# Patient Record
Sex: FEMALE | Race: ASIAN | NOT HISPANIC OR LATINO | Employment: UNEMPLOYED | URBAN - METROPOLITAN AREA
[De-identification: names, ages, dates, MRNs, and addresses within clinical notes are randomized per-mention and may not be internally consistent; named-entity substitution may affect disease eponyms.]

---

## 2018-03-07 ENCOUNTER — TRANSFERRED RECORDS (OUTPATIENT)
Dept: HEALTH INFORMATION MANAGEMENT | Facility: CLINIC | Age: 48
End: 2018-03-07

## 2019-07-29 LAB
CHOLESTEROL (EXTERNAL): 152 MG/DL
CREATININE (EXTERNAL): 1.99 MG/DL (ref 0.5–1.05)
GFR ESTIMATED (EXTERNAL): 28 ML/MIN/1.73M2
GFR ESTIMATED (IF AFRICAN AMERICAN) (EXTERNAL): 33 ML/MIN/1.73M2
GLUCOSE (EXTERNAL): 230 MG/DL (ref 65–99)
HDLC SERPL-MCNC: 16 MG/DL
NON HDL CHOLESTEROL (EXTERNAL): 136 MG/DL
POTASSIUM (EXTERNAL): 3.9 MMOL/L (ref 3.5–5.3)
TRIGLYCERIDES (EXTERNAL): 820 MG/DL

## 2019-11-15 LAB
AST SERPL-CCNC: 28 U/L (ref 10–35)
CHOLESTEROL (EXTERNAL): 136 MG/DL
CREATININE (EXTERNAL): 2.09 MG/DL (ref 0.5–1.05)
GFR ESTIMATED (EXTERNAL): 27 ML/MIN/1.73M2
GFR ESTIMATED (IF AFRICAN AMERICAN) (EXTERNAL): 31 ML/MIN/1.73M2
GLUCOSE (EXTERNAL): 369 MG/DL (ref 65–99)
HDLC SERPL-MCNC: 15 MG/DL
LDL CHOLESTEROL DIRECT (EXTERNAL): 30 MG/DL
NON HDL CHOLESTEROL (EXTERNAL): 121 MG/DL
POTASSIUM (EXTERNAL): 4.1 MMOL/L (ref 3.5–5.3)
TRIGLYCERIDES (EXTERNAL): 931 MG/DL

## 2020-07-29 LAB
AST SERPL-CCNC: 28 U/L (ref 10–35)
CHOLESTEROL (EXTERNAL): 220 MG/DL
CREATININE (EXTERNAL): 1.8 MG/DL (ref 0.5–1.05)
GFR ESTIMATED (EXTERNAL): 32 ML/MIN/1.73M2
GFR ESTIMATED (IF AFRICAN AMERICAN) (EXTERNAL): 37 ML/MIN/1.73M2
GLUCOSE (EXTERNAL): 492 MG/DL (ref 65–99)
HDLC SERPL-MCNC: 23 MG/DL
NON HDL CHOLESTEROL (EXTERNAL): 197 MG/DL
POTASSIUM (EXTERNAL): 4 MMOL/L (ref 3.5–5.3)
TRIGLYCERIDES (EXTERNAL): 1708 MG/DL
TSH SERPL-ACNC: 2.49 MIU/L (ref 0.4–4.5)

## 2020-12-02 LAB
AST SERPL-CCNC: 27 U/L (ref 10–35)
CHOLESTEROL (EXTERNAL): 136 MG/DL
CREATININE (EXTERNAL): 1.88 MG/DL (ref 0.5–1.05)
GFR ESTIMATED (EXTERNAL): 30 ML/MIN/1.73M2
GFR ESTIMATED (IF AFRICAN AMERICAN) (EXTERNAL): 35 ML/MIN/1.73M2
GLUCOSE (EXTERNAL): 153 MG/DL (ref 65–99)
HDLC SERPL-MCNC: 23 MG/DL
LDL CHOLESTEROL DIRECT (EXTERNAL): 33 MG/DL
NON HDL CHOLESTEROL (EXTERNAL): 113 MG/DL
POTASSIUM (EXTERNAL): 3.3 MMOL/L (ref 3.5–5.3)
TRIGLYCERIDES (EXTERNAL): 654 MG/DL

## 2021-03-23 LAB
AST SERPL-CCNC: 21 U/L (ref 10–35)
CHOLESTEROL (EXTERNAL): 152 MG/DL
CREATININE (EXTERNAL): 2.1 MG/DL (ref 0.5–1.05)
GFR ESTIMATED (EXTERNAL): 26 ML/MIN/1.73M2
GFR ESTIMATED (IF AFRICAN AMERICAN) (EXTERNAL): 30 ML/MIN/1.73M2
GLUCOSE (EXTERNAL): 77 MG/DL (ref 65–99)
HBA1C MFR BLD: 9.3 %
HDLC SERPL-MCNC: 29 MG/DL
NON HDL CHOLESTEROL (EXTERNAL): 123 MG/DL
POTASSIUM (EXTERNAL): 3 MMOL/L (ref 3.5–5.3)
TRIGLYCERIDES (EXTERNAL): 566 MG/DL

## 2021-07-07 ENCOUNTER — TRANSFERRED RECORDS (OUTPATIENT)
Dept: HEALTH INFORMATION MANAGEMENT | Facility: CLINIC | Age: 51
End: 2021-07-07

## 2021-07-07 LAB
ALBUMIN (URINE) MG/SPEC: 79.2 MG/DL
ALBUMIN/CREATININE RATIO: 1494 MCG/MG CREAT
AST SERPL-CCNC: 22 U/L (ref 10–35)
CHOLESTEROL (EXTERNAL): 150 MG/DL
CREATININE (EXTERNAL): 2.27 MG/DL (ref 0.5–1.05)
CREATININE (URINE): 53 MG/DL (ref 20–275)
GFR ESTIMATED (EXTERNAL): 24 ML/MIN/1.73M2
GFR ESTIMATED (IF AFRICAN AMERICAN) (EXTERNAL): 28 ML/MIN/1.73M2
GLUCOSE (EXTERNAL): 88 MG/DL (ref 65–99)
HBA1C MFR BLD: 8 %
HDLC SERPL-MCNC: 26 MG/DL
LDL CHOLESTEROL DIRECT (EXTERNAL): 44 MG/DL
NON HDL CHOLESTEROL (EXTERNAL): 124 MG/DL
POTASSIUM (EXTERNAL): 3.6 MMOL/L (ref 3.5–5.3)
TRIGLYCERIDES (EXTERNAL): 534 MG/DL

## 2021-08-17 ENCOUNTER — TRANSFERRED RECORDS (OUTPATIENT)
Dept: HEALTH INFORMATION MANAGEMENT | Facility: CLINIC | Age: 51
End: 2021-08-17

## 2021-08-17 LAB
C TRACH DNA SPEC QL PROBE+SIG AMP: NEGATIVE
HPV ABSTRACT: NORMAL
N GONORRHOEA DNA SPEC QL PROBE+SIG AMP: NEGATIVE
PAP-ABSTRACT: NORMAL
SPECIMEN DESCRIP: NORMAL
SPECIMEN DESCRIPTION: NORMAL

## 2021-09-24 ENCOUNTER — TRANSFERRED RECORDS (OUTPATIENT)
Dept: HEALTH INFORMATION MANAGEMENT | Facility: CLINIC | Age: 51
End: 2021-09-24

## 2021-09-24 LAB
CREATININE (EXTERNAL): 2.11 MG/DL (ref 0.5–1.05)
GFR ESTIMATED (EXTERNAL): 26 ML/MIN/1.73M2
GFR ESTIMATED (IF AFRICAN AMERICAN) (EXTERNAL): 30 ML/MIN/1.73M2
GLUCOSE (EXTERNAL): 131 MG/DL (ref 65–99)
HBA1C MFR BLD: 9.3 %
POTASSIUM (EXTERNAL): 3.6 MMOL/L (ref 3.5–5.3)

## 2021-09-29 ENCOUNTER — TRANSFERRED RECORDS (OUTPATIENT)
Dept: HEALTH INFORMATION MANAGEMENT | Facility: CLINIC | Age: 51
End: 2021-09-29

## 2021-12-02 ENCOUNTER — HOSPITAL ENCOUNTER (EMERGENCY)
Facility: HOSPITAL | Age: 51
Discharge: HOME | End: 2021-12-02
Attending: EMERGENCY MEDICINE | Admitting: EMERGENCY MEDICINE
Payer: MEDICAID

## 2021-12-02 VITALS
HEART RATE: 75 BPM | WEIGHT: 105 LBS | TEMPERATURE: 98.2 F | OXYGEN SATURATION: 95 % | SYSTOLIC BLOOD PRESSURE: 138 MMHG | DIASTOLIC BLOOD PRESSURE: 77 MMHG | RESPIRATION RATE: 16 BRPM

## 2021-12-02 DIAGNOSIS — Z78.9 HAS RUN OUT OF MEDICATIONS: ICD-10-CM

## 2021-12-02 DIAGNOSIS — R73.9 HYPERGLYCEMIA: ICD-10-CM

## 2021-12-02 LAB — GLUCOSE BLDC GLUCOMTR-MCNC: 573 MG/DL (ref 70–99)

## 2021-12-02 PROCEDURE — 99284 EMERGENCY DEPT VISIT MOD MDM: CPT

## 2021-12-02 RX ORDER — ESCITALOPRAM OXALATE 10 MG/1
10 TABLET ORAL DAILY
Qty: 30 TABLET | Refills: 0 | Status: SHIPPED | OUTPATIENT
Start: 2021-12-02 | End: 2021-12-13

## 2021-12-02 RX ORDER — AMLODIPINE BESYLATE 10 MG/1
10 TABLET ORAL DAILY
Qty: 30 TABLET | Refills: 0 | Status: ON HOLD | OUTPATIENT
Start: 2021-12-02 | End: 2021-12-10

## 2021-12-02 RX ORDER — METOPROLOL SUCCINATE 200 MG/1
200 TABLET, EXTENDED RELEASE ORAL 2 TIMES DAILY
Qty: 60 TABLET | Refills: 0 | Status: ON HOLD | OUTPATIENT
Start: 2021-12-02 | End: 2021-12-10

## 2021-12-02 RX ORDER — DULAGLUTIDE 3 MG/.5ML
0.5 INJECTION, SOLUTION SUBCUTANEOUS WEEKLY
Qty: 4 ML | Refills: 0 | Status: SHIPPED | OUTPATIENT
Start: 2021-12-02 | End: 2021-12-06

## 2021-12-02 RX ORDER — FENOFIBRATE 54 MG/1
54 TABLET ORAL 2 TIMES DAILY
Qty: 60 TABLET | Refills: 0 | Status: SHIPPED | OUTPATIENT
Start: 2021-12-02 | End: 2021-12-06

## 2021-12-02 RX ORDER — LISINOPRIL 40 MG/1
40 TABLET ORAL DAILY
Qty: 30 TABLET | Refills: 0 | Status: ON HOLD | OUTPATIENT
Start: 2021-12-02 | End: 2021-12-10

## 2021-12-02 RX ORDER — EZETIMIBE 10 MG/1
10 TABLET ORAL DAILY
Qty: 30 TABLET | Refills: 0 | Status: SHIPPED | OUTPATIENT
Start: 2021-12-02 | End: 2021-12-13

## 2021-12-02 RX ORDER — INSULIN DEGLUDEC 200 U/ML
160 INJECTION, SOLUTION SUBCUTANEOUS DAILY
Qty: 30 ML | Refills: 0 | Status: SHIPPED | OUTPATIENT
Start: 2021-12-02 | End: 2021-12-06

## 2021-12-02 RX ORDER — HYDROCHLOROTHIAZIDE 50 MG/1
50 TABLET ORAL DAILY
Qty: 30 TABLET | Refills: 0 | Status: SHIPPED | OUTPATIENT
Start: 2021-12-02 | End: 2021-12-13

## 2021-12-02 RX ORDER — INSULIN LISPRO 100 U/ML
20 INJECTION, SOLUTION SUBCUTANEOUS
Qty: 15 ML | Refills: 0 | Status: SHIPPED | OUTPATIENT
Start: 2021-12-02 | End: 2021-12-06

## 2021-12-02 RX ORDER — ATORVASTATIN CALCIUM 80 MG/1
80 TABLET, FILM COATED ORAL DAILY
Qty: 30 TABLET | Refills: 0 | Status: SHIPPED | OUTPATIENT
Start: 2021-12-02 | End: 2021-12-13

## 2021-12-02 ASSESSMENT — ENCOUNTER SYMPTOMS
CONFUSION: 0
NECK STIFFNESS: 0
SLEEP DISTURBANCE: 1
FEVER: 0
COLOR CHANGE: 0
HEADACHES: 0
MYALGIAS: 1
DIFFICULTY URINATING: 0
EYE REDNESS: 0
SHORTNESS OF BREATH: 0
ARTHRALGIAS: 0
ABDOMINAL PAIN: 0

## 2021-12-02 NOTE — ED TRIAGE NOTES
Patient presents here for evaluation of not feeling well due to running out with many of her prescribed medications, including those to treat her diabetes. She states that she has she has not monitored her blood sugar for one month.     Patient's blood glucose in triage is 573 at this time.

## 2021-12-02 NOTE — ED PROVIDER NOTES
EMERGENCY DEPARTMENT ENCOUNTER      NAME: Nithya Matthews  AGE: 51 year old female  YOB: 1970  MRN: [id number]  EVALUATION DATE & TIME: No admission date for patient encounter.    PCP: No Ref-Primary, Physician    ED PROVIDER: Andi You M.D.      Chief Complaint   Patient presents with     Ran out of medications         IMPRESSION  1. Has run out of medications    2. Hyperglycemia        PLAN  - refilled medications  - PCP referral made  - close PCP follow up  - discharge to home    ED COURSE & MEDICAL DECISION MAKING         1:12 PM I met with the patient for the initial interview and physical examination. Discussed plan for treatment and workup in the ED.  1:18 PM The patient's blood glucose noted to be 573.  1:40 PM I discussed the plan for discharge with the patient, and patient is agreeable. We discussed supportivecares at home and reasons for return to the ER including new or worsening symptoms - all questions and concerns addressed. Patient to be discharged by RN.     51yoF with history of T2DM, HTN, HLD presenting with her daughter requesting her previously-prescribed meds be refilled; moved from [place], [place] and ran out of meds 2 days ago. Feels fatigued since running out of her meds but no other symptoms. Normal vitals on presentation. Exam unremarkable with normal work of breathing, clear lungs, normal neuro exam, no peripheral edema, no rash, benign abdomen; reassuring. POC glucose in 500s but certainly no clinical concern for DKA with clear lungs, normal work of breathing, normal vitals. Refilled essential medications (diabetes, HTN, HLD) per patient & daughter request and referral to PCP made. Patient comfortable with discharge home at this time. Tolerating PO without difficulty; declines any labs here in the ED. Return precautions and need for PCP follow up discussed and understood. No further questions at the time of discharge.    I wore the following PPE during this patient encounter:  N95  mask, face shield w/ eye protection, gloves.    MEDICATIONS GIVEN IN THE EMERGENCY DEPARTMENT  Medications - No data to display    NEW PRESCRIPTIONS STARTED AT TODAY'S ER VISIT  Current Discharge Medication List      START taking these medications    Details   amLODIPine (NORVASC) 10 MG tablet Take 1 tablet (10 mg) by mouth daily  Qty: 30 tablet, Refills: 0      atorvastatin (LIPITOR) 80 MG tablet Take 1 tablet (80 mg) by mouth daily  Qty: 30 tablet, Refills: 0      Dulaglutide (TRULICITY) 3 MG/0.5ML SOPN Inject 0.5 mLs Subcutaneous once a week  Qty: 4 mL, Refills: 0      escitalopram (LEXAPRO) 10 MG tablet Take 1 tablet (10 mg) by mouth daily  Qty: 30 tablet, Refills: 0      ezetimibe (ZETIA) 10 MG tablet Take 1 tablet (10 mg) by mouth daily  Qty: 30 tablet, Refills: 0      fenofibrate (LOFIBRA) 54 MG tablet Take 1 tablet (54 mg) by mouth 2 times daily  Qty: 60 tablet, Refills: 0      hydrochlorothiazide (HYDRODIURIL) 50 MG tablet Take 1 tablet (50 mg) by mouth daily  Qty: 30 tablet, Refills: 0      insulin degludec (TRESIBA FLEXTOUCH) 200 UNIT/ML pen Inject 160 Units Subcutaneous daily  Qty: 30 mL, Refills: 0      insulin lispro (ADMELOG SOLOSTAR) 100 UNIT/ML pen Inject 20 Units Subcutaneous 2 times daily (before meals)  Qty: 15 mL, Refills: 0      lisinopril (ZESTRIL) 40 MG tablet Take 1 tablet (40 mg) by mouth daily  Qty: 30 tablet, Refills: 0      metoprolol succinate ER (TOPROL-XL) 200 MG 24 hr tablet Take 1 tablet (200 mg) by mouth 2 times daily  Qty: 60 tablet, Refills: 0           =================================================================      HPI  Patient information was obtained from: the patient    Use of : Yes - Language Jamari Matthews is a 51 year old female with no history who presents to this ED by personal vehicle for evaluation of med refill.    The patient presents to the ED today needing a medication refill.  [place] and has not taken her  medication for ~2 days and hasn't taken her injections for ~1 month.  She is currently out of all of her medications. She has not established a PCP since moving. She reports shakiness, myalgias, sleep disturbance.    The patient denies any other symptoms at this time.      REVIEW OF SYSTEMS   Review of Systems   Constitutional: Negative for fever.   HENT: Negative for congestion.    Eyes: Negative for redness.   Respiratory: Negative for shortness of breath.    Cardiovascular: Negative for chest pain.   Gastrointestinal: Negative for abdominal pain.   Genitourinary: Negative for difficulty urinating.   Musculoskeletal: Positive for myalgias. Negative for arthralgias and neck stiffness.        Positive for shakiness.   Skin: Negative for color change.   Neurological: Negative for headaches.   Psychiatric/Behavioral: Positive for sleep disturbance. Negative for confusion.   All other systems reviewed and are negative.          --------------- MEDICAL HISTORY ---------------  PAST MEDICAL HISTORY:  History reviewed. No pertinent past medical history.  There is no problem list on file for this patient.      PAST SURGICAL HISTORY:  History reviewed. No pertinent surgical history.    CURRENT MEDICATIONS:    No current facility-administered medications for this encounter.    Current Outpatient Medications:      amLODIPine (NORVASC) 10 MG tablet, Take 1 tablet (10 mg) by mouth daily, Disp: 30 tablet, Rfl: 0     atorvastatin (LIPITOR) 80 MG tablet, Take 1 tablet (80 mg) by mouth daily, Disp: 30 tablet, Rfl: 0     Dulaglutide (TRULICITY) 3 MG/0.5ML SOPN, Inject 0.5 mLs Subcutaneous once a week, Disp: 4 mL, Rfl: 0     escitalopram (LEXAPRO) 10 MG tablet, Take 1 tablet (10 mg) by mouth daily, Disp: 30 tablet, Rfl: 0     ezetimibe (ZETIA) 10 MG tablet, Take 1 tablet (10 mg) by mouth daily, Disp: 30 tablet, Rfl: 0     fenofibrate (LOFIBRA) 54 MG tablet, Take 1 tablet (54 mg) by mouth 2 times daily, Disp: 60 tablet, Rfl: 0      hydrochlorothiazide (HYDRODIURIL) 50 MG tablet, Take 1 tablet (50 mg) by mouth daily, Disp: 30 tablet, Rfl: 0     insulin degludec (TRESIBA FLEXTOUCH) 200 UNIT/ML pen, Inject 160 Units Subcutaneous daily, Disp: 30 mL, Rfl: 0     insulin lispro (ADMELOG SOLOSTAR) 100 UNIT/ML pen, Inject 20 Units Subcutaneous 2 times daily (before meals), Disp: 15 mL, Rfl: 0     lisinopril (ZESTRIL) 40 MG tablet, Take 1 tablet (40 mg) by mouth daily, Disp: 30 tablet, Rfl: 0     metoprolol succinate ER (TOPROL-XL) 200 MG 24 hr tablet, Take 1 tablet (200 mg) by mouth 2 times daily, Disp: 60 tablet, Rfl: 0    ALLERGIES:  No Known Allergies    FAMILY HISTORY:  History reviewed. No pertinent family history.    SOCIAL HISTORY:   Social History     Socioeconomic History     Marital status:      Spouse name: Not on file     Number of children: Not on file     Years of education: Not on file     Highest education level: Not on file   Occupational History     Not on file   Tobacco Use     Smoking status: Not on file     Smokeless tobacco: Not on file   Substance and Sexual Activity     Alcohol use: Not on file     Drug use: Not on file     Sexual activity: Not on file   Other Topics Concern     Not on file   Social History Narrative     Not on file     Social Determinants of Health     Financial Resource Strain: Not on file   Food Insecurity: Not on file   Transportation Needs: Not on file   Physical Activity: Not on file   Stress: Not on file   Social Connections: Not on file   Intimate Partner Violence: Not on file   Housing Stability: Not on file         --------------- PHYSICAL EXAM ---------------  Nursing notes and vitals reviewed by me.  VITALS:  Vitals:    12/02/21 1306   BP: 138/77   Pulse: 75   Resp: 16   Temp: 98.2  F (36.8  C)   TempSrc: Oral   SpO2: 95%   Weight: 47.6 kg (105 lb)       PHYSICAL EXAM:    General:  alert, interactive, no distress  Eyes:  conjunctivae clear, conjugate gaze   HENT:  atraumatic, nose with no  rhinorrhea, oropharynx clear  Neck:  no meningismus  Cardiovascular:  HR 70s during exam, regular rhythm, no murmurs, brisk cap refill  Chest:  no chest wall tenderness  Pulmonary:  no stridor, normal phonation, normal work of breathing, clear lungs bilaterally  Abdomen:  soft, nondistended, nontender  :  no CVA tenderness  Back:  no midline spinal tenderness  Musculoskeletal:  no pretibial edema, no calf tenderness. Gross ROM intact to joints of extremities with no obvious deformities.  Skin:  warm, dry, no rash  Neuro:  awake, alert, answers questions appropriately, follows commands, moves all limbs, CN 2-12 intact, negative pronator drift, 5/5 strength to all extremities with sensation to light touch intact, no tremor  Psych:  calm, normal affect      --------------- RESULTS ---------------  LAB:  Reviewed and interpreted by me.  Results for orders placed or performed during the hospital encounter of 12/02/21   Glucose by meter   Result Value Ref Range    GLUCOSE BY METER POCT 573 () 70 - 99 mg/dL           I, Mariusz Suarez, am serving as a scribe to document services personally performed by Dr. Andi You based on my observation and the provider's statements to me. I, Andi You MD attest that Mariusz Suarez is acting in a scribe capacity, has observed my performance of the services and has documented them in accordance with my direction.      Andi You MD  12/02/21  Emergency Medicine  [facility] EMERGENCY DEPARTMENT  [address] [place] [id number] [phone]  Dept: [phone]       Andi You MD  12/02/21 8286

## 2021-12-02 NOTE — DISCHARGE INSTRUCTIONS
Take your prescriptions as prescribed.    Drink plenty of fluids to stay hydrated.    Follow up with your Primary Care provider in 2 days for a recheck.    Return to the Emergency Department for any difficulty breathing, persistent vomiting, or any other concerns.

## 2021-12-06 ENCOUNTER — APPOINTMENT (OUTPATIENT)
Dept: CT IMAGING | Facility: HOSPITAL | Age: 51
End: 2021-12-06
Attending: EMERGENCY MEDICINE
Payer: MEDICAID

## 2021-12-06 ENCOUNTER — OFFICE VISIT (OUTPATIENT)
Dept: FAMILY MEDICINE | Facility: CLINIC | Age: 51
End: 2021-12-06
Payer: MEDICAID

## 2021-12-06 ENCOUNTER — APPOINTMENT (OUTPATIENT)
Dept: RADIOLOGY | Facility: HOSPITAL | Age: 51
End: 2021-12-06
Attending: EMERGENCY MEDICINE
Payer: MEDICAID

## 2021-12-06 ENCOUNTER — HOSPITAL ENCOUNTER (INPATIENT)
Facility: HOSPITAL | Age: 51
LOS: 1 days | Discharge: HOME | End: 2021-12-10
Attending: EMERGENCY MEDICINE | Admitting: HOSPITALIST
Payer: MEDICAID

## 2021-12-06 ENCOUNTER — TELEPHONE (OUTPATIENT)
Dept: FAMILY MEDICINE | Facility: CLINIC | Age: 51
End: 2021-12-06

## 2021-12-06 VITALS
WEIGHT: 106 LBS | RESPIRATION RATE: 16 BRPM | BODY MASS INDEX: 19.51 KG/M2 | TEMPERATURE: 98.5 F | DIASTOLIC BLOOD PRESSURE: 58 MMHG | HEIGHT: 62 IN | OXYGEN SATURATION: 95 % | HEART RATE: 61 BPM | SYSTOLIC BLOOD PRESSURE: 110 MMHG

## 2021-12-06 DIAGNOSIS — I10 HYPERTENSION, UNSPECIFIED TYPE: ICD-10-CM

## 2021-12-06 DIAGNOSIS — E78.5 HYPERLIPIDEMIA LDL GOAL <100: Primary | ICD-10-CM

## 2021-12-06 DIAGNOSIS — E78.5 HYPERLIPIDEMIA LDL GOAL <100: ICD-10-CM

## 2021-12-06 DIAGNOSIS — E11.69 TYPE 2 DIABETES MELLITUS WITH OTHER SPECIFIED COMPLICATION, UNSPECIFIED WHETHER LONG TERM INSULIN USE (H): Primary | ICD-10-CM

## 2021-12-06 DIAGNOSIS — F32.A DEPRESSION, UNSPECIFIED DEPRESSION TYPE: ICD-10-CM

## 2021-12-06 DIAGNOSIS — E11.69 TYPE 2 DIABETES MELLITUS WITH OTHER SPECIFIED COMPLICATION, UNSPECIFIED WHETHER LONG TERM INSULIN USE (H): ICD-10-CM

## 2021-12-06 DIAGNOSIS — R73.9 HYPERGLYCEMIA: ICD-10-CM

## 2021-12-06 DIAGNOSIS — M62.81 GENERALIZED MUSCLE WEAKNESS: ICD-10-CM

## 2021-12-06 PROBLEM — N18.30 STAGE 3 CHRONIC KIDNEY DISEASE (H): Status: ACTIVE | Noted: 2021-12-06

## 2021-12-06 LAB
ALBUMIN SERPL-MCNC: 4 G/DL (ref 3.5–5)
ALBUMIN UR-MCNC: 100 MG/DL
ALP SERPL-CCNC: 85 U/L (ref 45–120)
ALT SERPL W P-5'-P-CCNC: 37 U/L (ref 0–45)
ANION GAP SERPL CALCULATED.3IONS-SCNC: 16 MMOL/L (ref 5–18)
ANION GAP SERPL CALCULATED.3IONS-SCNC: 9 MMOL/L (ref 3–14)
APPEARANCE UR: CLEAR
AST SERPL W P-5'-P-CCNC: 50 U/L (ref 0–40)
BACTERIA #/AREA URNS HPF: ABNORMAL /HPF
BILIRUB SERPL-MCNC: 0.7 MG/DL (ref 0–1)
BILIRUB UR QL STRIP: NEGATIVE
BUN SERPL-MCNC: 39 MG/DL (ref 8–22)
BUN SERPL-MCNC: 41 MG/DL (ref 7–30)
CALCIUM SERPL-MCNC: 8.9 MG/DL (ref 8.5–10.1)
CALCIUM SERPL-MCNC: 8.9 MG/DL (ref 8.5–10.5)
CHLORIDE BLD-SCNC: 91 MMOL/L (ref 94–109)
CHLORIDE BLD-SCNC: 92 MMOL/L (ref 98–107)
CHOLEST SERPL-MCNC: 194 MG/DL
CO2 SERPL-SCNC: 25 MMOL/L (ref 22–31)
CO2 SERPL-SCNC: 29 MMOL/L (ref 20–32)
COLOR UR AUTO: ABNORMAL
CREAT SERPL-MCNC: 2.16 MG/DL (ref 0.52–1.04)
CREAT SERPL-MCNC: 2.54 MG/DL (ref 0.6–1.1)
ERYTHROCYTE [DISTWIDTH] IN BLOOD BY AUTOMATED COUNT: 18.8 % (ref 10–15)
ERYTHROCYTE [DISTWIDTH] IN BLOOD BY AUTOMATED COUNT: 18.8 % (ref 10–15)
FASTING STATUS PATIENT QL REPORTED: NO
GFR SERPL CREATININE-BSD FRML MDRD: 21 ML/MIN/1.73M2
GFR SERPL CREATININE-BSD FRML MDRD: 25 ML/MIN/1.73M2
GLUCOSE BLD-MCNC: 450 MG/DL (ref 70–125)
GLUCOSE BLD-MCNC: 669 MG/DL (ref 70–99)
GLUCOSE BLDC GLUCOMTR-MCNC: 409 MG/DL (ref 70–99)
GLUCOSE UR STRIP-MCNC: >1000 MG/DL
HBA1C MFR BLD: 14.5 % (ref 0–5.6)
HCT VFR BLD AUTO: 33.3 % (ref 35–47)
HCT VFR BLD AUTO: 35.5 % (ref 35–47)
HDLC SERPL-MCNC: 28 MG/DL
HGB BLD-MCNC: 10.8 G/DL (ref 11.7–15.7)
HGB BLD-MCNC: 11.3 G/DL (ref 11.7–15.7)
HGB UR QL STRIP: ABNORMAL
KETONES UR STRIP-MCNC: NEGATIVE MG/DL
LDLC SERPL CALC-MCNC: 40 MG/DL
LDLC SERPL CALC-MCNC: ABNORMAL MG/DL
LEUKOCYTE ESTERASE UR QL STRIP: NEGATIVE
LIPASE SERPL-CCNC: 108 U/L (ref 0–52)
MAGNESIUM SERPL-MCNC: 1.9 MG/DL (ref 1.8–2.6)
MCH RBC QN AUTO: 21.4 PG (ref 26.5–33)
MCH RBC QN AUTO: 21.7 PG (ref 26.5–33)
MCHC RBC AUTO-ENTMCNC: 31.8 G/DL (ref 31.5–36.5)
MCHC RBC AUTO-ENTMCNC: 32.4 G/DL (ref 31.5–36.5)
MCV RBC AUTO: 67 FL (ref 78–100)
MCV RBC AUTO: 67 FL (ref 78–100)
NITRATE UR QL: NEGATIVE
NONHDLC SERPL-MCNC: 166 MG/DL
PH UR STRIP: 6 [PH] (ref 5–7)
PLATELET # BLD AUTO: 188 10E3/UL (ref 150–450)
PLATELET # BLD AUTO: 200 10E3/UL (ref 150–450)
POTASSIUM BLD-SCNC: 3.4 MMOL/L (ref 3.4–5.3)
POTASSIUM BLD-SCNC: 3.5 MMOL/L (ref 3.5–5)
PROT SERPL-MCNC: 8.5 G/DL (ref 6–8)
RBC # BLD AUTO: 4.98 10E6/UL (ref 3.8–5.2)
RBC # BLD AUTO: 5.29 10E6/UL (ref 3.8–5.2)
RBC URINE: 3 /HPF
SARS-COV-2 RNA RESP QL NAA+PROBE: NEGATIVE
SODIUM SERPL-SCNC: 129 MMOL/L (ref 133–144)
SODIUM SERPL-SCNC: 133 MMOL/L (ref 136–145)
SP GR UR STRIP: 1.02 (ref 1–1.03)
SQUAMOUS EPITHELIAL: 4 /HPF
TRIGL SERPL-MCNC: 1018 MG/DL
UROBILINOGEN UR STRIP-MCNC: <2 MG/DL
WBC # BLD AUTO: 7.6 10E3/UL (ref 4–11)
WBC # BLD AUTO: 9.5 10E3/UL (ref 4–11)
WBC URINE: 10 /HPF

## 2021-12-06 PROCEDURE — 71045 X-RAY EXAM CHEST 1 VIEW: CPT

## 2021-12-06 PROCEDURE — C9803 HOPD COVID-19 SPEC COLLECT: HCPCS

## 2021-12-06 PROCEDURE — 81001 URINALYSIS AUTO W/SCOPE: CPT | Performed by: STUDENT IN AN ORGANIZED HEALTH CARE EDUCATION/TRAINING PROGRAM

## 2021-12-06 PROCEDURE — 80061 LIPID PANEL: CPT | Performed by: PHYSICIAN ASSISTANT

## 2021-12-06 PROCEDURE — 85027 COMPLETE CBC AUTOMATED: CPT | Performed by: EMERGENCY MEDICINE

## 2021-12-06 PROCEDURE — 258N000003 HC RX IP 258 OP 636: Performed by: EMERGENCY MEDICINE

## 2021-12-06 PROCEDURE — 36415 COLL VENOUS BLD VENIPUNCTURE: CPT | Performed by: EMERGENCY MEDICINE

## 2021-12-06 PROCEDURE — 84300 ASSAY OF URINE SODIUM: CPT | Performed by: HOSPITALIST

## 2021-12-06 PROCEDURE — 83690 ASSAY OF LIPASE: CPT | Performed by: EMERGENCY MEDICINE

## 2021-12-06 PROCEDURE — 36415 COLL VENOUS BLD VENIPUNCTURE: CPT | Performed by: PHYSICIAN ASSISTANT

## 2021-12-06 PROCEDURE — 83735 ASSAY OF MAGNESIUM: CPT | Performed by: EMERGENCY MEDICINE

## 2021-12-06 PROCEDURE — 99285 EMERGENCY DEPT VISIT HI MDM: CPT | Mod: 25

## 2021-12-06 PROCEDURE — 74176 CT ABD & PELVIS W/O CONTRAST: CPT

## 2021-12-06 PROCEDURE — 80048 BASIC METABOLIC PNL TOTAL CA: CPT | Performed by: EMERGENCY MEDICINE

## 2021-12-06 PROCEDURE — 83721 ASSAY OF BLOOD LIPOPROTEIN: CPT | Mod: 59 | Performed by: PHYSICIAN ASSISTANT

## 2021-12-06 PROCEDURE — 99204 OFFICE O/P NEW MOD 45 MIN: CPT | Performed by: PHYSICIAN ASSISTANT

## 2021-12-06 PROCEDURE — 84520 ASSAY OF UREA NITROGEN: CPT | Performed by: EMERGENCY MEDICINE

## 2021-12-06 PROCEDURE — 96361 HYDRATE IV INFUSION ADD-ON: CPT

## 2021-12-06 PROCEDURE — 83935 ASSAY OF URINE OSMOLALITY: CPT | Performed by: HOSPITALIST

## 2021-12-06 PROCEDURE — 80053 COMPREHEN METABOLIC PANEL: CPT | Performed by: PHYSICIAN ASSISTANT

## 2021-12-06 PROCEDURE — 83036 HEMOGLOBIN GLYCOSYLATED A1C: CPT | Performed by: PHYSICIAN ASSISTANT

## 2021-12-06 PROCEDURE — 81001 URINALYSIS AUTO W/SCOPE: CPT | Performed by: EMERGENCY MEDICINE

## 2021-12-06 PROCEDURE — 87635 SARS-COV-2 COVID-19 AMP PRB: CPT | Performed by: EMERGENCY MEDICINE

## 2021-12-06 PROCEDURE — 85027 COMPLETE CBC AUTOMATED: CPT | Performed by: PHYSICIAN ASSISTANT

## 2021-12-06 RX ORDER — INSULIN LISPRO 100 U/ML
20 INJECTION, SOLUTION SUBCUTANEOUS
Qty: 15 ML | Refills: 0 | Status: SHIPPED | OUTPATIENT
Start: 2021-12-06 | End: 2021-12-06

## 2021-12-06 RX ORDER — FENOFIBRATE 54 MG/1
54 TABLET ORAL 2 TIMES DAILY
Qty: 60 TABLET | Refills: 0 | Status: ON HOLD | OUTPATIENT
Start: 2021-12-06 | End: 2021-12-10

## 2021-12-06 RX ORDER — DULAGLUTIDE 3 MG/.5ML
0.5 INJECTION, SOLUTION SUBCUTANEOUS WEEKLY
Qty: 4 ML | Refills: 0 | Status: SHIPPED | OUTPATIENT
Start: 2021-12-06 | End: 2021-12-13

## 2021-12-06 RX ORDER — INSULIN LISPRO 100 U/ML
20 INJECTION, SOLUTION SUBCUTANEOUS
Qty: 15 ML | Refills: 0 | Status: ON HOLD | OUTPATIENT
Start: 2021-12-06 | End: 2021-12-10

## 2021-12-06 RX ORDER — DULAGLUTIDE 0.75 MG/.5ML
0.75 INJECTION, SOLUTION SUBCUTANEOUS
Qty: 2 ML | Refills: 0 | Status: SHIPPED | OUTPATIENT
Start: 2021-12-06 | End: 2021-12-07

## 2021-12-06 RX ORDER — INSULIN DEGLUDEC 200 U/ML
80 INJECTION, SOLUTION SUBCUTANEOUS DAILY
Qty: 30 ML | Refills: 0 | Status: ON HOLD | OUTPATIENT
Start: 2021-12-06 | End: 2021-12-10

## 2021-12-06 RX ORDER — INSULIN DEGLUDEC 200 U/ML
160 INJECTION, SOLUTION SUBCUTANEOUS DAILY
Qty: 30 ML | Refills: 0 | Status: SHIPPED | OUTPATIENT
Start: 2021-12-06 | End: 2021-12-06

## 2021-12-06 RX ADMIN — SODIUM CHLORIDE 2000 ML: 9 INJECTION, SOLUTION INTRAVENOUS at 22:22

## 2021-12-06 ASSESSMENT — ENCOUNTER SYMPTOMS
PARESTHESIAS: 1
NERVOUS/ANXIOUS: 0
VOMITING: 1
APPETITE CHANGE: 0
LIGHT-HEADEDNESS: 0
BACK PAIN: 1
VOMITING: 0
CHILLS: 1
FEVER: 0
FEVER: 0
DIZZINESS: 0
DIAPHORESIS: 0
NAUSEA: 0
SHORTNESS OF BREATH: 0
ABDOMINAL PAIN: 0
DIARRHEA: 1
BACK PAIN: 1

## 2021-12-06 ASSESSMENT — PAIN SCALES - GENERAL: PAINLEVEL: NO PAIN (0)

## 2021-12-06 ASSESSMENT — MIFFLIN-ST. JEOR: SCORE: 1039.06

## 2021-12-06 NOTE — PROGRESS NOTES
Assessment & Plan     Type 2 diabetes mellitus with other specified complication, unspecified whether long term insulin use (H)  Patient is a 51-year-old female with past medical history significant for type 2 diabetes-on insulin, hyperlipidemia, depression, hypertension who presents to clinic with daughter requesting to establish primary care provider following ED visit.  Patient was evaluated in ED due to running out of all medications as she recently moved from [place] to [place] and has not established primary care.  Per ED note, patient was out of medications for 3 days.  Per daughter, patient has been off of medications for 1 month.  Some medications were refilled through ED, but medications for diabetes including Trulicity, insulin degludec, and  insulin lispro declined.  Patient denies nausea, vomiting, abdominal pain.  Blood glucose in ED in the 500's.  No further lab work completed during ED visit.  Vital signs normal.  Physical exam without acute abnormalities.     Will complete A1c as well as electrolyte panel today.  Discussed that I am a same-day care provider and do not typically manage chronic conditions.  We will schedule patient with PCP for further management.  Patient care and medication dosing discussed with Sarah Moon, primary care NP.  Refills represcribed with recommendation to switch to alternative brand if needed for insurance coverage.  Patient scheduled to establish care PCP in 1 week.  Discussed symptoms that warrant urgent/emergent follow-up as well as return precautions.    - Dulaglutide (TRULICITY) 3 MG/0.5ML SOPN; Inject 0.5 mLs Subcutaneous once a week  - CBC with platelets; Future  - Basic metabolic panel  (Ca, Cl, CO2, Creat, Gluc, K, Na, BUN); Future  - Hemoglobin A1c; Future  - insulin degludec (TRESIBA FLEXTOUCH) 200 UNIT/ML pen; Inject 80 Units Subcutaneous daily  - insulin lispro (ADMELOG SOLOSTAR) 100 UNIT/ML pen; Inject 20 Units Subcutaneous 2 times daily (before  meals)  - dulaglutide (TRULICITY) 0.75 MG/0.5ML pen; Inject 0.75 mg Subcutaneous every 7 days  - CBC with platelets  - Basic metabolic panel  (Ca, Cl, CO2, Creat, Gluc, K, Na, BUN)  - Hemoglobin A1c    Hypertension, unspecified type  Patient compliant with amlodipine, hydrochlorothiazide, and metoprolol as prescribed by prior PCP and refilled the ED.  Blood pressure today 110/58.    Hyperlipidemia LDL goal <100  Patient prescribed fenofibrate as well as atorvastatin for management of hyperlipidemia.  Atorvastatin was refilled by ED, but fenofibrate declined by insurance.  Fenofibrate refill provided today.  Lipid panel in process.  - fenofibrate (LOFIBRA) 54 MG tablet; Take 1 tablet (54 mg) by mouth 2 times daily  - Lipid panel reflex to direct LDL Fasting; Future  - Lipid panel reflex to direct LDL Fasting    Depression, unspecified depression type  Patient has long-term history of depression controlled with escitalopram/Lexapro.  Refill provided by ED.  Patient compliant with this medication.     See Patient Instructions    No follow-ups on file.    Flor Cho PA-C  [facility] LENNOX Barriga is a 51 year old who presents for the following health issues     HPI     - Follow up aft 12/2/2021 ED visit. She is currently out of all medication. Patient has been off of insulin and other medications X 1 month     Diabetes Follow-up  Trulicity 0.5mL SubQ weekly, zetia 10mg every day, Tresiba 160U SubQ every day, Admelog 20U SubQ bid  How often are you checking your blood sugar? Two times daily  Blood sugar testing frequency justification:  Uncontrolled diabetes  What time of day are you checking your blood sugars (select all that apply)?  Before meals  Have you had any blood sugars above 200?  Yes 490 this morning before breakfast  Have you had any blood sugars below 70?  No    What symptoms do you notice when your blood sugar is low?  None    What concerns do you have today about your  "diabetes? Blood sugar is often over 200     Do you have any of these symptoms? (Select all that apply)  Numbness in feet and Burning in feet      Hyperlipidemia Follow-Up  Atorvastatin 80mg every day, fenobibrate 54mg bid    Are you regularly taking any medication or supplement to lower your cholesterol?   Yes- statin and fibrate    Are you having muscle aches or other side effects that you think could be caused by your cholesterol lowering medication?  No    Hypertension Follow-up  Amlodipine 10mg every day, hydrochlorothiazide 50mg every day, lisinopril 40mg, metoprolol 200mg bid    Do you check your blood pressure regularly outside of the clinic? No     Are you following a low salt diet? No    Are your blood pressures ever more than 140 on the top number (systolic) OR more   than 90 on the bottom number (diastolic), for example 140/90? N/A    BP Readings from Last 2 Encounters:   12/06/21 110/58   12/02/21 138/77     No results found for: A1C, LDL        Review of Systems   Constitutional: Negative for fever.   Respiratory: Negative for shortness of breath.    Cardiovascular: Negative for chest pain.   Gastrointestinal: Negative for abdominal pain, nausea and vomiting.   Musculoskeletal: Positive for back pain.   Skin: Negative for rash.   Neurological: Positive for paresthesias. Negative for dizziness and light-headedness.   Psychiatric/Behavioral: The patient is not nervous/anxious.             Objective    /58   Pulse 61   Temp 98.5  F (36.9  C) (Tympanic)   Resp 16   Ht 1.575 m (5' 2\")   Wt 48.1 kg (106 lb)   SpO2 95%   BMI 19.39 kg/m    Body mass index is 19.39 kg/m .  Physical Exam  Vitals and nursing note reviewed.   Constitutional:       General: She is not in acute distress.     Appearance: Normal appearance.   HENT:      Head: Normocephalic and atraumatic.      Mouth/Throat:      Mouth: Mucous membranes are moist.      Pharynx: Oropharynx is clear.   Eyes:      Extraocular Movements: " Extraocular movements intact.      Pupils: Pupils are equal, round, and reactive to light.   Cardiovascular:      Rate and Rhythm: Normal rate and regular rhythm.      Heart sounds: Normal heart sounds.   Pulmonary:      Effort: Pulmonary effort is normal.      Breath sounds: Normal breath sounds.   Abdominal:      General: Bowel sounds are normal.      Palpations: Abdomen is soft.      Tenderness: There is no abdominal tenderness.   Musculoskeletal:         General: Normal range of motion.      Cervical back: Normal range of motion.   Skin:     General: Skin is warm and dry.   Neurological:      General: No focal deficit present.      Mental Status: She is alert.   Psychiatric:         Mood and Affect: Mood normal.         Behavior: Behavior normal.

## 2021-12-06 NOTE — PATIENT INSTRUCTIONS
Your prescriptions for diabetes were sent to your pharmacy.  Please call your pharmacy to pick these up today and start them today.  You been scheduled with one of our primary care providers for further evaluation within 1 week.  We are completing lab work at this time which will be reviewed at your primary care visit.  Please reach out with questions or concerns.  Review the following handout about diabetes with high blood sugar and when to follow-up in the emergency room.    Patient Education     Diabetes with High Blood Sugar   You have been treated for high blood sugar (hyperglycemia). This may be because of an infection or other illness. Or it may be from eating too many sweets or starches. Or it may be from not taking enough insulin or other diabetes medicine.   Home care   Check your blood sugar level at least 2 times a day. Write it down the results. Do this before breakfast and before dinner. If you take insulin, also write down your routine insulin dose. Note any other doses you needed based on your sliding scale or as advised by your healthcare provider. Do this for the next 3 to 5 days.   High blood sugar may cause symptoms that you can learn to spot. These include:     Peeing often    Thirst    Headache    Breath that smells fruity    Nausea or vomiting    Belly pain  If you have symptoms of high blood sugar, use a blood or urine test to find out what your blood sugar level is. If it is above your usual range, use the sliding scale regular insulin dose from your healthcare provider. Call your provider for advice if you were not given a range for your insulin dose. If your blood sugar is over 240 mg/dL, check your urine for ketones.   Follow-up care   Follow up with your healthcare provider, or as advised. You may need to meet with your provider in the next week. You will likely look at your blood sugar records together. You may need to change your dose of insulin or other diabetes medicine.   When to  seek medical advice   Call your healthcare provider right away if these occur:     Symptoms of high blood sugar that don't get better with the treatment your provider advised. This is especially true if you also have ketones in your urine.    Blood sugar over 300 mg/dl. If you can t reach your healthcare provider, go to a hospital emergency room or urgent care center.  Call 911  Call 911 if you have any of the following:     Confusion    Dizziness, lightheadedness, or loss of consciousness    Shortness of breath    Chest pain    Weakness of an arm, leg, or one side of the face    Sudden trouble with speech or vision  Ta last reviewed this educational content on 9/1/2018 2000-2021 [employer]. All rights reserved. This information is not intended as a substitute for professional medical care. Always follow your healthcare professional's instructions.

## 2021-12-07 ENCOUNTER — TELEPHONE (OUTPATIENT)
Dept: FAMILY MEDICINE | Facility: CLINIC | Age: 51
End: 2021-12-07

## 2021-12-07 PROBLEM — R73.9 HYPERGLYCEMIA: Status: ACTIVE | Noted: 2021-12-07

## 2021-12-07 PROBLEM — M62.81 GENERALIZED MUSCLE WEAKNESS: Status: ACTIVE | Noted: 2021-12-07

## 2021-12-07 LAB
ANION GAP SERPL CALCULATED.3IONS-SCNC: 14 MMOL/L (ref 5–18)
ATRIAL RATE - MUSE: 65 BPM
BUN SERPL-MCNC: 35 MG/DL (ref 8–22)
CALCIUM SERPL-MCNC: 7.9 MG/DL (ref 8.5–10.5)
CHLORIDE BLD-SCNC: 98 MMOL/L (ref 98–107)
CO2 SERPL-SCNC: 22 MMOL/L (ref 22–31)
CREAT SERPL-MCNC: 2.2 MG/DL (ref 0.6–1.1)
DIASTOLIC BLOOD PRESSURE - MUSE: NORMAL MMHG
GFR SERPL CREATININE-BSD FRML MDRD: 25 ML/MIN/1.73M2
GLUCOSE BLD-MCNC: 467 MG/DL (ref 70–125)
GLUCOSE BLDC GLUCOMTR-MCNC: 277 MG/DL (ref 70–99)
GLUCOSE BLDC GLUCOMTR-MCNC: 280 MG/DL (ref 70–99)
GLUCOSE BLDC GLUCOMTR-MCNC: 308 MG/DL (ref 70–99)
GLUCOSE BLDC GLUCOMTR-MCNC: 314 MG/DL (ref 70–99)
GLUCOSE BLDC GLUCOMTR-MCNC: 358 MG/DL (ref 70–99)
INTERPRETATION ECG - MUSE: NORMAL
OSMOLALITY UR: 445 MOSM/KG (ref 300–900)
P AXIS - MUSE: 66 DEGREES
POTASSIUM BLD-SCNC: 3.4 MMOL/L (ref 3.5–5)
PR INTERVAL - MUSE: 188 MS
QRS DURATION - MUSE: 94 MS
QT - MUSE: 478 MS
QTC - MUSE: 497 MS
R AXIS - MUSE: 19 DEGREES
SODIUM SERPL-SCNC: 134 MMOL/L (ref 136–145)
SODIUM UR-SCNC: 57 MMOL/L
SYSTOLIC BLOOD PRESSURE - MUSE: NORMAL MMHG
T AXIS - MUSE: 35 DEGREES
VENTRICULAR RATE- MUSE: 65 BPM

## 2021-12-07 PROCEDURE — 82962 GLUCOSE BLOOD TEST: CPT

## 2021-12-07 PROCEDURE — 258N000003 HC RX IP 258 OP 636: Performed by: HOSPITALIST

## 2021-12-07 PROCEDURE — 250N000011 HC RX IP 250 OP 636: Performed by: HOSPITALIST

## 2021-12-07 PROCEDURE — 96372 THER/PROPH/DIAG INJ SC/IM: CPT | Mod: XS | Performed by: STUDENT IN AN ORGANIZED HEALTH CARE EDUCATION/TRAINING PROGRAM

## 2021-12-07 PROCEDURE — 96365 THER/PROPH/DIAG IV INF INIT: CPT

## 2021-12-07 PROCEDURE — G0378 HOSPITAL OBSERVATION PER HR: HCPCS

## 2021-12-07 PROCEDURE — 99219 PR INITIAL OBSERVATION CARE,LEVEL II: CPT | Performed by: HOSPITALIST

## 2021-12-07 PROCEDURE — 96372 THER/PROPH/DIAG INJ SC/IM: CPT | Mod: XS

## 2021-12-07 PROCEDURE — 250N000012 HC RX MED GY IP 250 OP 636 PS 637: Performed by: STUDENT IN AN ORGANIZED HEALTH CARE EDUCATION/TRAINING PROGRAM

## 2021-12-07 PROCEDURE — 96361 HYDRATE IV INFUSION ADD-ON: CPT

## 2021-12-07 PROCEDURE — 250N000012 HC RX MED GY IP 250 OP 636 PS 637: Performed by: HOSPITALIST

## 2021-12-07 PROCEDURE — 96372 THER/PROPH/DIAG INJ SC/IM: CPT | Performed by: HOSPITALIST

## 2021-12-07 PROCEDURE — 250N000013 HC RX MED GY IP 250 OP 250 PS 637: Performed by: HOSPITALIST

## 2021-12-07 RX ORDER — ACETAMINOPHEN 500 MG
500-1000 TABLET ORAL EVERY 6 HOURS PRN
COMMUNITY
End: 2022-12-19

## 2021-12-07 RX ORDER — ESCITALOPRAM OXALATE 10 MG/1
10 TABLET ORAL DAILY
Status: DISCONTINUED | OUTPATIENT
Start: 2021-12-08 | End: 2021-12-10 | Stop reason: HOSPADM

## 2021-12-07 RX ORDER — GABAPENTIN 300 MG/1
300 CAPSULE ORAL 2 TIMES DAILY
Status: DISCONTINUED | OUTPATIENT
Start: 2021-12-07 | End: 2021-12-10 | Stop reason: HOSPADM

## 2021-12-07 RX ORDER — GABAPENTIN 300 MG/1
300 CAPSULE ORAL 2 TIMES DAILY
COMMUNITY
End: 2021-12-13

## 2021-12-07 RX ORDER — ATORVASTATIN CALCIUM 40 MG/1
80 TABLET, FILM COATED ORAL DAILY
Status: DISCONTINUED | OUTPATIENT
Start: 2021-12-08 | End: 2021-12-10 | Stop reason: HOSPADM

## 2021-12-07 RX ORDER — ONDANSETRON 2 MG/ML
4 INJECTION INTRAMUSCULAR; INTRAVENOUS EVERY 6 HOURS PRN
Status: DISCONTINUED | OUTPATIENT
Start: 2021-12-07 | End: 2021-12-10 | Stop reason: HOSPADM

## 2021-12-07 RX ORDER — PANTOPRAZOLE SODIUM 40 MG/1
40 TABLET, DELAYED RELEASE ORAL
Status: DISCONTINUED | OUTPATIENT
Start: 2021-12-08 | End: 2021-12-10 | Stop reason: HOSPADM

## 2021-12-07 RX ORDER — NICOTINE POLACRILEX 4 MG/1
20 GUM, CHEWING ORAL DAILY
COMMUNITY
End: 2021-12-13

## 2021-12-07 RX ORDER — FERROUS SULFATE 325(65) MG
325 TABLET ORAL 2 TIMES DAILY WITH MEALS
Status: DISCONTINUED | OUTPATIENT
Start: 2021-12-08 | End: 2021-12-10 | Stop reason: HOSPADM

## 2021-12-07 RX ORDER — ACETAMINOPHEN 650 MG/1
650 SUPPOSITORY RECTAL EVERY 6 HOURS PRN
Status: DISCONTINUED | OUTPATIENT
Start: 2021-12-07 | End: 2021-12-10 | Stop reason: HOSPADM

## 2021-12-07 RX ORDER — SODIUM CHLORIDE 9 MG/ML
INJECTION, SOLUTION INTRAVENOUS CONTINUOUS
Status: DISCONTINUED | OUTPATIENT
Start: 2021-12-07 | End: 2021-12-10 | Stop reason: HOSPADM

## 2021-12-07 RX ORDER — ASPIRIN 81 MG/1
81 TABLET ORAL DAILY
COMMUNITY
End: 2021-12-13

## 2021-12-07 RX ORDER — AMLODIPINE BESYLATE 5 MG/1
10 TABLET ORAL DAILY
Status: DISCONTINUED | OUTPATIENT
Start: 2021-12-08 | End: 2021-12-10 | Stop reason: HOSPADM

## 2021-12-07 RX ORDER — ONDANSETRON 4 MG/1
4 TABLET, ORALLY DISINTEGRATING ORAL EVERY 6 HOURS PRN
Status: DISCONTINUED | OUTPATIENT
Start: 2021-12-07 | End: 2021-12-10 | Stop reason: HOSPADM

## 2021-12-07 RX ORDER — CHLORAL HYDRATE 500 MG
1 CAPSULE ORAL DAILY
COMMUNITY
End: 2021-12-13

## 2021-12-07 RX ORDER — NICOTINE POLACRILEX 4 MG/1
20 GUM, CHEWING ORAL DAILY
Status: DISCONTINUED | OUTPATIENT
Start: 2021-12-08 | End: 2021-12-07 | Stop reason: CLARIF

## 2021-12-07 RX ORDER — FERROUS SULFATE 325(65) MG
325 TABLET ORAL 2 TIMES DAILY WITH MEALS
COMMUNITY
End: 2021-12-13

## 2021-12-07 RX ORDER — EZETIMIBE 10 MG/1
10 TABLET ORAL DAILY
Status: DISCONTINUED | OUTPATIENT
Start: 2021-12-08 | End: 2021-12-10 | Stop reason: HOSPADM

## 2021-12-07 RX ORDER — LANOLIN ALCOHOL/MO/W.PET/CERES
1000 CREAM (GRAM) TOPICAL DAILY
Status: DISCONTINUED | OUTPATIENT
Start: 2021-12-08 | End: 2021-12-10 | Stop reason: HOSPADM

## 2021-12-07 RX ORDER — ERGOCALCIFEROL 1.25 MG/1
50000 CAPSULE, LIQUID FILLED ORAL WEEKLY
Status: DISCONTINUED | OUTPATIENT
Start: 2021-12-13 | End: 2021-12-10 | Stop reason: HOSPADM

## 2021-12-07 RX ORDER — NICOTINE POLACRILEX 4 MG
15-30 LOZENGE BUCCAL
Status: DISCONTINUED | OUTPATIENT
Start: 2021-12-07 | End: 2021-12-10 | Stop reason: HOSPADM

## 2021-12-07 RX ORDER — METOPROLOL SUCCINATE 100 MG/1
200 TABLET, EXTENDED RELEASE ORAL DAILY
Status: DISCONTINUED | OUTPATIENT
Start: 2021-12-08 | End: 2021-12-10 | Stop reason: HOSPADM

## 2021-12-07 RX ORDER — ACETAMINOPHEN 325 MG/1
650 TABLET ORAL EVERY 6 HOURS PRN
Status: DISCONTINUED | OUTPATIENT
Start: 2021-12-07 | End: 2021-12-10 | Stop reason: HOSPADM

## 2021-12-07 RX ORDER — ERGOCALCIFEROL 1.25 MG/1
50000 CAPSULE, LIQUID FILLED ORAL WEEKLY
COMMUNITY
End: 2021-12-13

## 2021-12-07 RX ORDER — POTASSIUM CHLORIDE 7.45 MG/ML
10 INJECTION INTRAVENOUS ONCE
Status: COMPLETED | OUTPATIENT
Start: 2021-12-07 | End: 2021-12-07

## 2021-12-07 RX ORDER — DEXTROSE MONOHYDRATE 25 G/50ML
25-50 INJECTION, SOLUTION INTRAVENOUS
Status: DISCONTINUED | OUTPATIENT
Start: 2021-12-07 | End: 2021-12-10 | Stop reason: HOSPADM

## 2021-12-07 RX ORDER — SODIUM CHLORIDE 9 MG/ML
INJECTION, SOLUTION INTRAVENOUS ONCE
Status: DISCONTINUED | OUTPATIENT
Start: 2021-12-07 | End: 2021-12-07

## 2021-12-07 RX ORDER — FENOFIBRATE 54 MG/1
54 TABLET ORAL DAILY
Status: DISCONTINUED | OUTPATIENT
Start: 2021-12-08 | End: 2021-12-10 | Stop reason: HOSPADM

## 2021-12-07 RX ORDER — ASPIRIN 81 MG/1
81 TABLET ORAL DAILY
Status: DISCONTINUED | OUTPATIENT
Start: 2021-12-08 | End: 2021-12-10 | Stop reason: HOSPADM

## 2021-12-07 RX ADMIN — ACETAMINOPHEN 650 MG: 325 TABLET ORAL at 23:08

## 2021-12-07 RX ADMIN — SODIUM CHLORIDE: 9 INJECTION, SOLUTION INTRAVENOUS at 05:49

## 2021-12-07 RX ADMIN — INSULIN ASPART 4 UNITS: 100 INJECTION, SOLUTION INTRAVENOUS; SUBCUTANEOUS at 11:47

## 2021-12-07 RX ADMIN — INSULIN GLARGINE 10 UNITS: 100 INJECTION, SOLUTION SUBCUTANEOUS at 15:08

## 2021-12-07 RX ADMIN — POTASSIUM CHLORIDE 10 MEQ: 7.46 INJECTION, SOLUTION INTRAVENOUS at 05:50

## 2021-12-07 RX ADMIN — INSULIN ASPART 5 UNITS: 100 INJECTION, SOLUTION INTRAVENOUS; SUBCUTANEOUS at 07:51

## 2021-12-07 RX ADMIN — INSULIN ASPART 3 UNITS: 100 INJECTION, SOLUTION INTRAVENOUS; SUBCUTANEOUS at 16:54

## 2021-12-07 NOTE — H&P
[facility]    History and Physical - Hospitalist Service       Date of Admission:  12/6/2021    Assessment & Plan      Nithya Matthews is a 51 year old female admitted on 12/6/2021. She was brought to the emergency department for evaluation of hyperglycemia, generalized weakness.    1.  Hyperglycemia, uncontrolled type 2 diabetes mellitus with long-term insulin  Hemoglobin A1c 14.5  Ran out of insulin 2 weeks ago  No evidence of DKA  Reconcile PTA medications  Insulin sliding scale  Care management to assist with medications for discharge    2.  Chronic kidney disease  Unknown baseline  Per daughter, known history of kidney disease over the last 2 years  IV hydration, monitor renal function    3.  Hyponatremia  Likely related to hyperglycemia  Check urine sodium, urine osmolality    4.  Hypokalemia  Replace per protocol  Likely from GI losses    5.  Essential hypertension  Reconcile PTA medications    6.  Dyslipidemia  Reconcile PTA medications    7.  Depression  Reconcile PTA medications     Diet: Low Consistent Carb (45 g CHO per Meal) Diet    DVT Prophylaxis: Ambulate every shift  Parra Catheter: Not present  Central Lines: None  Code Status: Full Code      Clinically Significant Risk Factors Present on Admission          # Hypocalcemia: Ca = 7.9 mg/dL (Ref range: 8.5 - 10.5 mg/dL) and/or iCa = N/A on admission, will replace as needed          Disposition Plan   Expected discharge:  1 day recommended to prior living arrangement once Blood glucose improved, insulin supplies sorted out.     The patient's care was discussed with the Patient and Patient's Family.    Jose Riggins MD  [facility]  Securely message with the [employer] Web Console (learn more here)  Text page via [employer] Paging/Directory        ______________________________________________________________________    Chief Complaint   Hyperglycemia, generalized weakness    History is obtained from the electronic  health record, emergency department physician and patient's daughter    History of Present Illness   Nithya Matthews is a 51 year old female who was brought to the emergency department for evaluation of hyperglycemia and generalized weakness.  Past medical history of type 2 diabetes, hypertension, chronic kidney disease, dyslipidemia, depression.  Patient moved from [facility] to [place] about 2 months ago and ran out of medications about a month ago.  Her daughter showed me medications prescribed from [facility].  Patient was seen in the ED on December 2, 2021 and given prescriptions with plan to establish cares in the clinic.  However, it seems that some medications were not approved by insurance and so she went to the clinic on December 6, 2021.  Laboratory work-up remarkable for blood glucose 669.  Patient has not felt well for a few days with generalized weakness and muscle aches.  She denies fevers, chills, chest pain, shortness of breath or palpitations.  However, she complained of some nausea without vomiting and some watery stools without melena or hematochezia before she came to the ED.    Review of Systems    The 10 point Review of Systems is negative other than noted in the HPI or here.     Past Medical History    I have reviewed this patient's medical history and updated it with pertinent information if needed.   No past medical history on file.    Past Surgical History   I have reviewed this patient's surgical history and updated it with pertinent information if needed.  No past surgical history on file.    Social History   I have reviewed this patient's social history and updated it with pertinent information if needed.  Social History     Tobacco Use     Smoking status: Not on file     Smokeless tobacco: Not on file   Substance Use Topics     Alcohol use: Not on file     Drug use: Not on file       Family History     No significant family history, including no history of:      Prior to Admission Medications   Prior to Admission Medications   Prescriptions Last Dose Informant Patient Reported? Taking?   Dulaglutide (TRULICITY) 3 MG/0.5ML SOPN   No No   Sig: Inject 0.5 mLs Subcutaneous once a week   amLODIPine (NORVASC) 10 MG tablet   No No   Sig: Take 1 tablet (10 mg) by mouth daily   atorvastatin (LIPITOR) 80 MG tablet   No No   Sig: Take 1 tablet (80 mg) by mouth daily   dulaglutide (TRULICITY) 0.75 MG/0.5ML pen   No No   Sig: Inject 0.75 mg Subcutaneous every 7 days   escitalopram (LEXAPRO) 10 MG tablet   No No   Sig: Take 1 tablet (10 mg) by mouth daily   ezetimibe (ZETIA) 10 MG tablet   No No   Sig: Take 1 tablet (10 mg) by mouth daily   fenofibrate (LOFIBRA) 54 MG tablet   No No   Sig: Take 1 tablet (54 mg) by mouth 2 times daily   hydrochlorothiazide (HYDRODIURIL) 50 MG tablet   No No   Sig: Take 1 tablet (50 mg) by mouth daily   insulin degludec (TRESIBA FLEXTOUCH) 200 UNIT/ML pen   No No   Sig: Inject 80 Units Subcutaneous daily   insulin lispro (ADMELOG SOLOSTAR) 100 UNIT/ML pen   No No   Sig: Inject 20 Units Subcutaneous 2 times daily (before meals)   lisinopril (ZESTRIL) 40 MG tablet   No No   Sig: Take 1 tablet (40 mg) by mouth daily   metoprolol succinate ER (TOPROL-XL) 200 MG 24 hr tablet   No No   Sig: Take 1 tablet (200 mg) by mouth 2 times daily      Facility-Administered Medications: None     Allergies   No Known Allergies    Physical Exam   Vital Signs: Temp: 99.1  F (37.3  C) Temp src: Oral BP: (!) 143/77 Pulse: 69   Resp: 14 SpO2: 98 % O2 Device: None (Room air)    Weight: 0 lbs 0 oz    Constitutional: awake, alert, cooperative, no apparent distress, and appears stated age  Eyes: extra-ocular muscles intact  ENT: normocepalic, without obvious abnormality, atramatic  Respiratory: No increased work of breathing, good air exchange, clear to auscultation bilaterally, no crackles or wheezing  Cardiovascular: regular rate and rhythm and normal S1 and S2  GI: normal  bowel sounds, soft, non-distended and non-tender  Skin: no bruising or bleeding and no redness, warmth, or swelling  Musculoskeletal: no lower extremity pitting edema present  tone is normal  Neurologic: Mental Status Exam:  Level of Alertness:   awake  Motor Exam:  moves all extremities well and symmetrically  Neuropsychiatric: General: normal and calm    Data   Data reviewed today: I reviewed all medications, new labs and imaging results over the last 24 hours. I personally reviewed the EKG tracing showing Sinus rhythm at 65 bpm, nonspecific ST waves, no previous EKG to compare.    Recent Labs   Lab 12/06/21  2347 12/06/21  2118 12/06/21 2054 12/06/21  1050   WBC  --  9.5  --  7.6   HGB  --  11.3*  --  10.8*   MCV  --  67*  --  67*   PLT  --  200  --  188   * 133*  --  129*   POTASSIUM 3.4* 3.5  --  3.4   CHLORIDE 98 92*  --  91*   CO2 22 25  --  29   BUN 35* 39*  --  41*   CR 2.20* 2.54*  --  2.16*   ANIONGAP 14 16  --  9   JOSEPH 7.9* 8.9  --  8.9   * 450* 409* 669*   ALBUMIN  --  4.0  --   --    PROTTOTAL  --  8.5*  --   --    BILITOTAL  --  0.7  --   --    ALKPHOS  --  85  --   --    ALT  --  37  --   --    AST  --  50*  --   --    LIPASE  --  108*  --   --      Recent Results (from the past 24 hour(s))   XR Chest Port 1 View    Narrative    EXAM: XR CHEST PORT 1 VIEW  LOCATION: [facility]  DATE/TIME: 12/6/2021 11:10 PM    INDICATION: cough  COMPARISON: None.      Impression    IMPRESSION: Negative chest.   CT Abdomen Pelvis w/o Contrast    Narrative    EXAM: CT ABDOMEN PELVIS W/O CONTRAST  LOCATION: [facility]  DATE/TIME: 12/7/2021 12:07 AM    INDICATION: Abdominal pain, acute, nonlocalized  COMPARISON: None.  TECHNIQUE: CT scan of the abdomen and pelvis was performed without IV contrast. Multiplanar reformats were obtained. Dose reduction techniques were used.  CONTRAST: None.    FINDINGS:   LOWER CHEST: Mosaic attenuation in the visualized lower  lungs can be seen with air trapping.    HEPATOBILIARY: Fatty liver.    PANCREAS: Normal.    SPLEEN: Normal.    ADRENAL GLANDS: Normal.    KIDNEYS/BLADDER: There are 2 nonobstructing stones in the upper pole of the right kidney the largest measuring 3 mm. No ureteric stone or hydronephrosis.    BOWEL: Normal.    LYMPH NODES: Normal.    VASCULATURE: Unremarkable.    PELVIC ORGANS: Dominant follicle right ovary.    MUSCULOSKELETAL: Normal.      Impression    IMPRESSION:   1.  2 small nonobstructing stones upper pole right kidney with the largest measuring 3 mm. No urinary calculi or hydronephrosis.

## 2021-12-07 NOTE — PHARMACY-ADMISSION MEDICATION HISTORY
Pharmacy Note - Admission Medication History    Pertinent Provider Information: patient arrives from [place] two months ago, with her daughter, and is now establishing care in [place]. Medication list, from [facility] obtained. Pharmacy pill packs were obtained too.      ______________________________________________________________________    Prior To Admission (PTA) med list completed and updated in EMR.       PTA Med List   Medication Sig Last Dose     acetaminophen (TYLENOL) 500 MG tablet Take 500-1,000 mg by mouth every 6 hours as needed for mild pain Past Week     amLODIPine (NORVASC) 10 MG tablet Take 1 tablet (10 mg) by mouth daily 12/7/2021 at am     aspirin 81 MG EC tablet Take 81 mg by mouth daily 12/7/2021 at am     atorvastatin (LIPITOR) 80 MG tablet Take 1 tablet (80 mg) by mouth daily 12/7/2021 at am     Dulaglutide (TRULICITY) 3 MG/0.5ML SOPN Inject 0.5 mLs Subcutaneous once a week 12/6/2021 at am     escitalopram (LEXAPRO) 10 MG tablet Take 1 tablet (10 mg) by mouth daily 12/7/2021 at am     ezetimibe (ZETIA) 10 MG tablet Take 1 tablet (10 mg) by mouth daily 12/7/2021 at am     fenofibrate (LOFIBRA) 54 MG tablet Take 1 tablet (54 mg) by mouth 2 times daily (Patient taking differently: Take 54 mg by mouth daily ) 12/7/2021 at am     ferrous sulfate (FEROSUL) 325 (65 Fe) MG tablet Take 325 mg by mouth 2 times daily (with meals) 12/7/2021 at am     fish oil-omega-3 fatty acids 1000 MG capsule Take 1 g by mouth daily 12/7/2021 at am     gabapentin (NEURONTIN) 300 MG capsule Take 300 mg by mouth 2 times daily 12/7/2021 at am     hydrochlorothiazide (HYDRODIURIL) 50 MG tablet Take 1 tablet (50 mg) by mouth daily 12/7/2021 at am     insulin degludec (TRESIBA FLEXTOUCH) 200 UNIT/ML pen Inject 80 Units Subcutaneous daily (Patient taking differently: Inject 160 Units Subcutaneous daily ) 12/6/2021 at am     insulin lispro (ADMELOG SOLOSTAR) 100 UNIT/ML pen Inject 20 Units Subcutaneous 2  times daily (before meals) (Patient taking differently: Inject 20 Units Subcutaneous 2 times daily (before meals) Before first and second meal of the day) 12/6/2021 at pm     lisinopril (ZESTRIL) 40 MG tablet Take 1 tablet (40 mg) by mouth daily 12/7/2021 at am     menthol, Topical Analgesic, 2.5% (BENGAY VANISHING SCENT) 2.5 % GEL topical gel Apply topically 3 times daily Right shoulder pain Past Week     metoprolol succinate ER (TOPROL-XL) 200 MG 24 hr tablet Take 1 tablet (200 mg) by mouth 2 times daily (Patient taking differently: Take 200 mg by mouth daily ) 12/7/2021 at am     omeprazole 20 MG tablet Take 20 mg by mouth daily 12/7/2021 at am     vitamin B-12 (CYANOCOBALAMIN) 100 MCG tablet Take 1,000 mcg by mouth daily 12/7/2021 at am     vitamin D2 (ERGOCALCIFEROL) 68819 units (1250 mcg) capsule Take 50,000 Units by mouth once a week 12/6/2021 at am       Information source(s): Patient, Family member, Caregiver, Patient's pharmacy, Clinic records, Hospital records, Prescription bottles and [facility]Everywhere[facility]  Method of interview communication: in-person    Summary of Changes to PTA Med List  New: nothing obvious  Discontinued: nothing  Changed: corrected dosing from primary care office inputs.     Patient was asked about OTC/herbal products specifically.  PTA med list reflects this.    In the past week, patient estimated taking medication this percent of the time:  greater than 90%.    Allergies were reviewed, assessed, and updated with the patient.      Patient did not bring any medications to the hospital and can't retrieve from home. No multi-dose medications are available for use during hospital stay.     The information provided in this note is only as accurate as the sources available at the time of the update(s).    Thank you for the opportunity to participate in the care of this patient.    Jessee Muro [facility]  12/7/2021 9:02 AM

## 2021-12-07 NOTE — ED PROVIDER NOTES
EMERGENCY DEPARTMENT ENCOUNTER      NAME: Nithya Matthews  AGE: 51 year old female  YOB: 1970  MRN: [id number]  EVALUATION DATE & TIME: 12/6/2021 10:14 PM    PCP: No Ref-Primary, Physician    ED PROVIDER: Kallie Reece M.D.      Chief Complaint   Patient presents with     Hypertension     Hyperglycemia         FINAL IMPRESSION:  1. Hyperglycemia    2. Generalized muscle weakness          ED COURSE & MEDICAL DECISION MAKING:    Pertinent Labs & Imaging studies reviewed. (See chart for details)  51 year old female presents to the Emergency Department for evaluation of hyperglycemia.  Patient reports that she has been off of her medications over the last month as she recently moved to the state and has not able been able to activate her Medicaid insurance.  She states that she has been increasingly thirsty, had body aches, felt chilled.  She also reports that she has had a cough.  She has decreased urinary output.  She has had associated chills.  She reports a history of back pain which is chronic and unchanged.  On exam, she has no new abdominal pain or otherwise new pain.  Laboratory studies demonstrate hyperglycemia without signs of DKA.  Plan to assess the patient for any sign of associated infection.    There are no clear signs of infection.  Patient remains hyperglycemic.  We will plan to admit given the patient's diffuse generalized weakness and necessity for full cares at home and ongoing management of uncontrolled hyperglycemia.    At the conclusion of the encounter I discussed the results of all of the tests and the disposition. The questions were answered. The patient or family acknowledged understanding and was agreeable with the care plan.     10:15 PM I met with patient for initial interview and exam. PPE includes N95, protective glasses, gloves.  12:50 AM I met with the patient and the patient's daughter to clarify some history.  The patient has been significantly weak over the last month.  The  patient's daughter states that she or her dad are having to help her with all ADLs.  Including standing up, walking, using the bathroom.  She usually eats about twice a day but does not eat a significant amount.  They attribute this to her being out of her medications for the last month.  Patient denies feeling significantly short of breath.  She states she has intermittent episodes of chest pain, this has been ongoing for 3 years.  On room air, oxygen saturations are 92%.  She has no history of asthma, denies tobacco use.  Given the patient's significant weakness, hyperglycemia, hypoxia requiring supplemental oxygen, plan to admit the patient for further evaluation and supportive management.         MEDICATIONS GIVEN IN THE EMERGENCY:  Medications   glucose gel 15-30 g (has no administration in time range)     Or   dextrose 50 % injection 25-50 mL (has no administration in time range)     Or   glucagon injection 1 mg (has no administration in time range)   sodium chloride 0.9% infusion (has no administration in time range)   0.9% sodium chloride BOLUS (0 mLs Intravenous Stopped 12/7/21 0015)       =================================================================    HPI    Patient information was obtained from: Patient and patient's daughter    Use of : Yes (MARTII) - Language Camilojovanjorgito Matthews is a 51 year old female with a pertinent history of diabetes mellitus type 2, hypertension, hyperlipidemia, chronic back pain, and depression who presents to this ED via private car for evaluation of hypertension and hyperglycemia.    Per patient's daughter, for the past couple weeks, the patient has been out of her insulin and diabetes medication and has shown increased blood sugar levels. She states that this morning, the patient's levels were at 490. In addition, the patient has a history of chronic back pain.    Per chart review (12/6/2021), the patient presents to [facility] to attempt to  "establish primary care provider following a ED visit. ED labs showed B in the 500s. Patient care and medication dosing was discussed with Sarah Moon, primary care NP. Patient discharged with recommendation to establish PCP care within the week.    The patient reports increased blood sugars for about a month. She states that she has been unable to refill her prescription because she [place].  In addition, she states that since this morning she has experienced diarrhea and vomiting. She visited the [facility] earlier today (12/6), for evaluation of increased blood sugars as well as to attempt primary care. The patient endorses \"pain everywhere\" and numbness to her legs and head. Additionally, she reports feeling chilled, and developing a \"cold\" last week. When she feels ill, she reports decreased fluid intake and decreased urination. Otherwise, she denies fever, diaphoresis, eating/drinking problems, or any other complaints at this time.    Of note, the patient reports she is a nonsmoker.    For her diabetes mellitus type 2, the patient is taking Trulicity 3 mg/0.5ml, 0.5 mL weekly, and insulin lispro 100u/mL, 20 units subcutaneous before 1st and 2nd meal.    REVIEW OF SYSTEMS   Review of Systems   Constitutional: Positive for chills. Negative for appetite change, diaphoresis and fever.   HENT:        Positive for a \"cold.\"   Gastrointestinal: Positive for diarrhea and vomiting.   Genitourinary: Positive for decreased urine volume.   Musculoskeletal: Positive for back pain (chronic).        Positive for \"pain everywhere, and numbness to head and legs.   All other systems reviewed and are negative.       PAST MEDICAL HISTORY:  No past medical history on file.    PAST SURGICAL HISTORY:  No past surgical history on file.        CURRENT MEDICATIONS:    Current Facility-Administered Medications   Medication     glucose gel 15-30 g    Or     dextrose 50 % injection 25-50 mL    Or     glucagon " injection 1 mg     sodium chloride 0.9% infusion     Current Outpatient Medications   Medication     amLODIPine (NORVASC) 10 MG tablet     atorvastatin (LIPITOR) 80 MG tablet     dulaglutide (TRULICITY) 0.75 MG/0.5ML pen     Dulaglutide (TRULICITY) 3 MG/0.5ML SOPN     escitalopram (LEXAPRO) 10 MG tablet     ezetimibe (ZETIA) 10 MG tablet     fenofibrate (LOFIBRA) 54 MG tablet     hydrochlorothiazide (HYDRODIURIL) 50 MG tablet     insulin degludec (TRESIBA FLEXTOUCH) 200 UNIT/ML pen     insulin lispro (ADMELOG SOLOSTAR) 100 UNIT/ML pen     lisinopril (ZESTRIL) 40 MG tablet     metoprolol succinate ER (TOPROL-XL) 200 MG 24 hr tablet         ALLERGIES:  No Known Allergies    FAMILY HISTORY:  No family history on file.    SOCIAL HISTORY:   Social History     Socioeconomic History     Marital status:      Spouse name: Not on file     Number of children: Not on file     Years of education: Not on file     Highest education level: Not on file   Occupational History     Not on file   Tobacco Use     Smoking status: Not on file     Smokeless tobacco: Not on file   Substance and Sexual Activity     Alcohol use: Not on file     Drug use: Not on file     Sexual activity: Not on file   Other Topics Concern     Not on file   Social History Narrative     Not on file     Social Determinants of Health     Financial Resource Strain: Not on file   Food Insecurity: Not on file   Transportation Needs: Not on file   Physical Activity: Not on file   Stress: Not on file   Social Connections: Not on file   Intimate Partner Violence: Not on file   Housing Stability: Not on file       VITALS:  BP (!) 150/77   Pulse 71   Temp 99.1  F (37.3  C) (Oral)   Resp 14   SpO2 92%     PHYSICAL EXAM    Constitutional: Well developed, Well nourished, NAD  HENT: Normocephalic, Atraumatic, Bilateral external ears normal, Oropharynx normal, mucous membranes moist, Nose normal. Neck- Normal range of motion, No tenderness, Supple, No stridor.    Eyes: PERRL, EOMI, Conjunctiva normal, No discharge.   Respiratory: Normal breath sounds, No respiratory distress, No wheezing, Speaks full sentences easily. No cough.    Cardiovascular: Normal heart rate, Regular rhythm, Chest wall nontender.    GI: Bowel sounds normal, Soft, No tenderness, No masses, No flank tenderness. No rebound or guarding.    Musculoskeletal: 2+ DP pulses. No edema.  No cyanosis, No clubbing. Good range of motion in all major joints. No tenderness to palpation or major deformities noted.   Integument: Warm, Dry, No erythema, No rash. No petechiae.   Neurologic: Alert & oriented x 3, symmetric strength in lower and upper extremities, Normal sensory function, No focal deficits noted.    Psychiatric: Affect normal, Judgment normal, Mood normal. Cooperative.      LAB/RADIOLOGY:  All pertinent labs and imaging reviewed and interpreted. Please see official radiology report.  Results for orders placed or performed during the hospital encounter of 12/06/21   XR Chest Port 1 View    Impression    IMPRESSION: Negative chest.   CT Abdomen Pelvis w/o Contrast    Impression    IMPRESSION:   1.  2 small nonobstructing stones upper pole right kidney with the largest measuring 3 mm. No urinary calculi or hydronephrosis.     Glucose by meter   Result Value Ref Range    GLUCOSE BY METER POCT 409 (H) 70 - 99 mg/dL   Comprehensive metabolic panel   Result Value Ref Range    Sodium 133 (L) 136 - 145 mmol/L    Potassium 3.5 3.5 - 5.0 mmol/L    Chloride 92 (L) 98 - 107 mmol/L    Carbon Dioxide (CO2) 25 22 - 31 mmol/L    Anion Gap 16 5 - 18 mmol/L    Urea Nitrogen 39 (H) 8 - 22 mg/dL    Creatinine 2.54 (H) 0.60 - 1.10 mg/dL    Calcium 8.9 8.5 - 10.5 mg/dL    Glucose 450 (HH) 70 - 125 mg/dL    Alkaline Phosphatase 85 45 - 120 U/L    AST 50 (H) 0 - 40 U/L    ALT 37 0 - 45 U/L    Protein Total 8.5 (H) 6.0 - 8.0 g/dL    Albumin 4.0 3.5 - 5.0 g/dL    Bilirubin Total 0.7 0.0 - 1.0 mg/dL    GFR Estimate 21 (L) >60  mL/min/1.73m2   Result Value Ref Range    Lipase 108 (H) 0 - 52 U/L   Result Value Ref Range    Magnesium 1.9 1.8 - 2.6 mg/dL   CBC (+ platelets, no diff)   Result Value Ref Range    WBC Count 9.5 4.0 - 11.0 10e3/uL    RBC Count 5.29 (H) 3.80 - 5.20 10e6/uL    Hemoglobin 11.3 (L) 11.7 - 15.7 g/dL    Hematocrit 35.5 35.0 - 47.0 %    MCV 67 (L) 78 - 100 fL    MCH 21.4 (L) 26.5 - 33.0 pg    MCHC 31.8 31.5 - 36.5 g/dL    RDW 18.8 (H) 10.0 - 15.0 %    Platelet Count 200 150 - 450 10e3/uL   UA with Microscopic reflex to Culture    Specimen: Urine, Clean Catch   Result Value Ref Range    Color Urine Light Yellow Colorless, Straw, Light Yellow, Yellow    Appearance Urine Clear Clear    Glucose Urine >1000 (A) Negative mg/dL    Bilirubin Urine Negative Negative    Ketones Urine Negative Negative mg/dL    Specific Gravity Urine 1.016 1.001 - 1.030    Blood Urine 0.1 mg/dL (A) Negative    pH Urine 6.0 5.0 - 7.0    Protein Albumin Urine 100  (A) Negative mg/dL    Urobilinogen Urine <2.0 <2.0 mg/dL    Nitrite Urine Negative Negative    Leukocyte Esterase Urine Negative Negative    Bacteria Urine Few (A) None Seen /HPF    RBC Urine 3 (H) <=2 /HPF    WBC Urine 10 (H) <=5 /HPF    Squamous Epithelials Urine 4 (H) <=1 /HPF   Symptomatic COVID-19 Virus (Coronavirus) by PCR Nasopharyngeal    Specimen: Nasopharyngeal; Swab   Result Value Ref Range    SARS CoV2 PCR Negative Negative   Basic metabolic panel   Result Value Ref Range    Sodium 134 (L) 136 - 145 mmol/L    Potassium 3.4 (L) 3.5 - 5.0 mmol/L    Chloride 98 98 - 107 mmol/L    Carbon Dioxide (CO2) 22 22 - 31 mmol/L    Anion Gap 14 5 - 18 mmol/L    Urea Nitrogen 35 (H) 8 - 22 mg/dL    Creatinine 2.20 (H) 0.60 - 1.10 mg/dL    Calcium 7.9 (L) 8.5 - 10.5 mg/dL    Glucose 467 (HH) 70 - 125 mg/dL    GFR Estimate 25 (L) >60 mL/min/1.73m2               ISarah, am serving as a scribe to document services personally performed by Dr. Reece based on my observation and the  provider's statements to me. I, Kallie Reece MD attest that Sarah Melgoza acting in a scribe capacity, has observed my performance of the services and has documented them in accordance with my direction.    Kallie Reece M.D.  Emergency Medicine  [facility] EMERGENCY DEPARTMENT  [address] [place] [id number]  [phone]  Dept: [phone]       Kallie Reece MD  12/07/21 0110

## 2021-12-07 NOTE — TELEPHONE ENCOUNTER
Prior Authorization Retail Medication Request    Medication/Dose: fenofibrate (LOFIBRA) 54 MG tablet/insulin lispro (ADMELOG SOLOSTAR) 100 UNIT/ML pen/insulin degludec (TRESIBA FLEXTOUCH) 200 UNIT/ML pen/Dulaglutide (TRULICITY) 3 MG/0.5ML SOPN/TRULICITY 0.75 MG/0.5 ML  ICD code (if different than what is on RX):  E78.5/E11.69/  Previously Tried and Failed:  na  Rationale:  na    Insurance Name:  MEDICAID [place]  Insurance ID:  [id number]      Pharmacy Information (if different than what is on RX)  Name:  [facility]  Phone:  [phone]

## 2021-12-07 NOTE — ED TRIAGE NOTES
Pt presents to ED with c/o high BP and high BS today at the office visit per daughter, pt has been out of her insulin for over 2 weeks due to insurance not covering medication any longer, daughter stated that BP was over 100 at the office, pt has not been feeling well for a couple of days now, pt has pain in her abd/back and down her legs, pt is a/ox4 in no acute distress

## 2021-12-07 NOTE — PROGRESS NOTES
Nithya Matthews is a 51 year old female admitted on 12/6/2021.  She recently moved to [place] from [place].  She was brought to the emergency department for evaluation of hyperglycemia, generalized weakness.  She has been admitted for type with hyperglycemia poorly controlled.  Reportedly patient do not have access to her medications.  Medication reconciliation is done.  Patient has multiple high dose antihypertensive medications and lipid-lowering agents.  We will get a comprehensive metabolic panel,  lipid panel tomorrow and reassess.    Problem list    Poorly controlled controlled type 2 diabetes mellitus  CKD  Hyponatremia  Hypokalemia  Hypertension/HLD    continue management as per admitting physician

## 2021-12-07 NOTE — TELEPHONE ENCOUNTER
Discussed with daughter as pt is reportedly not feeling well. Pt seen today and prescribed insulin but it is not covered by insurance so she did not receive her insulin today  Received call from lab.    Given that daughter reports pt not feeling well and does not elaborate recommend pt to go to ER for further evaluation. She may be IVF and insulin.     Claire Hameed MD

## 2021-12-07 NOTE — ED NOTES
"Pt presents C/O generalized weakness and fatigue for 2 months. Stated \"sugar has been high\", body aches, SOB/CP. Pt stated she has been too weak to walk, dress, bathe, toilet self. Has been having family assist her with ADL's for a couple months now.   SKIN: WNL.   HEENT: Normocephalic, alert and oriented x 3.   ABD: NTND. No reported N&V or diarrhea.  EXT: GOODRICH x 4  Rest of exam unremarkable.     "

## 2021-12-07 NOTE — PLAN OF CARE
Problem: Adult Inpatient Plan of Care  Goal: Plan of Care Review  Outcome: Improving  Flowsheets (Taken 12/7/2021 1209)  Plan of Care Reviewed With: patient  Goal: Patient-Specific Goal (Individualized)  Outcome: Improving  Goal: Absence of Hospital-Acquired Illness or Injury  Outcome: Improving  Intervention: Identify and Manage Fall Risk  Recent Flowsheet Documentation  Taken 12/7/2021 0800 by Benny Hancock RN  Safety Promotion/Fall Prevention: safety round/check completed  Goal: Optimal Comfort and Wellbeing  Outcome: Improving  Goal: Readiness for Transition of Care  Outcome: Improving   Pt is alert and oriented x4. Pt is med complaint. Pt denies pain. Pt's v/s stable. Pt's Blood sugar still is high. Pt is not symptomatic. Pt has poor appetite. Continue to monitor pt.

## 2021-12-07 NOTE — CONSULTS
Care Management Initial Consult    General Information  Assessment completed with: Patient,Children, pt and dtr Fritz Matthews  Type of CM/SW Visit: Initial Assessment    Primary Care Provider verified and updated as needed: Yes   Readmission within the last 30 days: no previous admission in last 30 days   Return Category: Progression of disease  Reason for Consult: discharge planning  Advance Care Planning: Advance Care Planning Reviewed: no concerns identified     General Information Comments: just moved from [place]    Communication Assessment  Patient's communication style: spoken language (English or Bilingual) (pt speaks KarZEBi)    Hearing Difficulty or Deaf: no   Wear Glasses or Blind: no    Cognitive  Cognitive/Neuro/Behavioral: WDL                      Living Environment:   People in home: spouse,child(darcie), adult     Current living Arrangements: apartment      Able to return to prior arrangements: yes       Family/Social Support:  Care provided by: child(darcie),spouse/significant other  Provides care for: no one  Marital Status:   ,Children          Description of Support System: Supportive         Current Resources:   Patient receiving home care services: No (had PCA while in [place] )     Community Resources: None  Equipment currently used at home: none  Supplies currently used at home: None    Employment/Financial:  Employment Status:          Financial Concerns: No concerns identified   Referral to Financial Counselor: No       Lifestyle & Psychosocial Needs:  Social Determinants of Health     Tobacco Use: Not on file   Alcohol Use: Not on file   Financial Resource Strain: Not on file   Food Insecurity: Not on file   Transportation Needs: Not on file   Physical Activity: Not on file   Stress: Not on file   Social Connections: Not on file   Intimate Partner Violence: Not on file   Depression: Not on file   Housing Stability: Not on file       Functional Status:  Prior to admission patient needed  assistance:   Dependent ADLs:: Ambulation-no assistive device,Bathing,Dressing  Dependent IADLs:: Transportation,Medication Management,Shopping,Cleaning,Cooking,Laundry  Assesssment of Functional Status: Not at baseline with mobility,Not at  functional baseline,Not at baseline with ADL Functioning,Needs assistive devices (DME)    Mental Health Status:  Mental Health Status: No Current Concerns       Chemical Dependency Status:                Values/Beliefs:  Spiritual, Cultural Beliefs, Druze Practices, Values that affect care: no               Additional Information:  KENTON assessed, spoke to patent and dtr, Fritz Allenh, pt recently moved here [facility], had a PCA but working on getting león here. No DMEs and What is Obs given. dtr Fritz will transport at discharge. [phone].      Estefania Yates RN

## 2021-12-08 ENCOUNTER — APPOINTMENT (OUTPATIENT)
Dept: PHYSICAL THERAPY | Facility: HOSPITAL | Age: 51
End: 2021-12-08
Attending: INTERNAL MEDICINE
Payer: MEDICAID

## 2021-12-08 LAB
ALBUMIN SERPL-MCNC: 3.3 G/DL (ref 3.5–5)
ALP SERPL-CCNC: 63 U/L (ref 45–120)
ALT SERPL W P-5'-P-CCNC: 41 U/L (ref 0–45)
ANION GAP SERPL CALCULATED.3IONS-SCNC: 10 MMOL/L (ref 5–18)
AST SERPL W P-5'-P-CCNC: 39 U/L (ref 0–40)
BILIRUB SERPL-MCNC: 0.4 MG/DL (ref 0–1)
BUN SERPL-MCNC: 23 MG/DL (ref 8–22)
CALCIUM SERPL-MCNC: 7.8 MG/DL (ref 8.5–10.5)
CHLORIDE BLD-SCNC: 104 MMOL/L (ref 98–107)
CHOLEST SERPL-MCNC: 150 MG/DL
CO2 SERPL-SCNC: 25 MMOL/L (ref 22–31)
CREAT SERPL-MCNC: 1.97 MG/DL (ref 0.6–1.1)
ERYTHROCYTE [DISTWIDTH] IN BLOOD BY AUTOMATED COUNT: 18.7 % (ref 10–15)
FASTING STATUS PATIENT QL REPORTED: ABNORMAL
GFR SERPL CREATININE-BSD FRML MDRD: 28 ML/MIN/1.73M2
GLUCOSE BLD-MCNC: 259 MG/DL (ref 70–125)
GLUCOSE BLDC GLUCOMTR-MCNC: 221 MG/DL (ref 70–99)
GLUCOSE BLDC GLUCOMTR-MCNC: 223 MG/DL (ref 70–99)
GLUCOSE BLDC GLUCOMTR-MCNC: 255 MG/DL (ref 70–99)
GLUCOSE BLDC GLUCOMTR-MCNC: 282 MG/DL (ref 70–99)
GLUCOSE BLDC GLUCOMTR-MCNC: 285 MG/DL (ref 70–99)
HCT VFR BLD AUTO: 32.4 % (ref 35–47)
HDLC SERPL-MCNC: 18 MG/DL
HGB BLD-MCNC: 10.1 G/DL (ref 11.7–15.7)
LDLC SERPL CALC-MCNC: ABNORMAL MG/DL
MCH RBC QN AUTO: 21.3 PG (ref 26.5–33)
MCHC RBC AUTO-ENTMCNC: 31.2 G/DL (ref 31.5–36.5)
MCV RBC AUTO: 68 FL (ref 78–100)
PLATELET # BLD AUTO: 156 10E3/UL (ref 150–450)
POTASSIUM BLD-SCNC: 3 MMOL/L (ref 3.5–5)
POTASSIUM BLD-SCNC: 3.5 MMOL/L (ref 3.5–5)
POTASSIUM BLD-SCNC: 3.5 MMOL/L (ref 3.5–5)
PROT SERPL-MCNC: 7.3 G/DL (ref 6–8)
RBC # BLD AUTO: 4.75 10E6/UL (ref 3.8–5.2)
SODIUM SERPL-SCNC: 139 MMOL/L (ref 136–145)
TRIGL SERPL-MCNC: 656 MG/DL
WBC # BLD AUTO: 5.9 10E3/UL (ref 4–11)

## 2021-12-08 PROCEDURE — 99207 PR CDG-CODE CATEGORY CHANGED: CPT | Performed by: INTERNAL MEDICINE

## 2021-12-08 PROCEDURE — G0378 HOSPITAL OBSERVATION PER HR: HCPCS

## 2021-12-08 PROCEDURE — 97162 PT EVAL MOD COMPLEX 30 MIN: CPT | Mod: GP

## 2021-12-08 PROCEDURE — 82962 GLUCOSE BLOOD TEST: CPT

## 2021-12-08 PROCEDURE — 84132 ASSAY OF SERUM POTASSIUM: CPT | Performed by: HOSPITALIST

## 2021-12-08 PROCEDURE — 258N000003 HC RX IP 258 OP 636: Performed by: HOSPITALIST

## 2021-12-08 PROCEDURE — 250N000013 HC RX MED GY IP 250 OP 250 PS 637: Performed by: STUDENT IN AN ORGANIZED HEALTH CARE EDUCATION/TRAINING PROGRAM

## 2021-12-08 PROCEDURE — 96372 THER/PROPH/DIAG INJ SC/IM: CPT

## 2021-12-08 PROCEDURE — 36415 COLL VENOUS BLD VENIPUNCTURE: CPT | Performed by: HOSPITALIST

## 2021-12-08 PROCEDURE — 84132 ASSAY OF SERUM POTASSIUM: CPT | Performed by: STUDENT IN AN ORGANIZED HEALTH CARE EDUCATION/TRAINING PROGRAM

## 2021-12-08 PROCEDURE — 82465 ASSAY BLD/SERUM CHOLESTEROL: CPT | Performed by: STUDENT IN AN ORGANIZED HEALTH CARE EDUCATION/TRAINING PROGRAM

## 2021-12-08 PROCEDURE — 97116 GAIT TRAINING THERAPY: CPT | Mod: GP

## 2021-12-08 PROCEDURE — 250N000013 HC RX MED GY IP 250 OP 250 PS 637: Performed by: HOSPITALIST

## 2021-12-08 PROCEDURE — 97110 THERAPEUTIC EXERCISES: CPT | Mod: GP

## 2021-12-08 PROCEDURE — 85027 COMPLETE CBC AUTOMATED: CPT | Performed by: STUDENT IN AN ORGANIZED HEALTH CARE EDUCATION/TRAINING PROGRAM

## 2021-12-08 PROCEDURE — 80053 COMPREHEN METABOLIC PANEL: CPT | Performed by: HOSPITALIST

## 2021-12-08 PROCEDURE — 36415 COLL VENOUS BLD VENIPUNCTURE: CPT | Performed by: STUDENT IN AN ORGANIZED HEALTH CARE EDUCATION/TRAINING PROGRAM

## 2021-12-08 PROCEDURE — 999N000111 HC STATISTIC OT IP EVAL DEFER

## 2021-12-08 PROCEDURE — 99225 PR SUBSEQUENT OBSERVATION CARE,LEVEL II: CPT | Performed by: INTERNAL MEDICINE

## 2021-12-08 RX ORDER — POTASSIUM CHLORIDE 1500 MG/1
40 TABLET, EXTENDED RELEASE ORAL ONCE
Status: COMPLETED | OUTPATIENT
Start: 2021-12-08 | End: 2021-12-08

## 2021-12-08 RX ADMIN — Medication: at 17:32

## 2021-12-08 RX ADMIN — INSULIN ASPART 2 UNITS: 100 INJECTION, SOLUTION INTRAVENOUS; SUBCUTANEOUS at 17:31

## 2021-12-08 RX ADMIN — EZETIMIBE 10 MG: 10 TABLET ORAL at 08:36

## 2021-12-08 RX ADMIN — GABAPENTIN 300 MG: 300 CAPSULE ORAL at 08:36

## 2021-12-08 RX ADMIN — ATORVASTATIN CALCIUM 80 MG: 40 TABLET, FILM COATED ORAL at 08:37

## 2021-12-08 RX ADMIN — ASPIRIN 81 MG: 81 TABLET, COATED ORAL at 08:36

## 2021-12-08 RX ADMIN — POTASSIUM CHLORIDE 40 MEQ: 1500 TABLET, EXTENDED RELEASE ORAL at 02:34

## 2021-12-08 RX ADMIN — PANTOPRAZOLE SODIUM 40 MG: 40 TABLET, DELAYED RELEASE ORAL at 08:37

## 2021-12-08 RX ADMIN — SODIUM CHLORIDE: 9 INJECTION, SOLUTION INTRAVENOUS at 08:44

## 2021-12-08 RX ADMIN — CYANOCOBALAMIN TAB 1000 MCG 1000 MCG: 1000 TAB at 08:37

## 2021-12-08 RX ADMIN — FERROUS SULFATE TAB 325 MG (65 MG ELEMENTAL FE) 325 MG: 325 (65 FE) TAB at 08:36

## 2021-12-08 RX ADMIN — INSULIN ASPART 2 UNITS: 100 INJECTION, SOLUTION INTRAVENOUS; SUBCUTANEOUS at 08:35

## 2021-12-08 RX ADMIN — GABAPENTIN 300 MG: 300 CAPSULE ORAL at 20:48

## 2021-12-08 RX ADMIN — FERROUS SULFATE TAB 325 MG (65 MG ELEMENTAL FE) 325 MG: 325 (65 FE) TAB at 17:31

## 2021-12-08 RX ADMIN — Medication: at 08:38

## 2021-12-08 RX ADMIN — INSULIN ASPART 3 UNITS: 100 INJECTION, SOLUTION INTRAVENOUS; SUBCUTANEOUS at 12:13

## 2021-12-08 RX ADMIN — Medication: at 12:17

## 2021-12-08 RX ADMIN — ESCITALOPRAM OXALATE 10 MG: 10 TABLET, FILM COATED ORAL at 08:38

## 2021-12-08 RX ADMIN — SODIUM CHLORIDE: 9 INJECTION, SOLUTION INTRAVENOUS at 13:51

## 2021-12-08 RX ADMIN — AMLODIPINE BESYLATE 10 MG: 5 TABLET ORAL at 08:37

## 2021-12-08 RX ADMIN — FENOFIBRATE 54 MG: 54 TABLET ORAL at 08:36

## 2021-12-08 RX ADMIN — GABAPENTIN 300 MG: 300 CAPSULE ORAL at 00:49

## 2021-12-08 RX ADMIN — SODIUM CHLORIDE: 9 INJECTION, SOLUTION INTRAVENOUS at 00:54

## 2021-12-08 NOTE — PLAN OF CARE
"PRIMARY DIAGNOSIS: \"GENERIC\" NURSING  OUTPATIENT/OBSERVATION GOALS TO BE MET BEFORE DISCHARGE:  1. ADLs back to baseline: No    2. Activity and level of assistance: Up with standby assistance.    3. Pain status: Improved with use of alternative comfort measures i.e.:Topical Analgesic.    4. Return to near baseline physical activity: No  AM Blood glucose 255 correction bolus given.  Oxygen titrated to Room Air sating 95%.    Discharge Planner Nurse   Safe discharge environment identified: No  Barriers to discharge: Yes       Entered by: Altagracia Sánchez 12/08/2021 11:22 AM     Please review provider order for any additional goals.   Nurse to notify provider when observation goals have been met and patient is ready for discharge.  "

## 2021-12-08 NOTE — ED NOTES
[facility] ED Handoff Report    ED Chief Complaint: Generalized Weakness    ED Diagnosis:  (R73.9) Hyperglycemia  Comment: , pt has not had insulin since moving to [place]  approx 2 weeks ago  Plan: Stabilize on meds    (M62.81) Generalized muscle weakness  Comment:   Plan: PT/OT       PMH:  No past medical history on file.     Code Status:  Full Code     Falls Risk: No Band: Not applicable    Current Living Situation/Residence: lives with their son or daughter     Elimination Status: Continent: Yes     Activity Level: Independent    Patients Preferred Language:  Other: [employer]     Needed: Yes    Vital Signs:  /66   Pulse 64   Temp 98.6  F (37  C) (Oral)   Resp 20   SpO2 97%      Cardiac Rhythm:     Pain Score: 0/10    Is the Patient Confused:  No    Last Food or Drink: 12/07/21 at dinner    Focused Assessment:  AAOx4, RA, low appetite    Tests Performed: Done: Labs and Imaging    Treatments Provided:  Insulin, IV fluids    Family Dynamics/Concerns: No    Family Updated On Visitor Policy: Yes    Plan of Care Communicated to Family: Yes    Who Was Updated about Plan of Care: Daughter    Belongings Checklist Done and Signed by Patient: No    Covid: asymptomatic , negative    Additional Information:     RN: Yaa Gillette   12/7/2021 8:46 PM

## 2021-12-08 NOTE — PLAN OF CARE
"PRIMARY DIAGNOSIS: \"GENERIC\" NURSING  OUTPATIENT/OBSERVATION GOALS TO BE MET BEFORE DISCHARGE:  ADLs back to baseline: No    Activity and level of assistance: Up with standby assistance.    Pain status: Improved with use of alternative comfort measures i.e.: topical anages      Return to near baseline physical activity: No       Discharge Planner Nurse   Safe discharge environment identified: No  Barriers to discharge: Yes       Entered by: Altagracia Sánchez 12/08/2021 12:20 PM     Please review provider order for any additional goals.   Nurse to notify provider when observation goals have been met and patient is ready for discharge.  "

## 2021-12-08 NOTE — PLAN OF CARE
PRIMARY DIAGNOSIS: HYPO/HYPERGLYCEMIA    OUTPATIENT/OBSERVATION GOALS TO BE MET BEFORE DISCHARGE  BG greater than 100 and less than 250 on two consecutive readings: No  No results for input(s): BGM in the last 58946 hours.    Ketones absent from urine  (hyperglycemia):  NA    Tolerating oral intake to maintain hydration: Yes    Return to near baseline physical activity: No    Discharge Planner Nurse   Safe discharge environment identified: No  Barriers to discharge: Yes      B @ 2231; 282 @ 0042. Pt received Novolog insulin at bedtime per sliding scale. Absent of any signs/symptoms of hyperglycemia. Continues on a 45g carbohydrate diet.     Pt denied and offered no c/o pain at this time, resting comfortably in bed. Received PO Tylenol on previous shift for chronic back and shoulder pain. Speaks fluent Karenni but understands some simple commands/verbal cues. Assist of 1 w/ gaitbelt for ambulation. Ambulated to restroom without difficulty but Pt noted to have ome generalized weakness. Calm and pleasant towards writer and other staff. Receptive to all cares. Continues on K+ protocol. Last K+ was 3.0 and Pt received PO replacement.     Continues on 2Ltrs O2 via NC. Fine crackles noted throughout. Pt with and infrequent dry/ nonproductive cough. Cough/ deep breath exercises completed w/ encouragement.

## 2021-12-08 NOTE — PLAN OF CARE
PRIMARY DIAGNOSIS: HYPERGLYCEMIA    OUTPATIENT/OBSERVATION GOALS TO BE MET BEFORE DISCHARGE  1. BG greater than 100 and less than 250 on two consecutive readings: Yes  2. Ketones absent from urine  (hyperglycemia): Yes    3. Tolerating oral intake to maintain hydration: Yes    4. Return to near baseline physical activity: No    Discharge Planner Nurse   Safe discharge environment identified: Yes  Barriers to discharge: Yes       Entered by: Altagracia Sánchez 12/08/2021 4:34 PM   Seen by Diabetic Education.  Please review provider order for any additional goals.   Nurse to notify provider when observation goals have been met and patient is ready for discharge.

## 2021-12-08 NOTE — PROGRESS NOTES
12/08/21 1300   Quick Adds   Type of Visit Initial PT Evaluation       Present yes   [facility]   Living Environment   People in home spouse;child(darcie), adult   Current Living Arrangements apartment   Home Accessibility other (see comments)  (unclear, but states bed on main level, gets assist with stai)   Transportation Anticipated family or friend will provide   Self-Care   Usual Activity Tolerance moderate   Current Activity Tolerance fair   Equipment Currently Used at Home none   Activity/Exercise/Self-Care Comment states family assists her with most ADL's as needed, states she does not use a walker   General Information   Onset of Illness/Injury or Date of Surgery 12/06/21   Referring Physician BLESSING Barone   Patient/Family Therapy Goals Statement (PT) return home   Pertinent History of Current Problem (include personal factors and/or comorbidities that impact the POC) admit with generalized weakness, fatigue, has not been taking her meds for diabetes   Existing Precautions/Restrictions fall   General Observations agreeable to PT   Cognition   Orientation Status (Cognition) oriented x 3   Affect/Mental Status (Cognition) WFL   Follows Commands (Cognition) WFL   Pain Assessment   Patient Currently in Pain Yes, see Vital Sign flowsheet   Range of Motion (ROM)   ROM Comment LE ROM is WFL   Strength   Strength Comments LE strength 4/5 but fatigues fairly easily   Bed Mobility   Bed Mobility sit-supine   Sit-Supine [place] (Bed Mobility) independent;verbal cues   Assistive Device (Bed Mobility)   (none)   Comment (Bed Mobility) able to reposition self in bed indep   Transfers   Transfers sit-stand transfer;toilet transfer   Sit-Stand Transfer   Sit-Stand [place] (Transfers) supervision;verbal cues   Assistive Device (Sit-Stand Transfers)   (none)   Sit/Stand Transfer Comments HHA, no AD   Toilet Transfer   Type (Toilet Transfer) sit-stand   [place] Level  (Toilet Transfer) supervision   Assistive Device (Toilet Transfer) grab bars/safety frame   Gait/Stairs (Locomotion)   [place] Level (Gait) contact guard   Assistive Device (Gait) other (see comments)  (HHA of 1)   Distance in Feet (Required for LE Total Joints) 75   Pattern (Gait) step-through   Comment (Gait/Stairs) slow pace, no LOB, fatigues easily   Clinical Impression   Criteria for Skilled Therapeutic Intervention yes, treatment indicated   PT Diagnosis (PT) decreased activity tolerance   Influenced by the following impairments weakness, fatigue   Functional limitations due to impairments weakness, fatigue   Clinical Presentation Stable/Uncomplicated   Clinical Presentation Rationale presents as medically diagnosed   Clinical Decision Making (Complexity) moderate complexity   Therapy Frequency (PT) Daily   Predicted Duration of Therapy Intervention (days/wks) 4 days   Planned Therapy Interventions (PT) bed mobility training;transfer training;gait training;stair training;strengthening   Anticipated Equipment Needs at Discharge (PT) other (see comments)  (could trial a FWW, most likely will not need any)   Risk & Benefits of therapy have been explained care plan/treatment goals reviewed;patient   PT Discharge Planning    PT Discharge Recommendation (DC Rec) home with assist   PT Rationale for DC Rec assist as prior to admit   PT Brief overview of current status  pt tolerated PT well   Total Evaluation Time   Total Evaluation Time (Minutes) 10

## 2021-12-08 NOTE — PROGRESS NOTES
Progress Note        Assessment/Plan  Nithya Matthews is a 51 year old female admitted on 12/6/2021. She was brought to the emergency department for evaluation of hyperglycemia, generalized weakness.     1.  Hyperglycemia  uncontrolled type 2 diabetes mellitus  Hemoglobin A1c 14.5  Ran out of insulin 2 weeks ago  No evidence of DKA  Reconcile PTA medications  Insulin sliding scale  Care management to assist with medications for discharge     2.  Chronic kidney disease  Unknown baseline   known history of kidney disease over the last 2 years  IV hydration, monitor renal function     3.  pseudoHyponatremia  related to hyperglycemia  Resolved      4.  Hypokalemia  Replace per protocol  Likely from GI losses     5.  Essential hypertension  Reconcile PTA medications     6.  Dyslipidemia  Resume statin      7.  Depression  Resume home meds      Diet: Low Consistent Carb (45 g CHO per Meal) Diet    DVT Prophylaxis: Ambulate every shift  Parra Catheter: Not present  Central Lines: None  Code Status: Full Code      Barriers to discharge weakness     Anticipated Discharge date  1-2 days         Subjective  Still weak not in painful distress, no fever     Objective  Vital signs in last 24 hours  Temp:  [98.2  F (36.8  C)-100  F (37.8  C)] 98.3  F (36.8  C)  Pulse:  [56-66] 66  Resp:  [17-20] 18  BP: (112-141)/(56-76) 112/59  SpO2:  [95 %-99 %] 95 %    Input and Output in 24 hrs     Intake/Output Summary (Last 24 hours) at 12/8/2021 1358  Last data filed at 12/8/2021 0400  Gross per 24 hour   Intake 1630 ml   Output --   Net 1630 ml       GEN: Alert and oriented. Not in acute distress  HEENT: Atraumatic    Pupils- round and reactive to light bilaterally   Neck- supple, no JVP elevation, no lymphadenopathy or thyromegaly   Sclera- anicteric   Mucous membrane- moist and pink  CHEST: Clear to auscultation bilaterally  HEART: S1S2 regular. No murmurs, rubs or gallops  ABDOMEN: Soft. Non-tender, non-distended. No organomegaly. No guarding or  rigidity. Bowel sounds- active  Extremities: No pedal oedema  CNS: No focal neurological deficit. No involuntary movements  SKIN: No skin rash, no cyanosis or clubbing      Pertinent Labs       Recent Results (from the past 24 hour(s))   Glucose by meter    Collection Time: 12/07/21  4:49 PM   Result Value Ref Range    GLUCOSE BY METER POCT 280 (H) 70 - 99 mg/dL   Glucose by meter    Collection Time: 12/07/21 10:31 PM   Result Value Ref Range    GLUCOSE BY METER POCT 277 (H) 70 - 99 mg/dL   Glucose by meter    Collection Time: 12/08/21 12:42 AM   Result Value Ref Range    GLUCOSE BY METER POCT 282 (H) 70 - 99 mg/dL   Potassium    Collection Time: 12/08/21  1:33 AM   Result Value Ref Range    Potassium 3.0 (L) 3.5 - 5.0 mmol/L   CBC with platelets    Collection Time: 12/08/21  6:07 AM   Result Value Ref Range    WBC Count 5.9 4.0 - 11.0 10e3/uL    RBC Count 4.75 3.80 - 5.20 10e6/uL    Hemoglobin 10.1 (L) 11.7 - 15.7 g/dL    Hematocrit 32.4 (L) 35.0 - 47.0 %    MCV 68 (L) 78 - 100 fL    MCH 21.3 (L) 26.5 - 33.0 pg    MCHC 31.2 (L) 31.5 - 36.5 g/dL    RDW 18.7 (H) 10.0 - 15.0 %    Platelet Count 156 150 - 450 10e3/uL   Comprehensive metabolic panel    Collection Time: 12/08/21  6:07 AM   Result Value Ref Range    Sodium 139 136 - 145 mmol/L    Potassium 3.5 3.5 - 5.0 mmol/L    Chloride 104 98 - 107 mmol/L    Carbon Dioxide (CO2) 25 22 - 31 mmol/L    Anion Gap 10 5 - 18 mmol/L    Urea Nitrogen 23 (H) 8 - 22 mg/dL    Creatinine 1.97 (H) 0.60 - 1.10 mg/dL    Calcium 7.8 (L) 8.5 - 10.5 mg/dL    Glucose 259 (H) 70 - 125 mg/dL    Alkaline Phosphatase 63 45 - 120 U/L    AST 39 0 - 40 U/L    ALT 41 0 - 45 U/L    Protein Total 7.3 6.0 - 8.0 g/dL    Albumin 3.3 (L) 3.5 - 5.0 g/dL    Bilirubin Total 0.4 0.0 - 1.0 mg/dL    GFR Estimate 28 (L) >60 mL/min/1.73m2   Lipid Profile    Collection Time: 12/08/21  6:07 AM   Result Value Ref Range    Cholesterol 150 <=199 mg/dL    Triglycerides 656 (H) <=149 mg/dL    Direct Measure HDL 18  (L) >=50 mg/dL    LDL Cholesterol Calculated      Patient Fasting > 8hrs? Unknown    Potassium    Collection Time: 12/08/21  6:07 AM   Result Value Ref Range    Potassium 3.5 3.5 - 5.0 mmol/L   Glucose by meter    Collection Time: 12/08/21  8:04 AM   Result Value Ref Range    GLUCOSE BY METER POCT 255 (H) 70 - 99 mg/dL   Glucose by meter    Collection Time: 12/08/21 11:29 AM   Result Value Ref Range    GLUCOSE BY METER POCT 285 (H) 70 - 99 mg/dL       EKG Results reviewed       Advanced Care Planning      Eliceo Barone MD MPipeD

## 2021-12-08 NOTE — CONSULTS
DIABETES CARE  Consulted by Provider for Diabetes Education    51year old female with type 2 diabetes. Patient was admitted for hyperglycemia and weakness.   Related Co-morbidities include: CKD, HTN    PCP: no provider yet, just moved from [place]  Social: Lives with spouse    Nutrition & Diabetes History: Longterm history of type 2 diabetes insulin dependent    Meds for BG Management PTA:  -Trulicity once weekly, not sure of dose. IT says in history 05 ml but the dose could be either 0.75 mg per 0.5 ml or 1.5 mg per 0.5 ml  -Tresiba 200 unit/ml 160 units daily  -Admelog or humalog 20 units per 2 meals daily.    Current Inpatient Meds for BG Management:  -Novolog medium correction dose 1 doprs 50  -Lantus 15 units      Labs:  Hemoglobin A1C:14.5            Hgb:11.3 SCr:1.97  GFR:28   BGs:   Results for SANJU GUNN (MRN [id number]) as of 12/8/2021 09:37   Ref. Range 12/7/2021 05:46 12/7/2021 07:39 12/7/2021 11:38 12/7/2021 16:49 12/7/2021 22:31 12/8/2021 00:42 12/8/2021 06:07 12/8/2021 08:04   Glucose Latest Ref Range: 70 - 125 mg/dL       259 (H)    GLUCOSE BY METER POCT Latest Ref Range: 70 - 99 mg/dL 314 (H) 358 (H) 308 (H) 280 (H) 277 (H) 282 (H)  255 (H)       Diet Order: 45 grams cho Intake: Fair   Weight: 48.1kg  BMI: 19.39    DM EDUCATION/COUNSELING:  Barriers to Learning and/or DM Self-Management: Language  Previous DM Education:   Yes  Current Education and/or visit with Patient and/or caregiver(s):  Reviewed diabetes disease with patient .. Discussed current blood sugars/A1C and target/goal numbers.  Reviewed hypoglycemia/hyperglycemia symptoms and risks of complications with continued high blood glucose. Reviewed how to treat low Bg.     Reviewed with her the insulin we have listed on her PTA and she states she was taking as per the PTA before he ran out of insulin after moving      She rotates sites on her arm and abdomen for insulin  Injections. She stores her extra insulin in refrigerator and leaves in a  "drawer out the ones she is using currently.    She treats lows appropriately      She needs to have medications delivered to her home or sent here before discharge.   May need meter as well.    (See also \"Diabetic Ed Flowsheet\"  Or Education tab-diabetes for any education topic details.)    ASSESSMENT:  Ran out of medications after move.  Told her she needs to continue with insulin in all circumstances or she will end up in the hospital again.  She needs medications ordered for her prior to discharge at PTA doses or whatever doses are decided upon in this admission     For inpatient diabetes management guidelines refer to \"Guidelines for Subcutaneous Insulin Dosing\" found in the DIAB Subcutaneous Insulin Management Adult order set.     F/U PLAN:  Will send request to PCP for DM educator referral at discharge.    DISCHARGE NEEDS:   Tresiba insulin   Trulicity insulin   Humalog insulin  Meter as needed plus supplies if needed.       Thank you,   Yaa Arboleda RN, [facility]  Diabetes Care   VM: [phone]  Pager: [phone]             "

## 2021-12-08 NOTE — PLAN OF CARE
"PRIMARY DIAGNOSIS: HYPO/HYPERGLYCEMIA    OUTPATIENT/OBSERVATION GOALS TO BE MET BEFORE DISCHARGE  BG greater than 100 and less than 250 on two consecutive readings: No  No results for input(s): BGM in the last 72470 hours.    Ketones absent from urine  (hyperglycemia):  n/a    Tolerating oral intake to maintain hydration: Yes    Return to near baseline physical activity: Yes    Discharge Planner Nurse   Safe discharge environment identified: No  Barriers to discharge: Yes       Entered by: Alexis Mary 12/07/2021 11:47 PM     [facility]  reported symptoms of dizziness for 1 month that is partly relieved by sitting down, headache, back of neck ache, pain in both shoulder, right hip, and lower back. Pain levels 10/10. Patient reported not knowing what year it is or what State they are in, repeatedly saying \"I dont know\" to orientation questions. But did not appear disoriented or delirious but rather disinterested and dismissive of such questions. Family member was present and said that that is \"just the way they have been\". Blood glucose 277. Patient ambulated with assistance to toilet and voiding for an long period. Drank 2 cups of hot tea. Bowel sounds clear and active. Lung sounds revealed crackles most audible from posterior mid and lower lobes.    Please review provider order for any additional goals.   Nurse to notify provider when observation goals have been met and patient is ready for discharge.  "

## 2021-12-08 NOTE — PLAN OF CARE
PRIMARY DIAGNOSIS:HYPERGLYCEMIA    OUTPATIENT/OBSERVATION GOALS TO BE MET BEFORE DISCHARGE  BG greater than 100 and less than 250 on two consecutive readings: No    Ketones absent from urine  (hyperglycemia):  NA    Tolerating oral intake to maintain hydration: Yes    Return to near baseline physical activity: No    Discharge Planner Nurse   Safe discharge environment identified: No  Barriers to discharge: No       Entered by: Denis James 12/08/2021 5:03 AM   Absent of any signs/symptoms of hyperglycemia. Continues on a 45g carbohydrate diet.   Pt resting/sleeping comfortably at this time. Uses call light appropriately.  Speaks fluent [place] but understands some simple commands/verbal cues. Assist of 1 w/ gaitbelt for ambulation.  Calm and pleasant towards writer and other staff. Receptive to all cares. Continues on K+ protocol. Last K+ was 3.0 and Pt received PO replacement.   Pt removed her Oxygen via NC but SpO2 maintained 95%. Fine crackles noted throughout. Pt with and infrequent dry/ nonproductive cough. Cough/ deep breath exercises completed w/ encouragement.   Continues on IV fluids, NS at 75ml/hr.

## 2021-12-08 NOTE — PLAN OF CARE
Occupational Therapy: Orders received. Chart reviewed and discussed with care team.? Occupational Therapy not indicated due to pt at baseline for ADLs.  Pt has assist from family as needed.  PT to continue to see pt to address endurance and strength..? Defer discharge recommendations to PT.? Will complete orders.

## 2021-12-08 NOTE — PLAN OF CARE
Problem: Adult Inpatient Plan of Care  Goal: Plan of Care Review  12/7/2021 1900 by Benny Hancock RN  Outcome: Improving  Flowsheets (Taken 12/7/2021 1900)  Plan of Care Reviewed With:   patient   daughter  12/7/2021 1209 by Benny Hancock RN  Outcome: Improving  Flowsheets (Taken 12/7/2021 1209)  Plan of Care Reviewed With: patient  Goal: Patient-Specific Goal (Individualized)  Outcome: Improving  Goal: Absence of Hospital-Acquired Illness or Injury  Outcome: Improving  Intervention: Identify and Manage Fall Risk  Recent Flowsheet Documentation  Taken 12/7/2021 1614 by Benny Hancock RN  Safety Promotion/Fall Prevention: safety round/check completed  Taken 12/7/2021 1200 by Benny Hancock RN  Safety Promotion/Fall Prevention: safety round/check completed  Taken 12/7/2021 0800 by Benny Hancock RN  Safety Promotion/Fall Prevention: safety round/check completed  Goal: Optimal Comfort and Wellbeing  Outcome: Improving  Goal: Readiness for Transition of Care  Outcome: Improving   Pt is alert and oriented x4. Pt is med complaint. Pt denies pain. Pt's v/s stable. Pt has poor appetite. No complain. Continue to monitor pt.

## 2021-12-09 ENCOUNTER — APPOINTMENT (OUTPATIENT)
Dept: PHYSICAL THERAPY | Facility: HOSPITAL | Age: 51
End: 2021-12-09
Payer: MEDICAID

## 2021-12-09 LAB
ANION GAP SERPL CALCULATED.3IONS-SCNC: 9 MMOL/L (ref 5–18)
BUN SERPL-MCNC: 19 MG/DL (ref 8–22)
CALCIUM SERPL-MCNC: 7.3 MG/DL (ref 8.5–10.5)
CHLORIDE BLD-SCNC: 108 MMOL/L (ref 98–107)
CO2 SERPL-SCNC: 21 MMOL/L (ref 22–31)
CREAT SERPL-MCNC: 1.67 MG/DL (ref 0.6–1.1)
GFR SERPL CREATININE-BSD FRML MDRD: 35 ML/MIN/1.73M2
GLUCOSE BLD-MCNC: 250 MG/DL (ref 70–125)
GLUCOSE BLDC GLUCOMTR-MCNC: 198 MG/DL (ref 70–99)
GLUCOSE BLDC GLUCOMTR-MCNC: 212 MG/DL (ref 70–99)
GLUCOSE BLDC GLUCOMTR-MCNC: 224 MG/DL (ref 70–99)
GLUCOSE BLDC GLUCOMTR-MCNC: 228 MG/DL (ref 70–99)
GLUCOSE BLDC GLUCOMTR-MCNC: 247 MG/DL (ref 70–99)
POTASSIUM BLD-SCNC: 3.5 MMOL/L (ref 3.5–5)
SODIUM SERPL-SCNC: 138 MMOL/L (ref 136–145)

## 2021-12-09 PROCEDURE — 96372 THER/PROPH/DIAG INJ SC/IM: CPT

## 2021-12-09 PROCEDURE — 80048 BASIC METABOLIC PNL TOTAL CA: CPT | Performed by: INTERNAL MEDICINE

## 2021-12-09 PROCEDURE — 258N000003 HC RX IP 258 OP 636: Performed by: INTERNAL MEDICINE

## 2021-12-09 PROCEDURE — 99222 1ST HOSP IP/OBS MODERATE 55: CPT | Mod: AI | Performed by: INTERNAL MEDICINE

## 2021-12-09 PROCEDURE — 250N000013 HC RX MED GY IP 250 OP 250 PS 637: Performed by: HOSPITALIST

## 2021-12-09 PROCEDURE — 120N000001 HC R&B MED SURG/OB

## 2021-12-09 PROCEDURE — 258N000003 HC RX IP 258 OP 636: Performed by: HOSPITALIST

## 2021-12-09 PROCEDURE — 36415 COLL VENOUS BLD VENIPUNCTURE: CPT | Performed by: INTERNAL MEDICINE

## 2021-12-09 PROCEDURE — G0378 HOSPITAL OBSERVATION PER HR: HCPCS

## 2021-12-09 PROCEDURE — 250N000013 HC RX MED GY IP 250 OP 250 PS 637: Performed by: STUDENT IN AN ORGANIZED HEALTH CARE EDUCATION/TRAINING PROGRAM

## 2021-12-09 PROCEDURE — 99207 PR CDG-CODE CATEGORY CHANGED: CPT | Performed by: INTERNAL MEDICINE

## 2021-12-09 PROCEDURE — 97116 GAIT TRAINING THERAPY: CPT | Mod: GP

## 2021-12-09 PROCEDURE — 82962 GLUCOSE BLOOD TEST: CPT

## 2021-12-09 PROCEDURE — 97110 THERAPEUTIC EXERCISES: CPT | Mod: GP

## 2021-12-09 RX ORDER — DEXTROSE MONOHYDRATE 25 G/50ML
25-50 INJECTION, SOLUTION INTRAVENOUS
Status: DISCONTINUED | OUTPATIENT
Start: 2021-12-09 | End: 2021-12-09

## 2021-12-09 RX ORDER — NICOTINE POLACRILEX 4 MG
15-30 LOZENGE BUCCAL
Status: DISCONTINUED | OUTPATIENT
Start: 2021-12-09 | End: 2021-12-09

## 2021-12-09 RX ADMIN — ATORVASTATIN CALCIUM 80 MG: 40 TABLET, FILM COATED ORAL at 09:14

## 2021-12-09 RX ADMIN — Medication: at 09:19

## 2021-12-09 RX ADMIN — FENOFIBRATE 54 MG: 54 TABLET ORAL at 09:13

## 2021-12-09 RX ADMIN — GABAPENTIN 300 MG: 300 CAPSULE ORAL at 21:01

## 2021-12-09 RX ADMIN — Medication: at 11:51

## 2021-12-09 RX ADMIN — ACETAMINOPHEN 650 MG: 325 TABLET ORAL at 16:45

## 2021-12-09 RX ADMIN — ESCITALOPRAM OXALATE 10 MG: 10 TABLET, FILM COATED ORAL at 09:14

## 2021-12-09 RX ADMIN — SODIUM CHLORIDE: 9 INJECTION, SOLUTION INTRAVENOUS at 11:49

## 2021-12-09 RX ADMIN — INSULIN ASPART 3 UNITS: 100 INJECTION, SOLUTION INTRAVENOUS; SUBCUTANEOUS at 09:15

## 2021-12-09 RX ADMIN — FERROUS SULFATE TAB 325 MG (65 MG ELEMENTAL FE) 325 MG: 325 (65 FE) TAB at 09:13

## 2021-12-09 RX ADMIN — EZETIMIBE 10 MG: 10 TABLET ORAL at 09:14

## 2021-12-09 RX ADMIN — SODIUM CHLORIDE: 9 INJECTION, SOLUTION INTRAVENOUS at 05:39

## 2021-12-09 RX ADMIN — Medication: at 16:53

## 2021-12-09 RX ADMIN — PANTOPRAZOLE SODIUM 40 MG: 40 TABLET, DELAYED RELEASE ORAL at 09:14

## 2021-12-09 RX ADMIN — GABAPENTIN 300 MG: 300 CAPSULE ORAL at 09:13

## 2021-12-09 RX ADMIN — SODIUM CHLORIDE 1000 ML: 9 INJECTION, SOLUTION INTRAVENOUS at 09:11

## 2021-12-09 RX ADMIN — ASPIRIN 81 MG: 81 TABLET, COATED ORAL at 09:14

## 2021-12-09 RX ADMIN — CYANOCOBALAMIN TAB 1000 MCG 1000 MCG: 1000 TAB at 09:14

## 2021-12-09 RX ADMIN — AMLODIPINE BESYLATE 10 MG: 5 TABLET ORAL at 09:14

## 2021-12-09 RX ADMIN — FERROUS SULFATE TAB 325 MG (65 MG ELEMENTAL FE) 325 MG: 325 (65 FE) TAB at 18:17

## 2021-12-09 RX ADMIN — SODIUM CHLORIDE: 9 INJECTION, SOLUTION INTRAVENOUS at 21:02

## 2021-12-09 ASSESSMENT — ACTIVITIES OF DAILY LIVING (ADL)
ADLS_ACUITY_SCORE: 18

## 2021-12-09 NOTE — CONSULTS
Care Management Follow Up    Length of Stay (days): 0    Expected Discharge Date: 12/10/2021     Concerns to be Addressed:     Control blood sugars, education with Diabetic Educator  Patient plan of care discussed at interdisciplinary rounds: Yes    Anticipated Discharge Disposition:  Home      Anticipated Discharge Services:  none  Anticipated Discharge DME:      Patient/family educated on Medicare website which has current facility and service quality ratings:    Education Provided on the Discharge Plan:    Patient/Family in Agreement with the Plan:      Referrals Placed by CM/SW:    Private pay costs discussed: Not applicable    Additional Information:  Pt. Should discharge home with no needs. Has stated could not afford DM meds and she does have [facility]. Diabetic Educator to work with for meds that may be cheaper.      Geri Puga RN

## 2021-12-09 NOTE — UTILIZATION REVIEW
Admission Status; Secondary Review Determination   Under the authority of the Utilization Management Committee, the utilization review process indicated a secondary review on [facility]. The review outcome is based on review of the medical records, discussions with staff, and applying clinical experience noted on the date of the review.   (x) Inpatient Status Appropriate - This patient's medical care is consistent with medical management for inpatient care and reasonable inpatient medical practice.     RATIONALE FOR DETERMINATION   51-year-old male with uncontrolled type 2 diabetes admitted with weakness, hyperglycemia, PRUDENCE, hyponatremia/hypokalemia.  Restarted on insulin, IV fluids, replacement of potassium and close monitoring of sodium with IV fluids.  Creatinine improved to 1.67 from a high of 2.20 3 days ago.  Blood glucose improved to 200s from the 600s.  Requiring further blood glucose management, IV fluids and renal function/electrolyte monitoring.    At the time of admission with the information available to the attending physician more than 2 nights Hospital complex care was anticipated, based on patient risk of adverse outcome if treated as outpatient and complex care required. Inpatient admission is appropriate based on the Medicare guidelines.   The information on this document is developed by the utilization review team in order for the business office to ensure compliance. This only denotes the appropriateness of proper admission status and does not reflect the quality of care rendered.   The definitions of Inpatient Status and Observation Status used in making the determination above are those provided in the CMS Coverage Manual, Chapter 1 and Chapter 6, section 70.4.   Sincerely,   Bari Leyva MD  Utilization Review  Physician Advisor  [facility]

## 2021-12-09 NOTE — PLAN OF CARE
Problem: Hyperglycemia  Goal: Blood Glucose Level Within Targeted Range  12/9/2021 1417 by Zollinger, Nancy K, RN  Outcome: Improving   PRIMARY DIAGNOSIS: HYPO/HYPERGLYCEMIA    OUTPATIENT/OBSERVATION GOALS TO BE MET BEFORE DISCHARGE  BG greater than 100 and less than 250 on two consecutive readings: Yes  No results for input(s): BGM in the last 98215 hours.    Ketones absent from urine  (hyperglycemia):  NA    Tolerating oral intake to maintain hydration: Yes    Return to near baseline physical activity: Yes    Discharge Planner Nurse   Safe discharge environment identified: Yes  Barriers to discharge: Yes       Entered by: Nancy K Zollinger 12/09/2021 2:18 PM     Please review provider order for any additional goals.   Nurse to notify provider when observation goals have been met and patient is ready for discharge.    Blood sugar 224 before lunch.  Patient ate 100% of lunch.  Novolog administered per sliding scale and carb count.  1000cc IV bolus completed.  Up in room with SBA.

## 2021-12-09 NOTE — PLAN OF CARE
PRIMARY DIAGNOSIS: HYPO/HYPERGLYCEMIA    OUTPATIENT/OBSERVATION GOALS TO BE MET BEFORE DISCHARGE  BG greater than 100 and less than 250 on two consecutive readings: No  No results for input(s): BGM in the last 10055 hours.    Ketones absent from urine  (hyperglycemia): Yes    Tolerating oral intake to maintain hydration: Yes    Return to near baseline physical activity: No    Discharge Planner Nurse   Safe discharge environment identified: No  Barriers to discharge: Yes       Entered by: Altagracia Sánchez 12/08/2021 9:27 PM     Please review provider order for any additional goals.   Nurse to notify provider when observation goals have been met and patient is ready for discharge.

## 2021-12-09 NOTE — PROGRESS NOTES
PRIMARY DIAGNOSIS: HYPO/HYPERGLYCEMIA    OUTPATIENT/OBSERVATION GOALS TO BE MET BEFORE DISCHARGE  1. BG greater than 100 and less than 250 on two consecutive readings: Yes  No results for input(s): BGM in the last 87714 hours.    2. Ketones absent from urine  (hyperglycemia): Yes    3. Tolerating oral intake to maintain hydration: Yes    4. Return to near baseline physical activity: No    Discharge Planner Nurse   Safe discharge environment identified: Yes  Barriers to discharge: Yes       Entered by: Masha Blanco 12/09/2021 6:11 AM     Please review provider order for any additional goals.   Nurse to notify provider when observation goals have been met and patient is ready for discharge.

## 2021-12-09 NOTE — PROGRESS NOTES
Progress Note        Assessment/Plan  Nithya Matthews is a 51 year old female admitted on 12/6/2021. She was brought to the emergency department for evaluation of hyperglycemia, generalized weakness.     1.  Hyperglycemia  uncontrolled type 2 diabetes mellitus,A1c 14.5  Ran out of insulin 2 weeks ago  No evidence of DKA  Increase lantus /Insulin sliding scale  Dm nurse to assist with affordable medications for discharge     2. PRUDENCE stage 2-3 with Chronic kidney disease  Unknown baseline   known history of kidney disease over the last 2 years  IV hydration, monitor renal function     3.  pseudoHyponatremia  related to hyperglycemia  Resolved      4.  Hypokalemia  Replace per protocol  Likely from GI losses     5.  Essential hypertension  Reconcile PTA medications     6.  Dyslipidemia  Resume statin      7.  Depression  Resume home meds      Diet: Low Consistent Carb (45 g CHO per Meal) Diet    DVT Prophylaxis: Ambulate every shift  Parra Catheter: Not present  Central Lines: None  Code Status: Full Code      Barriers to discharge weakness /elevated glucose     Anticipated Discharge date  1-2 days       Subjective  Communicate using prof    Had diarrhea few days ago , now resolved   Feels stronger     Objective  Vital signs in last 24 hours  Temp:  [98  F (36.7  C)-98.5  F (36.9  C)] 98.1  F (36.7  C)  Pulse:  [59-64] 64  Resp:  [18-20] 18  BP: (107-152)/(58-74) 119/64  SpO2:  [93 %-97 %] 93 %    Input and Output in 24 hrs     Intake/Output Summary (Last 24 hours) at 12/8/2021 1358  Last data filed at 12/8/2021 0400  Gross per 24 hour   Intake 1630 ml   Output --   Net 1630 ml       GEN: Alert and oriented. Not in acute distress  HEENT: Atraumatic    Pupils- round and reactive to light bilaterally   Neck- supple, no JVP elevation, no lymphadenopathy or thyromegaly   Sclera- anicteric   Mucous membrane- moist and pink  CHEST: Clear to auscultation bilaterally  HEART: S1S2 regular. No murmurs, rubs or gallops  ABDOMEN:  Soft. Non-tender, non-distended. No organomegaly. No guarding or rigidity. Bowel sounds- active  Extremities: No pedal oedema  CNS: No focal neurological deficit. No involuntary movements  SKIN: No skin rash, no cyanosis or clubbing      Pertinent Labs       Recent Results (from the past 24 hour(s))   Glucose by meter    Collection Time: 12/08/21  4:47 PM   Result Value Ref Range    GLUCOSE BY METER POCT 221 (H) 70 - 99 mg/dL   Glucose by meter    Collection Time: 12/08/21  9:13 PM   Result Value Ref Range    GLUCOSE BY METER POCT 223 (H) 70 - 99 mg/dL   Glucose by meter    Collection Time: 12/09/21  1:40 AM   Result Value Ref Range    GLUCOSE BY METER POCT 212 (H) 70 - 99 mg/dL   Basic metabolic panel    Collection Time: 12/09/21  7:25 AM   Result Value Ref Range    Sodium 138 136 - 145 mmol/L    Potassium 3.5 3.5 - 5.0 mmol/L    Chloride 108 (H) 98 - 107 mmol/L    Carbon Dioxide (CO2) 21 (L) 22 - 31 mmol/L    Anion Gap 9 5 - 18 mmol/L    Urea Nitrogen 19 8 - 22 mg/dL    Creatinine 1.67 (H) 0.60 - 1.10 mg/dL    Calcium 7.3 (L) 8.5 - 10.5 mg/dL    Glucose 250 (H) 70 - 125 mg/dL    GFR Estimate 35 (L) >60 mL/min/1.73m2   Glucose by meter    Collection Time: 12/09/21  9:09 AM   Result Value Ref Range    GLUCOSE BY METER POCT 247 (H) 70 - 99 mg/dL   Glucose by meter    Collection Time: 12/09/21 11:44 AM   Result Value Ref Range    GLUCOSE BY METER POCT 224 (H) 70 - 99 mg/dL       EKG Results reviewed       Advanced Care Planning      Eliceo Barone MD M.D

## 2021-12-09 NOTE — PLAN OF CARE
PRIMARY DIAGNOSIS: HYPO/HYPERGLYCEMIA    OUTPATIENT/OBSERVATION GOALS TO BE MET BEFORE DISCHARGE  BG greater than 100 and less than 250 on two consecutive readings:   No results for input(s): BGM in the last 92391 hours.  Yes  Ketones absent from urine  (hyperglycemia):  NA    Tolerating oral intake to maintain hydration: Yes    Return to near baseline physical activity: Yes    Discharge Planner Nurse   Safe discharge environment identified: Yes  Barriers to discharge: Yes       Entered by: Nancy K Zollinger 12/09/2021 11:10 AM     Please review provider order for any additional goals.   Nurse to notify provider when observation goals have been met and patient is ready for discharge.    Blood sugar 247 this morning, replaced per sliding scale.  Ate 100% of breakfast.  1000cc bolus infusing.

## 2021-12-09 NOTE — PROGRESS NOTES
PRIMARY DIAGNOSIS: HYPO/HYPERGLYCEMIA    OUTPATIENT/OBSERVATION GOALS TO BE MET BEFORE DISCHARGE  1. BG greater than 100 and less than 250 on two consecutive readings: Yes(223 and 221)  No results for input(s): BGM in the last 15554 hours.    2. Ketones absent from urine  (hyperglycemia): Yes    3. Tolerating oral intake to maintain hydration: Yes    4. Return to near baseline physical activity: No    Discharge Planner Nurse   Safe discharge environment identified: No  Barriers to discharge: Yes       Entered by: Masha Blanco 12/09/2021 2:09 AM   Patient still reports feeling weak. Stand by assist with transfer.  Please review provider order for any additional goals.   Nurse to notify provider when observation goals have been met and patient is ready for discharge.

## 2021-12-10 ENCOUNTER — TELEPHONE (OUTPATIENT)
Dept: FAMILY MEDICINE | Facility: CLINIC | Age: 51
End: 2021-12-10
Payer: MEDICAID

## 2021-12-10 ENCOUNTER — APPOINTMENT (OUTPATIENT)
Dept: PHYSICAL THERAPY | Facility: HOSPITAL | Age: 51
End: 2021-12-10
Payer: MEDICAID

## 2021-12-10 ENCOUNTER — MEDICAL CORRESPONDENCE (OUTPATIENT)
Dept: HEALTH INFORMATION MANAGEMENT | Facility: CLINIC | Age: 51
End: 2021-12-10

## 2021-12-10 VITALS
DIASTOLIC BLOOD PRESSURE: 78 MMHG | SYSTOLIC BLOOD PRESSURE: 156 MMHG | RESPIRATION RATE: 16 BRPM | HEART RATE: 61 BPM | TEMPERATURE: 98.3 F | OXYGEN SATURATION: 96 %

## 2021-12-10 DIAGNOSIS — E11.69 TYPE 2 DIABETES MELLITUS WITH OTHER SPECIFIED COMPLICATION, UNSPECIFIED WHETHER LONG TERM INSULIN USE (H): Primary | ICD-10-CM

## 2021-12-10 LAB
ANION GAP SERPL CALCULATED.3IONS-SCNC: 6 MMOL/L (ref 5–18)
BUN SERPL-MCNC: 14 MG/DL (ref 8–22)
CALCIUM SERPL-MCNC: 7.5 MG/DL (ref 8.5–10.5)
CHLORIDE BLD-SCNC: 113 MMOL/L (ref 98–107)
CO2 SERPL-SCNC: 23 MMOL/L (ref 22–31)
CREAT SERPL-MCNC: 1.57 MG/DL (ref 0.6–1.1)
GFR SERPL CREATININE-BSD FRML MDRD: 37 ML/MIN/1.73M2
GLUCOSE BLD-MCNC: 192 MG/DL (ref 70–125)
GLUCOSE BLDC GLUCOMTR-MCNC: 160 MG/DL (ref 70–99)
GLUCOSE BLDC GLUCOMTR-MCNC: 190 MG/DL (ref 70–99)
GLUCOSE BLDC GLUCOMTR-MCNC: 190 MG/DL (ref 70–99)
POTASSIUM BLD-SCNC: 3.6 MMOL/L (ref 3.5–5)
SODIUM SERPL-SCNC: 142 MMOL/L (ref 136–145)

## 2021-12-10 PROCEDURE — 97116 GAIT TRAINING THERAPY: CPT | Mod: GP

## 2021-12-10 PROCEDURE — 258N000003 HC RX IP 258 OP 636: Performed by: INTERNAL MEDICINE

## 2021-12-10 PROCEDURE — 80048 BASIC METABOLIC PNL TOTAL CA: CPT | Performed by: INTERNAL MEDICINE

## 2021-12-10 PROCEDURE — 250N000013 HC RX MED GY IP 250 OP 250 PS 637: Performed by: HOSPITALIST

## 2021-12-10 PROCEDURE — 36415 COLL VENOUS BLD VENIPUNCTURE: CPT | Performed by: INTERNAL MEDICINE

## 2021-12-10 PROCEDURE — 99239 HOSP IP/OBS DSCHRG MGMT >30: CPT | Performed by: INTERNAL MEDICINE

## 2021-12-10 PROCEDURE — 250N000013 HC RX MED GY IP 250 OP 250 PS 637: Performed by: STUDENT IN AN ORGANIZED HEALTH CARE EDUCATION/TRAINING PROGRAM

## 2021-12-10 RX ORDER — LANCING DEVICE/LANCETS
KIT MISCELLANEOUS
Qty: 1 EACH | Refills: 0 | Status: SHIPPED | OUTPATIENT
Start: 2021-12-10 | End: 2022-02-28

## 2021-12-10 RX ORDER — PEN NEEDLE, DIABETIC 32GX 5/32"
NEEDLE, DISPOSABLE MISCELLANEOUS
Qty: 100 EACH | Refills: 3 | Status: SHIPPED | OUTPATIENT
Start: 2021-12-10 | End: 2021-12-13

## 2021-12-10 RX ORDER — GLUCOSAMINE HCL/CHONDROITIN SU 500-400 MG
CAPSULE ORAL
Qty: 100 EACH | Refills: 3 | Status: SHIPPED | OUTPATIENT
Start: 2021-12-10 | End: 2021-12-13

## 2021-12-10 RX ORDER — INSULIN ASPART 100 [IU]/ML
INJECTION, SOLUTION INTRAVENOUS; SUBCUTANEOUS
Qty: 15 ML | Refills: 3 | Status: SHIPPED | OUTPATIENT
Start: 2021-12-10 | End: 2021-12-10

## 2021-12-10 RX ORDER — LANCETS
EACH MISCELLANEOUS
Qty: 102 EACH | Refills: 6 | Status: SHIPPED | OUTPATIENT
Start: 2021-12-10 | End: 2021-12-13

## 2021-12-10 RX ORDER — AMLODIPINE BESYLATE 10 MG/1
10 TABLET ORAL DAILY
Qty: 20 TABLET | Refills: 0 | Status: SHIPPED | OUTPATIENT
Start: 2021-12-10 | End: 2021-12-13

## 2021-12-10 RX ORDER — FENOFIBRATE 54 MG/1
54 TABLET ORAL DAILY
Qty: 30 TABLET | Refills: 0 | Status: SHIPPED | OUTPATIENT
Start: 2021-12-10 | End: 2021-12-13

## 2021-12-10 RX ORDER — GLUCOSAMINE HCL/CHONDROITIN SU 500-400 MG
CAPSULE ORAL
Qty: 100 EACH | Refills: 6 | Status: SHIPPED | OUTPATIENT
Start: 2021-12-10 | End: 2021-12-13

## 2021-12-10 RX ORDER — INSULIN LISPRO 100 U/ML
5 INJECTION, SOLUTION SUBCUTANEOUS
Qty: 15 ML | Refills: 0 | Status: SHIPPED | OUTPATIENT
Start: 2021-12-10 | End: 2021-12-13

## 2021-12-10 RX ORDER — LANCETS
EACH MISCELLANEOUS
Qty: 1 EACH | Refills: 3 | Status: SHIPPED | OUTPATIENT
Start: 2021-12-10 | End: 2021-12-13

## 2021-12-10 RX ORDER — METOPROLOL SUCCINATE 200 MG/1
200 TABLET, EXTENDED RELEASE ORAL DAILY
Qty: 30 TABLET | Refills: 0 | Status: SHIPPED | OUTPATIENT
Start: 2021-12-10 | End: 2021-12-13

## 2021-12-10 RX ORDER — LISINOPRIL 40 MG/1
40 TABLET ORAL DAILY
Qty: 20 TABLET | Refills: 1 | Status: SHIPPED | OUTPATIENT
Start: 2021-12-10 | End: 2021-12-13

## 2021-12-10 RX ADMIN — CYANOCOBALAMIN TAB 1000 MCG 1000 MCG: 1000 TAB at 09:44

## 2021-12-10 RX ADMIN — Medication 1 G: at 09:46

## 2021-12-10 RX ADMIN — FERROUS SULFATE TAB 325 MG (65 MG ELEMENTAL FE) 325 MG: 325 (65 FE) TAB at 09:44

## 2021-12-10 RX ADMIN — GABAPENTIN 300 MG: 300 CAPSULE ORAL at 09:44

## 2021-12-10 RX ADMIN — SODIUM CHLORIDE: 9 INJECTION, SOLUTION INTRAVENOUS at 06:14

## 2021-12-10 RX ADMIN — PANTOPRAZOLE SODIUM 40 MG: 40 TABLET, DELAYED RELEASE ORAL at 06:08

## 2021-12-10 RX ADMIN — ASPIRIN 81 MG: 81 TABLET, COATED ORAL at 09:43

## 2021-12-10 RX ADMIN — AMLODIPINE BESYLATE 10 MG: 5 TABLET ORAL at 09:43

## 2021-12-10 RX ADMIN — ATORVASTATIN CALCIUM 80 MG: 40 TABLET, FILM COATED ORAL at 09:44

## 2021-12-10 RX ADMIN — ESCITALOPRAM OXALATE 10 MG: 10 TABLET, FILM COATED ORAL at 09:44

## 2021-12-10 RX ADMIN — EZETIMIBE 10 MG: 10 TABLET ORAL at 09:44

## 2021-12-10 RX ADMIN — FENOFIBRATE 54 MG: 54 TABLET ORAL at 09:43

## 2021-12-10 RX ADMIN — METOPROLOL SUCCINATE 200 MG: 100 TABLET, FILM COATED, EXTENDED RELEASE ORAL at 09:43

## 2021-12-10 ASSESSMENT — ACTIVITIES OF DAILY LIVING (ADL)
ADLS_ACUITY_SCORE: 18
ADLS_ACUITY_SCORE: 16
ADLS_ACUITY_SCORE: 16
ADLS_ACUITY_SCORE: 18

## 2021-12-10 NOTE — PLAN OF CARE
Problem: Adult Inpatient Plan of Care  Goal: Absence of Hospital-Acquired Illness or Injury  Intervention: Identify and Manage Fall Risk  Recent Flowsheet Documentation  Taken 12/10/2021 0130 by Denis James RN  Safety Promotion/Fall Prevention:   activity supervised   bed alarm on   clutter free environment maintained   lighting adjusted   nonskid shoes/slippers when out of bed   patient and family education   room organization consistent     Problem: Adult Inpatient Plan of Care  Goal: Optimal Comfort and Wellbeing  Outcome: Improving     Problem: Hyperglycemia  Goal: Blood Glucose Level Within Targeted Range  Outcome: Improving    Pt denied and offered no c/o pain. Absent of non-verbal indicators. Received PRN Tylenol for lower back pain on previous shift. Slept comfortably throughout the shift. Calm/ pleasant towards writer. Receptive to all cares. Bedside glucose at 0200 was 190. Pt absent of any hyperglycemia symptoms this shift. VS: stable. Ambulates w/ SBA, steady gait noted. Fine crackles noted bibasilary, IS completed. Coughing and deep breathing also completed. Used call light appropriately. Continues on a 45g carb diet. Speaks Passenger Baggage Xpress fluently, Passenger Baggage Xpress  needed.

## 2021-12-10 NOTE — PLAN OF CARE
Physical Therapy Discharge Summary    Reason for therapy discharge:    Discharged to home. Home with assist. Issued SEC.    Progress towards therapy goal(s). See goals on Care Plan in [facility] electronic health record for goal details.  Goals met    Therapy recommendation(s):    No further therapy is recommended.

## 2021-12-10 NOTE — PROGRESS NOTES
Diabetes Care: Discussed with Barriga some issues related to her diabetes.  She states she has had 2-3 lows per week usually. This makes me think she has had too much insulin prescribed with no changes due to her readings.  She also treats hypoglycemia appropriately but should not have that often.    She does not report losing weight recently but I suspect as some point she has and thus also does not need as much insulin.  I think discharging her on the dose of 20 units of Lantus and IC of 1:10 Novolog like in the hospital seems appropriate with her doing follow up with provider and outpatient diabetes education.      Yaa Arboleda RN, [facility]  Diabetes Care   VM: [phone]  Pager: [phone]

## 2021-12-10 NOTE — PLAN OF CARE
Care Management Discharge Note    Discharge Date: 12/10/2021       Discharge Disposition: Home    Discharge Services: None    Discharge DME: None    Discharge Transportation: family or friend will provide    Private pay costs discussed: Not applicable    PAS Confirmation Code:    Patient/family educated on Medicare website which has current facility and service quality ratings:      Education Provided on the Discharge Plan:    Persons Notified of Discharge Plans: pt.  Patient/Family in Agreement with the Plan: yes    Handoff Referral Completed: No    Additional Information:  Home today with no CM needs.    Geri Puga RN

## 2021-12-10 NOTE — DISCHARGE INSTRUCTIONS
DIABETES REMINDERS:  1) Check your blood sugar 4 times per day.  Always bring your blood sugar log and meter to your diabetes-related appointments.  2) Your blood sugar goals:  80-130mg/dL before eating  and  mg/dL 2 hours after eating (or per your doctor).  3) Always be prepared to treat a low blood sugar should it happen. Keep a sugar-containing beverage or food nearby.  4) When to call your clinic:     Blood sugar over 400 mg/dL.     If you have 2 to 3 low blood sugars (under 70mg/dL) in a row,     Low reading the same time of day several days in a row,    Blood sugars elevated and you can not get them down with your usual diabetes regimen,    You are ill and can't keep blood sugars controlled.   5) When to call 911:  If your blood glucose does not get better with treatment, or if you/someone else is unable to give you treatment.  6) Talk to your primary care doctor about an appointment with a Certified Diabetes Educator to assist you with your BG management.

## 2021-12-10 NOTE — DISCHARGE SUMMARY
[facility] MEDICINE  DISCHARGE SUMMARY     Primary Care Physician: Flor Cho  Admission Date: 12/6/2021   Discharge Provider: Eliceo Barone MD Discharge Date: 12/10/2021   Diet: Diabetic   Code Status: Full Code   Activity: As tolerated        Condition at Discharge: Stable     REASON FOR PRESENTATION(See Admission Note for Details)   Weakness high blood sugar    PRINCIPAL & ACTIVE DISCHARGE DIAGNOSES     Principal Problem:    Hyperglycemia  Active Problems:    Type 2 diabetes mellitus with other specified complication, unspecified whether long term insulin use (H)    Hypertension, unspecified type    Stage 3 chronic kidney disease (H)    Generalized muscle weakness      SIGNIFICANT FINDINGS (Imaging, labs):     Results for orders placed or performed during the hospital encounter of 12/06/21   XR Chest Port 1 View    Narrative    EXAM: XR CHEST PORT 1 VIEW  LOCATION: [facility]  DATE/TIME: 12/6/2021 11:10 PM    INDICATION: cough  COMPARISON: None.      Impression    IMPRESSION: Negative chest.   CT Abdomen Pelvis w/o Contrast    Narrative    EXAM: CT ABDOMEN PELVIS W/O CONTRAST  LOCATION: [facility]  DATE/TIME: 12/7/2021 12:07 AM    INDICATION: Abdominal pain, acute, nonlocalized  COMPARISON: None.  TECHNIQUE: CT scan of the abdomen and pelvis was performed without IV contrast. Multiplanar reformats were obtained. Dose reduction techniques were used.  CONTRAST: None.    FINDINGS:   LOWER CHEST: Mosaic attenuation in the visualized lower lungs can be seen with air trapping.    HEPATOBILIARY: Fatty liver.    PANCREAS: Normal.    SPLEEN: Normal.    ADRENAL GLANDS: Normal.    KIDNEYS/BLADDER: There are 2 nonobstructing stones in the upper pole of the right kidney the largest measuring 3 mm. No ureteric stone or hydronephrosis.    BOWEL: Normal.    LYMPH NODES: Normal.    VASCULATURE: Unremarkable.    PELVIC ORGANS:  Dominant follicle right ovary.    MUSCULOSKELETAL: Normal.      Impression    IMPRESSION:   1.  2 small nonobstructing stones upper pole right kidney with the largest measuring 3 mm. No urinary calculi or hydronephrosis.           PENDING LABS          PROCEDURES ( this hospitalization only)          RECOMMENDATIONS TO OUTPATIENT PROVIDER FOR F/U VISIT     Primary care 1 to 2 weeks    DISPOSITION     Home    SUMMARY OF HOSPITAL COURSE:      Assessment/Plan  Nithya Matthews is a 51 year old female admitted on 12/6/2021. She was brought to the emergency department for evaluation of hyperglycemia, generalized weakness.     1.  Hyperglycemia  uncontrolled type 2 diabetes mellitus,A1c 14.5  Ran out of insulin 2 weeks ago  No evidence of DKA  Home medications dose/type adjusted     2. PRUDENCE stage 2-3 with Chronic kidney disease  Unknown baseline, better with hydration   known history of kidney disease over the last 2 years  Primary care follow-up     3.  pseudoHyponatremia  related to hyperglycemia  Resolved      4.  Hypokalemia  Replace per protocol  Likely from GI losses     5.  Essential hypertension  Reconcile PTA medications     6.  Dyslipidemia  Resume statin     Discharge Medications with Med changes:        Review of your medicines      START taking      Dose / Directions   * alcohol swab prep pads      Use to swab area of injection/amadeo as directed.  Quantity: 100 each  Refills: 6     * alcohol swab prep pads      Use to swab area of injection/amadeo as directed  Quantity: 100 each  Refills: 3     * alcohol swab prep pads      Use to swab area of injection/amadeo as directed.  Quantity: 100 each  Refills: 3     blood glucose calibration solution  Commonly known as: NO BRAND SPECIFIED      Use to calibrate blood glucose monitor as needed as directed. To accompany: Blood Glucose Monitor Brands: per insurance.  Quantity: 30 each  Refills: 0     blood glucose lancing device      Lancing device to be used with lancets.  Quantity:  1 each  Refills: 0     * blood glucose monitoring lancets      Use to test blood sugar 4x  times daily.  Quantity: 102 each  Refills: 6     * thin lancets  Commonly known as: NO BRAND SPECIFIED      Use to test blood sugar 4 times daily or as directed. To accompany: Blood Glucose Monitor Brands: per insurance.  Quantity: 1 each  Refills: 3     blood glucose test strip  Commonly known as: NO BRAND SPECIFIED      Use to test blood sugar 4x times daily or as directed. To accompany: Blood Glucose Monitor Brands: per insurance.  Quantity: 100 strip  Refills: 6     insulin glargine 100 UNIT/ML pen  Commonly known as: LANTUS PEN      Dose: 25 Units  Inject 25 Units Subcutaneous every morning (before breakfast)  Quantity: 15 mL  Refills: 0     * ULTICARE MICRO 32G X 4 MM miscellaneous  Generic drug: insulin pen needle      Use 4x pen needles daily or as directed.  Quantity: 100 each  Refills: 3     * ULTICARE MICRO 32G X 4 MM miscellaneous  Generic drug: insulin pen needle      Use 4 pen needles daily or as directed.  Quantity: 100 each  Refills: 3     * insulin pen needle 32G X 4 MM miscellaneous  Commonly known as: 32G X 4 MM      Use 4x pen needles daily or as directed.  Quantity: 30 each  Refills: 1         * This list has 8 medication(s) that are the same as other medications prescribed for you. Read the directions carefully, and ask your doctor or other care provider to review them with you.            CONTINUE these medicines which may have CHANGED, or have new prescriptions. If we are uncertain of the size of tablets/capsules you have at home, strength may be listed as something that might have changed.      Dose / Directions   insulin lispro 100 UNIT/ML pen  This may have changed:     how much to take    when to take this      Dose: 5 Units  Inject 5 Units Subcutaneous 3 times daily (before meals)  Quantity: 15 mL  Refills: 0        CONTINUE these medicines which have NOT CHANGED      Dose / Directions    acetaminophen 500 MG tablet  Commonly known as: TYLENOL      Dose: 500-1,000 mg  Take 500-1,000 mg by mouth every 6 hours as needed for mild pain  Refills: 0     amLODIPine 10 MG tablet  Commonly known as: NORVASC      Dose: 10 mg  Take 1 tablet (10 mg) by mouth daily  Quantity: 30 tablet  Refills: 0     aspirin 81 MG EC tablet      Dose: 81 mg  Take 81 mg by mouth daily  Refills: 0     atorvastatin 80 MG tablet  Commonly known as: LIPITOR      Dose: 80 mg  Take 1 tablet (80 mg) by mouth daily  Quantity: 30 tablet  Refills: 0     escitalopram 10 MG tablet  Commonly known as: LEXAPRO      Dose: 10 mg  Take 1 tablet (10 mg) by mouth daily  Quantity: 30 tablet  Refills: 0     ezetimibe 10 MG tablet  Commonly known as: ZETIA      Dose: 10 mg  Take 1 tablet (10 mg) by mouth daily  Quantity: 30 tablet  Refills: 0     fenofibrate 54 MG tablet  Commonly known as: LOFIBRA      Dose: 54 mg  Take 1 tablet (54 mg) by mouth daily  Quantity: 30 tablet  Refills: 0     ferrous sulfate 325 (65 Fe) MG tablet  Commonly known as: FEROSUL      Dose: 325 mg  Take 325 mg by mouth 2 times daily (with meals)  Refills: 0     fish oil-omega-3 fatty acids 1000 MG capsule      Dose: 1 g  Take 1 g by mouth daily  Refills: 0     gabapentin 300 MG capsule  Commonly known as: NEURONTIN      Dose: 300 mg  Take 300 mg by mouth 2 times daily  Refills: 0     hydrochlorothiazide 50 MG tablet  Commonly known as: HYDRODIURIL      Dose: 50 mg  Take 1 tablet (50 mg) by mouth daily  Quantity: 30 tablet  Refills: 0     lisinopril 40 MG tablet  Commonly known as: ZESTRIL      Dose: 40 mg  Take 1 tablet (40 mg) by mouth daily  Quantity: 30 tablet  Refills: 0     menthol (Topical Analgesic) 2.5% 2.5 % Gel topical gel  Commonly known as: BENGAY VANISHING SCENT      Apply topically 3 times daily Right shoulder pain  Refills: 0     metoprolol succinate  MG 24 hr tablet  Commonly known as: TOPROL-XL      Dose: 200 mg  Take 1 tablet (200 mg) by mouth  daily  Quantity: 30 tablet  Refills: 0     omeprazole 20 MG tablet      Dose: 20 mg  Take 20 mg by mouth daily  Refills: 0     Trulicity 3 MG/0.5ML Sopn  Generic drug: Dulaglutide      Dose: 0.5 mL  Inject 0.5 mLs Subcutaneous once a week  Quantity: 4 mL  Refills: 0     vitamin B-12 100 MCG tablet  Commonly known as: CYANOCOBALAMIN      Dose: 1,000 mcg  Take 1,000 mcg by mouth daily  Refills: 0     vitamin D2 09998 units (1250 mcg) capsule  Commonly known as: ERGOCALCIFEROL      Dose: 50,000 Units  Take 50,000 Units by mouth once a week  Refills: 0        STOP taking    Tresiba FlexTouch 200 UNIT/ML pen  Generic drug: insulin degludec              Where to get your medicines      These medications were sent to [facility] [address] [address]    Phone: [phone]     alcohol swab prep pads    alcohol swab prep pads    alcohol swab prep pads    blood glucose calibration solution    fenofibrate 54 MG tablet    metoprolol succinate  MG 24 hr tablet    ULTICARE MICRO 32G X 4 MM miscellaneous     Some of these will need a paper prescription and others can be bought over the counter. Ask your nurse if you have questions.    Bring a paper prescription for each of these medications    blood glucose lancing device    blood glucose monitoring lancets    blood glucose test strip    insulin glargine 100 UNIT/ML pen    insulin lispro 100 UNIT/ML pen    insulin pen needle 32G X 4 MM miscellaneous    thin lancets    ULTICARE MICRO 32G X 4 MM miscellaneous             Rationale for medication changes:              Consults         Immunizations given this encounter       There is no immunization history on file for this patient.        Anticoagulation Information      Recent INR results: No results for input(s): INR in the last 168 hours.  Warfarin doses (if applicable) or name of other anticoagulant:       Discharge Orders      Discharge Procedure Orders   Reason for your hospital stay   Order Comments: weakness     Follow-up and recommended labs and tests    Order Comments: Follow up with primary care provider, Physician No Ref-Primary, within 7 days for hospital follow- up.  The following labs/tests are recommended: cbc/bmp.     Activity   Order Comments: Your activity upon discharge: activity as tolerated     Order Specific Question Answer Comments   Is discharge order? Yes      Diet   Order Comments: Follow this diet upon discharge: Orders Placed This Encounter      Low Consistent Carb (45 g CHO per Meal) Diet     Order Specific Question Answer Comments   Is discharge order? Yes      Examination     Vital Signs in last 24 hours:   Temp:  [98  F (36.7  C)-98.3  F (36.8  C)] 98.3  F (36.8  C)  Pulse:  [57-65] 61  Resp:  [16-18] 16  BP: (119-156)/(58-78) 156/78  SpO2:  [93 %-98 %] 96 %   BP (!) 156/78 (BP Location: Left arm)   Pulse 61   Temp 98.3  F (36.8  C) (Oral)   Resp 16   SpO2 96%   General appearance: alert, appears stated age and cooperative  Lungs: clear to auscultation bilaterally  Heart: S1 anteriorly  Abdomen: soft, non-tender; bowel sounds normal; no masses,  no organomegaly  Extremities: No edema      Please see EMR for more detailed significant labs, imaging, consultant notes etc.  Total time spent on discharge: 3 5 minutes    Eliceo Barone MD   [facility] Service: Ph:[phone]    CC:Flor Cho

## 2021-12-10 NOTE — PLAN OF CARE
Problem: Adult Inpatient Plan of Care  Goal: Optimal Comfort and Wellbeing  Outcome: Improving   Medicated with tylenol for back pain  Problem: Hyperglycemia  Goal: Blood Glucose Level Within Targeted Range  Outcome: Improving     Problem: Diabetes Comorbidity  Goal: Blood Glucose Level Within Targeted Range  Outcome: Improving   Bs 198 and 228  Pt states feeling more stronger

## 2021-12-10 NOTE — TELEPHONE ENCOUNTER
Prior Authorization Retail Medication Request    Medication/Dose: Dulaglutide (TRULICITY) 3 MG/0.5ML SOPN  ICD code (if different than what is on RX):  E11.69  Previously Tried and Failed:  na  Rationale:  na    Insurance Name:   [place]ICA[place]  Insurance ID:  [id number]      Pharmacy Information (if different than what is on RX)  Name:  [facility]  Phone:  [phone]

## 2021-12-13 ENCOUNTER — OFFICE VISIT (OUTPATIENT)
Dept: FAMILY MEDICINE | Facility: CLINIC | Age: 51
End: 2021-12-13
Payer: MEDICAID

## 2021-12-13 ENCOUNTER — TELEPHONE (OUTPATIENT)
Dept: FAMILY MEDICINE | Facility: CLINIC | Age: 51
End: 2021-12-13

## 2021-12-13 ENCOUNTER — TELEPHONE (OUTPATIENT)
Dept: LAB | Facility: CLINIC | Age: 51
End: 2021-12-13

## 2021-12-13 VITALS
WEIGHT: 101 LBS | BODY MASS INDEX: 18.47 KG/M2 | DIASTOLIC BLOOD PRESSURE: 53 MMHG | SYSTOLIC BLOOD PRESSURE: 91 MMHG | TEMPERATURE: 95.7 F | OXYGEN SATURATION: 96 % | HEART RATE: 57 BPM

## 2021-12-13 DIAGNOSIS — F32.A DEPRESSION, UNSPECIFIED DEPRESSION TYPE: ICD-10-CM

## 2021-12-13 DIAGNOSIS — I10 HYPERTENSION GOAL BP (BLOOD PRESSURE) < 140/90: ICD-10-CM

## 2021-12-13 DIAGNOSIS — R73.9 HYPERGLYCEMIA: ICD-10-CM

## 2021-12-13 DIAGNOSIS — E78.5 HYPERLIPIDEMIA LDL GOAL <100: ICD-10-CM

## 2021-12-13 DIAGNOSIS — E61.1 LOW IRON: ICD-10-CM

## 2021-12-13 DIAGNOSIS — Z23 HIGH PRIORITY FOR 2019-NCOV VACCINE: ICD-10-CM

## 2021-12-13 DIAGNOSIS — E11.69 TYPE 2 DIABETES MELLITUS WITH OTHER SPECIFIED COMPLICATION, UNSPECIFIED WHETHER LONG TERM INSULIN USE (H): Primary | ICD-10-CM

## 2021-12-13 DIAGNOSIS — I10 HYPERTENSION, UNSPECIFIED TYPE: ICD-10-CM

## 2021-12-13 DIAGNOSIS — R79.89 LOW VITAMIN D LEVEL: ICD-10-CM

## 2021-12-13 DIAGNOSIS — K21.00 GASTROESOPHAGEAL REFLUX DISEASE WITH ESOPHAGITIS WITHOUT HEMORRHAGE: ICD-10-CM

## 2021-12-13 DIAGNOSIS — R79.89 LOW VITAMIN B12 LEVEL: ICD-10-CM

## 2021-12-13 PROCEDURE — 0064A COVID-19,PF,MODERNA (18+ YRS BOOSTER .25ML): CPT | Performed by: PHYSICIAN ASSISTANT

## 2021-12-13 PROCEDURE — 91306 COVID-19,PF,MODERNA (18+ YRS BOOSTER .25ML): CPT | Performed by: PHYSICIAN ASSISTANT

## 2021-12-13 PROCEDURE — 99215 OFFICE O/P EST HI 40 MIN: CPT | Mod: 25 | Performed by: PHYSICIAN ASSISTANT

## 2021-12-13 RX ORDER — CHLORAL HYDRATE 500 MG
1 CAPSULE ORAL DAILY
Qty: 90 CAPSULE | Refills: 0 | Status: SHIPPED | OUTPATIENT
Start: 2021-12-13 | End: 2022-03-17

## 2021-12-13 RX ORDER — LANOLIN ALCOHOL/MO/W.PET/CERES
1000 CREAM (GRAM) TOPICAL DAILY
Qty: 90 TABLET | Refills: 0 | Status: SHIPPED | OUTPATIENT
Start: 2021-12-13 | End: 2021-12-13

## 2021-12-13 RX ORDER — GABAPENTIN 300 MG/1
300 CAPSULE ORAL 2 TIMES DAILY
Qty: 180 CAPSULE | Refills: 0 | Status: SHIPPED | OUTPATIENT
Start: 2021-12-13 | End: 2022-02-28

## 2021-12-13 RX ORDER — ERGOCALCIFEROL 1.25 MG/1
50000 CAPSULE, LIQUID FILLED ORAL WEEKLY
Qty: 12 CAPSULE | Refills: 0 | Status: SHIPPED | OUTPATIENT
Start: 2021-12-13 | End: 2022-07-29

## 2021-12-13 RX ORDER — FENOFIBRATE 54 MG/1
54 TABLET ORAL DAILY
Qty: 90 TABLET | Refills: 0 | Status: SHIPPED | OUTPATIENT
Start: 2021-12-13 | End: 2021-12-13

## 2021-12-13 RX ORDER — FERROUS SULFATE 325(65) MG
325 TABLET ORAL 2 TIMES DAILY WITH MEALS
Qty: 180 TABLET | Refills: 0 | Status: SHIPPED | OUTPATIENT
Start: 2021-12-13 | End: 2022-03-17

## 2021-12-13 RX ORDER — HYDROCHLOROTHIAZIDE 50 MG/1
50 TABLET ORAL DAILY
Qty: 90 TABLET | Refills: 0 | Status: SHIPPED | OUTPATIENT
Start: 2021-12-13 | End: 2022-03-17

## 2021-12-13 RX ORDER — FERROUS SULFATE 325(65) MG
325 TABLET ORAL 2 TIMES DAILY WITH MEALS
Qty: 180 TABLET | Refills: 0 | Status: SHIPPED | OUTPATIENT
Start: 2021-12-13 | End: 2021-12-13

## 2021-12-13 RX ORDER — AMLODIPINE BESYLATE 10 MG/1
10 TABLET ORAL DAILY
Qty: 90 TABLET | Refills: 0 | Status: SHIPPED | OUTPATIENT
Start: 2021-12-13 | End: 2022-03-17

## 2021-12-13 RX ORDER — ESCITALOPRAM OXALATE 10 MG/1
10 TABLET ORAL DAILY
Qty: 90 TABLET | Refills: 0 | Status: SHIPPED | OUTPATIENT
Start: 2021-12-13 | End: 2022-03-17

## 2021-12-13 RX ORDER — ATORVASTATIN CALCIUM 80 MG/1
80 TABLET, FILM COATED ORAL DAILY
Qty: 90 TABLET | Refills: 0 | Status: SHIPPED | OUTPATIENT
Start: 2021-12-13 | End: 2022-03-17

## 2021-12-13 RX ORDER — DULAGLUTIDE 3 MG/.5ML
0.75 INJECTION, SOLUTION SUBCUTANEOUS WEEKLY
Qty: 4 ML | Refills: 0 | Status: SHIPPED | OUTPATIENT
Start: 2021-12-13 | End: 2021-12-13

## 2021-12-13 RX ORDER — ATORVASTATIN CALCIUM 80 MG/1
80 TABLET, FILM COATED ORAL DAILY
Qty: 90 TABLET | Refills: 0 | Status: SHIPPED | OUTPATIENT
Start: 2021-12-13 | End: 2021-12-13

## 2021-12-13 RX ORDER — LANOLIN ALCOHOL/MO/W.PET/CERES
1000 CREAM (GRAM) TOPICAL DAILY
Qty: 90 TABLET | Refills: 0 | Status: SHIPPED | OUTPATIENT
Start: 2021-12-13 | End: 2022-03-17

## 2021-12-13 RX ORDER — NICOTINE POLACRILEX 4 MG/1
20 GUM, CHEWING ORAL DAILY
Qty: 90 TABLET | Refills: 0 | Status: SHIPPED | OUTPATIENT
Start: 2021-12-13 | End: 2021-12-13

## 2021-12-13 RX ORDER — GLUCOSAMINE HCL/CHONDROITIN SU 500-400 MG
CAPSULE ORAL
Qty: 100 EACH | Refills: 3 | Status: SHIPPED | OUTPATIENT
Start: 2021-12-13 | End: 2022-02-14

## 2021-12-13 RX ORDER — ERGOCALCIFEROL 1.25 MG/1
50000 CAPSULE, LIQUID FILLED ORAL WEEKLY
Qty: 12 CAPSULE | Refills: 0 | Status: SHIPPED | OUTPATIENT
Start: 2021-12-13 | End: 2021-12-13

## 2021-12-13 RX ORDER — FENOFIBRATE 54 MG/1
54 TABLET ORAL DAILY
Qty: 90 TABLET | Refills: 0 | Status: SHIPPED | OUTPATIENT
Start: 2021-12-13 | End: 2022-03-17

## 2021-12-13 RX ORDER — LANCETS
EACH MISCELLANEOUS
Qty: 102 EACH | Refills: 6 | Status: SHIPPED | OUTPATIENT
Start: 2021-12-13 | End: 2022-02-14

## 2021-12-13 RX ORDER — ASPIRIN 81 MG/1
81 TABLET ORAL DAILY
Qty: 90 TABLET | Refills: 0 | Status: SHIPPED | OUTPATIENT
Start: 2021-12-13 | End: 2021-12-13

## 2021-12-13 RX ORDER — METOPROLOL SUCCINATE 200 MG/1
200 TABLET, EXTENDED RELEASE ORAL DAILY
Qty: 90 TABLET | Refills: 0 | Status: SHIPPED | OUTPATIENT
Start: 2021-12-13 | End: 2021-12-13

## 2021-12-13 RX ORDER — AMLODIPINE BESYLATE 10 MG/1
10 TABLET ORAL DAILY
Qty: 20 TABLET | Refills: 0 | Status: SHIPPED | OUTPATIENT
Start: 2021-12-13 | End: 2021-12-13

## 2021-12-13 RX ORDER — GABAPENTIN 300 MG/1
300 CAPSULE ORAL 2 TIMES DAILY
Qty: 180 CAPSULE | Refills: 0 | Status: SHIPPED | OUTPATIENT
Start: 2021-12-13 | End: 2021-12-13

## 2021-12-13 RX ORDER — ASPIRIN 81 MG/1
81 TABLET ORAL DAILY
Qty: 90 TABLET | Refills: 0 | Status: SHIPPED | OUTPATIENT
Start: 2021-12-13 | End: 2022-03-17

## 2021-12-13 RX ORDER — LANCETS
EACH MISCELLANEOUS
Qty: 1 EACH | Refills: 3 | Status: SHIPPED | OUTPATIENT
Start: 2021-12-13 | End: 2022-02-28

## 2021-12-13 RX ORDER — HYDROCHLOROTHIAZIDE 50 MG/1
50 TABLET ORAL DAILY
Qty: 90 TABLET | Refills: 0 | Status: SHIPPED | OUTPATIENT
Start: 2021-12-13 | End: 2021-12-13

## 2021-12-13 RX ORDER — ESCITALOPRAM OXALATE 10 MG/1
10 TABLET ORAL DAILY
Qty: 90 TABLET | Refills: 0 | Status: SHIPPED | OUTPATIENT
Start: 2021-12-13 | End: 2021-12-13

## 2021-12-13 RX ORDER — LISINOPRIL 40 MG/1
40 TABLET ORAL DAILY
Qty: 90 TABLET | Refills: 0 | Status: SHIPPED | OUTPATIENT
Start: 2021-12-13 | End: 2022-03-17

## 2021-12-13 RX ORDER — NICOTINE POLACRILEX 4 MG/1
20 GUM, CHEWING ORAL DAILY
Qty: 90 TABLET | Refills: 0 | Status: SHIPPED | OUTPATIENT
Start: 2021-12-13 | End: 2022-03-17

## 2021-12-13 RX ORDER — METOPROLOL SUCCINATE 200 MG/1
200 TABLET, EXTENDED RELEASE ORAL DAILY
Qty: 90 TABLET | Refills: 0 | Status: SHIPPED | OUTPATIENT
Start: 2021-12-13 | End: 2022-03-17

## 2021-12-13 RX ORDER — LISINOPRIL 40 MG/1
40 TABLET ORAL DAILY
Qty: 90 TABLET | Refills: 0 | Status: SHIPPED | OUTPATIENT
Start: 2021-12-13 | End: 2021-12-13

## 2021-12-13 RX ORDER — LIRAGLUTIDE 6 MG/ML
INJECTION SUBCUTANEOUS
Qty: 9 ML | Refills: 0 | Status: SHIPPED | OUTPATIENT
Start: 2021-12-13 | End: 2021-12-16

## 2021-12-13 ASSESSMENT — ANXIETY QUESTIONNAIRES
7. FEELING AFRAID AS IF SOMETHING AWFUL MIGHT HAPPEN: SEVERAL DAYS
GAD7 TOTAL SCORE: 11
5. BEING SO RESTLESS THAT IT IS HARD TO SIT STILL: NEARLY EVERY DAY
3. WORRYING TOO MUCH ABOUT DIFFERENT THINGS: SEVERAL DAYS
GAD7 TOTAL SCORE: 11
4. TROUBLE RELAXING: MORE THAN HALF THE DAYS
GAD7 TOTAL SCORE: 11
7. FEELING AFRAID AS IF SOMETHING AWFUL MIGHT HAPPEN: SEVERAL DAYS
6. BECOMING EASILY ANNOYED OR IRRITABLE: MORE THAN HALF THE DAYS
1. FEELING NERVOUS, ANXIOUS, OR ON EDGE: SEVERAL DAYS
2. NOT BEING ABLE TO STOP OR CONTROL WORRYING: SEVERAL DAYS

## 2021-12-13 ASSESSMENT — PATIENT HEALTH QUESTIONNAIRE - PHQ9
10. IF YOU CHECKED OFF ANY PROBLEMS, HOW DIFFICULT HAVE THESE PROBLEMS MADE IT FOR YOU TO DO YOUR WORK, TAKE CARE OF THINGS AT HOME, OR GET ALONG WITH OTHER PEOPLE: VERY DIFFICULT
SUM OF ALL RESPONSES TO PHQ QUESTIONS 1-9: 8
SUM OF ALL RESPONSES TO PHQ QUESTIONS 1-9: 8

## 2021-12-13 NOTE — TELEPHONE ENCOUNTER
"The prescriptions Alyssa Garcia sent today were sent to an independent pharmacy in [place] (not [facility]).  She needs all of her prescriptions cancelled and re-sent to \"[facility]  Phone number is [phone].  "

## 2021-12-13 NOTE — PROGRESS NOTES
Assessment & Plan     Type 2 diabetes mellitus with other specified complication, unspecified whether long term insulin use (H)  I suggest increasing lantus by 2 units every 3 days until morning fasting sugar is < 140.  Do not go higher than 40 units with this.      For the next three days, take 27 units every morning.  Check morning sugars and goal is < 140.      Leave short acting insulin as is for now (5 units before each meal).     Will try to get trulicity covered by insurance.  Trulicity not covered, but victoza was, will switch to this instead.  Spoke with our pharmacy, they are aware of a mail order med delivery through [employer] that will provide the weekly packets.  Meds sent to [email or web address].       - AMB Adult Diabetes Educator Referral; Future  - aspirin 81 MG EC tablet; Take 1 tablet (81 mg) by mouth daily  - alcohol swab prep pads; Use to swab area of injection/amadeo as directed.  - gabapentin (NEURONTIN) 300 MG capsule; Take 1 capsule (300 mg) by mouth 2 times daily  - fish oil-omega-3 fatty acids 1000 MG capsule; Take 1 capsule (1 g) by mouth daily  - insulin aspart (NOVOLOG PEN) 100 UNIT/ML pen; Inject 5 Units Subcutaneous 3 times daily (with meals)  - insulin pen needle (32G X 4 MM) 32G X 4 MM miscellaneous; Use 4x pen needles daily or as directed.    Hyperlipidemia LDL goal <100  Will leave lipitor as is.  She was also on zetia/fenofibrate, will recheck labs before next appt to determine if these are necessary going forward.  She is on quite a few medications and simplifying medications would be helpful.   - amLODIPine (NORVASC) 10 MG tablet; Take 1 tablet (10 mg) by mouth daily  - atorvastatin (LIPITOR) 80 MG tablet; Take 1 tablet (80 mg) by mouth daily  - fenofibrate (LOFIBRA) 54 MG tablet; Take 1 tablet (54 mg) by mouth daily  - lisinopril (ZESTRIL) 40 MG tablet; Take 1 tablet (40 mg) by mouth daily    Low iron  Will leave as is  - ferrous sulfate (FEROSUL) 325 (65 Fe) MG tablet; Take 1 tablet  (325 mg) by mouth 2 times daily (with meals)    Depression, unspecified depression type  Will leave as is  - escitalopram (LEXAPRO) 10 MG tablet; Take 1 tablet (10 mg) by mouth daily    Hypertension goal BP (blood pressure) < 140/90  Stable.   - hydrochlorothiazide (HYDRODIURIL) 50 MG tablet; Take 1 tablet (50 mg) by mouth daily    Gastroesophageal reflux disease with esophagitis without hemorrhage  stable  - omeprazole 20 MG tablet; Take 1 tablet (20 mg) by mouth daily    Low vitamin B12 level  stable  - cyanocobalamin (VITAMIN B-12) 1000 MCG tablet; Take 1 tablet (1,000 mcg) by mouth daily    Low vitamin D level  stable  - vitamin D2 (ERGOCALCIFEROL) 61950 units (1250 mcg) capsule; Take 1 capsule (50,000 Units) by mouth once a week    Hyperglycemia  As above  - blood glucose (NO BRAND SPECIFIED) test strip; Use to test blood sugar 4x times daily or as directed. To accompany: Blood Glucose Monitor Brands: per insurance.  - blood glucose monitoring (ACCU-CHEK FASTCLIX) lancets; Use to test blood sugar 4x  times daily.  - insulin glargine (LANTUS PEN) 100 UNIT/ML pen; Inject 27 Units Subcutaneous every morning (before breakfast)  - thin (NO BRAND SPECIFIED) lancets; Use to test blood sugar 4 times daily or as directed. To accompany: Blood Glucose Monitor Brands: per insurance.    Hypertension, unspecified type  stable  - metoprolol succinate ER (TOPROL-XL) 200 MG 24 hr tablet; Take 1 tablet (200 mg) by mouth daily    High priority for 2019-nCoV vaccine  Due for booster likely (she was not exactly sure when her last dose was, it was approximately 6 months ago).  Given current state of pandemic (especially in [place] now) and uncontrolled medical history, I suggest booster now as she is quite susceptible to a complication from covid.   - COVID-19,PF,MODERNA (18+ Yrs BOOSTER .25mL)      65 minutes spent on the date of the encounter doing chart review, history and exam, documentation and further activities per the note            No follow-ups on file.    [facility] LENNOX Barriga is a 51 year old who presents for the following health issues     HPI     Diabetes Follow-up    How often are you checking your blood sugar? Two times daily  Blood sugar testing frequency justification:  Uncontrolled diabetes  What time of day are you checking your blood sugars (select all that apply)?  Before and after meals  Have you had any blood sugars above 200?  Yes   Have you had any blood sugars below 70?  Yes     What symptoms do you notice when your blood sugar is low?  Shaky, Dizzy, Weak, Blurred vision and Confusion    What concerns do you have today about your diabetes? Getting infections often, Blood sugar is often over 200 and Low blood sugar     Do you have any of these symptoms? (Select all that apply)  Numbness in feet, Burning in feet, Excessive thirst, Blurry vision and Weight loss    Have you had a diabetic eye exam in the last 12 months?     She feels a little better since restarting medications (was recently in ER)  This am, sugar was 425  Sugar yesterday before meal:  120  After meal: 240    When sugars are 80-90 she feels shaky.    Will have lower sugars < 80 about once every 1-2 months.      She c/o numbness in feet.      BP Readings from Last 2 Encounters:   12/10/21 (!) 156/78   12/06/21 110/58     Hemoglobin A1C (%)   Date Value   12/06/2021 14.5 (H)     LDL Cholesterol Calculated (no units)   Date Value   12/08/2021      Comment:     Cannot estimate LDL when triglyceride exceeds 400 mg/dL   12/06/2021      Comment:     Cannot estimate LDL when triglyceride exceeds 400 mg/dL     LDL Cholesterol Direct (mg/dL)   Date Value   12/06/2021 40               Hypertension Follow-up      Do you check your blood pressure regularly outside of the clinic? Yes     Are you following a low salt diet? No    Are your blood pressures ever more than 140 on the top number (systolic) OR more   than 90  on the bottom number (diastolic), for example 140/90? Yes    Hospital follow-up:  Referred to hospital, started back on insulin.  No DKA.  Told to f/u with PCP for continued management of diabetes.     Review of Systems   Constitutional, HEENT, cardiovascular, pulmonary, GI, , musculoskeletal, neuro, skin, endocrine and psych systems are negative, except as otherwise noted.      Objective    There were no vitals taken for this visit.  There is no height or weight on file to calculate BMI.  Physical Exam   GENERAL: healthy, alert and no distress  NECK: no adenopathy, no asymmetry, masses, or scars and thyroid normal to palpation  RESP: lungs clear to auscultation - no rales, rhonchi or wheezes  CV: regular rate and rhythm, normal S1 S2, no S3 or S4, no murmur, click or rub, no peripheral edema and peripheral pulses strong  ABDOMEN: soft, nontender, no hepatosplenomegaly, no masses and bowel sounds normal  MS: no gross musculoskeletal defects noted, no edema    No results found for this or any previous visit (from the past 24 hour(s)).            Answers for HPI/ROS submitted by the patient on 12/13/2021  If you checked off any problems, how difficult have these problems made it for you to do your work, take care of things at home, or get along with other people?: Very difficult  PHQ9 TOTAL SCORE: 8  MADAN 7 TOTAL SCORE: 11

## 2021-12-13 NOTE — TELEPHONE ENCOUNTER
Looks like Rx's were sent to [facility] today; I called the other pharmacy, [facility], they said they already called the patient, the Rx's with them are cancelled.    I removed the wrong [facility] pharmacy from [facility] to avoid future errors.    I called   Services, placed call to patient with assistance of [facility]  #[phone].      Patient answered, I advised her of her prescriptions having been re-sent to [facility], phone number provided, advised she or a family member call to request prescriptions.    Patient verbalized understanding of and agreement with plan.    Maritza Stringer RN  [facility]

## 2021-12-13 NOTE — PATIENT INSTRUCTIONS
I suggest increasing lantus by 2 units every 3 days until morning fasting sugar is < 140.  Do not go higher than 40 units with this.      For the next three days, take 27 units every morning.  Check morning sugars and goal is < 140.      Leave short acting insulin as is for now (5 units before each meal).     Will try to get trulicity covered by insurance.      I am sending over your prescriptions to [facility] (they do the weekly pill packs delivered).  Please call [phone] to set up the account.

## 2021-12-13 NOTE — TELEPHONE ENCOUNTER
Called patient via [employer]  and informed her per Alyssa Garcia PA-C, to get OTC fish oil and more suggestions to follow once we recheck labs at her next visit..  Patient verbalized understanding via

## 2021-12-13 NOTE — TELEPHONE ENCOUNTER
Please call patient (will need ).    Prior authorization was denied for Trulicity, as insurance requires we first try a similar medication from that class instead called victoza.    This is similar to trulicity, however she should take it every day (not once per week).      Follow taper instructions:    Inject 0.6 mg Subcutaneous daily for 7 days, THEN 1.2 mg daily for 7 days, THEN 1.8 mg daily going forward.    Alyssa Garica PA-C

## 2021-12-13 NOTE — TELEPHONE ENCOUNTER
I suggest OTC for fish oil as well.  More suggestions to follow once we recheck labs at her next visit.     Alyssa Garcia PA-C

## 2021-12-13 NOTE — TELEPHONE ENCOUNTER
PRIOR AUTHORIZATION DENIED    Medication: Dulaglutide (TRULICITY) 3 MG/0.5ML SOPN    Denial Date: 12/13/2021    Denial Rationale:      Appeal Information: If provider would like to appeal this decision we will need a detailed letter of medical necessity to start the process. Then re-route this request back to the PA pool.

## 2021-12-13 NOTE — TELEPHONE ENCOUNTER
PA Initiation    Medication: Dulaglutide (TRULICITY) 3 MG/0.5ML SOPN  Insurance Company: [place] Medicaid ([facility]) - Phone [phone] Fax [phone]  Pharmacy Filling the Rx: [facility] - [place], [place] - [address]  Filling Pharmacy Phone: [phone]  Filling Pharmacy Fax: [phone]  Start Date: 12/13/2021

## 2021-12-13 NOTE — TELEPHONE ENCOUNTER
I called patient along with the [facility] .     I reviewed the directives from Alyssa Garcia PA-C with patient and she verbalized a good understanding.     She said that her plan will not cover the Aspirin 81 MG EC tablet, so she will purchase OTC.    She is concerned because the fish oil-omega-3 fatty acids 1000 MG capsules will cost $40/month.   I checked [facility] and the cost is generally $40-48 for 30 capsules.     Patient wonders if there is another option.     Please review and advise.     Milagro Walker RN BSN  [facility]

## 2021-12-14 ASSESSMENT — ANXIETY QUESTIONNAIRES: GAD7 TOTAL SCORE: 11

## 2021-12-14 ASSESSMENT — PATIENT HEALTH QUESTIONNAIRE - PHQ9: SUM OF ALL RESPONSES TO PHQ QUESTIONS 1-9: 8

## 2021-12-16 ENCOUNTER — TELEPHONE (OUTPATIENT)
Dept: FAMILY MEDICINE | Facility: CLINIC | Age: 51
End: 2021-12-16
Payer: MEDICAID

## 2021-12-16 DIAGNOSIS — E11.69 TYPE 2 DIABETES MELLITUS WITH OTHER SPECIFIED COMPLICATION, UNSPECIFIED WHETHER LONG TERM INSULIN USE (H): ICD-10-CM

## 2021-12-16 RX ORDER — LIRAGLUTIDE 6 MG/ML
INJECTION SUBCUTANEOUS
Qty: 9 ML | Refills: 0 | Status: SHIPPED | OUTPATIENT
Start: 2021-12-16 | End: 2022-02-14

## 2021-12-16 NOTE — TELEPHONE ENCOUNTER
Pharmacist from [facility] calling, says they need clarification on the trulicity, directions were to give .75 mls but the pen only gives 0.5 mls.  Patient's meds were transferred to them from [facility].    I see the directions are for 0.75 mg, not mls, but also that it was changed to victoza due to insurance not covering trulicity.    They did not get the victoza from [employer] yet.    I re-sent the victoza to [facility] now.    Maritza Stringer RN  [facility]

## 2021-12-21 ENCOUNTER — TELEPHONE (OUTPATIENT)
Dept: FAMILY MEDICINE | Facility: CLINIC | Age: 51
End: 2021-12-21
Payer: MEDICAID

## 2021-12-21 NOTE — TELEPHONE ENCOUNTER
The medication, Tresiba FlexTouch 200 Unit/ML Pen-inject has been discontinued. Sending to the provider as I am not sure if she would still like the patient to be on this medication.

## 2021-12-21 NOTE — TELEPHONE ENCOUNTER
Prior Authorization Retail Medication Request    Medication/Dose: Tresiba FlexTouch 200 Unit/ML Pen-injector  ICD code (if different than what is on RX):  R73.9  Previously Tried and Failed:  na  Rationale:  na    Insurance Name:  MEDICAID [place]  Insurance ID:  [id number]      Pharmacy Information (if different than what is on RX)  Name:  [facility]  Phone:  [phone]

## 2021-12-22 NOTE — TELEPHONE ENCOUNTER
Patient switched to lantus. Please notify pharmacy.  Patient has switched to pillpack by [facility] pharmacy      Alyssa Garcia PA-C

## 2022-02-14 ENCOUNTER — OFFICE VISIT (OUTPATIENT)
Dept: FAMILY MEDICINE | Facility: CLINIC | Age: 52
End: 2022-02-14
Payer: COMMERCIAL

## 2022-02-14 ENCOUNTER — TELEPHONE (OUTPATIENT)
Dept: FAMILY MEDICINE | Facility: CLINIC | Age: 52
End: 2022-02-14

## 2022-02-14 VITALS
BODY MASS INDEX: 19.88 KG/M2 | HEART RATE: 86 BPM | DIASTOLIC BLOOD PRESSURE: 60 MMHG | TEMPERATURE: 98.2 F | OXYGEN SATURATION: 90 % | SYSTOLIC BLOOD PRESSURE: 110 MMHG | WEIGHT: 108 LBS | HEIGHT: 62 IN

## 2022-02-14 DIAGNOSIS — K90.0 CELIAC DISEASE: ICD-10-CM

## 2022-02-14 DIAGNOSIS — Z76.89 ENCOUNTER TO ESTABLISH CARE: Primary | ICD-10-CM

## 2022-02-14 DIAGNOSIS — E11.42 DIABETIC POLYNEUROPATHY ASSOCIATED WITH TYPE 2 DIABETES MELLITUS (H): ICD-10-CM

## 2022-02-14 DIAGNOSIS — E11.69 TYPE 2 DIABETES MELLITUS WITH OTHER SPECIFIED COMPLICATION, UNSPECIFIED WHETHER LONG TERM INSULIN USE (H): ICD-10-CM

## 2022-02-14 DIAGNOSIS — Z59.9 FINANCIAL DIFFICULTIES: ICD-10-CM

## 2022-02-14 DIAGNOSIS — E78.5 HYPERLIPIDEMIA LDL GOAL <100: ICD-10-CM

## 2022-02-14 DIAGNOSIS — I10 HYPERTENSION, UNSPECIFIED TYPE: ICD-10-CM

## 2022-02-14 DIAGNOSIS — F32.A DEPRESSION, UNSPECIFIED DEPRESSION TYPE: ICD-10-CM

## 2022-02-14 DIAGNOSIS — K21.00 GASTROESOPHAGEAL REFLUX DISEASE WITH ESOPHAGITIS WITHOUT HEMORRHAGE: ICD-10-CM

## 2022-02-14 DIAGNOSIS — D56.3 BETA THALASSEMIA MINOR: ICD-10-CM

## 2022-02-14 DIAGNOSIS — E11.3493 SEVERE NONPROLIFERATIVE DIABETIC RETINOPATHY OF BOTH EYES WITHOUT MACULAR EDEMA ASSOCIATED WITH TYPE 2 DIABETES MELLITUS (H): ICD-10-CM

## 2022-02-14 DIAGNOSIS — E61.1 LOW IRON: ICD-10-CM

## 2022-02-14 DIAGNOSIS — R79.89 LOW VITAMIN B12 LEVEL: ICD-10-CM

## 2022-02-14 DIAGNOSIS — I10 HYPERTENSION GOAL BP (BLOOD PRESSURE) < 140/90: ICD-10-CM

## 2022-02-14 DIAGNOSIS — R73.9 HYPERGLYCEMIA: ICD-10-CM

## 2022-02-14 PROCEDURE — 99215 OFFICE O/P EST HI 40 MIN: CPT | Performed by: FAMILY MEDICINE

## 2022-02-14 RX ORDER — LANCETS
EACH MISCELLANEOUS
Qty: 102 EACH | Refills: 6 | Status: SHIPPED | OUTPATIENT
Start: 2022-02-14 | End: 2022-02-28

## 2022-02-14 RX ORDER — HYDROCHLOROTHIAZIDE 50 MG/1
50 TABLET ORAL DAILY
Qty: 90 TABLET | Refills: 0 | Status: CANCELLED | OUTPATIENT
Start: 2022-02-14

## 2022-02-14 RX ORDER — ESCITALOPRAM OXALATE 10 MG/1
10 TABLET ORAL DAILY
Qty: 90 TABLET | Refills: 0 | Status: CANCELLED | OUTPATIENT
Start: 2022-02-14

## 2022-02-14 RX ORDER — CHLORAL HYDRATE 500 MG
1 CAPSULE ORAL DAILY
Qty: 90 CAPSULE | Refills: 0 | Status: CANCELLED | OUTPATIENT
Start: 2022-02-14

## 2022-02-14 RX ORDER — ASPIRIN 81 MG/1
81 TABLET ORAL DAILY
Qty: 90 TABLET | Refills: 0 | Status: CANCELLED | OUTPATIENT
Start: 2022-02-14

## 2022-02-14 RX ORDER — ATORVASTATIN CALCIUM 80 MG/1
80 TABLET, FILM COATED ORAL DAILY
Qty: 90 TABLET | Refills: 0 | Status: CANCELLED | OUTPATIENT
Start: 2022-02-14

## 2022-02-14 RX ORDER — GABAPENTIN 300 MG/1
300 CAPSULE ORAL 2 TIMES DAILY
Qty: 180 CAPSULE | Refills: 0 | Status: CANCELLED | OUTPATIENT
Start: 2022-02-14

## 2022-02-14 RX ORDER — LIRAGLUTIDE 6 MG/ML
1.8 INJECTION SUBCUTANEOUS DAILY
Qty: 27 ML | Refills: 3 | Status: SHIPPED | OUTPATIENT
Start: 2022-02-14 | End: 2022-02-28

## 2022-02-14 RX ORDER — FERROUS SULFATE 325(65) MG
325 TABLET ORAL 2 TIMES DAILY WITH MEALS
Qty: 180 TABLET | Refills: 0 | Status: CANCELLED | OUTPATIENT
Start: 2022-02-14

## 2022-02-14 RX ORDER — METOPROLOL SUCCINATE 200 MG/1
200 TABLET, EXTENDED RELEASE ORAL DAILY
Qty: 90 TABLET | Refills: 0 | Status: CANCELLED | OUTPATIENT
Start: 2022-02-14

## 2022-02-14 RX ORDER — LISINOPRIL 40 MG/1
40 TABLET ORAL DAILY
Qty: 90 TABLET | Refills: 0 | Status: CANCELLED | OUTPATIENT
Start: 2022-02-14

## 2022-02-14 RX ORDER — NICOTINE POLACRILEX 4 MG/1
20 GUM, CHEWING ORAL DAILY
Qty: 90 TABLET | Refills: 0 | Status: CANCELLED | OUTPATIENT
Start: 2022-02-14

## 2022-02-14 RX ORDER — LANOLIN ALCOHOL/MO/W.PET/CERES
1000 CREAM (GRAM) TOPICAL DAILY
Qty: 90 TABLET | Refills: 0 | Status: CANCELLED | OUTPATIENT
Start: 2022-02-14

## 2022-02-14 RX ORDER — AMLODIPINE BESYLATE 10 MG/1
10 TABLET ORAL DAILY
Qty: 90 TABLET | Refills: 0 | Status: CANCELLED | OUTPATIENT
Start: 2022-02-14

## 2022-02-14 RX ORDER — GLUCOSAMINE HCL/CHONDROITIN SU 500-400 MG
CAPSULE ORAL
Qty: 200 EACH | Refills: 3 | Status: SHIPPED | OUTPATIENT
Start: 2022-02-14 | End: 2022-02-28

## 2022-02-14 RX ORDER — ACETAMINOPHEN 500 MG
500-1000 TABLET ORAL EVERY 6 HOURS PRN
Status: CANCELLED | OUTPATIENT
Start: 2022-02-14

## 2022-02-14 SDOH — ECONOMIC STABILITY - INCOME SECURITY: PROBLEM RELATED TO HOUSING AND ECONOMIC CIRCUMSTANCES, UNSPECIFIED: Z59.9

## 2022-02-14 ASSESSMENT — MIFFLIN-ST. JEOR: SCORE: 1043.13

## 2022-02-14 NOTE — TELEPHONE ENCOUNTER
Reason for Call:  Form, our goal is to have forms completed with 72 hours, however, some forms may require a visit or additional information.    Type of letter, form or note:  medical opinion form    Who is the form from?: [facility] (if other please explain)    Where did the form come from: Patient or family brought in       What clinic location was the form placed at?: [place]    Where the form was placed: Given to physician    What number is listed as a contact on the form?: [phone]       Additional comments: Caller is asking if PCP had a chance to complete form they brought to appt. They would like to send it ASAP. Please call pt back with status of form.    Call taken on 2/14/2022 at 4:59 PM by Sophia Garrett

## 2022-02-14 NOTE — PROGRESS NOTES
ASSESSMENT/PLAN:   Barriga was seen today for establish care.    Diagnoses and all orders for this visit:    Encounter to establish care  -     Care Coordination Referral; Future    Hyperlipidemia LDL goal <100    Type 2 diabetes mellitus with other specified complication, unspecified whether long term insulin use (H)  Increased lantus to 40u at bedtime but likely will need more, possibly needs U-500? She was on 100u tresiba in [place] along with max of trulicity and 20u mealtime insulin. Unclear what her a1c was back then. Her last visit to the doctor there was 9/2021.   -     liraglutide (VICTOZA) 18 MG/3ML solution; Inject 1.8 mg Subcutaneous daily  -     insulin pen needle (32G X 4 MM) 32G X 4 MM miscellaneous; Use 4x pen needles daily or as directed.  -     insulin aspart (NOVOLOG PEN) 100 UNIT/ML pen; Inject 7 Units Subcutaneous 3 times daily (with meals)  -     alcohol swab prep pads; Use to swab area of injection/amadeo as directed.  -     Care Coordination Referral; Future  - Will schedule with [facility] in 1 month (next available)      Hypertension goal BP (blood pressure) < 140/90  -     Care Coordination Referral; Future    Diabetic polyneuropathy associated with type 2 diabetes mellitus (H)  Hyperglycemia  Severe nonproliferative diabetic retinopathy of both eyes without macular edema associated with type 2 diabetes mellitus (H)  -     blood glucose monitoring (ACCU-CHEK FASTCLIX) lancets; Use to test blood sugar 4x  times daily.  -     Discontinue: insulin glargine (LANTUS PEN) 100 UNIT/ML pen; Inject 50 Units Subcutaneous every morning (before breakfast)  -     insulin glargine (LANTUS PEN) 100 UNIT/ML pen; Inject 40 Units Subcutaneous every morning (before breakfast)    Low vitamin B12 level  Depression, unspecified depression type  Low iron  Hypertension, unspecified type  Gastroesophageal reflux disease with esophagitis without hemorrhage  Celiac disease  Beta thalassemia minor  Medical opinion form filled out,  "patient is unable to work.   KRISTINA signed for her previous clinic. She has had colonoscopies, EGD, mammogram, pap smears in the past few years.   Patient has about a month of medications left before needing a refill. Most of her medications have been stable, daughter says bubble packs/prepackaged pills were extremely helpful in keeping her on track. Discussed with [facility] RN, will have to wait until her next refills to get them packaged and it also depends on their needs in terms of copay and other factors. Will refer to [facility].       Financial difficulties  -     Care Coordination Referral; Future          No follow-ups on file.       ======================================================    SUBJECTIVE  Barriga Byron is a 52 year old female here for [facility] care.     Diabetes.   Last a1c was 14.5%  She was put on insulin lantus after seen in ER and WIC.   Daughter has been keeping a very good record of her glucose which has  Been in the 500s all the time.     She says she has memory issues. She has been unable to work because of her medical conditions but she is unable to tell me what those issues are.     She moved from [place], [place] to [place] because her son moved to [place]. Now he got  and moved out of the house. Her daughter still lives with her and she would be her PCA. They applied but have not heard back, unsure what to do next.         ROS  Complete 10 point review of systems negative except as noted above in HPI      OBJECTIVE  /60 (BP Location: Left arm, Patient Position: Sitting, Cuff Size: Adult Regular)   Pulse 86   Temp 98.2  F (36.8  C) (Oral)   Ht 1.575 m (5' 2\")   Wt 49 kg (108 lb)   SpO2 90%   BMI 19.75 kg/m       General: Cooperative, pleasant, in no acute distress  HEENT: PERRL, EOMI.  TM normal bilaterally.  Pharynx normal without erythema.  Tonsils normal without hypertrophy or exudates.  Neck: no lymphadenopathy, no masses  CV: RRR, normal S1/S2, no murmur, rubs, gallops  Resp: No respiratory " distress. Clear to auscultation bilaterally. No wheezes, rales, rhonchi  Abd: Nontender, nondistended, bowel sounds present  Ext: radial/pedal pulses +2 bilaterally  MSK: Normal muscle tone  Neuro: CN II-XII intact  Skin: warm, well perfused. No rashes  Psych: No suicidal or homicidal ideations, no self-harm.  flataffect.    LABS & IMAGES   No results found for any visits on 02/14/22.      ======================================================  50 minutes spent on the date of the encounter doing chart review, history and exam, documentation and further activities per the note    Visit was completed along with [facility]  and patient's daughter.     Options for treatment and follow-up care were reviewed with the patient. Barriga Byron and/or guardian was engaged and actively involved in the decision making process. Barriga Byron and/or guardian verbalized understanding of the options discussed and was satisfied with the final plan.      Giancarlo Arizmendi MD

## 2022-02-15 PROBLEM — K90.0 CELIAC DISEASE: Status: ACTIVE | Noted: 2022-02-15

## 2022-02-15 PROBLEM — E11.3493 SEVERE NONPROLIFERATIVE DIABETIC RETINOPATHY OF BOTH EYES WITHOUT MACULAR EDEMA ASSOCIATED WITH TYPE 2 DIABETES MELLITUS (H): Status: ACTIVE | Noted: 2022-02-15

## 2022-02-15 PROBLEM — D56.3 BETA THALASSEMIA MINOR: Status: ACTIVE | Noted: 2022-02-15

## 2022-02-15 PROBLEM — E11.40 DIABETIC NEUROPATHY ASSOCIATED WITH TYPE 2 DIABETES MELLITUS (H): Status: ACTIVE | Noted: 2022-02-15

## 2022-02-15 NOTE — TELEPHONE ENCOUNTER
Talked to pt, she stated that she came in this AM and dropped off some ppwk to one of the girls at the  (one with a window in front of her desk). , have you seen this? They need ppwk before 02/25 since they have ti submit it no later than 02/25.

## 2022-02-15 NOTE — TELEPHONE ENCOUNTER
Please make sure you are routing this to the correct scheduling team. This is not our pt or nor provider. Completing task.

## 2022-02-28 ENCOUNTER — OFFICE VISIT (OUTPATIENT)
Dept: FAMILY MEDICINE | Facility: CLINIC | Age: 52
End: 2022-02-28
Payer: COMMERCIAL

## 2022-02-28 VITALS
SYSTOLIC BLOOD PRESSURE: 130 MMHG | BODY MASS INDEX: 20.43 KG/M2 | DIASTOLIC BLOOD PRESSURE: 70 MMHG | TEMPERATURE: 98 F | RESPIRATION RATE: 16 BRPM | HEIGHT: 62 IN | WEIGHT: 111 LBS | HEART RATE: 88 BPM

## 2022-02-28 DIAGNOSIS — E11.69 TYPE 2 DIABETES MELLITUS WITH OTHER SPECIFIED COMPLICATION, UNSPECIFIED WHETHER LONG TERM INSULIN USE (H): Primary | ICD-10-CM

## 2022-02-28 DIAGNOSIS — R73.9 HYPERGLYCEMIA: ICD-10-CM

## 2022-02-28 DIAGNOSIS — Z91.148 MEDICATION NONADHERENCE DUE TO PSYCHOSOCIAL PROBLEM: ICD-10-CM

## 2022-02-28 DIAGNOSIS — Z65.9 MEDICATION NONADHERENCE DUE TO PSYCHOSOCIAL PROBLEM: ICD-10-CM

## 2022-02-28 LAB
ALBUMIN SERPL-MCNC: 3.3 G/DL (ref 3.5–5)
ALP SERPL-CCNC: 88 U/L (ref 45–120)
ALT SERPL W P-5'-P-CCNC: 21 U/L (ref 0–45)
ANION GAP SERPL CALCULATED.3IONS-SCNC: 13 MMOL/L (ref 5–18)
AST SERPL W P-5'-P-CCNC: 23 U/L (ref 0–40)
BILIRUB SERPL-MCNC: 0.5 MG/DL (ref 0–1)
BUN SERPL-MCNC: 18 MG/DL (ref 8–22)
CALCIUM SERPL-MCNC: 8.2 MG/DL (ref 8.5–10.5)
CHLORIDE BLD-SCNC: 98 MMOL/L (ref 98–107)
CO2 SERPL-SCNC: 25 MMOL/L (ref 22–31)
CREAT SERPL-MCNC: 1.93 MG/DL (ref 0.6–1.1)
CREAT UR-MCNC: 63 MG/DL
GFR SERPL CREATININE-BSD FRML MDRD: 30 ML/MIN/1.73M2
GLUCOSE BLD-MCNC: 319 MG/DL (ref 70–125)
HBA1C MFR BLD: 14.4 % (ref 0–5.6)
MICROALBUMIN UR-MCNC: 230.52 MG/DL (ref 0–1.99)
MICROALBUMIN/CREAT UR: 3659 MG/G CR
POTASSIUM BLD-SCNC: 3.5 MMOL/L (ref 3.5–5)
PROT SERPL-MCNC: 7.5 G/DL (ref 6–8)
SODIUM SERPL-SCNC: 136 MMOL/L (ref 136–145)

## 2022-02-28 PROCEDURE — 99207 PR FOOT EXAM NO CHARGE: CPT | Performed by: FAMILY MEDICINE

## 2022-02-28 PROCEDURE — 83036 HEMOGLOBIN GLYCOSYLATED A1C: CPT | Performed by: FAMILY MEDICINE

## 2022-02-28 PROCEDURE — 80053 COMPREHEN METABOLIC PANEL: CPT | Performed by: FAMILY MEDICINE

## 2022-02-28 PROCEDURE — 82043 UR ALBUMIN QUANTITATIVE: CPT | Performed by: FAMILY MEDICINE

## 2022-02-28 PROCEDURE — 99214 OFFICE O/P EST MOD 30 MIN: CPT | Performed by: FAMILY MEDICINE

## 2022-02-28 PROCEDURE — 36415 COLL VENOUS BLD VENIPUNCTURE: CPT | Performed by: FAMILY MEDICINE

## 2022-02-28 RX ORDER — LIRAGLUTIDE 6 MG/ML
1.8 INJECTION SUBCUTANEOUS DAILY
Qty: 27 ML | Refills: 3 | Status: SHIPPED | OUTPATIENT
Start: 2022-02-28 | End: 2022-06-30

## 2022-02-28 RX ORDER — ORAL DOSING DEVICES
1 EACH MISCELLANEOUS DAILY
Qty: 1 EACH | Refills: 0 | Status: SHIPPED | OUTPATIENT
Start: 2022-02-28 | End: 2022-07-27

## 2022-02-28 RX ORDER — GLUCOSAMINE HCL/CHONDROITIN SU 500-400 MG
CAPSULE ORAL
Qty: 100 EACH | Refills: 3 | Status: SHIPPED | OUTPATIENT
Start: 2022-02-28 | End: 2022-07-28

## 2022-02-28 RX ORDER — GABAPENTIN 300 MG/1
600 CAPSULE ORAL 2 TIMES DAILY
Qty: 180 CAPSULE | Refills: 0
Start: 2022-02-28 | End: 2022-03-17

## 2022-02-28 RX ORDER — LANCETS
EACH MISCELLANEOUS
Qty: 200 EACH | Refills: 3 | Status: SHIPPED | OUTPATIENT
Start: 2022-02-28 | End: 2022-12-19

## 2022-02-28 NOTE — PATIENT INSTRUCTIONS
Lantus (purple)  Increase to 45 unit(s) starting tomorrow.   If blood sugars are ok, increase to 50 unit(s) on 3/7/2022 (next week)    Victoza (light blue)  Increase to 1.8 mg daily    Gabapentin  Increase to 600 mg (2 tablets) twice per day

## 2022-02-28 NOTE — PROGRESS NOTES
ASSESSMENT/PLAN:   Barriga was seen today for diabetes.    Diagnoses and all orders for this visit:    Type 2 diabetes mellitus with other specified complication, unspecified whether long term insulin use (H)  Hyperglycemia - glucose will likely be critical high on labs. No further triage needed - chronic issue and patient continues to be stable.   Patient only has her old meter, has not picked up any of them sent in [place]. Will re-send, have her continue to check glucose. Patient keeps a paper record of her glucose.    Discussed with [facility] pharmacist. Will increase to 45 unit(s) lantus, continue mealtime insulin 7 unit(s) , and maximize victoza to 1.8. If glucose still high, increase to 50 unit(s) lantus in 1 week.     -     FOOT EXAM  -     Hemoglobin A1c  -     Comprehensive metabolic panel (BMP + Alb, Alk Phos, ALT, AST, Total. Bili, TP)  -     Albumin Random Urine Quantitative with Creat Ratio  -     liraglutide (VICTOZA) 18 MG/3ML solution; Inject 1.8 mg Subcutaneous daily  -     blood glucose monitoring (NO BRAND SPECIFIED) meter device kit; Use to test blood sugar 3 times daily or as directed. Preferred blood glucose meter OR supplies to accompany: Blood Glucose Monitor Brands: per insurance.  -     blood glucose (NO BRAND SPECIFIED) test strip; Use to test blood sugar 3 times daily or as directed. To accompany: Blood Glucose Monitor Brands: per insurance.  -     blood glucose calibration (NO BRAND SPECIFIED) solution; To accompany: Blood Glucose Monitor Brands: per insurance.  -     thin (NO BRAND SPECIFIED) lancets; Use with lanceting device. To accompany: Blood Glucose Monitor Brands: per insurance.  -     alcohol swab prep pads; Use to swab area of injection/amadeo as directed.  -     gabapentin (NEURONTIN) 300 MG capsule; Take 2 capsules (600 mg) by mouth 2 times daily  -     Misc. Devices (PILL BOX 7 DAY) MISC; 1 each daily      Medication nonadherence due to psychosocial problem  Will discuss with [facility] RN (seeing  "tomorrow) and scheduled to see MTM in 3 weeks.   -     Misc. Devices (PILL BOX 7 DAY) MISC; 1 each daily          Return in about 1 month (around 3/28/2022) for diabetes follow up, Medication Check, sooner if needed.       ======================================================    SUBJECTIVE  Barriga Byron is a 52 year old female here for f/u DM.     Her daughter is very helpful with medications but struggling to set them up. They would benefit from pill box or prepackaged medicatiosn. They have an appointment with [facility] RN to discuss tomorrow.     Glucose still in the 300s-500s daily.   See previous notes. She was on 100u tresiba when she lived in [place].   Currently on 40u lantus, 7u tidac novolog, 1.2mg victoza.     She has numbness/tingling pain in her hands and feet. Her medications do not help. She has been struggling with this for a long time. We discussed that it's from her high blood sugars. She is on gabapentin 300mg bid. We can increase it or change to lyrica OR trial amitriptyline. She wants to increase it for now and discuss other options in the future.     ROS  Complete 10 point review of systems negative except as noted above in HPI      OBJECTIVE  /70 (BP Location: Right arm, Patient Position: Sitting, Cuff Size: Adult Regular)   Pulse 88   Temp 98  F (36.7  C) (Oral)   Resp 16   Ht 1.575 m (5' 2\")   Wt 50.3 kg (111 lb)   BMI 20.30 kg/m       General: Cooperative, pleasant, in no acute distress  CV: RRR, normal S1/S2, no murmur, rubs, gallops  Resp: No respiratory distress. Clear to auscultation bilaterally. No wheezes, rales, rhonchi  Abd: Nontender, nondistended, bowel sounds present  Ext: radial/pedal pulses +2 bilaterally  MSK: Normal muscle tone  Neuro: CN II-XII intact. Sensation to light touch absent in legs to mid shin and hands up to wrist. Diabetic foot exam: normal DP and PT pulses, no trophic changes or ulcerative lesions and reduced sensation at entire foot and hands  Skin: warm, well " perfused. No rashes  Psych: No suicidal or homicidal ideations, no self-harm.  Normal affect.    LABS & IMAGES   Results for orders placed or performed in visit on 02/28/22   Hemoglobin A1c     Status: Abnormal   Result Value Ref Range    Hemoglobin A1C 14.4 (H) 0.0 - 5.6 %         ======================================================    Visit was completed along with Fanny  and patient's daughter    Options for treatment and follow-up care were reviewed with the patient. Barriga Byron and/or guardian was engaged and actively involved in the decision making process. Barriga Byron and/or guardian verbalized understanding of the options discussed and was satisfied with the final plan.      Giancarlo Arizmendi MD

## 2022-03-01 ENCOUNTER — PATIENT OUTREACH (OUTPATIENT)
Dept: NURSING | Facility: CLINIC | Age: 52
End: 2022-03-01
Attending: FAMILY MEDICINE
Payer: COMMERCIAL

## 2022-03-01 DIAGNOSIS — Z59.9 FINANCIAL DIFFICULTIES: ICD-10-CM

## 2022-03-01 DIAGNOSIS — I10 HYPERTENSION GOAL BP (BLOOD PRESSURE) < 140/90: ICD-10-CM

## 2022-03-01 DIAGNOSIS — Z76.89 ENCOUNTER TO ESTABLISH CARE: ICD-10-CM

## 2022-03-01 DIAGNOSIS — E11.69 TYPE 2 DIABETES MELLITUS WITH OTHER SPECIFIED COMPLICATION, UNSPECIFIED WHETHER LONG TERM INSULIN USE (H): ICD-10-CM

## 2022-03-01 SDOH — ECONOMIC STABILITY - INCOME SECURITY: PROBLEM RELATED TO HOUSING AND ECONOMIC CIRCUMSTANCES, UNSPECIFIED: Z59.9

## 2022-03-01 ASSESSMENT — ACTIVITIES OF DAILY LIVING (ADL)
DEPENDENT_IADLS:: CLEANING;COOKING;LAUNDRY;SHOPPING;MEAL PREPARATION;MEDICATION MANAGEMENT;MONEY MANAGEMENT;TRANSPORTATION;INCONTINENCE

## 2022-03-01 NOTE — LETTER
[facility] CARE COORDINATION    March 2, 2022    [facility]  [address] [place] [address]      Dear Nithya,    I am a clinic care coordinator who works with Giancarlo Arizmendi MD. I wanted to thank you for spending the time to talk with me.  Below is a description of clinic care coordination and how I can further assist you.      The clinic care coordination team is made up of a registered nurse,  and community health worker who understand the health care system. The goal of clinic care coordination is to help you manage your health and improve access to the health care system in the most efficient manner. The team can assist you in meeting your health care goals by providing education, coordinating services, strengthening the communication among your providers and supporting you with any resource needs.    Please feel free to contact me and the Community Health Worker at [phone] with any questions or concerns. We are focused on providing you with the highest-quality healthcare experience possible and that all starts with you.     Sincerely,     Janel Jhaveri RN    Enclosed: I have enclosed a copy of the Patient Centered Plan of Care. This has helpful information and goals that we have talked about. Please keep this in an easy to access place to use as needed.

## 2022-03-01 NOTE — LETTER
[facility]  Patient Centered Plan of Care  About Me:        Patient Name:  Nithya Matthews    YOB: 1970  Age:         52 year old   Johann MRN:    [id number] Telephone Information:  Home Phone [phone]   Mobile [phone]       Address:  [address] [place] [address] Email address:  No e-mail address on record      Emergency Contact(s)    Name Relationship Lgl Grd Work Phone Home Phone Mobile Phone   1. JESSY MATTHEWS Daughter    [phone]           Primary language:  Jamari      needed? Yes   [place] [facility]:  [phone] op. 1  Other communication barriers:Language barrier; Physical impairment; Lack of coping    Preferred Method of Communication:     Current living arrangement: I live in a private home with family    Mobility Status/ Medical Equipment: Independent w/Device        Health Maintenance  Health Maintenance Reviewed: Due/Overdue  (with PCP on 5/4/2022)      My Access Plan  Medical Emergency 911   Primary Clinic Line [facility] [phone]   24 Hour Appointment Line [phone] or  [phone]) (toll-free)   24 Hour Nurse Line [phone] (toll-free)   Preferred Urgent Care [facility] [phone]     [facility]  [phone]     Preferred Pharmacy [facility] - [place], [place] - [address]     Behavioral Health Crisis Line The National Suicide Prevention Lifeline at 1-839.363.5943 or 911             My Care Team Members  Patient Care Team       Relationship Specialty Notifications Start End    Giancarlo Arizmendi MD PCP - General Family Medicine  2/14/22     Phone: [phone] Fax: [phone]         [address] [place] [address]    Giancarlo Arizmendi MD Assigned PCP   2/20/22     Phone: [phone] Fax: [phone]         [address] [place] [address]    Janel Jhaveri, RN Lead Care Coordinator  Primary Care - CC Admissions 3/1/22     Irene Samson [facility] Health Worker Primary Care - CC Admissions 3/1/22     [facility] worker ARM worker   3/2/22     Herbert            My Care Plans  Self Management and Treatment Plan  Goals and (Comments)  Goals        General     1. Med set up (pt-stated)      Notes - Note created  3/2/2022  5:34 AM by Janel Jhaveri, RN     Goal Statement: I want my adult daughter to learn how to set up my medications correctly by working with the CCC RN over the next 90 days.    Date goal set: 3/1/2022  Barriers: language barrier and lack of knowledge  Strengths: Agrees to work on goal  Date to Achieve By: 6/1/2022  Patient expressed understanding of goal: Yes    Action steps to achieve this goal  1. My adult daughter will meet with the CCC RN in the clinic on 3/17/2022 at 3:00pm.   2. I will take my medications as prescribed and as they are set up by my daughter.  3. I/daughter/son will call the CCC RN with concerns or questions.                Action Plans on File:                       Advance Care Plans/Directives Type:   No data recorded    My Medical and Care Information  Problem List   Patient Active Problem List   Diagnosis     Type 2 diabetes mellitus with other specified complication, unspecified whether long term insulin use (H)     Hypertension goal BP (blood pressure) < 140/90     Hyperlipidemia LDL goal <100     Depression, unspecified depression type     Stage 3 chronic kidney disease (H)     Hyperglycemia     Generalized muscle weakness     Low iron     Gastroesophageal reflux disease with esophagitis without hemorrhage     Low vitamin B12 level     Low vitamin D level     Celiac disease     Severe nonproliferative diabetic retinopathy of both 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    Care Coordination Start Date: 3/1/2022   Frequency of Care Coordination: 6 weeks     Form Last Updated: 03/02/2022

## 2022-03-01 NOTE — PROGRESS NOTES
Clinic Care Coordination Contact    Clinic Care Coordination Contact  OUTREACH    Referral Information:  Referral Source: PCP    Primary Diagnosis: Diabetes    Chief Complaint   Patient presents with     Clinic Care Coordination - Initial     Clinic Utilization  Difficulty keeping appointments:: No  Compliance Concerns: Yes  No-Show Concerns: Yes  No PCP office visit in Past Year: No  Utilization    Hospital Admissions  1             ED Visits  2             No Show Count (past year)  1                Current as of: 3/1/2022  3:18 AM            Clinical Concerns:  CC RN assessment completed today with patient and with the help of professional [facility]  - ID#[id number]. Patient was referral to us by PCP for:  Provider Comments 02/15/2022  2:00 PM Giancarlo Arizmendi MD Provider Comments -   Note    Patient recently moved to [place] from [place], [place]. Diabetes is not well controlled, insurance change after she got to [place] so her meds are going to need a lot of adjustment. Needs help setting up PCA, daughter would be best contact, number listed in chart. Financial concerns - she does not work, says they have not received any resources since arriving. Interested in bubble pack/blist pack for pills.              Patient lives in 2 bedroom duplex with spouse and 20 yr old daughter. Patient's 20 yr old daughter in college. No one works in the work. CC RN spoke found out from [employer] that patient is already establish with them for [facility] service. [facility] and her team are assisting patient with medication refill, county benefits and rental assistance. Per [facility], someone assisted patient filled out the wrong  on [employer] benefits forms which causes some delays. [facility] continues to follow up on this.     Patient states she would like assist with med set up due to too many meds and she's confused. Agreed to come see CC RN on 3/17/2022 at 3:00pm. MTM appt same day. CC RN plans to assess the need for other resources when she comes to see CC  RN on 3/17/2022. Will create new goals as needed.       Pain  Pain (GOAL):: Yes  Type: Chronic (>3mo)  Location of chronic pain:: all over  Radiating: No  Progression: Unchanged  Description of pain: Aching, Nagging  Chronic pain severity:: 4  Alleviating Factors: Pain Medication  Aggravating Factors: Activity  Health Maintenance Reviewed: Due/Overdue  (with PCP on 5/4/2022)  Clinical Pathway: None    Medication Management:  Medication review status: Medications reviewed and no changes reported per patient.             Functional Status:  Dependent ADLs:: Bathing, Dressing, Grooming, Incontinence, Toileting, Transfers  Dependent IADLs:: Cleaning, Cooking, Laundry, Shopping, Meal Preparation, Medication Management, Money Management, Transportation, Incontinence  Bed or wheelchair confined:: No  Mobility Status: Independent w/Device  Fallen 2 or more times in the past year?: No  Any fall with injury in the past year?: No    Living Situation:  Current living arrangement:: I live in a private home with family  Type of residence:: Private [place] [facility]    Lifestyle & Psychosocial Needs:    Social Determinants of Health     Tobacco Use: Low Risk      Smoking Tobacco Use: Never Smoker     Smokeless Tobacco Use: Never Used   Alcohol Use: Not on file   Financial Resource Strain: Not on file   Food Insecurity: Not on file   Transportation Needs: Not on file   Physical Activity: Not on file   Stress: Not on file   Social Connections: Not on file   Intimate Partner Violence: Not on file   Depression: Not at risk     PHQ-2 Score: 2   Housing Stability: Not on file     Diet:: Diabetic diet  Inadequate nutrition (GOAL):: No  Tube Feeding: No  Inadequate activity/exercise (GOAL):: Yes  Significant changes in sleep pattern (GOAL): No  Transportation means:: Medical transport     Yazidi or spiritual beliefs that impact treatment:: No  Mental health DX:: Yes  Mental health DX how managed:: Medication  Mental health management  concern (GOAL):: No  Chemical Dependency Status: No Current Concerns  Informal Support system:: Children, Lina based, Family      Resources and Interventions:  Current Resources:      Community Resources: None  Supplies Currently Used at Home: Diabetic Supplies  Equipment Currently Used at Home: cane, quad  Employment Status: unemployed    Advance Care Plan/Directive  Advanced Care Plans/Directives on file:: No  Advanced Care Plan/Directive Status: Declined Further Information    Referrals Placed: None     Goals:      Goals        1. Med set up (pt-stated)       Goal Statement: I want my adult daughter to learn how to set up my medications correctly by working with the CCC RN over the next 90 days.    Date goal set: 3/1/2022  Barriers: language barrier and lack of knowledge  Strengths: Agrees to work on goal  Date to Achieve By: 6/1/2022  Patient expressed understanding of goal: Yes    Action steps to achieve this goal  1. My adult daughter will meet with the CCC RN in the clinic on 3/17/2022 at 3:00pm.   2. I will take my medications as prescribed and as they are set up by my daughter.  3. I/daughter/son will call the CCC RN with concerns or questions.                 Outreach Frequency: 6 weeks  Future Appointments              Today PHONE, ; SPRO CCC RN [facility] JALIL Farah    In 2 weeks Mika Monahan RPH [facility] JALIL Farah    In 2 weeks SANDRAO CCC RN [facility] JALIL Farah    In 2 months Giancarlo Arizmendi MD [facility] JALIL Farah SPRGIOVANNI          Plan:   1) Patient will attend her appt with CC RN and MTM on 3/17/2022.

## 2022-03-02 SDOH — HEALTH STABILITY: PHYSICAL HEALTH: ON AVERAGE, HOW MANY MINUTES DO YOU ENGAGE IN EXERCISE AT THIS LEVEL?: 0 MIN

## 2022-03-02 SDOH — ECONOMIC STABILITY: INCOME INSECURITY: IN THE LAST 12 MONTHS, WAS THERE A TIME WHEN YOU WERE NOT ABLE TO PAY THE MORTGAGE OR RENT ON TIME?: NO

## 2022-03-02 SDOH — HEALTH STABILITY: PHYSICAL HEALTH: ON AVERAGE, HOW MANY DAYS PER WEEK DO YOU ENGAGE IN MODERATE TO STRENUOUS EXERCISE (LIKE A BRISK WALK)?: 0 DAYS

## 2022-03-02 SDOH — ECONOMIC STABILITY: TRANSPORTATION INSECURITY
IN THE PAST 12 MONTHS, HAS THE LACK OF TRANSPORTATION KEPT YOU FROM MEDICAL APPOINTMENTS OR FROM GETTING MEDICATIONS?: YES

## 2022-03-02 SDOH — ECONOMIC STABILITY: FOOD INSECURITY: WITHIN THE PAST 12 MONTHS, YOU WORRIED THAT YOUR FOOD WOULD RUN OUT BEFORE YOU GOT MONEY TO BUY MORE.: SOMETIMES TRUE

## 2022-03-02 SDOH — ECONOMIC STABILITY: TRANSPORTATION INSECURITY
IN THE PAST 12 MONTHS, HAS LACK OF TRANSPORTATION KEPT YOU FROM MEETINGS, WORK, OR FROM GETTING THINGS NEEDED FOR DAILY LIVING?: YES

## 2022-03-02 SDOH — ECONOMIC STABILITY: FOOD INSECURITY: WITHIN THE PAST 12 MONTHS, THE FOOD YOU BOUGHT JUST DIDN'T LAST AND YOU DIDN'T HAVE MONEY TO GET MORE.: SOMETIMES TRUE

## 2022-03-02 ASSESSMENT — SOCIAL DETERMINANTS OF HEALTH (SDOH)
WITHIN THE LAST YEAR, HAVE YOU BEEN AFRAID OF YOUR PARTNER OR EX-PARTNER?: NO
HOW OFTEN DO YOU ATTEND CHURCH OR RELIGIOUS SERVICES?: NEVER
HOW OFTEN DO YOU GET TOGETHER WITH FRIENDS OR RELATIVES?: MORE THAN THREE TIMES A WEEK
IN A TYPICAL WEEK, HOW MANY TIMES DO YOU TALK ON THE PHONE WITH FAMILY, FRIENDS, OR NEIGHBORS?: MORE THAN THREE TIMES A WEEK
HOW OFTEN DO YOU ATTENT MEETINGS OF THE CLUB OR ORGANIZATION YOU BELONG TO?: NEVER
HOW HARD IS IT FOR YOU TO PAY FOR THE VERY BASICS LIKE FOOD, HOUSING, MEDICAL CARE, AND HEATING?: SOMEWHAT HARD
WITHIN THE LAST YEAR, HAVE YOU BEEN KICKED, HIT, SLAPPED, OR OTHERWISE PHYSICALLY HURT BY YOUR PARTNER OR EX-PARTNER?: NO
WITHIN THE LAST YEAR, HAVE YOU BEEN HUMILIATED OR EMOTIONALLY ABUSED IN OTHER WAYS BY YOUR PARTNER OR EX-PARTNER?: NO
DO YOU BELONG TO ANY CLUBS OR ORGANIZATIONS SUCH AS CHURCH GROUPS UNIONS, FRATERNAL OR ATHLETIC GROUPS, OR SCHOOL GROUPS?: NO
WITHIN THE LAST YEAR, HAVE TO BEEN RAPED OR FORCED TO HAVE ANY KIND OF SEXUAL ACTIVITY BY YOUR PARTNER OR EX-PARTNER?: NO

## 2022-03-02 ASSESSMENT — LIFESTYLE VARIABLES
HOW OFTEN DO YOU HAVE SIX OR MORE DRINKS ON ONE OCCASION: NEVER
HOW OFTEN DO YOU HAVE A DRINK CONTAINING ALCOHOL: NEVER

## 2022-03-17 ENCOUNTER — ALLIED HEALTH/NURSE VISIT (OUTPATIENT)
Dept: NURSING | Facility: CLINIC | Age: 52
End: 2022-03-17
Payer: COMMERCIAL

## 2022-03-17 ENCOUNTER — OFFICE VISIT (OUTPATIENT)
Dept: PHARMACY | Facility: CLINIC | Age: 52
End: 2022-03-17
Payer: COMMERCIAL

## 2022-03-17 ENCOUNTER — LAB (OUTPATIENT)
Dept: LAB | Facility: CLINIC | Age: 52
End: 2022-03-17

## 2022-03-17 VITALS
HEART RATE: 95 BPM | SYSTOLIC BLOOD PRESSURE: 124 MMHG | DIASTOLIC BLOOD PRESSURE: 62 MMHG | OXYGEN SATURATION: 93 % | BODY MASS INDEX: 19.75 KG/M2 | WEIGHT: 108 LBS

## 2022-03-17 DIAGNOSIS — E61.1 LOW IRON: ICD-10-CM

## 2022-03-17 DIAGNOSIS — R79.89 LOW VITAMIN B12 LEVEL: ICD-10-CM

## 2022-03-17 DIAGNOSIS — Z78.9 TAKES DIETARY SUPPLEMENTS: ICD-10-CM

## 2022-03-17 DIAGNOSIS — E11.69 TYPE 2 DIABETES MELLITUS WITH OTHER SPECIFIED COMPLICATION, UNSPECIFIED WHETHER LONG TERM INSULIN USE (H): Primary | ICD-10-CM

## 2022-03-17 DIAGNOSIS — Z71.89 COMPLEX CARE COORDINATION: Primary | ICD-10-CM

## 2022-03-17 DIAGNOSIS — K21.00 GASTROESOPHAGEAL REFLUX DISEASE WITH ESOPHAGITIS WITHOUT HEMORRHAGE: ICD-10-CM

## 2022-03-17 DIAGNOSIS — F32.A DEPRESSION, UNSPECIFIED DEPRESSION TYPE: ICD-10-CM

## 2022-03-17 DIAGNOSIS — E78.5 HYPERLIPIDEMIA LDL GOAL <100: ICD-10-CM

## 2022-03-17 DIAGNOSIS — I10 HYPERTENSION GOAL BP (BLOOD PRESSURE) < 140/90: ICD-10-CM

## 2022-03-17 DIAGNOSIS — R79.89 LOW VITAMIN D LEVEL: ICD-10-CM

## 2022-03-17 DIAGNOSIS — R52 PAIN: ICD-10-CM

## 2022-03-17 LAB
ERYTHROCYTE [DISTWIDTH] IN BLOOD BY AUTOMATED COUNT: 20.5 % (ref 10–15)
HCT VFR BLD AUTO: 33.2 % (ref 35–47)
HGB BLD-MCNC: 10.7 G/DL (ref 11.7–15.7)
MCH RBC QN AUTO: 21.8 PG (ref 26.5–33)
MCHC RBC AUTO-ENTMCNC: 32.2 G/DL (ref 31.5–36.5)
MCV RBC AUTO: 68 FL (ref 78–100)
PLATELET # BLD AUTO: 161 10E3/UL (ref 150–450)
RBC # BLD AUTO: 4.9 10E6/UL (ref 3.8–5.2)
VIT B12 SERPL-MCNC: 729 PG/ML (ref 213–816)
WBC # BLD AUTO: 6.5 10E3/UL (ref 4–11)

## 2022-03-17 PROCEDURE — 99207 PR NO CHARGE LOS: CPT

## 2022-03-17 PROCEDURE — 85027 COMPLETE CBC AUTOMATED: CPT

## 2022-03-17 PROCEDURE — 99605 MTMS BY PHARM NP 15 MIN: CPT | Performed by: PHARMACIST

## 2022-03-17 PROCEDURE — 36415 COLL VENOUS BLD VENIPUNCTURE: CPT

## 2022-03-17 PROCEDURE — 82607 VITAMIN B-12: CPT

## 2022-03-17 PROCEDURE — 82306 VITAMIN D 25 HYDROXY: CPT

## 2022-03-17 PROCEDURE — 99607 MTMS BY PHARM ADDL 15 MIN: CPT | Performed by: PHARMACIST

## 2022-03-17 RX ORDER — CHLORAL HYDRATE 500 MG
1 CAPSULE ORAL DAILY
Qty: 90 CAPSULE | Refills: 3 | Status: SHIPPED | OUTPATIENT
Start: 2022-03-17 | End: 2023-02-16

## 2022-03-17 RX ORDER — HYDROCHLOROTHIAZIDE 50 MG/1
50 TABLET ORAL DAILY
Qty: 90 TABLET | Refills: 3 | Status: SHIPPED | OUTPATIENT
Start: 2022-03-17 | End: 2022-04-14

## 2022-03-17 RX ORDER — ATORVASTATIN CALCIUM 80 MG/1
80 TABLET, FILM COATED ORAL DAILY
Qty: 90 TABLET | Refills: 3 | Status: SHIPPED | OUTPATIENT
Start: 2022-03-17 | End: 2023-03-13

## 2022-03-17 RX ORDER — ESCITALOPRAM OXALATE 10 MG/1
10 TABLET ORAL DAILY
Qty: 90 TABLET | Refills: 3 | Status: SHIPPED | OUTPATIENT
Start: 2022-03-17 | End: 2023-03-13

## 2022-03-17 RX ORDER — GABAPENTIN 300 MG/1
600 CAPSULE ORAL 2 TIMES DAILY
Qty: 180 CAPSULE | Refills: 3 | Status: SHIPPED | OUTPATIENT
Start: 2022-03-17 | End: 2022-08-01

## 2022-03-17 RX ORDER — FERROUS SULFATE 325(65) MG
325 TABLET ORAL 2 TIMES DAILY WITH MEALS
Qty: 180 TABLET | Refills: 3 | Status: SHIPPED | OUTPATIENT
Start: 2022-03-17 | End: 2022-03-17

## 2022-03-17 RX ORDER — NICOTINE POLACRILEX 4 MG/1
20 GUM, CHEWING ORAL DAILY
Qty: 90 TABLET | Refills: 3 | Status: SHIPPED | OUTPATIENT
Start: 2022-03-17 | End: 2023-06-27

## 2022-03-17 RX ORDER — METOPROLOL SUCCINATE 200 MG/1
200 TABLET, EXTENDED RELEASE ORAL DAILY
Qty: 90 TABLET | Refills: 3 | Status: SHIPPED | OUTPATIENT
Start: 2022-03-17 | End: 2023-03-13

## 2022-03-17 RX ORDER — LISINOPRIL 40 MG/1
40 TABLET ORAL DAILY
Qty: 90 TABLET | Refills: 3 | Status: SHIPPED | OUTPATIENT
Start: 2022-03-17 | End: 2023-03-13

## 2022-03-17 RX ORDER — FERROUS SULFATE 325(65) MG
325 TABLET ORAL EVERY OTHER DAY
Qty: 70 TABLET | Refills: 3 | Status: SHIPPED | OUTPATIENT
Start: 2022-03-17 | End: 2023-04-06

## 2022-03-17 RX ORDER — LANOLIN ALCOHOL/MO/W.PET/CERES
1000 CREAM (GRAM) TOPICAL DAILY
Qty: 90 TABLET | Refills: 3 | Status: SHIPPED | OUTPATIENT
Start: 2022-03-17 | End: 2023-02-07

## 2022-03-17 RX ORDER — ASPIRIN 81 MG/1
81 TABLET ORAL DAILY
Qty: 90 TABLET | Refills: 3 | Status: ON HOLD | OUTPATIENT
Start: 2022-03-17 | End: 2022-10-29

## 2022-03-17 RX ORDER — AMLODIPINE BESYLATE 10 MG/1
10 TABLET ORAL DAILY
Qty: 90 TABLET | Refills: 3 | Status: SHIPPED | OUTPATIENT
Start: 2022-03-17 | End: 2022-05-04 | Stop reason: SINTOL

## 2022-03-17 NOTE — Clinical Note
Increased insulin and changed frequency of iron for better absorption. Patient was out of many medications so I sent refills to [facility],    Please let me know what questions you have for me,    Mika

## 2022-03-17 NOTE — PATIENT INSTRUCTIONS
Recommendations from today's MTM visit:                                                    MTM (medication therapy management) is a service provided by a clinical pharmacist designed to help you get the most of out of your medicines.   Today we reviewed what your medicines are for, how to know if they are working, that your medicines are safe and how to make your medicine regimen as easy as possible.      1. Increase Lantus (insulin glargine) to 60 units once daily.  2. Get the following labs done at next visit: fasting cholesterol  3. Sent refills for all medications to [facility] with refills.    Follow-up: Return in about 4 weeks (around 4/14/2022) for Follow up, in person.    It was great to speak with you today.  I value your experience and would be very thankful for your time with providing feedback on our clinic survey. You may receive a survey via email or text message in the next few days.     To schedule another MTM appointment, please call the clinic directly or you may call the MTM scheduling line at [phone] or toll-free at [phone].     My Clinical Pharmacist's contact information:                                                      Please feel free to contact me with any questions or concerns you have.      Mika Monahan, Pharm. D., [facility]  Medication Therapy Management Pharmacist  Direct Voicemail: [phone]

## 2022-03-17 NOTE — PROGRESS NOTES
Clinic Care Coordination Contact    Follow Up Progress Note      Assessment: Initial MEV  CC RN met with patient and adult daughter who speaks good English today. Patient brought in some empty prescription bottles and not enough to set up meds today. CC RN spoke with Dee - pharmacist with [facility] today. Per Dee, They need new scripts for all prescriptions except diabetic supplies. Requested auto refill and delivery. Daughter will attend MEV with CC RN on 3/29/2022 once all meds were filled. Patient met with MTM prior to seeing CC RN today. Assisted patient scheduled preventative care appt on 6/2/2022 at 11:40am.       Goals addressed this encounter:   Goals        1. Med set up (pt-stated)       Goal Statement: I want my adult daughter to learn how to set up my medications correctly by working with the CCC RN over the next 90 days.    Date goal set: 3/1/2022  Barriers: language barrier and lack of knowledge  Strengths: Agrees to work on goal  Date to Achieve By: 6/1/2022  Patient expressed understanding of goal: Yes    Action steps to achieve this goal  1. My adult daughter will meet with the CCC RN in the clinic on 3/17/2022 at 3:00pm. ( completed)'  2. My daughter will attend in person MEV with CC RN on 3/29/2022 at 12pm.   3. I will take my medications as prescribed and as they are set up by my daughter.  4. I/daughter/son will call the CCC RN with concerns or questions.            2. Preventative care (pt-stated)         Goal Statement: I want to attend an Annual Physical exam in the next 90 days so that I may address my current health maintenance care gaps with my PCP.     Date goal set: 3/17/2022  Barriers: language barrier  Strengths: motivated to attend PCP appt  Date to Achieve By: 6/17/22  Patient expressed understanding of goal: Yes    Action steps to achieve this goal:  1. I will answer my phone when I am contacted to schedule my Annual Physical.  2. I will attend my Annual Physical appointment  at [facility] on 6/2/2022 at 11:40am.   3. I will schedule a follow up appointment with my PCP if it is recommended to do so while I am at the clinic.  4. I will follow up with CCC regarding this goal at each outreach until it is completed.                  Outreach Frequency: 6 weeks    Plan:   1) Daughter will attend [facility] with CC RN on 3/29/2022 at 12pm.

## 2022-03-17 NOTE — PROGRESS NOTES
Medication Therapy Management (MTM) Encounter    ASSESSMENT:                            Medication Adherence/Access: No issues identified    Type 2 Diabetes: Patient is not meeting A1c goal of < 7%. Self monitoring of blood glucose is not at goal of fasting  mg/dL and post prandial < 180 mg/dL. Since she is having significantly high blood sugars we will institute a 20% increase in basal insulin.    Hypertension: Stable.     Hyperlipidemia: It is unclear whether the previous cholesterol labs were fasting. Discussed this with patient and she will come to next visit fasting. If triglycerides still elevated >500 would recommend increasing fishoil to 4 g daily and re-initiating fibrate.    Anemia: Patient would get more benefit from taking iron every other day as this will improve absorption.    Pain: Stable.     GERD: Stable.     Supplements:  Based off labs and discussion today it does not seem like she has been taking her vitamin D weekly. Will provide a refill.    Depression: Will send in a refill for Lexapro.    PLAN:                            1. Increase Lantus (insulin glargine) to 60 units once daily.  2. Get the following labs done at next visit: fasting cholesterol  3. Sent refills for all medications to [facility] with refills.  4. Update: changed iron to every other day for improved absorption    Follow-up: Return in about 4 weeks (around 4/14/2022) for Follow up, in person.  Medication issues to be addressed at a future visit: fasting lipid panel. Further increases in insulin.     SUBJECTIVE/OBJECTIVE:                          Nithya Matthews is a 52 year old female coming in for an initial visit. She was referred to me from Giancarlo Arizmendi MD. Patient [facility] .     Reason for visit: diabetes management.    Allergies/ADRs: Reviewed in chart  Past Medical History: Reviewed in chart  Tobacco: She reports that she has never smoked. She has never used smokeless tobacco.    Medication  Adherence/Access: no issues reported - reports that insurance would not pay for pillpack - wants to continue getting medications at [facility] is out of refills on most medications.    Type 2 Diabetes:  Currently taking Lantus 50 units daily, Victoza 1.8 mg daily, and insulin aspart 7 units three times a day with meals. Patient is not experiencing side effects.  Blood sugar monitoring: 3 time(s) daily. Ranges (based on glucometer readings): See below    Date FBG/ 2hours post Lunch/2hours post Dinner /2hours post    3/17 363      3/16 283  290 296   3/15 367  461 435   3/14 343  410 415   3/13 460  380 395   3/12 557  341 268   3/11 396  390 278       Symptoms of low blood sugar? none  Symptoms of high blood sugar? vision changes, polydipsia and nausea  Eye exam: due - sending referral   Foot exam: up to date  Diet/Exercise: Eats 1-2 meals per day  Aspirin: Taking 81mg daily and has the following issues: nose bleeds 2-3 times per week that stop quickly.  Statin: Yes: atrovastatin   ACEi/ARB: Yes: lisinopril.   Urine Albumin: No results found for: UMALCR   Lab Results   Component Value Date    A1C 14.4 02/28/2022    A1C 14.5 12/06/2021       Hypertension: Current medications include lisinopril 40 mg, hydrochlorothiazide 50 mg, daily, amlodipine 10 mg, and metoprolol succinate 200 mg daily.  Patient does not self-monitor blood pressure.  Patient reports the following medication side effects: dizziness.  BP Readings from Last 3 Encounters:   03/17/22 124/62   02/28/22 130/70   02/14/22 110/60     Hyperlipidemia: Current therapy includes atorvastatin 80mg daily.  Patient reports no significant myalgias or other side effects. Does not report that she is taking fenofibrate.  The ASCVD Risk score (Aurelia LINDA Jr., et al., 2013) failed to calculate for the following reasons:    The valid HDL cholesterol range is 20 to 100 mg/dL  Recent Labs   Lab Test 12/08/21  0607 12/06/21  1050   CHOL 150 194   HDL 18* 28*   LDL  --  40   TRIG  656* 1,018*       Anemia: Currently taking ferrous sulfate 325 mg twice a day. Reports no side effects.     Hemoglobin   Date Value Ref Range Status   03/17/2022 10.7 (L) 11.7 - 15.7 g/dL Final   ]    Pain: Currently taking gabapentin 600 mg twice a day and 500-1000 mg as needed for pain. Is asking for some topical cream to help with her pain. No concerns or questions at this time.     GERD: Currently taking omeprazole 20 mg daily. Reports she does still have heartburn sometimes.    Supplements:  Currently prescribed vitamin D2 5000 units weekly, fish oil 1g daily, and vitmain B12 1000 mcg daily. It is unclear whether the patient has been taking these medications regularly.    Depression: Doesn't have Lexepro but wants refill for this. Is prescribed escitalopram 10 mg daily.       Today's Vitals: /62   Pulse 95   Wt 108 lb (49 kg)   SpO2 93%   BMI 19.75 kg/m    ----------------      I spent 60 minutes with this patient today. All changes were made via collaborative practice agreement with Giancarlo Arizmendi MD. A copy of the visit note was provided to the patient's provider(s).    The patient was given a summary of these recommendations.     Mika Monahan, Pharm. D., [facility]  Medication Therapy Management Pharmacist  Direct Voicemail: [phone]       Medication Therapy Recommendations  Depression, unspecified depression type    Current Medication: escitalopram (LEXAPRO) 10 MG tablet   Rationale: Patient forgets to take - Adherence - Adherence   Recommendation: Provide Adherence Intervention   Status: Accepted per CPA         Diabetic neuropathy associated with type 2 diabetes mellitus (H)    Current Medication: insulin glargine (LANTUS PEN) 100 UNIT/ML pen   Rationale: Dose too low - Dosage too low - Effectiveness   Recommendation: Increase Dose   Status: Accepted per CPA         Low iron    Current Medication: ferrous sulfate (FEROSUL) 325 (65 Fe) MG tablet   Rationale: Frequency inappropriate - Dosage too low  - Effectiveness   Recommendation: Decrease Frequency   Status: Accepted per CPA

## 2022-03-18 LAB — DEPRECATED CALCIDIOL+CALCIFEROL SERPL-MC: 17 UG/L (ref 30–80)

## 2022-03-28 ENCOUNTER — TELEPHONE (OUTPATIENT)
Dept: PHARMACY | Facility: CLINIC | Age: 52
End: 2022-03-28
Payer: COMMERCIAL

## 2022-03-28 NOTE — TELEPHONE ENCOUNTER
Relayed change of iron administration to patient via phone . Patient verbalized understanding of message and had no further questions related to this message.    Patient is inquiring if she can get an order for a walker that she is able to sit on for the summer because she'd like to go outside with her family and feels fatigued causing her to need a place to rest that's mobile.    Routing to PCP to advise on patient's walker request.    Roma FELICIANO RN      ----- Message from Mika Monahan [facility] sent at 3/17/2022  3:27 PM CDT -----  Hemoglobin still low. Will change iron to every other day administration to improve absorption. Please call to inform of this change.    Thank you,    Mika

## 2022-03-29 ENCOUNTER — ALLIED HEALTH/NURSE VISIT (OUTPATIENT)
Dept: NURSING | Facility: CLINIC | Age: 52
End: 2022-03-29
Payer: COMMERCIAL

## 2022-03-29 DIAGNOSIS — E11.69 TYPE 2 DIABETES MELLITUS WITH OTHER SPECIFIED COMPLICATION, UNSPECIFIED WHETHER LONG TERM INSULIN USE (H): Primary | ICD-10-CM

## 2022-03-29 PROCEDURE — 99207 PR NO CHARGE LOS: CPT

## 2022-04-01 ENCOUNTER — OFFICE VISIT (OUTPATIENT)
Dept: OPHTHALMOLOGY | Facility: CLINIC | Age: 52
End: 2022-04-01
Attending: FAMILY MEDICINE
Payer: COMMERCIAL

## 2022-04-01 DIAGNOSIS — H35.371 EPIRETINAL MEMBRANE (ERM) OF RIGHT EYE: ICD-10-CM

## 2022-04-01 DIAGNOSIS — E11.3393 TYPE 2 DIABETES MELLITUS WITH BOTH EYES AFFECTED BY MODERATE NONPROLIFERATIVE RETINOPATHY WITHOUT MACULAR EDEMA, WITH LONG-TERM CURRENT USE OF INSULIN (H): Primary | ICD-10-CM

## 2022-04-01 DIAGNOSIS — Z96.1 PSEUDOPHAKIA OF BOTH EYES: ICD-10-CM

## 2022-04-01 DIAGNOSIS — Z79.4 TYPE 2 DIABETES MELLITUS WITH BOTH EYES AFFECTED BY MODERATE NONPROLIFERATIVE RETINOPATHY WITHOUT MACULAR EDEMA, WITH LONG-TERM CURRENT USE OF INSULIN (H): Primary | ICD-10-CM

## 2022-04-01 PROCEDURE — 92004 COMPRE OPH EXAM NEW PT 1/>: CPT | Performed by: OPTOMETRIST

## 2022-04-01 ASSESSMENT — REFRACTION_MANIFEST
OS_SPHERE: -1.00
OD_CYLINDER: +0.50
OS_AXIS: 130
OD_AXIS: 009
OD_ADD: +2.50
OS_CYLINDER: +0.25
OD_SPHERE: -1.50
OS_ADD: +2.50

## 2022-04-01 ASSESSMENT — VISUAL ACUITY
OD_SC: 20/50
METHOD: SNELLEN - LINEAR
OS_SC: 20/40
OS_SC+: -2
OD_PH_SC: 20/40

## 2022-04-01 ASSESSMENT — EXTERNAL EXAM - LEFT EYE: OS_EXAM: NORMAL

## 2022-04-01 ASSESSMENT — TONOMETRY
OD_IOP_MMHG: 10
OS_IOP_MMHG: 12
IOP_METHOD: ICARE

## 2022-04-01 ASSESSMENT — SLIT LAMP EXAM - LIDS
COMMENTS: NORMAL
COMMENTS: NORMAL

## 2022-04-01 ASSESSMENT — CUP TO DISC RATIO
OD_RATIO: 0.45
OS_RATIO: 0.45

## 2022-04-01 ASSESSMENT — CONF VISUAL FIELD
METHOD: COUNTING FINGERS
OS_NORMAL: 1
OD_NORMAL: 1

## 2022-04-01 ASSESSMENT — EXTERNAL EXAM - RIGHT EYE: OD_EXAM: NORMAL

## 2022-04-01 NOTE — NURSING NOTE
Chief Complaints and History of Present Illnesses   Patient presents with     Diabetic Eye Exam     Chief Complaint(s) and History of Present Illness(es)     Diabetic Eye Exam     Associated symptoms: blurred vision.  Negative for flashes, floaters, nausea and photophobia    Diabetes Type: Type 2, controlled with diet, on insulin and taking oral medications              Comments     Patient present for diabetic eye exam. Patient states she needs new glasses. Patient denies pain each eye. Patient had cataract sx 2-3 years ago believes it is getting foggy.   Lab Results       Component                Value               Date                       A1C                      14.4                02/28/2022                 A1C                      14.5                12/06/2021              NANCY Rodriguez April 1, 2022 9:36 AM

## 2022-04-01 NOTE — PROGRESS NOTES
A/P  1.) Type 2 DM with moderate nonproliferative diabetic retinopathy OU  -Last A1c 14.4, currently on insulin  -Peripheral CWS OU, no hemes at this time. No macular edema  -Reviewed findings with pt including need to reduce BS levels to avoid further retinopathy  -Rec recheck in 6 months     2.) Pseudophakia OU  -Doing well, lenses clear without PCO  -Mild nearsighted Rx    3.) ERM right eye  -Likely not visually significant   -Monitor    RTC 6 months diabetic eye exam, sooner prn with acute vision changes    I have confirmed the patient's CC, HPI and reviewed Past Medical History, Past Surgical History, Social History, Family History, Problem List, Medication List and agree with Tech note.     Miracle Mi, OD FAAO FSLS

## 2022-04-04 ENCOUNTER — PATIENT OUTREACH (OUTPATIENT)
Dept: NURSING | Facility: CLINIC | Age: 52
End: 2022-04-04
Payer: COMMERCIAL

## 2022-04-04 ENCOUNTER — PATIENT OUTREACH (OUTPATIENT)
Dept: CARE COORDINATION | Facility: CLINIC | Age: 52
End: 2022-04-04

## 2022-04-04 NOTE — PROGRESS NOTES
Clinic Care Coordination Contact    Community Health Worker Follow Up  Spoke with patient through Fanny .     Care Gaps:     Health Maintenance Due   Topic Date Due     PREVENTIVE CARE VISIT  Never done     ADVANCE CARE PLANNING  Never done     MAMMO SCREENING  Never done     Pneumococcal Vaccine: Pediatrics (0 to 5 Years) and At-Risk Patients (6 to 64 Years) (1 of 4 - PCV13) Never done     COLORECTAL CANCER SCREENING  Never done     HEPATITIS B IMMUNIZATION (1 of 3 - Risk 3-dose series) Never done     DTAP/TDAP/TD IMMUNIZATION (1 - Tdap) Never done     ZOSTER IMMUNIZATION (1 of 2) Never done     INFLUENZA VACCINE (1) Never done       Scheduled 6/2/22 at 11:20AM with Dr. Arizmendi for Preventative Care      Goals:   Goals Addressed as of 4/4/2022 at 2:39 PM                    Today    3/17/22       1. Med set up (pt-stated)   100%  40%    Added 3/2/22 by Janel Jhaveri, RN      Goal Statement: I want my adult daughter to learn how to set up my medications correctly by working with the CCC RN over the next 90 days.    Date goal set: 3/1/2022  Barriers: language barrier and lack of knowledge  Strengths: Agrees to work on goal  Date to Achieve By: 6/1/2022  Patient expressed understanding of goal: Yes    Action steps to achieve this goal  1. My adult daughter will meet with the CCC RN in the clinic on 3/17/2022 at 3:00pm. ( completed)'  2. My daughter will attend in person MEV with CC RN on 3/29/2022 at 12pm.   3. I will take my medications as prescribed and as they are set up by my daughter.  4. I/daughter/son will call the CCC RN with concerns or questions.            2. Preventative care (pt-stated)   30%  20%    Added 3/17/22 by Janel Jhaveri, RN        Goal Statement: I want to attend an Annual Physical exam in the next 90 days so that I may address my current health maintenance care gaps with my PCP.     Date goal set: 3/17/2022  Barriers: language barrier  Strengths: motivated to attend PCP appt  Date to Achieve  By: 6/17/22  Patient expressed understanding of goal: Yes    Action steps to achieve this goal:  1. I will answer my phone when I am contacted to schedule my Annual Physical.  2. I will attend my Annual Physical appointment at [facility] on 6/2/2022 at 11:40am.   3. I will schedule a follow up appointment with my PCP if it is recommended to do so while I am at the clinic.  4. I will follow up with CCC regarding this goal at each outreach until it is completed.             Intervention and Education during outreach:       CHW attempted follow up with patient however she handed phone to daughter Fritz Matthews who speaks English. Fritz Matthews confirmed that they met with CCRN on 3/29th and she feels comfortable setting up patients medications. CHW encouraged reaching out if she needs a refresher.     CHW reminded Fritz Matthews of appointment with Dr. Arizmendi on 6/2 at 11:40AM.       Patient states, she's still having right side leg pain and she thinks it was discussed with Dr. Arizmendi at last visit but she's not sure what she's supposed to do. CHW informed patient, will route message to Dr. Arizmendi for clarification.     CHW Plan: CHW to follow up in 1 month. CHW to send new encounter regarding right side leg pain to PCP.     Irene Samson  Clinic Care Coordination  [facility]    Phone: [phone]

## 2022-04-04 NOTE — PROGRESS NOTES
Clinic Care Coordination Medication Education Initial Visit    Patient presents for:  Medication education, Pill box teaching, Compliance monitoring and Medication reconciliation    Language: Jamari   : Yes    Communication: Literate in other languages    Accompanied by:      Patient Living Situation:      Primary Care Provider:  Giancarlo Arizmendi    Barriers:  Language, Cultural, Poor insight into disease process, Inadequate support at home, Non-compliance of medications, Multiple uncontrolled disease states, Chronic persistent mental illness and Memory deficits    Medication List (see cited below): Patient presents with all ordered medications    Current Outpatient Medications   Medication Sig     acetaminophen (TYLENOL) 500 MG tablet Take 500-1,000 mg by mouth every 6 hours as needed for mild pain     alcohol swab prep pads Use to swab area of injection/amadeo as directed.     amLODIPine (NORVASC) 10 MG tablet Take 1 tablet (10 mg) by mouth daily     aspirin 81 MG EC tablet Take 1 tablet (81 mg) by mouth daily     atorvastatin (LIPITOR) 80 MG tablet Take 1 tablet (80 mg) by mouth daily     blood glucose (NO BRAND SPECIFIED) test strip Use to test blood sugar 3 times daily or as directed. To accompany: Blood Glucose Monitor Brands: per insurance.     blood glucose calibration (NO BRAND SPECIFIED) solution To accompany: Blood Glucose Monitor Brands: per insurance.     blood glucose monitoring (NO BRAND SPECIFIED) meter device kit Use to test blood sugar 3 times daily or as directed. Preferred blood glucose meter OR supplies to accompany: Blood Glucose Monitor Brands: per insurance.     cyanocobalamin (VITAMIN B-12) 1000 MCG tablet Take 1 tablet (1,000 mcg) by mouth daily     escitalopram (LEXAPRO) 10 MG tablet Take 1 tablet (10 mg) by mouth daily     ferrous sulfate (FEROSUL) 325 (65 Fe) MG tablet Take 1 tablet (325 mg) by mouth every other day     fish oil-omega-3 fatty acids 1000 MG capsule Take 1 capsule  (1 g) by mouth daily     gabapentin (NEURONTIN) 300 MG capsule Take 2 capsules (600 mg) by mouth 2 times daily     hydrochlorothiazide (HYDRODIURIL) 50 MG tablet Take 1 tablet (50 mg) by mouth daily     insulin aspart (NOVOLOG PEN) 100 UNIT/ML pen Inject 7 Units Subcutaneous 3 times daily (with meals)     insulin glargine (LANTUS PEN) 100 UNIT/ML pen Inject 60 units under the skin daily or as directed     insulin pen needle (32G X 4 MM) 32G X 4 MM miscellaneous Use 4x pen needles daily or as directed.     liraglutide (VICTOZA) 18 MG/3ML solution Inject 1.8 mg Subcutaneous daily     lisinopril (ZESTRIL) 40 MG tablet Take 1 tablet (40 mg) by mouth daily     menthol, Topical Analgesic, 2.5% (BENGAY VANISHING SCENT) 2.5 % GEL topical gel Apply topically 3 times daily Right shoulder pain     metoprolol succinate ER (TOPROL-XL) 200 MG 24 hr tablet Take 1 tablet (200 mg) by mouth daily     Misc. Devices (PILL BOX 7 DAY) MISC 1 each daily     omeprazole 20 MG tablet Take 1 tablet (20 mg) by mouth daily     thin (NO BRAND SPECIFIED) lancets Use with lanceting device. To accompany: Blood Glucose Monitor Brands: per insurance.     vitamin D2 (ERGOCALCIFEROL) 08999 units (1250 mcg) capsule Take 1 capsule (50,000 Units) by mouth once a week     No current facility-administered medications for this visit.       Equipment: Basic pill box- four times daily dosing    Pill Box was set up by RN at visit  and daughter    Medication Set Up: Dependent  Medication Administration:  Independent    Compliance: 50%    Future Appointments   Date Time Provider Department Center   4/14/2022  8:15 AM [facility]   4/14/2022  8:30 AM Shital Hernandez, PharmD [facility]   5/4/2022 10:40 AM [facility]carlo Arizmendi MD [facility]   6/2/2022 11:40 AM [facility] Arizmendi MD [facility]   10/6/2022  9:40 AM Miracle Mi OD [facility]       Action Plan  RN Will:  4 - 6 weeks      Nursing Notes: Daughter met with CCRN in  person today for med teaching. CCRN reviewed each medicine with daughter and gave daughter med list printout. Daughter was taught how to set up meds weekly. Daughter speaks, reads and writes English very well.     Med set up x 2 weeks today with daughter. Daughter was able to demonstrate back how to set up meds correctly.     1) Almodipine 10mg (1) tab daily - AM    2) ASA 81mg (1) tab daily - AM    3) Atorvastatin 80mg (1) tab daily - HS    4) Vit B12 (1) tab daily -AM    5) Lexapro 10mg (1) tab daily - AM    6) Fish oil (1) tab daily - AM    7) Gabapentin (2) caps BID - AM and HS    8) Hydrochlorothiazide 50mg (1) tab daily - AM    9) Novolog 7 units TID with meals     10) Lantus 60 units at HS    11) Lisinopril 40mg (1) tab daily - AM    12) Metoprolol 200mg (1) tab - AM    13) Omeprazole 20mg (1) tab daily - AM - 30 mins before meal    14) Victoza 1.8mg daily    15) Vit D 1 cap weekly on Sundays    Daughter will request in person visit with CCRN again in the future if additional teaching needed. Patient and daughter plans to meet with MTM and lab on 4/14/2022.   CCRN plans to follow up on 4/28/2022 via phone to make sure daughter sets up meds as instructed.   CCRN gave future appts printout to daughter instructed daughter to attend in person MTM and lab appts on 4/14/2022.

## 2022-04-06 DIAGNOSIS — E78.5 HYPERLIPIDEMIA LDL GOAL <100: Primary | ICD-10-CM

## 2022-04-14 ENCOUNTER — APPOINTMENT (OUTPATIENT)
Dept: LAB | Facility: CLINIC | Age: 52
End: 2022-04-14
Payer: COMMERCIAL

## 2022-04-14 ENCOUNTER — TELEPHONE (OUTPATIENT)
Dept: FAMILY MEDICINE | Facility: CLINIC | Age: 52
End: 2022-04-14
Payer: COMMERCIAL

## 2022-04-14 ENCOUNTER — OFFICE VISIT (OUTPATIENT)
Dept: PHARMACY | Facility: CLINIC | Age: 52
End: 2022-04-14
Payer: COMMERCIAL

## 2022-04-14 VITALS
WEIGHT: 108.5 LBS | BODY MASS INDEX: 19.84 KG/M2 | OXYGEN SATURATION: 92 % | SYSTOLIC BLOOD PRESSURE: 118 MMHG | DIASTOLIC BLOOD PRESSURE: 70 MMHG | HEART RATE: 87 BPM

## 2022-04-14 DIAGNOSIS — E78.5 HYPERLIPIDEMIA LDL GOAL <100: ICD-10-CM

## 2022-04-14 DIAGNOSIS — E11.69 TYPE 2 DIABETES MELLITUS WITH OTHER SPECIFIED COMPLICATION, UNSPECIFIED WHETHER LONG TERM INSULIN USE (H): ICD-10-CM

## 2022-04-14 DIAGNOSIS — F32.A DEPRESSION, UNSPECIFIED DEPRESSION TYPE: ICD-10-CM

## 2022-04-14 DIAGNOSIS — E61.1 LOW IRON: Primary | ICD-10-CM

## 2022-04-14 DIAGNOSIS — Z78.9 TAKES DIETARY SUPPLEMENTS: ICD-10-CM

## 2022-04-14 DIAGNOSIS — I10 HYPERTENSION GOAL BP (BLOOD PRESSURE) < 140/90: ICD-10-CM

## 2022-04-14 DIAGNOSIS — R52 PAIN: ICD-10-CM

## 2022-04-14 DIAGNOSIS — K21.00 GASTROESOPHAGEAL REFLUX DISEASE WITH ESOPHAGITIS WITHOUT HEMORRHAGE: ICD-10-CM

## 2022-04-14 LAB
CHOLEST SERPL-MCNC: 260 MG/DL
FASTING STATUS PATIENT QL REPORTED: ABNORMAL
HDLC SERPL-MCNC: ABNORMAL MG/DL
HGB BLD-MCNC: 11.2 G/DL (ref 11.7–15.7)
LDLC SERPL CALC-MCNC: ABNORMAL MG/DL
TRIGL SERPL-MCNC: 1283 MG/DL

## 2022-04-14 PROCEDURE — 82465 ASSAY BLD/SERUM CHOLESTEROL: CPT | Performed by: FAMILY MEDICINE

## 2022-04-14 PROCEDURE — 99606 MTMS BY PHARM EST 15 MIN: CPT | Performed by: PHARMACIST

## 2022-04-14 PROCEDURE — 36415 COLL VENOUS BLD VENIPUNCTURE: CPT | Performed by: PHARMACIST

## 2022-04-14 PROCEDURE — 83721 ASSAY OF BLOOD LIPOPROTEIN: CPT | Mod: 59 | Performed by: FAMILY MEDICINE

## 2022-04-14 PROCEDURE — 99607 MTMS BY PHARM ADDL 15 MIN: CPT | Performed by: PHARMACIST

## 2022-04-14 PROCEDURE — 85018 HEMOGLOBIN: CPT | Performed by: FAMILY MEDICINE

## 2022-04-14 PROCEDURE — 84478 ASSAY OF TRIGLYCERIDES: CPT | Performed by: FAMILY MEDICINE

## 2022-04-14 RX ORDER — HYDROCHLOROTHIAZIDE 25 MG/1
25 TABLET ORAL DAILY
Qty: 30 TABLET | Refills: 11 | Status: SHIPPED | OUTPATIENT
Start: 2022-04-14 | End: 2022-06-02

## 2022-04-14 RX ORDER — FLASH GLUCOSE SCANNING READER
1 EACH MISCELLANEOUS ONCE
Qty: 1 EACH | Refills: 0 | Status: SHIPPED | OUTPATIENT
Start: 2022-04-14 | End: 2022-04-14

## 2022-04-14 RX ORDER — FLASH GLUCOSE SENSOR
1 KIT MISCELLANEOUS
Qty: 6 EACH | Refills: 4 | Status: SHIPPED | OUTPATIENT
Start: 2022-04-14 | End: 2022-08-09

## 2022-04-14 RX ORDER — FENOFIBRATE 54 MG/1
54 TABLET ORAL DAILY
Qty: 90 TABLET | Refills: 1 | Status: SHIPPED | OUTPATIENT
Start: 2022-04-14 | End: 2022-09-14

## 2022-04-14 NOTE — TELEPHONE ENCOUNTER
RN call to patient via  to relay attached information to patient  -inform if fenofibrate med  -reviewed changes from MTM visit as noted below  -patient had no questions at the end of call      Daughter was available   -RN provided same information to her     Margie Downing RN on 4/14/2022 at 4:23 PM      Route to [facility] for [facility]

## 2022-04-14 NOTE — PATIENT INSTRUCTIONS
Lantus 65 units per day  Amlodipine 10 mg and lisinopril 40 mg in the afternoon (not in the morning)   Claribel system and bring to follow up visit  Take acetaminophen (tylenol) 500-1000 up to every 6 hours as needed for pain

## 2022-04-14 NOTE — Clinical Note
Dr. Arizmendi -   Patient complaining of severe foot pain today - appears as though there is a large callus under the bunion on her right foot.  With her uncontrolled diabetes, do think a referral should be sent to podiatry?  Or would you like to see her first sooner than your next visit?  Please also note the other changes that I made today.  Let me know if you have any questions Jeremías Rubin

## 2022-04-14 NOTE — PROGRESS NOTES
Medication Therapy Management (MTM) Encounter    ASSESSMENT:                            Medication Adherence/Access: No issues identified    1. Type 2 diabetes mellitus with other specified complication, unspecified whether long term insulin use (H)  A1c elevated, not meeting goal of <7%.  Patient's blood sugars are slowly improving, though they remain elevated, especially fasting blood sugars.  Therefore, will increase dose of Lantus to 65 units daily and continue current NovoLog and Victoza.    2. Hypertension goal BP (blood pressure) < 140/90  BP at goal of <140/90.  Patient experiencing significant dizziness, therefore recommend patient continue HCTZ 25 mg daily (which she currently brings with her today) and avoid further increasing dose, updated prescription with pharmacy today.  Additionally, switching amlodipine and lisinopril administration to evening today to avoid all blood pressure medications administered at the same time.    3. Hyperlipidemia LDL goal <100  Rechecking fasting lipid today.  Appropriately taking high intensity statin and fish oil for elevated triglycerides.  Could consider dose increase of fish oil and adding fenofibrate if triglycerides remain elevated.    4. Low iron  Rechecking hemoglobin today.  Patient appropriately taking iron supplement.    5. Pain  Recommend increasing acetaminophen administration as prescribed to help with pain.  Additionally, will defer to PCP regarding foot pain, may warrant referral to podiatry.    6. Gastroesophageal reflux disease with esophagitis without hemorrhage  Stable.    7. Takes dietary supplements  Unable to address vitamin D capsule today, will check at next visit.    8. Depression, unspecified depression type  Unable to fully assess mood control today, will review at next visit.    PLAN:                            1.  Increase Lantus dose: 65 units daily  2.  Recheck labs today: Fasting lipid and hemoglobin  3.  Patient to take acetaminophen 500  to 1000 mg up to every 6 hours as needed for pain  4.  Decreased dose of hydrochlorothiazide: 25 mg daily  5.  Change administration of lisinopril and amlodipine to evening due to patient's dizziness  6.  Sent prescription for freestyle nathan CGM; education to be provided at next visit  7.  Defer to PCP regarding patient's foot pain, could consider referral to podiatry if warranted    Follow-up: Return in about 6 days (around 4/20/2022) for with me.    SUBJECTIVE/OBJECTIVE:                          Nithya Matthews is a 52 year old female coming in for a follow up visit. She was referred to me from Giancarlo Arizmendi MD. Patient was accompanied by daughter. Patient seen with [facility] . ID#[id number].    Reason for visit: Referral from PCP; Diabetes management.    Allergies/ADRs: Reviewed in chart  Past Medical History: Reviewed in chart  Tobacco: She reports that she has never smoked. She has never used smokeless tobacco.    Medication Adherence/Access: Daughter organizes medication in pillbox. Reports no missed medication doses.     Type 2 Diabetes:    Lantus 60 units daily AM  Novolog 7 units three times a day with meals (had ran out for a week due to insurance coverage)  Victoza 1.8 mg daily AM. Endorsing sometimes having stomach upset, not all the time. Reports appetite has been good.   Patients daughter has been injecting all injectables for patient.  Denies any missed doses of insulin or GLP-1 recently, other than when she ran out of her NovoLog.    Blood sugar monitoring: 3 time(s) daily. Ranges (based on glucometer readings): See below  Date FBG Dinner /2hours post   4/13 295    4/12 307    4/10 332 188   4/9 300    4/8 341    4/7 257 176   4/6 256 273     Symptoms of low blood sugar? Yes, feels shaking, hungry and weak - at 80-90. Not sure when this last happened.  Symptoms of high blood sugar? vision changes, polydipsia and nausea  Eye exam: due - sending referral   Foot exam: up to date  Diet/Exercise: Eats 3 meals  "per day, eats at 10 am, 1 pm, 5-6 pm.  Aspirin: Taking 81mg daily and has the following issues: nose bleeds 2-3 times per week that stop quickly.  Statin: Yes: atrovastatin   ACEi/ARB: Yes: lisinopril.    Lab Results   Component Value Date    A1C 14.4 02/28/2022    A1C 14.5 12/06/2021     Creatinine   Date Value Ref Range Status   02/28/2022 1.93 (H) 0.60 - 1.10 mg/dL Final      Latest Reference Range & Units 02/28/22 13:46   Microalbumin Urine mg/dL 0.00 - 1.99 mg/dL 230.52 (H)     Hypertension:   lisinopril 40 mg daily AM  hydrochlorothiazide 25 mg daily AM  amlodipine 10 mg daily AM  metoprolol succinate 200 mg daily AM  Patient does not self-monitor blood pressure.    Patient reports the following medication side effects: dizziness about 3 times weekly. Been happening for a long time. Has difficulty with daily living, going up and down stairs, because she \"almost passes out\". Denies any falls, states that she is about to fall but she doesn't because she holds onto something.   BP Readings from Last 3 Encounters:   04/14/22 118/70   03/17/22 124/62   02/28/22 130/70     Hyperlipidemia:   atorvastatin 80mg daily PM    Fish Oil 1,000 mg daily AM  Patient reports no significant myalgias or other side effects. Does not report that she is taking fenofibrate.  The ASCVD Risk score (Dana LINDA Jr., et al., 2013) failed to calculate for the following reasons:    The valid HDL cholesterol range is 20 to 100 mg/dL  Recent Labs   Lab Test 12/08/21  0607 12/06/21  1050   CHOL 150 194   HDL 18* 28*   LDL  --  40   TRIG 656* 1,018*     Anemia:   ferrous sulfate 325 mg - 1 tablet every other day.   Reports no side effects.   Hemoglobin   Date Value Ref Range Status   04/14/2022 11.2 (L) 11.7 - 15.7 g/dL Final     Pain:   gabapentin 600 mg twice a day   Acetaminophen 500 mg as needed for pain (taking 1 pill at a time only for severe pain, only taking occasionally, last dose 2 days ago)  Noting severe pain on the bottom of her right " foot when walking. Despite taking acetaminophen, experiencing sharp pain when walking and sleeping. Has numbness on her leg as well.     GERD:   omeprazole 20 mg daily. Reports she does still have heartburn sometimes.    Supplements:  Currently prescribed   vitamin D2 5000 units weekly (does not bring with her today)  vitmain B12 1000 mcg daily AM  Vitamin D Deficiency Screening Results:  Lab Results   Component Value Date    VITDT 17 (L) 03/17/2022     Depression:   Escitalopram 10 mg daily AM.   Unable to review efficacy from medication with patient today due to time limitations.  PHQ 12/13/2021   PHQ-9 Total Score 8   Q9: Thoughts of better off dead/self-harm past 2 weeks Not at all       Today's Vitals: /70   Pulse 87   Wt 108 lb 8 oz (49.2 kg)   SpO2 92%   BMI 19.84 kg/m    ----------------  I spent 70 minutes with this patient today. All changes were made via collaborative practice agreement with Giancarlo Arizmendi MD. A copy of the visit note was provided to the patient's provider(s).    The patient was given a summary of these recommendations.     Shital Hernandez, PharmD, BCACP  Medication Therapy Management Pharmacist     Medication Therapy Recommendations  Hypertension goal BP (blood pressure) < 140/90    Current Medication: lisinopril (ZESTRIL) 40 MG tablet   Rationale: Undesirable effect - Adverse medication event - Safety   Recommendation: Change Administration Time   Status: Patient Agreed - Adherence/Education         Type 2 diabetes mellitus with other specified complication, unspecified whether long term insulin use (H)    Current Medication: insulin glargine (LANTUS PEN) 100 UNIT/ML pen   Rationale: Dose too low - Dosage too low - Effectiveness   Recommendation: Increase Dose   Status: Accepted per CPA

## 2022-04-15 LAB — LDLC SERPL CALC-MCNC: NORMAL MG/DL

## 2022-04-20 ENCOUNTER — OFFICE VISIT (OUTPATIENT)
Dept: PHARMACY | Facility: CLINIC | Age: 52
End: 2022-04-20
Payer: COMMERCIAL

## 2022-04-20 DIAGNOSIS — E11.69 TYPE 2 DIABETES MELLITUS WITH OTHER SPECIFIED COMPLICATION, UNSPECIFIED WHETHER LONG TERM INSULIN USE (H): Primary | ICD-10-CM

## 2022-04-20 PROCEDURE — 99207 PR NO CHARGE LOS: CPT | Performed by: PHARMACIST

## 2022-04-20 NOTE — PROGRESS NOTES
Clinical Pharmacy Consult:                                                    Nithya Matthews is a 52 year old female coming in for a clinical pharmacist consult.  She was referred to me from Giancarlo Arizmendi.  ID# [id number], presents with her daughter today.    Reason for Consult: CGM Claribel education    Discussion: [facility] pharmacist helps patient and her daughter place first freestyle claribel sensor on the back of the left arm today. Provided education to patient today, recommended patient monitor blood sugars 3-4 times daily (at least every 8 hours), in the morning, at mealtime, and before bed.  Notified patient that the sensor can be worn and get wet, to be replaced every 14 days.  Patient endorsing understanding without further questions today.    Additionally patient mentioning continued pain on the bottom of her foot where callus present. Per discussion with PCP today, referral placed for patient to see podiatrist for further assessment.     Plan:  1. MT pharmacist provides freestyle claribel education during visit today; sensor placed on the back of left arm  2. Places referral for podiatry per discussion with PCP    Shital Hernandez, PharmD, BCACP  Medication Therapy Management Pharmacist     Medication Therapy Recommendations  Diabetic neuropathy associated with type 2 diabetes mellitus (H)    Current Medication: Continuous Blood Gluc  (FREESTYLE CLARIBEL 14 DAY READER) XAVI ()   Rationale: Does not understand instructions - Adherence - Adherence   Recommendation: Provide Education   Status: Patient Agreed - Adherence/Education

## 2022-04-26 ENCOUNTER — OFFICE VISIT (OUTPATIENT)
Dept: PODIATRY | Facility: CLINIC | Age: 52
End: 2022-04-26
Attending: FAMILY MEDICINE
Payer: COMMERCIAL

## 2022-04-26 VITALS
SYSTOLIC BLOOD PRESSURE: 122 MMHG | DIASTOLIC BLOOD PRESSURE: 72 MMHG | WEIGHT: 108 LBS | BODY MASS INDEX: 19.88 KG/M2 | HEIGHT: 62 IN

## 2022-04-26 DIAGNOSIS — M20.12 HAV (HALLUX ABDUCTO VALGUS), LEFT: ICD-10-CM

## 2022-04-26 DIAGNOSIS — E11.621 DIABETIC ULCER OF OTHER PART OF RIGHT FOOT ASSOCIATED WITH TYPE 2 DIABETES MELLITUS, WITH FAT LAYER EXPOSED (H): ICD-10-CM

## 2022-04-26 DIAGNOSIS — L84 PRE-ULCERATIVE CALLUSES: ICD-10-CM

## 2022-04-26 DIAGNOSIS — G57.93 NEUROPATHIC PAIN OF BOTH FEET: ICD-10-CM

## 2022-04-26 DIAGNOSIS — L97.512 DIABETIC ULCER OF OTHER PART OF RIGHT FOOT ASSOCIATED WITH TYPE 2 DIABETES MELLITUS, WITH FAT LAYER EXPOSED (H): ICD-10-CM

## 2022-04-26 DIAGNOSIS — M20.11 HAV (HALLUX ABDUCTO VALGUS), RIGHT: ICD-10-CM

## 2022-04-26 DIAGNOSIS — E11.42 DIABETIC POLYNEUROPATHY ASSOCIATED WITH TYPE 2 DIABETES MELLITUS (H): Primary | ICD-10-CM

## 2022-04-26 DIAGNOSIS — E11.69 TYPE 2 DIABETES MELLITUS WITH OTHER SPECIFIED COMPLICATION, UNSPECIFIED WHETHER LONG TERM INSULIN USE (H): ICD-10-CM

## 2022-04-26 PROCEDURE — 11042 DBRDMT SUBQ TIS 1ST 20SQCM/<: CPT | Performed by: PODIATRIST

## 2022-04-26 PROCEDURE — 99204 OFFICE O/P NEW MOD 45 MIN: CPT | Mod: 25 | Performed by: PODIATRIST

## 2022-04-26 RX ORDER — SILVER SULFADIAZINE 10 MG/G
CREAM TOPICAL 2 TIMES DAILY
Qty: 25 G | Refills: 1 | Status: SHIPPED | OUTPATIENT
Start: 2022-04-26 | End: 2022-12-19

## 2022-04-26 RX ORDER — AMMONIUM LACTATE 12 G/100G
CREAM TOPICAL DAILY
Qty: 385 G | Refills: 4 | Status: SHIPPED | OUTPATIENT
Start: 2022-04-26 | End: 2022-12-19

## 2022-04-26 NOTE — LETTER
"    4/26/2022         RE: Nithya Matthews  [address] [place] [address]        Dear Colleague,    Thank you for referring your patient, Nithya Matthews, to the [facility]. Please see a copy of my visit note below.    PATIENT HISTORY:  Dr. Arizmendi requested I see this patient for their foot issue.  Nithya Matthews is a 52 year old female who presents to clinic for diabetic foot check and foot pain.  Here with her daughter who is interpreting for her.  Trying to get the interpreting services to work but there was difficulties with the phone.  Daughter notes that patient states her feet hurt especially at night.  She is diabetic and notes that her sugars are kind of high.  She has thick \"swelling\" especially to the right foot and that can be sore at times when walking.  She is concerned about infection.  Denies fever, nausea, vomiting.  Denies injury.    Review of Systems:  Patient denies fever, chills, rash, wound, stiffness, limping,  weakness, heart burn, blood in stool, chest pain with activity, calf pain when walking, shortness of breath with activity, chronic cough, easy bleeding/bruising, swelling of ankles, excessive thirst, fatigue, depression, anxiety.  Patient admits to numbness.     PAST MEDICAL HISTORY: No past medical history on file.     PAST SURGICAL HISTORY:   Past Surgical History:   Procedure Laterality Date     CATARACT IOL, RT/LT          MEDICATIONS:   Current Outpatient Medications:      acetaminophen (TYLENOL) 500 MG tablet, Take 500-1,000 mg by mouth every 6 hours as needed for mild pain, Disp: , Rfl:      alcohol swab prep pads, Use to swab area of injection/amadeo as directed., Disp: 100 each, Rfl: 3     amLODIPine (NORVASC) 10 MG tablet, Take 1 tablet (10 mg) by mouth daily, Disp: 90 tablet, Rfl: 3     aspirin 81 MG EC tablet, Take 1 tablet (81 mg) by mouth daily, Disp: 90 tablet, Rfl: 3     atorvastatin (LIPITOR) 80 MG tablet, Take 1 tablet (80 mg) by mouth daily, Disp: 90 tablet, " Rfl: 3     blood glucose (NO BRAND SPECIFIED) test strip, Use to test blood sugar 3 times daily or as directed. To accompany: Blood Glucose Monitor Brands: per insurance., Disp: 200 strip, Rfl: 3     blood glucose calibration (NO BRAND SPECIFIED) solution, To accompany: Blood Glucose Monitor Brands: per insurance., Disp: 1 each, Rfl: 3     blood glucose monitoring (NO BRAND SPECIFIED) meter device kit, Use to test blood sugar 3 times daily or as directed. Preferred blood glucose meter OR supplies to accompany: Blood Glucose Monitor Brands: per insurance., Disp: 1 kit, Rfl: 0     Continuous Blood Gluc Sensor (FREESTYLE MICKIE 14 DAY SENSOR) [facility], 1 Device every 14 days Change sensor as directed every 14 days, Disp: 6 each, Rfl: 4     cyanocobalamin (VITAMIN B-12) 1000 MCG tablet, Take 1 tablet (1,000 mcg) by mouth daily, Disp: 90 tablet, Rfl: 3     escitalopram (LEXAPRO) 10 MG tablet, Take 1 tablet (10 mg) by mouth daily, Disp: 90 tablet, Rfl: 3     fenofibrate (LOFIBRA) 54 MG tablet, Take 1 tablet (54 mg) by mouth daily, Disp: 90 tablet, Rfl: 1     ferrous sulfate (FEROSUL) 325 (65 Fe) MG tablet, Take 1 tablet (325 mg) by mouth every other day, Disp: 70 tablet, Rfl: 3     fish oil-omega-3 fatty acids 1000 MG capsule, Take 1 capsule (1 g) by mouth daily, Disp: 90 capsule, Rfl: 3     gabapentin (NEURONTIN) 300 MG capsule, Take 2 capsules (600 mg) by mouth 2 times daily, Disp: 180 capsule, Rfl: 3     hydrochlorothiazide (HYDRODIURIL) 25 MG tablet, Take 1 tablet (25 mg) by mouth daily, Disp: 30 tablet, Rfl: 11     insulin aspart (NOVOLOG PEN) 100 UNIT/ML pen, Inject 7 Units Subcutaneous 3 times daily (with meals), Disp: 15 mL, Rfl: 3     insulin glargine (LANTUS PEN) 100 UNIT/ML pen, Inject 60 units under the skin daily or as directed, Disp: 60 mL, Rfl: 3     insulin pen needle (32G X 4 MM) 32G X 4 MM miscellaneous, Use 4x pen needles daily or as directed., Disp: 200 each, Rfl: 1     liraglutide (VICTOZA) 18 MG/3ML  solution, Inject 1.8 mg Subcutaneous daily, Disp: 27 mL, Rfl: 3     lisinopril (ZESTRIL) 40 MG tablet, Take 1 tablet (40 mg) by mouth daily, Disp: 90 tablet, Rfl: 3     menthol, Topical Analgesic, 2.5% (BENGAY VANISHING SCENT) 2.5 % GEL topical gel, Apply topically 3 times daily Right shoulder pain, Disp: , Rfl:      metoprolol succinate ER (TOPROL-XL) 200 MG 24 hr tablet, Take 1 tablet (200 mg) by mouth daily, Disp: 90 tablet, Rfl: 3     Misc. Devices (PILL BOX 7 DAY) MISC, 1 each daily, Disp: 1 each, Rfl: 0     omeprazole 20 MG tablet, Take 1 tablet (20 mg) by mouth daily, Disp: 90 tablet, Rfl: 3     thin (NO BRAND SPECIFIED) lancets, Use with lanceting device. To accompany: Blood Glucose Monitor Brands: per insurance., Disp: 200 each, Rfl: 3     vitamin D2 (ERGOCALCIFEROL) 59469 units (1250 mcg) capsule, Take 1 capsule (50,000 Units) by mouth once a week, Disp: 12 capsule, Rfl: 0     ALLERGIES:  No Known Allergies     SOCIAL HISTORY:   Social History     Socioeconomic History     Marital status:      Spouse name: Not on file     Number of children: Not on file     Years of education: Not on file     Highest education level: Not on file   Occupational History     Not on file   Tobacco Use     Smoking status: Never Smoker     Smokeless tobacco: Never Used   Vaping Use     Vaping Use: Never used   Substance and Sexual Activity     Alcohol use: Not Currently     Drug use: Never     Sexual activity: Yes   Other Topics Concern     Not on file   Social History Narrative     Not on file     Social Determinants of Health     Financial Resource Strain: Medium Risk     Difficulty of Paying Living Expenses: Somewhat hard   Food Insecurity: Food Insecurity Present     Worried About Running Out of Food in the Last Year: Sometimes true     Ran Out of Food in the Last Year: Sometimes true   Transportation Needs: Unmet Transportation Needs     Lack of Transportation (Medical): Yes     Lack of Transportation (Non-Medical):  "Yes   Physical Activity: Inactive     Days of Exercise per Week: 0 days     Minutes of Exercise per Session: 0 min   Stress: Stress Concern Present     Feeling of Stress : To some extent   Social Connections: Moderately Isolated     Frequency of Communication with Friends and Family: More than three times a week     Frequency of Social Gatherings with Friends and Family: More than three times a week     Attends Hinduism Services: Never     Active Member of Clubs or Organizations: No     Attends Club or Organization Meetings: Never     Marital Status:    Intimate Partner Violence: Not At Risk     Fear of Current or Ex-Partner: No     Emotionally Abused: No     Physically Abused: No     Sexually Abused: No   Housing Stability: Low Risk      Unable to Pay for Housing in the Last Year: No     Number of Places Lived in the Last Year: 2     Unstable Housing in the Last Year: No        FAMILY HISTORY:   Family History   Problem Relation Age of Onset     Glaucoma No family hx of      Macular Degeneration No family hx of         EXAM:Vitals: /72   Ht 1.575 m (5' 2\")   Wt 49 kg (108 lb)   BMI 19.75 kg/m      A1C: 14.4 (2/2022)    General appearance: Patient is alert and fully cooperative with history & exam.  No sign of distress is noted during the visit.     Psychiatric: Affect is pleasant & appropriate.  Patient appears motivated to improve health.     Respiratory: Breathing is regular & unlabored while sitting.     HEENT: Hearing is intact to spoken word.  Speech is clear.  No gross evidence of visual impairment that would impact ambulation.     Dermatologic: Full-thickness ulceration to the plantar aspect of the right first metatarsal head after debridement of callus.  This measures approximately 2-1/2 cm x 2 cm x 0.2 cm.  Base of the wound is granular.  No surrounding erythema purulent drainage or malodor noted.  Preulcerative callus noted to the plantar aspects of both fifth metatarsal heads.  No open " wounds are noted.     Vascular: DP & PT pulses are intact & regular bilaterally.  No significant edema or varicosities noted.  CFT and skin temperature is normal to both lower extremities.     Neurologic: Lower extremity sensation is diminished to feet.     Musculoskeletal: Patient is ambulatory without assistive device or brace.  Prominent first metatarsal heads medially both feet.       ASSESSMENT:    Type 2 diabetes mellitus with other specified complication, unspecified whether long term insulin use (H)  Diabetic polyneuropathy associated with type 2 diabetes mellitus (H)  Diabetic ulcer of other part of right foot associated with type 2 diabetes mellitus, with fat layer exposed (H)  Neuropathic pain of both feet  Pre-ulcerative calluses  Hav (hallux abducto valgus), right  Hav (hallux abducto valgus), left     Medical Decision Making/Plan:  Reviewed patient's chart in epic.  Had a long discussion with patient and patient's daughter about her foot pain.  Discussed that it is most likely due to her uncontrolled diabetic sugars.  We talked about using over-the-counter Voltaren gel to help with pain.  Talked about her calluses and ulcers.    For right foot ulcer was debrided today.  Please see procedure note below.  We will have them apply Silvadene cream and a large Band-Aid to the area daily.  We will have them apply AmLactin cream to the feet daily to help try to soften the other preulcerative callus areas before they become ulcers.  Discussed that given her high sugars she is at increased risk of infection.  Currently there are clinically no signs of infection noted.  They were given an order for a DH 2 shoe for her right foot that they will have her wear at all times and they will take the pegs out under the first metatarsal head.  She was also given an order for diabetic shoes and inserts for long-term to try to help prevent callusing and ulceration.  We will have them follow-up in 1 month for reassessment of  the right foot ulceration.  All questions were answered to patient and patient's daughter satisfaction and they will call for the questions or concerns.    Procedure: After verbal consent, excisional debridement was performed on ulcer.  #15 blade was used to debride ulcer down to and including subcutaneous tissue. Bleeding controlled with light pressure.   No drainage noted.  No anesthesia was used due to neuropathy. Dry dressing applied to foot.  Patient tolerated procedure well.      Patient risk factor: Patient is at high risk for infection.        Grecia Hearn DPM, Podiatry/Foot and Ankle Surgery        Again, thank you for allowing me to participate in the care of your patient.        Sincerely,        Grecia Hearn DPM, Podiatry/Foot and Ankle Surgery

## 2022-04-26 NOTE — PROGRESS NOTES
"PATIENT HISTORY:  Dr. Arizmendi requested I see this patient for their foot issue.  Nithya Matthews is a 52 year old female who presents to clinic for diabetic foot check and foot pain.  Here with her daughter who is interpreting for her.  Trying to get the interpreting services to work but there was difficulties with the phone.  Daughter notes that patient states her feet hurt especially at night.  She is diabetic and notes that her sugars are kind of high.  She has thick \"swelling\" especially to the right foot and that can be sore at times when walking.  She is concerned about infection.  Denies fever, nausea, vomiting.  Denies injury.    Review of Systems:  Patient denies fever, chills, rash, wound, stiffness, limping,  weakness, heart burn, blood in stool, chest pain with activity, calf pain when walking, shortness of breath with activity, chronic cough, easy bleeding/bruising, swelling of ankles, excessive thirst, fatigue, depression, anxiety.  Patient admits to numbness.     PAST MEDICAL HISTORY: No past medical history on file.     PAST SURGICAL HISTORY:   Past Surgical History:   Procedure Laterality Date     CATARACT IOL, RT/LT          MEDICATIONS:   Current Outpatient Medications:      acetaminophen (TYLENOL) 500 MG tablet, Take 500-1,000 mg by mouth every 6 hours as needed for mild pain, Disp: , Rfl:      alcohol swab prep pads, Use to swab area of injection/amadeo as directed., Disp: 100 each, Rfl: 3     amLODIPine (NORVASC) 10 MG tablet, Take 1 tablet (10 mg) by mouth daily, Disp: 90 tablet, Rfl: 3     aspirin 81 MG EC tablet, Take 1 tablet (81 mg) by mouth daily, Disp: 90 tablet, Rfl: 3     atorvastatin (LIPITOR) 80 MG tablet, Take 1 tablet (80 mg) by mouth daily, Disp: 90 tablet, Rfl: 3     blood glucose (NO BRAND SPECIFIED) test strip, Use to test blood sugar 3 times daily or as directed. To accompany: Blood Glucose Monitor Brands: per insurance., Disp: 200 strip, Rfl: 3     blood glucose calibration (NO BRAND " SPECIFIED) solution, To accompany: Blood Glucose Monitor Brands: per insurance., Disp: 1 each, Rfl: 3     blood glucose monitoring (NO BRAND SPECIFIED) meter device kit, Use to test blood sugar 3 times daily or as directed. Preferred blood glucose meter OR supplies to accompany: Blood Glucose Monitor Brands: per insurance., Disp: 1 kit, Rfl: 0     Continuous Blood Gluc Sensor (FREESTYLE MICKIE 14 DAY SENSOR) [facility] [place], 1 Device every 14 days Change sensor as directed every 14 days, Disp: 6 each, Rfl: 4     cyanocobalamin (VITAMIN B-12) 1000 MCG tablet, Take 1 tablet (1,000 mcg) by mouth daily, Disp: 90 tablet, Rfl: 3     escitalopram (LEXAPRO) 10 MG tablet, Take 1 tablet (10 mg) by mouth daily, Disp: 90 tablet, Rfl: 3     fenofibrate (LOFIBRA) 54 MG tablet, Take 1 tablet (54 mg) by mouth daily, Disp: 90 tablet, Rfl: 1     ferrous sulfate (FEROSUL) 325 (65 Fe) MG tablet, Take 1 tablet (325 mg) by mouth every other day, Disp: 70 tablet, Rfl: 3     fish oil-omega-3 fatty acids 1000 MG capsule, Take 1 capsule (1 g) by mouth daily, Disp: 90 capsule, Rfl: 3     gabapentin (NEURONTIN) 300 MG capsule, Take 2 capsules (600 mg) by mouth 2 times daily, Disp: 180 capsule, Rfl: 3     hydrochlorothiazide (HYDRODIURIL) 25 MG tablet, Take 1 tablet (25 mg) by mouth daily, Disp: 30 tablet, Rfl: 11     insulin aspart (NOVOLOG PEN) 100 UNIT/ML pen, Inject 7 Units Subcutaneous 3 times daily (with meals), Disp: 15 mL, Rfl: 3     insulin glargine (LANTUS PEN) 100 UNIT/ML pen, Inject 60 units under the skin daily or as directed, Disp: 60 mL, Rfl: 3     insulin pen needle (32G X 4 MM) 32G X 4 MM miscellaneous, Use 4x pen needles daily or as directed., Disp: 200 each, Rfl: 1     liraglutide (VICTOZA) 18 MG/3ML solution, Inject 1.8 mg Subcutaneous daily, Disp: 27 mL, Rfl: 3     lisinopril (ZESTRIL) 40 MG tablet, Take 1 tablet (40 mg) by mouth daily, Disp: 90 tablet, Rfl: 3     menthol, Topical Analgesic, 2.5% (BENGAY VANISHING SCENT) 2.5 % GEL  topical gel, Apply topically 3 times daily Right shoulder pain, Disp: , Rfl:      metoprolol succinate ER (TOPROL-XL) 200 MG 24 hr tablet, Take 1 tablet (200 mg) by mouth daily, Disp: 90 tablet, Rfl: 3     Misc. Devices (PILL BOX 7 DAY) MISC, 1 each daily, Disp: 1 each, Rfl: 0     omeprazole 20 MG tablet, Take 1 tablet (20 mg) by mouth daily, Disp: 90 tablet, Rfl: 3     thin (NO BRAND SPECIFIED) lancets, Use with lanceting device. To accompany: Blood Glucose Monitor Brands: per insurance., Disp: 200 each, Rfl: 3     vitamin D2 (ERGOCALCIFEROL) 56458 units (1250 mcg) capsule, Take 1 capsule (50,000 Units) by mouth once a week, Disp: 12 capsule, Rfl: 0     ALLERGIES:  No Known Allergies     SOCIAL HISTORY:   Social History     Socioeconomic History     Marital status:      Spouse name: Not on file     Number of children: Not on file     Years of education: Not on file     Highest education level: Not on file   Occupational History     Not on file   Tobacco Use     Smoking status: Never Smoker     Smokeless tobacco: Never Used   Vaping Use     Vaping Use: Never used   Substance and Sexual Activity     Alcohol use: Not Currently     Drug use: Never     Sexual activity: Yes   Other Topics Concern     Not on file   Social History Narrative     Not on file     Social Determinants of Health     Financial Resource Strain: Medium Risk     Difficulty of Paying Living Expenses: Somewhat hard   Food Insecurity: Food Insecurity Present     Worried About Running Out of Food in the Last Year: Sometimes true     Ran Out of Food in the Last Year: Sometimes true   Transportation Needs: Unmet Transportation Needs     Lack of Transportation (Medical): Yes     Lack of Transportation (Non-Medical): Yes   Physical Activity: Inactive     Days of Exercise per Week: 0 days     Minutes of Exercise per Session: 0 min   Stress: Stress Concern Present     Feeling of Stress : To some extent   Social Connections: Moderately Isolated      "Frequency of Communication with Friends and Family: More than three times a week     Frequency of Social Gatherings with Friends and Family: More than three times a week     Attends [employer] Services: Never     Active Member of Clubs or Organizations: No     Attends Club or Organization Meetings: Never     Marital Status:    Intimate Partner Violence: Not At Risk     Fear of Current or Ex-Partner: No     Emotionally Abused: No     Physically Abused: No     Sexually Abused: No   Housing Stability: Low Risk      Unable to Pay for Housing in the Last Year: No     Number of Places Lived in the Last Year: 2     Unstable Housing in the Last Year: No        FAMILY HISTORY:   Family History   Problem Relation Age of Onset     Glaucoma No family hx of      Macular Degeneration No family hx of         EXAM:Vitals: /72   Ht 1.575 m (5' 2\")   Wt 49 kg (108 lb)   BMI 19.75 kg/m      A1C: 14.4 (2/2022)    General appearance: Patient is alert and fully cooperative with history & exam.  No sign of distress is noted during the visit.     Psychiatric: Affect is pleasant & appropriate.  Patient appears motivated to improve health.     Respiratory: Breathing is regular & unlabored while sitting.     HEENT: Hearing is intact to spoken word.  Speech is clear.  No gross evidence of visual impairment that would impact ambulation.     Dermatologic: Full-thickness ulceration to the plantar aspect of the right first metatarsal head after debridement of callus.  This measures approximately 2-1/2 cm x 2 cm x 0.2 cm.  Base of the wound is granular.  No surrounding erythema purulent drainage or malodor noted.  Preulcerative callus noted to the plantar aspects of both fifth metatarsal heads.  No open wounds are noted.     Vascular: DP & PT pulses are intact & regular bilaterally.  No significant edema or varicosities noted.  CFT and skin temperature is normal to both lower extremities.     Neurologic: Lower extremity sensation is " diminished to feet.     Musculoskeletal: Patient is ambulatory without assistive device or brace.  Prominent first metatarsal heads medially both feet.       ASSESSMENT:    Type 2 diabetes mellitus with other specified complication, unspecified whether long term insulin use (H)  Diabetic polyneuropathy associated with type 2 diabetes mellitus (H)  Diabetic ulcer of other part of right foot associated with type 2 diabetes mellitus, with fat layer exposed (H)  Neuropathic pain of both feet  Pre-ulcerative calluses  Hav (hallux abducto valgus), right  Hav (hallux abducto valgus), left     Medical Decision Making/Plan:  Reviewed patient's chart in epic.  Had a long discussion with patient and patient's daughter about her foot pain.  Discussed that it is most likely due to her uncontrolled diabetic sugars.  We talked about using over-the-counter Voltaren gel to help with pain.  Talked about her calluses and ulcers.    For right foot ulcer was debrided today.  Please see procedure note below.  We will have them apply Silvadene cream and a large Band-Aid to the area daily.  We will have them apply AmLactin cream to the feet daily to help try to soften the other preulcerative callus areas before they become ulcers.  Discussed that given her high sugars she is at increased risk of infection.  Currently there are clinically no signs of infection noted.  They were given an order for a DH 2 shoe for her right foot that they will have her wear at all times and they will take the pegs out under the first metatarsal head.  She was also given an order for diabetic shoes and inserts for long-term to try to help prevent callusing and ulceration.  We will have them follow-up in 1 month for reassessment of the right foot ulceration.  All questions were answered to patient and patient's daughter satisfaction and they will call for the questions or concerns.    Procedure: After verbal consent, excisional debridement was performed on  ulcer.  #15 blade was used to debride ulcer down to and including subcutaneous tissue. Bleeding controlled with light pressure.   No drainage noted.  No anesthesia was used due to neuropathy. Dry dressing applied to foot.  Patient tolerated procedure well.      Patient risk factor: Patient is at high risk for infection.        Grecia Hearn DPM, Podiatry/Foot and Ankle Surgery

## 2022-04-26 NOTE — PATIENT INSTRUCTIONS
Thank you for choosing [facility] Podiatry / Foot & Ankle Surgery!    DR HIRSCH'S CLINIC:  [facility]   [address]   [place] [address]      TRIAGE LINE: [phone]  APPOINTMENTS: [phone]  RADIOLOGY: [phone]  SET UP SURGERY: [phone]  FAX NUMBER: [phone]  BILLING QUESTIONS: [phone]       Follow up: 1 month.  Patient needs to be a 30-minute appointment.      #1.   Silvadene cream and applied to patient's right foot ulcer daily.  Cover with an extra-large Band-Aid  #2  over-the-counter Voltaren gel and apply to feet when painful.  #3  offloading shoe for the right foot at [facility] medical on the second floor.  Take the pegs out under the ulcer area only.  Have patient wear the shoe at all times.    #4.  Call to schedule to be fitted for diabetic shoes and inserts.      [facility]  [place]  [address]   [place] [address]  Ph: [phone]  Fax: [phone] [place] [facility]  [address]  [place] [address]  Ph: [phone]  Fax: [phone]   [place]  [address]   [place] [address]  Ph: [phone]  Fax: [phone] [place]  [address]  [place] [address]   Ph: [phone]    [place] [address]  [place] [address]   Ph: [phone]  [place]  [address]  [place] [address]   Ph: [phone]      [place]  [address]  [place] [address]  Ph: [phone]  [place]  [address]   [place] [address]  Ph: [phone]  Fax: [phone]       [facility]  [facility]  [address]  [place] [address]  Phone: [phone]  Fax: [phone] [facility]  [address]  [place] [address]  Phone: [phone]   Fax: [phone]   [facility]  [address]  [place] [address]  Phone: [phone]  Fax: [phone] [facility]  [address]  [place] [address]  Phone: [phone]   Fax: [phone]   [facility]   [address]  [place] [address]  Phone: [phone]  Fax: [phone] * Please call any location listed to make an appointment for a casting/fitting. Your referral was sent to their central office and they will all have the order on file.     WEARING YOUR CUSTOM FOOT ORTHOTICS   Most insurance plans cover one pair of orthotics per year. You must check with your   insurance plan to see what your payment responsibility will be. Please call your   insurance company by calling the number on the back of your insurance card.   Orthotic's are non-refundable and non-returnable.   Orthotics are made of various designs. Some orthotics are covered with material that extends beyond your toes. If your orthotic is of this design, you will likely need to trim the toe end to get a proper fit. The insole from your shoe can be used as a template. Simply overlay the shoe insert on top of the custom orthotic. Align the heel end while tracing the length of the insert onto the custom orthotic. Use a large scissor to trim the toe end until you get a proper fit in the shoe.   The orthotic needs to be pushed as far back in the shoe as possible. The heel portion should not ride forward so as not to irritate your heel.   Orthotics are designed to work with socks. Excessive perspiration will shorten the life span of the orthotics. Remove the orthotic from the shoe frequently for proper drying.   The break-in period lasts for weeks. People new to orthotics will likely experience new aches and pains. The orthotic is forcing your foot into a new position. Arch, foot and leg muscle aches and fatigue are common during these weeks. Minor discomfort can be considered normal break in phenomenon. Start wearing your orthotic  around your home your first day. Limited activity for one to two hours is recommended. You can increase one or two additional hours each day provided the aches and pains are subsiding. The degree of discomfort, fatigue and problems will dictate the speed of break in. You may require multiple weeks to work up to full time use.   Do not continue wearing your orthotics if they are creating problems such as blisters or sores. Do not hesitate to call the clinic to speak with a nurse regarding orthotic   break in, fit, trimming, etc. You may also need to see the doctor if the orthotics are   simply not working out. Adjustments are sometimes made to improve orthotic   function.     Orthotics will only work in certain styles and types of shoes. Orthotics rarely work in dress shoes. Slip-ons, clogs, sandals and heels are particularly troublesome. Specially designed orthotics may be necessary for these types of shoes. Your custom orthotic was designed for activities that require appropriate walking or running shoes. Lace up athletic shoes, walking shoes or work boots should work appropriately. You may need a wider or longer shoe. Shoes with a removable  or insert work best. In general, you want to remove an insert from the shoe before placing the orthotic into the shoe. Shoes without a removable liner may not work as well.     When purchasing new shoes, bring your orthotics along to get a proper fit. Shop at stores that are familiar with orthotics.   Frequent washing of the orthotic may shorten the life span of the top cover. The top cover can be replaced but will generally last one to five years depending on use and foot perspiration.     FOOT ULCER (WOUND) EDUCATION  Ulceration ofthe foot involves a break or hole in the skin. Skin is our best protection against infection. Skin is quite durable, however, the underlying tissues are fragile. For this reason, the wound is likely to deepen rapidly. Deep wounds usually  get infected and require amputation. Prompt healing is therefore essential to avoid limb loss.     Foot ulcers do not heal without intervention. Walking on the foot and living your normal life is not typically compatible with healing the sore. Successful healing will require several months of significant alteration of your daily activities.   Ulcer complications frequently develop. This primarily includes infection of skin, which then spreads deep into your joints, bones and tendons. Spreading infection may travel up your leg and into other parts of your body. Deep infection is usually treated with amputation ofpart ofyour foot or your leg. Signs of infection include fever, chills, nausea, vomiting, erratic blood sugars, local redness, pus, strong odor and localized warmth. Signs of infection should be taken seriously. Prompt evaluation in the clinic or hospital emergency room is required.   Ulcer treatment requires debridement or surgical removal of devitalized tissue. Your doctor will trim away callused, moistened, unhealthy tissue from the wound surface and margin. This helps to clean the wound and allows proper inspection. Debridement also stimulates healing even though the wound originally appears larger. Expect some bleeding with each debridement. You will be given instruction regarding wound bandaging. This often includes ointment and gauze. Avoid tape directly on the skin. Hand washing is essential since most infections will come from your fingertips. Ulcer care requires a no touch technique. Your fingers should not touch the margin or base of the wound.    HELPFUL HEALING TIPS:  1. Debridement: Getting rid of bad tissue makes way for good tissue to promote healing  2. Addressing Foot Deformities: Hammertoes and bunions can cause increased pressure  3. Pressure Reduction: If pressure remains to the wound, it won't heal  4. Good Pulses: If bloodflow is not getting to the foot, the ulcer will not heal  5. Good  "Nutrition: If you are not getting proper nutrition your body can't heal.Protein! At least 90g a day.  Supplements are a good way to help get this, such as Dennys, Glucerna, Ensure. Also taking 5000units of Vitamin D a day.   6. Infection Control: Keep the ulcer clean with wound cleanser. DO NOT SOAK IT!  7. Moisture Control: Keep edema down and make sure that drainage is getting pulled away from the ulcer    IMPORTANCE OF DEBRIDEMENT   Reduces bioburden to help control or reduce infection. Even if an ulcer is not  infected,  the bacterial bioburden causes increased local inflammation.   Allows more accurate visualization of the wound base and edges, which allows for more precise staging.   Removes necrotic/non-viable tissue, which impedes wound healing, causes protein loss and can be a nidus for infection.   Stimulates new circulation (angiogenesis) and allows adequate oxygen delivery to the wound.   Removes undermining and tunneling, and may help reduce abscess formation.   Releases healing growth factors at the edge of the wound.   Prepares the wound bed by leaving only tissues that are capable of regenerating.    DIABETES AND YOUR FEET  Diabetes can result in several problems in the feet including ulcers (open sores) and amputations. Two of the most important reasons why people develop foot problems when they have diabetes is : 1. Neuropathy (loss of feeling)  2. Vascular disease (loss or decrease of blood flow).    Neuropathy is a term used to describe a loss of nerve function.  Patients with diabetes are at risk of developing neuropathy if their sugars continue to run high and are above the normal value. One theory for neuropathy is that the \"extra\" sugar in the body enters the nerves and is broken down. These by-products build up in the nerve causing it to swell and impairing nerve function. Often times, this can be prevented by controlling your sugars, dieting and exercise.    When a person develops " neuropathy, they usually begin to feel numbness or tingling in their feet and sometime in their legs.  Other symptoms may include painful burning or hot feet, tingling or feeling like insects or ants are crawling on your feet or legs.  If the diabetes is sever and the sugars run high for long periods of time, neuropathy can also occur in the hands.    Vascular disease  is a term used to describe a loss or decrease in circulation (blood flow). There is a problem in getting blood and oxygen to areas that need it. Similar to neuropathy, sugars can build up in the walls of the arteries (blood vessels) and cause them to become swollen, thickened and hardened. This decreases the amount of blood that can go to an area that needs it. Though this is common in the legs of diabetic patients, it can also affect other arteries (blood vessels) in the body such as in the heart and eyes.    In the legs, vascular disease usually results in cramping. Patients who develop leg cramps after walking the same distance every time (i.e. One block, half a mile, ect.) need to let their doctors know so that their circulation may be checked. Cramps causing severe pain in the feet and/or legs while sleeping and the cramps go away when you stand or hang your legs off the side of the bed, may also be a sign of poor blood circulation.  Occasional cramping in cold weather or on rare occasions with activity may not be due to poor circulation, but you should inform your doctor.    PREVENTION OF THESE DISEASES  The key to prevention is good blood sugar control. Poor blood sugar control is a big reason many of these problems start. Physical activity (exercise) is a very good way to help decrease your blood sugars. Exercise can lower your blood sugar, blood pressure, and cholesterol. It also reduces your risk for heart disease and stroke, relieves stress, and strengthens your heart, muscles and bones.  In addition, regular activity helps insulin work  "better, improves your blood circulation, and keeps your joints flexible. If you're trying to lose weight, a combination of exercise and wise food choices can help you reach your target weight and maintain it.      PAIN MANAGEMENT (**Please speak with your primary doctor about any medications**)  1.Blood Sugar Control - Most important  2. Medications such as:  Amytriptylline, duloxetine, gabapentin, lyrica, tramadol (talk with your primary care doctor about this).     NUTRITION:  Nutrition is also important to help with healing. If your body does not have what it needs, it can't heal.   Increasing your protein intake is important.  With wounds you need 60-90gm of protein a day to help with healing. Over the counter protein shakes such as Dennys, Glucerna, Ensure, ect... can help to supplement your daily protein intake.   It is also important to take Vitamins to help with healing.  Vitamins such as B12, B6 and Vitamin D3 are important for healing. These can be gotten over the counter at pharmacies or at stores like [employer] or the [employer].    I can also prescribe a dietary supplement called \"Rheumate\" that has a lot of essential vitamins in one capsule.  This may not be covered by insurance though.     FOOT CARE RECOMMENDATIONS   1. Wash your feet with lukewarm water and a mild soap and then dry them thoroughly, especially between the toes.     2. Examine your feet daily looking for cuts, corns, blisters, cracks, ect, especially after wearing new shoes. Make sure to look between your toes. If you cannot see the bottom of your feet, set a mirror on the floor and hold your foot over it, or ask a spouse, friend or family member to examine your feet for you. Contact your doctor immediately if new problems are noted or if sores are not healing.     3. Immediately apply moisturizer to the tops and bottoms of your feet, avoiding areas between the toes. Hand lotion (Intesive Care, Yanely, Eucerin, Neutrogena, Curel, ect) is " sufficient unless your doctor prescribes a medicated lotion. Apply sunscreen to your feet when going swimming outside.     4. Use clean comfortable shoes, wear white socks (if you have any bleeding or drainage, you will see it on white socks). Socks should not have thick seams or cut off the circulation around the leg. Break in new shoes slowly and rotate with older shoes until broken in. Check the inside of your shoes with your hand to look for areas of irritation or objects that may have fallen into your shoes.       5. Keep slippers by the side of your bed for use during the night.     6.  Shoes should be fitted by a professional and should not cause areas of irritation.  Check your feet regularly when wearing a new pair of shoes and replace them as needed.     7.  Talk to your doctor about proper exercise. Exercise and stretching stimulate blood flow to your feet and maintain proper glucose levels.     8.  Monitor your blood glucose level as instructed by your doctor. Notify your doctor immediately if your blood sugar is abnormally high or low.    9. Cut your nails straight across, but then gently round any sharp edges with a cardboard nail file. If you have neuropathy, peripheral vascular disease or cannot see that well to trim your own toenails contact [employer] ([phone]) or [employer] ([phone]).      THINGS TO AVOID DOING   1.  Do not soak your feet if you have an open sore. Use only lukewarm water and always check the temperature with your hand as hot water can easily burn your feet.       2.  Never use a hot water bottle or heating pad on your feet. Also do not apply cold compresses to your feet. With decreased sensation, you could burn or freeze your feet.       3.  Do not apply any of these to your feet:    -  Over the counter medicine for corns or warts    -  Harsh chemicals like boric acid    -  Do not self-treat corns, cuts, blisters or infections. Always consult your doctor.       4.  Do  not wear sandals, slippers or walk barefoot, especially on hot sand or concrete or other harsh surfaces.     5.  If you smoke, stop!!!        CALLUS / CORNS / IPKs  When there is excessive friction or pressure on the skin, the body responds by making the skin thicker to protect the deeper structures from becoming exposed. While this works well to protect the deeper structures, the thickened skin can increase pressure and pain.    CALLUS: Flat, diffuse thickening are simple calluses and they are usually caused by friction. Often these are the result of rubbing on a shoe or going barefoot.    CORNS: Calluses with a central core between the toes are called corns. These result from prominent joints on adjacent toes rubbing together. Theses are a symptom of bone malalignment and will always recur unless the underlying bones are addressed surgically.    IPKs: Calluses with a central core on the ball of the foot are usually IPKs (intractable plantar keratosis). These are caused by excessive pressure from the metatarsals, the bones that make up the ball of the foot. Often one of these bones is too long or too prominent.  Again, these will always recur unless the underlying bone issue is addressed. There is no cure for these. They will either go away by themselves, recur, or more could develop.    ROUTINE MAINTENANCE  1. File them down with a pumice stone or callus file a couple times a week.   2. An electric callus removing device. Amope Pedi Perfect Electronic Pedicure Foot File and Callus Remover can be a good option.   3. Lotion can be applied to soften the callus. A urea based cream such as Kersal or Vanicream or thicker cream with shea butter are good options.  4. Toe spacers or toe covers can be used for corns, gel pads can be used for other lesions on the bottom of the foot.   If there is a surgical pathology noted, such as a prominent bone, often this needs to be addressed surgically to minimize recurrence. However,  sometimes the lesion simply migrates to another spot after surgery, so it is not a guaranteed cure.     **If you come back to clinic for treatment, insurance does not cover it, and you would be billed. This charge could range from $100 - $227**

## 2022-04-28 ENCOUNTER — PATIENT OUTREACH (OUTPATIENT)
Dept: NURSING | Facility: CLINIC | Age: 52
End: 2022-04-28
Payer: COMMERCIAL

## 2022-04-28 NOTE — PROGRESS NOTES
Clinic Care Coordination Contact    Follow Up Progress Note      Assessment: follow up on med compliance  Chart review completed today. CCRN spoke with patient via phone with assist from professional Jamari - ID# [id number]    1) Med compliance   - Confirmed with patient today her daughter continues to set up her meds weekly and she has no concerns.   - Instructed patient to notify the clinic or PCP if her daughter not able to set up her meds.   - Patient will continue to work with MTM on DM II and med compliance.   - no further assist needed from [facility] re: med teaching.     2) Podiatry  - Seen by Dr Hearn on 4/26/2022 for right foot pain. Right foot ulcer was debrided on 4/26/2022. Per patient, she noticed less pain since it was debrided and she needs to wear a boot/shoe otherwise she notices more pain. Per patient, she was told they will give her a diabetic shoes and she will pick it up with her next follow up appt.   - follow-up appt scheduled on 5/24/2022 with Dr Hearn.         Care Gaps:    Health Maintenance Due   Topic Date Due     PREVENTIVE CARE VISIT  Never done     ADVANCE CARE PLANNING  Never done     MAMMO SCREENING  Never done     Pneumococcal Vaccine: Pediatrics (0 to 5 Years) and At-Risk Patients (6 to 64 Years) (1 - PCV) Never done     COLORECTAL CANCER SCREENING  Never done     HEPATITIS B IMMUNIZATION (1 of 3 - Risk 3-dose series) Never done     DTAP/TDAP/TD IMMUNIZATION (1 - Tdap) Never done     ZOSTER IMMUNIZATION (1 of 2) Never done     INFLUENZA VACCINE (1) Never done       6/2/2022 with PCP    Goals addressed this encounter:    Goals Addressed                    This Visit's Progress       1. Preventative care (pt-stated)   50%        Goal Statement: I want to attend an Annual Physical exam in the next 90 days so that I may address my current health maintenance care gaps with my PCP.     Date goal set: 3/17/2022  Barriers: language barrier  Strengths: motivated to attend PCP appt  Date to  Achieve By: 6/17/22  Patient expressed understanding of goal: Yes    Action steps to achieve this goal:  1. I will answer my phone when I am contacted to schedule my Annual Physical.  2. I will attend my Annual Physical appointment at [facility] on 6/2/2022 at 11:40am.   3. I will schedule a follow up appointment with my PCP if it is recommended to do so while I am at the clinic.  4. I will follow up with [facility] regarding this goal at each outreach until it is completed.                COMPLETED: Med set up (pt-stated)         Goal Statement: I want my adult daughter to learn how to set up my medications correctly by working with the [facility] RN over the next 90 days.    Date goal set: 3/1/2022  Barriers: language barrier and lack of knowledge  Strengths: Agrees to work on goal  Date to Achieve By: 6/1/2022  Patient expressed understanding of goal: Yes    Action steps to achieve this goal  1. My adult daughter will meet with the [facility] RN in the clinic on 3/17/2022 at 3:00pm. ( completed)'  2. My daughter will attend in person MEV with CC RN on 3/29/2022 at 12pm.   3. I will take my medications as prescribed and as they are set up by my daughter.  4. I/daughter/son will call the CCC RN with concerns or questions.                  Outreach Frequency: 6 weeks    Plan:   1) CHW to make sure transportation set up for her follow-up appt with podiatrist on 5/24/2022. Arrival time at 10:30am.

## 2022-05-04 ENCOUNTER — OFFICE VISIT (OUTPATIENT)
Dept: FAMILY MEDICINE | Facility: CLINIC | Age: 52
End: 2022-05-04
Payer: COMMERCIAL

## 2022-05-04 VITALS
HEIGHT: 62 IN | HEART RATE: 84 BPM | OXYGEN SATURATION: 94 % | TEMPERATURE: 98.1 F | BODY MASS INDEX: 20.06 KG/M2 | DIASTOLIC BLOOD PRESSURE: 68 MMHG | SYSTOLIC BLOOD PRESSURE: 116 MMHG | WEIGHT: 109 LBS

## 2022-05-04 DIAGNOSIS — E11.69 TYPE 2 DIABETES MELLITUS WITH OTHER SPECIFIED COMPLICATION, UNSPECIFIED WHETHER LONG TERM INSULIN USE (H): ICD-10-CM

## 2022-05-04 DIAGNOSIS — R15.2 INCONTINENCE OF FECES WITH FECAL URGENCY: ICD-10-CM

## 2022-05-04 DIAGNOSIS — F51.01 PRIMARY INSOMNIA: ICD-10-CM

## 2022-05-04 DIAGNOSIS — Z74.09 IMPAIRED MOBILITY AND ADLS: ICD-10-CM

## 2022-05-04 DIAGNOSIS — N39.490 OVERFLOW INCONTINENCE: ICD-10-CM

## 2022-05-04 DIAGNOSIS — E11.42 DIABETIC POLYNEUROPATHY ASSOCIATED WITH TYPE 2 DIABETES MELLITUS (H): Primary | ICD-10-CM

## 2022-05-04 DIAGNOSIS — Z78.9 IMPAIRED MOBILITY AND ADLS: ICD-10-CM

## 2022-05-04 DIAGNOSIS — R39.15 URINARY URGENCY: ICD-10-CM

## 2022-05-04 DIAGNOSIS — M62.81 GENERALIZED MUSCLE WEAKNESS: ICD-10-CM

## 2022-05-04 DIAGNOSIS — I10 HYPERTENSION GOAL BP (BLOOD PRESSURE) < 140/90: ICD-10-CM

## 2022-05-04 DIAGNOSIS — R15.9 INCONTINENCE OF FECES WITH FECAL URGENCY: ICD-10-CM

## 2022-05-04 PROCEDURE — 99214 OFFICE O/P EST MOD 30 MIN: CPT | Performed by: FAMILY MEDICINE

## 2022-05-04 RX ORDER — AMLODIPINE BESYLATE 5 MG/1
5 TABLET ORAL DAILY
Qty: 90 TABLET | Refills: 3 | Status: SHIPPED | OUTPATIENT
Start: 2022-05-04 | End: 2022-07-27

## 2022-05-04 RX ORDER — MIRTAZAPINE 7.5 MG/1
7.5 TABLET, FILM COATED ORAL AT BEDTIME
Qty: 30 TABLET | Refills: 0 | Status: SHIPPED | OUTPATIENT
Start: 2022-05-04 | End: 2022-05-16

## 2022-05-04 NOTE — PROGRESS NOTES
ASSESSMENT/PLAN:   Barriga was seen today for diabetes mellitus and edema.    Diagnoses and all orders for this visit:    Diabetic polyneuropathy associated with type 2 diabetes mellitus (H)  Generalized muscle weakness  Impaired mobility and ADLs  -     Walker Order for DME - ONLY FOR DME  -     Incontinence Supplies Order for DME - ONLY FOR DME    Edema  Hypertension goal BP (blood pressure) < 140/90  Bp on lower end and having intermittent edema likely from amlodipine. Will decrease dose and follow up in 1 month. If still having swelling, consider stopping and changing to metoprolol? She is already on hydrochlorothiazide and lisinopril maximum doses.   -     amLODIPine (NORVASC) 5 MG tablet; Take 1 tablet (5 mg) by mouth daily    Type 2 diabetes mellitus with other specified complication, unspecified whether long term insulin use (H)  Continue trulicity, increase lantus to 70u. Seeing MTM in 1-2 weeks to follow up.   -     insulin glargine (LANTUS PEN) 100 UNIT/ML pen; Inject 70 Units Subcutaneous every morning Inject 60 units under the skin daily or as directed    Overflow incontinence  Urinary urgency  Incontinence of feces with fecal urgency  Recommend incontinence supplies. Patient unable to make it to the bathroom every time due to urgency.   -     Incontinence Supplies Order for DME - ONLY FOR DME    Primary insomnia  Ok to increase if patient needs to. If no effect on sleep, can consider amitriptyline but she is also already on lexapro.   -     mirtazapine (REMERON) 7.5 MG tablet; Take 1 tablet (7.5 mg) by mouth At Bedtime          Return in about 1 week (around 5/11/2022) for diabetes follow up, With MTM pharmacist.       ======================================================    SUBJECTIVE  Barriga Byron is a 52 year old female here for follow up.     Diabetes.   Has cgm and doing well with it. Her glucose never goes below 170 (one time was 147). Most of the time glucose is in high 200s to high 300s. She is on  "trulicity 1.8mg, lantus 65u.     She feels her appetite is decreased, but she has gained over 5lb in the past few months. She also struggles to sleep and then feels tired the next day and needs to nap    She has urinary urgency - often does not make it to the bathroom on time. This happens with bowel movements as well. The urge comes on suddenly and she cannot control it every time. Her leg pain and foot pain make it difficult to move fast enough to get to the bathroom.     ROS  Complete 10 point review of systems negative except as noted above in HPI      OBJECTIVE  /68 (BP Location: Left arm, Patient Position: Sitting, Cuff Size: Adult Regular)   Pulse 84   Temp 98.1  F (36.7  C) (Oral)   Ht 1.575 m (5' 2\")   Wt 49.4 kg (109 lb)   SpO2 94%   BMI 19.94 kg/m       General: Cooperative, pleasant, in no acute distress  CV: RRR, normal S1/S2, no murmur, rubs, gallops  Resp: No respiratory distress. Clear to auscultation bilaterally. No wheezes, rales, rhonchi  Abd: Nontender, nondistended, bowel sounds present  Ext: radial/pedal pulses +2 bilaterally. Right foot in soft boot.   MSK: Normal muscle tone  Neuro: CN II-XII intact    Skin: warm, well perfused. No rashes  Psych: No suicidal or homicidal ideations, no self-harm.  Normal affect.    LABS & IMAGES   No results found for any visits on 05/04/22.      ======================================================    Visit was completed along with [place] , no [facility]  was available in person or on phone    Options for treatment and follow-up care were reviewed with the patient. Barriga Byron and/or guardian was engaged and actively involved in the decision making process. Barriga Byron and/or guardian verbalized understanding of the options discussed and was satisfied with the final plan.      Giancarlo Arizmendi MD        "

## 2022-05-12 ENCOUNTER — OFFICE VISIT (OUTPATIENT)
Dept: PHARMACY | Facility: CLINIC | Age: 52
End: 2022-05-12
Payer: COMMERCIAL

## 2022-05-12 VITALS
DIASTOLIC BLOOD PRESSURE: 68 MMHG | SYSTOLIC BLOOD PRESSURE: 108 MMHG | HEART RATE: 83 BPM | OXYGEN SATURATION: 96 % | BODY MASS INDEX: 20.12 KG/M2 | WEIGHT: 110 LBS

## 2022-05-12 DIAGNOSIS — Z78.9 TAKES DIETARY SUPPLEMENTS: ICD-10-CM

## 2022-05-12 DIAGNOSIS — E61.1 LOW IRON: ICD-10-CM

## 2022-05-12 DIAGNOSIS — E11.69 TYPE 2 DIABETES MELLITUS WITH OTHER SPECIFIED COMPLICATION, UNSPECIFIED WHETHER LONG TERM INSULIN USE (H): Primary | ICD-10-CM

## 2022-05-12 DIAGNOSIS — I10 HYPERTENSION GOAL BP (BLOOD PRESSURE) < 140/90: ICD-10-CM

## 2022-05-12 DIAGNOSIS — K21.00 GASTROESOPHAGEAL REFLUX DISEASE WITH ESOPHAGITIS WITHOUT HEMORRHAGE: ICD-10-CM

## 2022-05-12 DIAGNOSIS — E78.5 HYPERLIPIDEMIA LDL GOAL <100: ICD-10-CM

## 2022-05-12 DIAGNOSIS — F32.A DEPRESSION, UNSPECIFIED DEPRESSION TYPE: ICD-10-CM

## 2022-05-12 PROCEDURE — 99606 MTMS BY PHARM EST 15 MIN: CPT | Performed by: PHARMACIST

## 2022-05-12 PROCEDURE — 99605 MTMS BY PHARM NP 15 MIN: CPT | Performed by: PHARMACIST

## 2022-05-12 NOTE — PROGRESS NOTES
Medication Therapy Management (MTM) Encounter    ASSESSMENT:                            Medication Adherence/Access: No issues identified  - Called [facility] to inquire about mirtazapine, filled 5/4 & delivered to patient on 5/5.     1. Type 2 diabetes mellitus with other specified complication, unspecified whether long term insulin use (H)  A1c elevated, not meeting goal of <7%.  Per new results from CGM, patient's blood sugars are persistently elevated with no particular pattern throughout the day.  Patient's current basal insulin dose is much higher than her daily bolus insulin, therefore recommend dose increase of her mealtime insulin today.  Patient's kidney function is too low to initiate metformin.  Recommend continuing with basal/bolus insulin and GLP-1 agonist.  Had large discussion with patient today about diet and the importance of decreasing carbohydrates in her diet while increasing proteins and vegetables.    2. Hypertension goal BP (blood pressure) < 140/90  BP well controlled to goal of <140/90.  Patient continues to experience dizziness, could consider dose decrease or dose of antihypertensives in the future.  If dizziness continues would recommend discontinuing amlodipine at next visit.    3. Hyperlipidemia LDL goal <100  Last monitored, triglycerides were significantly elevated and therefore fenofibrate was reinitiated.  Patient appropriately taking high intensity statin and fish oil.  Triglycerides will also likely improve as A1c improves.    4. Low iron  Continue current ferrous sulfate.    5. Gastroesophageal reflux disease with esophagitis without hemorrhage  Stable.    6. Takes dietary supplements  Stable.    7. Depression, unspecified depression type  Recommend patient initiate mirtazapine as directed by PCP at recent visit.  This will likely help with her insomnia/pain/depression.    PLAN:                            1.  Initiate mirtazapine 7.5 mg daily at bedtime for insmonia   2.   Patient to decrease carbohydrates and increase protein/vegetable in her diet for better blood sugar control  3.  Increase NovoLog dose: 10 units 3 times daily before meals     Futrue:  - Recheck Vitamin D  - Consider dose adjustment of antihypertensive if still experiencing dizziness    Follow-up: Return in about 7 weeks (around 6/30/2022) for with me.    SUBJECTIVE/OBJECTIVE:                          Nithya Matthews is a 52 year old female coming in for a follow up visit. She was referred to me from Giancarlo Arizmendi MD. Patient was accompanied by daughter. Patient seen with [facility] . Follow up from 4/14/22. ID#[id number].    Reason for visit: Referral from PCP; Diabetes management.    Allergies/ADRs: Reviewed in chart  Past Medical History: Reviewed in chart  Tobacco: She reports that she has never smoked. She has never used smokeless tobacco.  Alcohol: None    Medication Adherence/Access: No concerns. Daughter organizes medication in pillbox. Reports no missed medication doses.   - Brings with her meter and medications today (in pillbox and bottles).    Type 2 Diabetes:    Lantus 70 units daily AM (dose increased per PCP on 5/4)  Novolog 7 units three times a day with meals   Victoza 1.8 mg daily AM. Endorsing sometimes having stomach upset, not all the time. Reports appetite has been good.   Patients daughter has been injecting all injectables for patient.  Denies any missed doses of insulin or GLP-1 recently.     Blood sugar monitoring: Now using CGM Freestyle Claribel since 4/20/22.  She is happy with this meter, glad she is not poking her fingers. Showed patient today her Claribel History results and explained that her blood sugars are consistently elevated.       Symptoms of low blood sugar? Yes, feels shaking, hungry and weak - at 80-90. Not sure when this last happened.  Symptoms of high blood sugar? vision changes, polydipsia and nausea  Eye exam: due - sending referral   Foot exam: up to date  Diet/Exercise: Eats 3  meals per day, eats at 10 am, 1 pm, 5-6 pm.  -- Patient thinks that maybe her blood sugar are high because of what she eats, but states that she is not entirely sure.   -- States that when she has mangoes her blood sugar increases. Also states that she thinks maybe the meat that she eats is increasing her sugars.   -- States that she is likes vegetables.  Aspirin: Taking 81mg daily and has the following issues: nose bleeds 2-3 times per week that stop quickly.  Statin: Yes: atrovastatin   ACEi/ARB: Yes: lisinopril.    Lab Results   Component Value Date    A1C 14.4 02/28/2022    A1C 14.5 12/06/2021     Creatinine   Date Value Ref Range Status   02/28/2022 1.93 (H) 0.60 - 1.10 mg/dL Final      Latest Reference Range & Units 02/28/22 13:46   Microalbumin Urine mg/dL 0.00 - 1.99 mg/dL 230.52 (H)     Hypertension:   lisinopril 40 mg daily PM  hydrochlorothiazide 25 mg daily AM  amlodipine 5 mg daily PM (dose decreased per PCP on 5/4 due to well controlled BP, and dizziness) - Currently taking amlodipine 10 mg - 1/2 tablet daily PM  metoprolol succinate 200 mg daily AM  Patient does not self-monitor blood pressure.    - Reports that the dizziness is the same as before. States that she cannot move her head too much or she feels dizzy. Dizziness frequency? All the time, every day, even when laying down or standing. States that some days are better than others.   - Denies ankle swelling since last visit, now this is better since decreasing amlodipine dose.  BP Readings from Last 3 Encounters:   05/12/22 108/68   05/04/22 116/68   04/26/22 122/72     Hyperlipidemia:   atorvastatin 80mg daily PM    Fish Oil 1,000 mg daily AM  Fenofibrate 54 mg daily AM  Patient reports no significant myalgias or other side effects. Reports that her pain is primarily in her back which radiates to both legs. Also points at her hip today. Reports that she has asked for a wheelchair, has not yet heard back on this.   The ASCVD Risk score (Wilmer DC   et al., 2013) failed to calculate for the following reasons:    The valid HDL cholesterol range is 20 to 100 mg/dL   Recent Labs   Lab Test 04/14/22  0808 12/08/21  0607 12/06/21  1050   CHOL 260* 150 194   HDL  --  18* 28*   LDL  --   --  40   TRIG 1,283* 656* 1,018*     Anemia:   ferrous sulfate 325 mg - 1 tablet every other day.   Reports she is still feeling fatigued with activity. She wants to sit down after walking due to pain.   Hemoglobin   Date Value Ref Range Status   04/14/2022 11.2 (L) 11.7 - 15.7 g/dL Final     Pain:   gabapentin 600 mg twice a day   Acetaminophen 500 mg as needed for pain (taking 1 pill at a time only for severe pain, only taking occasionally, last dose 2 days ago)  States that her pain is getting better. Since last visit, the podiatrist shaved down the area of her foot and she wear a boot today, likely provided by podiatry.     Supplements:    vitamin D2 5000 units weekly (does not have any ore of this)  vitmain B12 1000 mcg daily AM  Vitamin D Deficiency Screening Results:  Lab Results   Component Value Date    VITDT 17 (L) 03/17/2022     Depression/Insomnia:   Escitalopram 10 mg daily AM.   Prescribed Mirtazapine 7.5 mg daily at bedtime - Presents WITHOUT this today, not yet started, possibly just not delivered from pharmacy yet.   - Patient states that her sleep has been troublesome still, states that she cannot sleep well. States that since seeing podiatry she no longer has pain in her foot that affects her sleep but she is still struggling with sleep.  PHQ 12/13/2021   PHQ-9 Total Score 8   Q9: Thoughts of better off dead/self-harm past 2 weeks Not at all     GERD:   omeprazole 20 mg daily.   - Reports she does not have heartburn symptoms anymore, no concerns today.    Today's Vitals: /68   Pulse 83   Wt 110 lb (49.9 kg)   SpO2 96%   BMI 20.12 kg/m    ----------------  I spent 60 minutes with this patient today. All changes were made via collaborative practice  agreement with Giancarlo Arizmendi MD. A copy of the visit note was provided to the patient's provider(s).    The patient was given a summary of these recommendations.     Shital Hernandez, PharmD, BCACP  Medication Therapy Management Pharmacist     Medication Therapy Recommendations  Type 2 diabetes mellitus with other specified complication, unspecified whether long term insulin use (H)    Current Medication: insulin aspart (NOVOLOG PEN) 100 UNIT/ML pen   Rationale: Dose too low - Dosage too low - Effectiveness   Recommendation: Increase Dose   Status: Accepted per CPA

## 2022-05-16 DIAGNOSIS — F51.01 PRIMARY INSOMNIA: ICD-10-CM

## 2022-05-16 DIAGNOSIS — Z76.0 ENCOUNTER FOR MEDICATION REFILL: Primary | ICD-10-CM

## 2022-05-16 RX ORDER — MIRTAZAPINE 7.5 MG/1
7.5 TABLET, FILM COATED ORAL AT BEDTIME
Qty: 30 TABLET | Refills: 0 | Status: SHIPPED | OUTPATIENT
Start: 2022-05-16 | End: 2022-06-02

## 2022-05-24 ENCOUNTER — OFFICE VISIT (OUTPATIENT)
Dept: PODIATRY | Facility: CLINIC | Age: 52
End: 2022-05-24
Payer: COMMERCIAL

## 2022-05-24 VITALS — BODY MASS INDEX: 19.94 KG/M2 | WEIGHT: 109 LBS | SYSTOLIC BLOOD PRESSURE: 112 MMHG | DIASTOLIC BLOOD PRESSURE: 70 MMHG

## 2022-05-24 DIAGNOSIS — G57.93 NEUROPATHIC PAIN OF BOTH FEET: ICD-10-CM

## 2022-05-24 DIAGNOSIS — E11.42 DIABETIC POLYNEUROPATHY ASSOCIATED WITH TYPE 2 DIABETES MELLITUS (H): Primary | ICD-10-CM

## 2022-05-24 DIAGNOSIS — M20.11 HAV (HALLUX ABDUCTO VALGUS), RIGHT: ICD-10-CM

## 2022-05-24 DIAGNOSIS — M20.12 HAV (HALLUX ABDUCTO VALGUS), LEFT: ICD-10-CM

## 2022-05-24 DIAGNOSIS — L84 PRE-ULCERATIVE CALLUSES: ICD-10-CM

## 2022-05-24 PROCEDURE — 99213 OFFICE O/P EST LOW 20 MIN: CPT | Performed by: PODIATRIST

## 2022-05-24 RX ORDER — UREA 200 MG/G
CREAM TOPICAL DAILY
Qty: 85 G | Refills: 4 | Status: SHIPPED | OUTPATIENT
Start: 2022-05-24 | End: 2022-07-27

## 2022-05-24 NOTE — PATIENT INSTRUCTIONS
Thank you for choosing [facility] / Foot & Ankle Surgery!    DR HIRSCH'S CLINIC:  [facility]   [address]   [place] [address]      TRIAGE LINE: [phone]  APPOINTMENTS: [phone]  RADIOLOGY: [phone]  SET UP SURGERY: [phone]  FAX NUMBER: [phone]  BILLING QUESTIONS: [phone]       Follow up: 6-8 weeks.    Can stop using the Silvadene cream as the wound is healed.  Recommend using the urea lotion.  This was ordered to your pharmacy.  Apply this to your feet daily.  Also use a pumice stone to the callused area every time you take a bath or shower to help keep it from building up.    Call to schedule an appointment for diabetic shoes and inserts at one of the numbers below.    [facility]  [facility]  [address]  [place] [address]  Phone: [phone]  Fax: [phone] [facility]  [address]  [place] [address]  Phone: [phone]   Fax: [phone]   [facility]  [address]  [place] [address]  Phone: [phone]  Fax: [phone] [facility]  [address]  [place] [address]  Phone: [phone]   Fax: [phone]   [facility]   [address]  [place] [address]  Phone: [phone]  Fax: [phone] * Please call any location listed to make an appointment for a casting/fitting. Your referral was sent to their central office and they will all have the order on file.         CALLUS / CORNS / IPKs  When there is excessive friction or pressure on the skin, the body responds by making the skin thicker to protect the deeper structures from becoming exposed. While this works well to protect the deeper structures, the thickened skin can increase pressure and pain.    CALLUS: Flat, diffuse thickening are simple calluses and they are usually caused by  friction. Often these are the result of rubbing on a shoe or going barefoot.    CORNS: Calluses with a central core between the toes are called corns. These result from prominent joints on adjacent toes rubbing together. Theses are a symptom of bone malalignment and will always recur unless the underlying bones are addressed surgically.    IPKs: Calluses with a central core on the ball of the foot are usually IPKs (intractable plantar keratosis). These are caused by excessive pressure from the metatarsals, the bones that make up the ball of the foot. Often one of these bones is too long or too prominent.  Again, these will always recur unless the underlying bone issue is addressed. There is no cure for these. They will either go away by themselves, recur, or more could develop.    ROUTINE MAINTENANCE  1. File them down with a pumice stone or callus file a couple times a week.   2. An electric callus removing device. Amope Pedi Perfect Electronic Pedicure Foot File and Callus Remover can be a good option.   3. Lotion can be applied to soften the callus. A urea based cream such as Kersal or Vanicream or thicker cream with shea butter are good options.  4. Toe spacers or toe covers can be used for corns, gel pads can be used for other lesions on the bottom of the foot.   If there is a surgical pathology noted, such as a prominent bone, often this needs to be addressed surgically to minimize recurrence. However, sometimes the lesion simply migrates to another spot after surgery, so it is not a guaranteed cure.     **If you come back to clinic for treatment, insurance does not cover it, and you would be billed. This charge could range from $100 - $227**    DIABETES AND YOUR FEET  Diabetes can result in several problems in the feet including ulcers (open sores) and amputations. Two of the most important reasons why people develop foot problems when they have diabetes is : 1. Neuropathy (loss of feeling)  2. Vascular  "disease (loss or decrease of blood flow).    Neuropathy is a term used to describe a loss of nerve function.  Patients with diabetes are at risk of developing neuropathy if their sugars continue to run high and are above the normal value. One theory for neuropathy is that the \"extra\" sugar in the body enters the nerves and is broken down. These by-products build up in the nerve causing it to swell and impairing nerve function. Often times, this can be prevented by controlling your sugars, dieting and exercise.    When a person develops neuropathy, they usually begin to feel numbness or tingling in their feet and sometime in their legs.  Other symptoms may include painful burning or hot feet, tingling or feeling like insects or ants are crawling on your feet or legs.  If the diabetes is sever and the sugars run high for long periods of time, neuropathy can also occur in the hands.    Vascular disease  is a term used to describe a loss or decrease in circulation (blood flow). There is a problem in getting blood and oxygen to areas that need it. Similar to neuropathy, sugars can build up in the walls of the arteries (blood vessels) and cause them to become swollen, thickened and hardened. This decreases the amount of blood that can go to an area that needs it. Though this is common in the legs of diabetic patients, it can also affect other arteries (blood vessels) in the body such as in the heart and eyes.    In the legs, vascular disease usually results in cramping. Patients who develop leg cramps after walking the same distance every time (i.e. One block, half a mile, ect.) need to let their doctors know so that their circulation may be checked. Cramps causing severe pain in the feet and/or legs while sleeping and the cramps go away when you stand or hang your legs off the side of the bed, may also be a sign of poor blood circulation.  Occasional cramping in cold weather or on rare occasions with activity may not be " "due to poor circulation, but you should inform your doctor.    PREVENTION OF THESE DISEASES  The key to prevention is good blood sugar control. Poor blood sugar control is a big reason many of these problems start. Physical activity (exercise) is a very good way to help decrease your blood sugars. Exercise can lower your blood sugar, blood pressure, and cholesterol. It also reduces your risk for heart disease and stroke, relieves stress, and strengthens your heart, muscles and bones.  In addition, regular activity helps insulin work better, improves your blood circulation, and keeps your joints flexible. If you're trying to lose weight, a combination of exercise and wise food choices can help you reach your target weight and maintain it.      PAIN MANAGEMENT (**Please speak with your primary doctor about any medications**)  1.Blood Sugar Control - Most important  2. Medications such as:  Amytriptylline, duloxetine, gabapentin, lyrica, tramadol (talk with your primary care doctor about this).     NUTRITION:  Nutrition is also important to help with healing. If your body does not have what it needs, it can't heal.   Increasing your protein intake is important.  With wounds you need 60-90gm of protein a day to help with healing. Over the counter protein shakes such as Dennys, Glucerna, Ensure, ect... can help to supplement your daily protein intake.   It is also important to take Vitamins to help with healing.  Vitamins such as B12, B6 and Vitamin D3 are important for healing. These can be gotten over the counter at pharmacies or at stores like [employer] or the [employer].    I can also prescribe a dietary supplement called \"Rheumate\" that has a lot of essential vitamins in one capsule.  This may not be covered by insurance though.     FOOT CARE RECOMMENDATIONS   1. Wash your feet with lukewarm water and a mild soap and then dry them thoroughly, especially between the toes.     2. Examine your feet daily looking for cuts, " corns, blisters, cracks, ect, especially after wearing new shoes. Make sure to look between your toes. If you cannot see the bottom of your feet, set a mirror on the floor and hold your foot over it, or ask a spouse, friend or family member to examine your feet for you. Contact your doctor immediately if new problems are noted or if sores are not healing.     3. Immediately apply moisturizer to the tops and bottoms of your feet, avoiding areas between the toes. Hand lotion (Intesive Care, Yanely, Eucerin, Neutrogena, Curel, ect) is sufficient unless your doctor prescribes a medicated lotion. Apply sunscreen to your feet when going swimming outside.     4. Use clean comfortable shoes, wear white socks (if you have any bleeding or drainage, you will see it on white socks). Socks should not have thick seams or cut off the circulation around the leg. Break in new shoes slowly and rotate with older shoes until broken in. Check the inside of your shoes with your hand to look for areas of irritation or objects that may have fallen into your shoes.       5. Keep slippers by the side of your bed for use during the night.     6.  Shoes should be fitted by a professional and should not cause areas of irritation.  Check your feet regularly when wearing a new pair of shoes and replace them as needed.     7.  Talk to your doctor about proper exercise. Exercise and stretching stimulate blood flow to your feet and maintain proper glucose levels.     8.  Monitor your blood glucose level as instructed by your doctor. Notify your doctor immediately if your blood sugar is abnormally high or low.    9. Cut your nails straight across, but then gently round any sharp edges with a cardboard nail file. If you have neuropathy, peripheral vascular disease or cannot see that well to trim your own toenails contact [employer] ([phone]) or [employer] ([phone]).      THINGS TO AVOID DOING   1.  Do not soak your feet if you have an open  sore. Use only lukewarm water and always check the temperature with your hand as hot water can easily burn your feet.       2.  Never use a hot water bottle or heating pad on your feet. Also do not apply cold compresses to your feet. With decreased sensation, you could burn or freeze your feet.       3.  Do not apply any of these to your feet:    -  Over the counter medicine for corns or warts    -  Harsh chemicals like boric acid    -  Do not self-treat corns, cuts, blisters or infections. Always consult your doctor.       4.  Do not wear sandals, slippers or walk barefoot, especially on hot sand or concrete or other harsh surfaces.     5.  If you smoke, stop!!!

## 2022-05-24 NOTE — LETTER
5/24/2022         RE: Nithya Matthews  [address] [place] [address]        Dear Colleague,    Thank you for referring your patient, Nithya Matthews, to the [facility] POD[facility]RY. Please see a copy of my visit note below.    Podiatry / Foot and Ankle Surgery Progress Note    May 24, 2022    Subject: Patient was seen for follow-up on right foot ulcer.  They have been doing Silvadene dressing changes daily.  Patient is here with her daughter who is interpreting for her.  Denies fever, nausea, vomiting.  Notes pain is little better to the foot.  Has been wearing the offloading shoe.    Objective:  Vitals: /70   Wt 49.4 kg (109 lb)   BMI 19.94 kg/m    BMI= Body mass index is 19.94 kg/m .    A1C: 14.4 (2/2022)     General appearance: Patient is alert and fully cooperative with history & exam.  No sign of distress is noted during the visit.     Psychiatric: Affect is pleasant & appropriate.  Patient appears motivated to improve health.     Respiratory: Breathing is regular & unlabored while sitting.     HEENT: Hearing is intact to spoken word.  Speech is clear.  No gross evidence of visual impairment that would impact ambulation.     Dermatologic: Full-thickness ulceration to the plantar aspect of the right first metatarsal head is healed..  No surrounding erythema purulent drainage or malodor noted.  Preulcerative callus noted to the plantar aspects of both fifth metatarsal heads.  No open wounds are noted.     Vascular: DP & PT pulses are intact & regular bilaterally.  No significant edema or varicosities noted.  CFT and skin temperature is normal to both lower extremities.     Neurologic: Lower extremity sensation is diminished to feet.     Musculoskeletal: Patient is ambulatory without assistive device or brace.  Prominent first metatarsal heads medially both feet.        ASSESSMENT:     Diabetic polyneuropathy associated with type 2 diabetes mellitus (H)  Pre-ulcerative calluses  Neuropathic  pain of both feet  Hav (hallux abducto valgus), right  Hav (hallux abducto valgus), left     Medical Decision Making/Plan:  Reviewed patient's chart in epic.    At this time the ulceration has healed..  We will have him stop using the Silvadene cream and ordered urea cream to help soften the callus area.  Recommend using a pumice stone 3-4 times a week to try to keep the callus from building up.  Was also given an order for diabetic shoes and inserts to help as well.  We will have them follow-up in 6 to 8 weeks for reassessment.  We discussed if they develop any more callused areas to come in. All questions were answered to patient and patient's daughter satisfaction and they will call for the questions or concerns.      Patient risk factor: Patient is at high risk for infection.      Grecia Hearn DPM, Podiatry/Foot and Ankle Surgery        Again, thank you for allowing me to participate in the care of your patient.        Sincerely,        Grecia Hearn DPM, Podiatry/Foot and Ankle Surgery

## 2022-05-24 NOTE — PROGRESS NOTES
Podiatry / Foot and Ankle Surgery Progress Note    May 24, 2022    Subject: Patient was seen for follow-up on right foot ulcer.  They have been doing Silvadene dressing changes daily.  Patient is here with her daughter who is interpreting for her.  Denies fever, nausea, vomiting.  Notes pain is little better to the foot.  Has been wearing the offloading shoe.    Objective:  Vitals: /70   Wt 49.4 kg (109 lb)   BMI 19.94 kg/m    BMI= Body mass index is 19.94 kg/m .    A1C: 14.4 (2/2022)     General appearance: Patient is alert and fully cooperative with history & exam.  No sign of distress is noted during the visit.     Psychiatric: Affect is pleasant & appropriate.  Patient appears motivated to improve health.     Respiratory: Breathing is regular & unlabored while sitting.     HEENT: Hearing is intact to spoken word.  Speech is clear.  No gross evidence of visual impairment that would impact ambulation.     Dermatologic: Full-thickness ulceration to the plantar aspect of the right first metatarsal head is healed..  No surrounding erythema purulent drainage or malodor noted.  Preulcerative callus noted to the plantar aspects of both fifth metatarsal heads.  No open wounds are noted.     Vascular: DP & PT pulses are intact & regular bilaterally.  No significant edema or varicosities noted.  CFT and skin temperature is normal to both lower extremities.     Neurologic: Lower extremity sensation is diminished to feet.     Musculoskeletal: Patient is ambulatory without assistive device or brace.  Prominent first metatarsal heads medially both feet.        ASSESSMENT:     Diabetic polyneuropathy associated with type 2 diabetes mellitus (H)  Pre-ulcerative calluses  Neuropathic pain of both feet  Hav (hallux abducto valgus), right  Hav (hallux abducto valgus), left     Medical Decision Making/Plan:  Reviewed patient's chart in epic.    At this time the ulceration has healed..  We will have him stop using the  Silvadene cream and ordered urea cream to help soften the callus area.  Recommend using a pumice stone 3-4 times a week to try to keep the callus from building up.  Was also given an order for diabetic shoes and inserts to help as well.  We will have them follow-up in 6 to 8 weeks for reassessment.  We discussed if they develop any more callused areas to come in. All questions were answered to patient and patient's daughter satisfaction and they will call for the questions or concerns.      Patient risk factor: Patient is at high risk for infection.      Grecia Hearn DPM, Podiatry/Foot and Ankle Surgery

## 2022-06-01 ASSESSMENT — ENCOUNTER SYMPTOMS
SHORTNESS OF BREATH: 0
NAUSEA: 1
JOINT SWELLING: 0
PARESTHESIAS: 0
CONSTIPATION: 0
DIARRHEA: 0
HEADACHES: 1
EYE PAIN: 0
HEARTBURN: 0
HEMATOCHEZIA: 0
NERVOUS/ANXIOUS: 0
COUGH: 0
SORE THROAT: 0
ABDOMINAL PAIN: 0
FEVER: 0
DYSURIA: 0
DIZZINESS: 1
HEMATURIA: 0
FREQUENCY: 0
MYALGIAS: 1
WEAKNESS: 0
CHILLS: 0
BREAST MASS: 0
ARTHRALGIAS: 1
PALPITATIONS: 0

## 2022-06-02 ENCOUNTER — OFFICE VISIT (OUTPATIENT)
Dept: FAMILY MEDICINE | Facility: CLINIC | Age: 52
End: 2022-06-02
Payer: COMMERCIAL

## 2022-06-02 VITALS
DIASTOLIC BLOOD PRESSURE: 68 MMHG | SYSTOLIC BLOOD PRESSURE: 106 MMHG | TEMPERATURE: 98.3 F | RESPIRATION RATE: 16 BRPM | OXYGEN SATURATION: 96 % | WEIGHT: 106.56 LBS | HEART RATE: 87 BPM | BODY MASS INDEX: 19.61 KG/M2 | HEIGHT: 62 IN

## 2022-06-02 DIAGNOSIS — Z23 HIGH PRIORITY FOR 2019-NCOV VACCINE: ICD-10-CM

## 2022-06-02 DIAGNOSIS — Z00.00 ROUTINE GENERAL MEDICAL EXAMINATION AT A HEALTH CARE FACILITY: Primary | ICD-10-CM

## 2022-06-02 DIAGNOSIS — Z23 IMMUNIZATION DUE: ICD-10-CM

## 2022-06-02 DIAGNOSIS — F51.01 PRIMARY INSOMNIA: ICD-10-CM

## 2022-06-02 DIAGNOSIS — E11.69 TYPE 2 DIABETES MELLITUS WITH OTHER SPECIFIED COMPLICATION, UNSPECIFIED WHETHER LONG TERM INSULIN USE (H): ICD-10-CM

## 2022-06-02 DIAGNOSIS — Z12.11 SCREEN FOR COLON CANCER: ICD-10-CM

## 2022-06-02 DIAGNOSIS — Z76.0 ENCOUNTER FOR MEDICATION REFILL: ICD-10-CM

## 2022-06-02 DIAGNOSIS — Z12.31 VISIT FOR SCREENING MAMMOGRAM: ICD-10-CM

## 2022-06-02 LAB
ANION GAP SERPL CALCULATED.3IONS-SCNC: 15 MMOL/L (ref 5–18)
BUN SERPL-MCNC: 29 MG/DL (ref 8–22)
CALCIUM SERPL-MCNC: 8.8 MG/DL (ref 8.5–10.5)
CHLORIDE BLD-SCNC: 97 MMOL/L (ref 98–107)
CO2 SERPL-SCNC: 24 MMOL/L (ref 22–31)
CREAT SERPL-MCNC: 2.5 MG/DL (ref 0.6–1.1)
GFR SERPL CREATININE-BSD FRML MDRD: 22 ML/MIN/1.73M2
GLUCOSE BLD-MCNC: 242 MG/DL (ref 70–125)
HBA1C MFR BLD: 10.4 % (ref 0–5.6)
POTASSIUM BLD-SCNC: 3 MMOL/L (ref 3.5–5)
SODIUM SERPL-SCNC: 136 MMOL/L (ref 136–145)

## 2022-06-02 PROCEDURE — 99396 PREV VISIT EST AGE 40-64: CPT | Mod: 25 | Performed by: FAMILY MEDICINE

## 2022-06-02 PROCEDURE — 86706 HEP B SURFACE ANTIBODY: CPT | Performed by: FAMILY MEDICINE

## 2022-06-02 PROCEDURE — 83036 HEMOGLOBIN GLYCOSYLATED A1C: CPT | Performed by: FAMILY MEDICINE

## 2022-06-02 PROCEDURE — 99214 OFFICE O/P EST MOD 30 MIN: CPT | Mod: 25 | Performed by: FAMILY MEDICINE

## 2022-06-02 PROCEDURE — 91306 COVID-19,PF,MODERNA (18+ YRS BOOSTER .25ML): CPT | Performed by: FAMILY MEDICINE

## 2022-06-02 PROCEDURE — 0064A COVID-19,PF,MODERNA (18+ YRS BOOSTER .25ML): CPT | Performed by: FAMILY MEDICINE

## 2022-06-02 PROCEDURE — 80048 BASIC METABOLIC PNL TOTAL CA: CPT | Performed by: FAMILY MEDICINE

## 2022-06-02 PROCEDURE — 36415 COLL VENOUS BLD VENIPUNCTURE: CPT | Performed by: FAMILY MEDICINE

## 2022-06-02 RX ORDER — MIRTAZAPINE 7.5 MG/1
7.5 TABLET, FILM COATED ORAL AT BEDTIME
Qty: 90 TABLET | Refills: 3 | Status: SHIPPED | OUTPATIENT
Start: 2022-06-02 | End: 2022-06-27

## 2022-06-02 ASSESSMENT — ENCOUNTER SYMPTOMS
FREQUENCY: 0
COUGH: 0
HEMATOCHEZIA: 0
SHORTNESS OF BREATH: 0
CONSTIPATION: 0
CHILLS: 0
FEVER: 0
PARESTHESIAS: 0
JOINT SWELLING: 0
SORE THROAT: 0
MYALGIAS: 1
DIZZINESS: 1
HEARTBURN: 0
HEMATURIA: 0
WEAKNESS: 0
BREAST MASS: 0
ABDOMINAL PAIN: 0
ARTHRALGIAS: 1
NAUSEA: 1
DYSURIA: 0
EYE PAIN: 0
DIARRHEA: 0
HEADACHES: 1
NERVOUS/ANXIOUS: 0
PALPITATIONS: 0

## 2022-06-02 NOTE — Clinical Note
Hello hello.  I know this patient has an armhs worker and stuff, but I'm not sure who can help with what?  Patient says she has not received walker or incontinence supplies that were ordered last month.  She has an appointment to  diabetic shoes from podiatry in [place] on 6/16 at 2pm and they need a ride.  I am also referring to endocrine because her diabetes has been really difficult to control - daughter is pretty good at making appointments when they call.  She is worried about finances and she told me some weird story about someone telling her that no one is accepting applications for [employer] benefits so they cannot help her right now.   That's a lot! Let me know what you think!  Giancarlo Arizmendi MD

## 2022-06-02 NOTE — PROGRESS NOTES
SUBJECTIVE:   CC: Nithya Matthews is an 52 year old woman who presents for preventive health visit.     {Split Bill scripting  The purpose of this visit is to discuss your medical history and prevent health problems before you are sick. You may be responsible for a co-pay, coinsurance, or deductible if your visit today includes services such as checking on a sore throat, having an x-ray or lab test, or treating and evaluating a new or existing condition :  Patient has been advised of split billing requirements and indicates understanding: Yes  Healthy Habits:     Getting at least 3 servings of Calcium per day:  NO    Bi-annual eye exam:  Yes    Dental care twice a year:  NO    Sleep apnea or symptoms of sleep apnea:  None    Diet:  Regular (no restrictions)    Frequency of exercise:  None    Taking medications regularly:  No    Barriers to taking medications:  Problems remembering to take them    Medication side effects:  None    PHQ-2 Total Score: 2    Additional concerns today:  Yes    Has not received walker or incontinence supplies.     Diabetes   Notes from previous clinic reviewed. She was on less medications then, but A1c was in the 8-9.3 range. Her daily glucose now ranges mostly in upper 200-300s, occasionally in the 100s.     Patient wants to apply for benefits but unsure if she qualifies for them.   She wants to apply for SSI but unsure if she qualifies.   She is not currently a citizen, she has been in the [place] for 9 years. She has applied for citizenship but has not heard from anyone yet. She was told no one was accepting any applications for food stamps or citizenship or anything so they cannot help her.       Today's PHQ-2 Score:   PHQ-2 ( 1999 [employer]) 6/1/2022   Q1: Little interest or pleasure in doing things 0   Q2: Feeling down, depressed or hopeless 2   PHQ-2 Score 2   Q1: Little interest or pleasure in doing things Not at all   Q2: Feeling down, depressed or hopeless More than half the days   PHQ-2  Score 2       Abuse: Current or Past (Physical, Sexual or Emotional) - No  Do you feel safe in your environment? Yes    Have you ever done Advance Care Planning? (For example, a Health Directive, POLST, or a discussion with a medical provider or your loved ones about your wishes): No, advance care planning information given to patient to review.  Patient declined advance care planning discussion at this time.    Social History     Tobacco Use     Smoking status: Never Smoker     Smokeless tobacco: Never Used   Substance Use Topics     Alcohol use: Not Currently     If you drink alcohol do you typically have >3 drinks per day or >7 drinks per week? No    No flowsheet data found.    Reviewed orders with patient.  Reviewed health maintenance and updated orders accordingly - Yes  Lab work is in process    Breast Cancer Screening:    Breast CA Risk Assessment (FHS-7) 6/1/2022   Do you have a family history of breast, colon, or ovarian cancer? No / Unknown       Pertinent mammograms are reviewed under the imaging tab.    History of abnormal Pap smear: NO - age 30-65 PAP every 5 years with negative HPV co-testing recommended     Reviewed and updated as needed this visit by clinical staff   Tobacco  Allergies  Meds  Problems  Med Hx  Surg Hx  Fam Hx  Soc   Hx          Reviewed and updated as needed this visit by Provider   Tobacco  Allergies  Meds  Problems  Med Hx  Surg Hx  Fam Hx           History reviewed. No pertinent past medical history.   Past Surgical History:   Procedure Laterality Date     CATARACT IOL, RT/LT         Review of Systems   Constitutional: Negative for chills and fever.   HENT: Negative for congestion, ear pain, hearing loss and sore throat.    Eyes: Negative for pain and visual disturbance.   Respiratory: Negative for cough and shortness of breath.    Cardiovascular: Negative for chest pain, palpitations and peripheral edema.   Gastrointestinal: Positive for nausea. Negative for  "abdominal pain, constipation, diarrhea, heartburn and hematochezia.   Breasts:  Negative for tenderness, breast mass and discharge.   Genitourinary: Negative for dysuria, frequency, genital sores, hematuria, pelvic pain, urgency, vaginal bleeding and vaginal discharge.   Musculoskeletal: Positive for arthralgias and myalgias. Negative for joint swelling.   Skin: Negative for rash.   Neurological: Positive for dizziness and headaches. Negative for weakness and paresthesias.   Psychiatric/Behavioral: Negative for mood changes. The patient is not nervous/anxious.      CONSTITUTIONAL: NEGATIVE for fever, chills, change in weight  INTEGUMENTARY/SKIN: NEGATIVE for worrisome rashes, moles or lesions  EYES: NEGATIVE for vision changes or irritation  ENT: NEGATIVE for ear, mouth and throat problems  RESP: NEGATIVE for significant cough or SOB  BREAST: NEGATIVE for masses, tenderness or discharge  CV: NEGATIVE for chest pain, palpitations or peripheral edema  GI: NEGATIVE for nausea, abdominal pain, heartburn, or change in bowel habits  : NEGATIVE for unusual urinary or vaginal symptoms. No vaginal bleeding.  MUSCULOSKELETAL: NEGATIVE for significant arthralgias or myalgia  NEURO: NEGATIVE for weakness, dizziness or paresthesias  PSYCHIATRIC: NEGATIVE for changes in mood or affect      OBJECTIVE:   /68 (BP Location: Left arm, Patient Position: Sitting, Cuff Size: Adult Regular)   Pulse 87   Temp 98.3  F (36.8  C) (Oral)   Resp 16   Ht 1.575 m (5' 2\")   Wt 48.3 kg (106 lb 9 oz)   SpO2 96%   BMI 19.49 kg/m    Physical Exam  GENERAL APPEARANCE: healthy, alert and no distress  EYES: Eyes grossly normal to inspection, PERRL and conjunctivae and sclerae normal  HENT: ear canals and TM's normal, nose and mouth without ulcers or lesions, oropharynx clear and oral mucous membranes moist  NECK: no adenopathy, no asymmetry, masses, or scars and thyroid normal to palpation  RESP: lungs clear to auscultation - no rales, " rhonchi or wheezes  BREAST: normal without masses, tenderness or nipple discharge and no palpable axillary masses or adenopathy  CV: regular rate and rhythm, normal S1 S2, no S3 or S4, no murmur, click or rub, no peripheral edema and peripheral pulses strong  ABDOMEN: soft, nontender, no hepatosplenomegaly, no masses and bowel sounds normal  MS: no musculoskeletal defects are noted and gait is age appropriate without ataxia  SKIN: no suspicious lesions or rashes  NEURO: Normal strength and tone, sensory exam grossly normal, mentation intact and speech normal  PSYCH: mentation appears normal and affect normal/bright    Diagnostic Test Results:  Labs reviewed in Epic    ASSESSMENT/PLAN:   Barriga was seen today for physical and imm/inj.    Diagnoses and all orders for this visit:    Routine general medical examination at a health care facility    Immunization due  No immunization records from previous clinic, will check hep b antibodies and request immunizations.   -     Hepatitis B Surface Antibody    Hypertension  Now hypotensive and symptomatic. Will stop hydrochlorothiazide, continue metoprolol and lisinopril.     Type 2 diabetes mellitus with other specified complication, unspecified whether long term insulin use (H)  Has been really difficult to control, glucose remains in upper 200-300s. Will refer to endocrine to see if they have any ideas on what else we can do. Currently on victoza 1.8mg daily, lantus 70u daily, increased novolog to 13u tidac.   -     Hemoglobin A1c; Future  -     Basic metabolic panel  (Ca, Cl, CO2, Creat, Gluc, K, Na, BUN); Future  -     insulin glargine (LANTUS PEN) 100 UNIT/ML pen; Inject 70 Units Subcutaneous every morning  -     insulin aspart (NOVOLOG PEN) 100 UNIT/ML pen; Inject 13 Units Subcutaneous 3 times daily (with meals)  -     Hemoglobin A1c  -     Basic metabolic panel  (Ca, Cl, CO2, Creat, Gluc, K, Na, BUN)  -     Adult Endocrinology  Referral; Future    Visit for  "screening mammogram  -     *MA Screening Digital Bilateral; Future    Screen for colon cancer  -     COLOGUARD(EXACT SCIENCES)    Primary insomnia  -     mirtazapine (REMERON) 7.5 MG tablet; Take 1 tablet (7.5 mg) by mouth At Bedtime    High priority for 2019-nCoV vaccine  -     COVID-19,PF,MODERNA (18+ Yrs BOOSTER .25mL)            COUNSELING:  Reviewed preventive health counseling, as reflected in patient instructions       Regular exercise       Healthy diet/nutrition       Vision screening       Hearing screening       Advance Care Planning    Estimated body mass index is 19.49 kg/m  as calculated from the following:    Height as of this encounter: 1.575 m (5' 2\").    Weight as of this encounter: 48.3 kg (106 lb 9 oz).    Weight management plan noted, stable and monitoring    She reports that she has never smoked. She has never used smokeless tobacco.      Counseling Resources:  ATP IV Guidelines  Pooled Cohorts Equation Calculator  Breast Cancer Risk Calculator  BRCA-Related Cancer Risk Assessment: FHS-7 Tool  FRAX Risk Assessment  ICSI Preventive Guidelines  Dietary Guidelines for Americans, 2010  USDA's MyPlate  ASA Prophylaxis  Lung CA Screening    Giancarlo Arizmendi MD  [facility]  "

## 2022-06-03 LAB — HBV SURFACE AB SERPLBLD QL IA.RAPID: POSITIVE

## 2022-06-06 ENCOUNTER — PATIENT OUTREACH (OUTPATIENT)
Dept: CARE COORDINATION | Facility: CLINIC | Age: 52
End: 2022-06-06
Payer: COMMERCIAL

## 2022-06-06 NOTE — PROGRESS NOTES
Clinic Care Coordination Contact  [facility]/Voicemail    Clinical Data: Care Coordinator Outreach  Outreach attempted x 1.  Left message on patient's voicemail with call back information and requested return call.    Chart Review: Per Dr. Arizmendi's message on 6/2/22, CHW to clarify what [facility] worker can assist with. Patient has not received walker or incontinence supplies that were ordered last month. Patient has an appointment to  diabetic shoes from podiatry in [place] on 6/16 at 2PM and they need a ride.     Plan: Care Coordinator will try to reach patient again in 10 business days.    Irene Samson  Clinic Care Coordination  [facility]    Phone: [phone]

## 2022-06-13 ENCOUNTER — PATIENT OUTREACH (OUTPATIENT)
Dept: CARE COORDINATION | Facility: CLINIC | Age: 52
End: 2022-06-13

## 2022-06-13 ENCOUNTER — PATIENT OUTREACH (OUTPATIENT)
Dept: NURSING | Facility: CLINIC | Age: 52
End: 2022-06-13
Payer: COMMERCIAL

## 2022-06-13 NOTE — PROGRESS NOTES
Care Coordination Clinician Chart Review     Situation: Patient chart reviewed by care coordinator.?     Background: Initial assessment and enrollment to Care Coordination was 3/1/2022.?? Patient centered goals were developed with participation from patient.? Lead CC handed patient off to CHW for continued outreach every 30 days.??     Assessment: Per chart review, patient outreach completed by CC CHW on 6/9/2022 .? Patient is actively working to accomplish goal(s).? Patient's goal(s) remain(s) appropriate at this time.? Patient is not due for updated Plan of Care.? Annual assessment will be due 3/1/2022. Per chart review, CHW outreach to patient via phone today. See CHW notes for more information. CHW to follow up on incontinence supplies and walker. CHW to follow up on this.       Patient wants to apply for benefits but unsure if she qualifies for them.   She wants to apply for SSI but unsure if she qualifies.   She is not currently a citizen, she has been in the [place] 9 [place]. She has applied for citizenship but has not heard from anyone yet. She was told no one was accepting any applications for food stamps or citizenship or anything so they cannot help her.     Goals        1. Preventative care (pt-stated)         Goal Statement: I want to attend an Annual Physical exam in the next 90 days so that I may address my current health maintenance care gaps with my PCP.     Date goal set: 3/17/2022  Barriers: language barrier  Strengths: motivated to attend PCP appt  Date to Achieve By: 6/17/22  Patient expressed understanding of goal: Yes    Action steps to achieve this goal:  1. I will answer my phone when I am contacted to schedule my Annual Physical.  2. I will attend my Annual Physical appointment at [facility] on 6/2/2022 at 11:40am.   3. I will schedule a follow up appointment with my PCP if it is recommended to do so while I am at the clinic.  4. I will follow up with [facility] regarding this goal at each  outreach until it is completed.                Podiatry (pt-stated)       Goal Statement: I want to attend an follow up appointment with podiatrist in the next 90 days.    Date goal set: 5/04/2022  Barriers: language barrier  Strengths: motivated to attend PCP appt  Date to Achieve By: 8/04/2022  Patient expressed understanding of goal: Yes    Action steps to achieve this goal:  1. I will attend my podiatry appointment as scheduled on Tuesday 5/24/22 at 10:30am with Grecia Dove.  2. I will schedule a follow up appointment with my PCP if it is recommended to do so while I am at the clinic.  3. I will follow up with [facility] regarding this goal at each outreach until it is completed.     [facility] [phone]  [address] [address]           Queen size mattress (pt-stated)       Goal Statement: I need resources for a queen sizes mattress within the next 60 days.    Date goal set: 5/04/2022  Barriers: language and financial barriers  Strengths: accepting of resources  Date to Achieve By: 7/04/2022  Patient expressed understanding of goal: Yes    Action steps to achieve this goal:  1. I will work with my ARM on obtaining a queen size mattress.  2. I will follow up with CCC regarding this goal at each outreach until it is completed.     Note: CHW spoke with Scooter at [facility] on 5/4/22. She will notify patients ARM worker and assist with resources for mattress and delivery.           ??     Plan/Recommendations: The patient will continue working with Care Coordination to achieve above goal(s).? CHW will involve Lead CC as needed or if patient is ready to move to maintenance.? Lead CC will continue to monitor CHW s monthly outreaches and progress to goal(s) every 6 weeks.?     Plan of Care updated and sent to patient: Yes

## 2022-06-13 NOTE — LETTER
[facility]  Patient Centered Plan of Care  About Me:        Patient Name:  Nithya Matthews    YOB: 1970  Age:         52 year old   Johann MRN:    [id number] Telephone Information:  Home Phone [phone]   Mobile [phone]       Address:  [address] [place] [address] Email address:  No e-mail address on record      Emergency Contact(s)    Name Relationship Lgl Grd Work Phone Home Phone Mobile Phone   1. JESSY MATTHEWS Daughter    [phone]           Primary language:  Jamari      needed? Yes   [facility] [employer]:  [phone] op. 1  Other communication barriers:Language barrier; Physical impairment; Lack of coping    Preferred Method of Communication:     Current living arrangement: I live in a private home with family    Mobility Status/ Medical Equipment: Independent w/Device        Health Maintenance  Health Maintenance Reviewed: Due/Overdue  (with PCP on 5/4/2022)      My Access Plan  Medical Emergency 911   Primary Clinic Line [facility] [phone]   24 Hour Appointment Line [phone] or  [phone] ([phone]) (toll-free)   24 Hour Nurse Line [phone] (toll-free)   Preferred Urgent Care [facility] [phone]     [facility]  [phone]     Preferred Pharmacy [facility] - [place], [place] - [address]     Behavioral Health Crisis Line The National Suicide Prevention Lifeline at 1-246.621.6466 or 911             My Care Team Members  Patient Care Team       Relationship Specialty Notifications Start End    Giancarlo Airzmendi MD PCP - General Family Medicine  2/14/22     Referring to Eye    Phone: [phone] Fax: [phone]         [address] [facility] [address] [place] [address]    Giancarlo Arizmendi MD Assigned PCP   2/20/22     Phone: [phone] Fax: [phone]         [address] [facility] [address] [place] [address]    Janel Jhaveri RN Lead  Care Coordinator Primary Care - CC Admissions 3/1/22     Irene Samson [facility] Worker Primary Care - CC Admissions 3/1/22     Nicolle Andrew ARM worker   3/2/22     [facility]. Call Critical access hospital worker  Avani Nelson [phone] for assistance.    Phone: [phone]         Miracle Mi OD  Optometry  3/18/22     Phone: [phone] Fax: [phone]         [address] [address]    Shital Hernandez, PharmD Pharmacist Pharmacist  4/6/22     Phone: [phone]          [address] [place] [address]    Miracle Mi OD Assigned Surgical Provider   4/10/22     Phone: [phone] Fax: [phone]         [address] [place] [address]    Grecia Hearn, DPM, Podiatry/Foot and Ankle Surgery Assigned Musculoskeletal Provider   5/1/22     Phone: [phone] Pager: [phone] Fax: [phone]        [address] [address]  [facility] [address] [facility] [address]    Davis Villa MD MD Endocrinology, Diabetes, and Metabolism  6/2/22     Phone: [phone] Fax: [phone]         [facility] [address]            My Care Plans  Self Management and Treatment Plan  Goals and (Comments)   Goals        General     1. Podiatry (pt-stated)      Notes - Note edited  6/13/2022  4:38 PM by Irene Samson     Goal Statement: I want to attend an follow up appointment with podiatrist in the next 90 days.    Date goal set: 5/04/2022  Barriers: language barrier  Strengths: motivated to attend PCP appt  Date to Achieve By: 8/04/2022  Patient expressed understanding of goal: Yes    Action steps to achieve this goal:  1. I will attend my podiatry appointment as scheduled on Tuesday 5/24/22 at 10:30am with Grecia Dove. (Completed)    2. I will attend my follow up for Diabetic Shoes and Inserts as scheduled on Thursday 6/16/22 at 2pm   3. I will attend my 6 to 8 weeks follow up for reassessment with Dr. Hearn as  scheduled on Wednesday 7/6/22 at 10:45am.   4. I will schedule a follow up appointment with my PCP if it is recommended to do so while I am at the clinic.  5. I will follow up with CCC regarding this goal at each outreach until it is completed.     [facility] [phone]  [address] [place] [address]         2. Queen size mattress (pt-stated)      Notes - Note created  5/4/2022  3:04 PM by Irene Samson     Goal Statement: I need resources for a queen sizes mattress within the next 60 days.    Date goal set: 5/04/2022  Barriers: language and financial barriers  Strengths: accepting of resources  Date to Achieve By: 7/04/2022  Patient expressed understanding of goal: Yes    Action steps to achieve this goal:  1. I will work with my ARMHS on obtaining a queen size mattress.  2. I will follow up with [facility] regarding this goal at each outreach until it is completed.     Note: CHW spoke with Scooter at [facility] on 5/4/22. She will notify patients [facility] worker and assist with resources for mattress and delivery.          3. DME (pt-stated)      Notes - Note edited  6/13/2022  5:17 PM by Irene Samson     Goal Statement: I want to follow up on orders for 4-Wheel Walker (with seat) and Incontinence Supplies in the next 90 days.    Date goal set: 6/13/2022  Barriers: language and financial barriers  Strengths: accepting of resources  Date to Achieve By: 8/13/2022  Patient expressed understanding of goal: Yes    Action steps to achieve this goal:  1. CHW to assist patient/daughter with calling [facility] to clarify orders for walker and incontinence supplies.   2. I will work with my ARMHS on reordering of incontinence supplies or call CHW for support as needed.   2. I will follow up with [facility] regarding this goal at each outreach until it is completed.     Note: Orders submitted on 5/4/22 to [facility] [phone]               Action Plans on File:                        Advance Care Plans/Directives Type:   No data recorded    My Medical and Care Information  Problem List   Patient Active Problem List   Diagnosis     Type 2 diabetes mellitus with other specified complication, unspecified whether long term insulin use (H)     Hypertension goal BP (blood pressure) < 140/90     Hyperlipidemia LDL goal <100     Depression, unspecified depression type     Stage 3 chronic kidney disease (H)     Hyperglycemia     Generalized muscle weakness     Low iron     Gastroesophageal reflux disease with esophagitis without hemorrhage     Low vitamin B12 level     Low vitamin D level     Celiac disease     Severe nonproliferative diabetic retinopathy of both eyes without macular edema associated with type 2 diabetes mellitus (H)     Beta thalassemia minor     Diabetic neuropathy associated with type 2 diabetes mellitus (H)      Current Medications and Allergies:  See printed Medication Report.    Care Coordination Start Date: 3/1/2022   Frequency of Care Coordination: 6 weeks     Form Last Updated: 06/14/2022

## 2022-06-13 NOTE — PROGRESS NOTES
Clinic Care Coordination Contact    Community Health Worker Follow Up  Spoke with patient and Fritz Matthews (Daughter) through Fanny  [phone]    Care Gaps:     Health Maintenance Due   Topic Date Due     MAMMO SCREENING  Never done     Pneumococcal Vaccine: Pediatrics (0 to 5 Years) and At-Risk Patients (6 to 64 Years) (1 - PCV) Never done     COLORECTAL CANCER SCREENING  Never done     HEPATITIS B IMMUNIZATION (1 of 3 - Risk 3-dose series) Never done     DTAP/TDAP/TD IMMUNIZATION (1 - Tdap) Never done     ZOSTER IMMUNIZATION (1 of 2) Never done     CHW to review care gaps at next outreach.    Goals:    Goals Addressed as of 6/13/2022 at 4:39 PM                    Today    5/4/22       1. Preventative care (pt-stated)   100%  60%    Added 3/17/22 by Janel Jhaveri, RN        Goal Statement: I want to attend an Annual Physical exam in the next 90 days so that I may address my current health maintenance care gaps with my PCP.     Date goal set: 3/17/2022  Barriers: language barrier  Strengths: motivated to attend PCP appt  Date to Achieve By: 6/17/22  Patient expressed understanding of goal: Yes    Action steps to achieve this goal:  1. I will answer my phone when I am contacted to schedule my Annual Physical.  2. I will attend my Annual Physical appointment at [facility] on 6/2/2022 at 11:40am.   3. I will schedule a follow up appointment with my PCP if it is recommended to do so while I am at the clinic.  4. I will follow up with [facility] regarding this goal at each outreach until it is completed.                Podiatry (pt-stated)   20%  10%    Added 5/4/22 by Irene Samson      Goal Statement: I want to attend an follow up appointment with podiatrist in the next 90 days.    Date goal set: 5/04/2022  Barriers: language barrier  Strengths: motivated to attend PCP appt  Date to Achieve By: 8/04/2022  Patient expressed understanding of goal: Yes    Action steps to achieve this goal:  1. I will attend my podiatry  appointment as scheduled on Tuesday 5/24/22 at 10:30am with Grecia Dove. (Completed)    2. I will attend my follow up for Diabetic Shoes and Inserts as scheduled on Thursday 6/16/22 at 2pm   3. I will attend my 6 to 8 weeks follow up for reassessment with Dr. Hearn as scheduled on Wednesday 7/6/22 at 10:45am.   4. I will schedule a follow up appointment with my PCP if it is recommended to do so while I am at the clinic.  5. I will follow up with [facility] regarding this goal at each outreach until it is completed.     [facility] [phone]  [address] [place]  [address] [address]           Queen size mattress (pt-stated)   0%      Added 5/4/22 by Irene Samson      Goal Statement: I need resources for a queen sizes mattress within the next 60 days.    Date goal set: 5/04/2022  Barriers: language and financial barriers  Strengths: accepting of resources  Date to Achieve By: 7/04/2022  Patient expressed understanding of goal: Yes    Action steps to achieve this goal:  1. I will work with my ARM on obtaining a queen size mattress.  2. I will follow up with [facility] regarding this goal at each outreach until it is completed.     Note: CHW spoke with Scooter at [facility] on 5/4/22. She will notify patients [facility] worker and assist with resources for mattress and delivery.           Intervention and Education during outreach:       Informed, per Dr. Arizmendi's message on 6/2/22, CHW to clarify what ARMHS worker can assist with. Patient has not received walker or incontinence supplies that were ordered last month. Patient has an appointment to  diabetic shoes from podiatry in [place] on 6/16 at 2PM and they need a ride.        Per chart review and [place], patient went to 6/2 annual physical. Goal completed.       CHW called [facility] and confirmed that patients daughter already called to coordinate ride for appointments on Thurs 6/16 at 2pm for diabetic shoes and and follow up with  Dr. Hearn Wed at 7/6 at 10:45am. Both appointments schedule with [employer] [phone].     CHW reviewed above with Fritz and she confirmed that she set up rides for both appointments. Goal updated.     Informed, CHW scheduled [facility] for appointment on 6/30 at 11am with MTM.       [place] shares that patient has not received bed mattress yet. Informed, CHW to follow up with [facility] worker.       CHW inquired about walker and incontinence supplies. Fritz shares that patient has not received items yet. Informed, CHW to follow up on orders and will call her back. CHW will also outreach to [facility] worker to clarify support with reordering of incontinence supplies and bed mattress.     CHW called Scooter at [facility] and left message to return call. CHW called [facility] [phone], office is closed, left message for return call. Goal created.     CHW Plan: CHW to follow up in 1-2 weeks.     Irene Samson  Clinic Care Coordination  [facility]    Phone: [phone]

## 2022-06-14 ENCOUNTER — TELEPHONE (OUTPATIENT)
Dept: FAMILY MEDICINE | Facility: CLINIC | Age: 52
End: 2022-06-14
Payer: COMMERCIAL

## 2022-06-14 ENCOUNTER — APPOINTMENT (OUTPATIENT)
Dept: NURSING | Facility: CLINIC | Age: 52
End: 2022-06-14
Payer: COMMERCIAL

## 2022-06-14 NOTE — PROGRESS NOTES
Clinic Care Coordination Contact    Community Health Worker Follow Up  Spoke with patient and Fritz Matthews (Daughter) through [facility]  [phone]      Walter at [facility] returned call, confirmed orders received, they left message for return call on 5/4, to schedule delivery of walker but no return call from patient yet. Incontinent supplies will ship out separately. CHW encouraged calling Byron Fritz (Daughter) as contact for delivery. Walter will follow up with daughter for assistance.    CHW informed Fritz of above and encouraged her to help patient watch for follow up call. CHW encouraged reaching out to CHW for support as needed.    CHW Plan: CHW to follow up in 1 month    Irene Samson  [facility] Care Coordination  [facility]    Phone: [phone]

## 2022-06-14 NOTE — TELEPHONE ENCOUNTER
HELGA called and discussed with caller. I gave Betsy the number for [employer] [phone]. She will call and discuss with [employer].

## 2022-06-14 NOTE — TELEPHONE ENCOUNTER
Reason for call:  Other   Patient called regarding (reason for call):   Colonoscopy test kit replacement    Additional comments:   The patient needs a Colonoscopy test kit replacement.  Please call sergio to discuss.    Phone number to reach patient:  Other phone number:    Betsy [phone], care coordinator    Best Time:  any    Can we leave a detailed message on this number?  YES    Travel screening: Not Applicable

## 2022-06-27 DIAGNOSIS — Z76.0 ENCOUNTER FOR MEDICATION REFILL: Primary | ICD-10-CM

## 2022-06-27 DIAGNOSIS — F51.01 PRIMARY INSOMNIA: ICD-10-CM

## 2022-06-27 RX ORDER — MIRTAZAPINE 7.5 MG/1
7.5 TABLET, FILM COATED ORAL AT BEDTIME
Qty: 90 TABLET | Refills: 3 | Status: SHIPPED | OUTPATIENT
Start: 2022-06-27 | End: 2023-08-21

## 2022-06-30 ENCOUNTER — OFFICE VISIT (OUTPATIENT)
Dept: PHARMACY | Facility: CLINIC | Age: 52
End: 2022-06-30
Payer: COMMERCIAL

## 2022-06-30 VITALS
OXYGEN SATURATION: 94 % | HEART RATE: 87 BPM | WEIGHT: 105 LBS | SYSTOLIC BLOOD PRESSURE: 112 MMHG | BODY MASS INDEX: 19.2 KG/M2 | DIASTOLIC BLOOD PRESSURE: 76 MMHG

## 2022-06-30 DIAGNOSIS — E11.69 TYPE 2 DIABETES MELLITUS WITH OTHER SPECIFIED COMPLICATION, UNSPECIFIED WHETHER LONG TERM INSULIN USE (H): ICD-10-CM

## 2022-06-30 DIAGNOSIS — E78.5 HYPERLIPIDEMIA LDL GOAL <100: ICD-10-CM

## 2022-06-30 DIAGNOSIS — R52 PAIN: ICD-10-CM

## 2022-06-30 DIAGNOSIS — I10 HYPERTENSION GOAL BP (BLOOD PRESSURE) < 140/90: Primary | ICD-10-CM

## 2022-06-30 DIAGNOSIS — F32.A DEPRESSION, UNSPECIFIED DEPRESSION TYPE: ICD-10-CM

## 2022-06-30 LAB — NONINV COLON CA DNA+OCC BLD SCRN STL QL: NEGATIVE

## 2022-06-30 PROCEDURE — 99607 MTMS BY PHARM ADDL 15 MIN: CPT | Performed by: PHARMACIST

## 2022-06-30 PROCEDURE — 99606 MTMS BY PHARM EST 15 MIN: CPT | Performed by: PHARMACIST

## 2022-06-30 RX ORDER — LIRAGLUTIDE 6 MG/ML
1.8 INJECTION SUBCUTANEOUS DAILY
Qty: 9 ML | Refills: 11 | Status: SHIPPED | OUTPATIENT
Start: 2022-06-30 | End: 2023-07-19

## 2022-06-30 NOTE — PROGRESS NOTES
Medication Therapy Management (MTM) Encounter    ASSESSMENT:                            Medication Adherence/Access: Patient presents without medications today, recommended patient bring for complete medication review at follow-up visit.    1. Type 2 diabetes mellitus with other specified complication, unspecified whether long term insulin use (H)  A1c showed improvement, though still not meeting goal of <7%.  Per CGM resolved, blood sugars remain persistently elevated.  Recommend patient resume eating 3 meals a day and using larger bolus dose of insulin to help cover postprandial blood sugars, patient agreeable to plan.  Regarding patient concerns with nonfunctioning nathan sensor, recommended patient contact manufacture who can send replacement device, provided patient customer service number: [phone].    2. Hypertension goal BP (blood pressure) < 140/90  BP at goal of <140/90.  Per recent PCP visit, HCTZ discontinued, this decision was appropriate given patient's hypoglycemia and hypokalemia/elevated creatinine.  Recommend rechecking BMP at next visit.    3. Hyperlipidemia LDL goal <100  When last monitored, triglycerides were significantly elevated and therefore fenofibrate was reinitiated.  Could consider rechecking fasting lipids at next lab visit.    4. Pain  Stable.  Recommend patient continue to follow-up with podiatry as directed for foot pain.    5. Depression, unspecified depression type  Stable.    PLAN:                            1.  Increase NovoLog dose: 16 units 3 times daily before meals   2.  Labs ordered today for recheck at next visit: BMP    Follow-up: Return in about 27 days (around 7/27/2022) for with me.   PCP 8/9; Endo 12/19  SUBJECTIVE/OBJECTIVE:                          Nithya Matthews is a 52 year old female coming in for a follow up visit. She was referred to me from Giancarlo Arizmendi MD. Patient was accompanied by daughter. Patient seen with [facility] . Follow up from 5/12/22. ID  #[id number].    Reason for visit: Referral from PCP; Diabetes management.    Allergies/ADRs: Reviewed in chart  Past Medical History: Reviewed in chart  Tobacco: She reports that she has never smoked. She has never used smokeless tobacco.  Alcohol: None    Medication Adherence/Access: No concerns. Daughter organizes medication in pillbox. Reports no missed medication doses.   - Brings with her meter and injectable medicaitons today.   - Does NOT bring her pillbox and bottles with her today like she did last time, forgot at home.    Type 2 Diabetes:    PCP recently referred to endocrinology d/t persistently uncontrolled A1c, visit scheduled for 12/19/22.  Lantus 70 units daily AM   Admelog 13 units 2 times a day with meals (previosuly eating 3 meals)  Victoza 1.8 mg daily AM. Appetite decreased with warmer weather.  - Previously her daughter was giving the insulin to her, now she administers on her own.  Denies any missed doses of insulin or GLP-1 recently.     Blood sugar monitoring: Reports some days without information because it will fall off early sometimes.   Per Claribel in last 30 days:  Time in ranges: 89% above target, 11% in target, 0% below target.   Average glucose: 235 mg/dL at 12am-6am, 256 mg/dL at 6am-12pm, 296 mg/dL at 12pm-6pm, 273 mg/dL at 6pm-12am.   Time CGM is active 59%. Scanning twice daily       Symptoms of low blood sugar? Yes, feels shaking, hungry and weak - repots 1-2 times monthly and she is aware how to correct for lows.  Symptoms of high blood sugar? vision changes, polydipsia and nausea  Eye exam: up to date  Foot exam: up to date  Diet/Exercise: Eats 2 meals per day, eats at 8-10 am, 5-6 pm. Previously was eating 3 meals daily but now only eating twice daily since the weather is warmer out.   Aspirin: Taking 81mg daily and has the following issues: nose bleeds 2-3 times per week that stop quickly.  Statin: Yes: atrovastatin   ACEi/ARB: Yes: lisinopril.    Lab Results   Component Value Date     A1C 10.4 06/02/2022    A1C 14.4 02/28/2022    A1C 14.5 12/06/2021     Hypertension: *Unable to verify what she is currently taking without medications present today*  lisinopril 40 mg daily PM  amlodipine 5 mg daily PM (dose decreased per PCP on 5/4)  metoprolol succinate 200 mg daily AM  - Reports still having lightheadedness when changing position (standing, sitting). Reports symptoms more 2-3 times monthly. No more edema.   - Med Hx: hydrochlorothiazide d/c'ed by PCP on 6/2 due to hypotension (patient states that she thinks she stopped taking this medication, though unable to fully verify without medications present today)  - Reports drinking a lot of water  BP Readings from Last 3 Encounters:   06/30/22 112/76   06/02/22 106/68   05/24/22 112/70     Creatinine   Date Value Ref Range Status   06/02/2022 2.50 (H) 0.60 - 1.10 mg/dL Final     Potassium   Date Value Ref Range Status   06/02/2022 3.0 (L) 3.5 - 5.0 mmol/L Final     Hyperlipidemia:   atorvastatin 80mg daily PM    Fish Oil 1,000 mg daily AM  Fenofibrate 54 mg daily AM  Patient reports no significant myalgias or other side effects.   The ASCVD Risk score (Wilmer LINDA Jr., et al., 2013) failed to calculate for the following reasons:    The valid HDL cholesterol range is 20 to 100 mg/dL   Recent Labs   Lab Test 04/14/22  0808 12/08/21  0607 12/06/21  1050   CHOL 260* 150 194   HDL  --  18* 28*   LDL  --   --  40   TRIG 1,283* 656* 1,018*     Pain:   gabapentin 600 mg twice a day   Acetaminophen 500 mg as needed for pain (taking 1 pill at a time only for severe pain, only taking occasionally, last dose 2 days ago)  States that her pain is getting better. Since last visit, the podiatrist shaved down the area of her foot and she wear a boot today. Reports follow-up with podiatry in July. States that the pain is starting to comeback since surgery.     Depression/Insomnia:   Escitalopram 10 mg daily AM.   Reports taking Mirtazapine 7.5 mg daily at bedtime   -  Patient states that her sleep has been better, no concerns today.   - Reports mood has been better lately, happy right now.  PHQ 12/13/2021   PHQ-9 Total Score 8   Q9: Thoughts of better off dead/self-harm past 2 weeks Not at all     Today's Vitals: /76   Pulse 87   Wt 105 lb (47.6 kg)   SpO2 94%   BMI 19.20 kg/m    ----------------  I spent 60 minutes with this patient today. All changes were made via collaborative practice agreement with Giancarlo Arizmendi MD. A copy of the visit note was provided to the patient's provider(s).    The patient was given a summary of these recommendations.     Shital Hernandez, PharmD, BCACP  Medication Therapy Management Pharmacist     Medication Therapy Recommendations  Diabetic neuropathy associated with type 2 diabetes mellitus (H)    Current Medication: insulin aspart (NOVOLOG PEN) 100 UNIT/ML pen   Rationale: Dose too low - Dosage too low - Effectiveness   Recommendation: Increase Dose   Status: Accepted per CPA

## 2022-07-06 ENCOUNTER — OFFICE VISIT (OUTPATIENT)
Dept: PODIATRY | Facility: CLINIC | Age: 52
End: 2022-07-06
Payer: COMMERCIAL

## 2022-07-06 VITALS — DIASTOLIC BLOOD PRESSURE: 68 MMHG | BODY MASS INDEX: 19.2 KG/M2 | WEIGHT: 105 LBS | SYSTOLIC BLOOD PRESSURE: 110 MMHG

## 2022-07-06 DIAGNOSIS — M20.11 HAV (HALLUX ABDUCTO VALGUS), RIGHT: ICD-10-CM

## 2022-07-06 DIAGNOSIS — L84 PRE-ULCERATIVE CALLUSES: ICD-10-CM

## 2022-07-06 DIAGNOSIS — M20.12 HAV (HALLUX ABDUCTO VALGUS), LEFT: ICD-10-CM

## 2022-07-06 DIAGNOSIS — M79.671 FOOT PAIN, BILATERAL: ICD-10-CM

## 2022-07-06 DIAGNOSIS — E11.42 DIABETIC POLYNEUROPATHY ASSOCIATED WITH TYPE 2 DIABETES MELLITUS (H): Primary | ICD-10-CM

## 2022-07-06 DIAGNOSIS — M79.672 FOOT PAIN, BILATERAL: ICD-10-CM

## 2022-07-06 PROCEDURE — 11056 PARNG/CUTG B9 HYPRKR LES 2-4: CPT | Performed by: PODIATRIST

## 2022-07-06 PROCEDURE — 99213 OFFICE O/P EST LOW 20 MIN: CPT | Mod: 25 | Performed by: PODIATRIST

## 2022-07-06 RX ORDER — UREA 200 MG/G
CREAM TOPICAL DAILY
Qty: 385 G | Refills: 6 | Status: SHIPPED | OUTPATIENT
Start: 2022-07-06 | End: 2022-12-19

## 2022-07-06 NOTE — PROGRESS NOTES
Podiatry / Foot and Ankle Surgery Progress Note    July 6, 2022    Subject: Patient was seen for painful calluses.  Here with her daughter who is interpreting for her today  That contributed calluses down the feet felt a lot better however as the calluses build up started to become painful again.  She is wearing her diabetic shoes and inserts.  Denies fever, nausea, vomiting.  No injury to the foot.    Objective:  Vitals: /68   Wt 47.6 kg (105 lb)   BMI 19.20 kg/m    BMI= Body mass index is 19.2 kg/m .    A1C: 14.4 (2/2022)     General appearance: Patient is alert and fully cooperative with history & exam.  No sign of distress is noted during the visit.    Dermatologic: Preulcerative hyperkeratotic lesions to the plantar aspects of the first and fifth metatarsal heads bilaterally.  No open lesions or signs of infection noted.    Vascular: DP & PT pulses are intact & regular bilaterally.  No significant edema or varicosities noted.  CFT and skin temperature is normal to both lower extremities.     Neurologic: Lower extremity sensation is diminished to feet.     Musculoskeletal: Patient is ambulatory without assistive device or brace.  Prominent first metatarsal heads medially both feet.        ASSESSMENT:      Diabetic polyneuropathy associated with type 2 diabetes mellitus (H)  Foot pain, bilateral  Hav (hallux abducto valgus), right  Hav (hallux abducto valgus), left  Pre-ulcerative calluses     Medical Decision Making/Plan:  Reviewed patient's chart in epic.    Discussed causes of keratomas.  They are due to areas of increase friction.  Hammertoes can create these as they put more pressure to the metatarsal head.  Discussed treatments such as using foot file, pumice stone, metatarsal pads, orthotics, and not walking barefoot.     We discussed the cost structure of callus care if they were to come back and have it treated in the clinic if insurance does not cover it and explained that they would be billed.  They were also provided information on places to get the callus treatment.      Recommend using a pumice stone 3-4 times a week to try to keep the callus from building up.    Recommend using urea cream.  This was ordered.  Continue to wearing her diabetic shoes and inserts at all times.  Was given a list of places that do  Callus care.  ll questions were answered to patient and patient's daughter satisfaction and they will call for the questions or concerns.    Procedure: After verbal consent, the hyperkeratotic lesions x 4 were debrided using a #15 blade without incident. Patient tolerated procedure well.       Patient risk factor: Patient is at high risk for infection.      Grecia Hearn DPM, Podiatry/Foot and Ankle Surgery

## 2022-07-06 NOTE — LETTER
7/6/2022         RE: Nithya Matthews  [address] [place] [address]        Dear Colleague,    Thank you for referring your patient, Nithya Matthews, to the [facility] PODIATRY. Please see a copy of my visit note below.    Podiatry / Foot and Ankle Surgery Progress Note    July 6, 2022    Subject: Patient was seen for painful calluses.  Here with her daughter who is interpreting for her today  That contributed calluses down the feet felt a lot better however as the calluses build up started to become painful again.  She is wearing her diabetic shoes and inserts.  Denies fever, nausea, vomiting.  No injury to the foot.    Objective:  Vitals: /68   Wt 47.6 kg (105 lb)   BMI 19.20 kg/m    BMI= Body mass index is 19.2 kg/m .    A1C: 14.4 (2/2022)     General appearance: Patient is alert and fully cooperative with history & exam.  No sign of distress is noted during the visit.    Dermatologic: Preulcerative hyperkeratotic lesions to the plantar aspects of the first and fifth metatarsal heads bilaterally.  No open lesions or signs of infection noted.    Vascular: DP & PT pulses are intact & regular bilaterally.  No significant edema or varicosities noted.  CFT and skin temperature is normal to both lower extremities.     Neurologic: Lower extremity sensation is diminished to feet.     Musculoskeletal: Patient is ambulatory without assistive device or brace.  Prominent first metatarsal heads medially both feet.        ASSESSMENT:      Diabetic polyneuropathy associated with type 2 diabetes mellitus (H)  Foot pain, bilateral  Hav (hallux abducto valgus), right  Hav (hallux abducto valgus), left  Pre-ulcerative calluses     Medical Decision Making/Plan:  Reviewed patient's chart in epic.    Discussed causes of keratomas.  They are due to areas of increase friction.  Hammertoes can create these as they put more pressure to the metatarsal head.  Discussed treatments such as using foot file, pumice stone,  metatarsal pads, orthotics, and not walking barefoot.     We discussed the cost structure of callus care if they were to come back and have it treated in the clinic if insurance does not cover it and explained that they would be billed. They were also provided information on places to get the callus treatment.      Recommend using a pumice stone 3-4 times a week to try to keep the callus from building up.    Recommend using urea cream.  This was ordered.  Continue to wearing her diabetic shoes and inserts at all times.  Was given a list of places that do  Callus care.  ll questions were answered to patient and patient's daughter satisfaction and they will call for the questions or concerns.    Procedure: After verbal consent, the hyperkeratotic lesions x 4 were debrided using a #15 blade without incident. Patient tolerated procedure well.       Patient risk factor: Patient is at high risk for infection.      Grecia Hearn DPM, Podiatry/Foot and Ankle Surgery        Again, thank you for allowing me to participate in the care of your patient.        Sincerely,        Grecia Hearn DPM, Podiatry/Foot and Ankle Surgery

## 2022-07-06 NOTE — PATIENT INSTRUCTIONS
Thank you for choosing [facility] Podiatry / Foot & Ankle Surgery!    DR HIRSCH'S CLINIC:  [facility]   [address]   [place] [address]      TRIAGE LINE: [phone]  APPOINTMENTS: [phone]  RADIOLOGY: [phone]  SET UP SURGERY: [phone]  FAX NUMBER: [phone]  BILLING QUESTIONS: [phone]       Follow up: As needed    CALLUS / CORNS / IPKs  When there is excessive friction or pressure on the skin, the body responds by making the skin thicker to protect the deeper structures from becoming exposed. While this works well to protect the deeper structures, the thickened skin can increase pressure and pain.    CALLUS: Flat, diffuse thickening are simple calluses and they are usually caused by friction. Often these are the result of rubbing on a shoe or going barefoot.    CORNS: Calluses with a central core between the toes are called corns. These result from prominent joints on adjacent toes rubbing together. Theses are a symptom of bone malalignment and will always recur unless the underlying bones are addressed surgically.    IPKs: Calluses with a central core on the ball of the foot are usually IPKs (intractable plantar keratosis). These are caused by excessive pressure from the metatarsals, the bones that make up the ball of the foot. Often one of these bones is too long or too prominent.  Again, these will always recur unless the underlying bone issue is addressed. There is no cure for these. They will either go away by themselves, recur, or more could develop.    ROUTINE MAINTENANCE  1. File them down with a pumice stone or callus file a couple times a week.   2. An electric callus removing device. Amope Pedi Perfect Electronic Pedicure Foot File and Callus Remover can be a good option.   3. Lotion can be applied to soften the callus. A urea based cream such as Kersal or Vanicream or thicker cream with shea butter are good options.  4. Toe spacers or toe covers  can be used for corns, gel pads can be used for other lesions on the bottom of the foot.   If there is a surgical pathology noted, such as a prominent bone, often this needs to be addressed surgically to minimize recurrence. However, sometimes the lesion simply migrates to another spot after surgery, so it is not a guaranteed cure.     **If you come back to clinic for treatment, insurance does not cover it, and you would be billed. This charge could range from $100 - $227** ROUTINE FOOT CARE (NAIL TRIMMING / CALLUSES)    Go to afcna.org ([employer]) & search near you.    FYI: Some providers accept insurance while others are out of pocket.  Please contact them for more updated details.    [facility]   [phone]   [facility] (out of pocket)  [phone]  [email or web address]   [facility]  [phone]  [place] + [place] mile [place] [facility]  [phone]  [facility].   [facility]  [phone] [address] [facility]  [phone]  [place] [facility]   [facility]  [phone]  [facility]  [phone]  [place] - Dr. Meneses on Tuesdays   [facility]  [phone]  [facility]  [phone]  [facility] Podiat[facility]  [phone] [facility] (in home)  Masha Downs RN Foot Specialist  [phone]   [facility]   [phone]   [facility]  [phone] [address] [address]  Dr. Juan M Aleman  [phone]  [place], [place]

## 2022-07-14 ENCOUNTER — PATIENT OUTREACH (OUTPATIENT)
Dept: CARE COORDINATION | Facility: CLINIC | Age: 52
End: 2022-07-14

## 2022-07-14 NOTE — TELEPHONE ENCOUNTER
Jens Kendrick,   Schedule patient with PARDEEP Lynch and she can referral patient to disability specialists.     Thank you,

## 2022-07-14 NOTE — PROGRESS NOTES
Clinic Care Coordination Contact    Community Health Worker Follow Up  [facility]      Care Gaps:     Health Maintenance Due   Topic Date Due     MAMMO SCREENING  Never done     Pneumococcal Vaccine: Pediatrics (0 to 5 Years) and At-Risk Patients (6 to 64 Years) (1 - PCV) Never done     ZOSTER IMMUNIZATION (1 of 2) Never done     HEPATITIS B IMMUNIZATION (1 of 3 - Risk 3-dose series) Never done     DTAP/TDAP/TD IMMUNIZATION (1 - Tdap) Never done       Scheduled Tue 8/9/22 at 2PM with Dr. Arizmendi and Mammogram at 2:45PM.       Goals:    Goals Addressed as of 7/14/2022 at 1:45 PM                    Today    6/13/22       1. Podiatry (pt-stated)   100%  20%1     Added 5/4/22 by Irene Samson      Goal Statement: I want to attend an follow up appointment with podiatrist in the next 90 days.    Date goal set: 5/04/2022  Barriers: language barrier  Strengths: motivated to attend PCP appt  Date to Achieve By: 8/04/2022  Patient expressed understanding of goal: Yes    Action steps to achieve this goal:  1. I will attend my podiatry appointment as scheduled on Tuesday 5/24/22 at 10:30am with Grecia Dove. (Completed)    2. I will attend my follow up for Diabetic Shoes and Inserts as scheduled on Thursday 6/16/22 at 2pm. (Completed)  3. I will attend my 6 to 8 weeks follow up for reassessment with Dr. Hearn as scheduled on Wednesday 7/6/22 at 10:45am. (Completed)  4. I will schedule a follow up appointment with my PCP if it is recommended to do so while I am at the clinic.  5. I will follow up with [facility] regarding this goal at each outreach until it is completed.     [facility] [phone]  [address] [place] [address]           2. Queen size mattress (pt-stated)   100%  0%2     Added 5/4/22 by Irene Samson      Goal Statement: I need resources for a queen sizes mattress within the next 60 days.    Date goal set: 5/04/2022  Barriers: language and  financial barriers  Strengths: accepting of resources  Date to Achieve By: 7/04/2022  Patient expressed understanding of goal: Yes    Action steps to achieve this goal:  1. I will work with my ARM on obtaining a queen size mattress.  2. I will follow up with CCC regarding this goal at each outreach until it is completed.     Note: CHW spoke with Bonitaforrest at [facility] on 5/4/22. She will notify patients [employer] worker and assist with resources for mattress and delivery.            3. DME (pt-stated)   100%      Added 6/13/22 by Irene Samson      Goal Statement: I want to follow up on orders for 4-Wheel Walker (with seat) and Incontinence Supplies in the next 90 days.    Date goal set: 6/13/2022  Barriers: language and financial barriers  Strengths: accepting of resources  Date to Achieve By: 8/13/2022  Patient expressed understanding of goal: Yes    Action steps to achieve this goal:  1. CHW to assist patient/daughter with calling  [employer] to clarify orders for walker and incontinence supplies.   2. I will work with my [facility] on reordering of incontinence supplies or call CHW for support as needed.   2. I will follow up with CCC regarding this goal at each outreach until it is completed.     Note: Orders submitted on 5/4/22 to [facility] -[phone]          Intervention and Education during outreach:       Patient confirmed that she received diabetic shoes and went to follow up appointment with Dr. Hearn on 7/6. Goal completed.      Patient confirmed she received Queen size mattress with the help of her [employer] worker. Goal completed.      Patient confirmed that she received incontinence supplies and walker. Goal completed.       Patient inquired about resources for an air conditioner. CHW inquired if patient outreached to [employer] worker. Patient confirmed that she notified [facility] worker and they are working on it. Patient shares that [employer] worker is to visit next week, she will follow up  regarding air conditioner and will notify CHW if additional support is needed.       Patient inquired about resources to apply for SSI. She doesn't feel she can work. Informed, CHW to outreach to [employer] for support, need to clarify if okay to submit referral to  or if this is something [facility] worker could help with. Patient agreed.      CHW reviewed upcoming appointment and assisted with transportation. CHW called [employer] [phone], transportation confirmed with [employer] [phone] for appointments on Wed 7/27 2:45PM Lab, 3PM MTM and Tue 8/9 2PM Dr. Arizmendi, 2:45PM Mammo.    CHW Plan: CHW to follow up in 1 month or as needed. CHW to route encounter to CCRN for review and clarification with applying for SSI.     Irene Samson  Clinic Care Coordination  [facility]    Phone: [phone];bill

## 2022-07-15 NOTE — PROGRESS NOTES
CHW spoke with patient through [facility] , appointment scheduled on 7/29/22 at 2pm with SHERI Flynn to review resources for applying for SSI.     Plan: CHW to follow up in 1 month    [facility] Care Coordination  [facility]    Phone: [phone]

## 2022-07-27 ENCOUNTER — OFFICE VISIT (OUTPATIENT)
Dept: PHARMACY | Facility: CLINIC | Age: 52
End: 2022-07-27
Payer: COMMERCIAL

## 2022-07-27 VITALS — OXYGEN SATURATION: 90 % | SYSTOLIC BLOOD PRESSURE: 110 MMHG | DIASTOLIC BLOOD PRESSURE: 64 MMHG | HEART RATE: 88 BPM

## 2022-07-27 DIAGNOSIS — E55.9 VITAMIN D DEFICIENCY: ICD-10-CM

## 2022-07-27 DIAGNOSIS — E78.5 HYPERLIPIDEMIA LDL GOAL <100: ICD-10-CM

## 2022-07-27 DIAGNOSIS — I10 HYPERTENSION GOAL BP (BLOOD PRESSURE) < 140/90: Primary | ICD-10-CM

## 2022-07-27 DIAGNOSIS — E11.69 TYPE 2 DIABETES MELLITUS WITH OTHER SPECIFIED COMPLICATION, UNSPECIFIED WHETHER LONG TERM INSULIN USE (H): ICD-10-CM

## 2022-07-27 DIAGNOSIS — R79.89 LOW VITAMIN D LEVEL: ICD-10-CM

## 2022-07-27 DIAGNOSIS — R52 PAIN: ICD-10-CM

## 2022-07-27 DIAGNOSIS — K21.00 GASTROESOPHAGEAL REFLUX DISEASE WITH ESOPHAGITIS WITHOUT HEMORRHAGE: ICD-10-CM

## 2022-07-27 DIAGNOSIS — F32.A DEPRESSION, UNSPECIFIED DEPRESSION TYPE: ICD-10-CM

## 2022-07-27 LAB
ANION GAP SERPL CALCULATED.3IONS-SCNC: 13 MMOL/L (ref 7–15)
BUN SERPL-MCNC: 21.7 MG/DL (ref 6–20)
CALCIUM SERPL-MCNC: 8.3 MG/DL (ref 8.6–10)
CHLORIDE SERPL-SCNC: 97 MMOL/L (ref 98–107)
CREAT SERPL-MCNC: 2.37 MG/DL (ref 0.51–0.95)
DEPRECATED HCO3 PLAS-SCNC: 26 MMOL/L (ref 22–29)
GFR SERPL CREATININE-BSD FRML MDRD: 24 ML/MIN/1.73M2
GLUCOSE SERPL-MCNC: 417 MG/DL (ref 70–99)
POTASSIUM SERPL-SCNC: 3.4 MMOL/L (ref 3.4–5.3)
SODIUM SERPL-SCNC: 136 MMOL/L (ref 136–145)

## 2022-07-27 PROCEDURE — 99607 MTMS BY PHARM ADDL 15 MIN: CPT | Performed by: PHARMACIST

## 2022-07-27 PROCEDURE — 36415 COLL VENOUS BLD VENIPUNCTURE: CPT | Performed by: PHARMACIST

## 2022-07-27 PROCEDURE — 82306 VITAMIN D 25 HYDROXY: CPT | Performed by: PHARMACIST

## 2022-07-27 PROCEDURE — 80048 BASIC METABOLIC PNL TOTAL CA: CPT | Performed by: PHARMACIST

## 2022-07-27 PROCEDURE — 99606 MTMS BY PHARM EST 15 MIN: CPT | Performed by: PHARMACIST

## 2022-07-27 RX ORDER — AMLODIPINE BESYLATE 5 MG/1
2.5 TABLET ORAL DAILY
Qty: 90 TABLET | Refills: 3
Start: 2022-07-27 | End: 2022-08-09

## 2022-07-27 NOTE — PROGRESS NOTES
Medication Therapy Management (MTM) Encounter    ASSESSMENT:                            Medication Adherence/Access: Despite MTM expressing concern for hyperglycemia, patient denies any missed doses of diabetes medication.  Reviewed with patient today the importance of chronic medication use.    1. Type 2 diabetes mellitus with other specified complication, unspecified whether long term insulin use (H)  Last A1c elevated, though showed improvement from previously, still not meeting goal of <7%.  As noted above, largely concerned about medication nonadherence to insulin and GLP-1 agonist.  Large discussion with patient today about diet and exercise to help control blood sugars.  Per CGM, blood sugars were better controlled in June, therefore concerned that patient recently decreased/stopped using insulin or GLP-1 agonist as prescribed.  Although, patient states that she is taking her medications as prescribed, therefore slightly increased dose of basal insulin today.  Additionally, concerned that basal insulin not being completely absorbed due to high dose, recommended patient split dose in half and administering both sides of abdomen once daily, patient agreeable to plan.    2. Hypertension goal BP (blood pressure) < 140/90  BP meeting goal of <130/80.  Rechecked BMP today, potassium now stable.  Considering well-controlled BP, acceptable to have patient continue current dose of amlodipine 2.5 mg daily rather than 5 mg as prescribed, updated medication list to reflect this today.    3. Hyperlipidemia LDL goal <100  When last monitored, triglycerides were significantly elevated and therefore fenofibrate was reinitiated.  Recommend rechecking fasting lipid cascade at next lab draw.    4. Pain  Of note, considering patient's worsened renal function, max recommended dose of gabapentin is 600 mg/day and 1-2 divided doses.  Will route to PCP, recommend decreasing dose of gabapentin to 300 mg twice daily.    5. Depression,  unspecified depression type  Stable.  Continue current therapy.    6. Gastroesophageal reflux disease with esophagitis without hemorrhage  Patient currently taking chronic PPI. PPIs increase risk of Cdiff, pneumonia, low B12, low Mg, low BMD/inc fracture risk, and risk increases with dose.  Unclear if patient taking for any other indication that GERD.  Pending current indication, would recommend trial tapering off of PPI and taking alternative, H2 blocker and/or Tums.    7. Vitamin D deficiency  Patient treated with ergocalciferol x12 weeks, rechecking vitamin D today.  If vitamin D remains low would recommend additional treatment with ergocalciferol, if stable would recommend maintenance vitamin D supplementation.    PLAN:                            1.  Increase basal insulin: Lantus 74 units -directed patient to dose 37 units x 2 once daily to help with insulin absorption  2.  Reviewed with patient today importance of diet/exercise for BG control  3.  Patient to continue amlodipine 2.5 mg daily, sent prescription to pharmacy today  4.  Labs today: BMP and vitamin D  5.  Routing recommendation to decrease dose of gabapentin to PCP today    Future:   -Recheck fasting lipid cascade    Follow-up: Return in about 7 weeks (around 9/14/2022) for with me.   PCP 8/9; Endo 12/19  SUBJECTIVE/OBJECTIVE:                          Nithya Matthews is a 52 year old female coming in for a follow up visit. She was referred to me from Giancarlo Arizmendi MD. Patient was accompanied by daughter. Patient seen with [facility] . Follow up from 6/30/22. ID #[id number].    Reason for visit: Referral from PCP; Diabetes management.    Allergies/ADRs: Reviewed in chart  Past Medical History: Reviewed in chart  Tobacco: She reports that she has never smoked. She has never used smokeless tobacco.  Alcohol: None    Medication Adherence/Access: No concerns. Daughter organizes medication in pillbox - takes medications twice daily.   - Reports no missed  medication doses. Large discussion today, patient denies any missed doses of oral or injectable medications.   - Brings with her meter, injections, med vials, and 1 day of pillbox sleeve today.     Type 2 Diabetes:    PCP recently referred to endocrinology d/t persistently uncontrolled A1c, visit scheduled for 12/19/22.  Lantus 70 units daily AM   Novolog 16 units 3 times a day before meals   Victoza 1.8 mg daily AM. Appetite decreased with warmer weather.  - Previously her daughter was giving the insulin to her, now she administers on her own.  Denies any missed doses of insulin or GLP-1 recently.   - Blood sugar monitoring: Per CGM blood sugar was better in June, see below.  July:       June:      Symptoms of low blood sugar? Sweating only sometimes, none recently  Symptoms of high blood sugar? Reports normal urination, admits blurred vision  Eye exam: up to date  Foot exam: up to date  Diet/Exercise: Eats 3 meals per day. Has not been meeting with diabetic educator. Did drink one tamarind juice (shows picture on her phone). But now only drinking water. Patient reports that she has been eating about the same amount of rice as before. Also reports eating fruit, veggies and meat.   Aspirin: Taking 81mg daily and has the following issues: nose bleeds 2-3 times per week that stop quickly.  Statin: Yes: atrovastatin   ACEi/ARB: Yes: lisinopril.    Lab Results   Component Value Date    A1C 10.4 06/02/2022    A1C 14.4 02/28/2022    A1C 14.5 12/06/2021     BP Readings from Last 3 Encounters:   07/27/22 110/64   07/06/22 110/68   06/30/22 112/76      Pulse Readings from Last 3 Encounters:   07/27/22 88   06/30/22 87   06/02/22 87     Wt Readings from Last 3 Encounters:   07/06/22 105 lb (47.6 kg)   06/30/22 105 lb (47.6 kg)   06/02/22 106 lb 9 oz (48.3 kg)     Hypertension:   lisinopril 40 mg daily PM  amlodipine 5 mg daily PM, though patient taking 1/2 tablet (2.5 mg) daily (dose decreased per PCP on 5/4 from 10 mg to 5  mg). Likely when patient received 5 mg tablets she continued to take 1/2 tablet instead of the full tablet as prescribed.   metoprolol succinate 200 mg daily AM  - Still feels dizziness at times but thinks this has been better recently - Been going outside more that she thinks helps, reports drinking plenty of water   - Med Hx: hydrochlorothiazide d/c'ed by PCP on 6/2/22 due to hypotension, amlodipine dose decreased from 10 to 5 mg on 5/4/22 due to edema (now stable)  BP Readings from Last 3 Encounters:   07/27/22 110/64   07/06/22 110/68   06/30/22 112/76     Creatinine   Date Value Ref Range Status   07/27/2022 2.37 (H) 0.51 - 0.95 mg/dL Final     Potassium   Date Value Ref Range Status   07/27/2022 3.4 3.4 - 5.3 mmol/L Final   06/02/2022 3.0 (L) 3.5 - 5.0 mmol/L Final      Latest Reference Range & Units 02/28/22 13:46   Microalbumin Urine mg/g Cr <=19.9 mg/g Cr 3,659.0 (H)     Hyperlipidemia:   atorvastatin 80mg daily PM    Fish Oil 1,000 mg daily AM  Fenofibrate 54 mg daily AM  Patient reports no significant myalgias or other side effects. Only has the pain on her foot when walking.   The ASCVD Risk score (Bristol LINDA Jr., et al., 2013) failed to calculate for the following reasons:    The valid HDL cholesterol range is 20 to 100 mg/dL   Recent Labs   Lab Test 04/14/22  0808 12/08/21  0607 12/06/21  1050   CHOL 260* 150 194   HDL  --  18* 28*   LDL  --   --  40   TRIG 1,283* 656* 1,018*     Pain:   gabapentin 600 mg twice a day   Acetaminophen 500 mg as needed for pain (states that she has not been taking this)  No more follow up with the podiatrist unless pain worsening again. Reports neuropathy is better, no more nerve pain.     Depression/Insomnia:   Escitalopram 10 mg daily AM.   Reports taking Mirtazapine 7.5 mg daily at bedtime   - Patient states that her sleep has been better, no concerns today.   - Reports mood has been better lately, happy right now.  PHQ 12/13/2021   PHQ-9 Total Score 8   Q9: Thoughts of  better off dead/self-harm past 2 weeks Not at all     GERD:   Omeprazole 20 mg daily  - States that she does notice acid reflux maybe once every 2 weeks and usually only when eating spicy foods. Trying to avoid these foods now.     Vitamin d deficiency: No longer taking vitamin D supplement, likely discontinued use in March 2022, was taking ergocalciferol 50,000 units once weekly.   Vitamin D Deficiency Screening Results:  Lab Results   Component Value Date    VITDT 12 (L) 07/27/2022    VITDT 17 (L) 03/17/2022     Today's Vitals: /64   Pulse 88   SpO2 90%   ----------------  I spent 60 minutes with this patient today. All changes were made via collaborative practice agreement with Giancarlo Arizmendi MD. A copy of the visit note was provided to the patient's provider(s).    The patient was given a summary of these recommendations.     Shital Hernandez, PharmD, BCACP  Medication Therapy Management Pharmacist     Medication Therapy Recommendations  Diabetic neuropathy associated with type 2 diabetes mellitus (H)    Current Medication: insulin glargine (LANTUS PEN) 100 UNIT/ML pen   Rationale: Dose too low - Dosage too low - Effectiveness   Recommendation: Increase Dose   Status: Accepted per CPA         Pain    Current Medication: gabapentin (NEURONTIN) 300 MG capsule (Discontinued)   Rationale: Dose too high - Dosage too high - Safety   Recommendation: Decrease Dose   Status: Accepted per Provider

## 2022-07-27 NOTE — Clinical Note
BIJAN Toth I did add ergo for her and send updated prescription with decreased dose due to her renal function.  Scottie

## 2022-07-27 NOTE — Clinical Note
See note. Blood sugar graph from July is much worse than blood sugar graph from June, though pt denies any missed doses. I am having her split the lantus dose in half to see if this can help with absorption.  Other thing to note, her gabapentin dose is higher than recommended daily dose of 600 mg considering her poor renal function, consider decreasing dose. If agreed I can notify patient.   Jeremías Scottie

## 2022-07-28 LAB — DEPRECATED CALCIDIOL+CALCIFEROL SERPL-MC: 12 UG/L (ref 20–75)

## 2022-07-28 RX ORDER — GLUCOSAMINE HCL/CHONDROITIN SU 500-400 MG
CAPSULE ORAL
Qty: 200 EACH | Refills: 11 | Status: SHIPPED | OUTPATIENT
Start: 2022-07-28 | End: 2022-12-19

## 2022-07-29 RX ORDER — ERGOCALCIFEROL 1.25 MG/1
50000 CAPSULE, LIQUID FILLED ORAL WEEKLY
Qty: 12 CAPSULE | Refills: 0 | Status: SHIPPED | OUTPATIENT
Start: 2022-07-29 | End: 2022-10-24

## 2022-08-01 RX ORDER — GABAPENTIN 300 MG/1
300 CAPSULE ORAL 2 TIMES DAILY
Qty: 60 CAPSULE | Refills: 5 | Status: SHIPPED | OUTPATIENT
Start: 2022-08-01 | End: 2023-03-22

## 2022-08-01 NOTE — PROGRESS NOTES
Called with [facility]  #[id number]. Called with lab results and medication changes today.     Vitamin D Deficiency Screening Results:  Lab Results   Component Value Date    VITDT 12 (L) 07/27/2022    VITDT 17 (L) 03/17/2022     Creatinine   Date Value Ref Range Status   07/27/2022 2.37 (H) 0.51 - 0.95 mg/dL Final   Due to renal function, recommend decreasing dose of gabapentin.     Plan:   1. Initiate ergocalciferol 50,000 units weekly - recheck vitamin d in 12 weeks  2. Decrease dose of gabapentin: max daily dose 600 mg due to renal function. Recommend patient take gabapentin 300 mg twice daily instead of 600 mg twice daily. Updated prescription today with approval per PCP.    Shital Hernandez, PharmD, BCACP  Medication Therapy Management Pharmacist

## 2022-08-09 ENCOUNTER — ANCILLARY PROCEDURE (OUTPATIENT)
Dept: MAMMOGRAPHY | Facility: CLINIC | Age: 52
End: 2022-08-09
Payer: COMMERCIAL

## 2022-08-09 ENCOUNTER — OFFICE VISIT (OUTPATIENT)
Dept: FAMILY MEDICINE | Facility: CLINIC | Age: 52
End: 2022-08-09

## 2022-08-09 VITALS
TEMPERATURE: 99.3 F | OXYGEN SATURATION: 91 % | SYSTOLIC BLOOD PRESSURE: 100 MMHG | HEART RATE: 95 BPM | RESPIRATION RATE: 16 BRPM | BODY MASS INDEX: 20.2 KG/M2 | WEIGHT: 109.8 LBS | DIASTOLIC BLOOD PRESSURE: 56 MMHG | HEIGHT: 62 IN

## 2022-08-09 DIAGNOSIS — E11.69 TYPE 2 DIABETES MELLITUS WITH OTHER SPECIFIED COMPLICATION, UNSPECIFIED WHETHER LONG TERM INSULIN USE (H): ICD-10-CM

## 2022-08-09 DIAGNOSIS — Z12.31 VISIT FOR SCREENING MAMMOGRAM: ICD-10-CM

## 2022-08-09 PROCEDURE — 77067 SCR MAMMO BI INCL CAD: CPT | Mod: TC | Performed by: RADIOLOGY

## 2022-08-09 PROCEDURE — 99214 OFFICE O/P EST MOD 30 MIN: CPT | Performed by: FAMILY MEDICINE

## 2022-08-09 RX ORDER — FLASH GLUCOSE SENSOR
1 KIT MISCELLANEOUS
Qty: 6 EACH | Refills: 4 | Status: SHIPPED | OUTPATIENT
Start: 2022-08-09 | End: 2022-12-19

## 2022-08-09 NOTE — PROGRESS NOTES
Assessment & Plan     Type 2 diabetes mellitus with other specified complication, unspecified whether long term insulin use (H)  Increase to 76 unit(s) daily, patient was only taking 70 unit(s) . Split 38 and 38 like they were doing before. Glucose has been hard to control. Was referred to endocrine for assistance as well, scheduled in October. Will follow closely.   - Continuous Blood Gluc Sensor (FREESTYLE MICKIE 14 DAY SENSOR) MISC  Dispense: 6 each; Refill: 4  - insulin glargine (LANTUS PEN) 100 UNIT/ML pen  Dispense: 75 mL; Refill: 3      Return in about 2 weeks (around 8/23/2022) for diabetes follow up, Medication Check.    Giancarlo Arizmendi MD  [facility] SHANKAR Barriga is a 52 year old, presenting for the following health issues:  Recheck Medication      History of Present Illness       Diabetes:   She presents for follow up of diabetes.  She is checking home blood glucose four or more times daily. She checks blood glucose before and after meals.  Blood glucose is sometimes over 200 and sometimes under 70. She is aware of hypoglycemia symptoms including shakiness. She is concerned about blood sugar frequently over 200. She is having numbness in feet, excessive thirst and blurry vision.         Reason for visit:  Diabetes    She eats 2-3 servings of fruits and vegetables daily.She consumes 0 sweetened beverage(s) daily.   She is taking medications regularly.     Patient was only taking 70 unit(s) lantus, not the 74 prescribed. Glucose is very high, consistent with her recent a1c of 10.4 about 1 month and half ago. She is splitting the insulin 35 and 35 unit(s) in different spots, once per day. Recommended increasing to 76 unit(s). She still has some neuropathy, blurry vision, increased thirst, but nothing different from before.       Review of Systems   Constitutional, HEENT, cardiovascular, pulmonary, gi and gu systems are negative, except as otherwise noted.      Objective    BP  "100/56   Pulse 95   Temp 99.3  F (37.4  C)   Resp 16   Ht 1.575 m (5' 2.01\")   Wt 49.8 kg (109 lb 12.8 oz)   SpO2 91%   BMI 20.08 kg/m    Body mass index is 20.08 kg/m .  Physical Exam   GENERAL: healthy, alert and no distress  RESP: lungs clear to auscultation - no rales, rhonchi or wheezes  CV: regular rate and rhythm, normal S1 S2, no S3 or S4, no murmur, click or rub, no peripheral edema and peripheral pulses strong  ABDOMEN: soft, nontender, no hepatosplenomegaly, no masses and bowel sounds normal  MS: no gross musculoskeletal defects noted, no edema  SKIN: no suspicious lesions or rashes  NEURO: Normal strength and tone, mentation intact and speech normal  PSYCH: mentation appears normal, affect normal/bright    Results for orders placed or performed in visit on 08/09/22   *MA Screening Digital Bilateral     Status: None    Narrative    BILATERAL FULL FIELD DIGITAL SCREENING MAMMOGRAM    Performed on: 8/9/22    No comparisons were made when reading this study. Baseline examination.    Technique: This study was evaluated with the assistance of Computer-Aided   Detection.    Findings: The breasts are extremely dense, which lowers the sensitivity of   mammography.  There is no radiographic evidence of malignancy.     IMPRESSION: ACR BI-RADS Category 1: Negative    RECOMMENDED FOLLOW-UP: Annual routine screening mammogram    The results and recommendations of this examination will be communicated   to the patient.                     .  ..  "

## 2022-08-10 ENCOUNTER — TELEPHONE (OUTPATIENT)
Dept: FAMILY MEDICINE | Facility: CLINIC | Age: 52
End: 2022-08-10

## 2022-08-10 NOTE — TELEPHONE ENCOUNTER
Forms/Letter Request    Type of form/letter: Medical Opinion (Also brought in form for Than Lay, MRN [id number] for Dr. Finley to fill out)    Have you been seen for this request: Yes 8/9/22     Do we have the form/letter: Yes: I will put the form in Dr. Arizmendi's blue folder    When is form/letter needed by: It is due 8/18/22    How would you like the form/letter returned:  BEFORE 8/18/22    Patient Notified form requests are processed in 3-5 business days:Yes    Okay to leave a detailed message?: Yes at Other phone number: [phone]

## 2022-08-16 ENCOUNTER — PATIENT OUTREACH (OUTPATIENT)
Dept: CARE COORDINATION | Facility: CLINIC | Age: 52
End: 2022-08-16

## 2022-08-16 NOTE — PROGRESS NOTES
Clinic Care Coordination Contact    Community Health Worker Follow Up  Spoke with patient through [employer]      Care Gaps:     Health Maintenance Due   Topic Date Due     Pneumococcal Vaccine: Pediatrics (0 to 5 Years) and At-Risk Patients (6 to 64 Years) (1 - PCV) Never done     ZOSTER IMMUNIZATION (1 of 2) Never done     HEPATITIS B IMMUNIZATION (1 of 3 - Risk 3-dose series) Never done     DTAP/TDAP/TD IMMUNIZATION (1 - Tdap) Never done     CHW to review care gaps at next outreach.    Goals:    Goals Addressed as of 8/16/2022 at 9:30 AM                    Today       1.Financial Wellbeing (pt-stated)   0%    Added 7/29/22 by Cris Smith, [facility]      Goal Statement: I want to apply for social security  Date Goal set:7/29/22  Barriers: language  Strengths: motivated to be financially stable  Date to Achieve By:10/11/22  Patient expressed understanding of goal: yes  Action steps to achieve this goal:  1. I will answer the phone when disability services call me  2. I will provide information and work with advocate  3. I will reach out to care coordinator if I have questions    Note: Online referral placed by CCSW on 7/29/22.      [phone]          Intervention and Education during outreach:       Patient has not received follow up call from  yet and will continue to watch for call. CHW reviewed  services and encouraged reaching out to CCC for support as needed.     CHW Plan: CHW to follow up in 1 month    Irene Samson  [facility] Care Coordination  [facility]    Phone: [phone]

## 2022-08-22 ENCOUNTER — PATIENT OUTREACH (OUTPATIENT)
Dept: CARE COORDINATION | Facility: CLINIC | Age: 52
End: 2022-08-22

## 2022-08-22 NOTE — PROGRESS NOTES
Clinic Care Coordination - Chart Review Only    Situation/Background: Patient chart reviewed by care coordinator related to [employer] conversion.    Assessment: Patient continues to be followed by Clinic Care Coordination.    Plan: Patient's chart updated to align with [employer] program for ongoing patient management.    Irene Samson  Clinic Care Coordination  [facility]    Phone: [phone]

## 2022-08-25 ENCOUNTER — TELEPHONE (OUTPATIENT)
Dept: FAMILY MEDICINE | Facility: CLINIC | Age: 52
End: 2022-08-25

## 2022-08-25 ENCOUNTER — OFFICE VISIT (OUTPATIENT)
Dept: FAMILY MEDICINE | Facility: CLINIC | Age: 52
End: 2022-08-25
Payer: COMMERCIAL

## 2022-08-25 VITALS
RESPIRATION RATE: 14 BRPM | TEMPERATURE: 98.6 F | WEIGHT: 107 LBS | OXYGEN SATURATION: 96 % | DIASTOLIC BLOOD PRESSURE: 70 MMHG | HEART RATE: 83 BPM | SYSTOLIC BLOOD PRESSURE: 116 MMHG | BODY MASS INDEX: 19.69 KG/M2 | HEIGHT: 62 IN

## 2022-08-25 DIAGNOSIS — Z23 NEED FOR VACCINATION: Primary | ICD-10-CM

## 2022-08-25 DIAGNOSIS — E11.69 TYPE 2 DIABETES MELLITUS WITH OTHER SPECIFIED COMPLICATION, UNSPECIFIED WHETHER LONG TERM INSULIN USE (H): ICD-10-CM

## 2022-08-25 DIAGNOSIS — N18.30 STAGE 3 CHRONIC KIDNEY DISEASE, UNSPECIFIED WHETHER STAGE 3A OR 3B CKD (H): ICD-10-CM

## 2022-08-25 DIAGNOSIS — E78.5 HYPERLIPIDEMIA LDL GOAL <100: ICD-10-CM

## 2022-08-25 PROCEDURE — 99214 OFFICE O/P EST MOD 30 MIN: CPT | Mod: 25 | Performed by: FAMILY MEDICINE

## 2022-08-25 PROCEDURE — 90472 IMMUNIZATION ADMIN EACH ADD: CPT | Performed by: FAMILY MEDICINE

## 2022-08-25 PROCEDURE — 90715 TDAP VACCINE 7 YRS/> IM: CPT | Performed by: FAMILY MEDICINE

## 2022-08-25 PROCEDURE — 90471 IMMUNIZATION ADMIN: CPT | Performed by: FAMILY MEDICINE

## 2022-08-25 PROCEDURE — 90677 PCV20 VACCINE IM: CPT | Performed by: FAMILY MEDICINE

## 2022-08-25 NOTE — TELEPHONE ENCOUNTER
General Call      Reason for Call:  Need clarification on insulin sig     What are your questions or concerns:  [facility] called and need clarification insulin glargine (LANTUS PEN) 100 UNIT/ML pen sig. Please follow back up with [facility] to advise. Thank you.       insulin glargine (LANTUS PEN) 100 UNIT/ML pen 75 mL 3 8/25/2022  No   Sig - Route: Inject 40 Units Subcutaneous every morning 40 unit(s) on each side - Subcutaneous     Pharmacy info:   [facility] - [place], [place] - [facility] [address]    Date of last appointment with provider: 8/25/22    Okay to leave a detailed message?: Yes at Home number on file [phone] (home)

## 2022-08-25 NOTE — PROGRESS NOTES
Assessment & Plan     Need for vaccination  - Pneumococcal 20 Valent Conjugate (Prevnar 20)  - TDAP VACCINE (Adacel, Boostrix)    Stage 3 chronic kidney disease, unspecified whether stage 3a or 3b CKD (H)  Consider referral to nephrology. Likely from dehydration and poorly ocntrolled DM.     Type 2 diabetes mellitus with other specified complication, unspecified whether long term insulin use (H)  Increased lantus to 80u daily, continue victoza. She receives 10 hours of pca - she needs it. Follow up with MTM in 3 weeks. Referred to DM educator, would definitely benefit from teaching. She eats a bowl of fruit for lunch every day, if she does not eat fruit she will eat a bowl of white rice. Spikes in glucose correlate.   - [facility] Adult Diabetes Educator Referral  - insulin glargine (LANTUS PEN) 100 UNIT/ML pen  Dispense: 75 mL; Refill: 3  - Hemoglobin A1c    Hyperlipidemia LDL goal <100  Continue statin.         Return in about 3 weeks (around 9/15/2022) for diabetes follow up, Medication Check, With MTM pharmacist.    30 minutes spent on the date of the encounter doing chart review, history and exam, documentation and further activities per the note    Visit was completed along with [facility]  and daughter    Options for treatment and follow-up care were reviewed with the patient. Nithya Byron and/or guardian was engaged and actively involved in the decision making process. Barriga Byron and/or guardian verbalized understanding of the options discussed and was satisfied with the final plan.    Giancarlo Arizmendi MD  [facility] is a 52 year old accompanied by her daughter, presenting for the following health issues:  Diabetes      History of Present Illness       Back Pain:  She presents for follow up of back pain. Patient's back pain is a chronic problem.  Location of back pain:  Right lower back, left lower back, right middle of back, left middle of back, right shoulder, left shoulder,  "right hip and left hip  Description of back pain: burning, sharp and shooting  Back pain spreads: right foot, left foot, right shoulder and left shoulder    Since patient first noticed back pain, pain is: always present, but gets better and worse  Does back pain interfere with her job:  Yes      Diabetes:   She presents for follow up of diabetes.  She is checking home blood glucose three times daily. She checks blood glucose before and after meals.  Blood glucose is sometimes over 200 and never under 70. She is aware of hypoglycemia symptoms including shakiness, dizziness, weakness, blurred vision and confusion. She is concerned about blood sugar frequently over 200. She is having numbness in feet, burning in feet and blurry vision.         Hypertension: She presents for follow up of hypertension.  She does not check blood pressure  regularly outside of the clinic. Outpatient blood pressures have not been over 140/90. She does not follow a low salt diet.         Increased insulin last visit to 78 unit(s)  Continue victoza daily, already at 1.8  Mealtime novolog 16 unit(s) tid  Metformin    She still has blurry vision, tired, weak/no energy. Nothing new. No nausea or vomiting.   She feels not much has changed since last visit - no lows and glucose still high.     Looking at her meter, prior to increase in insulin, her glucose was consistently \"hi\" on the meter or in the 400s. Since mid august her glucose is in the high 200s and a few in the 110s-140s. This is a significant improvement.     She seems to have a sharp spike around noon when she has her lunch. She eats a bowl of sweet fruits with lemon juice every day for lunch, at the same time.       Review of Systems   Constitutional, HEENT, cardiovascular, pulmonary, gi and gu systems are negative, except as otherwise noted.      Objective    /70   Pulse 83   Temp 98.6  F (37  C) (Oral)   Resp 14   Ht 1.575 m (5' 2.01\")   Wt 48.5 kg (107 lb)   SpO2 96%   " BMI 19.57 kg/m    Body mass index is 19.57 kg/m .  Physical Exam   GENERAL: healthy, alert and no distress  EYES: Eyes grossly normal to inspection, PERRL and conjunctivae and sclerae normal  HENT: ear canals and TM's normal, nose and mouth without ulcers or lesions  NECK: no adenopathy, no asymmetry, masses, or scars and thyroid normal to palpation  RESP: lungs clear to auscultation - no rales, rhonchi or wheezes  BREAST: normal without masses, tenderness or nipple discharge and no palpable axillary masses or adenopathy  CV: regular rate and rhythm, normal S1 S2, no S3 or S4, no murmur, click or rub, no peripheral edema and peripheral pulses strong  ABDOMEN: soft, nontender, no hepatosplenomegaly, no masses and bowel sounds normal  MS: no gross musculoskeletal defects noted, no edema  SKIN: no suspicious lesions or rashes  NEURO: Normal strength and tone, mentation intact and speech normal  PSYCH: mentation appears normal, affect normal/bright    No results found for this or any previous visit (from the past 24 hour(s)).                .  ..

## 2022-08-26 NOTE — TELEPHONE ENCOUNTER
Acknowledged.  Provider made correction to insulin.    Amos Carballo, ZAYRAN, RN  [facility]

## 2022-09-14 ENCOUNTER — LAB (OUTPATIENT)
Dept: LAB | Facility: CLINIC | Age: 52
End: 2022-09-14
Payer: COMMERCIAL

## 2022-09-14 ENCOUNTER — OFFICE VISIT (OUTPATIENT)
Dept: PHARMACY | Facility: CLINIC | Age: 52
End: 2022-09-14
Payer: COMMERCIAL

## 2022-09-14 VITALS
OXYGEN SATURATION: 94 % | DIASTOLIC BLOOD PRESSURE: 82 MMHG | BODY MASS INDEX: 19.84 KG/M2 | SYSTOLIC BLOOD PRESSURE: 136 MMHG | HEART RATE: 93 BPM | WEIGHT: 108.5 LBS

## 2022-09-14 DIAGNOSIS — E11.69 TYPE 2 DIABETES MELLITUS WITH OTHER SPECIFIED COMPLICATION, UNSPECIFIED WHETHER LONG TERM INSULIN USE (H): Primary | ICD-10-CM

## 2022-09-14 DIAGNOSIS — E78.5 HYPERLIPIDEMIA LDL GOAL <100: ICD-10-CM

## 2022-09-14 DIAGNOSIS — F32.A DEPRESSION, UNSPECIFIED DEPRESSION TYPE: ICD-10-CM

## 2022-09-14 DIAGNOSIS — R52 PAIN: ICD-10-CM

## 2022-09-14 DIAGNOSIS — I10 HYPERTENSION GOAL BP (BLOOD PRESSURE) < 140/90: ICD-10-CM

## 2022-09-14 DIAGNOSIS — B37.31 YEAST INFECTION OF THE VAGINA: ICD-10-CM

## 2022-09-14 DIAGNOSIS — E11.69 TYPE 2 DIABETES MELLITUS WITH OTHER SPECIFIED COMPLICATION, UNSPECIFIED WHETHER LONG TERM INSULIN USE (H): ICD-10-CM

## 2022-09-14 DIAGNOSIS — K21.00 GASTROESOPHAGEAL REFLUX DISEASE WITH ESOPHAGITIS WITHOUT HEMORRHAGE: ICD-10-CM

## 2022-09-14 DIAGNOSIS — E55.9 VITAMIN D DEFICIENCY: ICD-10-CM

## 2022-09-14 LAB
ANION GAP SERPL CALCULATED.3IONS-SCNC: 12 MMOL/L (ref 7–15)
BUN SERPL-MCNC: 18.8 MG/DL (ref 6–20)
CALCIUM SERPL-MCNC: 6.1 MG/DL (ref 8.6–10)
CHLORIDE SERPL-SCNC: 101 MMOL/L (ref 98–107)
CHOLEST SERPL-MCNC: 307 MG/DL
CREAT SERPL-MCNC: 2.53 MG/DL (ref 0.51–0.95)
DEPRECATED HCO3 PLAS-SCNC: 26 MMOL/L (ref 22–29)
GFR SERPL CREATININE-BSD FRML MDRD: 22 ML/MIN/1.73M2
GLUCOSE SERPL-MCNC: 329 MG/DL (ref 70–99)
HBA1C MFR BLD: 11.1 % (ref 0–5.6)
HDLC SERPL-MCNC: 24 MG/DL
LDLC SERPL CALC-MCNC: ABNORMAL MG/DL
NONHDLC SERPL-MCNC: 283 MG/DL
POTASSIUM SERPL-SCNC: 3.3 MMOL/L (ref 3.4–5.3)
SODIUM SERPL-SCNC: 139 MMOL/L (ref 136–145)
TRIGL SERPL-MCNC: 1750 MG/DL

## 2022-09-14 PROCEDURE — 80061 LIPID PANEL: CPT | Performed by: FAMILY MEDICINE

## 2022-09-14 PROCEDURE — 83721 ASSAY OF BLOOD LIPOPROTEIN: CPT | Mod: 59 | Performed by: FAMILY MEDICINE

## 2022-09-14 PROCEDURE — 99606 MTMS BY PHARM EST 15 MIN: CPT | Performed by: PHARMACIST

## 2022-09-14 PROCEDURE — 83036 HEMOGLOBIN GLYCOSYLATED A1C: CPT | Performed by: FAMILY MEDICINE

## 2022-09-14 PROCEDURE — 99607 MTMS BY PHARM ADDL 15 MIN: CPT | Performed by: PHARMACIST

## 2022-09-14 PROCEDURE — 80048 BASIC METABOLIC PNL TOTAL CA: CPT | Performed by: FAMILY MEDICINE

## 2022-09-14 PROCEDURE — 36415 COLL VENOUS BLD VENIPUNCTURE: CPT | Performed by: FAMILY MEDICINE

## 2022-09-14 RX ORDER — FENOFIBRATE 54 MG/1
54 TABLET ORAL DAILY
Qty: 90 TABLET | Refills: 1 | Status: SHIPPED | OUTPATIENT
Start: 2022-09-14 | End: 2023-03-31

## 2022-09-14 RX ORDER — FLUCONAZOLE 150 MG/1
150 TABLET ORAL ONCE
Qty: 1 TABLET | Refills: 0 | Status: SHIPPED | OUTPATIENT
Start: 2022-09-14 | End: 2022-09-14

## 2022-09-14 NOTE — PROGRESS NOTES
Medication Therapy Management (MTM) Encounter    ASSESSMENT:                            Medication Adherence/Access: No issues identified.  Previously had lengthy discussion with patient about medication adherence.    1. Type 2 diabetes mellitus with other specified complication, unspecified whether long term insulin use (H)  A1c rechecked today, elevated, not meeting goal of <7%.  Since last visit patient's dose of Lantus was increased, blood sugars have shown improvement, specifically fasting blood sugars.  In the future, could consider switching current Lantus to Toujeo considering high quantity of insulin.  For now, focusing on postprandial sugars and recommended patient increase dose of bolus insulin today, agreeable to plan.  PCP has also sent referral to endocrinologist for further assessment.    2. Hyperlipidemia LDL goal <100  When last monitored, patient's triglycerides were significantly elevated and fenofibrate was reinitiated.  Lipids rechecked today remained elevated, recommend recheck when patient is fasting.    3. Hypertension goal BP (blood pressure) < 140/90  BP at goal today of <140/90.  Recently hydrochlorothiazide discontinued due to hypotension, patient's dizziness has since improved.  No changes recommended today.  Unfortunately patient's renal function did not show improvement when monitored today, per discussion with PCP sending referral to nephrology.    4. Pain  Stable on current therapy.    5. Depression, unspecified depression type  Stable.    6. Gastroesophageal reflux disease with esophagitis without hemorrhage  Stable.    7. Vitamin D deficiency  Last vitamin D was low.  Patient currently taking ergocalciferol once weekly.  Recommend rechecking vitamin D level in early November (after completion of 12-weeks ergocalciferol).    8. Yeast infection of the vagina  Per discussion with PCP today, will treat patient with 1 day course fluconazole.  Prescription sent to the pharmacy today with  verbal approval from PCP.    PLAN:                            1.  Labs today: A1c, Lipids, BMP -also reordering lipid cascade today for recheck when patient is fasting  2.  Increase insulin dose: Novolog 18 units three times daily before meals  3.  Placing referral today to nephrology per discussion with PCP  4.  Patient to take fluconazole 150 mg x 1 dose    Future:  - Consider switching Lantus to Toujeo    Follow-up: Return in about 4 weeks (around 10/12/2022) for with me.   PCP 11/28; CDE 11/1  SUBJECTIVE/OBJECTIVE:                          Nithya Matthews is a 52 year old female coming in for a follow-up visit. Patient was accompanied by daughter. [facility] Today's visit is a follow-up MTM visit from 7/27/22     Reason for visit: MTM follow up    Allergies/ADRs: Reviewed in chart  Past Medical History: Reviewed in chart  Tobacco: She reports that she has never smoked. She has never used smokeless tobacco.  Alcohol: none    Medication Adherence/Access:   Missed a few days of Victoza because the pharmacy told her it was too soon to be filled.  Daughter organizes medication in pillbox - takes medications twice daily.   - Reports no missed medication doses. No missed oral or injectable doses lately.   - Brings with her meter, injections, and med vials.     Sx Yeast infection: Patient notes that she has had white vaginal discharge that's itchy for the last week. Endorsing painful urination. This has happened in the past, last was about a year ago. Also notes that she is feeling cold under her skin all over her body - this comes and goes, always has happened to her. No fever or chills noted though.    Type 2 Diabetes:    PCP recently referred to endocrinology d/t persistently uncontrolled A1c, visit scheduled for 12/19/22.  Lantus 80 units daily (40 on each side of the abdomen) AM   Novolog 16 units 3 times a day before meals   Victoza 1.8 mg daily AM. Appetite is the same, no concerns.   -  Previously her daughter was giving the insulin to her, now she administers on her own.  Denies any missed doses of insulin or GLP-1 recently.   - Blood sugar monitoring: Per CGM      Symptoms of low blood sugar? Sweating only sometimes, none recently  Symptoms of high blood sugar? Reports normal urination, admits blurred vision  Eye exam: up to date  Foot exam: up to date  Diet/Exercise: Eats 3 meals per day. Has not been meeting with diabetic educator. States that her diet is the same, no changes.   Aspirin: Taking 81mg daily and has the following issues: nose bleeds 2-3 times per week that stop quickly.  Statin: Yes: atrovastatin   ACEi/ARB: Yes: lisinopril.    Lab Results   Component Value Date    A1C 11.1 09/14/2022    A1C 10.4 06/02/2022    A1C 14.4 02/28/2022     BP Readings from Last 3 Encounters:   09/14/22 136/82   08/25/22 116/70   08/09/22 100/56      Pulse Readings from Last 3 Encounters:   09/14/22 93   08/25/22 83   08/09/22 95     Wt Readings from Last 3 Encounters:   09/14/22 108 lb 8 oz (49.2 kg)   08/25/22 107 lb (48.5 kg)   08/09/22 109 lb 12.8 oz (49.8 kg)     Hypertension:   lisinopril 40 mg daily PM  metoprolol succinate 200 mg daily AM  - Reports that her dizziness is better now.   - Med Hx: hydrochlorothiazide d/c'ed by PCP on 6/2/22 due to hypotension, amlodipine dose decreased from 10 to 5 mg on 5/4/22 due to edema and stopped per pcp on 8/25 due to hypotension  BP Readings from Last 3 Encounters:   09/14/22 136/82   08/25/22 116/70   08/09/22 100/56     Creatinine   Date Value Ref Range Status   09/14/2022 2.53 (H) 0.51 - 0.95 mg/dL Final     Potassium   Date Value Ref Range Status   09/14/2022 3.3 (L) 3.4 - 5.3 mmol/L Final   06/02/2022 3.0 (L) 3.5 - 5.0 mmol/L Final      Latest Reference Range & Units 02/28/22 13:46   Microalbumin Urine mg/g Cr <=19.9 mg/g Cr 3,659.0 (H)     Hyperlipidemia:   atorvastatin 80mg daily PM    Fish Oil 1,000 mg daily AM  Fenofibrate 54 mg daily AM (does not  bring this with her today)  Patient reports no significant myalgias or other side effects.   -Of note, patient nonfasting when getting lipids rechecked today  Recent Labs   Lab Test 09/14/22  1518 04/14/22  0808 12/08/21  0607 12/06/21  1050   CHOL 307* 260* 150 194   HDL 24*  --  18* 28*   LDL 30  --   --  40   TRIG 1,750* 1,283* 656* 1,018*     Pain:   Gabapentin 300 mg twice a day (dose decreased in July due to renal function)  Acetaminophen 500 mg as needed for pain (states that she has not been taking this)  - Reports neuropathy is better, no more nerve pain.     Depression/Insomnia:   Escitalopram 10 mg daily AM.   Mirtazapine 7.5 mg daily at bedtime   - Unable to assess condition today.   PHQ 12/13/2021   PHQ-9 Total Score 8   Q9: Thoughts of better off dead/self-harm past 2 weeks Not at all     GERD:   Omeprazole 20 mg daily  - Historically states that she does notice acid reflux maybe once every 2 weeks and usually only when eating spicy foods. Trying to avoid these foods now.     Vitamin d deficiency:  ergocalciferol 50,000 units once weekly.   Vitamin D Deficiency Screening Results:  Lab Results   Component Value Date    VITDT 12 (L) 07/27/2022    VITDT 17 (L) 03/17/2022     Today's Vitals: /82   Pulse 93   Wt 108 lb 8 oz (49.2 kg)   SpO2 94%   BMI 19.84 kg/m    ----------------      I spent 45 minutes with this patient today. All changes were made via collaborative practice agreement with Giancarlo Arizmendi MD. A copy of the visit note was provided to the patient's provider(s).    The patient declined a summary of these recommendations.     Shital Hernandez, PharmD, BCACP  Medication Therapy Management Pharmacist     Medication Therapy Recommendations  Type 2 diabetes mellitus with other specified complication, unspecified whether long term insulin use (H)    Current Medication: insulin aspart (NOVOLOG PEN) 100 UNIT/ML pen   Rationale: Dose too low - Dosage too low - Effectiveness    Recommendation: Increase Dose   Status: Accepted per CPA

## 2022-09-15 ENCOUNTER — TELEPHONE (OUTPATIENT)
Dept: FAMILY MEDICINE | Facility: CLINIC | Age: 52
End: 2022-09-15

## 2022-09-15 LAB — LDLC SERPL DIRECT ASSAY-MCNC: 30 MG/DL

## 2022-09-15 NOTE — TELEPHONE ENCOUNTER
Called patient with the assistance of an  to relay provider message regarding test result. Lab only appointment scheduled. Instruct patient nothing to eat 12 hours before appointment.  Referral contact provided.  Patient verbalized understood recommendation.    PATRICIA Chavez, RN  [facility]

## 2022-09-15 NOTE — TELEPHONE ENCOUNTER
----- Message from Shital Hernandez PharmD sent at 9/15/2022 10:14 AM CDT -----  Please contact patient and let her know that she needs to come in for FASTING cholesterol check at soonest available. Also sending referral to kidney specialist - they will reach out to schedule this. Her kidneys are the same, unimproved.

## 2022-09-21 ENCOUNTER — LAB (OUTPATIENT)
Dept: LAB | Facility: CLINIC | Age: 52
End: 2022-09-21
Payer: COMMERCIAL

## 2022-09-21 DIAGNOSIS — E11.69 TYPE 2 DIABETES MELLITUS WITH OTHER SPECIFIED COMPLICATION, UNSPECIFIED WHETHER LONG TERM INSULIN USE (H): ICD-10-CM

## 2022-09-21 DIAGNOSIS — E78.5 HYPERLIPIDEMIA LDL GOAL <100: ICD-10-CM

## 2022-09-21 LAB
CHOLEST SERPL-MCNC: 310 MG/DL
HBA1C MFR BLD: 11 % (ref 0–5.6)
HDLC SERPL-MCNC: 25 MG/DL
LDLC SERPL CALC-MCNC: ABNORMAL MG/DL
LDLC SERPL DIRECT ASSAY-MCNC: 37 MG/DL
NONHDLC SERPL-MCNC: 285 MG/DL
TRIGL SERPL-MCNC: 1627 MG/DL

## 2022-09-21 PROCEDURE — 83721 ASSAY OF BLOOD LIPOPROTEIN: CPT | Mod: 59 | Performed by: FAMILY MEDICINE

## 2022-09-21 PROCEDURE — 83036 HEMOGLOBIN GLYCOSYLATED A1C: CPT | Performed by: FAMILY MEDICINE

## 2022-09-21 PROCEDURE — 36415 COLL VENOUS BLD VENIPUNCTURE: CPT | Performed by: FAMILY MEDICINE

## 2022-09-21 PROCEDURE — 80061 LIPID PANEL: CPT | Performed by: FAMILY MEDICINE

## 2022-09-30 ENCOUNTER — PATIENT OUTREACH (OUTPATIENT)
Dept: CARE COORDINATION | Facility: CLINIC | Age: 52
End: 2022-09-30

## 2022-09-30 NOTE — PROGRESS NOTES
Clinic Care Coordination Contact  Community Health Worker Follow Up    Spoke with patient through [facility] -  ID [id number]    Care Gaps: Did Not Address    Health Maintenance Due   Topic Date Due     ZOSTER IMMUNIZATION (1 of 2) Never done     COVID-19 Vaccine (5 - Booster) 07/28/2022     INFLUENZA VACCINE (1) Never done         Care Plan:    Addressed on 9/30/22     Goal Statement: I want to apply for social security  Date Goal set:7/29/22  Barriers: language  Strengths: motivated to be financially stable  Date to Achieve By:10/11/22  Patient expressed understanding of goal: yes  Action steps to achieve this goal:  1. I will answer the phone when disability services call me  2. I will provide information and work with advocate  3. I will reach out to care coordinator if I have questions    Note: Online referral placed by CCSW on 7/29/22.      [phone]         Intervention and Education during outreach:     Patient stated she has not received a call from .    CHW called  with patient and .  Spoke with Faizan.  Faizan checked the online referrals.  She did not have one for this patient.  However, Faizan stated many online referrals were recently lost due to their system change.  Faizan transferred the call to [place] to begin the intake process..    Ayleen started the intake process with patient.  As the process and questions continued, patient is not eligible for any benefits with  / Social Security at this time.    Patient was born in [facility].  Moved to the [place] in [place].  Patient has not worked or had any employment since her move to the [place].  Patient does have her green card.  Patient does not have [place] Citizenship.  Patient stated she applied for [place] in January of 2022.    Ayleen with  stated when a person moves to the [place], that person has seven years to apply for  Social Security benefits having their green card.  After 7 years, they must be a [place] Citizen.  If a person has worked, then they would be able to apply for benefits using worked hours.  Patient should have applied by 2020 with her green card.    At this time, patient is not egilible for Disability / Social Security.    Steps per Ayleen at :    Once patient receives her [place] Citizens[place] paperwork,  1) Patient should go to the Social Security office in person.  2) Patient should show the Social  the original paperwork of her [place] Citizenship.  During this time, request an in person appointment to apply for Social Security benefits.      CHW Plan: will put current goal on hold.  CHW will route to [facility].    Kaylah Rendon, DORY  Clinic Care Coordination  [facility]: [facility] [place], and [facility]  Phone: [phone]

## 2022-10-06 ENCOUNTER — OFFICE VISIT (OUTPATIENT)
Dept: OPHTHALMOLOGY | Facility: CLINIC | Age: 52
End: 2022-10-06
Payer: COMMERCIAL

## 2022-10-06 DIAGNOSIS — E11.3393 TYPE 2 DIABETES MELLITUS WITH BOTH EYES AFFECTED BY MODERATE NONPROLIFERATIVE RETINOPATHY WITHOUT MACULAR EDEMA, WITH LONG-TERM CURRENT USE OF INSULIN (H): Primary | ICD-10-CM

## 2022-10-06 DIAGNOSIS — H04.129 DRY EYE: ICD-10-CM

## 2022-10-06 DIAGNOSIS — Z79.4 TYPE 2 DIABETES MELLITUS WITH BOTH EYES AFFECTED BY MODERATE NONPROLIFERATIVE RETINOPATHY WITHOUT MACULAR EDEMA, WITH LONG-TERM CURRENT USE OF INSULIN (H): Primary | ICD-10-CM

## 2022-10-06 DIAGNOSIS — H35.371 EPIRETINAL MEMBRANE (ERM) OF RIGHT EYE: ICD-10-CM

## 2022-10-06 DIAGNOSIS — Z96.1 PSEUDOPHAKIA OF BOTH EYES: ICD-10-CM

## 2022-10-06 PROCEDURE — 92014 COMPRE OPH EXAM EST PT 1/>: CPT | Performed by: OPTOMETRIST

## 2022-10-06 ASSESSMENT — CONF VISUAL FIELD
OD_NORMAL: 1
OS_NORMAL: 1
METHOD: COUNTING FINGERS

## 2022-10-06 ASSESSMENT — EXTERNAL EXAM - LEFT EYE: OS_EXAM: NORMAL

## 2022-10-06 ASSESSMENT — REFRACTION_WEARINGRX
OD_ADD: +2.50
OS_CYLINDER: +0.25
OS_AXIS: 130
OS_ADD: +2.50
OD_AXIS: 009
OS_SPHERE: -1.00
OD_SPHERE: -1.50
OD_CYLINDER: +0.50

## 2022-10-06 ASSESSMENT — VISUAL ACUITY
OD_SC+: -2
OS_SC: 20/40
OD_PH_SC: 20/30
OS_PH_SC: 20/30
OD_SC: 20/40
METHOD: SNELLEN - LINEAR

## 2022-10-06 ASSESSMENT — REFRACTION_MANIFEST
OD_CYLINDER: +0.50
OS_AXIS: 130
OD_SPHERE: -1.25
OS_SPHERE: -1.00
OS_CYLINDER: +0.25
OD_AXIS: 009
OD_ADD: +2.50
OS_ADD: +2.50

## 2022-10-06 ASSESSMENT — SLIT LAMP EXAM - LIDS
COMMENTS: NORMAL
COMMENTS: NORMAL

## 2022-10-06 ASSESSMENT — TONOMETRY
OD_IOP_MMHG: 10
IOP_METHOD: ICARE
OS_IOP_MMHG: 8

## 2022-10-06 ASSESSMENT — CUP TO DISC RATIO
OD_RATIO: 0.45
OS_RATIO: 0.45

## 2022-10-06 ASSESSMENT — EXTERNAL EXAM - RIGHT EYE: OD_EXAM: NORMAL

## 2022-10-06 NOTE — PROGRESS NOTES
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 Now with mild blot heme/exudate only. No macular edema  -Reviewed findings with pt including need to reduce BS levels to avoid further retinopathy  -Monitor 1 year at this time    2.) Pseudophakia OU  -Doing well, lenses clear without PCO  -Mild nearsighted Rx  -Reduced BCVA left eye>right eye without obvious cause, stable to last exam    3.) ERM right eye  -Likely not visually significant   -Monitor    RTC 1 year diabetic eye exam, sooner prn with acute vision changes    I have confirmed the patient's CC, HPI and reviewed Past Medical History, Past Surgical History, Social History, Family History, Problem List, Medication List and agree with Tech note.     Miracle Mi, OD FAAO FSLS

## 2022-10-06 NOTE — NURSING NOTE
Chief Complaints and History of Present Illnesses   Patient presents with     Diabetic Eye Exam     Chief Complaint(s) and History of Present Illness(es)     Diabetic Eye Exam     Vision: is blurred for near, is blurred for distance and fluctuates with blood sugars    Diabetes Type: Type 2              Comments     Patient present for diabetic eye exam. Patient states stinging in both eyes everyday.  Patient has trouble with near and distance vision each eye.    Lab Results       Component                Value               Date                       A1C                      11.0                09/21/2022                 A1C                      11.1                09/14/2022                 A1C                      10.4                06/02/2022                 A1C                      14.4                02/28/2022                 A1C                      14.5                12/06/2021             NANCY Rodriguez October 6, 2022 9:54 AM

## 2022-10-12 ENCOUNTER — OFFICE VISIT (OUTPATIENT)
Dept: PHARMACY | Facility: CLINIC | Age: 52
End: 2022-10-12
Payer: COMMERCIAL

## 2022-10-12 VITALS
HEART RATE: 91 BPM | OXYGEN SATURATION: 97 % | SYSTOLIC BLOOD PRESSURE: 112 MMHG | BODY MASS INDEX: 19.95 KG/M2 | DIASTOLIC BLOOD PRESSURE: 74 MMHG | WEIGHT: 109.08 LBS

## 2022-10-12 DIAGNOSIS — F32.A DEPRESSION, UNSPECIFIED DEPRESSION TYPE: ICD-10-CM

## 2022-10-12 DIAGNOSIS — I10 HYPERTENSION GOAL BP (BLOOD PRESSURE) < 140/90: ICD-10-CM

## 2022-10-12 DIAGNOSIS — K21.00 GASTROESOPHAGEAL REFLUX DISEASE WITH ESOPHAGITIS WITHOUT HEMORRHAGE: ICD-10-CM

## 2022-10-12 DIAGNOSIS — E78.5 HYPERLIPIDEMIA LDL GOAL <100: ICD-10-CM

## 2022-10-12 DIAGNOSIS — R79.89 LOW VITAMIN B12 LEVEL: ICD-10-CM

## 2022-10-12 DIAGNOSIS — G47.00 INSOMNIA, UNSPECIFIED TYPE: ICD-10-CM

## 2022-10-12 DIAGNOSIS — E11.69 TYPE 2 DIABETES MELLITUS WITH OTHER SPECIFIED COMPLICATION, UNSPECIFIED WHETHER LONG TERM INSULIN USE (H): Primary | ICD-10-CM

## 2022-10-12 DIAGNOSIS — R79.89 LOW VITAMIN D LEVEL: ICD-10-CM

## 2022-10-12 DIAGNOSIS — B37.31 YEAST INFECTION OF THE VAGINA: ICD-10-CM

## 2022-10-12 DIAGNOSIS — R52 PAIN: ICD-10-CM

## 2022-10-12 DIAGNOSIS — E61.1 LOW IRON: ICD-10-CM

## 2022-10-12 PROCEDURE — 99606 MTMS BY PHARM EST 15 MIN: CPT | Performed by: PHARMACIST

## 2022-10-12 PROCEDURE — 99607 MTMS BY PHARM ADDL 15 MIN: CPT | Performed by: PHARMACIST

## 2022-10-12 RX ORDER — LANOLIN ALCOHOL/MO/W.PET/CERES
3 CREAM (GRAM) TOPICAL
Qty: 30 TABLET | Refills: 3 | Status: SHIPPED | OUTPATIENT
Start: 2022-10-12 | End: 2022-12-19

## 2022-10-12 RX ORDER — POLYETHYLENE GLYCOL 3350 17 G/17G
17 POWDER, FOR SOLUTION ORAL DAILY
Qty: 510 G | Refills: 3 | Status: SHIPPED | OUTPATIENT
Start: 2022-10-12 | End: 2022-12-19

## 2022-10-12 NOTE — PATIENT INSTRUCTIONS
PLAN:                          1.  Increase Novolog to 22 units three times daily 5-10 mins before eating  2. Start taking melatonin 3mg at bedtime as needed  3. Start Miralax 17g once daily

## 2022-10-12 NOTE — PROGRESS NOTES
Medication Therapy Management (MTM) Encounter    ASSESSMENT:                            Medication Adherence/Access: No issues identified    Type 2 diabetes mellitus with other specified complication, unspecified whether long term insulin use (H)  A1c rechecked today, elevated, not meeting goal of <7%. Unfortunately given patient's kidney function, metformin is contraindicated. Patient is using large quantities of both basal and bolus insulin and in the future, could consider switching current Lantus to Toujeo.  For now, focusing on postprandial sugars and recommended patient increase dose of bolus insulin today, agreeable to plan. Patient understands how to check sugars and appropriately administer insulin. She also understands the importance of diet and exercise in helping improve her sugars.  PCP has also sent referral to endocrinologist for further assessment.    Hypertension goal BP (blood pressure) < 140/90  BP at goal of <140/90 and pulse is elevated at 91 today in clinic. Patient has upcoming appointment with nephrology. Recommend patient continue with current regimen.     Hyperlipidemia LDL goal <100  Lipids remain elevated although patient is on high intensity statin, fish oil, and fenofibrate. Would expect improvement in triglycerides as her sugars become better controlled.  At this time, would not adjust medications, but encourage lifestyle changes. Recommend rechecking lipids in March.     Pain  Stable on current therapy. In future, could consider scheduling Tylenol. Unfortunately, unable to increase gabapentin dose given renal function.     Depression/Insomnia  Depression is stable. Patient does have some difficulty falling asleep for which as needed melatonin may be helpful. Prescription sent to pharmacy and reviewed instructions with patient.     Gastroesophageal reflux disease with esophagitis without hemorrhage  Stable. In future, could consider switching off of PPI and starting H2RA.     Vitamin D  deficiency  Last vitamin D was WNL.  Patient currently taking ergocalciferol once weekly.  Recommend rechecking vitamin D level in early November (after completion of 12-weeks ergocalciferol).    Low Iron:   Patient would benefit from starting Miralax given the increase in constipation, potentially due to her daily iron supplement. Prescription sent to pharmacy for patient to start.      Yeast infection of the vagina  Resolved after treatment.     Low Vitamin B12:   Stable, continue with current therapy.     PLAN:                            1.  Increase Novolog to 22 units three times daily 5-10 mins before eating  2. Start taking melatonin 3mg at bedtime as needed  3. Start Miralax 17g once daily  4. Scan sensor at least three times a day      Future:  - Consider switching Lantus to Toujeo  - Recommend rechecking Vitamin D in November    Follow-up: Return in about 11 weeks (around 12/28/2022) for Follow up, with me, in person.   PCP 11/28; CDE 11/1; and nephrology and Endo  SUBJECTIVE/OBJECTIVE:                          Nithya Matthews is a 52 year old female coming in for a follow-up visit. Patient was accompanied by daughter. [facility] . Today's visit is a follow-up MTM visit from 9/14/22ID# [id number]      Reason for visit: MTM follow up    Allergies/ADRs: Reviewed in chart  Past Medical History: Reviewed in chart  Tobacco: She reports that she has never smoked. She has never used smokeless tobacco.  Alcohol: none    Medication Adherence/Access:   Missed a few days of Victoza because the pharmacy told her it was too soon to be filled.  Daughter organizes medication in pillbox - takes medications twice times daily.   - Reports no missed medication doses. No missed oral or injectable doses lately.   - Brings with her meter, injections, pillbox and med vials.     Type 2 Diabetes:    PCP recently referred to endocrinology d/t persistently uncontrolled A1c, visit scheduled for 12/19/22. Patient not on  metformin anymore due to CrCl. Check in clinic is 308 and she reports using Novolog prior to lunch   Lantus 80 units daily (40 on each side of the abdomen) AM   Novolog 18 units 3 times a day before meals  Victoza 1.8 mg daily AM. Appetite is the same, no concerns.   - Previously her daughter was giving the insulin to her, now she administers on her own.  Denies any missed doses of insulin or GLP-1 recently  - Blood sugar monitoring: Per CGM          Symptoms of low blood sugar? Sweating only sometimes, none recently  Symptoms of high blood sugar? Reports normal urination, admits blurred vision  Eye exam: up to date  Foot exam: up to date  Diet/Exercise: Eats 3 meals per day. Has not been meeting with diabetic educator. States that her diet is the same, no changes and is eating rice, veggies and ocasio. Drinks water and hot tea but does not drink sugary drinks. Breakfast 10am, Lunch 1pm, Dinner 6pm  Aspirin: Taking 81mg daily (denies anymore nose bleeds)  Statin: Yes: atrovastatin   ACEi/ARB: Yes: lisinopril.    Lab Results   Component Value Date    A1C 11.0 09/21/2022    A1C 11.1 09/14/2022    A1C 10.4 06/02/2022      Latest Reference Range & Units 02/28/22 13:46   Microalbumin Urine mg/g Cr <=19.9 mg/g Cr 3,659.0 (H)   (H): Data is abnormally high    Hypertension:   lisinopril 40 mg daily PM  metoprolol succinate 200 mg daily AM  - Denies feeling dizzy, lightheaded or headaches, but endorses blurry vision   - Med Hx: hydrochlorothiazide d/c'ed by PCP on 6/2/22 due to hypotension, amlodipine dose decreased from 10 to 5 mg on 5/4/22 due to edema and stopped per pcp on 8/25 due to hypotension  BP Readings from Last 3 Encounters:   10/12/22 112/74   09/14/22 136/82   08/25/22 116/70      Pulse Readings from Last 3 Encounters:   10/12/22 91   09/14/22 93   08/25/22 83     Creatinine   Date Value Ref Range Status   09/14/2022 2.53 (H) 0.51 - 0.95 mg/dL Final     Potassium   Date Value Ref Range Status   09/14/2022 3.3  (L) 3.4 - 5.3 mmol/L Final   06/02/2022 3.0 (L) 3.5 - 5.0 mmol/L Final        Latest Reference Range & Units 02/28/22 13:46   Microalbumin Urine mg/g Cr <=19.9 mg/g Cr 3,659.0 (H)     Hyperlipidemia:   Atorvastatin 80mg daily PM    Fish Oil 1,000 mg daily AM  Fenofibrate 54 mg daily AM   -  Reports unilateral cramping in calf that occurs when she strains herself   - Of note, patient nonfasting when getting lipids rechecked 9/21/22  Recent Labs   Lab Test 09/21/22  0814 09/14/22  1518   CHOL 310* 307*   HDL 25* 24*   LDL 37 30   TRIG 1,627* 1,750*     Pain:   Gabapentin 300 mg twice a day (dose decreased in July due to renal function)  Acetaminophen 500 mg as needed for pain (states that she has not been taking this)  - Patient with all over body aches for which she takes Tylenol  - Endorses numbness and tingling in her fingers and toes but states it has improved but not completely resolved    Depression/Insomnia:   Escitalopram 10 mg daily AM.   Mirtazapine 7.5 mg daily at bedtime   - Has been feeling good and okay   - Sleeps well at night for the most part but will occasionally have issues sleeping (about 2 times per week)   PHQ 12/13/2021   PHQ-9 Total Score 8   Q9: Thoughts of better off dead/self-harm past 2 weeks Not at all     GERD:   Omeprazole 20 mg daily  - Patient with epigastric pains that she states is not related to eating and describes that as a dull pain  - Historically states that she does notice acid reflux maybe once every 2 weeks and usually only when eating spicy foods. Trying to avoid these foods now.     Vitamin D deficiency:  ergocalciferol 50,000 units once weekly   Vitamin D Deficiency Screening Results:  Lab Results   Component Value Date    VITDT 12 (L) 07/27/2022    VITDT 17 (L) 03/17/2022     Low Iron/constipation: Ferrous Sulfate 325 every other day  - Reports constipation and having one bowel movement per day though they are hard to pass. Patient not taking anything for constipation.     Latest Reference Range & Units 12/06/21 10:50 12/06/21 21:18 12/08/21 06:07 03/17/22 14:33 04/14/22 08:08   Hemoglobin 11.7 - 15.7 g/dL 10.8 (L) 11.3 (L) 10.1 (L) 10.7 (L) 11.2 (L)   (L): Data is abnormally low    Yeast Infection:    - Took 1 dose of fluconazole and symptoms resolved.     Low Vitamin B12: vitamin B12 1000mcg every day   - No issues    Latest Reference Range & Units 03/17/22 14:33   Vitamin B12 213 - 816 pg/mL 729       Today's Vitals: /74   Pulse 91   Wt 109 lb 1.3 oz (49.5 kg)   SpO2 97%   BMI 19.95 kg/m    ----------------      I spent 30 minutes with this patient today. All changes were made via collaborative practice agreement with Giancarlo Arizmendi MD. A copy of the visit note was provided to the patient's provider(s).    The patient was given a summary of these recommendations.     Shital Hernandez, PharmD, BCACP  Medication Therapy Management Pharmacist    Jules WayneD,MPH  MTM Pharmacy Resident     Medication Therapy Recommendations  Insomnia, unspecified type    Rationale: Synergistic therapy - Needs additional medication therapy - Indication   Recommendation: Start Medication   Status: Accepted per CPA         Low iron    Current Medication: ferrous sulfate (FEROSUL) 325 (65 Fe) MG tablet   Rationale: Undesirable effect - Adverse medication event - Safety   Recommendation: Start Medication   Status: Accepted per CPA         Type 2 diabetes mellitus with other specified complication, unspecified whether long term insulin use (H)    Current Medication: insulin aspart (NOVOLOG PEN) 100 UNIT/ML pen   Rationale: Dose too low - Dosage too low - Effectiveness   Recommendation: Increase Dose   Status: Accepted per CPA

## 2022-10-14 NOTE — TELEPHONE ENCOUNTER
DIAGNOSIS:  Type 2 diabetes mellitus with other specified complication, unspecified whether long term insulin use, Hypertension goal BP (blood pressure) < 140/90   DATE RECEIVED: 10.20.2022    NOTES STATUS DETAILS   OFFICE NOTE from referring provider Internal 09.14.2022 Giancarlo Arizmendi MD   OFFICE NOTE from other specialist  Internal 10.12.2022 Shital Hernandez, PharmD   *Only VASCULITIS or LUPUS gather office notes for the following     *PULMONARY       *ENT     *DERMATOLOGY     *RHEUMATOLOGY     DISCHARGE SUMMARY from hospital     DISCHARGE REPORT from the ER     MEDICATION LIST Internal    IMAGING  (NEED IMAGES AND REPORTS)     KIDNEY CT SCAN     KIDNEY ULTRASOUND     MR ABDOMEN     NUCLEAR MEDICINE RENAL     LABS     CBC Internal 03.17.2022   CMP Internal 03.17.2022   BMP Internal 09.14.2022   UA Internal 12.06.2021   URINE PROTEIN Internal 12.06.2021   RENAL PANEL     BIOPSY     KIDNEY BIOPSY

## 2022-10-17 DIAGNOSIS — N18.31 STAGE 3A CHRONIC KIDNEY DISEASE (H): Primary | ICD-10-CM

## 2022-10-17 PROBLEM — N18.4 STAGE 4 CHRONIC KIDNEY DISEASE (H): Status: ACTIVE | Noted: 2021-12-06

## 2022-10-17 NOTE — PROGRESS NOTES
Nephrology Clinic Visit  Nithya Matthews MRN: [id number] YOB: 1970  Primary Care Provider: Giancarlo Arizmendi  History Obtained From: Patient  External Document Review: Primary Care Provider    Pertinent Nephro History:  CKD G4A3 2/2 DM, HTN, ?Obstruction from Nephrolithiasis?  No renal biopsy  CT Abd/Pelvis 12/2021: Nonobstructing stones R kidney, no hydro  Liraglutide, Glargine, Aspart, Lisinopril, Metoprolol, Ferrous Sulfate, Atorvastatin, Fenofibrate    -------------------------------------------------------------------------------------------------------------------------------  Subjective:  -Here to establish care with [facility]  -here with daughter today  -DM Control: Poorly controlled. Long term issue. Follows with PCP and referred to Endo  -HTN Control: Had some issues with hypotension recently so hydrochlorothiazide discontinued and BP improved. Continues on Lisinopril. BP in the 140's at todays appointments. She doesn't check her BP at home.   -Hx of Kidney Stones: She denies a history of any symptomatic kidney stones. She denies gross hematuria.  -Nocturia: 1-2x per night  -NSAIDs: Doesn't sound that way   -Herbal/OTC Medications: Denies  -RRT Planning/GOC:   --Understanding of kidney dysfunction: knows that her kidneys have been weak for a long time but not clear on how bad her function is. She is very reserved, asks few questions, and has a difficult time summarizing what we talked about today. This is not surprising as she is clearly nervous/scared about kidney failure/dialysis.   --We discussed that she is right at the border of stage 5 kidney disease and that we need to start planning for RRT.  --We had a long discussion about RRT and what kidney failure is using interpretor.  -Uremic symptoms:   --Appetite: She thinks her appetite is good.  --Metallic taste: No  --Nausea: She is often having nausea. She thinks lime juice helps.   --Day/Night Reversal: Sleep is okay  --Swelling in legs: She  notices a bit of leg swelling.  -She has been eating and drinking well leading up to today's appointment.    Objective:  PAST MEDICAL HISTORY:  No past medical history on file.   DM  HTN    PAST SURGICAL HISTORY:  Past Surgical History:   Procedure Laterality Date     CATARACT IOL, RT/LT         MEDICATIONS:  Current Outpatient Medications   Medication Instructions     acetaminophen (TYLENOL) 500-1,000 mg, Oral, EVERY 6 HOURS PRN     alcohol swab prep pads Use to swab area of injection/amadeo as directed.     ammonium lactate (AMLACTIN) 12 % external cream Topical, DAILY     aspirin 81 mg, Oral, DAILY     atorvastatin (LIPITOR) 80 mg, Oral, DAILY     blood glucose (NO BRAND SPECIFIED) test strip Use to test blood sugar 3 times daily or as directed. To accompany: Blood Glucose Monitor Brands: per insurance.     blood glucose calibration (NO BRAND SPECIFIED) solution To accompany: Blood Glucose Monitor Brands: per insurance.     blood glucose monitoring (NO BRAND SPECIFIED) meter device kit Use to test blood sugar 3 times daily or as directed. Preferred blood glucose meter OR supplies to accompany: Blood Glucose Monitor Brands: per insurance.     carboxymethylcellulose-glycerin (REFRESH OPTIVE) 0.5-0.9 % ophthalmic solution 1 drop, Both Eyes, 3 TIMES DAILY PRN     Continuous Blood Gluc Sensor (FREESTYLE MICKIE 14 DAY SENSOR) MISC 1 Device, Does not apply, EVERY 14 DAYS, Change sensor as directed every 14 days     cyanocobalamin (VITAMIN B-12) 1,000 mcg, Oral, DAILY     escitalopram (LEXAPRO) 10 mg, Oral, DAILY     fenofibrate (LOFIBRA) 54 mg, Oral, DAILY     ferrous sulfate (FEROSUL) 325 mg, Oral, EVERY OTHER DAY     fish oil-omega-3 fatty acids 1 g, Oral, DAILY     gabapentin (NEURONTIN) 300 mg, Oral, 2 TIMES DAILY     insulin aspart (NOVOLOG PEN) 18 Units, Subcutaneous, 3 TIMES DAILY WITH MEALS     insulin glargine (LANTUS PEN) 80 Units, Subcutaneous, EVERY MORNING, 40 unit(s) on each side     insulin pen needle (32G  "X 4 MM) 32G X 4 MM miscellaneous Use 6x pen needles daily or as directed (victoza, 3 for novolog, 2 for lantus)     liraglutide (VICTOZA) 1.8 mg, Subcutaneous, DAILY     lisinopril (ZESTRIL) 40 mg, Oral, DAILY     melatonin 3 mg, Oral, AT BEDTIME PRN     menthol, Topical Analgesic, 2.5% (BENGAY VANISHING SCENT) 2.5 % GEL topical gel Topical, 3 TIMES DAILY (Diuretics and Nitrates), Right shoulder pain      metoprolol succinate ER (TOPROL XL) 200 mg, Oral, DAILY     mirtazapine (REMERON) 7.5 mg, Oral, AT BEDTIME     omeprazole 20 mg, Oral, DAILY     polyethylene glycol (MIRALAX) 17 g, Oral, DAILY     silver sulfADIAZINE (SILVADENE) 1 % external cream Topical, 2 TIMES DAILY     thin (NO BRAND SPECIFIED) lancets Use with lanceting device. To accompany: Blood Glucose Monitor Brands: per insurance.     urea (GORMEL) 20 % external cream Topical, DAILY, Apply to feet daily     vitamin D2 (ERGOCALCIFEROL) 50,000 Units, Oral, WEEKLY       FAMILY MEDICAL HISTORY:   Family History   Problem Relation Age of Onset     Glaucoma No family hx of      Macular Degeneration No family hx of      Breast Cancer No family hx of      Ovarian Cancer No family hx of        PHYSICAL EXAM:   BP (!) 144/77   Pulse 92   Resp 18   Ht 1.575 m (5' 2.01\")   Wt 50.3 kg (111 lb)   SpO2 95%   BMI 20.30 kg/m    GENERAL APPEARANCE: alert and no distress  EYES: nonicteric  HENT: mouth without ulcers or lesions  RESP: lungs clear to auscultation   CV: regular rhythm, normal rate, no rub  ABDOMEN: soft, nontender, normal bowel sounds, no HSM   Extremities: no clubbing, cyanosis, no LE edema today (very surprising given her severe nephrotic range proteinuria)  MS: no evidence of inflammation in joints, no muscle tenderness  SKIN: no rash  NEURO: mentation intact and speech normal  PSYCH: affect normal    LABS REVIEWED BY ME:   ANEMIA  Recent Labs   Lab Test 04/14/22  0808 03/17/22  1433 12/08/21  0607 12/06/21  2118   HGB 11.2* 10.7* 10.1* 11.3* "       BMP  Recent Labs   Lab Test 09/14/22  1518 07/27/22  1447 06/02/22  1300 02/28/22  1334 12/09/21  0725 12/08/21  0607 12/06/21 2347 12/06/21 2118    136 136 136   < > 139   < > 133*   POTASSIUM 3.3* 3.4 3.0* 3.5   < > 3.5  3.5   < > 3.5   CHLORIDE 101 97* 97* 98   < > 104   < > 92*   CO2 26 26 24 25   < > 25   < > 25   ANIONGAP 12 13 15 13   < > 10   < > 16   BUN 18.8 21.7* 29* 18   < > 23*   < > 39*   CR 2.53* 2.37* 2.50* 1.93*   < > 1.97*   < > 2.54*   GFRESTIMATED 22* 24* 22* 30*   < > 28*   < > 21*   MAG  --   --   --   --   --   --   --  1.9   PROTTOTAL  --   --   --  7.5  --  7.3  --  8.5*    < > = values in this interval not displayed.       CBC  Recent Labs   Lab Test 04/14/22  0808 03/17/22  1433 12/08/21  0607 12/06/21 2118 12/06/21  1050   HGB 11.2* 10.7* 10.1* 11.3* 10.8*   WBC  --  6.5 5.9 9.5 7.6   HCT  --  33.2* 32.4* 35.5 33.3*   MCV  --  68* 68* 67* 67*   PLT  --  161 156 200 188       DIABETES  Recent Labs   Lab Test 09/21/22  0814 09/14/22  1518 06/02/22  1300 02/28/22  1334   A1C 11.0* 11.1* 10.4* 14.4*       HYPONATREMIA  Recent Labs   Lab Test 12/06/21 2118   UNAR 57       MBD  Recent Labs   Lab Test 09/14/22  1518 07/27/22  1447 06/02/22  1300 02/28/22  1334 12/09/21  0725 12/08/21  0607 12/06/21 2347 12/06/21 2118   JOSEPH 6.1* 8.3* 8.8 8.2*   < > 7.8*   < > 8.9   ALBUMIN  --   --   --  3.3*  --  3.3*  --  4.0    < > = values in this interval not displayed.        URINE STUDIES  Recent Labs   Lab Test 12/06/21 2118   COLOR Light Yellow   APPEARANCE Clear   URINEGLC >1000*   URINEBILI Negative   URINEKETONE Negative   SG 1.016   UBLD 0.1 mg/dL*   URINEPH 6.0   PROTEIN 100*   NITRITE Negative   LEUKEST Negative   RBCU 3*   WBCU 10*     No lab results found.    ADDITIONAL LABS ORDERED/REVIEWED BY ME:  Renal US, Magnesium level    Assessment/Plan  CKD G4A3  Establ[facility] care [facility] 10/19/22    Oldest labs I am able to see in the system are from 7/29/2019 when  creatinine was 2. Patient was admitted 12/2021 with hyperglycemia and had presumed pre-renal PRUDENCE. Creatinine improved from 2.16 -> 1.57 at time of discharge. At follow up her creatinine has been in the high 1's to mid 2's range (2.53 on 9/14/22) and on 10/19/22 unfortunately up to 3.3. She has hx of kidney stones noted on CT Abd/Pelvis 12/2021 but no proven episodes of obstructive nephropathy. Likely progressive renal impairment from a combination of recurrent pre-renal AKIs in the setting of significantly elevated glucose levels, DKD, and HTN nephrosclerosis. Interestingly on 10/19/22 she was found to have severe Nephrotic range proteinuria with UPCR suggestive of 15g/day. This level of nephrotic range proteinuria is certainly atypical for DN. I do not see any old UPCRs to compare it to. ? Underlying membranous nephropathy vs Minimal Change contributing to her progressive renal failure ? It is not clear to me if she's had age appropriate cancer screening. She is immune to Hep B without significant hematuria.     Plan:  -Will send complements, HIV, Hep C, and PLA2R. Will obtain Renal US to evaluate kidney size and cortex, eval for stones, eval for hydro.  -I am also going to try to make arrangements for renal biopsy next week to see if she has membranous or minimal change disease that can be treated with IS.  -Refer to [facility] for RRT education. Will further discuss RRT and access planning at next appointment prior to making referral to Vascular for potential access placement    History of Nephrolithiasis  Stones noted on CT Abd/Pelvis 12/2021. Evaluate for obstruction and nephrolithiasis with renal US.    Anemia of Renal Disease:  -Hemoglobin 10.1 (above goal for EPO, but will likely need soon)  -Ferritin: Acceptable  -TSAT: 27 (acceptable)    Enroll in anemia clinic in the near future.     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 If Simvastatin used alone, 40mg qDay is referenced.   Other options include: Atorvastatin 20mg qDay (4D trial) and Rosuvastatin 10mg qDay (RHONDA trial)    Current regimen: Atorvastatin 80mg qDay, Fenofibrate 54mg qDay    RRT/GOC:  We have discussed the following regarding Renal Replacement Therapy:  Is RRT within this patient's GOC?: It does seem that way although I am a bit nervous about how much the patient completely understands about her kidney failure and what dialysis would look like. We went over this multiple times in different ways with the interpretor but the patient had very limited questions and was quite reserved. This discussion was clearly overwhelming for her and we will need to try to reiterate flower points at next visit.  -Longterm vs Time-limited trial: Longterm  --Review of Conservative Kidney Management:  ---We discussed options for medical management of symptoms related to renal failure (High dose diuretics, potassium binders, medications for nausea/itching) and some benefits associated with this strategy (less interactions with healthcare system, less appointments, less medications, typically less time spent in the hospital)  ---We discussed that patients with no residual kidney function will have a rapid accumulation of toxins/fluid whereas patients with some residual kidney function can be managed medically for quite some time.     Modalities:  -Hemodialysis vs Peritoneal Dialysis  --Home vs In-center  ---Favor HD and In-center if RRT pursued    Access:  -AVG vs AVF vs PD Cath vs  TDC vs Temp Shakeelh  --We have discussed the benefits/risks/timing of placement of the various types of RRT access  ---Referral for placement/estimated timing of placement: Will send referral for access evaluation (AVF) after next appointment. Need to discuss RRT and renal failure in more depth first so she understands what she is getting herself into if she proceeds with HD.  -Will send CKD journey referral for further education and support. I will also see her back in 1 month to reiterate many of the things we discussed today and to re-eval kidney function/uremic symptoms and try to get this access evaluation appointment on the books for approximately 1.5 months from now    Transplant:  -eGFR < 20 on two or more readings?: No, today is first eGFR < 20.   --Referral placed for eval?: If < 20 at next follow up will place referral.    Return to clinic in 1 month    Sahil Waterman MD   of Medicine  Division of Nephrology and Hypertension  [facility]    90 minutes spent on the date of the encounter doing chart review, history and exam, documentation and further activities as noted above

## 2022-10-19 ENCOUNTER — LAB (OUTPATIENT)
Dept: LAB | Facility: CLINIC | Age: 52
End: 2022-10-19
Payer: COMMERCIAL

## 2022-10-19 ENCOUNTER — OFFICE VISIT (OUTPATIENT)
Dept: NEPHROLOGY | Facility: CLINIC | Age: 52
End: 2022-10-19
Attending: FAMILY MEDICINE
Payer: COMMERCIAL

## 2022-10-19 VITALS
BODY MASS INDEX: 20.43 KG/M2 | HEART RATE: 92 BPM | SYSTOLIC BLOOD PRESSURE: 144 MMHG | OXYGEN SATURATION: 95 % | WEIGHT: 111 LBS | DIASTOLIC BLOOD PRESSURE: 77 MMHG | HEIGHT: 62 IN | RESPIRATION RATE: 18 BRPM

## 2022-10-19 DIAGNOSIS — N18.4 STAGE 4 CHRONIC KIDNEY DISEASE (H): ICD-10-CM

## 2022-10-19 DIAGNOSIS — E11.69 TYPE 2 DIABETES MELLITUS WITH OTHER SPECIFIED COMPLICATION, UNSPECIFIED WHETHER LONG TERM INSULIN USE (H): ICD-10-CM

## 2022-10-19 DIAGNOSIS — N18.31 STAGE 3A CHRONIC KIDNEY DISEASE (H): ICD-10-CM

## 2022-10-19 DIAGNOSIS — I10 HYPERTENSION GOAL BP (BLOOD PRESSURE) < 140/90: ICD-10-CM

## 2022-10-19 DIAGNOSIS — N18.4 STAGE 4 CHRONIC KIDNEY DISEASE (H): Primary | ICD-10-CM

## 2022-10-19 DIAGNOSIS — R80.9 PROTEINURIA: ICD-10-CM

## 2022-10-19 LAB
ALBUMIN MFR UR ELPH: 425 MG/DL
ALBUMIN SERPL BCG-MCNC: 3 G/DL (ref 3.5–5.2)
ALBUMIN UR-MCNC: 300 MG/DL
ANION GAP SERPL CALCULATED.3IONS-SCNC: 10 MMOL/L (ref 7–15)
APPEARANCE UR: CLEAR
BILIRUB UR QL STRIP: NEGATIVE
BUN SERPL-MCNC: 17.9 MG/DL (ref 6–20)
CALCIUM SERPL-MCNC: 6.6 MG/DL (ref 8.6–10)
CHLORIDE SERPL-SCNC: 105 MMOL/L (ref 98–107)
COLOR UR AUTO: YELLOW
CREAT SERPL-MCNC: 3.32 MG/DL (ref 0.51–0.95)
CREAT UR-MCNC: 28.2 MG/DL
DEPRECATED HCO3 PLAS-SCNC: 28 MMOL/L (ref 22–29)
ERYTHROCYTE [DISTWIDTH] IN BLOOD BY AUTOMATED COUNT: 20.1 % (ref 10–15)
FERRITIN SERPL-MCNC: 587 NG/ML (ref 11–328)
GFR SERPL CREATININE-BSD FRML MDRD: 16 ML/MIN/1.73M2
GLUCOSE SERPL-MCNC: 186 MG/DL (ref 70–99)
GLUCOSE UR STRIP-MCNC: 250 MG/DL
HCT VFR BLD AUTO: 33.1 % (ref 35–47)
HCV AB SERPL QL IA: NONREACTIVE
HGB BLD-MCNC: 10.1 G/DL (ref 11.7–15.7)
HGB UR QL STRIP: ABNORMAL
HIV 1+2 AB+HIV1 P24 AG SERPL QL IA: NONREACTIVE
IRON BINDING CAPACITY (ROCHE): 216 UG/DL (ref 240–430)
IRON SATN MFR SERPL: 27 % (ref 15–46)
IRON SERPL-MCNC: 58 UG/DL (ref 37–145)
KETONES UR STRIP-MCNC: NEGATIVE MG/DL
LEUKOCYTE ESTERASE UR QL STRIP: NEGATIVE
MAGNESIUM SERPL-MCNC: 1.6 MG/DL (ref 1.7–2.3)
MCH RBC QN AUTO: 21.4 PG (ref 26.5–33)
MCHC RBC AUTO-ENTMCNC: 30.5 G/DL (ref 31.5–36.5)
MCV RBC AUTO: 70 FL (ref 78–100)
NITRATE UR QL: NEGATIVE
PH UR STRIP: 7 [PH] (ref 5–7)
PHOSPHATE SERPL-MCNC: 3.5 MG/DL (ref 2.5–4.5)
PLATELET # BLD AUTO: 155 10E3/UL (ref 150–450)
POTASSIUM SERPL-SCNC: 3 MMOL/L (ref 3.4–5.3)
PROT/CREAT 24H UR: 15.07 MG/MG CR (ref 0–0.2)
PTH-INTACT SERPL-MCNC: 87 PG/ML (ref 15–65)
RBC # BLD AUTO: 4.71 10E6/UL (ref 3.8–5.2)
RBC URINE: 3 /HPF
SODIUM SERPL-SCNC: 143 MMOL/L (ref 136–145)
SP GR UR STRIP: 1.02 (ref 1–1.03)
SQUAMOUS EPITHELIAL: 3 /HPF
UROBILINOGEN UR STRIP-MCNC: NORMAL MG/DL
WBC # BLD AUTO: 6.5 10E3/UL (ref 4–11)
WBC URINE: 1 /HPF

## 2022-10-19 PROCEDURE — 80069 RENAL FUNCTION PANEL: CPT | Performed by: PATHOLOGY

## 2022-10-19 PROCEDURE — 83550 IRON BINDING TEST: CPT | Performed by: PATHOLOGY

## 2022-10-19 PROCEDURE — 83540 ASSAY OF IRON: CPT | Performed by: PATHOLOGY

## 2022-10-19 PROCEDURE — 84156 ASSAY OF PROTEIN URINE: CPT | Performed by: PATHOLOGY

## 2022-10-19 PROCEDURE — 99417 PROLNG OP E/M EACH 15 MIN: CPT | Performed by: STUDENT IN AN ORGANIZED HEALTH CARE EDUCATION/TRAINING PROGRAM

## 2022-10-19 PROCEDURE — 36415 COLL VENOUS BLD VENIPUNCTURE: CPT | Performed by: PATHOLOGY

## 2022-10-19 PROCEDURE — 85027 COMPLETE CBC AUTOMATED: CPT | Performed by: PATHOLOGY

## 2022-10-19 PROCEDURE — 81001 URINALYSIS AUTO W/SCOPE: CPT | Performed by: PATHOLOGY

## 2022-10-19 PROCEDURE — 99205 OFFICE O/P NEW HI 60 MIN: CPT | Performed by: STUDENT IN AN ORGANIZED HEALTH CARE EDUCATION/TRAINING PROGRAM

## 2022-10-19 PROCEDURE — 83970 ASSAY OF PARATHORMONE: CPT | Performed by: PATHOLOGY

## 2022-10-19 PROCEDURE — G0463 HOSPITAL OUTPT CLINIC VISIT: HCPCS

## 2022-10-19 PROCEDURE — 82728 ASSAY OF FERRITIN: CPT | Performed by: STUDENT IN AN ORGANIZED HEALTH CARE EDUCATION/TRAINING PROGRAM

## 2022-10-19 PROCEDURE — 86803 HEPATITIS C AB TEST: CPT | Performed by: STUDENT IN AN ORGANIZED HEALTH CARE EDUCATION/TRAINING PROGRAM

## 2022-10-19 PROCEDURE — 87389 HIV-1 AG W/HIV-1&-2 AB AG IA: CPT | Performed by: STUDENT IN AN ORGANIZED HEALTH CARE EDUCATION/TRAINING PROGRAM

## 2022-10-19 PROCEDURE — 83735 ASSAY OF MAGNESIUM: CPT | Performed by: PATHOLOGY

## 2022-10-19 NOTE — PATIENT INSTRUCTIONS
"It was a pleasure to see you in nephrology clinic today. I've included a brief summary of our discussion and care plan from today's visit below.  _______________________________________________________________________    My recommendations are summarized as follows:  -Keep a Blood Pressure log. Please make sure that you are using a validated blood pressure device (check \"www.validatebp.org\").  -Avoid all NSAID's. Examples include Ibuprofen (Advil, Motrin), naprosyn (Aleve), celebrex among others. Acetaminophen (Tylenol) is ok with maximum dose in 24 hours of 3000mg.  -Healthy lifestyle measures will keep your kidney's functioning at their current best. This includes regular exercise, maintaining a healthy body weight and smoking cessation.   -Please try to stay well hydrated  -Please try to limit salt intake  -I am referring you to [facility] which will talk with you more about dialysis options.   -I am going to start the process of getting you evaluated by vascular surgery for an access placement  -I have also ordered a renal US      Please return to Nephrology Clinic in 4 weeks to review your progress.     Who do I call with any questions after my visit?  There are multiple ways to contact your nephrology care team:    -To schedule or reschedule an appointment, please call [phone].  -Reach out via [employer]. These messages are answered by your nurse or doctor during business hours and typically in 1-2 days. [employer] messages are best for quick questions/clarifications/updates. Frequently, your doctor or nurse will recommend setting up a follow up appointment to address any significant questions/concerns.  -For urgent questions after business hours, you may reach the on-call Nephrology Fellow by contacting the [facility]  at [phone].    To schedule imaging:   -Please call [phone]     To schedule your lab appointment at the [facility]:  -Please call " [phone]    Sincerely,    Dr. Sahil Waterman   of Medicine  Division of Nephrology and Hypertension  [facility]

## 2022-10-19 NOTE — LETTER
10/19/2022       RE: Nithya Matthews  [address] [place] [address]     Dear Colleague,    Thank you for referring your patient, Nithya Matthews, to the [facility] [place] at [facility]. Please see a copy of my visit note below.        Nephrology Clinic Visit  Nithya Matthews MRN: [id number] YOB: 1970  Primary Care Provider: Giancarlo Arizmendi  History Obtained From: Patient  External Document Review: Primary Care Provider    Pertinent Nephro History:  CKD G4A3 2/2 DM, HTN, ?Obstruction from Nephrolithiasis?  No renal biopsy  CT Abd/Pelvis 12/2021: Nonobstructing stones R kidney, no hydro  Liraglutide, Glargine, Aspart, Lisinopril, Metoprolol, Ferrous Sulfate, Atorvastatin, Fenofibrate    -------------------------------------------------------------------------------------------------------------------------------  Subjective:  -Here to establish care with [facility]  -here with daughter today  -DM Control: Poorly controlled. Long term issue. Follows with PCP and referred to Endo  -HTN Control: Had some issues with hypotension recently so hydrochlorothiazide discontinued and BP improved. Continues on Lisinopril. BP in the 140's at todays appointments. She doesn't check her BP at home.   -Hx of Kidney Stones: She denies a history of any symptomatic kidney stones. She denies gross hematuria.  -Nocturia: 1-2x per night  -NSAIDs: Doesn't sound that way   -Herbal/OTC Medications: Denies  -RRT Planning/GOC:   --Understanding of kidney dysfunction: knows that her kidneys have been weak for a long time but not clear on how bad her function is. She is very reserved, asks few questions, and has a difficult time summarizing what we talked about today. This is not surprising as she is clearly nervous/scared about kidney failure/dialysis.   --We discussed that she is right at the border of stage 5 kidney disease and that we need to start planning for RRT.  --We  had a long discussion about RRT and what kidney failure is using interpretor.  -Uremic symptoms:   --Appetite: She thinks her appetite is good.  --Metallic taste: No  --Nausea: She is often having nausea. She thinks lime juice helps.   --Day/Night Reversal: Sleep is okay  --Swelling in legs: She notices a bit of leg swelling.  -She has been eating and drinking well leading up to today's appointment.    Objective:  PAST MEDICAL HISTORY:  No past medical history on file.   DM  HTN    PAST SURGICAL HISTORY:  Past Surgical History:   Procedure Laterality Date     CATARACT IOL, RT/LT         MEDICATIONS:  Current Outpatient Medications   Medication Instructions     acetaminophen (TYLENOL) 500-1,000 mg, Oral, EVERY 6 HOURS PRN     alcohol swab prep pads Use to swab area of injection/amadeo as directed.     ammonium lactate (AMLACTIN) 12 % external cream Topical, DAILY     aspirin 81 mg, Oral, DAILY     atorvastatin (LIPITOR) 80 mg, Oral, DAILY     blood glucose (NO BRAND SPECIFIED) test strip Use to test blood sugar 3 times daily or as directed. To accompany: Blood Glucose Monitor Brands: per insurance.     blood glucose calibration (NO BRAND SPECIFIED) solution To accompany: Blood Glucose Monitor Brands: per insurance.     blood glucose monitoring (NO BRAND SPECIFIED) meter device kit Use to test blood sugar 3 times daily or as directed. Preferred blood glucose meter OR supplies to accompany: Blood Glucose Monitor Brands: per insurance.     carboxymethylcellulose-glycerin (REFRESH OPTIVE) 0.5-0.9 % ophthalmic solution 1 drop, Both Eyes, 3 TIMES DAILY PRN     Continuous Blood Gluc Sensor (FREESTYLE MICKIE 14 DAY SENSOR) MIS 1 Device, Does not apply, EVERY 14 DAYS, Change sensor as directed every 14 days     cyanocobalamin (VITAMIN B-12) 1,000 mcg, Oral, DAILY     escitalopram (LEXAPRO) 10 mg, Oral, DAILY     fenofibrate (LOFIBRA) 54 mg, Oral, DAILY     ferrous sulfate (FEROSUL) 325 mg, Oral, EVERY OTHER DAY     fish  "oil-omega-3 fatty acids 1 g, Oral, DAILY     gabapentin (NEURONTIN) 300 mg, Oral, 2 TIMES DAILY     insulin aspart (NOVOLOG PEN) 18 Units, Subcutaneous, 3 TIMES DAILY WITH MEALS     insulin glargine (LANTUS PEN) 80 Units, Subcutaneous, EVERY MORNING, 40 unit(s) on each side     insulin pen needle (32G X 4 MM) 32G X 4 MM miscellaneous Use 6x pen needles daily or as directed (victoza, 3 for novolog, 2 for lantus)     liraglutide (VICTOZA) 1.8 mg, Subcutaneous, DAILY     lisinopril (ZESTRIL) 40 mg, Oral, DAILY     melatonin 3 mg, Oral, AT BEDTIME PRN     menthol, Topical Analgesic, 2.5% (BENGAY VANISHING SCENT) 2.5 % GEL topical gel Topical, 3 TIMES DAILY (Diuretics and Nitrates), Right shoulder pain      metoprolol succinate ER (TOPROL XL) 200 mg, Oral, DAILY     mirtazapine (REMERON) 7.5 mg, Oral, AT BEDTIME     omeprazole 20 mg, Oral, DAILY     polyethylene glycol (MIRALAX) 17 g, Oral, DAILY     silver sulfADIAZINE (SILVADENE) 1 % external cream Topical, 2 TIMES DAILY     thin (NO BRAND SPECIFIED) lancets Use with lanceting device. To accompany: Blood Glucose Monitor Brands: per insurance.     urea (GORMEL) 20 % external cream Topical, DAILY, Apply to feet daily     vitamin D2 (ERGOCALCIFEROL) 50,000 Units, Oral, WEEKLY       FAMILY MEDICAL HISTORY:   Family History   Problem Relation Age of Onset     Glaucoma No family hx of      Macular Degeneration No family hx of      Breast Cancer No family hx of      Ovarian Cancer No family hx of        PHYSICAL EXAM:   BP (!) 144/77   Pulse 92   Resp 18   Ht 1.575 m (5' 2.01\")   Wt 50.3 kg (111 lb)   SpO2 95%   BMI 20.30 kg/m    GENERAL APPEARANCE: alert and no distress  EYES: nonicteric  HENT: mouth without ulcers or lesions  RESP: lungs clear to auscultation   CV: regular rhythm, normal rate, no rub  ABDOMEN: soft, nontender, normal bowel sounds, no HSM   Extremities: no clubbing, cyanosis, no LE edema today (very surprising given her severe nephrotic range " proteinuria)  MS: no evidence of inflammation in joints, no muscle tenderness  SKIN: no rash  NEURO: mentation intact and speech normal  PSYCH: affect normal    LABS REVIEWED BY ME:   ANEMIA  Recent Labs   Lab Test 04/14/22  0808 03/17/22  1433 12/08/21  0607 12/06/21 2118   HGB 11.2* 10.7* 10.1* 11.3*       BMP  Recent Labs   Lab Test 09/14/22  1518 07/27/22  1447 06/02/22  1300 02/28/22  1334 12/09/21  0725 12/08/21  0607 12/06/21  2347 12/06/21 2118    136 136 136   < > 139   < > 133*   POTASSIUM 3.3* 3.4 3.0* 3.5   < > 3.5  3.5   < > 3.5   CHLORIDE 101 97* 97* 98   < > 104   < > 92*   CO2 26 26 24 25   < > 25   < > 25   ANIONGAP 12 13 15 13   < > 10   < > 16   BUN 18.8 21.7* 29* 18   < > 23*   < > 39*   CR 2.53* 2.37* 2.50* 1.93*   < > 1.97*   < > 2.54*   GFRESTIMATED 22* 24* 22* 30*   < > 28*   < > 21*   MAG  --   --   --   --   --   --   --  1.9   PROTTOTAL  --   --   --  7.5  --  7.3  --  8.5*    < > = values in this interval not displayed.       CBC  Recent Labs   Lab Test 04/14/22  0808 03/17/22  1433 12/08/21  0607 12/06/21 2118 12/06/21  1050   HGB 11.2* 10.7* 10.1* 11.3* 10.8*   WBC  --  6.5 5.9 9.5 7.6   HCT  --  33.2* 32.4* 35.5 33.3*   MCV  --  68* 68* 67* 67*   PLT  --  161 156 200 188       DIABETES  Recent Labs   Lab Test 09/21/22  0814 09/14/22  1518 06/02/22  1300 02/28/22  1334   A1C 11.0* 11.1* 10.4* 14.4*       HYPONATREMIA  Recent Labs   Lab Test 12/06/21 2118   UNAR 57       MBD  Recent Labs   Lab Test 09/14/22  1518 07/27/22  1447 06/02/22  1300 02/28/22  1334 12/09/21  0725 12/08/21  0607 12/06/21  2347 12/06/21 2118   JOSEPH 6.1* 8.3* 8.8 8.2*   < > 7.8*   < > 8.9   ALBUMIN  --   --   --  3.3*  --  3.3*  --  4.0    < > = values in this interval not displayed.        URINE STUDIES  Recent Labs   Lab Test 12/06/21 2118   COLOR Light Yellow   APPEARANCE Clear   URINEGLC >1000*   URINEBILI Negative   URINEKETONE Negative   SG 1.016   UBLD 0.1 mg/dL*   URINEPH 6.0   PROTEIN 100*    NITRITE Negative   LEUKEST Negative   RBCU 3*   WBCU 10*     No lab results found.    ADDITIONAL LABS ORDERED/REVIEWED BY ME:  Renal US, Magnesium level    Assessment/Plan  CKD G4A3  Established care with [facility] 10/19/22    Oldest labs I am able to see in the system are from 7/29/2019 when creatinine was 2. Patient was admitted 12/2021 with hyperglycemia and had presumed pre-renal PRUDENCE. Creatinine improved from 2.16 -> 1.57 at time of discharge. At follow up her creatinine has been in the high 1's to mid 2's range (2.53 on 9/14/22) and on 10/19/22 unfortunately up to 3.3. She has hx of kidney stones noted on CT Abd/Pelvis 12/2021 but no proven episodes of obstructive nephropathy. Likely progressive renal impairment from a combination of recurrent pre-renal AKIs in the setting of significantly elevated glucose levels, DKD, and HTN nephrosclerosis. Interestingly on 10/19/22 she was found to have severe Nephrotic range proteinuria with UPCR suggestive of 15g/day. This level of nephrotic range proteinuria is certainly atypical for DN. I do not see any old UPCRs to compare it to. ? Underlying membranous nephropathy vs Minimal Change contributing to her progressive renal failure ? It is not clear to me if she's had age appropriate cancer screening. She is immune to Hep B without significant hematuria.     Plan:  -Will send complements, HIV, Hep C, and PLA2R. Will obtain Renal US to evaluate kidney size and cortex, eval for stones, eval for hydro.  -I am also going to try to make arrangements for renal biopsy next week to see if she has membranous or minimal change disease that can be treated with IS.  -Refer to CKD Journey for RRT education. Will further discuss RRT and access planning at next appointment prior to making referral to Vascular for potential access placement    History of Nephrolithiasis  Stones noted on CT Abd/Pelvis 12/2021. Evaluate for obstruction and nephrolithiasis with renal US.    Anemia of  Renal Disease:  -Hemoglobin 10.1 (above goal for EPO, but will likely need soon)  -Ferritin: Acceptable  -TSAT: 27 (acceptable)    Enroll in anemia clinic in the near future.     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 If Simvastatin used alone, 40mg qDay is referenced.   Other options include: Atorvastatin 20mg qDay (4D trial) and Rosuvastatin 10mg qDay (RHONDA trial)    Current regimen: Atorvastatin 80mg qDay, Fenofibrate 54mg qDay    RRT/GOC:  We have discussed the following regarding Renal Replacement Therapy:  Is RRT within this patient's GOC?: It does seem that way although I am a bit nervous about how much the patient completely understands about her kidney failure and what dialysis would look like. We went over this multiple times in different ways with the interpretor but the patient had very limited questions and was quite reserved. This discussion was clearly overwhelming for her and we will need to try to reiterate flower points at next visit.  -Longterm vs Time-limited trial: Longterm  --Review of Conservative Kidney Management:  ---We discussed options for medical management of symptoms related to renal failure (High dose diuretics, potassium binders, medications for nausea/itching) and some benefits associated with this strategy (less interactions with healthcare system, less appointments, less medications, typically less time spent in the hospital)  ---We discussed that patients with no residual kidney function will have a  rapid accumulation of toxins/fluid whereas patients with some residual kidney function can be managed medically for quite some time.     Modalities:  -Hemodialysis vs Peritoneal Dialysis  --Home vs In-center  ---Favor HD and In-center if RRT pursued    Access:  -AVG vs AVF vs PD Cath vs TDC vs Temp Vascath  --We have discussed the benefits/risks/timing of placement of the various types of RRT access  ---Referral for placement/estimated timing of placement: Will send referral for access evaluation (AVF) after next appointment. Need to discuss RRT and renal failure in more depth first so she understands what she is getting herself into if she proceeds with HD.  -Will send CKD journey referral for further education and support. I will also see her back in 1 month to reiterate many of the things we discussed today and to re-eval kidney function/uremic symptoms and try to get this access evaluation appointment on the books for approximately 1.5 months from now    Transplant:  -eGFR < 20 on two or more readings?: No, today is first eGFR < 20.   --Referral placed for eval?: If < 20 at next follow up will place referral.    Return to clinic in 1 month    Sahil Waterman MD   of Medicine  Division of Nephrology and Hypertension  [facility]    90 minutes spent on the date of the encounter doing chart review, history and exam, documentation and further activities as noted above

## 2022-10-19 NOTE — NURSING NOTE
"Chief Complaint   Patient presents with     Consult     Elevated creatinine     Vital signs:      BP: (!) 144/77 Pulse: 92   Resp: 18 SpO2: 95 %     Height: 157.5 cm (5' 2.01\") Weight: 50.3 kg (111 lb)  Estimated body mass index is 20.3 kg/m  as calculated from the following:    Height as of this encounter: 1.575 m (5' 2.01\").    Weight as of this encounter: 50.3 kg (111 lb).      Анна Gilman, [facility]  10/19/2022 8:02 AM      "

## 2022-10-20 ENCOUNTER — PRE VISIT (OUTPATIENT)
Dept: NEPHROLOGY | Facility: CLINIC | Age: 52
End: 2022-10-20

## 2022-10-20 ENCOUNTER — TELEPHONE (OUTPATIENT)
Dept: NEPHROLOGY | Facility: CLINIC | Age: 52
End: 2022-10-20

## 2022-10-20 NOTE — TELEPHONE ENCOUNTER
Attempted to contact patient through interpretor service yesterday and x2 today. Left message regarding need to discuss labs and renal biopsy plan and have scheduled time with an interpretor for 10:30AM tomorrow. Will try patient again at that time.

## 2022-10-21 ENCOUNTER — TELEPHONE (OUTPATIENT)
Dept: NEPHROLOGY | Facility: CLINIC | Age: 52
End: 2022-10-21

## 2022-10-21 ENCOUNTER — PATIENT OUTREACH (OUTPATIENT)
Dept: NEPHROLOGY | Facility: CLINIC | Age: 52
End: 2022-10-21

## 2022-10-21 DIAGNOSIS — Z01.812 PRE-PROCEDURE LAB EXAM: Primary | ICD-10-CM

## 2022-10-21 NOTE — PROGRESS NOTES
[facility]  with MD also on line. Introduced myself and role. Gave her writer's phone number and information. Went over Kidney Smart education referral and she was agreeable. Went over biopsy pre-procedure instructions. She took ASA today but will stop it until after biopsy. She states they do not have a car and will have to have transportation set up.  She will need COVID test 1-2 days prior and went over home test vs test in clinic. She would like all this information relayed to her daughter, Fritz. She lives in a home with her  and youngest daughter, Fritz and was at a Senior center when writer called her.    Writer then called daughter Fritz (she does not need an ) and went over the above. She sets up her mothers medication and she will take the aspirin out.  Writer told her she would need to set up a taxi ride through her [employer] which she can do. We talked about Covid test, will talk next week on if she will get a home kit or wants COVID test order placed.  Went over that Barriga will be at the hospital for likely 6-8 hours, being monitored afterwards.    Kidney Smart referral sent.     Patient scheduled for kidney biopsy on Oct 28th at 0900, she will need to arrive at 0730. Gen neph MD and fellow who would be performing biopsy were notified. Will talk to patient/daughter on Monday regarding date and time as well as going over pre op instructions again.    Neph Tracking Information 10/21/2022   CKD Education Status Referred   CKD Education Referral Date 10/21/2022   CKD Education Type Kidney Smart Modality Education;Kidney Smart CKD Basics   Journey Referral 10/19/22   No flowsheet data found.      Reason for Call    Inform patient of Kidney Biopsy schedule and instructions.    Plan    1)  Your Kidney Biopsy is scheduled for Oct 28th at 9 am.  2)  You are to arrive at the [facility] waiting room at 0730 am, at the [facility],        [address] [place] [address],  [place]  [address].  3)  You can park in the Patient Visitor Parking Ramp or leave your vehicle with [employer]       services.  4)  You must have a  to take you home. You will not be allowed to drive.  5)  Do not take any blood thinning medications 7 days prior to your biopsy date. This       includes Aspirin, Plavix, Warfarin (Coumadin), Naproxen, Fish Oil or Ibuprofen       containing products such as Motrin or Aleve. Tylenol is a safe substitute for pain       control. If you are unsure if a medication that you are taking could thin your blood,       please contact the clinic to discuss, prior to taking the medication.   6)  Nothing to eat or drink six hours prior to the procedure.  7)  The kidney biopsy is generally an outpatient procedure. You will be monitored 4-6       hours post procedure and released to home if no complications arise.   8)  Your biopsy results may take a few days to be available. Your provider or clinic staff       will contact you to discuss the results once available.   9)  If you need to reschedule the biopsy, please call the nurse as soon as possible.  10) It is ok to take the rest of your medications with small sips of water the morning of the procedure.  11) Please take your Blood Pressure medication. If there are questions please contact the clinic.      *Due to COVID pandemic, 1 visitor is allowed to be present during the stay of this procedure.  *Please let us know if you plan to get your COVID vaccine within 3 days before or after this scheduled procedure.    Dr. Arango has been informed of the scheduled biopsy.  Please call with questions or concerns.  Radha Boone LPN  [phone]  Nephrology Education  About Your Kidney Biopsy  A biopsy is a test in which small pieces of tissue are taken from your body, often through a needle. The tissue is then looked at under a microscope. A kidney biopsy can help diagnose a problem. It can also help choose the best way to treat that problem.  Your doctor may suggest a kidney biopsy if you have:    blood in your urine (hematuria)    protein in your urine (proteinuria)    abnormal kidney function (a sign of kidney disease).  A doctor will look at the kidney tissue for things that might explain your condition. The doctor may find that you have a condition that can be treated and cured. A biopsy can also help explain why a transplanted kidney is not working properly.  Before the biopsy    Talk with your doctor to make sure you know why you need the biopsy.    Tell your doctor about any allergies you have and any medicines you take.    Ask your doctor any questions you may have about the risks of having a biopsy. You will be asked to sign a consent form showing that you understand these risks.    Follow the instructions you are given to get ready for the biopsy. Shortly before the biopsy, you will give blood and urine to make sure that it is okay to do the biopsy.  During the biopsy  Kidney biopsies are usually done as an outpatient, or same-day, procedure.  You will lie on your stomach so that your kidneys are near the surface of your back. (If you have a transplanted kidney, you will lie on your back.) The doctor will natalia and clean the site and give you a local painkiller.   To do a biopsy through the skin, your doctor will use an ultrasound machine to find the right spot and then a needle to get the tissue. As the doctor inserts the needle, you will need to hold your breath so that your kidneys do not move. Each sample will take about 30 seconds. The doctor may need to take three or four samples.   The biopsy usually takes less than an hour. This includes the time to find the kidney, clean the site, give the painkiller and get the samples.   If you are prone to bleeding, taking a blood thinner or have low platelets, you may have a different procedure.   After the biopsy  You will lie on your back for about eight hours while your care team observes you.  Your care team will check your blood pressure, pulse, red blood cell count and urine.  You may notice some blood in your urine for 24 hours after the test. In rare cases, bleeding may not stop on its own. If this happens, a transfusion may be needed.  It is possible, though unlikely, to get an infection from the biopsy.  Tell your doctor or nurse if you:    Have blood in your urine for more than 24 hours after the biopsy    Cannot urinate (pass water)    Have a fever    Have pain that gets worse    Feel faint or dizzy.  Getting the results  After the biopsy, the doctor will look at the samples under a microscope.   It usually takes a few days to get all of the results. If your case is urgent, you may get some of the results more quickly.  For informational purposes only. Not to replace the advice of your health care provider. Adapted from NIH Publication No. , April 2003. [employer] [id number] - REV 03/16.

## 2022-10-21 NOTE — TELEPHONE ENCOUNTER
Attempted to call patient via interpretor services x2 this AM. Left VM x2 letting patient know I will try her again Monday at 11AM (left message yesterday with the same instructions that I would be calling at 10:30AM today). Will attempt to call again Monday at 11AM via interpretor service.

## 2022-10-23 DIAGNOSIS — R79.89 LOW VITAMIN D LEVEL: ICD-10-CM

## 2022-10-23 DIAGNOSIS — Z76.0 ENCOUNTER FOR MEDICATION REFILL: Primary | ICD-10-CM

## 2022-10-24 RX ORDER — LIDOCAINE 40 MG/G
CREAM TOPICAL
Status: CANCELLED | OUTPATIENT
Start: 2022-10-24

## 2022-10-24 NOTE — PROGRESS NOTES
Spoke to daughter, Fritz today. Went over date and time of biopsy as well as covid testing. Barriga was set up for a COVID test after her kidney ultrasound on 8/26. Fritz would like to have instructions emailed to her. Writer emailed instructions to her. She has been holding ASA as well.

## 2022-10-26 ENCOUNTER — LAB (OUTPATIENT)
Dept: LAB | Facility: CLINIC | Age: 52
End: 2022-10-26
Payer: COMMERCIAL

## 2022-10-26 ENCOUNTER — ANCILLARY PROCEDURE (OUTPATIENT)
Dept: ULTRASOUND IMAGING | Facility: CLINIC | Age: 52
End: 2022-10-26
Attending: STUDENT IN AN ORGANIZED HEALTH CARE EDUCATION/TRAINING PROGRAM
Payer: COMMERCIAL

## 2022-10-26 DIAGNOSIS — Z01.812 PRE-PROCEDURE LAB EXAM: ICD-10-CM

## 2022-10-26 DIAGNOSIS — N18.4 STAGE 4 CHRONIC KIDNEY DISEASE (H): ICD-10-CM

## 2022-10-26 LAB — SARS-COV-2 RNA RESP QL NAA+PROBE: NEGATIVE

## 2022-10-26 PROCEDURE — 76770 US EXAM ABDO BACK WALL COMP: CPT | Mod: GC | Performed by: RADIOLOGY

## 2022-10-26 PROCEDURE — U0003 INFECTIOUS AGENT DETECTION BY NUCLEIC ACID (DNA OR RNA); SEVERE ACUTE RESPIRATORY SYNDROME CORONAVIRUS 2 (SARS-COV-2) (CORONAVIRUS DISEASE [COVID-19]), AMPLIFIED PROBE TECHNIQUE, MAKING USE OF HIGH THROUGHPUT TECHNOLOGIES AS DESCRIBED BY CMS-2020-01-R: HCPCS | Performed by: STUDENT IN AN ORGANIZED HEALTH CARE EDUCATION/TRAINING PROGRAM

## 2022-10-26 RX ORDER — ERGOCALCIFEROL 1.25 MG/1
50000 CAPSULE, LIQUID FILLED ORAL WEEKLY
Qty: 12 CAPSULE | Refills: 3 | Status: SHIPPED | OUTPATIENT
Start: 2022-10-26 | End: 2022-12-29 | Stop reason: DRUGHIGH

## 2022-10-27 ENCOUNTER — OFFICE VISIT (OUTPATIENT)
Dept: PODIATRY | Facility: CLINIC | Age: 52
End: 2022-10-27
Payer: COMMERCIAL

## 2022-10-27 VITALS — BODY MASS INDEX: 20.3 KG/M2 | WEIGHT: 111 LBS | DIASTOLIC BLOOD PRESSURE: 74 MMHG | SYSTOLIC BLOOD PRESSURE: 132 MMHG

## 2022-10-27 DIAGNOSIS — M79.671 FOOT PAIN, BILATERAL: ICD-10-CM

## 2022-10-27 DIAGNOSIS — L84 PRE-ULCERATIVE CALLUSES: ICD-10-CM

## 2022-10-27 DIAGNOSIS — M20.11 HAV (HALLUX ABDUCTO VALGUS), RIGHT: ICD-10-CM

## 2022-10-27 DIAGNOSIS — M20.12 HAV (HALLUX ABDUCTO VALGUS), LEFT: ICD-10-CM

## 2022-10-27 DIAGNOSIS — E11.42 DIABETIC POLYNEUROPATHY ASSOCIATED WITH TYPE 2 DIABETES MELLITUS (H): Primary | ICD-10-CM

## 2022-10-27 DIAGNOSIS — M79.672 FOOT PAIN, BILATERAL: ICD-10-CM

## 2022-10-27 PROCEDURE — 99213 OFFICE O/P EST LOW 20 MIN: CPT | Mod: 25 | Performed by: PODIATRIST

## 2022-10-27 PROCEDURE — 11056 PARNG/CUTG B9 HYPRKR LES 2-4: CPT | Performed by: PODIATRIST

## 2022-10-27 NOTE — LETTER
10/27/2022         RE: Nithya Matthews  [address] [place] [address]        Dear Colleague,    Thank you for referring your patient, Nithya Matthews, to the [facility] PODIATRY. Please see a copy of my visit note below.    Podiatry / Foot and Ankle Surgery Progress Note    October 27, 2022    Subject: Patient was seen for recurrent painful calluses to both feet.  Patient is here with her daughter who is interpreting for her.  Pain to the right foot is 5 out of 10.  Denies fever, nausea, vomiting.  Has not noted any drainage from the feet.    Objective:  Vitals: /74   Wt 50.3 kg (111 lb)   BMI 20.30 kg/m    BMI= Body mass index is 20.3 kg/m .    A1C: 14.4 (2/2022)     General appearance: Patient is alert and fully cooperative with history & exam.  No sign of distress is noted during the visit.     Dermatologic: Preulcerative hyperkeratotic lesions to the plantar aspects of the first and fifth metatarsal heads bilaterally.  No open lesions or signs of infection noted.     Vascular: DP & PT pulses are intact & regular bilaterally.  No significant edema or varicosities noted.  CFT and skin temperature is normal to both lower extremities.     Neurologic: Lower extremity sensation is diminished to feet.     Musculoskeletal: Patient is ambulatory without assistive device or brace.  Prominent first metatarsal heads medially both feet.        ASSESSMENT:      Diabetic polyneuropathy associated with type 2 diabetes mellitus (H)  Foot pain, bilateral  Hav (hallux abducto valgus), right  Hav (hallux abducto valgus), left  Pre-ulcerative calluses     Medical Decision Making/Plan:  Reviewed patient's chart in epic.    Discussed causes of keratomas.  They are due to areas of increase friction.  Hammertoes can create these as they put more pressure to the metatarsal head.  Discussed treatments such as using foot file, pumice stone, metatarsal pads, orthotics, and not walking barefoot.      We discussed the  cost structure of callus care if they were to come back and have it treated in the clinic if insurance does not cover it and explained that they would be billed. They were also provided information on places to get the callus treatment.        Recommend using a pumice stone 3-4 times a week to try to keep the callus from building up.    Recommend continue using urea cream.  Continue to wearing her diabetic shoes and inserts at all times.  Was given a list of places that do Callus care.  ll questions were answered to patient and patient's daughter satisfaction and they will call for the questions or concerns.     Procedure: After verbal consent, the hyperkeratotic lesions x 4 were debrided using a #15 blade without incident. Patient tolerated procedure well.     Grecia Hearn DPM, Podiatry/Foot and Ankle Surgery        Again, thank you for allowing me to participate in the care of your patient.        Sincerely,        Grecia Hearn DPM, Podiatry/Foot and Ankle Surgery

## 2022-10-27 NOTE — PROGRESS NOTES
Podiatry / Foot and Ankle Surgery Progress Note    October 27, 2022    Subject: Patient was seen for recurrent painful calluses to both feet.  Patient is here with her daughter who is interpreting for her.  Pain to the right foot is 5 out of 10.  Denies fever, nausea, vomiting.  Has not noted any drainage from the feet.    Objective:  Vitals: /74   Wt 50.3 kg (111 lb)   BMI 20.30 kg/m    BMI= Body mass index is 20.3 kg/m .    A1C: 14.4 (2/2022)     General appearance: Patient is alert and fully cooperative with history & exam.  No sign of distress is noted during the visit.     Dermatologic: Preulcerative hyperkeratotic lesions to the plantar aspects of the first and fifth metatarsal heads bilaterally.  No open lesions or signs of infection noted.     Vascular: DP & PT pulses are intact & regular bilaterally.  No significant edema or varicosities noted.  CFT and skin temperature is normal to both lower extremities.     Neurologic: Lower extremity sensation is diminished to feet.     Musculoskeletal: Patient is ambulatory without assistive device or brace.  Prominent first metatarsal heads medially both feet.        ASSESSMENT:      Diabetic polyneuropathy associated with type 2 diabetes mellitus (H)  Foot pain, bilateral  Hav (hallux abducto valgus), right  Hav (hallux abducto valgus), left  Pre-ulcerative calluses     Medical Decision Making/Plan:  Reviewed patient's chart in epic.    Discussed causes of keratomas.  They are due to areas of increase friction.  Hammertoes can create these as they put more pressure to the metatarsal head.  Discussed treatments such as using foot file, pumice stone, metatarsal pads, orthotics, and not walking barefoot.      We discussed the cost structure of callus care if they were to come back and have it treated in the clinic if insurance does not cover it and explained that they would be billed. They were also provided information on places to get the callus  treatment.        Recommend using a pumice stone 3-4 times a week to try to keep the callus from building up.    Recommend continue using urea cream.  Continue to wearing her diabetic shoes and inserts at all times.  Was given a list of places that do Callus care.  ll questions were answered to patient and patient's daughter satisfaction and they will call for the questions or concerns.     Procedure: After verbal consent, the hyperkeratotic lesions x 4 were debrided using a #15 blade without incident. Patient tolerated procedure well.     Grecia Hearn DPM, Podiatry/Foot and Ankle Surgery

## 2022-10-28 ENCOUNTER — HOSPITAL ENCOUNTER (OUTPATIENT)
Facility: CLINIC | Age: 52
Setting detail: OBSERVATION
Discharge: HOME | End: 2022-10-29
Attending: STUDENT IN AN ORGANIZED HEALTH CARE EDUCATION/TRAINING PROGRAM | Admitting: INTERNAL MEDICINE
Payer: COMMERCIAL

## 2022-10-28 ENCOUNTER — APPOINTMENT (OUTPATIENT)
Dept: MEDSURG UNIT | Facility: CLINIC | Age: 52
End: 2022-10-28
Attending: STUDENT IN AN ORGANIZED HEALTH CARE EDUCATION/TRAINING PROGRAM
Payer: COMMERCIAL

## 2022-10-28 ENCOUNTER — HOSPITAL ENCOUNTER (OUTPATIENT)
Dept: ULTRASOUND IMAGING | Facility: CLINIC | Age: 52
Discharge: HOME | End: 2022-10-28
Attending: STUDENT IN AN ORGANIZED HEALTH CARE EDUCATION/TRAINING PROGRAM | Admitting: STUDENT IN AN ORGANIZED HEALTH CARE EDUCATION/TRAINING PROGRAM
Payer: COMMERCIAL

## 2022-10-28 ENCOUNTER — APPOINTMENT (OUTPATIENT)
Dept: ULTRASOUND IMAGING | Facility: CLINIC | Age: 52
End: 2022-10-28
Attending: STUDENT IN AN ORGANIZED HEALTH CARE EDUCATION/TRAINING PROGRAM
Payer: COMMERCIAL

## 2022-10-28 DIAGNOSIS — R80.9 PROTEINURIA: ICD-10-CM

## 2022-10-28 DIAGNOSIS — E87.6 HYPOKALEMIA: ICD-10-CM

## 2022-10-28 DIAGNOSIS — E11.69 TYPE 2 DIABETES MELLITUS WITH OTHER SPECIFIED COMPLICATION, UNSPECIFIED WHETHER LONG TERM INSULIN USE (H): ICD-10-CM

## 2022-10-28 DIAGNOSIS — N18.4 STAGE 4 CHRONIC KIDNEY DISEASE (H): ICD-10-CM

## 2022-10-28 DIAGNOSIS — I10 HYPERTENSION GOAL BP (BLOOD PRESSURE) < 140/90: Primary | ICD-10-CM

## 2022-10-28 LAB
ABO/RH(D): NORMAL
ANION GAP SERPL CALCULATED.3IONS-SCNC: 12 MMOL/L (ref 7–15)
ANION GAP SERPL CALCULATED.3IONS-SCNC: 8 MMOL/L (ref 7–15)
ANTIBODY SCREEN: NEGATIVE
APTT PPP: 31 SECONDS (ref 22–38)
BASOPHILS # BLD AUTO: 0 10E3/UL (ref 0–0.2)
BASOPHILS NFR BLD AUTO: 0 %
BUN SERPL-MCNC: 18.5 MG/DL (ref 6–20)
BUN SERPL-MCNC: 18.7 MG/DL (ref 6–20)
BURR CELLS BLD QL SMEAR: SLIGHT
CALCIUM SERPL-MCNC: 6 MG/DL (ref 8.6–10)
CALCIUM SERPL-MCNC: 6.3 MG/DL (ref 8.6–10)
CHLORIDE SERPL-SCNC: 109 MMOL/L (ref 98–107)
CHLORIDE SERPL-SCNC: 111 MMOL/L (ref 98–107)
CREAT SERPL-MCNC: 3.19 MG/DL (ref 0.51–0.95)
CREAT SERPL-MCNC: 3.21 MG/DL (ref 0.51–0.95)
DEPRECATED HCO3 PLAS-SCNC: 23 MMOL/L (ref 22–29)
DEPRECATED HCO3 PLAS-SCNC: 24 MMOL/L (ref 22–29)
ELLIPTOCYTES BLD QL SMEAR: SLIGHT
EOSINOPHIL # BLD AUTO: 0.2 10E3/UL (ref 0–0.7)
EOSINOPHIL NFR BLD AUTO: 2 %
ERYTHROCYTE [DISTWIDTH] IN BLOOD BY AUTOMATED COUNT: 20.5 % (ref 10–15)
FIBRINOGEN PPP-MCNC: 302 MG/DL (ref 170–490)
GFR SERPL CREATININE-BSD FRML MDRD: 16 ML/MIN/1.73M2
GFR SERPL CREATININE-BSD FRML MDRD: 17 ML/MIN/1.73M2
GLUCOSE BLDC GLUCOMTR-MCNC: 115 MG/DL (ref 70–99)
GLUCOSE BLDC GLUCOMTR-MCNC: 132 MG/DL (ref 70–99)
GLUCOSE BLDC GLUCOMTR-MCNC: 147 MG/DL (ref 70–99)
GLUCOSE BLDC GLUCOMTR-MCNC: 167 MG/DL (ref 70–99)
GLUCOSE BLDC GLUCOMTR-MCNC: 176 MG/DL (ref 70–99)
GLUCOSE BLDC GLUCOMTR-MCNC: 195 MG/DL (ref 70–99)
GLUCOSE BLDC GLUCOMTR-MCNC: 197 MG/DL (ref 70–99)
GLUCOSE BLDC GLUCOMTR-MCNC: 64 MG/DL (ref 70–99)
GLUCOSE BLDC GLUCOMTR-MCNC: 88 MG/DL (ref 70–99)
GLUCOSE SERPL-MCNC: 233 MG/DL (ref 70–99)
GLUCOSE SERPL-MCNC: 62 MG/DL (ref 70–99)
HCT VFR BLD AUTO: 29.9 % (ref 35–47)
HGB BLD-MCNC: 7.4 G/DL (ref 11.7–15.7)
HGB BLD-MCNC: 8.3 G/DL (ref 11.7–15.7)
HGB BLD-MCNC: 8.9 G/DL (ref 11.7–15.7)
HOLD SPECIMEN: NORMAL
IMM GRANULOCYTES # BLD: 0 10E3/UL
IMM GRANULOCYTES NFR BLD: 0 %
INR PPP: 0.88 (ref 0.85–1.15)
INR PPP: 0.91 (ref 0.85–1.15)
LYMPHOCYTES # BLD AUTO: 2.7 10E3/UL (ref 0.8–5.3)
LYMPHOCYTES NFR BLD AUTO: 36 %
MCH RBC QN AUTO: 21.3 PG (ref 26.5–33)
MCHC RBC AUTO-ENTMCNC: 29.8 G/DL (ref 31.5–36.5)
MCV RBC AUTO: 72 FL (ref 78–100)
MONOCYTES # BLD AUTO: 0.5 10E3/UL (ref 0–1.3)
MONOCYTES NFR BLD AUTO: 6 %
NEUTROPHILS # BLD AUTO: 4.2 10E3/UL (ref 1.6–8.3)
NEUTROPHILS NFR BLD AUTO: 56 %
NRBC # BLD AUTO: 0 10E3/UL
NRBC BLD AUTO-RTO: 0 /100
PLAT MORPH BLD: ABNORMAL
PLATELET # BLD AUTO: 175 10E3/UL (ref 150–450)
POTASSIUM SERPL-SCNC: 2.8 MMOL/L (ref 3.4–5.3)
POTASSIUM SERPL-SCNC: 3.3 MMOL/L (ref 3.4–5.3)
RBC # BLD AUTO: 4.17 10E6/UL (ref 3.8–5.2)
RBC MORPH BLD: ABNORMAL
SARS-COV-2 RNA RESP QL NAA+PROBE: NEGATIVE
SODIUM SERPL-SCNC: 141 MMOL/L (ref 136–145)
SODIUM SERPL-SCNC: 146 MMOL/L (ref 136–145)
SPECIMEN EXPIRATION DATE: NORMAL
WBC # BLD AUTO: 7.6 10E3/UL (ref 4–11)

## 2022-10-28 PROCEDURE — 88313 SPECIAL STAINS GROUP 2: CPT | Mod: 26 | Performed by: PATHOLOGY

## 2022-10-28 PROCEDURE — 250N000013 HC RX MED GY IP 250 OP 250 PS 637: Performed by: STUDENT IN AN ORGANIZED HEALTH CARE EDUCATION/TRAINING PROGRAM

## 2022-10-28 PROCEDURE — 82310 ASSAY OF CALCIUM: CPT | Performed by: STUDENT IN AN ORGANIZED HEALTH CARE EDUCATION/TRAINING PROGRAM

## 2022-10-28 PROCEDURE — 93010 ELECTROCARDIOGRAM REPORT: CPT | Performed by: INTERNAL MEDICINE

## 2022-10-28 PROCEDURE — 85384 FIBRINOGEN ACTIVITY: CPT | Performed by: STUDENT IN AN ORGANIZED HEALTH CARE EDUCATION/TRAINING PROGRAM

## 2022-10-28 PROCEDURE — 80048 BASIC METABOLIC PNL TOTAL CA: CPT

## 2022-10-28 PROCEDURE — 999N000142 HC STATISTIC PROCEDURE PREP ONLY

## 2022-10-28 PROCEDURE — 36415 COLL VENOUS BLD VENIPUNCTURE: CPT

## 2022-10-28 PROCEDURE — 76775 US EXAM ABDO BACK WALL LIM: CPT

## 2022-10-28 PROCEDURE — 36415 COLL VENOUS BLD VENIPUNCTURE: CPT | Performed by: STUDENT IN AN ORGANIZED HEALTH CARE EDUCATION/TRAINING PROGRAM

## 2022-10-28 PROCEDURE — 88305 TISSUE EXAM BY PATHOLOGIST: CPT | Mod: 26 | Performed by: PATHOLOGY

## 2022-10-28 PROCEDURE — 250N000013 HC RX MED GY IP 250 OP 250 PS 637

## 2022-10-28 PROCEDURE — 50200 RENAL BIOPSY PERQ: CPT | Mod: LT | Performed by: INTERNAL MEDICINE

## 2022-10-28 PROCEDURE — 258N000003 HC RX IP 258 OP 636: Performed by: INTERNAL MEDICINE

## 2022-10-28 PROCEDURE — 93005 ELECTROCARDIOGRAM TRACING: CPT

## 2022-10-28 PROCEDURE — G0378 HOSPITAL OBSERVATION PER HR: HCPCS

## 2022-10-28 PROCEDURE — 85025 COMPLETE CBC W/AUTO DIFF WBC: CPT | Performed by: STUDENT IN AN ORGANIZED HEALTH CARE EDUCATION/TRAINING PROGRAM

## 2022-10-28 PROCEDURE — 86850 RBC ANTIBODY SCREEN: CPT | Performed by: STUDENT IN AN ORGANIZED HEALTH CARE EDUCATION/TRAINING PROGRAM

## 2022-10-28 PROCEDURE — 272N000704 US BIOPSY RENAL: Mod: TC

## 2022-10-28 PROCEDURE — 99225 PR SUBSEQUENT OBSERVATION CARE,LEVEL II: CPT | Mod: GC | Performed by: STUDENT IN AN ORGANIZED HEALTH CARE EDUCATION/TRAINING PROGRAM

## 2022-10-28 PROCEDURE — U0005 INFEC AGEN DETEC AMPLI PROBE: HCPCS

## 2022-10-28 PROCEDURE — 250N000009 HC RX 250: Performed by: INTERNAL MEDICINE

## 2022-10-28 PROCEDURE — 85610 PROTHROMBIN TIME: CPT | Performed by: STUDENT IN AN ORGANIZED HEALTH CARE EDUCATION/TRAINING PROGRAM

## 2022-10-28 PROCEDURE — 88305 TISSUE EXAM BY PATHOLOGIST: CPT | Mod: TC | Performed by: INTERNAL MEDICINE

## 2022-10-28 PROCEDURE — 258N000001 HC RX 258: Performed by: INTERNAL MEDICINE

## 2022-10-28 PROCEDURE — 88350 IMFLUOR EA ADDL 1ANTB STN PX: CPT | Mod: TC | Performed by: INTERNAL MEDICINE

## 2022-10-28 PROCEDURE — 76775 US EXAM ABDO BACK WALL LIM: CPT | Mod: 26 | Performed by: RADIOLOGY

## 2022-10-28 PROCEDURE — 85018 HEMOGLOBIN: CPT

## 2022-10-28 PROCEDURE — 85018 HEMOGLOBIN: CPT | Mod: 91 | Performed by: STUDENT IN AN ORGANIZED HEALTH CARE EDUCATION/TRAINING PROGRAM

## 2022-10-28 PROCEDURE — 999N000134 HC STATISTIC PP CARE STAGE 3

## 2022-10-28 PROCEDURE — 82962 GLUCOSE BLOOD TEST: CPT

## 2022-10-28 PROCEDURE — 86901 BLOOD TYPING SEROLOGIC RH(D): CPT | Performed by: STUDENT IN AN ORGANIZED HEALTH CARE EDUCATION/TRAINING PROGRAM

## 2022-10-28 PROCEDURE — 85730 THROMBOPLASTIN TIME PARTIAL: CPT | Performed by: STUDENT IN AN ORGANIZED HEALTH CARE EDUCATION/TRAINING PROGRAM

## 2022-10-28 RX ORDER — POTASSIUM CHLORIDE 750 MG/1
40 TABLET, EXTENDED RELEASE ORAL ONCE
Status: COMPLETED | OUTPATIENT
Start: 2022-10-28 | End: 2022-10-28

## 2022-10-28 RX ORDER — MIRTAZAPINE 7.5 MG/1
7.5 TABLET, FILM COATED ORAL AT BEDTIME
Status: DISCONTINUED | OUTPATIENT
Start: 2022-10-28 | End: 2022-10-29 | Stop reason: HOSPADM

## 2022-10-28 RX ORDER — PROCHLORPERAZINE MALEATE 5 MG
10 TABLET ORAL EVERY 6 HOURS PRN
Status: DISCONTINUED | OUTPATIENT
Start: 2022-10-28 | End: 2022-10-29 | Stop reason: HOSPADM

## 2022-10-28 RX ORDER — ESCITALOPRAM OXALATE 10 MG/1
10 TABLET ORAL DAILY
Status: DISCONTINUED | OUTPATIENT
Start: 2022-10-29 | End: 2022-10-29 | Stop reason: HOSPADM

## 2022-10-28 RX ORDER — GABAPENTIN 300 MG/1
300 CAPSULE ORAL 2 TIMES DAILY
Status: DISCONTINUED | OUTPATIENT
Start: 2022-10-28 | End: 2022-10-29 | Stop reason: HOSPADM

## 2022-10-28 RX ORDER — ACETAMINOPHEN 500 MG
500-1000 TABLET ORAL EVERY 6 HOURS PRN
Status: DISCONTINUED | OUTPATIENT
Start: 2022-10-28 | End: 2022-10-29 | Stop reason: HOSPADM

## 2022-10-28 RX ORDER — ONDANSETRON 2 MG/ML
4 INJECTION INTRAMUSCULAR; INTRAVENOUS EVERY 6 HOURS PRN
Status: DISCONTINUED | OUTPATIENT
Start: 2022-10-28 | End: 2022-10-29 | Stop reason: HOSPADM

## 2022-10-28 RX ORDER — LIDOCAINE HYDROCHLORIDE 10 MG/ML
30 INJECTION, SOLUTION INFILTRATION; PERINEURAL ONCE
Status: COMPLETED | OUTPATIENT
Start: 2022-10-28 | End: 2022-10-28

## 2022-10-28 RX ORDER — DEXTROSE MONOHYDRATE 25 G/50ML
50 INJECTION, SOLUTION INTRAVENOUS ONCE
Status: COMPLETED | OUTPATIENT
Start: 2022-10-28 | End: 2022-10-28

## 2022-10-28 RX ORDER — ATORVASTATIN CALCIUM 40 MG/1
80 TABLET, FILM COATED ORAL DAILY
Status: DISCONTINUED | OUTPATIENT
Start: 2022-10-29 | End: 2022-10-29 | Stop reason: HOSPADM

## 2022-10-28 RX ORDER — DEXTROSE MONOHYDRATE 25 G/50ML
25-50 INJECTION, SOLUTION INTRAVENOUS
Status: DISCONTINUED | OUTPATIENT
Start: 2022-10-28 | End: 2022-10-29 | Stop reason: HOSPADM

## 2022-10-28 RX ORDER — AMOXICILLIN 250 MG
1 CAPSULE ORAL 2 TIMES DAILY PRN
Status: DISCONTINUED | OUTPATIENT
Start: 2022-10-28 | End: 2022-10-29 | Stop reason: HOSPADM

## 2022-10-28 RX ORDER — PROCHLORPERAZINE 25 MG
25 SUPPOSITORY, RECTAL RECTAL EVERY 12 HOURS PRN
Status: DISCONTINUED | OUTPATIENT
Start: 2022-10-28 | End: 2022-10-29 | Stop reason: HOSPADM

## 2022-10-28 RX ORDER — NICOTINE POLACRILEX 4 MG
15-30 LOZENGE BUCCAL
Status: DISCONTINUED | OUTPATIENT
Start: 2022-10-28 | End: 2022-10-29 | Stop reason: HOSPADM

## 2022-10-28 RX ORDER — FERROUS SULFATE 325(65) MG
325 TABLET ORAL EVERY OTHER DAY
Status: DISCONTINUED | OUTPATIENT
Start: 2022-10-30 | End: 2022-10-29 | Stop reason: HOSPADM

## 2022-10-28 RX ORDER — AMOXICILLIN 250 MG
2 CAPSULE ORAL 2 TIMES DAILY PRN
Status: DISCONTINUED | OUTPATIENT
Start: 2022-10-28 | End: 2022-10-29 | Stop reason: HOSPADM

## 2022-10-28 RX ORDER — NICOTINE POLACRILEX 4 MG/1
20 GUM, CHEWING ORAL DAILY
Status: DISCONTINUED | OUTPATIENT
Start: 2022-10-28 | End: 2022-10-28

## 2022-10-28 RX ORDER — POTASSIUM CHLORIDE 750 MG/1
40 TABLET, EXTENDED RELEASE ORAL ONCE
Status: CANCELLED | OUTPATIENT
Start: 2022-10-28

## 2022-10-28 RX ORDER — ONDANSETRON 4 MG/1
4 TABLET, ORALLY DISINTEGRATING ORAL EVERY 6 HOURS PRN
Status: DISCONTINUED | OUTPATIENT
Start: 2022-10-28 | End: 2022-10-29 | Stop reason: HOSPADM

## 2022-10-28 RX ORDER — HYDRALAZINE HYDROCHLORIDE 20 MG/ML
10 INJECTION INTRAMUSCULAR; INTRAVENOUS EVERY 6 HOURS PRN
Status: DISCONTINUED | OUTPATIENT
Start: 2022-10-28 | End: 2022-10-29 | Stop reason: HOSPADM

## 2022-10-28 RX ORDER — LIDOCAINE 40 MG/G
CREAM TOPICAL
Status: DISCONTINUED | OUTPATIENT
Start: 2022-10-28 | End: 2022-10-29 | Stop reason: HOSPADM

## 2022-10-28 RX ORDER — PANTOPRAZOLE SODIUM 40 MG/1
40 TABLET, DELAYED RELEASE ORAL
Status: DISCONTINUED | OUTPATIENT
Start: 2022-10-29 | End: 2022-10-29 | Stop reason: HOSPADM

## 2022-10-28 RX ORDER — DEXTROSE MONOHYDRATE 50 MG/ML
INJECTION, SOLUTION INTRAVENOUS CONTINUOUS
Status: DISCONTINUED | OUTPATIENT
Start: 2022-10-28 | End: 2022-10-29 | Stop reason: HOSPADM

## 2022-10-28 RX ORDER — POLYETHYLENE GLYCOL 3350 17 G/17G
17 POWDER, FOR SOLUTION ORAL DAILY PRN
Status: DISCONTINUED | OUTPATIENT
Start: 2022-10-28 | End: 2022-10-29 | Stop reason: HOSPADM

## 2022-10-28 RX ORDER — POTASSIUM CHLORIDE 750 MG/1
40 TABLET, EXTENDED RELEASE ORAL ONCE
Status: DISCONTINUED | OUTPATIENT
Start: 2022-10-28 | End: 2022-10-28

## 2022-10-28 RX ORDER — POTASSIUM CHLORIDE 750 MG/1
20 TABLET, EXTENDED RELEASE ORAL ONCE
Status: COMPLETED | OUTPATIENT
Start: 2022-10-28 | End: 2022-10-28

## 2022-10-28 RX ADMIN — DEXTROSE MONOHYDRATE 25 ML: 25 INJECTION, SOLUTION INTRAVENOUS at 09:00

## 2022-10-28 RX ADMIN — POTASSIUM CHLORIDE 40 MEQ: 750 TABLET, EXTENDED RELEASE ORAL at 18:41

## 2022-10-28 RX ADMIN — POTASSIUM CHLORIDE 20 MEQ: 750 TABLET, EXTENDED RELEASE ORAL at 23:04

## 2022-10-28 RX ADMIN — DEXTROSE MONOHYDRATE: 50 INJECTION, SOLUTION INTRAVENOUS at 09:11

## 2022-10-28 RX ADMIN — ACETAMINOPHEN 1000 MG: 500 TABLET ORAL at 20:56

## 2022-10-28 RX ADMIN — LIDOCAINE HYDROCHLORIDE 8 ML: 10 INJECTION, SOLUTION EPIDURAL; INFILTRATION; INTRACAUDAL; PERINEURAL at 10:29

## 2022-10-28 ASSESSMENT — ACTIVITIES OF DAILY LIVING (ADL)
ADLS_ACUITY_SCORE: 35

## 2022-10-28 NOTE — PROCEDURES
Kidney Transplant Biopsy Procedure Note    Indication/Diagnosis:  Kidney transplant with proteinuria    Procedure:  The indications and risks of the kidney biopsy, including bleeding severe enough to require hospitalization, transfusion, surgery, or even loss of the kidney or death, were explained, understood and agreed.  Consent was signed.  The kidney, located in the left lower quadrant, was visualized under ultrasound and biopsy site marked.  Patient was prepped and draped in the usual sterile manner.  Biopsy site was anesthetized with 1% lidocaine.  Biopsy consisted of 3 passes with a 18 ga needle under direct ultrasound guidance were made and tissue was sent to pathology.  There were no immediate complications.     Pressure was held over biopsy site and patient returned to the recovery unit. No hematoma was identified on initial imaging.     Post-biopsy hemoglobin check returned at 7.4 (from 8.9) - given significant change, repeat ultrasound was done - showing 10.2x1.9x4.1cm perinephric hematoma. Hemodynamically stable and no noted hematuria.     Will plan for obs admission overnight to monitor hgb trend and perinephric hematoma. Repeat imaging prior to discharge. Goal BP <140 systolic.      Fausto Damian MD

## 2022-10-28 NOTE — PROGRESS NOTES
US read with hematoma. Dr. Mraie at bedside to evaluate patient. VS remain stable. No blood in urine noted.

## 2022-10-28 NOTE — DISCHARGE INSTRUCTIONS
[facility] Care Following Kidney Biopsy  Physician:                                               Date:October 28, 2022    -Please avoid taking aspirin for 7 days till November 5th.     ACTIVITY:    Relax and take it easy, no strenuous activity for 24 hours.  No heavy lifting (>10 lbs.) for 1 week    DIET:            Resume your regular diet and drink plenty of fluids, unless you are fluid restricted                    DRAINAGE:    There should be minimal drainage from the biopsy site. If bleeding soaks the dressing, you should lie down and apply pressure to the site for a minimum of 10 minutes. If the bleeding persists, refer to the emergency contact numbers below; whether the bleeding persists or not, you should report the occurrence to one of the numbers below.    Refer to the emergency numbers below for the following:   Excessive bleeding or drainage  Excessive swelling, redness, or tenderness at the site  Fever above 100.5 degrees orally  Severe pain  Drainage that is green, yellow, thick white, or has a bad odor  Passage of bloody urine or clots after you are discharged.     Emergency Contact Numbers:             [phone]      Ask for the Adult Nephrology Fellow on Call, someone is                                                     available 24 hours a day.

## 2022-10-28 NOTE — PROGRESS NOTES
Dr. Arango wishes to admit patient overnight to monitor hemoglobin. Patient agreeable. Orders placed. Patient placement aware. Glucose 147.

## 2022-10-28 NOTE — PROGRESS NOTES
Admitted/transferred from: 2A  2 RN full   skin assessment completed by Flor Fernandez, RN and LORAINE Lua RN.  Skin assessment finding: issues found L back puncture site- covered with bandaid and CDI, PIV, dry skin on heels   Interventions/actions: skin interventions pt education, encourage activity OOB, nutrition and hydration     Bedside Emergency Equipment Present:  Suction Regulator: Yes  Suction Canister: Yes  Tubing between Regulator and Canister: Yes  O2 Regulator with Tree: Yes  Ambu Bag: Yes

## 2022-10-28 NOTE — PROGRESS NOTES
Brief nephrology note     Ms. Matthews underwent renal biopsy today for her PRUDENCE on CKD of unclear etiology. She tolerated the procedure well but later noted to have >1g of Hb drop and 10 cm pericapsular bleeding on left kidney (at the biopsy site)  - requested for obs admission to monitor her labs and vitals overnight.  - if her Hb continue to drop and or hemodynamically unstable, will consider stat IR consult for possible imaging and intervention to stop bleeding.  - appreciate medicine help to admit her overnight.    Cynthia Arango MD  Page [phone]

## 2022-10-28 NOTE — PROVIDER NOTIFICATION
"Writer tatiana GOMES \"Pt arrived on floor and swallowed a handful of pills from home and cannot tell me what they were-thanks!\"  "

## 2022-10-28 NOTE — H&P
[facility]    History and Physical - Medicine Service, MADELINE TEAM 1       Date of Admission:  10/28/2022    Assessment & Plan      Nithya Matthews is a 52 year old female with PMHx of HTN, DM2, CKD, HLD admitted on 10/28/2022. She has undergone renal biopsy today and the following US renal has demonstrated left-sided hematoma. Thus, she was admitted for observation.     #Hematoma likely 2/2 renal biopsy  Renal US revealed left-sided perinephric hematoma measuring 10.2 x 1.9 x 4.1 cm.   Arrived with Hg of 8.9 and post biopsy Hg of 7.4 Will consider stat IR consult if HD unstable or continuous drop in Hg  -Hg checks   -transfuse if Hg <7   -will consider repeat imaging in the AM    #CKD G4  Progressive renal impairment likely from a combination of recurrent pre-renal AKIs in the setting of significantly elevated glucose levels, DM, and HTN nephrosclerosis. UPCR 15g/day warranted renal biopsy to evaluate for membranous or minimal change disease.    #Type 2 diabetes mellitus with Diabetic Polyneuropathy  Adm with glu 176 and 9/21/22 A1C 11  Home regimen: liraglutide + metformin + glargine 70U  -glargine 35U  -MDSS  -hypoglycemia protocol    #Hypokalemia  BMP measures shortly after taking AM lantus. Suspect K shifting   -40meQ potassium   -K recheck after replacement     #Hypertension   - hold pta lisinopril, pta metoprolol     #Primary insomnia   -  pta mirtazapine (REMERON) 7.5 MG    #HLD  -pta atorvastatin     #Depression  -hold pta lexapro        Diet: NPO per Anesthesia Guidelines for Procedure/Surgery Except for: Meds    DVT Prophylaxis: VTE Prophylaxis contraindicated due to hematoma  Parra Catheter: Not present  Fluids: NA  Central Lines: None  Cardiac Monitoring: None  Code Status:  Full Code    Clinically Significant Risk Factors Present on Admission        # Hypokalemia: Lowest K = 2.8 mmol/L (Ref range: 3.4-5.3) in last 2 days, will replace as needed  # Hypernatremia:  Highest Na = 146 mmol/L (Ref range: 136-145) in last 2 days, will monitor as appropriate          # Hypertension: home medication list includes antihypertensive(s)    # DMII: A1C = 11.0 % (Ref range: 0.0 - 5.6 %) within past 3 months            Disposition Plan      Expected Discharge Date: 10/29/2022                The patient's care was discussed with the Attending Physician, Dr. Solano.    WESTLEY WHARTON MD  Medicine Service, [address]  Securely message with the Vocera Web Console (learn more here)  Text page via [facility] Paging/Directory   Please see signed in provider for up to date coverage information    ______________________________________________________________________    Chief Complaint   hematoma    History is obtained from the patient and chart review    History of Present Illness      Barriga Byron is a 52 year old female with PMHx of HTN, DM2, CKD, HLD admitted on 10/28/2022. Ms. Matthews underwent renal biopsy today for her PRUDENCE on CKD of unclear etiology. She tolerated the procedure well but later noted to have >1g of Hb drop and 10 cm pericapsular bleeding on left kidney (at the biopsy site).   She was admitted for observation overnight.   She endorses 4/10 pain on the biopsy site. She denies pain elsewhere. She denies N/V, SOB, dyspnea.       Review of Systems    The 10 point Review of Systems is negative other than noted in the HPI or here.     Past Medical History    I have reviewed this patient's medical history and updated it with pertinent information if needed.   History reviewed. No pertinent past medical history.     Past Surgical History   I have reviewed this patient's surgical history and updated it with pertinent information if needed.  Past Surgical History:   Procedure Laterality Date     CATARACT IOL, RT/LT          Social History   I have reviewed this patient's social history and updated it with pertinent information if needed.  Nithya Matthews  reports that she has never smoked. She has never used smokeless tobacco. She reports that she does not currently use alcohol. She reports that she does not use drugs.    Family History       Prior to Admission Medications   Prior to Admission Medications   Prescriptions Last Dose Informant Patient Reported? Taking?   Continuous Blood Gluc Sensor (FREESTYLE MICKIE 14 DAY SENSOR) [facility]   No No   Si Device every 14 days Change sensor as directed every 14 days   acetaminophen (TYLENOL) 500 MG tablet 10/28/2022  Yes Yes   Sig: Take 500-1,000 mg by mouth every 6 hours as needed for mild pain   alcohol swab prep pads   No No   Sig: Use to swab area of injection/amaedo as directed.   ammonium lactate (AMLACTIN) 12 % external cream   No No   Sig: Apply topically daily   aspirin 81 MG EC tablet 10/28/2022  No Yes   Sig: Take 1 tablet (81 mg) by mouth daily   atorvastatin (LIPITOR) 80 MG tablet 10/28/2022  No Yes   Sig: Take 1 tablet (80 mg) by mouth daily   blood glucose (NO BRAND SPECIFIED) test strip   No No   Sig: Use to test blood sugar 3 times daily or as directed. To accompany: Blood Glucose Monitor Brands: per insurance.   blood glucose calibration (NO BRAND SPECIFIED) solution   No No   Sig: To accompany: Blood Glucose Monitor Brands: per insurance.   blood glucose monitoring (NO BRAND SPECIFIED) meter device kit   No No   Sig: Use to test blood sugar 3 times daily or as directed. Preferred blood glucose meter OR supplies to accompany: Blood Glucose Monitor Brands: per insurance.   carboxymethylcellulose-glycerin (REFRESH OPTIVE) 0.5-0.9 % ophthalmic solution   No No   Sig: Place 1 drop into both eyes 3 times daily as needed (as needed for dry eye/eye pain)   cyanocobalamin (VITAMIN B-12) 1000 MCG tablet 10/28/2022  No Yes   Sig: Take 1 tablet (1,000 mcg) by mouth daily   escitalopram (LEXAPRO) 10 MG tablet 10/28/2022  No Yes   Sig: Take 1 tablet (10 mg) by mouth daily   fenofibrate (LOFIBRA) 54 MG tablet  10/28/2022  No Yes   Sig: Take 1 tablet (54 mg) by mouth daily   ferrous sulfate (FEROSUL) 325 (65 Fe) MG tablet 10/28/2022  No Yes   Sig: Take 1 tablet (325 mg) by mouth every other day   fish oil-omega-3 fatty acids 1000 MG capsule 10/28/2022  No Yes   Sig: Take 1 capsule (1 g) by mouth daily   gabapentin (NEURONTIN) 300 MG capsule 10/28/2022  No Yes   Sig: Take 1 capsule (300 mg) by mouth 2 times daily   insulin aspart (NOVOLOG PEN) 100 UNIT/ML pen 10/27/2022  No Yes   Sig: Inject 18 Units Subcutaneous 3 times daily (with meals)   insulin glargine (LANTUS PEN) 100 UNIT/ML pen   No No   Sig: Inject 80 Units Subcutaneous every morning 40 unit(s) on each side   insulin pen needle (32G X 4 MM) 32G X 4 MM miscellaneous   No No   Sig: Use 6x pen needles daily or as directed (victoza, 3 for novolog, 2 for lantus)   liraglutide (VICTOZA) 18 MG/3ML solution 10/28/2022  No Yes   Sig: Inject 1.8 mg Subcutaneous daily   lisinopril (ZESTRIL) 40 MG tablet 10/28/2022  No Yes   Sig: Take 1 tablet (40 mg) by mouth daily   melatonin 3 MG tablet 10/28/2022  No Yes   Sig: Take 1 tablet (3 mg) by mouth nightly as needed for sleep   menthol, Topical Analgesic, 2.5% (BENGAY VANISHING SCENT) 2.5 % GEL topical gel 10/28/2022  Yes Yes   Sig: Apply topically 3 times daily Right shoulder pain   metFORMIN (GLUCOPHAGE) 1000 MG tablet 10/28/2022  Yes Yes   Sig: Take 1 tablet by mouth 2 times daily (with meals)   metoprolol succinate ER (TOPROL-XL) 200 MG 24 hr tablet 10/28/2022  No Yes   Sig: Take 1 tablet (200 mg) by mouth daily   mirtazapine (REMERON) 7.5 MG tablet 10/27/2022  No Yes   Sig: TAKE 1 TABLET (7.5 MG) BY MOUTH AT BEDTIME   omeprazole 20 MG tablet 10/28/2022  No Yes   Sig: Take 1 tablet (20 mg) by mouth daily   polyethylene glycol (MIRALAX) 17 GM/Dose powder 10/28/2022  No Yes   Sig: Take 17 g by mouth daily for 90 days   silver sulfADIAZINE (SILVADENE) 1 % external cream   No No   Sig: Apply topically 2 times daily   thin (NO  BRAND SPECIFIED) lancets   No No   Sig: Use with lanceting device. To accompany: Blood Glucose Monitor Brands: per insurance.   urea (GORMEL) 20 % external cream   No No   Sig: Apply topically daily Apply to feet daily   vitamin D2 (ERGOCALCIFEROL) 11661 units (1250 mcg) capsule 10/28/2022  No Yes   Sig: TAKE 1 CAPSULE (50,000 UNITS) BY MOUTH ONCE A WEEK      Facility-Administered Medications: None     Allergies   No Known Allergies    Physical Exam   Vital Signs: Temp: 98.6  F (37  C)   BP: (!) 159/89 Pulse: 90   Resp: 16 SpO2: 98 % O2 Device: None (Room air)    Weight: 110 lbs 0 oz       GENERAL APPEARANCE: alert and no distress  EYES: nonicteric  HENT: mouth without ulcers or lesions  RESP: lungs clear to auscultation   CV: regular rhythm, normal rate, no rub  ABDOMEN: soft, nontender, normal bowel sounds, a band aid over biopsy site on the left  Extremities: no clubbing, cyanosis, no LE edema  MS: no evidence of inflammation in joints, no muscle tenderness  SKIN: no rash  NEURO: mentation intact and speech normal  PSYCH: affect normal    Data   Data reviewed today: I reviewed all medications, new labs and imaging results over the last 24 hours.    Recent Labs   Lab 10/28/22  1634 10/28/22  1425 10/28/22  1300 10/28/22  1122 10/28/22  0848 10/28/22  0842   WBC  --   --   --   --   --  7.6   HGB  --  7.4*  --   --   --  8.9*   MCV  --   --   --   --   --  72*   PLT  --   --   --   --   --  175   INR  --   --   --   --   --  0.88   NA  --   --   --   --   --  146*   POTASSIUM  --   --   --   --   --  2.8*   CHLORIDE  --   --   --   --   --  111*   CO2  --   --   --   --   --  23   BUN  --   --   --   --   --  18.5   CR  --   --   --   --   --  3.19*   ANIONGAP  --   --   --   --   --  12   JOSEPH  --   --   --   --   --  6.3*   *  --  176* 115*   < > 62*    < > = values in this interval not displayed.

## 2022-10-28 NOTE — PROGRESS NOTES
Pt here for kidney biopsy. Pt complaining of shakiness and took mealtime insulin and full dose of long acting insulin this AM. Glucose 64. Given 25ml D50 and started D5 @ 75ml/hr per renal fellow. Glucose increased to 168 but then fell to 136. Will continue to monitor. BP somewhat elevated, MD aware.

## 2022-10-29 VITALS
RESPIRATION RATE: 16 BRPM | OXYGEN SATURATION: 99 % | TEMPERATURE: 96.5 F | SYSTOLIC BLOOD PRESSURE: 149 MMHG | DIASTOLIC BLOOD PRESSURE: 85 MMHG | WEIGHT: 110 LBS | HEART RATE: 83 BPM | HEIGHT: 62 IN | BODY MASS INDEX: 20.24 KG/M2

## 2022-10-29 LAB
ANION GAP SERPL CALCULATED.3IONS-SCNC: 9 MMOL/L (ref 7–15)
BUN SERPL-MCNC: 18.7 MG/DL (ref 6–20)
CALCIUM SERPL-MCNC: 6 MG/DL (ref 8.6–10)
CHLORIDE SERPL-SCNC: 110 MMOL/L (ref 98–107)
CREAT SERPL-MCNC: 3.12 MG/DL (ref 0.51–0.95)
DEPRECATED HCO3 PLAS-SCNC: 24 MMOL/L (ref 22–29)
ERYTHROCYTE [DISTWIDTH] IN BLOOD BY AUTOMATED COUNT: 20.1 % (ref 10–15)
GFR SERPL CREATININE-BSD FRML MDRD: 17 ML/MIN/1.73M2
GLUCOSE BLDC GLUCOMTR-MCNC: 108 MG/DL (ref 70–99)
GLUCOSE BLDC GLUCOMTR-MCNC: 221 MG/DL (ref 70–99)
GLUCOSE BLDC GLUCOMTR-MCNC: 84 MG/DL (ref 70–99)
GLUCOSE SERPL-MCNC: 85 MG/DL (ref 70–99)
HCT VFR BLD AUTO: 24.5 % (ref 35–47)
HGB BLD-MCNC: 7.3 G/DL (ref 11.7–15.7)
MCH RBC QN AUTO: 21.6 PG (ref 26.5–33)
MCHC RBC AUTO-ENTMCNC: 29.8 G/DL (ref 31.5–36.5)
MCV RBC AUTO: 73 FL (ref 78–100)
PLATELET # BLD AUTO: 146 10E3/UL (ref 150–450)
POTASSIUM SERPL-SCNC: 3.4 MMOL/L (ref 3.4–5.3)
RBC # BLD AUTO: 3.38 10E6/UL (ref 3.8–5.2)
SODIUM SERPL-SCNC: 143 MMOL/L (ref 136–145)
WBC # BLD AUTO: 6.4 10E3/UL (ref 4–11)

## 2022-10-29 PROCEDURE — 250N000012 HC RX MED GY IP 250 OP 636 PS 637

## 2022-10-29 PROCEDURE — 250N000013 HC RX MED GY IP 250 OP 250 PS 637

## 2022-10-29 PROCEDURE — 82310 ASSAY OF CALCIUM: CPT

## 2022-10-29 PROCEDURE — 96372 THER/PROPH/DIAG INJ SC/IM: CPT

## 2022-10-29 PROCEDURE — 99217 PR OBSERVATION CARE DISCHARGE: CPT | Mod: GC | Performed by: STUDENT IN AN ORGANIZED HEALTH CARE EDUCATION/TRAINING PROGRAM

## 2022-10-29 PROCEDURE — 36415 COLL VENOUS BLD VENIPUNCTURE: CPT

## 2022-10-29 PROCEDURE — 250N000013 HC RX MED GY IP 250 OP 250 PS 637: Performed by: STUDENT IN AN ORGANIZED HEALTH CARE EDUCATION/TRAINING PROGRAM

## 2022-10-29 PROCEDURE — 85014 HEMATOCRIT: CPT

## 2022-10-29 PROCEDURE — 82962 GLUCOSE BLOOD TEST: CPT

## 2022-10-29 PROCEDURE — G0378 HOSPITAL OBSERVATION PER HR: HCPCS

## 2022-10-29 RX ORDER — ASPIRIN 81 MG/1
81 TABLET ORAL DAILY
Qty: 90 TABLET | Refills: 3 | Status: SHIPPED | OUTPATIENT
Start: 2022-11-05 | End: 2024-02-13

## 2022-10-29 RX ORDER — POTASSIUM CHLORIDE 750 MG/1
20 TABLET, EXTENDED RELEASE ORAL ONCE
Status: COMPLETED | OUTPATIENT
Start: 2022-10-29 | End: 2022-10-29

## 2022-10-29 RX ADMIN — PANTOPRAZOLE SODIUM 40 MG: 40 TABLET, DELAYED RELEASE ORAL at 08:43

## 2022-10-29 RX ADMIN — ESCITALOPRAM OXALATE 10 MG: 10 TABLET ORAL at 08:43

## 2022-10-29 RX ADMIN — GABAPENTIN 300 MG: 300 CAPSULE ORAL at 08:43

## 2022-10-29 RX ADMIN — INSULIN ASPART 2 UNITS: 100 INJECTION, SOLUTION INTRAVENOUS; SUBCUTANEOUS at 12:47

## 2022-10-29 RX ADMIN — POTASSIUM CHLORIDE 20 MEQ: 750 TABLET, EXTENDED RELEASE ORAL at 08:43

## 2022-10-29 RX ADMIN — ATORVASTATIN CALCIUM 80 MG: 40 TABLET, FILM COATED ORAL at 08:43

## 2022-10-29 ASSESSMENT — ACTIVITIES OF DAILY LIVING (ADL)
ADLS_ACUITY_SCORE: 35

## 2022-10-29 NOTE — PLAN OF CARE
Observation goals:    -diagnostic tests and consults completed and resulted: pending  -vital signs normal or at patient baseline: yes

## 2022-10-29 NOTE — PLAN OF CARE
Goal Outcome Evaluation: Improving    Pt discharged to home with Daughter at 12:54pm. Reviewed discharge paperwork with Daughter. Explained to patient that she needs to avoid taking Aspirin until November 5th per MD's order. All questions answered. All belongings sent with patient. PIV removed

## 2022-10-29 NOTE — PLAN OF CARE
Observation goals:    -diagnostic tests and consults completed and resulted: yes  -vital signs normal or at patient baseline: yes

## 2022-10-29 NOTE — DISCHARGE SUMMARY
[facility]  Discharge Summary - Medicine & Pediatrics       Date of Admission:  10/28/2022  Date of Discharge:  10/29/2022  Discharging Provider: Dr. Denis Solano,   Discharge Service: Medicine Service, MADELINE TEAM 1    Discharge Diagnoses   Renal hematoma 2/2 renal biopsy  Acute on chronic anemia  CKD 4  Hypokalemia  DMII  HTN  HLD    Follow-ups Needed After Discharge   Follow-up Appointments     Follow Up and recommended labs and tests      Follow up with primary care provider, Giancarlo Arizmendi, on Monday 10/31 to   follow up on your hypokalemia. Please avoid taking aspirin for next 7   days.            PCP to do:  1. Follow-up on CBC to assess for continued stabilization/improvement of hb post-biopsy  2. In conjunction with nephrology, f/u on BMP to assess K+ for ongoing hypokalemia and need for oral replacement    Unresulted Labs Ordered in the Past 30 Days of this Admission     Date and Time Order Name Status Description    10/28/2022 10:27 AM Surgical Pathology Exam In process       These results will be followed up by Nephrologist    Discharge Disposition   Discharged to home  Condition at discharge: Stable  Hospital Course   Nithya Matthews was admitted on 10/28/2022 for observation following acute hematoma in the setting of kidney biopsy on 10/28. The following problems were addressed during her hospitalization:    #Renal hematoma likely 2/2 renal biopsy  Renal US revealed left-sided perinephric hematoma measuring 10.2 x 1.9 x 4.1 cm.   Arrived with Hg of 8.9 and post biopsy Hg of 7.4. Recheck overnight increased to 8.3, however, on 10/29 value stable at 7.3 Suspect that 8.3 is erroneous value and hb remains stable in mid-7 range from post-biopsy value.   -CBC on 10/31/22- result has been forward to PCP + nephrology to f/u on.    -Please check to ensure stability from prior value.   -Pt advised to avoid aspirin for 7 days post-procedure      #CKD G4  Progressive renal  impairment likely from a combination of recurrent pre-renal AKIs in the setting of significantly elevated glucose levels, DM, and HTN nephrosclerosis. UPCR 15g/day warranted renal biopsy to evaluate for membranous or minimal change disease.  -Nephrology managing pt as outpt and will follow-up on biopsy    #Hypokalemia  K 2.8 around time of biopsy. Was repleted with 40meq, K improved to 3.3 and given additional 40meq. Unclear etiology.  -BMP 10/31 with result forwarded to PCP to monitor hypokalemia and need for ongoing oral supplementation     #Type 2 diabetes mellitus with Diabetic Polyneuropathy  Resume prior home regimen: liraglutide + metformin + glargine 70U     #Hypertension   PTA lisinopril held overnight in the setting for monitoring for bleed. Resumed pta lisionpril and metoprolol at discharge     #Primary insomnia: continued pta mirtazapine (REMERON) 7.5 MG     #HLD continued pta atorvastatin     #Depression: continued pta lexapro    Consultations This Hospital Stay   None    Code Status   Full Code     The patient was discussed with DO Tatiana Morse MD Maroon [address] [place] [address] [address] [address]  Phone: [phone]  ______________________________________________________________________    Physical Exam   Vital Signs: Temp: (!) 96.5  F (35.8  C) Temp src: Oral BP: (!) 149/85 Pulse: 83   Resp: 16 SpO2: 99 % O2 Device: None (Room air)    Weight: 110 lbs 0 oz  General Appearance: Awake, converses via , appears comfortable  Respiratory: BLCTA, no noted cough, nonlabored breathing on RA  Cardiovascular: RRR, normal S1/S2, no m/r/g  GI: Soft, nontender, nondistended. BS+  MSK: Left posterior back with bandaid over biopsy site. No surrounding erythema, ecchymosis. No tenderness to palpation over back. No noted hematoma or edema.      Primary Care Physician   Giancarlo Arizmendi    Discharge Orders      Basic metabolic panel      Discharge Instructions    No aspirin products for 7 days after kidney biopsy.  Provider will determine when to start NSAIDs, warfarin (COUMADIN), clopidogrel (PLAVIX) or other blood thinner if appropriate     Reason for your hospital stay    Hello, you have been admitted for observation due to bleeding around your kidney. We watched you overnight and you have been stable.     Activity    Your activity upon discharge: activity as tolerated     Follow Up and recommended labs and tests    Follow up with primary care provider, Giancarlo Arizmendi, on Monday 10/31 to follow up on your hypokalemia. Please avoid taking aspirin for next 7 days.     Diet    Follow this diet upon discharge: Orders Placed This Encounter      Renal Diet (non-dialysis)       Significant Results and Procedures   Hb (pre-procedure) 8.9  Hb post-procedure trend: 7.4-->8.3-->7.3  K 3.4 at discharge (lowest value 2.8 on 10/28)    Discharge Medications   Current Discharge Medication List      CONTINUE these medications which have CHANGED    Details   aspirin 81 MG EC tablet Take 1 tablet (81 mg) by mouth daily  Qty: 90 tablet, Refills: 3    Associated Diagnoses: Type 2 diabetes mellitus with other specified complication, unspecified whether long term insulin use (H)         CONTINUE these medications which have NOT CHANGED    Details   acetaminophen (TYLENOL) 500 MG tablet Take 500-1,000 mg by mouth every 6 hours as needed for mild pain      atorvastatin (LIPITOR) 80 MG tablet Take 1 tablet (80 mg) by mouth daily  Qty: 90 tablet, Refills: 3    Associated Diagnoses: Hyperlipidemia LDL goal <100      cyanocobalamin (VITAMIN B-12) 1000 MCG tablet Take 1 tablet (1,000 mcg) by mouth daily  Qty: 90 tablet, Refills: 3      escitalopram (LEXAPRO) 10 MG tablet Take 1 tablet (10 mg) by mouth daily  Qty: 90 tablet, Refills: 3    Associated Diagnoses: Depression, unspecified depression type      fenofibrate (LOFIBRA) 54 MG tablet Take 1 tablet (54 mg) by mouth  daily  Qty: 90 tablet, Refills: 1    Associated Diagnoses: Hyperlipidemia LDL goal <100      ferrous sulfate (FEROSUL) 325 (65 Fe) MG tablet Take 1 tablet (325 mg) by mouth every other day  Qty: 70 tablet, Refills: 3    Associated Diagnoses: Low iron      fish oil-omega-3 fatty acids 1000 MG capsule Take 1 capsule (1 g) by mouth daily  Qty: 90 capsule, Refills: 3    Associated Diagnoses: Type 2 diabetes mellitus with other specified complication, unspecified whether long term insulin use (H)      gabapentin (NEURONTIN) 300 MG capsule Take 1 capsule (300 mg) by mouth 2 times daily  Qty: 60 capsule, Refills: 5    Associated Diagnoses: Type 2 diabetes mellitus with other specified complication, unspecified whether long term insulin use (H)      insulin aspart (NOVOLOG PEN) 100 UNIT/ML pen Inject 18 Units Subcutaneous 3 times daily (with meals)  Qty: 45 mL, Refills: 3    Associated Diagnoses: Type 2 diabetes mellitus with other specified complication, unspecified whether long term insulin use (H)      liraglutide (VICTOZA) 18 MG/3ML solution Inject 1.8 mg Subcutaneous daily  Qty: 9 mL, Refills: 11    Associated Diagnoses: Type 2 diabetes mellitus with other specified complication, unspecified whether long term insulin use (H)      lisinopril (ZESTRIL) 40 MG tablet Take 1 tablet (40 mg) by mouth daily  Qty: 90 tablet, Refills: 3    Associated Diagnoses: Hyperlipidemia LDL goal <100      melatonin 3 MG tablet Take 1 tablet (3 mg) by mouth nightly as needed for sleep  Qty: 30 tablet, Refills: 3    Associated Diagnoses: Insomnia, unspecified type      menthol, Topical Analgesic, 2.5% (BENGAY VANISHING SCENT) 2.5 % GEL topical gel Apply topically 3 times daily Right shoulder pain      metFORMIN (GLUCOPHAGE) 1000 MG tablet Take 1 tablet by mouth 2 times daily (with meals)      metoprolol succinate ER (TOPROL-XL) 200 MG 24 hr tablet Take 1 tablet (200 mg) by mouth daily  Qty: 90 tablet, Refills: 3      mirtazapine (REMERON)  7.5 MG tablet TAKE 1 TABLET (7.5 MG) BY MOUTH AT BEDTIME  Qty: 90 tablet, Refills: 3    Associated Diagnoses: Primary insomnia; Encounter for medication refill      omeprazole 20 MG tablet Take 1 tablet (20 mg) by mouth daily  Qty: 90 tablet, Refills: 3    Associated Diagnoses: Gastroesophageal reflux disease with esophagitis without hemorrhage      polyethylene glycol (MIRALAX) 17 GM/Dose powder Take 17 g by mouth daily for 90 days  Qty: 510 g, Refills: 3    Associated Diagnoses: Low iron      vitamin D2 (ERGOCALCIFEROL) 03028 units (1250 mcg) capsule TAKE 1 CAPSULE (50,000 UNITS) BY MOUTH ONCE A WEEK  Qty: 12 capsule, Refills: 3    Associated Diagnoses: Low vitamin D level; Encounter for medication refill      alcohol swab prep pads Use to swab area of injection/amadeo as directed.  Qty: 200 each, Refills: 11    Associated Diagnoses: Type 2 diabetes mellitus with other specified complication, unspecified whether long term insulin use (H)      ammonium lactate (AMLACTIN) 12 % external cream Apply topically daily  Qty: 385 g, Refills: 4    Associated Diagnoses: Type 2 diabetes mellitus with other specified complication, unspecified whether long term insulin use (H); Diabetic polyneuropathy associated with type 2 diabetes mellitus (H); Diabetic ulcer of other part of right foot associated with type 2 diabetes mellitus, with fat layer exposed (H); Neuropathic pain of both feet; Pre-ulcerative calluses; Hav (hallux abducto valgus), right; Hav (hallux abducto valgus), left      blood glucose (NO BRAND SPECIFIED) test strip Use to test blood sugar 3 times daily or as directed. To accompany: Blood Glucose Monitor Brands: per insurance.  Qty: 200 strip, Refills: 3    Associated Diagnoses: Type 2 diabetes mellitus with other specified complication, unspecified whether long term insulin use (H); Hyperglycemia      blood glucose calibration (NO BRAND SPECIFIED) solution To accompany: Blood Glucose Monitor Brands: per  insurance.  Qty: 1 each, Refills: 3    Associated Diagnoses: Type 2 diabetes mellitus with other specified complication, unspecified whether long term insulin use (H); Hyperglycemia      blood glucose monitoring (NO BRAND SPECIFIED) meter device kit Use to test blood sugar 3 times daily or as directed. Preferred blood glucose meter OR supplies to accompany: Blood Glucose Monitor Brands: per insurance.  Qty: 1 kit, Refills: 0    Associated Diagnoses: Type 2 diabetes mellitus with other specified complication, unspecified whether long term insulin use (H); Hyperglycemia      carboxymethylcellulose-glycerin (REFRESH OPTIVE) 0.5-0.9 % ophthalmic solution Place 1 drop into both eyes 3 times daily as needed (as needed for dry eye/eye pain)  Qty: 10 mL, Refills: 4    Associated Diagnoses: Dry eye      Continuous Blood Gluc Sensor (FREESTYLE MICKIE 14 DAY SENSOR) MISC 1 Device every 14 days Change sensor as directed every 14 days  Qty: 6 each, Refills: 4    Associated Diagnoses: Type 2 diabetes mellitus with other specified complication, unspecified whether long term insulin use (H)      insulin glargine (LANTUS PEN) 100 UNIT/ML pen Inject 80 Units Subcutaneous every morning 40 unit(s) on each side  Qty: 75 mL, Refills: 3    Comments: If Lantus is not covered by insurance, may substitute Basaglar or Semglee or other insulin glargine product per insurance preference at same dose and frequency.    Associated Diagnoses: Type 2 diabetes mellitus with other specified complication, unspecified whether long term insulin use (H)      insulin pen needle (32G X 4 MM) 32G X 4 MM miscellaneous Use 6x pen needles daily or as directed (victoza, 3 for novolog, 2 for lantus)  Qty: 200 each, Refills: 11    Associated Diagnoses: Type 2 diabetes mellitus with other specified complication, unspecified whether long term insulin use (H)      silver sulfADIAZINE (SILVADENE) 1 % external cream Apply topically 2 times daily  Qty: 25 g, Refills: 1     Associated Diagnoses: Type 2 diabetes mellitus with other specified complication, unspecified whether long term insulin use (H); Diabetic polyneuropathy associated with type 2 diabetes mellitus (H); Diabetic ulcer of other part of right foot associated with type 2 diabetes mellitus, with fat layer exposed (H); Neuropathic pain of both feet; Pre-ulcerative calluses; Hav (hallux abducto valgus), right; Hav (hallux abducto valgus), left      thin (NO BRAND SPECIFIED) lancets Use with lanceting device. To accompany: Blood Glucose Monitor Brands: per insurance.  Qty: 200 each, Refills: 3    Associated Diagnoses: Type 2 diabetes mellitus with other specified complication, unspecified whether long term insulin use (H); Hyperglycemia      urea (GORMEL) 20 % external cream Apply topically daily Apply to feet daily  Qty: 385 g, Refills: 6    Associated Diagnoses: Diabetic polyneuropathy associated with type 2 diabetes mellitus (H); Foot pain, bilateral; Hav (hallux abducto valgus), right; Hav (hallux abducto valgus), left; Pre-ulcerative calluses           Allergies   No Known Allergies

## 2022-10-29 NOTE — PLAN OF CARE
"BP (!) 150/88 (BP Location: Right arm)   Pulse 83   Temp (!) 96.6  F (35.9  C) (Oral)   Resp 16   Ht 1.575 m (5' 2\")   Wt 49.9 kg (110 lb)   SpO2 96%   BMI 20.12 kg/m      Reason for admission:  Acute loss of blood. Renal biopsy showed left side hematoma  Neuro: AO, needs a  who speaks Karenni  Pain: 3, gave tylenol  CMS: Skin warm, radial and pedal pulse easily palpable  Cardiac: normal  Resp: clear equally bilaterally  GI/: urine free from blood  Skin/Wound: Puncture wound on left lower lateral side  Access: Right PIV saline locked  Labs: Hemoglobin 8.3, K 3.3- gave 20 mEq K pills  Activity: Independent  Nutrition: Renal diet  Changes this shift: None  Plan: possible discharged today                        "

## 2022-10-31 ENCOUNTER — PATIENT OUTREACH (OUTPATIENT)
Dept: CARE COORDINATION | Facility: CLINIC | Age: 52
End: 2022-10-31

## 2022-10-31 LAB
ATRIAL RATE - MUSE: 90 BPM
DIASTOLIC BLOOD PRESSURE - MUSE: NORMAL MMHG
INTERPRETATION ECG - MUSE: NORMAL
P AXIS - MUSE: 54 DEGREES
PR INTERVAL - MUSE: 178 MS
QRS DURATION - MUSE: 86 MS
QT - MUSE: 456 MS
QTC - MUSE: 557 MS
R AXIS - MUSE: 0 DEGREES
SYSTOLIC BLOOD PRESSURE - MUSE: NORMAL MMHG
T AXIS - MUSE: 15 DEGREES
VENTRICULAR RATE- MUSE: 90 BPM

## 2022-10-31 NOTE — PROGRESS NOTES
Clinic Care Coordination Contact  [facility]/Voicemail    Referral Source: PCP  Clinical Data: Care Coordinator Outreach  Outreach attempted x 1.  Left message on patient's voicemail with call back information and requested return call.    Plan: Care Coordinator will try to reach patient again in 10 business days.    Irene Samson  [facility] Care Coordination  [facility]    Phone: [phone]

## 2022-11-01 ENCOUNTER — ALLIED HEALTH/NURSE VISIT (OUTPATIENT)
Dept: EDUCATION SERVICES | Facility: CLINIC | Age: 52
End: 2022-11-01
Attending: FAMILY MEDICINE
Payer: COMMERCIAL

## 2022-11-01 DIAGNOSIS — E11.69 TYPE 2 DIABETES MELLITUS WITH OTHER SPECIFIED COMPLICATION, UNSPECIFIED WHETHER LONG TERM INSULIN USE (H): Primary | ICD-10-CM

## 2022-11-01 LAB
PATH REPORT.COMMENTS IMP SPEC: NORMAL
PATH REPORT.FINAL DX SPEC: NORMAL
PATH REPORT.GROSS SPEC: NORMAL
PATH REPORT.MICROSCOPIC SPEC OTHER STN: NORMAL
PATH REPORT.RELEVANT HX SPEC: NORMAL
PHOTO IMAGE: NORMAL

## 2022-11-01 PROCEDURE — G0108 DIAB MANAGE TRN  PER INDIV: HCPCS | Mod: AE | Performed by: DIETITIAN, REGISTERED

## 2022-11-01 NOTE — PROGRESS NOTES
Diabetes Self-Management Education & Support    Presents for:      Type of Service: In Person Visit    Assessment Type:     ASSESSMENT:    Initial visit for diabetes education, conducted with the help of a professional .   Pt is accompanied is by her daughter today.  New dx  CKD - stage 4  Hyperlipidemia  HTN  Neuropathy   Depression    Pt brings in her med vials, insulin and Victoza pens and also Freestyle Claribel 2 reader.    Current meds:  Pt appropriately takin U of basal insulin (40 U on each side of abdomen) AM  22 U of meal time insulin TID, 10 mins before meals  1.8 U victoza daily AM    Metformin is contraindicated due to higher risk of lactic acidosis with eGFR <30 and was d/cd on 10/12     SMBG/CGM data indicate improved time in range (TIR) but patient is also experiencing recurrent, symptomatic hypoglycemia. She has had 4 episodes of lows in the last 7 days, between 6 am -12 pm    Hence, we will go ahead and decrease basal insulin by 10% or to 72 units daily (36 U on each side of abdomen).  Future: Due to pt's advanced CKD and hence a marked reduction in insulin clearance, insulin requirements could decrease. If pt continues to have hypoglycemia, it would be safe to decrease or even discontinue insulin and switch to conservative initial dosing of sulfonylurea/glipizide along with GLP-1 (victoza), with the lowest risk of hypoglycemia    Pt had an episode of hypoglycemia while at the clinic today,   She felt shaky and weak. Freestyle claribel showed a BG of 58. We treated with candy and apple juice.   Writer also requested Viv Barrett (Gagan DUMONT) to help/translate.  Pt's symptoms had improved by the time she left the clinic and BG was 89.   Daughter shared that pt had not had her breakfast due to their am appointment today.    Reviewed s/s of hypoglycemia and treatment: pt/daughter expressed understanding.    After treating a low BG, also encouraged to eat a snack or meal after BG has returned  to normal to prevent recurrent hypoglycemia if a meal or snack is not planned within the next hour.    Encouraged regular meals with consistent CHO. Encouraged to always carry a snack when going out - discussed examples.    Encouraged pt to test BG when experiencing s/s of lows but to go ahead and treat anyway if unable to test for any reason.    They will also buy glucose tablets and keep them available at all times     SMBG:  Uses Freestyle nathan, brings in the reader today.    CGM data (last 7 days):   Av  Time in target:62%  Above:29%  Below: 9%  Low glucose events:4 (between 6am-12pm)  Scans per day:3  Sensor usage:59%    * Writer could not download CGM data due to time constraints.     ** We did not have time to discuss MNT today.     Patient's most recent   Lab Results   Component Value Date    A1C 11.0 2022     is not meeting goal of <7.0     ** per [facility], okay to have a modified target of <8.0, given multiple chronic conditions and CKD - stage 4    Diabetes knowledge and skills assessment:   Patient is knowledgeable in diabetes management concepts related to: needs AADE 7 review     Continue education with the following diabetes management concepts: needs AADE 7 review     Based on learning assessment above, most appropriate setting for further diabetes education would be: Individual setting.      PLAN    Decreased basal insulin to 72 U (36 U on each side of abdomen) daily - daughter/pt expressed understanding.  Future: Due to pt's advanced CKD and hence a marked reduction in insulin clearance, insulin requirements could decrease. If pt continues to have hypoglycemia, it would be safe to decrease or even discontinue insulin and switch to conservative initial dosing of sulfonylurea/glipizide along with GLP-1 (victoza), with the lowest risk of hypoglycemia    Daughter/pt to  glucose tablets and always keep them available.  To NOT skip/delay meals.    Instructed daughter to call if pt continues  "to have 2 low BS (or s/s of lows) in a 7-day period.     Topics to cover at upcoming visits: discuss MNT in great detail   Also needs AADE 7 review     Follow-up: 1/13/23  PCP:11/28  MTM:12/28    See Care Plan for co-developed, patient-state behavior change goals.  AVS provided for patient today.    SUBJECTIVE/OBJECTIVE:  Diabetes education in the past 24mo: No  How confident are you filling out medical forms by yourself:: Not at all  Cultural Influences/Ethnic Background:  Choose not to answer    Diabetes Symptoms & Complications:  Fatigue: Yes  Neuropathy: No  Polydipsia: Yes  Polyphagia: Yes  Polyuria: Yes  Visual change: Yes  Slow healing wounds: Yes  Nephropathy: Yes  Peripheral neuropathy: Yes  Retinopathy: Yes    Patient Problem List and Family Medical History reviewed for relevant medical history, current medical status, and diabetes risk factors.    Vitals:  There were no vitals taken for this visit.  Estimated body mass index is 20.12 kg/m  as calculated from the following:    Height as of 10/28/22: 1.575 m (5' 2\").    Weight as of 10/28/22: 49.9 kg (110 lb).   Last 3 BP:   BP Readings from Last 3 Encounters:   10/29/22 (!) 149/85   10/27/22 132/74   10/19/22 (!) 144/77       History   Smoking Status     Never   Smokeless Tobacco     Never       Labs:  Lab Results   Component Value Date    A1C 11.0 09/21/2022     Lab Results   Component Value Date     10/29/2022    GLC 85 10/29/2022     06/02/2022     Lab Results   Component Value Date    LDL  09/21/2022      Comment:      Cannot estimate LDL when triglyceride exceeds 400 mg/dL    LDL 37 09/21/2022    LDL  04/14/2022      Comment:      Invalid, Triglyceride >1100      Direct Measure HDL   Date Value Ref Range Status   09/21/2022 25 (L) >=50 mg/dL Final   ]  GFR Estimate   Date Value Ref Range Status   10/29/2022 17 (L) >60 mL/min/1.73m2 Final     Comment:     Effective December 21, 2021 eGFRcr in adults is calculated using the 2021 CKD-EPI " creatinine equation which includes age and gender (Zenobia cormier al., NEJM, DOI: 10.1056/JGQYgm7324950)     No results found for: GFRESTBLACK  Lab Results   Component Value Date    CR 3.12 10/29/2022     No results found for: MICROALBUMIN    Healthy Eating:  Meals include: Breakfast, Lunch, Dinner  Beverages: Water, Tea, Coffee    Monitoring:  Blood Glucose Meter: FreeStyle  Times checking blood sugar at home (number): 3    Taking Medications:  Diabetes Medication(s)     Biguanides       metFORMIN (GLUCOPHAGE) 1000 MG tablet    Take 1 tablet by mouth 2 times daily (with meals)    Insulin       insulin aspart (NOVOLOG PEN) 100 UNIT/ML pen    Inject 18 Units Subcutaneous 3 times daily (with meals)     insulin glargine (LANTUS PEN) 100 UNIT/ML pen    Inject 80 Units Subcutaneous every morning 40 unit(s) on each side    Incretin Mimetic Agents (GLP-1 Receptor Agonists)       liraglutide (VICTOZA) 18 MG/3ML solution    Inject 1.8 mg Subcutaneous daily          Current Treatments: Insulin Injections, Oral Medication (taken by mouth)    Problem Solving:     Reducing Risks:  CAD Risks: Diabetes Mellitus, Hypertension  Has dilated eye exam at least once a year?: Yes    Healthy Coping:     Patient Activation Measure Survey Score:  No flowsheet data found.    Care Plan and Education Provided:  BG monitoring, sx and tx of lows, taking medication     Time Spent: 90 minutes  Encounter Type: Individual    Any diabetes medication dose changes were made via the CDE Protocol per the patient's referring provider. A copy of this encounter was shared with the provider.

## 2022-11-01 NOTE — Clinical Note
"    2022         RE: Nithya Matthews  [address] [place] [address]        Dear Colleague,    Thank you for referring your patient, Nithya Matthews, to the [facility]. Please see a copy of my visit note below.    Diabetes Self-Management Education & Support    Presents for:      Type of Service: In Person Visit    Assessment Type:     ASSESSMENT:  Initial visit for DSME.  New dx  CKD - stage 4    Metformin was d/cd on 10/12   Pt appropriately takin U of basal insulin, 22 U of meal time insulin TID  1.8 U victoza daily     Pt had an episode of hypoglycemia while at the clinic with a BG of 58   Writer took Viv Arnold's help    Treated with candy and juice     SMBG:  Uses Freestyle nathan, brings in the reader today.    CGM data (last 7 days):   Av  Time in target:62%  Above:29%  Below: 9%  Low glucose events:4 (between 6am-12pm)  Scans per day:3  Sensor usage:59%     Plan:  Decreased basal insulin to 72 U daily   To  glucose tablets   To NOT skip/delay meals      Patient's most recent   Lab Results   Component Value Date    A1C 11.0 2022     {is/is not:457986::\"is\"} meeting goal of {A1C GOALS:548557}    Diabetes knowledge and skills assessment:   Patient is knowledgeable in diabetes management concepts related to: {AADE 7:038587}    Continue education with the following diabetes management concepts: {AADE 7:522870}    Based on learning assessment above, most appropriate setting for further diabetes education would be: {Visit Type:177877} setting.      PLAN    ***  Topics to cover at upcoming visits: {AADE 7:110666}    Follow-up: ***    See Care Plan for co-developed, patient-state behavior change goals.  AVS provided for patient today.    Education Materials Provided:  {CDE EDUCATION MATERIALS:501327}      SUBJECTIVE/OBJECTIVE:  Diabetes education in the past 24mo: No  How confident are you filling out medical forms by yourself:: Not at all  Cultural Influences/Ethnic " "Background:  Choose not to answer  ***    Diabetes Symptoms & Complications:  Fatigue: Yes  Neuropathy: No  Polydipsia: Yes  Polyphagia: Yes  Polyuria: Yes  Visual change: Yes  Slow healing wounds: Yes  Nephropathy: Yes  Peripheral neuropathy: Yes  Retinopathy: Yes    Patient Problem List and Family Medical History reviewed for relevant medical history, current medical status, and diabetes risk factors.    Vitals:  There were no vitals taken for this visit.  Estimated body mass index is 20.12 kg/m  as calculated from the following:    Height as of 10/28/22: 1.575 m (5' 2\").    Weight as of 10/28/22: 49.9 kg (110 lb).   Last 3 BP:   BP Readings from Last 3 Encounters:   10/29/22 (!) 149/85   10/27/22 132/74   10/19/22 (!) 144/77       History   Smoking Status     Never   Smokeless Tobacco     Never       Labs:  Lab Results   Component Value Date    A1C 11.0 09/21/2022     Lab Results   Component Value Date     10/29/2022    GLC 85 10/29/2022     06/02/2022     Lab Results   Component Value Date    LDL  09/21/2022      Comment:      Cannot estimate LDL when triglyceride exceeds 400 mg/dL    LDL 37 09/21/2022    LDL  04/14/2022      Comment:      Invalid, Triglyceride >1100      Direct Measure HDL   Date Value Ref Range Status   09/21/2022 25 (L) >=50 mg/dL Final   ]  GFR Estimate   Date Value Ref Range Status   10/29/2022 17 (L) >60 mL/min/1.73m2 Final     Comment:     Effective December 21, 2021 eGFRcr in adults is calculated using the 2021 CKD-EPI creatinine equation which includes age and gender (Zenobia cormier al., NEJ, DOI: 10.1056/ILIIom2662718)     No results found for: GFRESTBLACK  Lab Results   Component Value Date    CR 3.12 10/29/2022     No results found for: MICROALBUMIN    Healthy Eating:  Meals include: Breakfast, Lunch, Dinner  Beverages: Water, Tea, Coffee    Being Active:       Monitoring:  Blood Glucose Meter: FreeStyle  Times checking blood sugar at home (number): 3    ***    Taking " Medications:  Diabetes Medication(s)     Biguanides       metFORMIN (GLUCOPHAGE) 1000 MG tablet    Take 1 tablet by mouth 2 times daily (with meals)    Insulin       insulin aspart (NOVOLOG PEN) 100 UNIT/ML pen    Inject 18 Units Subcutaneous 3 times daily (with meals)     insulin glargine (LANTUS PEN) 100 UNIT/ML pen    Inject 80 Units Subcutaneous every morning 40 unit(s) on each side    Incretin Mimetic Agents (GLP-1 Receptor Agonists)       liraglutide (VICTOZA) 18 MG/3ML solution    Inject 1.8 mg Subcutaneous daily          Current Treatments: Insulin Injections, Oral Medication (taken by mouth)    Problem Solving:     Reducing Risks:  CAD Risks: Diabetes Mellitus, Hypertension  Has dilated eye exam at least once a year?: Yes    Healthy Coping:     Patient Activation Measure Survey Score:  No flowsheet data found.      Care Plan and Education Provided:  {Care Plan and Eduction Provided:321941}    ***    Time Spent: {cde time spent:740949} minutes  Encounter Type: Individual    Any diabetes medication dose changes were made via the CDE Protocol per the patient's {diabetes education provider list:193124}. A copy of this encounter was shared with the provider.    Diabetes Self-Management Education & Support    Presents for:      Type of Service: In Person Visit    Assessment Type:     ASSESSMENT:    Initial visit for diabetes education, conducted with the help of a professional .   Pt is accompanied is by her daughter today.  New dx  CKD - stage 4  Hyperlipidemia  HTN  Neuropathy   Depression    Pt brings in her med vials, insulin and Victoza pens and also Freestyle Claribel 2 reader.    Current meds:  Pt appropriately takin U of basal insulin (40 U on each side of abdomen) AM  22 U of meal time insulin TID, 10 mins before meals  1.8 U victoza daily AM    Metformin is contraindicated due to higher risk of lactic acidosis with eGFR <30 and was d/cd on 10/12     SMBG/CGM data indicate improved time in  range (TIR) but patient is also experiencing recurrent, symptomatic hypoglycemia. She has had 4 episodes of lows in the last 7 days, between 6 am -12 pm    Hence, we will go ahead and decrease basal insulin by 10% or to 72 units daily (36 U on each side of abdomen).  Future: Due to pt's advanced CKD and hence a marked reduction in insulin clearance, insulin requirements could decrease. If pt continues to have hypoglycemia, it would be safe to decrease or even discontinue insulin and switch to conservative initial dosing of sulfonylurea/glipizide along with GLP-1 (victoza), with the lowest risk of hypoglycemia    Pt had an episode of hypoglycemia while at the clinic today,   She felt shaky and weak. Freestyle nathan showed a BG of 58. We treated with candy and apple juice.   Writer also requested Viv Barrett (Gagan DUMONT) to help/translate.  Pt's symptoms had improved by the time she left the clinic and BG was 89.   Daughter shared that pt had not had her breakfast due to their am appointment today.    Reviewed s/s of hypoglycemia and treatment: pt/daughter expressed understanding.    After treating a low BG, also encouraged to eat a snack or meal after BG has returned to normal to prevent recurrent hypoglycemia if a meal or snack is not planned within the next hour.    Encouraged regular meals with consistent CHO. Encouraged to always carry a snack when going out - discussed examples.    Encouraged pt to test BG when experiencing s/s of lows but to go ahead and treat anyway if unable to test for any reason.    They will also buy glucose tablets and keep them available at all times     SMBG:  Uses Freestyle nathan, brings in the reader today.    CGM data (last 7 days):   Av  Time in target:62%  Above:29%  Below: 9%  Low glucose events:4 (between 6am-12pm)  Scans per day:3  Sensor usage:59%    * Writer could not download CGM data due to time constraints.     ** We did not have time to discuss MNT today.     Patient's  most recent   Lab Results   Component Value Date    A1C 11.0 09/21/2022     is not meeting goal of <7.0     ** per [facility], okay to have a modified target of <8.0, given multiple chronic conditions and CKD - stage 4    Diabetes knowledge and skills assessment:   Patient is knowledgeable in diabetes management concepts related to: needs AADE 7 review     Continue education with the following diabetes management concepts: needs AADE 7 review     Based on learning assessment above, most appropriate setting for further diabetes education would be: Individual setting.      PLAN    Decreased basal insulin to 72 U (36 U on each side of abdomen) daily - daughter/pt expressed understanding.  Future: Due to pt's advanced CKD and hence a marked reduction in insulin clearance, insulin requirements could decrease. If pt continues to have hypoglycemia, it would be safe to decrease or even discontinue insulin and switch to conservative initial dosing of sulfonylurea/glipizide along with GLP-1 (victoza), with the lowest risk of hypoglycemia    Daughter/pt to  glucose tablets and always keep them available.  To NOT skip/delay meals.    Instructed daughter to call if pt continues to have 2 low BS (or s/s of lows) in a 7-day period.     Topics to cover at upcoming visits: discuss MNT in great detail   Also needs AADE 7 review     Follow-up: 1/13/23  PCP:11/28  MTM:12/28    See Care Plan for co-developed, patient-state behavior change goals.  AVS provided for patient today.    SUBJECTIVE/OBJECTIVE:  Diabetes education in the past 24mo: No  How confident are you filling out medical forms by yourself:: Not at all  Cultural Influences/Ethnic Background:  Choose not to answer    Diabetes Symptoms & Complications:  Fatigue: Yes  Neuropathy: No  Polydipsia: Yes  Polyphagia: Yes  Polyuria: Yes  Visual change: Yes  Slow healing wounds: Yes  Nephropathy: Yes  Peripheral neuropathy: Yes  Retinopathy: Yes    Patient Problem List and Family  "Medical History reviewed for relevant medical history, current medical status, and diabetes risk factors.    Vitals:  There were no vitals taken for this visit.  Estimated body mass index is 20.12 kg/m  as calculated from the following:    Height as of 10/28/22: 1.575 m (5' 2\").    Weight as of 10/28/22: 49.9 kg (110 lb).   Last 3 BP:   BP Readings from Last 3 Encounters:   10/29/22 (!) 149/85   10/27/22 132/74   10/19/22 (!) 144/77       History   Smoking Status     Never   Smokeless Tobacco     Never       Labs:  Lab Results   Component Value Date    A1C 11.0 09/21/2022     Lab Results   Component Value Date     10/29/2022    GLC 85 10/29/2022     06/02/2022     Lab Results   Component Value Date    LDL  09/21/2022      Comment:      Cannot estimate LDL when triglyceride exceeds 400 mg/dL    LDL 37 09/21/2022    LDL  04/14/2022      Comment:      Invalid, Triglyceride >1100      Direct Measure HDL   Date Value Ref Range Status   09/21/2022 25 (L) >=50 mg/dL Final   ]  GFR Estimate   Date Value Ref Range Status   10/29/2022 17 (L) >60 mL/min/1.73m2 Final     Comment:     Effective December 21, 2021 eGFRcr in adults is calculated using the 2021 CKD-EPI creatinine equation which includes age and gender (Zenobia cormier al., NEJ, DOI: 10.1056/KWXUng4696283)     No results found for: GFRESTBLACK  Lab Results   Component Value Date    CR 3.12 10/29/2022     No results found for: MICROALBUMIN    Healthy Eating:  Meals include: Breakfast, Lunch, Dinner  Beverages: Water, Tea, Coffee    Monitoring:  Blood Glucose Meter: FreeStyle  Times checking blood sugar at home (number): 3    Taking Medications:  Diabetes Medication(s)     Biguanides       metFORMIN (GLUCOPHAGE) 1000 MG tablet    Take 1 tablet by mouth 2 times daily (with meals)    Insulin       insulin aspart (NOVOLOG PEN) 100 UNIT/ML pen    Inject 18 Units Subcutaneous 3 times daily (with meals)     insulin glargine (LANTUS PEN) 100 UNIT/ML pen    Inject 80 " Units Subcutaneous every morning 40 unit(s) on each side    Incretin Mimetic Agents (GLP-1 Receptor Agonists)       liraglutide (VICTOZA) 18 MG/3ML solution    Inject 1.8 mg Subcutaneous daily          Current Treatments: Insulin Injections, Oral Medication (taken by mouth)    Problem Solving:     Reducing Risks:  CAD Risks: Diabetes Mellitus, Hypertension  Has dilated eye exam at least once a year?: Yes    Healthy Coping:     Patient Activation Measure Survey Score:  No flowsheet data found.    Care Plan and Education Provided:  BG monitoring, sx and tx of lows, taking medication     Time Spent: 90 minutes  Encounter Type: Individual    Any diabetes medication dose changes were made via the CDE Protocol per the patient's referring provider. A copy of this encounter was shared with the provider.

## 2022-11-07 ENCOUNTER — PATIENT OUTREACH (OUTPATIENT)
Dept: CARE COORDINATION | Facility: CLINIC | Age: 52
End: 2022-11-07

## 2022-11-07 NOTE — PROGRESS NOTES
Clinic Care Coordination Contact  [facility]/Voicemail  [place]  [place] [id number]    Referral Source: PCP  Clinical Data: Care Coordinator Outreach  Outreach attempted x 2.  Left message on patient's voicemail with call back information and requested return call.      CHW spoke with Avani Nelson,  for patients ARM worker through [facility]. Avani Nelson shares that patient has a new [facility] Worker, Betsy Lopez [phone]. She's no longer working with Nicolle Andrew [facility]. Updated in records.     Avani Nelson will assist in relaying message to patient and daughter to return call to CHW    Plan: Care Coordinator will try to reach patient again in 1 month. CHW to clarify any new needs prior to requesting maintenance.     Irene Samson  [facility] Care Coordination  [facility]    Phone: [phone]

## 2022-11-11 ENCOUNTER — TELEPHONE (OUTPATIENT)
Dept: NEPHROLOGY | Facility: CLINIC | Age: 52
End: 2022-11-11

## 2022-11-11 NOTE — TELEPHONE ENCOUNTER
Called patient via  service with a appointment reminder for Wed. 11/16/22 @ 2:00 pm with Dr. Waterman and a lab draw @ 1:00 pm    Aurelio Miller on 11/11/2022 at 1:44 PM

## 2022-11-13 NOTE — PROGRESS NOTES
Nephrology Clinic Visit  Nithya [facility] MRN: [id number] YOB: 1970  Primary Care Provider: Giancarlo Arizmendi  History Obtained From: Patient  External Document Review: Primary Care Provider    Pertinent Nephro History:  CKD G4A3 2/2 DM, HTN, ?Obstruction from Nephrolithiasis?  No renal biopsy  CT Abd/Pelvis 12/2021: Nonobstructing stones R kidney, no hydro  Liraglutide, Glargine, Aspart, Lisinopril, Metoprolol, Ferrous Sulfate, Atorvastatin, Fenofibrate    -------------------------------------------------------------------------------------------------------------------------------  Visit 11/16/22  -Here for follow up visit with [facility]  -BP control: Okay at this point.   -Understanding of renal impairment: We again discussed her severe renal impairment and how biopsy findings do not suggest any reversible cause. We need to make preparations for RRT.  -Plan for RRT: The patient had RRT education yesterday. Leaning towards in center hemodialysis.  -Plan for Transplant: Says her son is willing to potentially be a living donor. He lives out in [place]. Needs to be referred for transplant eval.    Visit 10/19/22:  -Here to establish care with [facility]  -here with daughter today  -DM Control: Poorly controlled. Long term issue. Follows with PCP and referred to Endo  -HTN Control: Had some issues with hypotension recently so hydrochlorothiazide discontinued and BP improved. Continues on Lisinopril. BP in the 140's at todays appointments. She doesn't check her BP at home.   -Hx of Kidney Stones: She denies a history of any symptomatic kidney stones. She denies gross hematuria.  -Nocturia: 1-2x per night  -NSAIDs: Doesn't sound that way   -Herbal/OTC Medications: Denies  -RRT Planning/GOC:   --Understanding of kidney dysfunction: knows that her kidneys have been weak for a long time but not clear on how bad her function is. She is very reserved, asks few questions, and has a difficult time summarizing what  we talked about today. This is not surprising as she is clearly nervous/scared about kidney failure/dialysis.   --We discussed that she is right at the border of stage 5 kidney disease and that we need to start planning for RRT.  --We had a long discussion about RRT and what kidney failure is using interpretor.  -Uremic symptoms:   --Appetite: She thinks her appetite is good.  --Metallic taste: No  --Nausea: She is often having nausea. She thinks lime juice helps.   --Day/Night Reversal: Sleep is okay  --Swelling in legs: She notices a bit of leg swelling.  -She has been eating and drinking well leading up to today's appointment.    Objective:  PAST MEDICAL HISTORY:  No past medical history on file.   DM  HTN    PAST SURGICAL HISTORY:  Past Surgical History:   Procedure Laterality Date     CATARACT IOL, RT/LT         MEDICATIONS:  Current Outpatient Medications   Medication Instructions     acetaminophen (TYLENOL) 500-1,000 mg, Oral, EVERY 6 HOURS PRN     alcohol swab prep pads Use to swab area of injection/amadeo as directed.     ammonium lactate (AMLACTIN) 12 % external cream Topical, DAILY     aspirin 81 mg, Oral, DAILY     atorvastatin (LIPITOR) 80 mg, Oral, DAILY     blood glucose (NO BRAND SPECIFIED) test strip Use to test blood sugar 3 times daily or as directed. To accompany: Blood Glucose Monitor Brands: per insurance.     blood glucose calibration (NO BRAND SPECIFIED) solution To accompany: Blood Glucose Monitor Brands: per insurance.     blood glucose monitoring (NO BRAND SPECIFIED) meter device kit Use to test blood sugar 3 times daily or as directed. Preferred blood glucose meter OR supplies to accompany: Blood Glucose Monitor Brands: per insurance.     carboxymethylcellulose-glycerin (REFRESH OPTIVE) 0.5-0.9 % ophthalmic solution 1 drop, Both Eyes, 3 TIMES DAILY PRN     Continuous Blood Gluc Sensor (FREESTYLE MICKIE 14 DAY SENSOR) MISC 1 Device, Does not apply, EVERY 14 DAYS, Change sensor as directed  every 14 days     cyanocobalamin (VITAMIN B-12) 1,000 mcg, Oral, DAILY     escitalopram (LEXAPRO) 10 mg, Oral, DAILY     fenofibrate (LOFIBRA) 54 mg, Oral, DAILY     ferrous sulfate (FEROSUL) 325 mg, Oral, EVERY OTHER DAY     fish oil-omega-3 fatty acids 1 g, Oral, DAILY     gabapentin (NEURONTIN) 300 mg, Oral, 2 TIMES DAILY     insulin aspart (NOVOLOG PEN) 18 Units, Subcutaneous, 3 TIMES DAILY WITH MEALS     insulin glargine (LANTUS PEN) 80 Units, Subcutaneous, EVERY MORNING, 40 unit(s) on each side     insulin pen needle (32G X 4 MM) 32G X 4 MM miscellaneous Use 6x pen needles daily or as directed (victoza, 3 for novolog, 2 for lantus)     liraglutide (VICTOZA) 1.8 mg, Subcutaneous, DAILY     lisinopril (ZESTRIL) 40 mg, Oral, DAILY     melatonin 3 mg, Oral, AT BEDTIME PRN     menthol, Topical Analgesic, 2.5% (BENGAY VANISHING SCENT) 2.5 % GEL topical gel Topical, 3 TIMES DAILY (Diuretics and Nitrates), Right shoulder pain      metoprolol succinate ER (TOPROL XL) 200 mg, Oral, DAILY     mirtazapine (REMERON) 7.5 mg, Oral, AT BEDTIME     omeprazole 20 mg, Oral, DAILY     polyethylene glycol (MIRALAX) 17 g, Oral, DAILY     silver sulfADIAZINE (SILVADENE) 1 % external cream Topical, 2 TIMES DAILY     thin (NO BRAND SPECIFIED) lancets Use with lanceting device. To accompany: Blood Glucose Monitor Brands: per insurance.     urea (GORMEL) 20 % external cream Topical, DAILY, Apply to feet daily     vitamin D2 (ERGOCALCIFEROL) 50,000 Units, Oral, WEEKLY       FAMILY MEDICAL HISTORY:   Family History   Problem Relation Age of Onset     Glaucoma No family hx of      Macular Degeneration No family hx of      Breast Cancer No family hx of      Ovarian Cancer No family hx of        PHYSICAL EXAM:   BP (!) 140/76   Pulse 87   Temp 98.5  F (36.9  C)   Wt 49.5 kg (109 lb 3.2 oz)   SpO2 94%   BMI 19.97 kg/m    GENERAL APPEARANCE: appears older than stated age, uses a cane when ambulating  EYES: nonicteric  HENT: mouth without  ulcers or lesions  RESP: lungs clear to auscultation   CV: regular rhythm, normal rate, no rub  ABDOMEN: soft, nontender, normal bowel sounds, no HSM   Extremities: no clubbing, cyanosis, no LE edema today (very surprising given her severe nephrotic range proteinuria)  MS: no evidence of inflammation in joints, no muscle tenderness  SKIN: no rash  NEURO: mentation intact and speech normal  PSYCH: affect normal    LABS REVIEWED BY ME:   ANEMIA  Recent Labs   Lab Test 10/29/22  0727 10/28/22  1827 10/28/22  1425 10/28/22  0842 10/19/22  0625   HGB 7.3* 8.3* 7.4* 8.9* 10.1*   IRONSAT  --   --   --   --  27   ISH  --   --   --   --  587*       BMP  Recent Labs   Lab Test 10/29/22  0727 10/28/22  2102 10/28/22  0842 10/19/22  0625 06/02/22  1300 02/28/22  1334 12/09/21  0725 12/08/21  0607 12/06/21  2347 12/06/21  2118    141 146* 143   < > 136   < > 139   < > 133*   POTASSIUM 3.4 3.3* 2.8* 3.0*   < > 3.5   < > 3.5  3.5   < > 3.5   CHLORIDE 110* 109* 111* 105   < > 98   < > 104   < > 92*   CO2 24 24 23 28   < > 25   < > 25   < > 25   ANIONGAP 9 8 12 10   < > 13   < > 10   < > 16   BUN 18.7 18.7 18.5 17.9   < > 18   < > 23*   < > 39*   CR 3.12* 3.21* 3.19* 3.32*   < > 1.93*   < > 1.97*   < > 2.54*   GFRESTIMATED 17* 16* 17* 16*   < > 30*   < > 28*   < > 21*   MAG  --   --   --  1.6*  --   --   --   --   --  1.9   PROTTOTAL  --   --   --   --   --  7.5  --  7.3  --  8.5*    < > = values in this interval not displayed.       CBC  Recent Labs   Lab Test 10/29/22  0727 10/28/22  1827 10/28/22  1425 10/28/22  0842 10/19/22  0625 04/14/22  0808 03/17/22  1433   HGB 7.3* 8.3* 7.4* 8.9* 10.1*   < > 10.7*   WBC 6.4  --   --  7.6 6.5  --  6.5   HCT 24.5*  --   --  29.9* 33.1*  --  33.2*   MCV 73*  --   --  72* 70*  --  68*   *  --   --  175 155  --  161    < > = values in this interval not displayed.       DIABETES  Recent Labs   Lab Test 09/21/22  0814 09/14/22  1518 06/02/22  1300 02/28/22  1334   A1C 11.0* 11.1*  10.4* 14.4*       HYPONATREMIA  Recent Labs   Lab Test 12/06/21 2118   UNAR 57       MBD  Recent Labs   Lab Test 10/29/22  0727 10/28/22  2102 10/28/22  0842 10/19/22  0625 06/02/22  1300 02/28/22  1334 12/09/21  0725 12/08/21  0607 12/06/21  2347 12/06/21 2118   JOSEPH 6.0* 6.0* 6.3* 6.6*   < > 8.2*   < > 7.8*   < > 8.9   ALBUMIN  --   --   --  3.0*  --  3.3*  --  3.3*  --  4.0   PHOS  --   --   --  3.5  --   --   --   --   --   --    PTHI  --   --   --  87*  --   --   --   --   --   --     < > = values in this interval not displayed.        URINE STUDIES  Recent Labs   Lab Test 10/19/22  0705 12/06/21 2118   COLOR Yellow Light Yellow   APPEARANCE Clear Clear   URINEGLC 250* >1000*   URINEBILI Negative Negative   URINEKETONE Negative Negative   SG 1.025 1.016   UBLD Small* 0.1 mg/dL*   URINEPH 7.0 6.0   PROTEIN 300* 100*   NITRITE Negative Negative   LEUKEST Negative Negative   RBCU 3* 3*   WBCU 1 10*     No lab results found.    ADDITIONAL LABS ORDERED/REVIEWED BY ME:  Renal US, Magnesium level    Assessment/Plan  CKD G4A3  Established care with [facility] 10/19/22    Oldest labs I am able to see in the system are from 7/29/2019 when creatinine was 2. Patient was admitted 12/2021 with hyperglycemia and had presumed pre-renal PRUDENCE. Creatinine improved from 2.16 -> 1.57 at time of discharge. At follow up her creatinine has been in the high 1's to mid 2's range (2.53 on 9/14/22) and on 10/19/22 unfortunately up to 3.3. She has hx of kidney stones noted on CT Abd/Pelvis 12/2021 but no proven episodes of obstructive nephropathy. Likely progressive renal impairment from a combination of recurrent pre-renal AKIs in the setting of significantly elevated glucose levels, DKD, and HTN nephrosclerosis. Interestingly on 10/19/22 she was found to have severe Nephrotic range proteinuria with UPCR suggestive of 15g/day. This level of nephrotic range proteinuria is certainly atypical for DN. Renal biopsy was obtained 10/28/22  with advanced/diffuse glomerulosclerosis. Proceeding with RRT/Transplant planning.     Plan:  -Discussed RRT planning and next steps as outlined below under GOC problem  -Discussed Transplant referral and next steps as outlined below under GOC problem  -Continue with DM and HTN medications as prescribed.    History of Nephrolithiasis  Stones noted on CT Abd/Pelvis 12/2021. Renal US 10/2022 without stones.    Anemia of Renal Disease:  -Hemoglobin 8  -Ferritin: Acceptable  -TSAT: 27 (acceptable)    Enroll in anemia clinic (message sent 11/16/2022) for EPO initiation.    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 If Simvastatin used alone, 40mg qDay is referenced.   Other options include: Atorvastatin 20mg qDay (4D trial) and Rosuvastatin 10mg qDay (RHONDA trial)    Current regimen: Atorvastatin 80mg qDay, Fenofibrate 54mg qDay    RRT/GOC:  We have discussed the following regarding Renal Replacement Therapy:  Is RRT within this patient's GOC?: Yes  -Longterm vs Time-limited trial: Longterm    --Review of Conservative Kidney Management:  ---We discussed options for medical management of symptoms related to renal failure (High dose diuretics, potassium binders, medications for nausea/itching) and some benefits associated with this strategy (less interactions with healthcare system, less appointments, less medications, typically less time spent in the hospital)  ---We discussed that patients with no residual kidney function will have a rapid  accumulation of toxins/fluid whereas patients with some residual kidney function can be managed medically for quite some time.     Modalities:  -Hemodialysis vs Peritoneal Dialysis  --Home vs In-center  ---Favor HD and In-center. Discussed PD vs HD extensively 22. Discussed how patient and daughter (or other family member) would be educated at the PD center until they felt comfortable doing PD alone at home. Discussed that PD offers more freedom but more responsibility compared to in-center HD. Patient was hesitant to make a decision on PD vs HD but after questioning her multiple times and explaining the difference between the two modalities multiple times (including reviewing technique, pictures, and impact on lifestyle) the patient ultimately decided that she would prefer to go to a HD center 3x per week and have HD performed via an AVF. Message sent for access eval/placement 22.    Access:  -AVG vs AVF vs PD Cath vs TDC vs Temp Vascath  --We have discussed the benefits/risks/timing of placement of the various types of RRT access  ---Referral for placement/estimated timing of placement: Sending Access referral message today to get her evaluated for AVF and to undergo surgery in the near future  ---Has already been referred to [facility]    Transplant:  -eGFR < 20 on two or more readings?: Yes  --Referral placed for eval?: Placed 2022. Discussed that she needs to be evaluated to see if she could safely get a kidney transplant and then either have a living donor identified or go on the wait list for  donor.    Return to clinic in 1 month    Sahil Waterman MD   of Medicine  Division of Nephrology and Hypertension  [facility]    60 minutes spent on the date of the encounter doing chart review, history and exam, documentation and further activities as noted above

## 2022-11-14 DIAGNOSIS — N18.4 STAGE 4 CHRONIC KIDNEY DISEASE (H): Primary | ICD-10-CM

## 2022-11-16 ENCOUNTER — REFERRAL (OUTPATIENT)
Dept: TRANSPLANT | Facility: CLINIC | Age: 52
End: 2022-11-16

## 2022-11-16 ENCOUNTER — OFFICE VISIT (OUTPATIENT)
Dept: NEPHROLOGY | Facility: CLINIC | Age: 52
End: 2022-11-16
Attending: STUDENT IN AN ORGANIZED HEALTH CARE EDUCATION/TRAINING PROGRAM
Payer: COMMERCIAL

## 2022-11-16 ENCOUNTER — PATIENT OUTREACH (OUTPATIENT)
Dept: NEPHROLOGY | Facility: CLINIC | Age: 52
End: 2022-11-16

## 2022-11-16 ENCOUNTER — LAB (OUTPATIENT)
Dept: LAB | Facility: CLINIC | Age: 52
End: 2022-11-16
Payer: COMMERCIAL

## 2022-11-16 VITALS
OXYGEN SATURATION: 94 % | WEIGHT: 109.2 LBS | TEMPERATURE: 98.5 F | HEART RATE: 87 BPM | SYSTOLIC BLOOD PRESSURE: 140 MMHG | DIASTOLIC BLOOD PRESSURE: 76 MMHG | BODY MASS INDEX: 19.97 KG/M2

## 2022-11-16 DIAGNOSIS — I10 PRIMARY HYPERTENSION: Primary | ICD-10-CM

## 2022-11-16 DIAGNOSIS — Z76.82 ORGAN TRANSPLANT CANDIDATE: ICD-10-CM

## 2022-11-16 DIAGNOSIS — D63.1 ANEMIA OF CHRONIC RENAL FAILURE, STAGE 4 (SEVERE) (H): ICD-10-CM

## 2022-11-16 DIAGNOSIS — N18.4 STAGE 4 CHRONIC KIDNEY DISEASE (H): ICD-10-CM

## 2022-11-16 DIAGNOSIS — I10 ESSENTIAL HYPERTENSION: ICD-10-CM

## 2022-11-16 DIAGNOSIS — N18.5 CHRONIC KIDNEY DISEASE, STAGE V (H): ICD-10-CM

## 2022-11-16 DIAGNOSIS — N18.4 CKD (CHRONIC KIDNEY DISEASE) STAGE 4, GFR 15-29 ML/MIN (H): ICD-10-CM

## 2022-11-16 DIAGNOSIS — Z01.818 ENCOUNTER FOR PRE-TRANSPLANT EVALUATION FOR KIDNEY AND PANCREAS TRANSPLANT: ICD-10-CM

## 2022-11-16 DIAGNOSIS — E11.9 DIABETES MELLITUS, TYPE 2 (H): ICD-10-CM

## 2022-11-16 DIAGNOSIS — E87.6 HYPOKALEMIA: ICD-10-CM

## 2022-11-16 DIAGNOSIS — N18.4 ANEMIA OF CHRONIC RENAL FAILURE, STAGE 4 (SEVERE) (H): ICD-10-CM

## 2022-11-16 DIAGNOSIS — N18.4 STAGE 4 CHRONIC KIDNEY DISEASE (H): Primary | ICD-10-CM

## 2022-11-16 LAB
ALBUMIN SERPL BCG-MCNC: 3 G/DL (ref 3.5–5.2)
ANION GAP SERPL CALCULATED.3IONS-SCNC: 13 MMOL/L (ref 7–15)
BUN SERPL-MCNC: 25.4 MG/DL (ref 6–20)
CALCIUM SERPL-MCNC: 6.2 MG/DL (ref 8.6–10)
CHLORIDE SERPL-SCNC: 105 MMOL/L (ref 98–107)
CREAT SERPL-MCNC: 3.39 MG/DL (ref 0.51–0.95)
DEPRECATED HCO3 PLAS-SCNC: 23 MMOL/L (ref 22–29)
ERYTHROCYTE [DISTWIDTH] IN BLOOD BY AUTOMATED COUNT: 20.1 % (ref 10–15)
GFR SERPL CREATININE-BSD FRML MDRD: 15 ML/MIN/1.73M2
GLUCOSE SERPL-MCNC: 202 MG/DL (ref 70–99)
HCT VFR BLD AUTO: 26 % (ref 35–47)
HGB BLD-MCNC: 8 G/DL (ref 11.7–15.7)
MCH RBC QN AUTO: 21.8 PG (ref 26.5–33)
MCHC RBC AUTO-ENTMCNC: 30.8 G/DL (ref 31.5–36.5)
MCV RBC AUTO: 71 FL (ref 78–100)
PHOSPHATE SERPL-MCNC: 2.9 MG/DL (ref 2.5–4.5)
PLATELET # BLD AUTO: 190 10E3/UL (ref 150–450)
POTASSIUM SERPL-SCNC: 3.3 MMOL/L (ref 3.4–5.3)
RBC # BLD AUTO: 3.67 10E6/UL (ref 3.8–5.2)
SODIUM SERPL-SCNC: 141 MMOL/L (ref 136–145)
WBC # BLD AUTO: 5.2 10E3/UL (ref 4–11)

## 2022-11-16 PROCEDURE — 86160 COMPLEMENT ANTIGEN: CPT | Performed by: STUDENT IN AN ORGANIZED HEALTH CARE EDUCATION/TRAINING PROGRAM

## 2022-11-16 PROCEDURE — 82728 ASSAY OF FERRITIN: CPT | Performed by: STUDENT IN AN ORGANIZED HEALTH CARE EDUCATION/TRAINING PROGRAM

## 2022-11-16 PROCEDURE — 83550 IRON BINDING TEST: CPT | Performed by: PATHOLOGY

## 2022-11-16 PROCEDURE — 83540 ASSAY OF IRON: CPT | Performed by: PATHOLOGY

## 2022-11-16 PROCEDURE — G0463 HOSPITAL OUTPT CLINIC VISIT: HCPCS

## 2022-11-16 PROCEDURE — 99215 OFFICE O/P EST HI 40 MIN: CPT | Performed by: STUDENT IN AN ORGANIZED HEALTH CARE EDUCATION/TRAINING PROGRAM

## 2022-11-16 PROCEDURE — 80069 RENAL FUNCTION PANEL: CPT | Performed by: PATHOLOGY

## 2022-11-16 PROCEDURE — 36415 COLL VENOUS BLD VENIPUNCTURE: CPT | Performed by: PATHOLOGY

## 2022-11-16 PROCEDURE — 83520 IMMUNOASSAY QUANT NOS NONAB: CPT | Mod: 90 | Performed by: PATHOLOGY

## 2022-11-16 PROCEDURE — 99000 SPECIMEN HANDLING OFFICE-LAB: CPT | Performed by: PATHOLOGY

## 2022-11-16 PROCEDURE — 85027 COMPLETE CBC AUTOMATED: CPT | Performed by: PATHOLOGY

## 2022-11-16 ASSESSMENT — PAIN SCALES - GENERAL: PAINLEVEL: NO PAIN (0)

## 2022-11-16 NOTE — LETTER
November 22, 2022    [facility]  [address] [place] [address]      Dear Nithya,    Thank you for your interest in the Transplant Center at [facility]. We look forward to being a part of your care team and assisting you through the transplant process.    As we discussed, your transplant coordinator is Emma Pineda (Kidney).  You may call your coordinator at any time with questions or concerns.  Your first scheduled call will be on 11/29/2022 between 8am and 12pm.  If this needs to change, call [phone].    Please complete the following.    1. Fill out and return the enclosed forms    Authorization for Electronic Communication    Authorization to Discuss Protected Health Information    Authorization for Release of Protected Health Information    2. Sign up for:    [employer], access to your electronic medical record (see enclosed pamphlet)    [email or web address], a transplant education website    You can use these tools to learn more about your transplant, communicate with your care team, and track your medical details      Sincerely,      [facility] Transplant  [facility]    cc: Referring Physician PCP

## 2022-11-16 NOTE — PATIENT INSTRUCTIONS
"It was a pleasure to see you in nephrology clinic today. I've included a brief summary of our discussion and care plan from today's visit below.  _______________________________________________________________________    My recommendations are summarized as follows:  -Keep a Blood Pressure log. Please make sure that you are using a validated blood pressure device (check \"www.validatebp.org\").  -Avoid all NSAID's. Examples include Ibuprofen (Advil, Motrin), naprosyn (Aleve), celebrex among others. Acetaminophen (Tylenol) is ok with maximum dose in 24 hours of 3000mg.  -Healthy lifestyle measures will keep your kidney's functioning at their current best. This includes regular exercise, maintaining a healthy body weight and smoking cessation.   -I am going to refer you for AVF placement  -I am going to refer you to renal transplant  -We will see each other again in 1 month to check your labs and make sure you are feeling okay    Please return to Nephrology Clinic in 1 month to review your progress.     Who do I call with any questions after my visit?  There are multiple ways to contact your nephrology care team:    -To schedule or reschedule an appointment, please call [phone].  -Reach out via everbill. These messages are answered by your nurse or doctor during business hours and typically in 1-2 days. everbill messages are best for quick questions/clarifications/updates. Frequently, your doctor or nurse will recommend setting up a follow up appointment to address any significant questions/concerns.  -For urgent questions after business hours, you may reach the on-call Nephrology Fellow by contacting the [facility]  at [phone].    To schedule imaging:   -Please call [phone]     To schedule your lab appointment at the [facility]:  -Please call [phone]    Sincerely,    Dr. aShil Waterman   of Medicine  Division of Nephrology and [facility] " [facility]

## 2022-11-16 NOTE — LETTER
11/16/2022       RE: Nithya Matthews  [address] [place] [address]     Dear Colleague,    Thank you for referring your patient, Nithya Matthews, to the [facility] [place] at [facility]. Please see a copy of my visit note below.        Nephrology Clinic Visit  Nithya Matthews MRN: [id number] YOB: 1970  Primary Care Provider: Giancarlo Arizmendi  History Obtained From: Patient  External Document Review: Primary Care Provider    Pertinent Nephro History:  CKD G4A3 2/2 DM, HTN, ?Obstruction from Nephrolithiasis?  No renal biopsy  CT Abd/Pelvis 12/2021: Nonobstructing stones R kidney, no hydro  Liraglutide, Glargine, Aspart, Lisinopril, Metoprolol, Ferrous Sulfate, Atorvastatin, Fenofibrate    -------------------------------------------------------------------------------------------------------------------------------  Visit 11/16/22  -Here for follow up visit with [facility] neph[facility]  -BP control: Okay at this point.   -Understanding of renal impairment: We again discussed her severe renal impairment and how biopsy findings do not suggest any reversible cause. We need to make preparations for RRT.  -Plan for RRT: The patient had RRT education yesterday. Leaning towards in center hemodialysis.  -Plan for Transplant: Says her son is willing to potentially be a living donor. He lives out in [place]. Needs to be referred for transplant eval.    Visit 10/19/22:  -Here to establish care with Trena Buitragoro  -here with daughter today  -DM Control: Poorly controlled. Long term issue. Follows with PCP and referred to Endo  -HTN Control: Had some issues with hypotension recently so hydrochlorothiazide discontinued and BP improved. Continues on Lisinopril. BP in the 140's at todays appointments. She doesn't check her BP at home.   -Hx of Kidney Stones: She denies a history of any symptomatic kidney stones. She denies gross hematuria.  -Nocturia: 1-2x per night  -NSAIDs:  Doesn't sound that way   -Herbal/OTC Medications: Denies  -RRT Planning/GOC:   --Understanding of kidney dysfunction: knows that her kidneys have been weak for a long time but not clear on how bad her function is. She is very reserved, asks few questions, and has a difficult time summarizing what we talked about today. This is not surprising as she is clearly nervous/scared about kidney failure/dialysis.   --We discussed that she is right at the border of stage 5 kidney disease and that we need to start planning for RRT.  --We had a long discussion about RRT and what kidney failure is using interpretor.  -Uremic symptoms:   --Appetite: She thinks her appetite is good.  --Metallic taste: No  --Nausea: She is often having nausea. She thinks lime juice helps.   --Day/Night Reversal: Sleep is okay  --Swelling in legs: She notices a bit of leg swelling.  -She has been eating and drinking well leading up to today's appointment.    Objective:  PAST MEDICAL HISTORY:  No past medical history on file.   DM  HTN    PAST SURGICAL HISTORY:  Past Surgical History:   Procedure Laterality Date     CATARACT IOL, RT/LT         MEDICATIONS:  Current Outpatient Medications   Medication Instructions     acetaminophen (TYLENOL) 500-1,000 mg, Oral, EVERY 6 HOURS PRN     alcohol swab prep pads Use to swab area of injection/amadeo as directed.     ammonium lactate (AMLACTIN) 12 % external cream Topical, DAILY     aspirin 81 mg, Oral, DAILY     atorvastatin (LIPITOR) 80 mg, Oral, DAILY     blood glucose (NO BRAND SPECIFIED) test strip Use to test blood sugar 3 times daily or as directed. To accompany: Blood Glucose Monitor Brands: per insurance.     blood glucose calibration (NO BRAND SPECIFIED) solution To accompany: Blood Glucose Monitor Brands: per insurance.     blood glucose monitoring (NO BRAND SPECIFIED) meter device kit Use to test blood sugar 3 times daily or as directed. Preferred blood glucose meter OR supplies to accompany: Blood  Glucose Monitor Brands: per insurance.     carboxymethylcellulose-glycerin (REFRESH OPTIVE) 0.5-0.9 % ophthalmic solution 1 drop, Both Eyes, 3 TIMES DAILY PRN     Continuous Blood Gluc Sensor (FREESTYLE MICKIE 14 DAY SENSOR) [facility] 1 Device, Does not apply, EVERY 14 DAYS, Change sensor as directed every 14 days     cyanocobalamin (VITAMIN B-12) 1,000 mcg, Oral, DAILY     escitalopram (LEXAPRO) 10 mg, Oral, DAILY     fenofibrate (LOFIBRA) 54 mg, Oral, DAILY     ferrous sulfate (FEROSUL) 325 mg, Oral, EVERY OTHER DAY     fish oil-omega-3 fatty acids 1 g, Oral, DAILY     gabapentin (NEURONTIN) 300 mg, Oral, 2 TIMES DAILY     insulin aspart (NOVOLOG PEN) 18 Units, Subcutaneous, 3 TIMES DAILY WITH MEALS     insulin glargine (LANTUS PEN) 80 Units, Subcutaneous, EVERY MORNING, 40 unit(s) on each side     insulin pen needle (32G X 4 MM) 32G X 4 MM miscellaneous Use 6x pen needles daily or as directed (victoza, 3 for novolog, 2 for lantus)     liraglutide (VICTOZA) 1.8 mg, Subcutaneous, DAILY     lisinopril (ZESTRIL) 40 mg, Oral, DAILY     melatonin 3 mg, Oral, AT BEDTIME PRN     menthol, Topical Analgesic, 2.5% (BENGAY VANISHING SCENT) 2.5 % GEL topical gel Topical, 3 TIMES DAILY (Diuretics and Nitrates), Right shoulder pain      metoprolol succinate ER (TOPROL XL) 200 mg, Oral, DAILY     mirtazapine (REMERON) 7.5 mg, Oral, AT BEDTIME     omeprazole 20 mg, Oral, DAILY     polyethylene glycol (MIRALAX) 17 g, Oral, DAILY     silver sulfADIAZINE (SILVADENE) 1 % external cream Topical, 2 TIMES DAILY     thin (NO BRAND SPECIFIED) lancets Use with lanceting device. To accompany: Blood Glucose Monitor Brands: per insurance.     urea (GORMEL) 20 % external cream Topical, DAILY, Apply to feet daily     vitamin D2 (ERGOCALCIFEROL) 50,000 Units, Oral, WEEKLY       FAMILY MEDICAL HISTORY:   Family History   Problem Relation Age of Onset     Glaucoma No family hx of      Macular Degeneration No family hx of      Breast Cancer No family hx  of      Ovarian Cancer No family hx of        PHYSICAL EXAM:   BP (!) 140/76   Pulse 87   Temp 98.5  F (36.9  C)   Wt 49.5 kg (109 lb 3.2 oz)   SpO2 94%   BMI 19.97 kg/m    GENERAL APPEARANCE: appears older than stated age, uses a cane when ambulating  EYES: nonicteric  HENT: mouth without ulcers or lesions  RESP: lungs clear to auscultation   CV: regular rhythm, normal rate, no rub  ABDOMEN: soft, nontender, normal bowel sounds, no HSM   Extremities: no clubbing, cyanosis, no LE edema today (very surprising given her severe nephrotic range proteinuria)  MS: no evidence of inflammation in joints, no muscle tenderness  SKIN: no rash  NEURO: mentation intact and speech normal  PSYCH: affect normal    LABS REVIEWED BY ME:   ANEMIA  Recent Labs   Lab Test 10/29/22  0727 10/28/22  1827 10/28/22  1425 10/28/22  0842 10/19/22  0625   HGB 7.3* 8.3* 7.4* 8.9* 10.1*   IRONSAT  --   --   --   --  27   ISH  --   --   --   --  587*       BMP  Recent Labs   Lab Test 10/29/22  0727 10/28/22  2102 10/28/22  0842 10/19/22  0625 06/02/22  1300 02/28/22  1334 12/09/21  0725 12/08/21  0607 12/06/21  2347 12/06/21  2118    141 146* 143   < > 136   < > 139   < > 133*   POTASSIUM 3.4 3.3* 2.8* 3.0*   < > 3.5   < > 3.5  3.5   < > 3.5   CHLORIDE 110* 109* 111* 105   < > 98   < > 104   < > 92*   CO2 24 24 23 28   < > 25   < > 25   < > 25   ANIONGAP 9 8 12 10   < > 13   < > 10   < > 16   BUN 18.7 18.7 18.5 17.9   < > 18   < > 23*   < > 39*   CR 3.12* 3.21* 3.19* 3.32*   < > 1.93*   < > 1.97*   < > 2.54*   GFRESTIMATED 17* 16* 17* 16*   < > 30*   < > 28*   < > 21*   MAG  --   --   --  1.6*  --   --   --   --   --  1.9   PROTTOTAL  --   --   --   --   --  7.5  --  7.3  --  8.5*    < > = values in this interval not displayed.       CBC  Recent Labs   Lab Test 10/29/22  0727 10/28/22  1827 10/28/22  1425 10/28/22  0842 10/19/22  0625 04/14/22  0808 03/17/22  1433   HGB 7.3* 8.3* 7.4* 8.9* 10.1*   < > 10.7*   WBC 6.4  --   --  7.6 6.5   --  6.5   HCT 24.5*  --   --  29.9* 33.1*  --  33.2*   MCV 73*  --   --  72* 70*  --  68*   *  --   --  175 155  --  161    < > = values in this interval not displayed.       DIABETES  Recent Labs   Lab Test 09/21/22  0814 09/14/22  1518 06/02/22  1300 02/28/22  1334   A1C 11.0* 11.1* 10.4* 14.4*       HYPONATREMIA  Recent Labs   Lab Test 12/06/21 2118   UNAR 57       MBD  Recent Labs   Lab Test 10/29/22  0727 10/28/22  2102 10/28/22  0842 10/19/22  0625 06/02/22  1300 02/28/22  1334 12/09/21  0725 12/08/21  0607 12/06/21  2347 12/06/21 2118   JOSEPH 6.0* 6.0* 6.3* 6.6*   < > 8.2*   < > 7.8*   < > 8.9   ALBUMIN  --   --   --  3.0*  --  3.3*  --  3.3*  --  4.0   PHOS  --   --   --  3.5  --   --   --   --   --   --    PTHI  --   --   --  87*  --   --   --   --   --   --     < > = values in this interval not displayed.        URINE STUDIES  Recent Labs   Lab Test 10/19/22  0705 12/06/21 2118   COLOR Yellow Light Yellow   APPEARANCE Clear Clear   URINEGLC 250* >1000*   URINEBILI Negative Negative   URINEKETONE Negative Negative   SG 1.025 1.016   UBLD Small* 0.1 mg/dL*   URINEPH 7.0 6.0   PROTEIN 300* 100*   NITRITE Negative Negative   LEUKEST Negative Negative   RBCU 3* 3*   WBCU 1 10*     No lab results found.    ADDITIONAL LABS ORDERED/REVIEWED BY ME:  Renal US, Magnesium level    Assessment/Plan  CKD G4A3  Established care with [facility] 10/19/22    Oldest labs I am able to see in the system are from 7/29/2019 when creatinine was 2. Patient was admitted 12/2021 with hyperglycemia and had presumed pre-renal PRUDENCE. Creatinine improved from 2.16 -> 1.57 at time of discharge. At follow up her creatinine has been in the high 1's to mid 2's range (2.53 on 9/14/22) and on 10/19/22 unfortunately up to 3.3. She has hx of kidney stones noted on CT Abd/Pelvis 12/2021 but no proven episodes of obstructive nephropathy. Likely progressive renal impairment from a combination of recurrent pre-renal AKIs in the setting of  significantly elevated glucose levels, DKD, and HTN nephrosclerosis. Interestingly on 10/19/22 she was found to have severe Nephrotic range proteinuria with UPCR suggestive of 15g/day. This level of nephrotic range proteinuria is certainly atypical for DN. Renal biopsy was obtained 10/28/22 with advanced/diffuse glomerulosclerosis. Proceeding with RRT/Transplant planning.     Plan:  -Discussed RRT planning and next steps as outlined below under GOC problem  -Discussed Transplant referral and next steps as outlined below under GOC problem  -Continue with DM and HTN medications as prescribed.    History of Nephrolithiasis  Stones noted on CT Abd/Pelvis 12/2021. Renal US 10/2022 without stones.    Anemia of Renal Disease:  -Hemoglobin 8  -Ferritin: Acceptable  -TSAT: 27 (acceptable)    Enroll in anemia clinic (message sent 11/16/2022) for EPO initiation.    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 If Simvastatin used alone, 40mg qDay is referenced.   Other options include: Atorvastatin 20mg qDay (4D trial) and Rosuvastatin 10mg qDay (RHONDA trial)    Current regimen: Atorvastatin 80mg qDay, Fenofibrate 54mg qDay    RRT/GOC:  We have discussed the following regarding Renal Replacement Therapy:  Is RRT within this patient's GOC?: Yes  -Longterm vs Time-limited trial: Longterm    --Review of Conservative Kidney Management:  ---We discussed options for medical management of symptoms related to renal failure (High dose diuretics, potassium binders,  medications for nausea/itching) and some benefits associated with this strategy (less interactions with healthcare system, less appointments, less medications, typically less time spent in the hospital)  ---We discussed that patients with no residual kidney function will have a rapid accumulation of toxins/fluid whereas patients with some residual kidney function can be managed medically for quite some time.     Modalities:  -Hemodialysis vs Peritoneal Dialysis  --Home vs In-center  ---Favor HD and In-center. Discussed PD vs HD extensively 22. Discussed how patient and daughter (or other family member) would be educated at the PD center until they felt comfortable doing PD alone at home. Discussed that PD offers more freedom but more responsibility compared to in-center HD. Patient was hesitant to make a decision on PD vs HD but after questioning her multiple times and explaining the difference between the two modalities multiple times (including reviewing technique, pictures, and impact on lifestyle) the patient ultimately decided that she would prefer to go to a HD center 3x per week and have HD performed via an AVF. Message sent for access eval/placement 22.    Access:  -AVG vs AVF vs PD Cath vs TDC vs Temp Vascath  --We have discussed the benefits/risks/timing of placement of the various types of RRT access  ---Referral for placement/estimated timing of placement: Sending Access referral message today to get her evaluated for AVF and to undergo surgery in the near future  ---Has already been referred to [facility]    Transplant:  -eGFR < 20 on two or more readings?: Yes  --Referral placed for eval?: Placed 2022. Discussed that she needs to be evaluated to see if she could safely get a kidney transplant and then either have a living donor identified or go on the wait list for  donor.    Return to clinic in 1 month    Sahil Waterman MD   of Medicine  Division of  Nephrology and Hypertension  [facility]    60 minutes spent on the date of the encounter doing chart review, history and exam, documentation and further activities as noted above

## 2022-11-16 NOTE — NURSING NOTE
Chief Complaint   Patient presents with     RECHECK     Return visit.     Blood pressure (!) 140/76, pulse 87, temperature 98.5  F (36.9  C), weight 49.5 kg (109 lb 3.2 oz), SpO2 94 %.    NI ROCHA

## 2022-11-17 ENCOUNTER — PATIENT OUTREACH (OUTPATIENT)
Dept: NEPHROLOGY | Facility: CLINIC | Age: 52
End: 2022-11-17

## 2022-11-17 DIAGNOSIS — Z01.812 PRE-PROCEDURE LAB EXAM: Primary | ICD-10-CM

## 2022-11-17 LAB
C3 SERPL-MCNC: 139 MG/DL (ref 81–157)
C4 SERPL-MCNC: 50 MG/DL (ref 13–39)

## 2022-11-17 NOTE — PROGRESS NOTES
Called pt with [facility]  to discuss plan for dialysis access consult so pt would know what to expect.  Discussed what vein mapping is and that it is done prior to the consult appointment with the surgeon.  Discussed what will happen at the dialysis consult appointment and that it is necessary to have a plan to be able to schedule dialysis access surgery.  Pt verbalized understanding and stated this was discussed at her last visit with Dr. Waterman.  Pt preferred to schedule via clinic coordinator when they call at a different time.    Sahil Waterman MD  P Dialysis Access Nurse  Good morning,     Could you please help make arrangements for this patient to be evaluated for AVF placement? If approved for AVF placement after initial eval she should have surgery arranged for the near future given her current eGFR of 15. Please let me know if you have any questions.     Thank you,   Sahil     Dialysis Access Referral     Pt referred for Dialysis Access Surgical Consult.    Referring provider: Dr. Waterman    Patient is ready for access surgery.  Will schedule surgery after consult per Dr. Waterman.    Patient prefers HD.    Patient had been previously referred for Transplant.     Request sent to scheduling to schedule with Dr. Langford.     Vein mapping was ordered, will need scheduling.     Patient IS aware dialysis access referral has been made and IS expecting appointments to be scheduled.    Neph Tracking has been updated.    Neph Tracking Flowsheet Last Filled Values     CKD Education Status Complete    CKD Education Referral Date 10/21/22    CKD Education Completed Date 11/15/22    CKD Education Type Kidney Smart Modality Education; Kidney Smart CKD Basics    Preferred Modality Hemodialysis    Patient's Referral Dates Auto Populate Patient's Referral Dates    Journey Referral 10/19/22    Access Surgeon Referral Status  Referred  Fistula consult with Dr. Langford    Dialysis Access Referral 11/17/22  ok to  schedule surgery post consult per Dr. Waterman    Transplant Evaluation Referral 11/16/22          Will continue to follow.    BLANCHE BALDWIN RN on 11/17/2022 at 2:05 PM  Dialysis Access Care Coordinator  Phone: [phone]  Please use P_Dialysis_Access_Nurse pool for Epic InBasket communications to ensure timely response.

## 2022-11-18 DIAGNOSIS — D63.1 ANEMIA IN CHRONIC KIDNEY DISEASE: Primary | ICD-10-CM

## 2022-11-18 DIAGNOSIS — N18.9 ANEMIA IN CHRONIC KIDNEY DISEASE: Primary | ICD-10-CM

## 2022-11-18 LAB — PLA2R IGG SER IA-ACNC: <2 RU/ML

## 2022-11-20 ENCOUNTER — TELEPHONE (OUTPATIENT)
Dept: PHARMACY | Facility: CLINIC | Age: 52
End: 2022-11-20

## 2022-11-20 DIAGNOSIS — N18.4 ANEMIA OF CHRONIC RENAL FAILURE, STAGE 4 (SEVERE) (H): ICD-10-CM

## 2022-11-20 DIAGNOSIS — N18.4 CKD (CHRONIC KIDNEY DISEASE) STAGE 4, GFR 15-29 ML/MIN (H): Primary | ICD-10-CM

## 2022-11-20 DIAGNOSIS — D63.1 ANEMIA OF CHRONIC RENAL FAILURE, STAGE 4 (SEVERE) (H): ICD-10-CM

## 2022-11-20 LAB
FERRITIN SERPL-MCNC: 583 NG/ML (ref 11–328)
IRON BINDING CAPACITY (ROCHE): 257 UG/DL (ref 240–430)
IRON SATN MFR SERPL: 24 % (ref 15–46)
IRON SERPL-MCNC: 61 UG/DL (ref 37–145)

## 2022-11-20 NOTE — TELEPHONE ENCOUNTER
Anemia Management Note - Enrollment  SUBJECTIVE/OBJECTIVE:    Referred by Dr. Sahil Waterman on 2022  Primary Diagnosis: Anemia in Chronic Kidney Disease (N18.4, D63.1)     Secondary Diagnosis:  Chronic Kidney Disease, Stage 4 (N18.4)   Hgb goal range:  9-10  Epo/Darbo: Aranesp 40mcg every 14 days for Hgb 10.0. In Clinic.   Iron regimen:  Ferrous Sulfate one tab every other day.   Labs : 2023  Recent VIK use, transfusion, IV iron: NA  RX/TX plans : 2023    [facility] : [phone]    No history of stroke, MI and blood clots or cancers.    Contact: OK to speak with Fritz Matthews (daughter) regarding Scheduling, Medical, and Billing Information per consent to communicate dated 3/17/2022.     Anemia Latest Ref Rng & Units 2022 10/19/2022 10/28/2022 10/28/2022 10/28/2022 10/29/2022 2022   Hemoglobin 11.7 - 15.7 g/dL 11.2(L) 10.1(L) 8.9(L) 7.4(L) 8.3(L) 7.3(L) 8.0(L)   Ferritin 11 - 328 ng/mL - 587(H) - - - - -       BP Readings from Last 3 Encounters:   22 (!) 140/76   10/29/22 (!) 149/85   10/27/22 132/74     Wt Readings from Last 2 Encounters:   22 109 lb 3.2 oz (49.5 kg)   10/28/22 110 lb (49.9 kg)     Current Outpatient Medications   Medication Sig Dispense Refill     acetaminophen (TYLENOL) 500 MG tablet Take 500-1,000 mg by mouth every 6 hours as needed for mild pain       alcohol swab prep pads Use to swab area of injection/amadeo as directed. 200 each 11     ammonium lactate (AMLACTIN) 12 % external cream Apply topically daily 385 g 4     aspirin 81 MG EC tablet Take 1 tablet (81 mg) by mouth daily 90 tablet 3     atorvastatin (LIPITOR) 80 MG tablet Take 1 tablet (80 mg) by mouth daily 90 tablet 3     blood glucose (NO BRAND SPECIFIED) test strip Use to test blood sugar 3 times daily or as directed. To accompany: Blood Glucose Monitor Brands: per insurance. 200 strip 3     blood glucose calibration (NO BRAND SPECIFIED) solution To accompany: Blood Glucose  Monitor Brands: per insurance. 1 each 3     blood glucose monitoring (NO BRAND SPECIFIED) meter device kit Use to test blood sugar 3 times daily or as directed. Preferred blood glucose meter OR supplies to accompany: Blood Glucose Monitor Brands: per insurance. 1 kit 0     carboxymethylcellulose-glycerin (REFRESH OPTIVE) 0.5-0.9 % ophthalmic solution Place 1 drop into both eyes 3 times daily as needed (as needed for dry eye/eye pain) 10 mL 4     Continuous Blood Gluc Sensor (FREESTYLE MICKIE 14 DAY SENSOR) [facility] 1 Device every 14 days Change sensor as directed every 14 days 6 each 4     cyanocobalamin (VITAMIN B-12) 1000 MCG tablet Take 1 tablet (1,000 mcg) by mouth daily 90 tablet 3     escitalopram (LEXAPRO) 10 MG tablet Take 1 tablet (10 mg) by mouth daily 90 tablet 3     fenofibrate (LOFIBRA) 54 MG tablet Take 1 tablet (54 mg) by mouth daily 90 tablet 1     ferrous sulfate (FEROSUL) 325 (65 Fe) MG tablet Take 1 tablet (325 mg) by mouth every other day 70 tablet 3     fish oil-omega-3 fatty acids 1000 MG capsule Take 1 capsule (1 g) by mouth daily 90 capsule 3     gabapentin (NEURONTIN) 300 MG capsule Take 1 capsule (300 mg) by mouth 2 times daily 60 capsule 5     insulin aspart (NOVOLOG PEN) 100 UNIT/ML pen Inject 18 Units Subcutaneous 3 times daily (with meals) 45 mL 3     insulin glargine (LANTUS PEN) 100 UNIT/ML pen Inject 80 Units Subcutaneous every morning 40 unit(s) on each side 75 mL 3     insulin pen needle (32G X 4 MM) 32G X 4 MM miscellaneous Use 6x pen needles daily or as directed (victoza, 3 for novolog, 2 for lantus) 200 each 11     liraglutide (VICTOZA) 18 MG/3ML solution Inject 1.8 mg Subcutaneous daily 9 mL 11     lisinopril (ZESTRIL) 40 MG tablet Take 1 tablet (40 mg) by mouth daily 90 tablet 3     melatonin 3 MG tablet Take 1 tablet (3 mg) by mouth nightly as needed for sleep 30 tablet 3     menthol, Topical Analgesic, 2.5% (BENGAY VANISHING SCENT) 2.5 % GEL topical gel Apply topically 3 times  daily Right shoulder pain       metFORMIN (GLUCOPHAGE) 1000 MG tablet Take 1 tablet by mouth 2 times daily (with meals)       metoprolol succinate ER (TOPROL-XL) 200 MG 24 hr tablet Take 1 tablet (200 mg) by mouth daily 90 tablet 3     mirtazapine (REMERON) 7.5 MG tablet TAKE 1 TABLET (7.5 MG) BY MOUTH AT BEDTIME 90 tablet 3     omeprazole 20 MG tablet Take 1 tablet (20 mg) by mouth daily 90 tablet 3     polyethylene glycol (MIRALAX) 17 GM/Dose powder Take 17 g by mouth daily for 90 days 510 g 3     silver sulfADIAZINE (SILVADENE) 1 % external cream Apply topically 2 times daily 25 g 1     thin (NO BRAND SPECIFIED) lancets Use with lanceting device. To accompany: Blood Glucose Monitor Brands: per insurance. 200 each 3     urea (GORMEL) 20 % external cream Apply topically daily Apply to feet daily 385 g 6     vitamin D2 (ERGOCALCIFEROL) 00182 units (1250 mcg) capsule TAKE 1 CAPSULE (50,000 UNITS) BY MOUTH ONCE A WEEK 12 capsule 3     ASSESSMENT:  Hgb Not at goal/Initiation of therapy   Ferritin: at goal  TSat: not at goal, Ferritin >500, No IV Iron.   Iron regimen recommended: Continue taking Oral Iron as ordered.   Recommended VIK regimen: Aranesp 40mcg every 14 days for Hgb <10.0.  In Clinic.   Blood Pressure: Stable    PLAN:  1. Called [facility]. Busy signal, x 2 per [employer] . Unable to LVM. for enrollment in Anemia Management Service once Iron Studies are resulted.  2. Need to discuss:  anemia overview, monitoring service and goal hemoglobin range and rationale and risks of VIK blood clots, stroke and increase in blood pressure  3. Dose location: [facility]  4. Labs: [facility]  5. Pharmacy: [facility] needs updated Iron Studies before insurance will approve VIK.  Labs were added on to 11/16/22 lab draw. Results are pending.   11/21/22 TSAT slightly low, Ferritin >500. No IV Iron needed, ok to start Aranesp.     11/21/22; Busy signal, x 2 per [employer] . Unable to LVM.    Next call date:  11/22/22  New pt.      Geri Mortensen RN   [address] [address]   [email or web address]   Office : [phone]  Fax: [phone]

## 2022-11-21 PROBLEM — D63.1 ANEMIA OF CHRONIC RENAL FAILURE, STAGE 4 (SEVERE) (H): Status: ACTIVE | Noted: 2022-11-21

## 2022-11-21 PROBLEM — N18.4 CKD (CHRONIC KIDNEY DISEASE) STAGE 4, GFR 15-29 ML/MIN (H): Status: ACTIVE | Noted: 2022-11-21

## 2022-11-21 PROBLEM — N18.4 ANEMIA OF CHRONIC RENAL FAILURE, STAGE 4 (SEVERE) (H): Status: ACTIVE | Noted: 2022-11-21

## 2022-11-21 RX ORDER — ALBUTEROL SULFATE 90 UG/1
1-2 AEROSOL, METERED RESPIRATORY (INHALATION)
Status: CANCELLED
Start: 2022-11-21

## 2022-11-21 RX ORDER — DIPHENHYDRAMINE HYDROCHLORIDE 50 MG/ML
50 INJECTION INTRAMUSCULAR; INTRAVENOUS
Status: CANCELLED
Start: 2022-11-21

## 2022-11-21 RX ORDER — METHYLPREDNISOLONE SODIUM SUCCINATE 125 MG/2ML
125 INJECTION, POWDER, LYOPHILIZED, FOR SOLUTION INTRAMUSCULAR; INTRAVENOUS
Status: CANCELLED
Start: 2022-11-21

## 2022-11-21 RX ORDER — ALBUTEROL SULFATE 0.83 MG/ML
2.5 SOLUTION RESPIRATORY (INHALATION)
Status: CANCELLED | OUTPATIENT
Start: 2022-11-21

## 2022-11-21 RX ORDER — MEPERIDINE HYDROCHLORIDE 25 MG/ML
25 INJECTION INTRAMUSCULAR; INTRAVENOUS; SUBCUTANEOUS EVERY 30 MIN PRN
Status: CANCELLED | OUTPATIENT
Start: 2022-11-21

## 2022-11-21 RX ORDER — EPINEPHRINE 1 MG/ML
0.3 INJECTION, SOLUTION, CONCENTRATE INTRAVENOUS EVERY 5 MIN PRN
Status: CANCELLED | OUTPATIENT
Start: 2022-11-21

## 2022-11-22 VITALS — WEIGHT: 105 LBS | BODY MASS INDEX: 19.32 KG/M2 | HEIGHT: 62 IN

## 2022-11-22 NOTE — TELEPHONE ENCOUNTER
PCP: Dr Korin Arizmendi MD  Referring Provider: Dr Sahil Srivastava  Referring Diagnosis: CKD Stage 4    GFR/Date: 15  (11/16/2022)    Is patient under the age of 65? Yes  Is patient diabetic? Yes  Is patient on insulin? Yes  Was patient offered a pancreas transplant referral? Yes    Is patient in a group home/assisted living? No  Does patient have a guardian? No    Referral intake process completed.  Patient is aware that after financial approval is received, medical records will be requested.   Patient confirmed for a callback from transplant coordinator on 11/29/2022. (within 2 weeks)  Tentative evaluation date 12/20/2022 (within 4 weeks) if appointment is virtual, does patient have capabilities of setting this up? yes    Confirmed coordinator will discuss evaluation process in more detail at the time of their call.   Patient is aware of the need to arrange age appropriate cancer screening, vaccinations, and dental care.  Reminded patient to complete questionnaire, complete medical records release, and review packet prior to evaluation visit .    Assessed patient for special needs (ie-wheelchair, assistance, guardian, and ):  Yes  Patient uses wheelchair and cane.  Patient needs LAM or RONALD     Patient instructed to call [phone] with questions.     Patient gave verbal consent during intake call to obtain medical records and documents outside of [facility]:  Yes    Ronald See # [id number]

## 2022-11-23 ENCOUNTER — TELEPHONE (OUTPATIENT)
Dept: PHARMACY | Facility: CLINIC | Age: 52
End: 2022-11-23

## 2022-11-23 NOTE — TELEPHONE ENCOUNTER
Follow-up with anemia management service:    2nd call to pt using a [employer] . No Answer, Unable to LVM.     Anemia Latest Ref Rng & Units 4/14/2022 10/19/2022 10/28/2022 10/28/2022 10/28/2022 10/29/2022 11/16/2022   Hemoglobin 11.7 - 15.7 g/dL 11.2(L) 10.1(L) 8.9(L) 7.4(L) 8.3(L) 7.3(L) 8.0(L)   Ferritin 11 - 328 ng/mL - 587(H) - - - - 583(H)           Follow-up call date: 11/30/22    Geri Mortensen RN   Anemia Services  [address] [address]   [email or web address]   Office : [phone]  Fax: [phone]

## 2022-11-29 NOTE — TELEPHONE ENCOUNTER
Reviewed chart for purpose of pre kidney pancreas transplant evaluation planning. Patient has CKD stage 4. Labs on 11/16/2022 creatinine 3.39 and GFR 15. Native kidney biopsy done 10/28/22: subptimal sample with advanced/diffuse glomerulosclerosis. Per Dr. Sahil Waterman clinic note 11/16/2022, patient has progressive renal impairment from a combination of recurrent pre-renal AKIs in the setting of significantly elevated glucose levels, DKD, and HTN nephrosclerosis. Diabetes type 2 and hypertension diagnosed both in 2001. Patient is insulin dependent. Pt immigrated to [facility] in 2013, moved to [place] in November 2021 to be with her son. Patient presented at local ED 12/02/2021 with hyperglycemia/weakness and not having taken medications for a month because her medicaid insurancehas not become active yet. Hgb A1C 09/21/2022 was 11.0. Hgb A1C 12/06/2021 was 14.5. No mention of chronic heart or lung issues. Mammogram done 08/09/2022 negative. PAP done 08/17/2021 negative. No record for colonoscopy found. No history of smoking/ alcohol use, recreational drug use. BMI about 19 to 20. All okay to proceed with pre kidney pancreas transplant evaluation.     Contacted patient with the assistance of a [employer]  ID # [facility], started conversation then call dropped. I called patient a second time with a new [employer] , Katlyn ID # [place], who told me she was unable to dial out and instructed me to try again.  Called patient a third time with [employer] , Katlyn ID # [place]AM and reached VM on both mobile and home #'s so left VM that I called and asked pt to return my call.  While on the line initially with  ID # [facility] I was able to introduce myself as their Transplant Coordinator, also introduced the role of the Transplant Coordinator in the transplant process and provide my phone numbers.  Explained the purpose of this call including reviewing next steps and answering questions.  Patient did  confirm she is interested in proceeding with pre kidney pancreas transplant evaluation. Patient explained she was diagnosed with diabetes type 2 in 2001 and has been on insulin for about 10 years. She explained that she checks her blood sugars 3 to 4 times per day and that her daughter helps her with her diabetes management. At this point call was dropped.

## 2022-11-30 ENCOUNTER — TELEPHONE (OUTPATIENT)
Dept: PHARMACY | Facility: CLINIC | Age: 52
End: 2022-11-30

## 2022-11-30 NOTE — TELEPHONE ENCOUNTER
Follow-up with anemia management service:    Attempted to call Barriga via [employer] : [phone]. Waited over 15 minutes on hold for an . Will tray again later.       Anemia Latest Ref Rng & Units 4/14/2022 10/19/2022 10/28/2022 10/28/2022 10/28/2022 10/29/2022 11/16/2022   Hemoglobin 11.7 - 15.7 g/dL 11.2(L) 10.1(L) 8.9(L) 7.4(L) 8.3(L) 7.3(L) 8.0(L)   Ferritin 11 - 328 ng/mL - 587(H) - - - - 583(H)         Follow-up call date: 12/1/22    Geri Mortensen RN   Anemia Services  [address] [address]   [email or web address]   Office : [phone]  Fax: [phone]

## 2022-12-02 NOTE — TELEPHONE ENCOUNTER
[facility]     Barriga needs to start Aranesp. Due to the possible upcoming strike, Aranesp is currently scheduled for 1/4/22.  Will monitor labs at this time.  If Hgb drops. [place] need [place] [facility]   Barriga does not have transportation and needs to call for a ride/taxi.       Geri Mortensen RN   Anemia Services  [address] [address]   [email or web address]   Office : [phone]  Fax: [phone]

## 2022-12-06 ENCOUNTER — TRANSFERRED RECORDS (OUTPATIENT)
Dept: HEALTH INFORMATION MANAGEMENT | Facility: CLINIC | Age: 52
End: 2022-12-06

## 2022-12-07 ENCOUNTER — PATIENT OUTREACH (OUTPATIENT)
Dept: CARE COORDINATION | Facility: CLINIC | Age: 52
End: 2022-12-07

## 2022-12-07 NOTE — PROGRESS NOTES
Clinic Care Coordination Contact    Community Health Worker Follow Up  Spoke with patient and Byron Little Neck (Daughter) through [employer]  RAYRAY AVILA    Care Gaps:     Health Maintenance Due   Topic Date Due     ZOSTER IMMUNIZATION (1 of 2) Never done     COVID-19 Vaccine (3 - Mixed Product risk series) 06/30/2022     INFLUENZA VACCINE (1) Never done     A1C  12/21/2022     Scheduled 1/10/23 at 6:45AM with Dr. Arizmendi.      Care Plan: No active goals     Intervention and Education during outreach:     CHW reviewed 9/30/22 encounter regarding applying for social security income. Patient stated she applied for [place] Citizenship in January of 2022. Patient is to apply in person at [facility] once she receives her [place] Citizenship per .     Patient and Little Neck expressed understanding and will reach out for support as needed.     CHW Plan: CHW to follow up in 1 month    Irene Samson  Clinic Care Coordination  [facility]    Phone: [phone]

## 2022-12-08 ENCOUNTER — PATIENT OUTREACH (OUTPATIENT)
Dept: CARE COORDINATION | Facility: CLINIC | Age: 52
End: 2022-12-08

## 2022-12-08 NOTE — LETTER
[facility] CARE COORDINATION    December 8, 2022      Nithya Matthews  [address] [place] [address]      Dear Barriga,     Attached is an updated Patient Centered Plan of Care for your continued enrollment in Care Coordination.     Please let us know if you have additional questions, concerns, or goals that we can assist with.    Sincerely,    ELOY [place] Care Coordination    [facility]  Patient Centered Plan of Care  About Me:        Patient Name:  Nithya Matthews    YOB: 1970  Age:         52 year old   [employer] MRN:    [id number] Telephone Information:  Home Phone [phone]   Mobile [phone]       Address:  [address] [place] [address] Email address:  [email or web address]      Emergency Contact(s)    Name Relationship Lgl Grd Work Phone Home Phone Mobile Phone   JESSY VAUGHN Daughter    [phone]           Primary language:  Jamari      needed? Yes   [employer]:  [phone] op. 1  Other communication barriers:Language barrier; Physical impairment; Lack of coping  Preferred Method of Communication:     Current living arrangement: I live in a private home with family  Mobility Status/ Medical Equipment: Independent w/Device    Health Maintenance  Health Maintenance Reviewed: Due/Overdue   Health Maintenance Due   Topic Date Due     ZOSTER IMMUNIZATION (1 of 2) Never done     COVID-19 Vaccine (3 - Mixed Product risk series) 06/30/2022     INFLUENZA VACCINE (1) Never done     A1C  12/21/2022     My Access Plan  Medical Emergency 911   Primary Clinic Line [facility] [phone]   24 Hour Appointment Line [phone] or  [phone]) (toll-free)   24 Hour Nurse Line [phone] (toll-free)   Preferred Urgent Care [facility] [phone]     Preferred Hospital [facility]  [phone]     Preferred Pharmacy [facility] - [place],  [place] - [address]     Behavioral Health Crisis Line The National Suicide Prevention Lifeline at 1-689.868.7221 or Text/Call 988     My Care Team Members  Patient Care Team       Relationship Specialty Notifications Start End    Giancarlo Arizmendi MD PCP - General Family Medicine  2/14/22     Referring to Eye    Phone: [phone] Fax: [phone]         [address] [facility] [address] [place] [address]    Giancarlo Arizmendi MD Assigned PCP   2/20/22     Phone: [phone] Fax: [phone]         [address] [place] [address]    Irene Samson Community Health Worker Primary Care - CC Admissions 3/1/22     Betsy Lopez ARMHS worker   3/2/22     [facility]. Call [facility] worker  Avani Nelson [phone] for assistance.    Phone: [phone]         Miracle Mi OD  Optometry  3/18/22     Phone: [phone] Fax: [phone]         [address] [address]    Shital Hernandez, PharmD Pharmacist Pharmacist  4/6/22     Phone: [phone]          [facility] [address] [place] [address]    Miracle Mi OD Assigned Surgical Provider   4/10/22     Phone: [phone] Fax: [phone]         [address] [address]    Grecia Hearn DPM, Podiatry/Foot and Ankle Surgery Assigned Musculoskeletal Provider   5/1/22     Phone: [phone] Pager: [phone] Fax: [phone]        [address] [facility] [address]    Davis Villa MD MD Endocrinology, Diabetes, and Metabolism  6/2/22     Phone: [phone] Fax: [phone]         [facility] [address]    Cris Smith, [facility] Lead Care Coordinator  Admissions 8/22/22     Shital Hernandez, PharmD Assigned MTM Pharmacist   9/28/22     Phone: [phone]          [facility] [address] [facility] [address] [place] [address]    Negin Courtney, RN Specialty Care Coordinator Nephrology All results, Admissions  10/21/22     Sahil Waterman MD Assigned Nephrology Provider   10/29/22     Phone: [phone] Fax: [phone] [address] [facility] [address]    Mariah Luther, RN Specialty Care Coordinator Nephrology Admissions 11/17/22     Dialysis Access Care Coordinator  [phone]    Rebeca Mckay RN Specialty Care Coordinator Nephrology Admissions 11/17/22     Dialysis Access Care Coordinator  [phone]    Geri Mortensen, RN Registered Nurse   11/20/22     Phone: [phone]           SPECIALTY PHARMACY [address] [facility] [address]            My Care Plans  Self Management and Treatment Plan  Care Plan       Action Plans on File:                       Advance Care Plans/Directives Type:   No data recorded    Honoring Choices    Advance Care Planning and Health Care Directives  When it comes to decisions about your health care, it s important that your voice is heard. You may not always be able to speak for yourself.    We encourage you to have discussions with your loved ones, cultural or spiritual leaders and health care providers about your goals, values, beliefs and choices.    We are a part of [employer] [place] , supporting and promoting the benefits of advance care planning conversations.    Our goals are to:    Help you make informed decisions about your healthcare choices and ensure that those choices are honored    Offer advance care planning discussions with trained staff    Ensure your choices are clearly defined, documented and available in your medical record    Translate your choices into medical orders as needed    Why is Advance Care Planning important?    Know what your health care choices are and decide what is right for you    An unexpected illness or injury could leave you unable to participate in important treatment decisions    When choices are left to others to decide that responsibility can be difficult and stressful    By discussing and outlining  your choices, your voice is heard in the care you want to receive    How can I learn more?  We offer free classes at multiple locations, days and times. Our trained facilitators will provide information and guide you through a Health Care Directive document. They can also review, notarize and add your document to your medical record.    Call [employer] at [phone] or toll free at [phone] for assistance.      My Medical and Care Information  Problem List   Patient Active Problem List   Diagnosis     Hypertension goal BP (blood pressure) < 140/90     Hyperlipidemia LDL goal <100     Depression, unspecified depression type     Stage 4 chronic kidney disease (H)     Hyperglycemia     Generalized muscle weakness     Low iron     Gastroesophageal reflux disease with esophagitis without hemorrhage     Low vitamin B12 level     Low vitamin D level     Celiac disease     Severe nonproliferative diabetic retinopathy of both eyes without macular edema associated with type 2 diabetes mellitus (H)     Beta thalassemia minor     Type 2 diabetes mellitus with other specified complication, unspecified whether long term insulin use (H)     CKD (chronic kidney disease) stage 4, GFR 15-29 ml/min (H)     Anemia of chronic renal failure, stage 4 (severe) (H)      Current Medications and Allergies:    Current Outpatient Medications   Medication Instructions     acetaminophen (TYLENOL) 500-1,000 mg, Oral, EVERY 6 HOURS PRN     alcohol swab prep pads Use to swab area of injection/amadeo as directed.     ammonium lactate (AMLACTIN) 12 % external cream Topical, DAILY     aspirin 81 mg, Oral, DAILY     atorvastatin (LIPITOR) 80 mg, Oral, DAILY     blood glucose (NO BRAND SPECIFIED) test strip Use to test blood sugar 3 times daily or as directed. To accompany: Blood Glucose Monitor Brands: per insurance.     blood glucose calibration (NO BRAND SPECIFIED) solution To accompany: Blood Glucose Monitor Brands: per insurance.      blood glucose monitoring (NO BRAND SPECIFIED) meter device kit Use to test blood sugar 3 times daily or as directed. Preferred blood glucose meter OR supplies to accompany: Blood Glucose Monitor Brands: per insurance.     carboxymethylcellulose-glycerin (REFRESH OPTIVE) 0.5-0.9 % ophthalmic solution 1 drop, Both Eyes, 3 TIMES DAILY PRN     Continuous Blood Gluc Sensor (FREESTYLE MICKIE 14 DAY SENSOR) MIS 1 Device, Does not apply, EVERY 14 DAYS, Change sensor as directed every 14 days     cyanocobalamin (VITAMIN B-12) 1,000 mcg, Oral, DAILY     escitalopram (LEXAPRO) 10 mg, Oral, DAILY     fenofibrate (LOFIBRA) 54 mg, Oral, DAILY     ferrous sulfate (FEROSUL) 325 mg, Oral, EVERY OTHER DAY     fish oil-omega-3 fatty acids 1 g, Oral, DAILY     gabapentin (NEURONTIN) 300 mg, Oral, 2 TIMES DAILY     insulin aspart (NOVOLOG PEN) 18 Units, Subcutaneous, 3 TIMES DAILY WITH MEALS     insulin glargine (LANTUS PEN) 80 Units, Subcutaneous, EVERY MORNING, 40 unit(s) on each side     insulin pen needle (32G X 4 MM) 32G X 4 MM miscellaneous Use 6x pen needles daily or as directed (victoza, 3 for novolog, 2 for lantus)     liraglutide (VICTOZA) 1.8 mg, Subcutaneous, DAILY     lisinopril (ZESTRIL) 40 mg, Oral, DAILY     melatonin 3 mg, Oral, AT BEDTIME PRN     menthol, Topical Analgesic, 2.5% (BENGAY VANISHING SCENT) 2.5 % GEL topical gel Topical, 3 TIMES DAILY (Diuretics and Nitrates), Right shoulder pain      metFORMIN (GLUCOPHAGE) 1000 MG tablet 1 tablet, Oral, 2 TIMES DAILY WITH MEALS     metoprolol succinate ER (TOPROL XL) 200 mg, Oral, DAILY     mirtazapine (REMERON) 7.5 mg, Oral, AT BEDTIME     omeprazole 20 mg, Oral, DAILY     polyethylene glycol (MIRALAX) 17 g, Oral, DAILY     silver sulfADIAZINE (SILVADENE) 1 % external cream Topical, 2 TIMES DAILY     thin (NO BRAND SPECIFIED) lancets Use with lanceting device. To accompany: Blood Glucose Monitor Brands: per insurance.     urea (GORMEL) 20 % external cream Topical,  DAILY, Apply to feet daily     vitamin D2 (ERGOCALCIFEROL) 50,000 Units, Oral, WEEKLY     Care Coordination Start Date: 3/1/2022   Frequency of Care Coordination: monthly     Form Last Updated: 12/08/2022

## 2022-12-08 NOTE — PROGRESS NOTES
Clinic Care Coordination Contact  Care Coordination Clinician Chart Review    Situation: Patient chart reviewed by care coordinator.       Background: Care Coordination initial assessment and enrollment to Care Coordination was 3/1/22. Patient centered goals were developed with participation from patient. RN/PARDEEP CC handed patient off to CHW for continued outreach every 30 days.        Assessment: Per chart review, patient outreach completed by CC CHW on 12/7/22. Patient is on maintenance status but has continued outreaches. Pt needs a new goal at next outreach or moved to maintenance status.       Patient is due for updated Plan of Care.     Annual assessment will be due 3/1/23.      Plan/Recommendations: The patient will continue working with Care Coordination.    CHW will involve SW CC as needed or if patient is ready to move to maintenance. CHW to outreach every 30 days.    SW CC will continue to monitor progress to goals and CHW outreaches every 6 weeks.     Plan of Care updated and mailed to patient: Yes    JORGE Byrne   Social Work Primary Care Clinic Care Coordinator   [facility]  [phone]  [email or web address]

## 2022-12-12 NOTE — PROGRESS NOTES
Nephrology Clinic Visit  Nithya Matthews MRN: [id number] YOB: 1970  Primary Care Provider: Giancarlo Arizmendi  History Obtained From: Patient  External Document Review: Primary Care Provider    Pertinent Nephro History:  CKD G4A3 2/2 DM, HTN, ?Obstruction from Nephrolithiasis?  No renal biopsy  CT Abd/Pelvis 12/2021: Nonobstructing stones R kidney, no hydro  Liraglutide, Glargine, Aspart, Lisinopril, Metoprolol, Ferrous Sulfate, Atorvastatin, Fenofibrate    -------------------------------------------------------------------------------------------------------------------------------  Visit 12/16/2022  -BP control: Discussed that we really need home BP readings. She is usually in the 130-140's and even had issues not that long ago with severe hypotension (prompting holding of hydrochlorothiazide). Elevated today in clinic. Some LE edema. Likely will need a diuretic on board again soon. Send me home readings if able otherwise low threshold to add on Lasix if LE worsens or if Systolic BP > 140 at her next appointment.  -Transplant planning: Yes, she has been contacted by Transplant. She is not sure if she has a number to contact them. I see one message saved to the chart from transplant outreach. Will have them contact her again.   -RRT planning: Looks like she has access appointment scheduled for 1/9/2023. She has vein mapping scheduled for this same day at 1PM. We have discussed this today to make sure she is aware of this appointment as we are certainly approaching the point where I need her to have this fistula in place and maturing so we can safely start dialysis when needed.   -UOP:   -Lower extremity swelling: Yes, b/l 1+  -Uremic Symptoms: She denies significant uremic symptoms today. She is eating well, no nausea, no metallic taste in mouth  -Anemia clinic: getting set up for EPO injections. Looks like she is currently set up for 1/4/2023 to get an EPO injection.    Visit 11/16/22  -Here for follow up  visit with [facility]  -BP control: Okay at this point.   -Understanding of renal impairment: We again discussed her severe renal impairment and how biopsy findings do not suggest any reversible cause. We need to make preparations for RRT.  -Plan for RRT: The patient had RRT education yesterday. Leaning towards in center hemodialysis.  -Plan for Transplant: Says her son is willing to potentially be a living donor. He lives out in [place]. Needs to be referred for transplant eval.    Visit 10/19/22:  -Here to establish care with [facility]  -here with daughter today  -DM Control: Poorly controlled. Long term issue. Follows with PCP and referred to Endo  -HTN Control: Had some issues with hypotension recently so hydrochlorothiazide discontinued and BP improved. Continues on Lisinopril. BP in the 140's at todays appointments. She doesn't check her BP at home.   -Hx of Kidney Stones: She denies a history of any symptomatic kidney stones. She denies gross hematuria.  -Nocturia: 1-2x per night  -NSAIDs: Doesn't sound that way   -Herbal/OTC Medications: Denies  -RRT Planning/GOC:   --Understanding of kidney dysfunction: knows that her kidneys have been weak for a long time but not clear on how bad her function is. She is very reserved, asks few questions, and has a difficult time summarizing what we talked about today. This is not surprising as she is clearly nervous/scared about kidney failure/dialysis.   --We discussed that she is right at the border of stage 5 kidney disease and that we need to start planning for RRT.  --We had a long discussion about RRT and what kidney failure is using interpretor.  -Uremic symptoms:   --Appetite: She thinks her appetite is good.  --Metallic taste: No  --Nausea: She is often having nausea. She thinks lime juice helps.   --Day/Night Reversal: Sleep is okay  --Swelling in legs: She notices a bit of leg swelling.  -She has been eating and drinking well leading up to today's  appointment.    Objective:  PAST MEDICAL HISTORY:  Past Medical History:   Diagnosis Date     Arthritis      Diabetes (H)      Hypertension       DM  HTN    PAST SURGICAL HISTORY:  Past Surgical History:   Procedure Laterality Date     ABDOMEN SURGERY       APPENDECTOMY       BIOPSY       CATARACT IOL, RT/LT         MEDICATIONS:  Current Outpatient Medications   Medication Instructions     acetaminophen (TYLENOL) 500-1,000 mg, Oral, EVERY 6 HOURS PRN     alcohol swab prep pads Use to swab area of injection/amadeo as directed.     ammonium lactate (AMLACTIN) 12 % external cream Topical, DAILY     aspirin 81 mg, Oral, DAILY     atorvastatin (LIPITOR) 80 mg, Oral, DAILY     blood glucose (NO BRAND SPECIFIED) test strip Use to test blood sugar 3 times daily or as directed. To accompany: Blood Glucose Monitor Brands: per insurance.     blood glucose calibration (NO BRAND SPECIFIED) solution To accompany: Blood Glucose Monitor Brands: per insurance.     blood glucose monitoring (NO BRAND SPECIFIED) meter device kit Use to test blood sugar 3 times daily or as directed. Preferred blood glucose meter OR supplies to accompany: Blood Glucose Monitor Brands: per insurance.     carboxymethylcellulose-glycerin (REFRESH OPTIVE) 0.5-0.9 % ophthalmic solution 1 drop, Both Eyes, 3 TIMES DAILY PRN     Continuous Blood Gluc Sensor (FREESTYLE MICKIE 14 DAY SENSOR) MIS 1 Device, Does not apply, EVERY 14 DAYS, Change sensor as directed every 14 days     cyanocobalamin (VITAMIN B-12) 1,000 mcg, Oral, DAILY     escitalopram (LEXAPRO) 10 mg, Oral, DAILY     fenofibrate (LOFIBRA) 54 mg, Oral, DAILY     ferrous sulfate (FEROSUL) 325 mg, Oral, EVERY OTHER DAY     fish oil-omega-3 fatty acids 1 g, Oral, DAILY     gabapentin (NEURONTIN) 300 mg, Oral, 2 TIMES DAILY     insulin aspart (NOVOLOG PEN) 18 Units, Subcutaneous, 3 TIMES DAILY WITH MEALS     insulin glargine (LANTUS PEN) 80 Units, Subcutaneous, EVERY MORNING, 40 unit(s) on each side      insulin pen needle (32G X 4 MM) 32G X 4 MM miscellaneous Use 6x pen needles daily or as directed (victoza, 3 for novolog, 2 for lantus)     liraglutide (VICTOZA) 1.8 mg, Subcutaneous, DAILY     lisinopril (ZESTRIL) 40 mg, Oral, DAILY     melatonin 3 mg, Oral, AT BEDTIME PRN     menthol, Topical Analgesic, 2.5% (BENGAY VANISHING SCENT) 2.5 % GEL topical gel Topical, 3 TIMES DAILY (Diuretics and Nitrates), Right shoulder pain      metoprolol succinate ER (TOPROL XL) 200 mg, Oral, DAILY     mirtazapine (REMERON) 7.5 mg, Oral, AT BEDTIME     omeprazole 20 mg, Oral, DAILY     polyethylene glycol (MIRALAX) 17 g, Oral, DAILY     silver sulfADIAZINE (SILVADENE) 1 % external cream Topical, 2 TIMES DAILY     thin (NO BRAND SPECIFIED) lancets Use with lanceting device. To accompany: Blood Glucose Monitor Brands: per insurance.     urea (GORMEL) 20 % external cream Topical, DAILY, Apply to feet daily     vitamin D2 (ERGOCALCIFEROL) 50,000 Units, Oral, WEEKLY       FAMILY MEDICAL HISTORY:   Family History   Problem Relation Age of Onset     Glaucoma No family hx of      Macular Degeneration No family hx of      Breast Cancer No family hx of      Ovarian Cancer No family hx of        PHYSICAL EXAM:   BP (!) (P) 188/88   Pulse (P) 66   Wt 53.5 kg (118 lb)   SpO2 (P) 94%   BMI 21.58 kg/m    GENERAL APPEARANCE: appears older than stated age, uses a cane when ambulating  EYES: nonicteric  HENT: mouth without ulcers or lesions  RESP: lungs clear to auscultation   CV: regular rhythm, normal rate, no rub  ABDOMEN: soft, nontender, normal bowel sounds, no HSM   Extremities: no clubbing, cyanosis, 1+ LE edema (worse than last time but still quite mild - hodling on diuretic for now).   MS: no evidence of inflammation in joints, no muscle tenderness  SKIN: no rash  NEURO: mentation intact and speech normal  PSYCH: affect normal    LABS REVIEWED BY ME:   ANEMIA  Recent Labs   Lab Test 12/16/22  1240 11/16/22  1253 10/29/22  9682  10/28/22  1827 10/28/22  0842 10/19/22  0625   HGB 7.4* 8.0* 7.3* 8.3*   < > 10.1*   IRONSAT 26 24  --   --   --  27   ISH  --  583*  --   --   --  587*    < > = values in this interval not displayed.       BMP  Recent Labs   Lab Test 11/16/22  1253 10/29/22  0727 10/28/22  2102 10/28/22  0842 10/19/22  0625 06/02/22  1300 02/28/22  1334 12/09/21  0725 12/08/21  0607 12/06/21  2347 12/06/21  2118    143 141 146* 143   < > 136   < > 139   < > 133*   POTASSIUM 3.3* 3.4 3.3* 2.8* 3.0*   < > 3.5   < > 3.5  3.5   < > 3.5   CHLORIDE 105 110* 109* 111* 105   < > 98   < > 104   < > 92*   CO2 23 24 24 23 28   < > 25   < > 25   < > 25   ANIONGAP 13 9 8 12 10   < > 13   < > 10   < > 16   BUN 25.4* 18.7 18.7 18.5 17.9   < > 18   < > 23*   < > 39*   CR 3.39* 3.12* 3.21* 3.19* 3.32*   < > 1.93*   < > 1.97*   < > 2.54*   GFRESTIMATED 15* 17* 16* 17* 16*   < > 30*   < > 28*   < > 21*   MAG  --   --   --   --  1.6*  --   --   --   --   --  1.9   PROTTOTAL  --   --   --   --   --   --  7.5  --  7.3  --  8.5*    < > = values in this interval not displayed.       CBC  Recent Labs   Lab Test 12/16/22  1240 11/16/22  1253 10/29/22  0727 10/28/22  1827 10/28/22  1425 10/28/22  0842 10/19/22  0625   HGB 7.4* 8.0* 7.3* 8.3*   < > 8.9* 10.1*   WBC  --  5.2 6.4  --   --  7.6 6.5   HCT 24.6* 26.0* 24.5*  --   --  29.9* 33.1*   MCV  --  71* 73*  --   --  72* 70*   PLT  --  190 146*  --   --  175 155    < > = values in this interval not displayed.       DIABETES  Recent Labs   Lab Test 09/21/22  0814 09/14/22  1518 06/02/22  1300 02/28/22  1334   A1C 11.0* 11.1* 10.4* 14.4*       HYPONATREMIA  Recent Labs   Lab Test 12/06/21  2118   UNAR 57       MBD  Recent Labs   Lab Test 11/16/22  1253 10/29/22  0727 10/28/22  2102 10/28/22  0842 10/19/22  0625 06/02/22  1300 02/28/22  1334 12/09/21  0725 12/08/21  0607   JOSEPH 6.2* 6.0* 6.0* 6.3* 6.6*   < > 8.2*   < > 7.8*   ALBUMIN 3.0*  --   --   --  3.0*  --  3.3*  --  3.3*   PHOS 2.9  --   --   --   3.5  --   --   --   --    PTHI  --   --   --   --  87*  --   --   --   --     < > = values in this interval not displayed.        URINE STUDIES  Recent Labs   Lab Test 10/19/22  0705 12/06/21  2118   COLOR Yellow Light Yellow   APPEARANCE Clear Clear   URINEGLC 250* >1000*   URINEBILI Negative Negative   URINEKETONE Negative Negative   SG 1.025 1.016   UBLD Small* 0.1 mg/dL*   URINEPH 7.0 6.0   PROTEIN 300* 100*   NITRITE Negative Negative   LEUKEST Negative Negative   RBCU 3* 3*   WBCU 1 10*     No lab results found.    ADDITIONAL LABS ORDERED/REVIEWED BY ME:  None    Assessment/Plan  CKD G5A3  Established care with [facility] 10/19/22    Oldest labs I am able to see in the system are from 7/29/2019 when creatinine was 2. Patient was admitted 12/2021 with hyperglycemia and had presumed pre-renal PRUDENCE. Creatinine improved from 2.16 -> 1.57 at time of discharge. At follow up her creatinine has been in the high 1's to mid 2's range (2.53 on 9/14/22) and on 10/19/22 unfortunately up to 3.3. She has hx of kidney stones noted on CT Abd/Pelvis 12/2021 but no proven episodes of obstructive nephropathy. Likely progressive renal impairment from a combination of recurrent pre-renal AKIs in the setting of significantly elevated glucose levels, DKD, and HTN nephrosclerosis. Interestingly on 10/19/22 she was found to have severe Nephrotic range proteinuria with UPCR suggestive of 15g/day. This level of nephrotic range proteinuria is certainly atypical for DN. Renal biopsy was obtained 10/28/22 with advanced/diffuse glomerulosclerosis. Proceeding with RRT/Transplant planning.     Seeing patient monthly until all RRT/Transplant planning steps are in place and to monitor for development of Uremic symptoms.     Plan:  -Discussed RRT planning and next steps as outlined below under GOC problem  -Discussed Transplant referral and next steps as outlined below under GOC problem  -Continue with DM and HTN medications as  prescribed.  -Continue Lisinopril and Metoprolol for now. Her BP was elevated in clinic (asymptomatic) and has recently been in the 130's to 140's. Really would benefit from home readings to guide uptitration, but likely next step is to add back on a diuretic. If remains HTN at next appointment and with LE edema will start with Lasix 40mg BID and see how she responds (although need to be careful given her persistent hypoK despite severe renal impairment. Will need very close lab monitoring after diuretic initiation).     History of Nephrolithiasis  Stones noted on CT Abd/Pelvis 12/2021. Renal US 10/2022 without stones.    Anemia of Renal Disease:  -Hemoglobin 7.4  -Ferritin: Acceptable  -TSAT: 27 (acceptable)    Plan:  -Enroll in anemia clinic (message sent 11/16/2022) for EPO initiation.    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 If Simvastatin used alone, 40mg qDay is referenced.   Other options include: Atorvastatin 20mg qDay (4D trial) and Rosuvastatin 10mg qDay (RHONDA trial)    Current regimen: Atorvastatin 80mg qDay, Fenofibrate 54mg qDay    RRT/GOC:  We have discussed the following regarding Renal Replacement Therapy:  Is RRT within this patient's GOC?: Yes  -Longterm vs Time-limited trial: Longterm    --Review of Conservative Kidney Management:  ---We discussed options for medical management of symptoms related to renal failure (High dose diuretics, potassium binders, medications for nausea/itching) and some benefits associated  with this strategy (less interactions with healthcare system, less appointments, less medications, typically less time spent in the hospital)  ---We discussed that patients with no residual kidney function will have a rapid accumulation of toxins/fluid whereas patients with some residual kidney function can be managed medically for quite some time.     Modalities:  -Hemodialysis vs Peritoneal Dialysis  --Home vs In-center  ---Favor HD and In-center. Discussed PD vs HD extensively 22. Discussed how patient and daughter (or other family member) would be educated at the PD center until they felt comfortable doing PD alone at home. Discussed that PD offers more freedom but more responsibility compared to in-center HD. Patient was hesitant to make a decision on PD vs HD but after questioning her multiple times and explaining the difference between the two modalities multiple times (including reviewing technique, pictures, and impact on lifestyle) the patient ultimately decided that she would prefer to go to a HD center 3x per week and have HD performed via an AVF.     Access:  -AVG vs AVF vs PD Cath vs TDC vs Temp Vascath  --We have discussed the benefits/risks/timing of placement of the various types of RRT access  ---Referral for placement/estimated timing of placement: Message sent for access eval/placement 22. Appt scheduled for 2023.  ---Has already been referred to [facility]    Transplant:  -eGFR < 20 on two or more readings?: Yes  --Referral placed for eval?: Placed 2022. Discussed that she needs to be evaluated to see if she could safely get a kidney transplant and then either have a living donor identified or go on the wait list for  donor.    Return to clinic: 1 month    Sahil Waterman MD   of Medicine  Division of Nephrology and Hypertension  [facility]    35 minutes spent on the date of the encounter doing chart review, history and  exam, documentation and further activities as noted above

## 2022-12-13 ENCOUNTER — TELEPHONE (OUTPATIENT)
Dept: NEPHROLOGY | Facility: CLINIC | Age: 52
End: 2022-12-13

## 2022-12-13 NOTE — TELEPHONE ENCOUNTER
Called patient via  service with a appointment reminder for Fri. 12/16/22 @ 2:00 pm with Dr. Waterman and a lb draw @ 1:00 pm.    Aurelio Miller on 12/13/2022 at 11:16 AM

## 2022-12-16 ENCOUNTER — LAB (OUTPATIENT)
Dept: LAB | Facility: CLINIC | Age: 52
End: 2022-12-16
Payer: COMMERCIAL

## 2022-12-16 ENCOUNTER — OFFICE VISIT (OUTPATIENT)
Dept: NEPHROLOGY | Facility: CLINIC | Age: 52
End: 2022-12-16
Attending: STUDENT IN AN ORGANIZED HEALTH CARE EDUCATION/TRAINING PROGRAM
Payer: COMMERCIAL

## 2022-12-16 VITALS — BODY MASS INDEX: 21.58 KG/M2 | WEIGHT: 118 LBS

## 2022-12-16 DIAGNOSIS — D63.1 ANEMIA OF CHRONIC RENAL FAILURE, STAGE 4 (SEVERE) (H): ICD-10-CM

## 2022-12-16 DIAGNOSIS — N18.4 ANEMIA OF CHRONIC RENAL FAILURE, STAGE 4 (SEVERE) (H): ICD-10-CM

## 2022-12-16 DIAGNOSIS — N18.4 CKD (CHRONIC KIDNEY DISEASE) STAGE 4, GFR 15-29 ML/MIN (H): ICD-10-CM

## 2022-12-16 DIAGNOSIS — N18.4 STAGE 4 CHRONIC KIDNEY DISEASE (H): Primary | ICD-10-CM

## 2022-12-16 LAB
FERRITIN SERPL-MCNC: 420 NG/ML (ref 11–328)
HCT VFR BLD AUTO: 24.6 % (ref 35–47)
HGB BLD-MCNC: 7.4 G/DL (ref 11.7–15.7)
IRON BINDING CAPACITY (ROCHE): 228 UG/DL (ref 240–430)
IRON SATN MFR SERPL: 26 % (ref 15–46)
IRON SERPL-MCNC: 59 UG/DL (ref 37–145)

## 2022-12-16 PROCEDURE — 82728 ASSAY OF FERRITIN: CPT | Performed by: STUDENT IN AN ORGANIZED HEALTH CARE EDUCATION/TRAINING PROGRAM

## 2022-12-16 PROCEDURE — 36415 COLL VENOUS BLD VENIPUNCTURE: CPT | Performed by: PATHOLOGY

## 2022-12-16 PROCEDURE — 85018 HEMOGLOBIN: CPT | Performed by: PATHOLOGY

## 2022-12-16 PROCEDURE — 83540 ASSAY OF IRON: CPT | Performed by: PATHOLOGY

## 2022-12-16 PROCEDURE — G0463 HOSPITAL OUTPT CLINIC VISIT: HCPCS | Mod: 25 | Performed by: STUDENT IN AN ORGANIZED HEALTH CARE EDUCATION/TRAINING PROGRAM

## 2022-12-16 PROCEDURE — 83550 IRON BINDING TEST: CPT | Performed by: PATHOLOGY

## 2022-12-16 PROCEDURE — 85014 HEMATOCRIT: CPT | Performed by: PATHOLOGY

## 2022-12-16 PROCEDURE — 99214 OFFICE O/P EST MOD 30 MIN: CPT | Performed by: STUDENT IN AN ORGANIZED HEALTH CARE EDUCATION/TRAINING PROGRAM

## 2022-12-16 NOTE — LETTER
12/16/2022       RE: Nithya Matthews  [address] [place] [address]     Dear Colleague,    Thank you for referring your patient, Nithya Matthews, to the [facility] [place] at [facility]. Please see a copy of my visit note below.        Nephrology Clinic Visit  Nithya Matthews MRN: [id number] YOB: 1970  Primary Care Provider: Giancarlo Arizmendi  History Obtained From: Patient  External Document Review: Primary Care Provider    Pertinent Nephro History:  CKD G4A3 2/2 DM, HTN, ?Obstruction from Nephrolithiasis?  No renal biopsy  CT Abd/Pelvis 12/2021: Nonobstructing stones R kidney, no hydro  Liraglutide, Glargine, Aspart, Lisinopril, Metoprolol, Ferrous Sulfate, Atorvastatin, Fenofibrate    -------------------------------------------------------------------------------------------------------------------------------  Visit 12/16/2022  -BP control: Discussed that we really need home BP readings. She is usually in the 130-140's and even had issues not that long ago with severe hypotension (prompting holding of hydrochlorothiazide). Elevated today in clinic. Some LE edema. Likely will need a diuretic on board again soon. Send me home readings if able otherwise low threshold to add on Lasix if LE worsens or if Systolic BP > 140 at her next appointment.  -Transplant planning: Yes, she has been contacted by Transplant. She is not sure if she has a number to contact them. I see one message saved to the chart from transplant outreach. Will have them contact her again.   -RRT planning: Looks like she has access appointment scheduled for 1/9/2023. She has vein mapping scheduled for this same day at 1PM. We have discussed this today to make sure she is aware of this appointment as we are certainly approaching the point where I need her to have this fistula in place and maturing so we can safely start dialysis when needed.   -UOP:   -Lower extremity swelling: Yes,  b/l 1+  -Uremic Symptoms: She denies significant uremic symptoms today. She is eating well, no nausea, no metallic taste in mouth  -Anemia clinic: getting set up for EPO injections. Looks like she is currently set up for 1/4/2023 to get an EPO injection.    Visit 11/16/22  -Here for follow up visit with [facility]  -BP control: Okay at this point.   -Understanding of renal impairment: We again discussed her severe renal impairment and how biopsy findings do not suggest any reversible cause. We need to make preparations for RRT.  -Plan for RRT: The patient had RRT education yesterday. Leaning towards in center hemodialysis.  -Plan for Transplant: Says her son is willing to potentially be a living donor. He lives out in [place]. Needs to be referred for transplant eval.    Visit 10/19/22:  -Here to establish care with [facility]  -here with daughter today  -DM Control: Poorly controlled. Long term issue. Follows with PCP and referred to Endo  -HTN Control: Had some issues with hypotension recently so hydrochlorothiazide discontinued and BP improved. Continues on Lisinopril. BP in the 140's at todays appointments. She doesn't check her BP at home.   -Hx of Kidney Stones: She denies a history of any symptomatic kidney stones. She denies gross hematuria.  -Nocturia: 1-2x per night  -NSAIDs: Doesn't sound that way   -Herbal/OTC Medications: Denies  -RRT Planning/GOC:   --Understanding of kidney dysfunction: knows that her kidneys have been weak for a long time but not clear on how bad her function is. She is very reserved, asks few questions, and has a difficult time summarizing what we talked about today. This is not surprising as she is clearly nervous/scared about kidney failure/dialysis.   --We discussed that she is right at the border of stage 5 kidney disease and that we need to start planning for RRT.  --We had a long discussion about RRT and what kidney failure is using interpretor.  -Uremic symptoms:    --Appetite: She thinks her appetite is good.  --Metallic taste: No  --Nausea: She is often having nausea. She thinks lime juice helps.   --Day/Night Reversal: Sleep is okay  --Swelling in legs: She notices a bit of leg swelling.  -She has been eating and drinking well leading up to today's appointment.    Objective:  PAST MEDICAL HISTORY:  Past Medical History:   Diagnosis Date     Arthritis      Diabetes (H)      Hypertension       DM  HTN    PAST SURGICAL HISTORY:  Past Surgical History:   Procedure Laterality Date     ABDOMEN SURGERY       APPENDECTOMY       BIOPSY       CATARACT IOL, RT/LT         MEDICATIONS:  Current Outpatient Medications   Medication Instructions     acetaminophen (TYLENOL) 500-1,000 mg, Oral, EVERY 6 HOURS PRN     alcohol swab prep pads Use to swab area of injection/amadeo as directed.     ammonium lactate (AMLACTIN) 12 % external cream Topical, DAILY     aspirin 81 mg, Oral, DAILY     atorvastatin (LIPITOR) 80 mg, Oral, DAILY     blood glucose (NO BRAND SPECIFIED) test strip Use to test blood sugar 3 times daily or as directed. To accompany: Blood Glucose Monitor Brands: per insurance.     blood glucose calibration (NO BRAND SPECIFIED) solution To accompany: Blood Glucose Monitor Brands: per insurance.     blood glucose monitoring (NO BRAND SPECIFIED) meter device kit Use to test blood sugar 3 times daily or as directed. Preferred blood glucose meter OR supplies to accompany: Blood Glucose Monitor Brands: per insurance.     carboxymethylcellulose-glycerin (REFRESH OPTIVE) 0.5-0.9 % ophthalmic solution 1 drop, Both Eyes, 3 TIMES DAILY PRN     Continuous Blood Gluc Sensor (FREESTYLE MICKIE 14 DAY SENSOR) [facility] 1 Device, Does not apply, EVERY 14 DAYS, Change sensor as directed every 14 days     cyanocobalamin (VITAMIN B-12) 1,000 mcg, Oral, DAILY     escitalopram (LEXAPRO) 10 mg, Oral, DAILY     fenofibrate (LOFIBRA) 54 mg, Oral, DAILY     ferrous sulfate (FEROSUL) 325 mg, Oral, EVERY OTHER  DAY     fish oil-omega-3 fatty acids 1 g, Oral, DAILY     gabapentin (NEURONTIN) 300 mg, Oral, 2 TIMES DAILY     insulin aspart (NOVOLOG PEN) 18 Units, Subcutaneous, 3 TIMES DAILY WITH MEALS     insulin glargine (LANTUS PEN) 80 Units, Subcutaneous, EVERY MORNING, 40 unit(s) on each side     insulin pen needle (32G X 4 MM) 32G X 4 MM miscellaneous Use 6x pen needles daily or as directed (victoza, 3 for novolog, 2 for lantus)     liraglutide (VICTOZA) 1.8 mg, Subcutaneous, DAILY     lisinopril (ZESTRIL) 40 mg, Oral, DAILY     melatonin 3 mg, Oral, AT BEDTIME PRN     menthol, Topical Analgesic, 2.5% (BENGAY VANISHING SCENT) 2.5 % GEL topical gel Topical, 3 TIMES DAILY (Diuretics and Nitrates), Right shoulder pain      metoprolol succinate ER (TOPROL XL) 200 mg, Oral, DAILY     mirtazapine (REMERON) 7.5 mg, Oral, AT BEDTIME     omeprazole 20 mg, Oral, DAILY     polyethylene glycol (MIRALAX) 17 g, Oral, DAILY     silver sulfADIAZINE (SILVADENE) 1 % external cream Topical, 2 TIMES DAILY     thin (NO BRAND SPECIFIED) lancets Use with lanceting device. To accompany: Blood Glucose Monitor Brands: per insurance.     urea (GORMEL) 20 % external cream Topical, DAILY, Apply to feet daily     vitamin D2 (ERGOCALCIFEROL) 50,000 Units, Oral, WEEKLY       FAMILY MEDICAL HISTORY:   Family History   Problem Relation Age of Onset     Glaucoma No family hx of      Macular Degeneration No family hx of      Breast Cancer No family hx of      Ovarian Cancer No family hx of        PHYSICAL EXAM:   BP (!) (P) 188/88   Pulse (P) 66   Wt 53.5 kg (118 lb)   SpO2 (P) 94%   BMI 21.58 kg/m    GENERAL APPEARANCE: appears older than stated age, uses a cane when ambulating  EYES: nonicteric  HENT: mouth without ulcers or lesions  RESP: lungs clear to auscultation   CV: regular rhythm, normal rate, no rub  ABDOMEN: soft, nontender, normal bowel sounds, no HSM   Extremities: no clubbing, cyanosis, 1+ LE edema (worse than last time but still quite  mild - hodling on diuretic for now).   MS: no evidence of inflammation in joints, no muscle tenderness  SKIN: no rash  NEURO: mentation intact and speech normal  PSYCH: affect normal    LABS REVIEWED BY ME:   ANEMIA  Recent Labs   Lab Test 12/16/22  1240 11/16/22  1253 10/29/22  0727 10/28/22  1827 10/28/22  0842 10/19/22  0625   HGB 7.4* 8.0* 7.3* 8.3*   < > 10.1*   IRONSAT 26 24  --   --   --  27   ISH  --  583*  --   --   --  587*    < > = values in this interval not displayed.       BMP  Recent Labs   Lab Test 11/16/22  1253 10/29/22  0727 10/28/22  2102 10/28/22  0842 10/19/22  0625 06/02/22  1300 02/28/22  1334 12/09/21  0725 12/08/21  0607 12/06/21  2347 12/06/21  2118    143 141 146* 143   < > 136   < > 139   < > 133*   POTASSIUM 3.3* 3.4 3.3* 2.8* 3.0*   < > 3.5   < > 3.5  3.5   < > 3.5   CHLORIDE 105 110* 109* 111* 105   < > 98   < > 104   < > 92*   CO2 23 24 24 23 28   < > 25   < > 25   < > 25   ANIONGAP 13 9 8 12 10   < > 13   < > 10   < > 16   BUN 25.4* 18.7 18.7 18.5 17.9   < > 18   < > 23*   < > 39*   CR 3.39* 3.12* 3.21* 3.19* 3.32*   < > 1.93*   < > 1.97*   < > 2.54*   GFRESTIMATED 15* 17* 16* 17* 16*   < > 30*   < > 28*   < > 21*   MAG  --   --   --   --  1.6*  --   --   --   --   --  1.9   PROTTOTAL  --   --   --   --   --   --  7.5  --  7.3  --  8.5*    < > = values in this interval not displayed.       CBC  Recent Labs   Lab Test 12/16/22  1240 11/16/22  1253 10/29/22  0727 10/28/22  1827 10/28/22  1425 10/28/22  0842 10/19/22  0625   HGB 7.4* 8.0* 7.3* 8.3*   < > 8.9* 10.1*   WBC  --  5.2 6.4  --   --  7.6 6.5   HCT 24.6* 26.0* 24.5*  --   --  29.9* 33.1*   MCV  --  71* 73*  --   --  72* 70*   PLT  --  190 146*  --   --  175 155    < > = values in this interval not displayed.       DIABETES  Recent Labs   Lab Test 09/21/22  0814 09/14/22  1518 06/02/22  1300 02/28/22  1334   A1C 11.0* 11.1* 10.4* 14.4*       HYPONATREMIA  Recent Labs   Lab Test 12/06/21  2118   UNAR 57       MBD  Recent  Labs   Lab Test 11/16/22  1253 10/29/22  0727 10/28/22  2102 10/28/22  0842 10/19/22  0625 06/02/22  1300 02/28/22  1334 12/09/21  0725 12/08/21  0607   JOSEPH 6.2* 6.0* 6.0* 6.3* 6.6*   < > 8.2*   < > 7.8*   ALBUMIN 3.0*  --   --   --  3.0*  --  3.3*  --  3.3*   PHOS 2.9  --   --   --  3.5  --   --   --   --    PTHI  --   --   --   --  87*  --   --   --   --     < > = values in this interval not displayed.        URINE STUDIES  Recent Labs   Lab Test 10/19/22  0705 12/06/21 2118   COLOR Yellow Light Yellow   APPEARANCE Clear Clear   URINEGLC 250* >1000*   URINEBILI Negative Negative   URINEKETONE Negative Negative   SG 1.025 1.016   UBLD Small* 0.1 mg/dL*   URINEPH 7.0 6.0   PROTEIN 300* 100*   NITRITE Negative Negative   LEUKEST Negative Negative   RBCU 3* 3*   WBCU 1 10*     No lab results found.    ADDITIONAL LABS ORDERED/REVIEWED BY ME:  None    Assessment/Plan  CKD G5A3  Established care with [facility] 10/19/22    Oldest labs I am able to see in the system are from 7/29/2019 when creatinine was 2. Patient was admitted 12/2021 with hyperglycemia and had presumed pre-renal PRUDENCE. Creatinine improved from 2.16 -> 1.57 at time of discharge. At follow up her creatinine has been in the high 1's to mid 2's range (2.53 on 9/14/22) and on 10/19/22 unfortunately up to 3.3. She has hx of kidney stones noted on CT Abd/Pelvis 12/2021 but no proven episodes of obstructive nephropathy. Likely progressive renal impairment from a combination of recurrent pre-renal AKIs in the setting of significantly elevated glucose levels, DKD, and HTN nephrosclerosis. Interestingly on 10/19/22 she was found to have severe Nephrotic range proteinuria with UPCR suggestive of 15g/day. This level of nephrotic range proteinuria is certainly atypical for DN. Renal biopsy was obtained 10/28/22 with advanced/diffuse glomerulosclerosis. Proceeding with RRT/Transplant planning.     Seeing patient monthly until all RRT/Transplant planning steps are in  place and to monitor for development of Uremic symptoms.     Plan:  -Discussed RRT planning and next steps as outlined below under GOC problem  -Discussed Transplant referral and next steps as outlined below under GOC problem  -Continue with DM and HTN medications as prescribed.  -Continue Lisinopril and Metoprolol for now. Her BP was elevated in clinic (asymptomatic) and has recently been in the 130's to 140's. Really would benefit from home readings to guide uptitration, but likely next step is to add back on a diuretic. If remains HTN at next appointment and with LE edema will start with Lasix 40mg BID and see how she responds (although need to be careful given her persistent hypoK despite severe renal impairment. Will need very close lab monitoring after diuretic initiation).     History of Nephrolithiasis  Stones noted on CT Abd/Pelvis 12/2021. Renal US 10/2022 without stones.    Anemia of Renal Disease:  -Hemoglobin 7.4  -Ferritin: Acceptable  -TSAT: 27 (acceptable)    Plan:  -Enroll in anemia clinic (message sent 11/16/2022) for EPO initiation.    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 If Simvastatin used alone, 40mg qDay is referenced.   Other options include: Atorvastatin 20mg qDay (4D trial) and Rosuvastatin 10mg qDay (RHONDA trial)    Current regimen: Atorvastatin 80mg qDay, Fenofibrate 54mg qDay    RRT/GOC:  We have discussed the following regarding Renal Replacement Therapy:  Is RRT within this patient's GOC?: Yes  -Longterm  vs Time-limited trial: Longterm    --Review of Conservative Kidney Management:  ---We discussed options for medical management of symptoms related to renal failure (High dose diuretics, potassium binders, medications for nausea/itching) and some benefits associated with this strategy (less interactions with healthcare system, less appointments, less medications, typically less time spent in the hospital)  ---We discussed that patients with no residual kidney function will have a rapid accumulation of toxins/fluid whereas patients with some residual kidney function can be managed medically for quite some time.     Modalities:  -Hemodialysis vs Peritoneal Dialysis  --Home vs In-center  ---Favor HD and In-center. Discussed PD vs HD extensively 11/16/22. Discussed how patient and daughter (or other family member) would be educated at the PD center until they felt comfortable doing PD alone at home. Discussed that PD offers more freedom but more responsibility compared to in-center HD. Patient was hesitant to make a decision on PD vs HD but after questioning her multiple times and explaining the difference between the two modalities multiple times (including reviewing technique, pictures, and impact on lifestyle) the patient ultimately decided that she would prefer to go to a HD center 3x per week and have HD performed via an AVF.     Access:  -AVG vs AVF vs PD Cath vs TDC vs Temp Vascath  --We have discussed the benefits/risks/timing of placement of the various types of RRT access  ---Referral for placement/estimated timing of placement: Message sent for access eval/placement 11/17/22. Appt scheduled for 1/9/2023.  ---Has already been referred to [facility]    Transplant:  -eGFR < 20 on two or more readings?: Yes  --Referral placed for eval?: Placed 11/16/2022. Discussed that she needs to be evaluated to see if she could safely get a kidney transplant and then either have a living donor identified or go on the wait  list for  donor.    Return to clinic: 1 month    Sahil Waterman MD   of Medicine  Division of Nephrology and Hypertension  [facility]    35 minutes spent on the date of the encounter doing chart review, history and exam, documentation and further activities as noted above

## 2022-12-16 NOTE — PATIENT INSTRUCTIONS
"It was a pleasure to see you in nephrology clinic today. I've included a brief summary of our discussion and care plan from today's visit below.  _______________________________________________________________________    My recommendations are summarized as follows:  -Keep a Blood Pressure log. Please make sure that you are using a validated blood pressure device (check \"www.validatebp.org\").  -Avoid all NSAID's. Examples include Ibuprofen (Advil, Motrin), naprosyn (Aleve), celebrex among others. Acetaminophen (Tylenol) is ok with maximum dose in 24 hours of 3000mg.  -Healthy lifestyle measures will keep your kidney's functioning at their current best. This includes regular exercise, maintaining a healthy body weight and smoking cessation.   -I will send a message to our nurses to make sure our transplant folks reach out to you.  -Please let me know if your leg swelling gets worse  -Please keep your appointments in January    Please return to Nephrology Clinic in 1 month to review your progress.     Who do I call with any questions after my visit?  There are multiple ways to contact your nephrology care team:    -To schedule or reschedule an appointment, please call [phone].  -Reach out via [employer]. These messages are answered by your nurse or doctor during business hours and typically in 1-2 days. [employer] messages are best for quick questions/clarifications/updates. Frequently, your doctor or nurse will recommend setting up a follow up appointment to address any significant questions/concerns.  -For urgent questions after business hours, you may reach the on-call Nephrology Fellow by contacting the [facility]  at [phone].    To schedule imaging:   -Please call [phone]     To schedule your lab appointment at the [facility]:  -Please call [phone]    Sincerely,    Dr. Sahil Waterman   of Medicine  Division of Nephrology and [facility] " [facility]

## 2022-12-19 ENCOUNTER — VIRTUAL VISIT (OUTPATIENT)
Dept: ENDOCRINOLOGY | Facility: CLINIC | Age: 52
End: 2022-12-19
Attending: FAMILY MEDICINE
Payer: COMMERCIAL

## 2022-12-19 ENCOUNTER — TELEPHONE (OUTPATIENT)
Dept: FAMILY MEDICINE | Facility: CLINIC | Age: 52
End: 2022-12-19

## 2022-12-19 ENCOUNTER — TELEPHONE (OUTPATIENT)
Dept: ENDOCRINOLOGY | Facility: CLINIC | Age: 52
End: 2022-12-19

## 2022-12-19 DIAGNOSIS — Z79.4 TYPE 2 DIABETES MELLITUS WITH RETINOPATHY, WITH LONG-TERM CURRENT USE OF INSULIN, MACULAR EDEMA PRESENCE UNSPECIFIED, UNSPECIFIED LATERALITY, UNSPECIFIED RETINOPATHY SEVERITY (H): ICD-10-CM

## 2022-12-19 DIAGNOSIS — Z78.9 LANGUAGE BARRIER TO COMMUNICATION: ICD-10-CM

## 2022-12-19 DIAGNOSIS — Z79.4 LONG TERM (CURRENT) USE OF INSULIN (H): ICD-10-CM

## 2022-12-19 DIAGNOSIS — I10 HYPERTENSION, UNSPECIFIED TYPE: ICD-10-CM

## 2022-12-19 DIAGNOSIS — Z79.4 TYPE 2 DIABETES MELLITUS WITH DIABETIC NEPHROPATHY, WITH LONG-TERM CURRENT USE OF INSULIN (H): ICD-10-CM

## 2022-12-19 DIAGNOSIS — E11.319 TYPE 2 DIABETES MELLITUS WITH RETINOPATHY, WITH LONG-TERM CURRENT USE OF INSULIN, MACULAR EDEMA PRESENCE UNSPECIFIED, UNSPECIFIED LATERALITY, UNSPECIFIED RETINOPATHY SEVERITY (H): ICD-10-CM

## 2022-12-19 DIAGNOSIS — Z79.4 TYPE 2 DIABETES MELLITUS WITH DIABETIC POLYNEUROPATHY, WITH LONG-TERM CURRENT USE OF INSULIN (H): ICD-10-CM

## 2022-12-19 DIAGNOSIS — Z79.4 TYPE 2 DIABETES MELLITUS WITH HYPERGLYCEMIA, WITH LONG-TERM CURRENT USE OF INSULIN (H): Primary | ICD-10-CM

## 2022-12-19 DIAGNOSIS — E11.21 TYPE 2 DIABETES MELLITUS WITH DIABETIC NEPHROPATHY, WITH LONG-TERM CURRENT USE OF INSULIN (H): ICD-10-CM

## 2022-12-19 DIAGNOSIS — E11.42 TYPE 2 DIABETES MELLITUS WITH DIABETIC POLYNEUROPATHY, WITH LONG-TERM CURRENT USE OF INSULIN (H): ICD-10-CM

## 2022-12-19 DIAGNOSIS — E78.2 MIXED HYPERLIPIDEMIA: ICD-10-CM

## 2022-12-19 DIAGNOSIS — K76.0 HEPATIC STEATOSIS: ICD-10-CM

## 2022-12-19 DIAGNOSIS — E55.9 VITAMIN D DEFICIENCY: ICD-10-CM

## 2022-12-19 DIAGNOSIS — E11.65 TYPE 2 DIABETES MELLITUS WITH HYPERGLYCEMIA, WITH LONG-TERM CURRENT USE OF INSULIN (H): Primary | ICD-10-CM

## 2022-12-19 PROCEDURE — 99204 OFFICE O/P NEW MOD 45 MIN: CPT | Mod: 25 | Performed by: INTERNAL MEDICINE

## 2022-12-19 PROCEDURE — 95251 CONT GLUC MNTR ANALYSIS I&R: CPT | Performed by: INTERNAL MEDICINE

## 2022-12-19 NOTE — TELEPHONE ENCOUNTER
Per Dr Villa;  I reviewed her freestyle nathan data and recommend:     -Reduce Glargine from 72 units once daily (done as 2 separate 36 unit injections) to 56 units daily (given as 2 separate 28 unit injections).     She should let us know between now and our upcoming appointment if glucose is consistently outside of the target range - 70 - 180 mg/dL.

## 2022-12-19 NOTE — TELEPHONE ENCOUNTER
Forms/Letter Request    Type of form/letter: REQUEST FOR MEDICAL OPINION    Have you been seen for this request: No    Do we have the form/letter: Yes: PLACED IN  BLUE FOLDER    When is form/letter needed by: BEFORE 1/15/2023    How would you like the form/letter returned:     Patient Notified form requests are processed in 3-5 business days:Yes    Okay to leave a detailed message?: Yes at Home number on file [phone] (home)

## 2022-12-19 NOTE — TELEPHONE ENCOUNTER
Call[facility]  [facility] this [facility] , was on HOLD for 28 minutes when a live person then explained there was not one available. I then made an appt for a [facility]  today at 12:30 pm and was told that there are now guarantees that one will be available then.

## 2022-12-19 NOTE — NURSING NOTE
12/19  10:30 am 128 (fasting)    12/18  8:30 pm 175    12/17  11 am 258 (after breakfast)  2:56 pm  344 (after lunch)  10:30 pm 217 (after dinner)    12/16  1041 am 245 (after Breakfast)  127 pm 292 (after lunch)

## 2022-12-19 NOTE — LETTER
"    12/19/2022         RE: Nithya Matthews  [address] [place] [address]        Dear Colleague,    Thank you for referring your patient, Nithya Matthews, to the [facility]. Please see a copy of my visit note below.    Phone visit    Start time 10:20, stop time 11:00; additional 20 minutes spent on the date of the encounter doing chart review, documentation and further activities as noted.     Provider [facility] [address] [place] [address] [address], [place] [address]    Patient location: patient home    Mode of transmission: phone    Subjective:    New patient, no prior Endocrine notes including Care Everywhere     Nithya Matthews is a 52 year old female who presents for DM.    Diagnosed with DM: diagnosed 20 years ago, she currently has a BMI of 21.6 kg/m2 and notes she was never heavier than her current weight throughout her life    History of DKA/HHS: has had severe hyperglycemia at times without acidosis     FH of DM: no    Glycemic control over the years:     3/23/2021 was the first ever HbA1c         Current DM therapy:  -Glargine 72 units once daily (done as 2 separate 36 unit injections)  -NovoLog 22-22-22, she injects 3-5 minutes before meals, denies missed doses  -Victoza 1.8 mg daily     Prior DM therapy:  -Metformin stopped 10/2022 because of her GFR     Current glycemic control: see Melissa's note for data, endorses glucose in the 60s at times throughout the day and night including last night at 9 - 10 PM her glucose was 67, she reports she has a CGM     Diet: 3 meals/day, she has fruit for snacks, she drinks juice     Physical activity: limited     Tobacco/alcohol use: no    Complications noted in the A/P section.     No personal/FH: pancreatitis, pancreatic cancer, thyroid cancer, MEN    Objective:    5'2\", ~110 #, phone visit    Assessment/Plan:    # DM  -12/2021 CT A/P: pancreas normal per radiology   # Moderate nonproliferative diabetic retinopathy, most recent eye exam " 10/2022   # CKD, hypertension, on lisinopril    -Follows with nephrology at [facility], most recent appointment 12/16/2022  -11/16/2022: GFR 16 (baseline)   -2/2022: urine microalbumin 3,659 mg/g Cr  # Peripheral neuropathy, she endorses prior foot ulcerations    # No prior ASCVD event  # Mixed hyperlipidemia, on ASA, on atorvastatin 80 mg daily, on fenofibrate 54 mg, on fish oil    -9/2022: , TG 1,627, HDL 25, LDL not calculated; LDL previously was 37 9/2022  -No known pancreatitis   # Hepatic steatosis    This appointment was done with a phone  and Nithya's daughter was also present.  However the  noted that Nithya speaks a certain dialect and so they had to communicate using another language which does confound communication.    Nithya has a freestyle nathan CGM.  However we have no way to remotely review the data at this time.  We will ask her to drop off her reader so that I can review her CGM data and adjust her insulin dosing as needed.  I did not make any adjustments today since I cannot see the data.    Given her BMI her insulin dose requirements are highly unusual and I wonder about her injection technique.    She has an upcoming appointment with Maya CONTRERAS and it would be ideal for her to have her injection technique observed. I did send Maya a message.    I would like to see her back in person and this is ordered.  I also added labs to her upcoming blood work.    # CKD-MBD    Has non obstructing nephrolithiasis on imaging. Denies ever passing a stone. No prior hypercalcemia. PTH checked once 10/2022 and mildly elevated. 7/2022: low 25-OH vitamin D at 12 (ref range 20 - 75 ug/L). No prior DEXA. Denies prior fractures.    Vitamin D2 50,000 international unit(s) weekly was rx end of 10/2022 and she notes taking this.       Again, thank you for allowing me to participate in the care of your patient.        Sincerely,        Davis Villa MD

## 2022-12-19 NOTE — PROGRESS NOTES
"Phone visit    Start time 10:20, stop time 11:00; additional 20 minutes spent on the date of the encounter doing chart review, documentation and further activities as noted. This did not include time spent on CGM review.     [facility], [address] [place] [address] [address] [address]    Patient location: patient home    Mode of transmission: phone    Subjective:    New patient, no prior Endocrine notes including Care Everywhere     Nithya Matthews is a 52 year old female who presents for DM.    Diagnosed with DM: diagnosed 20 years ago, she currently has a BMI of 21.6 kg/m2 and notes she was never heavier than her current weight throughout her life    History of DKA/HHS: has had severe hyperglycemia at times without acidosis     FH of DM: no    Glycemic control over the years:     3/23/2021 was the first ever HbA1c         Current DM therapy:  -Glargine 72 units once daily (done as 2 separate 36 unit injections)  -NovoLog 22-22-22, she injects 3-5 minutes before meals, denies missed doses  -Victoza 1.8 mg daily     Prior DM therapy:  -Metformin stopped 10/2022 because of her GFR     Current glycemic control: see Melissa's note for data, endorses glucose in the 60s at times throughout the day and night including last night at 9 - 10 PM her glucose was 67, she reports she has a CGM     Diet: 3 meals/day, she has fruit for snacks, she drinks juice     Physical activity: limited     Tobacco/alcohol use: no    Complications noted in the A/P section.     No personal/FH: pancreatitis, pancreatic cancer, thyroid cancer, MEN    Objective:    5'2\", ~110 #, phone visit    Assessment/Plan:    # DM  -12/2021 CT A/P: pancreas normal per radiology   # Moderate nonproliferative diabetic retinopathy, most recent eye exam 10/2022   # CKD, hypertension, on lisinopril    -Follows with nephrology at [facility], most recent appointment 12/16/2022  -11/16/2022: GFR 16 (baseline)   -2/2022: urine microalbumin 3,659 mg/g " Cr  # Peripheral neuropathy, she endorses prior foot ulcerations    # No prior ASCVD event  # Mixed hyperlipidemia, on ASA, on atorvastatin 80 mg daily, on fenofibrate 54 mg, on fish oil    -9/2022: , TG 1,627, HDL 25, LDL not calculated; LDL previously was 37 9/2022  -No known pancreatitis   # Hepatic steatosis    This appointment was done with a phone  and Nithya's daughter was also present.  However the  noted that Nithya speaks a certain dialect and so they had to communicate using another language which does confound communication.    Nithya has a freestyle nathan CGM.  However we have no way to remotely review the data at this time.  We will ask her to drop off her reader so that I can review her CGM data and adjust her insulin dosing as needed.  I did not make any adjustments today since I cannot see the data.    Given her BMI her insulin dose requirements are highly unusual and I wonder about her injection technique.    She has an upcoming appointment with Maya CONTRERAS and it would be ideal for her to have her injection technique observed. I did send Maya a message.    I would like to see her back in person and this is ordered.  I also added labs to her upcoming blood work.    ADDENDUM 12/19/2022:    An hour or so after our appointment we obtained her CGM data.               Based on my review I suspect adherence to taking insulin is a major issue. For example, when she takes her insulin, like on 12/18 and 12/19 the doses are much too high.    Based on this I recommend:    -Reduce Glargine from 72 units once daily (done as 2 separate 36 unit injections) to 56 units daily (given as 2 separate 28 unit injections).    She should let us know between now and our upcoming appointment if glucose is consistently outside of the target range - 70 - 180 mg/dL.    # CKD-MBD    Has non obstructing nephrolithiasis on imaging. Denies ever passing a stone. No prior hypercalcemia. PTH checked once 10/2022  and mildly elevated. 7/2022: low 25-OH vitamin D at 12 (ref range 20 - 75 ug/L). No prior DEXA. Denies prior fractures.    Vitamin D2 50,000 international unit(s) weekly was rx end of 10/2022 and she notes taking this.

## 2022-12-20 NOTE — TELEPHONE ENCOUNTER
Please remind patients that they will receive their forms in 3-5 business days, calling about them early will likely result in a delay next time.       Forms filled out in less than 24 hours from being received, placed in blue folders and given back to Billy.    Giancarlo Arizmendi MD

## 2022-12-21 NOTE — TELEPHONE ENCOUNTER
Document reviewed, completed, and signed by physician. Document copied to EHR scanning. Unable to fax as there is no contact information listed on form. Patient advised to  at the [facility].     Form returned to the [facility] .

## 2022-12-27 ENCOUNTER — TELEPHONE (OUTPATIENT)
Dept: NEPHROLOGY | Facility: CLINIC | Age: 52
End: 2022-12-27

## 2022-12-28 ENCOUNTER — LAB (OUTPATIENT)
Dept: LAB | Facility: CLINIC | Age: 52
End: 2022-12-28
Payer: COMMERCIAL

## 2022-12-28 ENCOUNTER — OFFICE VISIT (OUTPATIENT)
Dept: PHARMACY | Facility: CLINIC | Age: 52
End: 2022-12-28
Payer: COMMERCIAL

## 2022-12-28 VITALS
WEIGHT: 115 LBS | OXYGEN SATURATION: 95 % | HEART RATE: 92 BPM | DIASTOLIC BLOOD PRESSURE: 68 MMHG | SYSTOLIC BLOOD PRESSURE: 156 MMHG | BODY MASS INDEX: 21.03 KG/M2

## 2022-12-28 DIAGNOSIS — E11.69 TYPE 2 DIABETES MELLITUS WITH OTHER SPECIFIED COMPLICATION, UNSPECIFIED WHETHER LONG TERM INSULIN USE (H): ICD-10-CM

## 2022-12-28 DIAGNOSIS — N18.4 CKD (CHRONIC KIDNEY DISEASE) STAGE 4, GFR 15-29 ML/MIN (H): ICD-10-CM

## 2022-12-28 DIAGNOSIS — E11.65 TYPE 2 DIABETES MELLITUS WITH HYPERGLYCEMIA, WITH LONG-TERM CURRENT USE OF INSULIN (H): ICD-10-CM

## 2022-12-28 DIAGNOSIS — I10 HYPERTENSION GOAL BP (BLOOD PRESSURE) < 140/90: Primary | ICD-10-CM

## 2022-12-28 DIAGNOSIS — E55.9 VITAMIN D DEFICIENCY: ICD-10-CM

## 2022-12-28 DIAGNOSIS — Z79.4 TYPE 2 DIABETES MELLITUS WITH HYPERGLYCEMIA, WITH LONG-TERM CURRENT USE OF INSULIN (H): ICD-10-CM

## 2022-12-28 LAB
CHOLEST SERPL-MCNC: 189 MG/DL
CREAT UR-MCNC: 102 MG/DL
FASTING STATUS PATIENT QL REPORTED: ABNORMAL
GLUCOSE SERPL-MCNC: 234 MG/DL (ref 70–99)
HBA1C MFR BLD: 7.3 % (ref 0–5.6)
HDLC SERPL-MCNC: 34 MG/DL
LDLC SERPL CALC-MCNC: 82 MG/DL
MICROALBUMIN UR-MCNC: >4400 MG/L
MICROALBUMIN/CREAT UR: NORMAL MG/G{CREAT}
NONHDLC SERPL-MCNC: 155 MG/DL
TRIGL SERPL-MCNC: 365 MG/DL
TSH SERPL DL<=0.005 MIU/L-ACNC: 2.31 UIU/ML (ref 0.3–4.2)

## 2022-12-28 PROCEDURE — 36415 COLL VENOUS BLD VENIPUNCTURE: CPT

## 2022-12-28 PROCEDURE — 99607 MTMS BY PHARM ADDL 15 MIN: CPT | Performed by: PHARMACIST

## 2022-12-28 PROCEDURE — 82043 UR ALBUMIN QUANTITATIVE: CPT

## 2022-12-28 PROCEDURE — 82947 ASSAY GLUCOSE BLOOD QUANT: CPT

## 2022-12-28 PROCEDURE — 84443 ASSAY THYROID STIM HORMONE: CPT

## 2022-12-28 PROCEDURE — 80061 LIPID PANEL: CPT

## 2022-12-28 PROCEDURE — 82306 VITAMIN D 25 HYDROXY: CPT

## 2022-12-28 PROCEDURE — 84681 ASSAY OF C-PEPTIDE: CPT

## 2022-12-28 PROCEDURE — 99606 MTMS BY PHARM EST 15 MIN: CPT | Performed by: PHARMACIST

## 2022-12-28 PROCEDURE — 83036 HEMOGLOBIN GLYCOSYLATED A1C: CPT

## 2022-12-28 PROCEDURE — 82570 ASSAY OF URINE CREATININE: CPT

## 2022-12-28 NOTE — PATIENT INSTRUCTIONS
PLAN:                            1.  Decrease Novolog to 18 units three times daily 5-10 mins before eating  2.  Patient to continue current dose of Victoza and Lantus todayng

## 2022-12-28 NOTE — PROGRESS NOTES
Medication Therapy Management (MTM) Encounter    ASSESSMENT:                            Medication Adherence/Access: Recommended patient bring medications and glucometer with her to all follow-up visits.    1. Hypertension goal BP (blood pressure) < 140/90  BP elevated today, still not meeting goal of <140/90.  Hypertension monitored closely by nephrologist, upcoming visit in less than 1 month.  As noted per nephrology, likely appropriate to restart loop diuretic at that time, will not make medication adjustments today though.    2. Type 2 diabetes mellitus with other specified complication, unspecified whether long term insulin use (H)  A1c today showed drastic improvement, much closer to goal of <7%.  Unfortunately, patient notes that she has been experiencing hypoglycemic episodes recently, though unable to fully assess today without glucometer present.  Additionally, patient notes that her appetite has started to improve, therefore recommend continuing current Victoza and Lantus dose without change.  Although, with patient experiencing hypoglycemia throughout the day per patient report today, recommend dose decrease of bolus insulin to 18 units before meals.  Observed insulin administration with demonstration insulin pen, administration appropriate.    3. Vitamin D deficiency  Rechecking vitamin D level today as historically this was quite low.    PLAN:                            1.  Decreased dose: Novolog 18 units three times daily 5-10 mins before eating  2.  Patient to continue current dose of Victoza and Lantus today  3.  As discussed, recommend patient bring glucometer and medications to all visits for review  4.  Labs today: A1c and Vit D  - Update (12/29): Vitamin D remains low, therefore recommend increasing ergocalciferol from once weekly to twice weekly.    Future:  1.  If suppressed appetite persists, would recommend decreasing dose of Victoza  2.  Consider adding loop diuretic at next visit per  suggestion from nephrology    Follow-up: Return in about 13 days (around 1/10/2023) for PCP.   PRN MTM after visit with PCP, Luis Miguel 2/21, and DIANE 3/7  SUBJECTIVE/OBJECTIVE:                          Nithya Matthews is a 52 year old female coming in for a follow-up visit. Patient was accompanied by daughter. Patient seen with [facility] . Today's visit is a follow-up MTM visit from 9/14/22ID# [id number]     Reason for visit: MTM follow up    Allergies/ADRs: Reviewed in chart  Past Medical History: Reviewed in chart  Tobacco: She reports that she has never smoked. She has never used smokeless tobacco.  Alcohol: none    Medication Adherence/Access:   - Daughter organizes medication in pillbox - takes medications twice times daily.   - Reports no missed medication doses. No missed oral or injectable doses lately.   - Does not bring any medications or glucometer with her today.     Type 2 Diabetes:    Recent virtual visit with Endo on 12/19.   Lantus 56 units (given as 2 separate 28 unit injections twice daily) dose decreased per Endo recently.   Novolog 22 units 3 times a day before meals  Victoza 1.8 mg daily AM.  Decreased appetite recently.  - Has not had a good appetite lately for about 1 month but states that it has started to return today and she ate more this morning, blood sugar today has been 150.   - Administers the insulin on her own.  Denies any missed doses of insulin or GLP-1 recently.   - Demonstrates insulin administration with Lantus demo pen in clinic today (first dials 22 units, then corrects after it is pointed out)  Blood sugar monitoring: Patient using nathan CGM, although she left it at home today because she is charging the battery. Been using it to monitor blood sugar every day.   Symptoms of low blood sugar? Notes experiencing quite often lately, daily. Drinks juice and she feels better. Patient notes that she is having low blood sugar of 50-70 lately, almost every day and throughout the day, not only  in the morning.   Symptoms of high blood sugar? None  Eye exam: up to date  Foot exam: up to date  Diet/Exercise: Eats 3 meals per day.   Aspirin: Taking 81mg daily   Statin: Yes: atrovastatin   ACEi/ARB: Yes: lisinopril  Lab Results   Component Value Date    A1C 7.3 12/28/2022    A1C 11.0 09/21/2022    A1C 11.1 09/14/2022    A1C 10.4 06/02/2022      Latest Reference Range & Units 02/28/22 13:46   Microalbumin Urine mg/g Cr <=19.9 mg/g Cr 3,659.0 (H)   (H): Data is abnormally high    Hypertension:   lisinopril 40 mg daily PM  metoprolol succinate 200 mg daily AM  - Endorsing symptoms of feeling lightheaded lately and neck tension.   - Med Hx: hydrochlorothiazide d/c'ed by PCP on 6/2/22 due to hypotension, amlodipine dose decreased from 10 to 5 mg on 5/4/22 due to edema and stopped per pcp on 8/25 due to hypotension  - Nephrologist: Sahil Campbell -Next follow-up scheduled for 1/13/2023  BP Readings from Last 3 Encounters:   12/28/22 (!) 156/68   12/16/22 (!) (P) 188/88   11/16/22 (!) 140/76      Pulse Readings from Last 3 Encounters:   12/28/22 92   12/16/22 (P) 66   11/16/22 87     Wt Readings from Last 3 Encounters:   12/28/22 115 lb (52.2 kg)   12/16/22 118 lb (53.5 kg)   11/22/22 105 lb (47.6 kg)     Creatinine   Date Value Ref Range Status   11/16/2022 3.39 (H) 0.51 - 0.95 mg/dL Final     Potassium   Date Value Ref Range Status   11/16/2022 3.3 (L) 3.4 - 5.3 mmol/L Final   06/02/2022 3.0 (L) 3.5 - 5.0 mmol/L Final      Latest Reference Range & Units 02/28/22 13:46   Microalbumin Urine mg/g Cr <=19.9 mg/g Cr 3,659.0 (H)     Vitamin D deficiency:  ergocalciferol 50,000 units once weekly   Vitamin D Deficiency Screening Results:  Lab Results   Component Value Date    VITDT 12 (L) 07/27/2022    VITDT 17 (L) 03/17/2022     Today's Vitals: BP (!) 156/68   Pulse 92   Wt 115 lb (52.2 kg)   SpO2 95%   BMI 21.03 kg/m    ----------------      I spent 30 minutes with this patient today. All changes were made via  collaborative practice agreement with Giancarlo Arizmendi MD. A copy of the visit note was provided to the patient's provider(s).    A summary of these recommendations was given to the patient.    Shital Hernandez, PharmD, BCACP  Medication Therapy Management Pharmacist     Medication Therapy Recommendations  Type 2 diabetes mellitus with other specified complication, unspecified whether long term insulin use (H)    Current Medication: insulin aspart (NOVOLOG PEN) 100 UNIT/ML pen   Rationale: Dose too high - Dosage too high - Safety   Recommendation: Decrease Dose   Status: Accepted per CPA

## 2022-12-29 ENCOUNTER — TELEPHONE (OUTPATIENT)
Dept: PHARMACY | Facility: CLINIC | Age: 52
End: 2022-12-29

## 2022-12-29 LAB
C PEPTIDE SERPL-MCNC: 14.4 NG/ML (ref 0.9–6.9)
DEPRECATED CALCIDIOL+CALCIFEROL SERPL-MC: 17 UG/L (ref 20–75)

## 2022-12-29 RX ORDER — ERGOCALCIFEROL 1.25 MG/1
50000 CAPSULE, LIQUID FILLED ORAL
Qty: 8 CAPSULE | Refills: 4 | Status: SHIPPED | OUTPATIENT
Start: 2022-12-29 | End: 2023-06-27

## 2022-12-29 NOTE — TELEPHONE ENCOUNTER
Writer called pt with the help of a [facility]  regarding message below. Luis Enrique's message relayed to pt.    Pt verbalizes understanding, agrees with plan and has no further questions.    Closing encounter.    ZAYRA ArmendarizN, RN   [facility]    ----- Message from Shital Hernandez PharmD sent at 12/29/2022  2:25 PM CST -----  Vitamin D still low, therefore increasing patient's ergocalciferol from once weekly to twice weekly, please call patient to inform.     No

## 2023-01-04 ENCOUNTER — INFUSION THERAPY VISIT (OUTPATIENT)
Dept: INFUSION THERAPY | Facility: HOSPITAL | Age: 53
End: 2023-01-04
Attending: STUDENT IN AN ORGANIZED HEALTH CARE EDUCATION/TRAINING PROGRAM
Payer: COMMERCIAL

## 2023-01-04 ENCOUNTER — DOCUMENTATION ONLY (OUTPATIENT)
Dept: PHARMACY | Facility: CLINIC | Age: 53
End: 2023-01-04

## 2023-01-04 VITALS
HEART RATE: 89 BPM | OXYGEN SATURATION: 95 % | RESPIRATION RATE: 18 BRPM | DIASTOLIC BLOOD PRESSURE: 83 MMHG | TEMPERATURE: 98.5 F | SYSTOLIC BLOOD PRESSURE: 161 MMHG

## 2023-01-04 DIAGNOSIS — D63.1 ANEMIA OF CHRONIC RENAL FAILURE, STAGE 4 (SEVERE) (H): ICD-10-CM

## 2023-01-04 DIAGNOSIS — N18.4 ANEMIA OF CHRONIC RENAL FAILURE, STAGE 4 (SEVERE) (H): ICD-10-CM

## 2023-01-04 DIAGNOSIS — N18.4 CKD (CHRONIC KIDNEY DISEASE) STAGE 4, GFR 15-29 ML/MIN (H): Primary | ICD-10-CM

## 2023-01-04 LAB
HCT VFR BLD AUTO: 33 % (ref 35–47)
HGB BLD-MCNC: 10 G/DL (ref 11.7–15.7)

## 2023-01-04 PROCEDURE — 85018 HEMOGLOBIN: CPT | Performed by: STUDENT IN AN ORGANIZED HEALTH CARE EDUCATION/TRAINING PROGRAM

## 2023-01-04 PROCEDURE — 36415 COLL VENOUS BLD VENIPUNCTURE: CPT | Performed by: STUDENT IN AN ORGANIZED HEALTH CARE EDUCATION/TRAINING PROGRAM

## 2023-01-04 PROCEDURE — 85014 HEMATOCRIT: CPT | Performed by: STUDENT IN AN ORGANIZED HEALTH CARE EDUCATION/TRAINING PROGRAM

## 2023-01-04 RX ORDER — ALBUTEROL SULFATE 90 UG/1
1-2 AEROSOL, METERED RESPIRATORY (INHALATION)
Status: CANCELLED
Start: 2023-01-04

## 2023-01-04 RX ORDER — DIPHENHYDRAMINE HYDROCHLORIDE 50 MG/ML
50 INJECTION INTRAMUSCULAR; INTRAVENOUS
Status: CANCELLED
Start: 2023-01-04

## 2023-01-04 RX ORDER — MEPERIDINE HYDROCHLORIDE 25 MG/ML
25 INJECTION INTRAMUSCULAR; INTRAVENOUS; SUBCUTANEOUS EVERY 30 MIN PRN
Status: CANCELLED | OUTPATIENT
Start: 2023-01-04

## 2023-01-04 RX ORDER — ALBUTEROL SULFATE 0.83 MG/ML
2.5 SOLUTION RESPIRATORY (INHALATION)
Status: CANCELLED | OUTPATIENT
Start: 2023-01-04

## 2023-01-04 RX ORDER — METHYLPREDNISOLONE SODIUM SUCCINATE 125 MG/2ML
125 INJECTION, POWDER, LYOPHILIZED, FOR SOLUTION INTRAMUSCULAR; INTRAVENOUS
Status: CANCELLED
Start: 2023-01-04

## 2023-01-04 RX ORDER — EPINEPHRINE 1 MG/ML
0.3 INJECTION, SOLUTION INTRAMUSCULAR; SUBCUTANEOUS EVERY 5 MIN PRN
Status: CANCELLED | OUTPATIENT
Start: 2023-01-04

## 2023-01-04 NOTE — NURSING NOTE
Anemia Management Note  SUBJECTIVE/OBJECTIVE:  Referred by Dr. Sahil Waterman on 2022  Primary Diagnosis: Anemia in Chronic Kidney Disease (N18.4, D63.1)     Secondary Diagnosis:  Chronic Kidney Disease, Stage 4 (N18.4)   Hgb goal range:  9-10  Epo/Darbo: Aranesp 40mcg every 14 days for Hgb 10.0. In Clinic.   Iron regimen:  Ferrous Sulfate one tab every other day.   Labs : 2023  Recent VIK use, transfusion, IV iron: NA  RX/TX plans : 2023     [facility] : [phone]     No history of stroke, MI and blood clots or cancers.     Contact: OK to speak with Fritz Matthews (daughter) regarding Scheduling, Medical, and Billing Information per consent to communicate dated 3/17/2022.     Anemia Latest Ref Rng & Units 10/28/2022 10/28/2022 10/28/2022 10/29/2022 2022 2022 2023   Hemoglobin 11.7 - 15.7 g/dL 8.9(L) 7.4(L) 8.3(L) 7.3(L) 8.0(L) 7.4(L) 10.0(L)   Ferritin 11 - 328 ng/mL - - - - 583(H) 420(H) -     BP Readings from Last 3 Encounters:   23 (!) 161/83   22 (!) 156/68   22 (!) (P) 188/88     Wt Readings from Last 2 Encounters:   22 115 lb (52.2 kg)   22 118 lb (53.5 kg)           ASSESSMENT:  Hgb:Above goal - recommend hold dose  TSat: not at goal of >30% Ferritin: At goal (>100ng/mL)    PLAN:  Hold Aranesp and RTC for hgb then aranesp if needed in 2 week(s)    Orders needed to be renewed (for next follow-up date) in EPIC: None    Iron labs due:  Mid 2023    Plan discussed with:  No call, chart review      NEXT FOLLOW-UP DATE:  23    Geri Mortensen RN   An[facility] Services  [address] [place] [address]   [email or web address]   Office : [phone]  Fax: [phone]

## 2023-01-04 NOTE — PROGRESS NOTES
"Infusion Nursing Note:  Nithya Matthews presents today for labs and possible aranesp.    Patient seen by provider today: No   present during visit today: Yes, Language: Camilogeovanny .     Note: Patient arrives today via wheelchair accompanied by her .  Patient is alert and oriented appearing quite fatigued and diaphoretic. VSS with exception of elevated blood pressure which is common according to her previous readings.   Patient states she is tired and feels \"shaky\".  Patient extremeties are damp from perspiration.  Patient is diabetic, writer asked if patient has eaten or drank anything today and patient replied she hadn't.  Provided patient with cookies and apple juice. Pt states she did not check her blood sugar today as she needs more test strips.  Patient re-assessed after she ate her snacks and she states the shakiness went away and patient is no longer perspiring.  Advised patient to check her blood sugar when she gets home and to contact Dr Graff if she continues to have symptoms. Patient also advised to come back in two weeks to recheck her blood for possible aranesp.    Patient and her  verbalized understanding via .      Intravenous Access:  Lab draw site right AC, Needle type butterfly, Gauge 23.    Treatment Conditions:  Lab Results   Component Value Date    HGB 10.0 (L) 01/04/2023    WBC 5.2 11/16/2022    ANEUTAUTO 4.2 10/28/2022     11/16/2022      Results reviewed, labs did NOT meet treatment parameters: Hgb 10.0.    Post Infusion Assessment:  Not applicable.     Discharge Plan:   Discharge instructions reviewed with: Patient and Family.  Patient and/or family verbalized understanding of discharge instructions and all questions answered.  Copy of AVS reviewed with patient and/or family.  Patient will return im two weeks for next appointment.  Patient discharged in stable condition accompanied by: self and .  Departure Mode: Wheelchair.      BLAZE ACE " RN

## 2023-01-09 ENCOUNTER — PATIENT OUTREACH (OUTPATIENT)
Dept: CARE COORDINATION | Facility: CLINIC | Age: 53
End: 2023-01-09

## 2023-01-09 ENCOUNTER — PATIENT OUTREACH (OUTPATIENT)
Dept: NEPHROLOGY | Facility: CLINIC | Age: 53
End: 2023-01-09

## 2023-01-09 ENCOUNTER — ANCILLARY PROCEDURE (OUTPATIENT)
Dept: ULTRASOUND IMAGING | Facility: CLINIC | Age: 53
End: 2023-01-09
Attending: STUDENT IN AN ORGANIZED HEALTH CARE EDUCATION/TRAINING PROGRAM
Payer: COMMERCIAL

## 2023-01-09 ENCOUNTER — OFFICE VISIT (OUTPATIENT)
Dept: TRANSPLANT | Facility: CLINIC | Age: 53
End: 2023-01-09
Attending: SURGERY
Payer: COMMERCIAL

## 2023-01-09 VITALS
DIASTOLIC BLOOD PRESSURE: 66 MMHG | SYSTOLIC BLOOD PRESSURE: 142 MMHG | BODY MASS INDEX: 19.84 KG/M2 | HEART RATE: 85 BPM | WEIGHT: 108.5 LBS | OXYGEN SATURATION: 98 %

## 2023-01-09 DIAGNOSIS — Z99.2 ENCOUNTER REGARDING VASCULAR ACCESS FOR DIALYSIS FOR END-STAGE RENAL DISEASE (H): Primary | ICD-10-CM

## 2023-01-09 DIAGNOSIS — N18.4 STAGE 4 CHRONIC KIDNEY DISEASE (H): Primary | ICD-10-CM

## 2023-01-09 DIAGNOSIS — N18.6 ENCOUNTER REGARDING VASCULAR ACCESS FOR DIALYSIS FOR END-STAGE RENAL DISEASE (H): Primary | ICD-10-CM

## 2023-01-09 PROCEDURE — 99203 OFFICE O/P NEW LOW 30 MIN: CPT | Performed by: SURGERY

## 2023-01-09 PROCEDURE — 93970 EXTREMITY STUDY: CPT | Mod: GC | Performed by: RADIOLOGY

## 2023-01-09 PROCEDURE — G0463 HOSPITAL OUTPT CLINIC VISIT: HCPCS | Performed by: SURGERY

## 2023-01-09 NOTE — PROGRESS NOTES
Pt referred by Dr. Waterman for fistula consult with Dr. Langford.  Pt present with her daughter and [employer] phone  was used for the duration of the visit.    Neph Tracking Flowsheet Last Filled Values     CKD Education Status Complete    CKD Education Referral Date 10/21/22    CKD Education Completed Date 11/15/22    CKD Education Type Kidney Smart Modality Education; Kidney Smart CKD Basics    Preferred Modality Hemodialysis    Patient's Referral Dates Auto Populate Patient's Referral Dates    Anemia Services Referral 11/18/22    Journey Referral 10/19/22    Vein Mapping/US Order 11/17/22    Vein Mapping/US  01/09/23    Access Surgeon Referral Status  Referred  Fistula consult with Dr. Langford    Dialysis Access Referral 11/17/22  ok to schedule surgery post consult per Dr. Waterman    Access Surgical Consult 01/09/23  Plan for left vs right AV fistula with Dr. Langford (first choice left radiocephalic AVF).  Request sent to schedule.  Pt will need PAC prior.    Transplant Evaluation Referral 11/16/22    Transplant Status  Referred        Dialysis Access Assessment:    Diabetic: Yes     Hemoglobin A1C   Date Value Ref Range Status   12/28/2022 7.3 (H) 0.0 - 5.6 % Final     Comment:     Normal <5.7%   Prediabetes 5.7-6.4%    Diabetes 6.5% or higher     Note: Adopted from ADA consensus guidelines.   09/21/2022 11.0 (H) 0.0 - 5.6 % Final     Comment:     Normal <5.7%   Prediabetes 5.7-6.4%    Diabetes 6.5% or higher     Note: Adopted from ADA consensus guidelines.   09/14/2022 11.1 (H) 0.0 - 5.6 % Final     Comment:     Normal <5.7%   Prediabetes 5.7-6.4%    Diabetes 6.5% or higher     Note: Adopted from ADA consensus guidelines.     Has family/ friend(s) support system: Yes; please explain: Pt daughter, Gurdon, present and engaged in consult  Taking anticoagulants: Yes; please explain: ASA  Taking anti rejection medications: No    Hemodialysis:  Dominant hand: right  Previous AV fistula/ graft:   No  Currently on dialysis: No  Current dialysis access: None    Lab Results   Component Value Date    GFRESTIMATED 15 (L) 11/16/2022    GFRESTIMATED 17 (L) 10/29/2022    GFRESTIMATED 16 (L) 10/28/2022    GFRESTIMATED 17 (L) 10/28/2022    GFRESTIMATED 16 (L) 10/19/2022     Lab Results   Component Value Date    BUN 25.4 (H) 11/16/2022    BUN 18.7 10/29/2022    BUN 18.7 10/28/2022    BUN 18.5 10/28/2022    BUN 17.9 10/19/2022    CR 3.39 (H) 11/16/2022    CR 3.12 (H) 10/29/2022    CR 3.21 (H) 10/28/2022    CR 3.19 (H) 10/28/2022    CR 3.32 (H) 10/19/2022     Hemodialysis reviewed.  Educated on what a fistula is, risks and complications of the surgery and surgical timeline/plan.  Explained the fistula will need time to grow/ mature to be able to support hemodialysis.  Explained that Dr. Waterman will let pt know when it is time to start hemodialysis, but in the meantime, it is a good idea to move forward with surgery so the fistula has time to heal and mature in hopes it will be ready to use when the pt needs to initiate hemodialysis.  Pt and daughter verbalized understanding of what HD is, how it works, and why a fistula is needed for HD.  Pt and daughter verbalized understanding of the procedure for AV fistula creation and that someone would be calling to schedule surgery, PAC and a 2 week follow up appointment.  Explained what PAC is and that it is required within 30 days of surgery.  Pt and daughter verbalized understanding and are expecting calls to schedule.    Reviewed with pt that they will get a call the day prior to surgery with instructions for when to stop eating and what medications to take or not take, including insulin.  Instructed pt she will need to be NPO after midnight the night prior to surgery, and will need someone to drive her home afterward.  Pt and daughter verbalized understanding of instructions and denied any questions.    Writer gave Dialysis Access Care Coordinator business card with direct  number for writer and colleague, Rebeca Mckay.  Pt and daughter aware to reach out if they have any questions or concerns moving forward.    Plan:  Left vs right upper extremity AV fistula, under general anesthesia.  First choice, left radiocephalic with side branch ligation.  Pt will need PAC within 30 days of surgery.  Will send request to schedule per Dr. Waterman.  Pt daughter requests calls are made to her for scheduling surgery and corresponding appointments.    Communicated plan to nephrology team.    Will continue to follow.    BLANCHE BALDWIN RN on 1/9/2023 at 4:47 PM  Dialysis Access Care Coordinator  Phone: [phone]  Pool: P_Dialysis_Access_Nurse

## 2023-01-09 NOTE — PROGRESS NOTES
Clinic Care Coordination Contact  [facility]/Voicemail  Sarah  ID [id number]    Referral Source: PCP  Clinical Data: Care Coordinator Outreach  Outreach attempted x 1.  Left message on patient's voicemail with call back information and requested return call.    Plan: Care Coordinator will try to reach patient again in 10 business days.    Irene Samson  [facility] Care Coordination  [facility]    Phone: [phone]

## 2023-01-09 NOTE — LETTER
1/9/2023         RE: Nithya Matthews  [address] [place] [address]        Dear Colleague,    Thank you for referring your patient, Nithya Matthews, to the [facility]. Please see a copy of my visit note below.    Pt referred by Dr. Waterman for fistula consult with Dr. Langford.  Pt present with her daughter and Check-Cap phone  was used for the duration of the visit.          Neph Tracking Flowsheet Last Filled Values      CKD Education Status Complete     CKD Education Referral Date 10/21/22     CKD Education Completed Date 11/15/22     CKD Education Type Kidney Smart Modality Education; Kidney Smart CKD Basics     Preferred Modality Hemodialysis     Patient's Referral Dates Auto Populate Patient's Referral Dates     Anemia Services Referral 11/18/22     Journey Referral 10/19/22     Vein Mapping/US Order 11/17/22     Vein Mapping/US  01/09/23     Access Surgeon Referral Status  Referred  Fistula consult with Dr. Langford     Dialysis Access Referral 11/17/22  ok to schedule surgery post consult per Dr. Waterman     Access Surgical Consult 01/09/23  Plan for left vs right AV fistula with Dr. Langford (first choice left radiocephalic AVF).  Request sent to schedule.  Pt will need PAC prior.     Transplant Evaluation Referral 11/16/22     Transplant Status  Referred          Dialysis Access Assessment:     Diabetic: Yes                        Hemoglobin A1C   Date Value Ref Range Status   12/28/2022 7.3 (H) 0.0 - 5.6 % Final       Comment:       Normal <5.7%   Prediabetes 5.7-6.4%    Diabetes 6.5% or higher     Note: Adopted from ADA consensus guidelines.   09/21/2022 11.0 (H) 0.0 - 5.6 % Final       Comment:       Normal <5.7%   Prediabetes 5.7-6.4%    Diabetes 6.5% or higher     Note: Adopted from ADA consensus guidelines.   09/14/2022 11.1 (H) 0.0 - 5.6 % Final       Comment:       Normal <5.7%   Prediabetes 5.7-6.4%    Diabetes 6.5% or higher     Note: Adopted from ADA consensus  guidelines.      Has family/ friend(s) support system: Yes; please explain: Pt daughter, [place], present and engaged in consult  Taking anticoagulants: Yes; please explain: ASA  Taking anti rejection medications: No     Hemodialysis:  Dominant hand: right  Previous AV fistula/ graft:  No  Currently on dialysis: No  Current dialysis access: None           Lab Results   Component Value Date     GFRESTIMATED 15 (L) 11/16/2022     GFRESTIMATED 17 (L) 10/29/2022     GFRESTIMATED 16 (L) 10/28/2022     GFRESTIMATED 17 (L) 10/28/2022     GFRESTIMATED 16 (L) 10/19/2022            Lab Results   Component Value Date     BUN 25.4 (H) 11/16/2022     BUN 18.7 10/29/2022     BUN 18.7 10/28/2022     BUN 18.5 10/28/2022     BUN 17.9 10/19/2022     CR 3.39 (H) 11/16/2022     CR 3.12 (H) 10/29/2022     CR 3.21 (H) 10/28/2022     CR 3.19 (H) 10/28/2022     CR 3.32 (H) 10/19/2022      Hemodialysis reviewed.  Educated on what a fistula is, risks and complications of the surgery and surgical timeline/plan.  Explained the fistula will need time to grow/ mature to be able to support hemodialysis.  Explained that Dr. Waterman will let pt know when it is time to start hemodialysis, but in the meantime, it is a good idea to move forward with surgery so the fistula has time to heal and mature in hopes it will be ready to use when the pt needs to initiate hemodialysis.  Pt and daughter verbalized understanding of what HD is, how it works, and why a fistula is needed for HD.  Pt and daughter verbalized understanding of the procedure for AV fistula creation and that someone would be calling to schedule surgery, PAC and a 2 week follow up appointment.  Explained what PAC is and that it is required within 30 days of surgery.  Pt and daughter verbalized understanding and are expecting calls to schedule.     Reviewed with pt that they will get a call the day prior to surgery with instructions for when to stop eating and what medications to take or not  take, including insulin.  Instructed pt she will need to be NPO after midnight the night prior to surgery, and will need someone to drive her home afterward.  Pt and daughter verbalized understanding of instructions and denied any questions.     Writer gave Dialysis Access Care Coordinator business card with direct number for writer and colleague, Rebeca Mckay.  Pt and daughter aware to reach out if they have any questions or concerns moving forward.     Plan:  Left vs right upper extremity AV fistula, under general anesthesia.  First choice, left radiocephalic with side branch ligation.  Pt will need PAC within 30 days of surgery.  Will send request to schedule per Dr. Waterman.  Pt daughter requests calls are made to her for scheduling surgery and corresponding appointments.     Communicated plan to nephrology team.     Will continue to follow.     BLANCHE BALDWIN RN on 1/9/2023 at 4:47 PM  Dialysis Access Care Coordinator  Phone: [phone]    I have reviewed history, examined patient and discussed plan with the fellow/resident/IMER/care coordinator.  I concur with the findings in this note.    Vascular mapping reviewed. Plan as above    Risks of the surgical procedure including but not limited to the rare risk of mortality discussed in detail. Patient verbalized good understanding and had several pertinent questions which were answered satisfactorily.     Total time: 30 min  Counseling time: 20 min

## 2023-01-10 ENCOUNTER — OFFICE VISIT (OUTPATIENT)
Dept: FAMILY MEDICINE | Facility: CLINIC | Age: 53
End: 2023-01-10
Payer: COMMERCIAL

## 2023-01-10 VITALS
BODY MASS INDEX: 19.88 KG/M2 | DIASTOLIC BLOOD PRESSURE: 76 MMHG | WEIGHT: 108 LBS | OXYGEN SATURATION: 97 % | HEART RATE: 88 BPM | TEMPERATURE: 98 F | RESPIRATION RATE: 16 BRPM | HEIGHT: 62 IN | SYSTOLIC BLOOD PRESSURE: 136 MMHG

## 2023-01-10 DIAGNOSIS — R15.2 INCONTINENCE OF FECES WITH FECAL URGENCY: ICD-10-CM

## 2023-01-10 DIAGNOSIS — E11.621 DIABETIC ULCER OF OTHER PART OF RIGHT FOOT ASSOCIATED WITH TYPE 2 DIABETES MELLITUS, WITH FAT LAYER EXPOSED (H): ICD-10-CM

## 2023-01-10 DIAGNOSIS — G62.9 NEUROPATHY: ICD-10-CM

## 2023-01-10 DIAGNOSIS — N18.4 STAGE 4 CHRONIC KIDNEY DISEASE (H): Primary | ICD-10-CM

## 2023-01-10 DIAGNOSIS — D56.3 BETA THALASSEMIA MINOR: ICD-10-CM

## 2023-01-10 DIAGNOSIS — R07.81 RIB PAIN: ICD-10-CM

## 2023-01-10 DIAGNOSIS — L97.512 DIABETIC ULCER OF OTHER PART OF RIGHT FOOT ASSOCIATED WITH TYPE 2 DIABETES MELLITUS, WITH FAT LAYER EXPOSED (H): ICD-10-CM

## 2023-01-10 DIAGNOSIS — Z23 NEED FOR IMMUNIZATION AGAINST INFLUENZA: ICD-10-CM

## 2023-01-10 DIAGNOSIS — I10 PRIMARY HYPERTENSION: ICD-10-CM

## 2023-01-10 DIAGNOSIS — E11.69 TYPE 2 DIABETES MELLITUS WITH OTHER SPECIFIED COMPLICATION, UNSPECIFIED WHETHER LONG TERM INSULIN USE (H): ICD-10-CM

## 2023-01-10 DIAGNOSIS — R53.81 PHYSICAL DECONDITIONING: ICD-10-CM

## 2023-01-10 DIAGNOSIS — R15.9 INCONTINENCE OF FECES WITH FECAL URGENCY: ICD-10-CM

## 2023-01-10 PROCEDURE — 90682 RIV4 VACC RECOMBINANT DNA IM: CPT | Performed by: FAMILY MEDICINE

## 2023-01-10 PROCEDURE — 90471 IMMUNIZATION ADMIN: CPT | Performed by: FAMILY MEDICINE

## 2023-01-10 PROCEDURE — 99214 OFFICE O/P EST MOD 30 MIN: CPT | Mod: 25 | Performed by: FAMILY MEDICINE

## 2023-01-10 RX ORDER — FLASH GLUCOSE SENSOR
KIT MISCELLANEOUS
COMMUNITY
Start: 2023-01-08 | End: 2023-04-27

## 2023-01-10 RX ORDER — CARBOXYMETHYLCELLULOSE SODIUM, GLYCERIN, AND POLYSORBATE 80 5; 10; 5 MG/ML; MG/ML; MG/ML
SOLUTION/ DROPS OPHTHALMIC
COMMUNITY
Start: 2022-10-06 | End: 2024-02-13

## 2023-01-10 RX ORDER — ACETAMINOPHEN 500 MG
500-1000 TABLET ORAL EVERY 6 HOURS PRN
Qty: 100 TABLET | Refills: 3 | Status: SHIPPED | OUTPATIENT
Start: 2023-01-10 | End: 2024-01-22

## 2023-01-10 NOTE — Clinical Note
Hello!  I put in an order for this patient's incontinency supplies - can we help her with those?   She also had a lot of questions about rental assistance that I couldn't answer.   I filled out a form to get a handicap tag, sent that in and I don't think she needs any assistance with that.   Thanks!

## 2023-01-10 NOTE — PROGRESS NOTES
Assessment & Plan     Stage 4 chronic kidney disease (H)  Following with nephrology, plan is for fistula placement to give time to mature.     Primary hypertension  Controlled.     Beta thalassemia minor  Stable, no concerns.     Type 2 diabetes mellitus with other specified complication, unspecified whether long term insulin use (H)  Some symptomatic lows with meals - on meter it is consistently after bfast or lunch. Never fasting. Will decrease mealtime to 15 unit(s) from 18 unit(s).   - insulin aspart (NOVOLOG PEN) 100 UNIT/ML pen  Dispense: 45 mL; Refill: 3  - continue lantus 80 unit(s) daily, novolog 15 unit(s) tid ac, victoza.     Rib pain  - diclofenac (VOLTAREN) 1 % topical gel  Dispense: 350 g; Refill: 3  - acetaminophen (TYLENOL) 500 MG tablet  Dispense: 100 tablet; Refill: 3    Incontinence of feces with fecal urgency  - Incontinence Supplies Order for DME - ONLY FOR DME    Need for immunization against influenza  - INFLUENZA VACCINE 50-64 OR 18-64 W/EGG ALLERGY (FLUBLOK)    Physical deconditioning  Neuropathy  She feels really weak and would like to keep moving if possible but feels unsteady. With her kidney failure and other health issues, she has been pretty deconditioned. Will refer to PT.   - Physical Therapy Referral      Return in about 3 months (around 4/10/2023) for Medication Check.    Giancarlo Arizmendi MD  [facility] SHANKAR Barriga is a 53 year old accompanied by her daughter, presenting for the following health issues:  Diabetes, Hypertension, and Forms (Would like to fill out form for parking permit )      History of Present Illness       Diabetes:   She presents for follow up of diabetes.  She is checking home blood glucose three times daily. She checks blood glucose before and after meals.  Blood glucose is sometimes over 200 and sometimes under 70. She is aware of hypoglycemia symptoms including shakiness, dizziness, weakness, blurred vision and confusion. She is  "concerned about low blood sugar, several less than 70 in the past few weeks. She is having numbness in feet.         Hypertension: She presents for follow up of hypertension.  She does check blood pressure  regularly outside of the clinic. Outside blood pressures have been over 140/90. She does not follow a low salt diet.         Stage 4 CKD  Plan is for fistula.   Seen by nephrology yesterday.     Diabetes  Really well controlled. However, with stage 4 CKD, results of a1c not as reliable. Will check meter.   Insulin lantus, novolog, victoza    Review of Systems   Constitutional, HEENT, cardiovascular, pulmonary, gi and gu systems are negative, except as otherwise noted.      Objective    /76   Pulse 88   Temp 98  F (36.7  C) (Oral)   Resp 16   Ht 1.575 m (5' 2\")   Wt 49 kg (108 lb)   SpO2 97%   BMI 19.75 kg/m    Body mass index is 19.75 kg/m .  Physical Exam   GENERAL: healthy, alert and no distress  EYES: Eyes grossly normal to inspection, PERRL and conjunctivae and sclerae normal  HENT: ear canals and TM's normal, nose and mouth without ulcers or lesions  NECK: no adenopathy, no asymmetry, masses, or scars and thyroid normal to palpation  RESP: lungs clear to auscultation - no rales, rhonchi or wheezes  CV: regular rate and rhythm, normal S1 S2, no S3 or S4, no murmur, click or rub, no peripheral edema and peripheral pulses strong  ABDOMEN: soft, nontender, no hepatosplenomegaly, no masses and bowel sounds normal  MS: no gross musculoskeletal defects noted, no edema  SKIN: no suspicious lesions or rashes  NEURO: Normal strength and tone, mentation intact and speech normal  PSYCH: mentation appears normal, affect normal/bright    Infusion Therapy Visit on 01/04/2023   Component Date Value Ref Range Status     Hemoglobin 01/04/2023 10.0 (L)  11.7 - 15.7 g/dL Final     Hematocrit 01/04/2023 33.0 (L)  35.0 - 47.0 % Final     No results found for any visits on 01/10/23.                  "

## 2023-01-11 ENCOUNTER — PATIENT OUTREACH (OUTPATIENT)
Dept: CARE COORDINATION | Facility: CLINIC | Age: 53
End: 2023-01-11

## 2023-01-11 ENCOUNTER — PREP FOR PROCEDURE (OUTPATIENT)
Dept: TRANSPLANT | Facility: CLINIC | Age: 53
End: 2023-01-11

## 2023-01-11 DIAGNOSIS — N18.6 ESRD (END STAGE RENAL DISEASE) (H): Primary | ICD-10-CM

## 2023-01-11 PROBLEM — L97.512 DIABETIC ULCER OF OTHER PART OF RIGHT FOOT ASSOCIATED WITH TYPE 2 DIABETES MELLITUS, WITH FAT LAYER EXPOSED (H): Status: ACTIVE | Noted: 2023-01-11

## 2023-01-11 PROBLEM — E11.621 DIABETIC ULCER OF OTHER PART OF RIGHT FOOT ASSOCIATED WITH TYPE 2 DIABETES MELLITUS, WITH FAT LAYER EXPOSED (H): Status: ACTIVE | Noted: 2023-01-11

## 2023-01-11 NOTE — PROGRESS NOTES
Clinic Care Coordination Contact  Care Coordination Clinician Chart Review    Situation: Patient chart reviewed by care coordinator.       Background: Care Coordination initial assessment and enrollment to Care Coordination was 3-2-22  Patient centered goals were developed with participation from patient.  PARDEEP CC handed patient off to CHW for continued outreach every 30 days.        Assessment: Per chart review, patient outreach completed by CC CHW on 1-9-23.  [facility] and will outreach to pt again in 10 business days.  Pt needs new goal set up when CHW connects with pt or transitioned to Maintenance.        Patient is not due for updated Plan of Care    Annual assessment will be due 3-2-23       Plan/Recommendations: The patient will continue working with Care Coordination.  CHW will involve PARDEEP CC as needed or if patient is ready to move to maintenance.  SW CC will continue to monitor progress to goals (if new goal is established) and CHW outreaches every 6 weeks.   Plan of Care updated and mailed to patient: No    Alexys Saunders, SW/CC, for Cris Smith, SW/CC  [facility] Care Coordination  [phone]

## 2023-01-16 PROBLEM — E11.3493 SEVERE NONPROLIFERATIVE DIABETIC RETINOPATHY OF BOTH EYES WITHOUT MACULAR EDEMA ASSOCIATED WITH TYPE 2 DIABETES MELLITUS (H): Status: RESOLVED | Noted: 2022-02-15 | Resolved: 2023-01-16

## 2023-01-16 PROBLEM — I10 PRIMARY HYPERTENSION: Status: ACTIVE | Noted: 2021-12-06

## 2023-01-17 ENCOUNTER — PATIENT OUTREACH (OUTPATIENT)
Dept: CARE COORDINATION | Facility: CLINIC | Age: 53
End: 2023-01-17

## 2023-01-17 NOTE — PROGRESS NOTES
Clinic Care Coordination Contact  [facility]/Voicemail    Referral Source: PCP  Clinical Data: Care Coordinator Outreach  Outreach attempted x 2.  Per Language Line, after hold for 20 minutes, was told no FOCUS Trainr  available.    Chart Review: Per message from Dr. Arizmendi on 1/16/23: I put in an order for this patient's incontinency supplies - can we help her with those?   She also had a lot of questions about rental assistance that I couldn't answer.   I filled out a form to get a handicap tag, sent that in and I don't think she needs any assistance with that.   Thanks!     Plan: Care Coordinator will try to reach patient again in 1 week.    Irene Samson  Clinic Care Coordination  [facility]    Phone: [phone]

## 2023-01-18 ENCOUNTER — INFUSION THERAPY VISIT (OUTPATIENT)
Dept: INFUSION THERAPY | Facility: HOSPITAL | Age: 53
End: 2023-01-18
Attending: FAMILY MEDICINE
Payer: COMMERCIAL

## 2023-01-18 ENCOUNTER — DOCUMENTATION ONLY (OUTPATIENT)
Dept: PHARMACY | Facility: CLINIC | Age: 53
End: 2023-01-18

## 2023-01-18 VITALS
TEMPERATURE: 98.6 F | SYSTOLIC BLOOD PRESSURE: 141 MMHG | HEART RATE: 67 BPM | OXYGEN SATURATION: 95 % | DIASTOLIC BLOOD PRESSURE: 77 MMHG | RESPIRATION RATE: 18 BRPM

## 2023-01-18 DIAGNOSIS — D63.1 ANEMIA OF CHRONIC RENAL FAILURE, STAGE 4 (SEVERE) (H): ICD-10-CM

## 2023-01-18 DIAGNOSIS — N18.4 ANEMIA OF CHRONIC RENAL FAILURE, STAGE 4 (SEVERE) (H): ICD-10-CM

## 2023-01-18 DIAGNOSIS — N18.4 CKD (CHRONIC KIDNEY DISEASE) STAGE 4, GFR 15-29 ML/MIN (H): Primary | ICD-10-CM

## 2023-01-18 LAB
HCT VFR BLD AUTO: 29.5 % (ref 35–47)
HGB BLD-MCNC: 8.9 G/DL (ref 11.7–15.7)

## 2023-01-18 PROCEDURE — 85018 HEMOGLOBIN: CPT | Performed by: STUDENT IN AN ORGANIZED HEALTH CARE EDUCATION/TRAINING PROGRAM

## 2023-01-18 PROCEDURE — 96372 THER/PROPH/DIAG INJ SC/IM: CPT | Performed by: STUDENT IN AN ORGANIZED HEALTH CARE EDUCATION/TRAINING PROGRAM

## 2023-01-18 PROCEDURE — 36415 COLL VENOUS BLD VENIPUNCTURE: CPT | Performed by: STUDENT IN AN ORGANIZED HEALTH CARE EDUCATION/TRAINING PROGRAM

## 2023-01-18 PROCEDURE — 85014 HEMATOCRIT: CPT | Performed by: STUDENT IN AN ORGANIZED HEALTH CARE EDUCATION/TRAINING PROGRAM

## 2023-01-18 PROCEDURE — 250N000011 HC RX IP 250 OP 636: Mod: EC | Performed by: STUDENT IN AN ORGANIZED HEALTH CARE EDUCATION/TRAINING PROGRAM

## 2023-01-18 RX ORDER — EPINEPHRINE 1 MG/ML
0.3 INJECTION, SOLUTION INTRAMUSCULAR; SUBCUTANEOUS EVERY 5 MIN PRN
Status: CANCELLED | OUTPATIENT
Start: 2023-01-18

## 2023-01-18 RX ORDER — ALBUTEROL SULFATE 0.83 MG/ML
2.5 SOLUTION RESPIRATORY (INHALATION)
Status: CANCELLED | OUTPATIENT
Start: 2023-01-18

## 2023-01-18 RX ORDER — METHYLPREDNISOLONE SODIUM SUCCINATE 125 MG/2ML
125 INJECTION, POWDER, LYOPHILIZED, FOR SOLUTION INTRAMUSCULAR; INTRAVENOUS
Status: DISCONTINUED | OUTPATIENT
Start: 2023-01-18 | End: 2023-01-18 | Stop reason: HOSPADM

## 2023-01-18 RX ORDER — DIPHENHYDRAMINE HYDROCHLORIDE 50 MG/ML
50 INJECTION INTRAMUSCULAR; INTRAVENOUS
Status: DISCONTINUED | OUTPATIENT
Start: 2023-01-18 | End: 2023-01-18 | Stop reason: HOSPADM

## 2023-01-18 RX ORDER — METHYLPREDNISOLONE SODIUM SUCCINATE 125 MG/2ML
125 INJECTION, POWDER, LYOPHILIZED, FOR SOLUTION INTRAMUSCULAR; INTRAVENOUS
Status: CANCELLED
Start: 2023-01-18

## 2023-01-18 RX ORDER — EPINEPHRINE 1 MG/ML
0.3 INJECTION, SOLUTION INTRAMUSCULAR; SUBCUTANEOUS EVERY 5 MIN PRN
Status: DISCONTINUED | OUTPATIENT
Start: 2023-01-18 | End: 2023-01-18 | Stop reason: HOSPADM

## 2023-01-18 RX ORDER — ALBUTEROL SULFATE 0.83 MG/ML
2.5 SOLUTION RESPIRATORY (INHALATION)
Status: DISCONTINUED | OUTPATIENT
Start: 2023-01-18 | End: 2023-01-18 | Stop reason: HOSPADM

## 2023-01-18 RX ORDER — ALBUTEROL SULFATE 90 UG/1
1-2 AEROSOL, METERED RESPIRATORY (INHALATION)
Status: CANCELLED
Start: 2023-01-18

## 2023-01-18 RX ORDER — MEPERIDINE HYDROCHLORIDE 25 MG/ML
25 INJECTION INTRAMUSCULAR; INTRAVENOUS; SUBCUTANEOUS EVERY 30 MIN PRN
Status: DISCONTINUED | OUTPATIENT
Start: 2023-01-18 | End: 2023-01-18 | Stop reason: HOSPADM

## 2023-01-18 RX ORDER — ALBUTEROL SULFATE 90 UG/1
1-2 AEROSOL, METERED RESPIRATORY (INHALATION)
Status: DISCONTINUED | OUTPATIENT
Start: 2023-01-18 | End: 2023-01-18 | Stop reason: HOSPADM

## 2023-01-18 RX ORDER — MEPERIDINE HYDROCHLORIDE 25 MG/ML
25 INJECTION INTRAMUSCULAR; INTRAVENOUS; SUBCUTANEOUS EVERY 30 MIN PRN
Status: CANCELLED | OUTPATIENT
Start: 2023-01-18

## 2023-01-18 RX ORDER — DIPHENHYDRAMINE HYDROCHLORIDE 50 MG/ML
50 INJECTION INTRAMUSCULAR; INTRAVENOUS
Status: CANCELLED
Start: 2023-01-18

## 2023-01-18 RX ADMIN — DARBEPOETIN ALFA 40 MCG: 40 INJECTION, SOLUTION INTRAVENOUS; SUBCUTANEOUS at 13:03

## 2023-01-18 NOTE — PROGRESS NOTES
Anemia Management Note  SUBJECTIVE/OBJECTIVE:  Referred by Dr. Sahil Waterman on 2022  Primary Diagnosis: Anemia in Chronic Kidney Disease (N18.4, D63.1)     Secondary Diagnosis:  Chronic Kidney Disease, Stage 4 (N18.4)   Hgb goal range:  9-10  Epo/Darbo: Aranesp 40mcg every 14 days for Hgb 10.0. In Clinic.   Iron regimen:  Ferrous Sulfate one tab every other day.   Labs : 2023  Recent VIK use, transfusion, IV iron: NA  RX/TX plans : 2023     [facility] : [phone]     No history of stroke, MI and blood clots or cancers.     Contact: OK to speak with Fritz Matthews (daughter) regarding Scheduling, Medical, and Billing Information per consent to communicate dated 3/17/2022.     Anemia Latest Ref Rng & Units 10/28/2022 10/28/2022 10/29/2022 2022 2022 2023 2023   VIK Dose - - - - - - - 40mcg   Hemoglobin 11.7 - 15.7 g/dL 7.4(L) 8.3(L) 7.3(L) 8.0(L) 7.4(L) 10.0(L) 8.9(L)   Ferritin 11 - 328 ng/mL - - - 583(H) 420(H) - -     BP Readings from Last 3 Encounters:   23 (!) 141/77   01/10/23 136/76   23 (!) 142/66     Wt Readings from Last 2 Encounters:   01/10/23 108 lb (49 kg)   23 108 lb 8 oz (49.2 kg)           ASSESSMENT:  Hgb:Not at goal/Initiation of therapy  TSat: not at goal of >30% Ferritin: At goal (>100ng/mL)    PLAN:  Dose with aranesp and RTC for hgb then aranesp if needed in 2 week(s)    Orders needed to be renewed (for next follow-up date) in EPIC: None    Iron labs due:  Due    Plan discussed with:  No call, chart review      NEXT FOLLOW-UP DATE:  23    Geri Mortensen RN   Anemia Services  [address] [address]   [email or web address]   Office : [phone]  Fax: [phone]

## 2023-01-19 ENCOUNTER — HOSPITAL ENCOUNTER (OUTPATIENT)
Dept: PHYSICAL THERAPY | Facility: REHABILITATION | Age: 53
Discharge: HOME | End: 2023-01-19
Attending: FAMILY MEDICINE
Payer: COMMERCIAL

## 2023-01-19 DIAGNOSIS — G62.9 NEUROPATHY: ICD-10-CM

## 2023-01-19 DIAGNOSIS — M62.81 MUSCLE WEAKNESS (GENERALIZED): ICD-10-CM

## 2023-01-19 DIAGNOSIS — R53.81 PHYSICAL DECONDITIONING: ICD-10-CM

## 2023-01-19 DIAGNOSIS — Z74.09 IMPAIRED FUNCTIONAL MOBILITY, BALANCE, GAIT, AND ENDURANCE: Primary | ICD-10-CM

## 2023-01-19 PROCEDURE — 97162 PT EVAL MOD COMPLEX 30 MIN: CPT | Mod: GP

## 2023-01-19 NOTE — PROGRESS NOTES
[facility]                                                                                   OUTPATIENT PHYSICAL THERAPY FUNCTIONAL EVALUATION  PLAN OF TREATMENT FOR OUTPATIENT REHABILITATION  (COMPLETE FOR INITIAL CLAIMS ONLY)  Patient's Last Name, First Name, M.I.  YOB: 1970  Nithya Matthews        Provider's Name   [facility]   Medical Record No.  [id number]     Start of Care Date:  01/19/23   Onset Date:  01/10/22 (order date)   Type:     _X__PT   ____OT  ____SLP Medical Diagnosis:  Physical deconditioning, neuropathy     PT Diagnosis:  Impaired functional mobility, gait, and balance, impaired strength, low endurance Visits from SOC:  1                              __________________________________________________________________________________  Plan of Treatment/Functional Goals:  balance training, gait training, neuromuscular re-education, ROM, strengthening, stretching           GOALS  FGA  Barriga will improve score on FGA by 4 or more points in order to demonstrate meaningful improvement in balance reactions and decrease risk for falls.  04/18/23    Functional LE strength  Barriga will decrease amount of time taken to complete 5x STS by 3 or more seconds in order to demonstrate improved functional LE strength and balance.  04/18/23    Gait speed  Barriga will improve gait speed with or without AD by 0.2 m/s without overt instabilities or LOB in order to improve independence through home and community.  04/18/23       Therapy Frequency:  1 time/week (decreasing prn)   Predicted Duration of Therapy Intervention:  90 days    CAROLIN SABILLON PT                                    I CERTIFY THE NEED FOR THESE SERVICES FURNISHED UNDER        THIS PLAN OF TREATMENT AND WHILE UNDER MY CARE     (Physician co-signature of this document indicates review and certification of the therapy plan).                 Certification Date From:  01/19/23   Certification Date To:  04/18/23    Referring Provider:  Giancarlo Arizmendi MD    Initial Assessment  See Epic Evaluation- Start of Care Date: 01/19/23

## 2023-01-19 NOTE — PROGRESS NOTES
01/19/23 0600   Quick Adds   Quick Adds Certification   Type of Visit Initial OP PT Evaluation       Present Yes   Language Other  (Faroese via iPad- preferred language is Karenni but  was not available)   General Information   Start of Care Date 01/19/23   Referring Physician Giancarlo Arizmendi MD   Orders Evaluate and Treat as Indicated   Order Date 01/10/22   Medical Diagnosis Physical deconditioning, neuropathy   Onset of illness/injury or Date of Surgery 01/10/22  (order date)   Precautions/Limitations fall precautions   Surgical/Medical history reviewed Yes   Pertinent history of current problem (include personal factors and/or comorbidities that impact the POC) Nithya is a 53 year old female with a history of Stage 4 CKD, DMII, and hypertension who presents for evaluation. She arrives with her daughter. States she is here because she has been unhealthy. CKD started 1-2 years ago and has been feeling weak ever since then. States she cannot do anything herself. Feels like she might fall. Is shaky and states her BP has been very low (although through chart review therapist understands patient has hypertension- language barrier may be affecting this piece of information). States every day around 9-10am she gets shaky and weak d/t her diabetes, takes injections for this.   Patient role/Employment history Other/comments  (Does not work)   Home/Community Accessibility Comments Lives with family, daughter and , who are able to help out with daily activities. They live in a duplex, there is a stairway to go upstairs but she does not use that anymore and just stays downstairs. Can get to everything downstairs. Cannot go to the toilet by herself, needs help. States she doesn't feel she can clean herself up enough. Daughter will help with those things once she is home.   Current Assistive Devices Standard Cane;Front Wheeled Walker   Assistive Devices Comments Been using a cane for a year.  Also has a walker that she got from the hospital when she was admitted and uses it all the time. States right now she is only using the cane but if it's summer and weather is fine she uses the walker.   Patient/Family Goals Statement Improve strength, balance, and endurance   Fall Risk Screen   Fall screen completed by PT   Have you fallen 2 or more times in the past year? Yes   Have you fallen and had an injury in the past year? No   Timed Up and Go score (seconds) 23.2  (using SPC in LUE)   Is patient a fall risk? Yes   Fall screen comments 2 falls in past year. First fall was down the stairs, second fall was a week or two ago where she fell onto the ground.   Pain   Patient currently in pain Yes   Pain location Low back   Pain comments Chronic LBP, R>L; can't stand for longer periods of time   Vitals Signs   Blood Pressure 121/69   Vital Signs Comments Seated L arm following 5xSTS testing   Range of Motion (ROM)   ROM Comment Gross LE ROM screen found to be WFL B   Strength   Strength Comments Obvious global weakness noted throughout B LEs and core. All motions 3+/5 B, with hesitancy to resist against given pressure.   Gait   Gait Comments Ambulates using SPC in LUE at slow speed. Tends to stay close to wall for safety and security but does not reach arm out on wall.   Balance   Balance Comments Moderate sway with romberg EO requiring close CGA for 30 seconds, then moderate to significant sway with romberg EC for ~24 seconds before requiring min A to avoid LOB. 5xSTS testin seconds without use of UE.  (5xSTS indicates fall risk: norms for age = 7.7 seconds, and overall increased risk for falls with score >15 seconds.)   Balance Special Tests   Balance Special Tests Sit to stand reps;Romberg   Balance Special Tests Romberg   Seconds 30 Seconds   Comments mod sway   Planned Therapy Interventions   Planned Therapy Interventions balance training;gait training;neuromuscular  re-education;ROM;strengthening;stretching   Clinical Impression   Criteria for Skilled Therapeutic Interventions Met yes, treatment indicated   PT Diagnosis Impaired functional mobility, gait, and balance, impaired strength, low endurance   Influenced by the following impairments CKD stage 4, DMII, reduced mobility and balance d/t previous hospitalizations, impaired strength, impaired functional mobility   Functional limitations due to impairments Decreased safety and independence in the home and community, increased risk for falls   Clinical Presentation Evolving/Changing   Clinical Presentation Rationale Several medical factors affecting case, symptom report, clinical judgment   Clinical Decision Making (Complexity) Moderate complexity   Therapy Frequency 1 time/week  (decreasing prn)   Predicted Duration of Therapy Intervention (days/wks) 90 days   Risk & Benefits of therapy have been explained Yes   Patient, Family & other staff in agreement with plan of care Yes   Clinical Impression Comments Nithya is a 52 yo female who demonstrates impaired functional mobility, balance, strength, and endurance. She will benefit from skilled PT intervention targeting her impairments in order to improve safety and independence throughout home, and decrease risk for falls.   GOALS   PT Eval Goals 1;2;3   Goal 1   Goal Identifier FGA   Goal Description Barriga will improve score on FGA by 4 or more points in order to demonstrate meaningful improvement in balance reactions and decrease risk for falls.   Goal Progress to be tested at next session   Target Date 04/18/23   Goal 2   Goal Identifier Functional LE strength   Goal Description Barriga will decrease amount of time taken to complete 5x STS by 3 or more seconds in order to demonstrate improved functional LE strength and balance.   Goal Progress baseline: 29 seconds   Target Date 04/18/23   Goal 3   Goal Identifier Gait speed   Goal Description Barriga will improve gait speed with or without AD  by 0.2 m/s without overt instabilities or LOB in order to improve independence through home and community.   Goal Progress to be tested at next session   Target Date 04/18/23   Total Evaluation Time   PT Eval, Moderate Complexity Minutes (19504) 30   Therapy Certification   Certification date from 01/19/23   Certification date to 04/18/23   Medical Diagnosis Physical deconditioning, neuropathy   Certification I certify the need for these services furnished under this plan of treatment and while under my care.  (Physician co-signature of this document indicates review and certification of the therapy plan).       Thank you for referring Barriga to outpatient physical therapy. Please do not hesitate to contact me with any questions via email at [email or web address].     Rohini iH, PT, DPT  [employer]   [email or web address]

## 2023-01-23 NOTE — PROGRESS NOTES
Pt referred by Dr. Waterman for fistula consult with Dr. Langford.  Pt present with her daughter and [employer] phone  was used for the duration of the visit.          Neph Tracking Flowsheet Last Filled Values      CKD Education Status Complete     CKD Education Referral Date 10/21/22     CKD Education Completed Date 11/15/22     CKD Education Type Kidney Smart Modality Education; Kidney Smart CKD Basics     Preferred Modality Hemodialysis     Patient's Referral Dates Auto Populate Patient's Referral Dates     Anemia Services Referral 11/18/22     Journey Referral 10/19/22     Vein Mapping/US Order 11/17/22     Vein Mapping/US  01/09/23     Access Surgeon Referral Status  Referred  Fistula consult with Dr. Langford     Dialysis Access Referral 11/17/22  ok to schedule surgery post consult per Dr. Waterman     Access Surgical Consult 01/09/23  Plan for left vs right AV fistula with Dr. Langford (first choice left radiocephalic AVF).  Request sent to schedule.  Pt will need PAC prior.     Transplant Evaluation Referral 11/16/22     Transplant Status  Referred          Dialysis Access Assessment:     Diabetic: Yes                        Hemoglobin A1C   Date Value Ref Range Status   12/28/2022 7.3 (H) 0.0 - 5.6 % Final       Comment:       Normal <5.7%   Prediabetes 5.7-6.4%    Diabetes 6.5% or higher     Note: Adopted from ADA consensus guidelines.   09/21/2022 11.0 (H) 0.0 - 5.6 % Final       Comment:       Normal <5.7%   Prediabetes 5.7-6.4%    Diabetes 6.5% or higher     Note: Adopted from ADA consensus guidelines.   09/14/2022 11.1 (H) 0.0 - 5.6 % Final       Comment:       Normal <5.7%   Prediabetes 5.7-6.4%    Diabetes 6.5% or higher     Note: Adopted from ADA consensus guidelines.      Has family/ friend(s) support system: Yes; please explain: Pt daughter, [place], present and engaged in consult  Taking anticoagulants: Yes; please explain: ASA  Taking anti rejection medications:  No     Hemodialysis:  Dominant hand: right  Previous AV fistula/ graft:  No  Currently on dialysis: No  Current dialysis access: None           Lab Results   Component Value Date     GFRESTIMATED 15 (L) 11/16/2022     GFRESTIMATED 17 (L) 10/29/2022     GFRESTIMATED 16 (L) 10/28/2022     GFRESTIMATED 17 (L) 10/28/2022     GFRESTIMATED 16 (L) 10/19/2022            Lab Results   Component Value Date     BUN 25.4 (H) 11/16/2022     BUN 18.7 10/29/2022     BUN 18.7 10/28/2022     BUN 18.5 10/28/2022     BUN 17.9 10/19/2022     CR 3.39 (H) 11/16/2022     CR 3.12 (H) 10/29/2022     CR 3.21 (H) 10/28/2022     CR 3.19 (H) 10/28/2022     CR 3.32 (H) 10/19/2022      Hemodialysis reviewed.  Educated on what a fistula is, risks and complications of the surgery and surgical timeline/plan.  Explained the fistula will need time to grow/ mature to be able to support hemodialysis.  Explained that Dr. Waterman will let pt know when it is time to start hemodialysis, but in the meantime, it is a good idea to move forward with surgery so the fistula has time to heal and mature in hopes it will be ready to use when the pt needs to initiate hemodialysis.  Pt and daughter verbalized understanding of what HD is, how it works, and why a fistula is needed for HD.  Pt and daughter verbalized understanding of the procedure for AV fistula creation and that someone would be calling to schedule surgery, PAC and a 2 week follow up appointment.  Explained what PAC is and that it is required within 30 days of surgery.  Pt and daughter verbalized understanding and are expecting calls to schedule.     Reviewed with pt that they will get a call the day prior to surgery with instructions for when to stop eating and what medications to take or not take, including insulin.  Instructed pt she will need to be NPO after midnight the night prior to surgery, and will need someone to drive her home afterward.  Pt and daughter verbalized understanding of  instructions and denied any questions.     Writer gave Dialysis Access Care Coordinator business card with direct number for writer and colleague, Rebeca Mckay.  Pt and daughter aware to reach out if they have any questions or concerns moving forward.     Plan:  Left vs right upper extremity AV fistula, under general anesthesia.  First choice, left radiocephalic with side branch ligation.  Pt will need PAC within 30 days of surgery.  Will send request to schedule per Dr. Waterman.  Pt daughter requests calls are made to her for scheduling surgery and corresponding appointments.     Communicated plan to nephrology team.     Will continue to follow.     BLANCHE BALDWIN RN on 1/9/2023 at 4:47 PM  Dialysis Access Care Coordinator  Phone: [phone]    I have reviewed history, examined patient and discussed plan with the fellow/resident/IMER/care coordinator.  I concur with the findings in this note.    Vascular mapping reviewed. Plan as above    Risks of the surgical procedure including but not limited to the rare risk of mortality discussed in detail. Patient verbalized good understanding and had several pertinent questions which were answered satisfactorily.     Total time: 30 min  Counseling time: 20 min

## 2023-01-24 ENCOUNTER — OFFICE VISIT (OUTPATIENT)
Dept: PODIATRY | Facility: CLINIC | Age: 53
End: 2023-01-24
Payer: COMMERCIAL

## 2023-01-24 VITALS — BODY MASS INDEX: 19.75 KG/M2 | DIASTOLIC BLOOD PRESSURE: 72 MMHG | WEIGHT: 108 LBS | SYSTOLIC BLOOD PRESSURE: 150 MMHG

## 2023-01-24 DIAGNOSIS — L84 PRE-ULCERATIVE CALLUSES: ICD-10-CM

## 2023-01-24 DIAGNOSIS — E11.42 DIABETIC POLYNEUROPATHY ASSOCIATED WITH TYPE 2 DIABETES MELLITUS (H): Primary | ICD-10-CM

## 2023-01-24 DIAGNOSIS — L97.512 DIABETIC ULCER OF OTHER PART OF RIGHT FOOT ASSOCIATED WITH TYPE 2 DIABETES MELLITUS, WITH FAT LAYER EXPOSED (H): ICD-10-CM

## 2023-01-24 DIAGNOSIS — M20.12 HAV (HALLUX ABDUCTO VALGUS), LEFT: ICD-10-CM

## 2023-01-24 DIAGNOSIS — E11.621 DIABETIC ULCER OF OTHER PART OF RIGHT FOOT ASSOCIATED WITH TYPE 2 DIABETES MELLITUS, WITH FAT LAYER EXPOSED (H): ICD-10-CM

## 2023-01-24 DIAGNOSIS — M20.11 HAV (HALLUX ABDUCTO VALGUS), RIGHT: ICD-10-CM

## 2023-01-24 PROCEDURE — 99213 OFFICE O/P EST LOW 20 MIN: CPT | Mod: 25 | Performed by: PODIATRIST

## 2023-01-24 PROCEDURE — 11042 DBRDMT SUBQ TIS 1ST 20SQCM/<: CPT | Performed by: PODIATRIST

## 2023-01-24 RX ORDER — ACETAMINOPHEN 650 MG
TABLET, EXTENDED RELEASE ORAL DAILY
Qty: 30 ML | Refills: 0 | Status: SHIPPED | OUTPATIENT
Start: 2023-01-24 | End: 2023-12-20

## 2023-01-24 NOTE — PATIENT INSTRUCTIONS
Thank you for choosing [facility] Podiatry / Foot & Ankle Surgery!    DR HIRSCH'S CLINIC:  [facility]   [address]   [place] [address]      TRIAGE LINE: [phone]  APPOINTMENTS: [phone]  RADIOLOGY: [phone]  SET UP SURGERY: [phone]  PHYSICAL THERAPY: [phone]   FAX NUMBER: [phone]  BILLING QUESTIONS: [phone]       Follow up: 1 month      FOOT ULCER (WOUND) EDUCATION  Ulceration ofthe foot involves a break or hole in the skin. Skin is our best protection against infection. Skin is quite durable, however, the underlying tissues are fragile. For this reason, the wound is likely to deepen rapidly. Deep wounds usually get infected and require amputation. Prompt healing is therefore essential to avoid limb loss.     Foot ulcers do not heal without intervention. Walking on the foot and living your normal life is not typically compatible with healing the sore. Successful healing will require several months of significant alteration of your daily activities.   Ulcer complications frequently develop. This primarily includes infection of skin, which then spreads deep into your joints, bones and tendons. Spreading infection may travel up your leg and into other parts of your body. Deep infection is usually treated with amputation ofpart ofyour foot or your leg. Signs of infection include fever, chills, nausea, vomiting, erratic blood sugars, local redness, pus, strong odor and localized warmth. Signs of infection should be taken seriously. Prompt evaluation in the clinic or hospital emergency room is required.   Ulcer treatment requires debridement or surgical removal of devitalized tissue. Your doctor will trim away callused, moistened, unhealthy tissue from the wound surface and margin. This helps to clean the wound and allows proper inspection. Debridement also stimulates healing even though the wound originally appears larger. Expect some bleeding with  each debridement. You will be given instruction regarding wound bandaging. This often includes ointment and gauze. Avoid tape directly on the skin. Hand washing is essential since most infections will come from your fingertips. Ulcer care requires a no touch technique. Your fingers should not touch the margin or base of the wound.    HELPFUL HEALING TIPS:  1. Debridement: Getting rid of bad tissue makes way for good tissue to promote healing  2. Addressing Foot Deformities: Hammertoes and bunions can cause increased pressure  3. Pressure Reduction: If pressure remains to the wound, it won't heal  4. Good Pulses: If bloodflow is not getting to the foot, the ulcer will not heal  5. Good Nutrition: If you are not getting proper nutrition your body can't heal.Protein! At least 90g a day.  Supplements are a good way to help get this, such as Dennys, Glucerna, Ensure. Also taking 5000units of Vitamin D a day.   6. Infection Control: Keep the ulcer clean with wound cleanser. DO NOT SOAK IT!  7. Moisture Control: Keep edema down and make sure that drainage is getting pulled away from the ulcer    IMPORTANCE OF DEBRIDEMENT   Reduces bioburden to help control or reduce infection. Even if an ulcer is not  infected,  the bacterial bioburden causes increased local inflammation.   Allows more accurate visualization of the wound base and edges, which allows for more precise staging.   Removes necrotic/non-viable tissue, which impedes wound healing, causes protein loss and can be a nidus for infection.   Stimulates new circulation (angiogenesis) and allows adequate oxygen delivery to the wound.   Removes undermining and tunneling, and may help reduce abscess formation.   Releases healing growth factors at the edge of the wound.   Prepares the wound bed by leaving only tissues that are capable of regenerating.

## 2023-01-24 NOTE — PROGRESS NOTES
Podiatry / Foot and Ankle Surgery Progress Note    January 24, 2023    Subject: Patient was seen for recurrent painful calluses to both feet.  Patient is here with her daughter who is interpreting for her.  Pain to the right foot is 5 out of 10.  Denies fever, nausea, vomiting.  Has not noted any drainage from the feet.    Objective:  Vitals: BP (!) 150/72   Wt 49 kg (108 lb)   BMI 19.75 kg/m    BMI= Body mass index is 19.75 kg/m .    A1C: 14.4 (2/2022)     General appearance: Patient is alert and fully cooperative with history & exam.  No sign of distress is noted during the visit.     Dermatologic: Preulcerative hyperkeratotic lesions to the plantar aspects of the first and fifth metatarsal heads bilaterally.  Upon debridement of the right first metatarsal head callus there is an ulceration noted.  This measures approximately 1 cm x 0.3 cm x 0.3 cm in depth.  No redness, purulent drainage or malodor noted.  Maceration around the wound edges.     Vascular: DP & PT pulses are intact & regular bilaterally.  No significant edema or varicosities noted.  CFT and skin temperature is normal to both lower extremities.     Neurologic: Lower extremity sensation is diminished to feet.     Musculoskeletal: Patient is ambulatory without assistive device or brace.  Prominent first metatarsal heads medially both feet.        ASSESSMENT:     Diabetic polyneuropathy associated with type 2 diabetes mellitus (H)  Diabetic ulcer of other part of right foot associated with type 2 diabetes mellitus, with fat layer exposed (H)  Hav (hallux abducto valgus), right  Hav (hallux abducto valgus), left  Pre-ulcerative calluses     Medical Decision Making/Plan:  Reviewed patient's chart in epic.    Discussed causes of keratomas.  They are due to areas of increase friction.  Hammertoes can create these as they put more pressure to the metatarsal head.  Discussed treatments such as using foot file, pumice stone, metatarsal pads, orthotics, and  not walking barefoot.      We discussed the cost structure of callus care if they were to come back and have it treated in the clinic if insurance does not cover it and explained that they would be billed. They were also provided information on places to get the callus treatment.        Recommend using a pumice stone 3-4 times a week to try to keep the callus from building up.    Recommend continue using urea cream.  Continue to wearing her diabetic shoes and inserts at all times.  At this time we will have them apply iodine and a bandage to the ulceration daily.  Currently there are no signs of infection so would not recommend an antibiotic at this time.  Do recommend following up with her primary care doctor to get her sugars checked out. All questions were answered to patient and patient's daughter satisfaction and they will call for the questions or concerns.     Procedure: After verbal consent, excisional debridement was performed on ulcer.  #15 blade was used to debride ulcer down to and including subcutaneous tissue. Bleeding controlled with light pressure.   No drainage noted.  No anesthesia was used due to neuropathy. Dry dressing applied to foot.  Patient tolerated procedure well.       Grecai Hearn DPM, Podiatry/Foot and Ankle Surgery

## 2023-01-24 NOTE — LETTER
1/24/2023         RE: Nithya Matthews  [address] [place] [address]        Dear Colleague,    Thank you for referring your patient, Nithya Matthews, to the [facility] PODI[facility]. Please see a copy of my visit note below.    Podiatry / Foot and Ankle Surgery Progress Note    January 24, 2023    Subject: Patient was seen for recurrent painful calluses to both feet.  Patient is here with her daughter who is interpreting for her.  Pain to the right foot is 5 out of 10.  Denies fever, nausea, vomiting.  Has not noted any drainage from the feet.    Objective:  Vitals: BP (!) 150/72   Wt 49 kg (108 lb)   BMI 19.75 kg/m    BMI= Body mass index is 19.75 kg/m .    A1C: 14.4 (2/2022)     General appearance: Patient is alert and fully cooperative with history & exam.  No sign of distress is noted during the visit.     Dermatologic: Preulcerative hyperkeratotic lesions to the plantar aspects of the first and fifth metatarsal heads bilaterally.  Upon debridement of the right first metatarsal head callus there is an ulceration noted.  This measures approximately 1 cm x 0.3 cm x 0.3 cm in depth.  No redness, purulent drainage or malodor noted.  Maceration around the wound edges.     Vascular: DP & PT pulses are intact & regular bilaterally.  No significant edema or varicosities noted.  CFT and skin temperature is normal to both lower extremities.     Neurologic: Lower extremity sensation is diminished to feet.     Musculoskeletal: Patient is ambulatory without assistive device or brace.  Prominent first metatarsal heads medially both feet.        ASSESSMENT:     Diabetic polyneuropathy associated with type 2 diabetes mellitus (H)  Diabetic ulcer of other part of right foot associated with type 2 diabetes mellitus, with fat layer exposed (H)  Hav (hallux abducto valgus), right  Hav (hallux abducto valgus), left  Pre-ulcerative calluses     Medical Decision Making/Plan:  Reviewed patient's chart in epic.    Discussed  causes of keratomas.  They are due to areas of increase friction.  Hammertoes can create these as they put more pressure to the metatarsal head.  Discussed treatments such as using foot file, pumice stone, metatarsal pads, orthotics, and not walking barefoot.      We discussed the cost structure of callus care if they were to come back and have it treated in the clinic if insurance does not cover it and explained that they would be billed. They were also provided information on places to get the callus treatment.        Recommend using a pumice stone 3-4 times a week to try to keep the callus from building up.    Recommend continue using urea cream.  Continue to wearing her diabetic shoes and inserts at all times.  At this time we will have them apply iodine and a bandage to the ulceration daily.  Currently there are no signs of infection so would not recommend an antibiotic at this time.  Do recommend following up with her primary care doctor to get her sugars checked out. All questions were answered to patient and patient's daughter satisfaction and they will call for the questions or concerns.     Procedure: After verbal consent, excisional debridement was performed on ulcer.  #15 blade was used to debride ulcer down to and including subcutaneous tissue. Bleeding controlled with light pressure.   No drainage noted.  No anesthesia was used due to neuropathy. Dry dressing applied to foot.  Patient tolerated procedure well.       Grecia Hearn DPM, Podiatry/Foot and Ankle Surgery        Again, thank you for allowing me to participate in the care of your patient.        Sincerely,        Grecia Hearn DPM, Podiatry/Foot and Ankle Surgery

## 2023-01-25 ENCOUNTER — PATIENT OUTREACH (OUTPATIENT)
Dept: CARE COORDINATION | Facility: CLINIC | Age: 53
End: 2023-01-25
Payer: COMMERCIAL

## 2023-01-25 NOTE — PROGRESS NOTES
Clinic Care Coordination Contact    Community Health Worker Follow Up  [facility]      Care Gaps:     Health Maintenance Due   Topic Date Due     ZOSTER IMMUNIZATION (1 of 2) Never done     COVID-19 Vaccine (3 - Mixed Product risk series) 06/30/2022     BMP  02/16/2023     Postponed to next outreach.     Care Plan:   Care Plan: DME     Problem: I want Pull-Ups in the next 60 days so that I may better manage my incontinence symptoms.     Goal: Incontinent supplies     Start Date: 1/25/2023 Expected End Date: 3/25/2023    Note:     Goal Statement: I want Pull-Ups in the next 60 days so that I may better manage my incontinence symptoms.     Barriers: Language  Strengths: Accepting of support  Patient expressed understanding of goal: Yes    Action steps to achieve this goal:  1. I will answer my phone when [employer] [phone]  contacts me to deliver or  my pull-ups.  3. I will follow up with CCC in the next month regarding this goal for additional coordination support.    Note: Order for pull-ups was sent to DME Provider 1/10/23.                     Intervention and Education during outreach:     Chart Review: Per message from Dr. Arizmendi on 1/16/23: I put in an order for this patient's incontinency supplies - can we help her with those?   She also had a lot of questions about rental assistance that I couldn't answer.   I filled out a form to get a handicap tag, sent that in and I don't think she needs any assistance with that.   Thanks!       CHW informed patient of above and assisted with calling Sandy at [employer] [phone]. Sandy states, patients last order was June, she will have briefs shipped out today, it can take 3-5 business days. Sandy confirmed that family will have to call for orders monthly as they do not have an automatic refills. Patient expressed understanding. CHW encourage reaching out to [facility] team for  support as needed.       CHW inquired about rental assistance and offered to place FRW referral. Patient will have daughter Fritz Matthews call back to complete referral questionnaires. CHW will create new goal as needed once daughter calls back.     CHW Plan: CHW to follow up in 1 month    [facility] Care Coordination  [facility]    Phone: [phone]

## 2023-01-25 NOTE — PROGRESS NOTES
Nephrology Clinic Visit  Nithya Matthews MRN: [id number] YOB: 1970  Primary Care Provider: Giancarlo Arizmendi  History Obtained From: Patient  External Document Review: Primary Care Provider  -------------------------------------------------------------------------------------------------------------------------------  Visit 1/27/2023:  -BP Control: Bp looks great in office. 123/70.  -Transplant Planning: Adjusted the primary phoen number to her Daughter's number at their request as this is usually the number they are able to answer.   -RRT Planning: AVF placement planned 2/14/2023.   -UOP/LE Swelling: Swelling is much better.   -Uremic Symptoms: No uremic symptoms today  -Having some discomfort in her feet. Had a procedure? Reports her feet feels somewhat better now.     Visit 12/16/2022  -BP control: Discussed that we really need home BP readings. She is usually in the 130-140's and even had issues not that long ago with severe hypotension (prompting holding of hydrochlorothiazide). Elevated today in clinic. Some LE edema. Likely will need a diuretic on board again soon. Send me home readings if able otherwise low threshold to add on Lasix if LE worsens or if Systolic BP > 140 at her next appointment.  -Transplant planning: Yes, she has been contacted by Transplant. She is not sure if she has a number to contact them. I see one message saved to the chart from transplant outreach. Will have them contact her again.   -RRT planning: Looks like she has access appointment scheduled for 1/9/2023. She has vein mapping scheduled for this same day at 1PM. We have discussed this today to make sure she is aware of this appointment as we are certainly approaching the point where I need her to have this fistula in place and maturing so we can safely start dialysis when needed.   -UOP:   -Lower extremity swelling: Yes, b/l 1+  -Uremic Symptoms: She denies significant uremic symptoms today. She is eating well, no nausea, no  metallic taste in mouth  -Anemia clinic: getting set up for EPO injections. Looks like she is currently set up for 1/4/2023 to get an EPO injection.    Visit 11/16/22  -Here for follow up visit with [facility]  -BP control: Okay at this point.   -Understanding of renal impairment: We again discussed her severe renal impairment and how biopsy findings do not suggest any reversible cause. We need to make preparations for RRT.  -Plan for RRT: The patient had RRT education yesterday. Leaning towards in center hemodialysis.  -Plan for Transplant: Says her son is willing to potentially be a living donor. He lives out in [place]. Needs to be referred for transplant eval.    Visit 10/19/22:  -Here to establish care with Trena Buitragoro  -here with daughter today  -DM Control: Poorly controlled. Long term issue. Follows with PCP and referred to Endo  -HTN Control: Had some issues with hypotension recently so hydrochlorothiazide discontinued and BP improved. Continues on Lisinopril. BP in the 140's at todays appointments. She doesn't check her BP at home.   -Hx of Kidney Stones: She denies a history of any symptomatic kidney stones. She denies gross hematuria.  -Nocturia: 1-2x per night  -NSAIDs: Doesn't sound that way   -Herbal/OTC Medications: Denies  -RRT Planning/GOC:   --Understanding of kidney dysfunction: knows that her kidneys have been weak for a long time but not clear on how bad her function is. She is very reserved, asks few questions, and has a difficult time summarizing what we talked about today. This is not surprising as she is clearly nervous/scared about kidney failure/dialysis.   --We discussed that she is right at the border of stage 5 kidney disease and that we need to start planning for RRT.  --We had a long discussion about RRT and what kidney failure is using interpretor.  -Uremic symptoms:   --Appetite: She thinks her appetite is good.  --Metallic taste: No  --Nausea: She is often having nausea. She  thinks lime juice helps.   --Day/Night Reversal: Sleep is okay  --Swelling in legs: She notices a bit of leg swelling.  -She has been eating and drinking well leading up to today's appointment.    Objective:  PAST MEDICAL HISTORY:  Past Medical History:   Diagnosis Date     Arthritis      Diabetes (H)      Hypertension       DM  HTN    PAST SURGICAL HISTORY:  Past Surgical History:   Procedure Laterality Date     ABDOMEN SURGERY       APPENDECTOMY       BIOPSY       CATARACT IOL, RT/LT         MEDICATIONS:  Current Outpatient Medications   Medication Instructions     acetaminophen (TYLENOL) 500-1,000 mg, Oral, EVERY 6 HOURS PRN     alcohol swab prep pads Use to swab area of injection/amadeo as directed.     ammonium lactate (AMLACTIN) 12 % external cream Topical, DAILY     aspirin 81 mg, Oral, DAILY     atorvastatin (LIPITOR) 80 mg, Oral, DAILY     blood glucose (NO BRAND SPECIFIED) test strip Use to test blood sugar 3 times daily or as directed. To accompany: Blood Glucose Monitor Brands: per insurance.     blood glucose calibration (NO BRAND SPECIFIED) solution To accompany: Blood Glucose Monitor Brands: per insurance.     blood glucose monitoring (NO BRAND SPECIFIED) meter device kit Use to test blood sugar 3 times daily or as directed. Preferred blood glucose meter OR supplies to accompany: Blood Glucose Monitor Brands: per insurance.     carboxymethylcellulose-glycerin (REFRESH OPTIVE) 0.5-0.9 % ophthalmic solution 1 drop, Both Eyes, 3 TIMES DAILY PRN     Continuous Blood Gluc Sensor (FREESTYLE MICKIE 14 DAY SENSOR) [facility] 1 Device, Does not apply, EVERY 14 DAYS, Change sensor as directed every 14 days     cyanocobalamin (VITAMIN B-12) 1,000 mcg, Oral, DAILY     escitalopram (LEXAPRO) 10 mg, Oral, DAILY     fenofibrate (LOFIBRA) 54 mg, Oral, DAILY     ferrous sulfate (FEROSUL) 325 mg, Oral, EVERY OTHER DAY     fish oil-omega-3 fatty acids 1 g, Oral, DAILY     gabapentin (NEURONTIN) 300 mg, Oral, 2 TIMES DAILY      insulin aspart (NOVOLOG PEN) 18 Units, Subcutaneous, 3 TIMES DAILY WITH MEALS     insulin glargine (LANTUS PEN) 80 Units, Subcutaneous, EVERY MORNING, 40 unit(s) on each side     insulin pen needle (32G X 4 MM) 32G X 4 MM miscellaneous Use 6x pen needles daily or as directed (victoza, 3 for novolog, 2 for lantus)     liraglutide (VICTOZA) 1.8 mg, Subcutaneous, DAILY     lisinopril (ZESTRIL) 40 mg, Oral, DAILY     melatonin 3 mg, Oral, AT BEDTIME PRN     menthol, Topical Analgesic, 2.5% (BENGAY VANISHING SCENT) 2.5 % GEL topical gel Topical, 3 TIMES DAILY (Diuretics and Nitrates), Right shoulder pain      metoprolol succinate ER (TOPROL XL) 200 mg, Oral, DAILY     mirtazapine (REMERON) 7.5 mg, Oral, AT BEDTIME     omeprazole 20 mg, Oral, DAILY     polyethylene glycol (MIRALAX) 17 g, Oral, DAILY     silver sulfADIAZINE (SILVADENE) 1 % external cream Topical, 2 TIMES DAILY     thin (NO BRAND SPECIFIED) lancets Use with lanceting device. To accompany: Blood Glucose Monitor Brands: per insurance.     urea (GORMEL) 20 % external cream Topical, DAILY, Apply to feet daily     vitamin D2 (ERGOCALCIFEROL) 50,000 Units, Oral, WEEKLY       FAMILY MEDICAL HISTORY:   Family History   Problem Relation Age of Onset     Glaucoma No family hx of      Macular Degeneration No family hx of      Breast Cancer No family hx of      Ovarian Cancer No family hx of        PHYSICAL EXAM:   /70 (BP Location: Right arm, Patient Position: Sitting, Cuff Size: Adult Regular)   Pulse 88   Wt 48.5 kg (107 lb)   SpO2 96%   BMI 19.57 kg/m    GENERAL APPEARANCE: appears older than stated age, uses a cane when ambulating  EYES: nonicteric  HENT: mouth without ulcers or lesions  RESP: lungs clear to auscultation   CV: regular rhythm, normal rate, no rub  ABDOMEN: soft, nontender, normal bowel sounds, no HSM   Extremities: No LE edema   MS: no evidence of inflammation in joints, no muscle tenderness  SKIN: no rash  NEURO: mentation intact and  speech normal  PSYCH: affect normal    LABS REVIEWED BY ME:   ANEMIA  Recent Labs   Lab Test 01/18/23  1139 01/04/23  1130 12/16/22  1240 11/16/22  1253 10/28/22  0842 10/19/22  0625   HGB 8.9* 10.0* 7.4* 8.0*   < > 10.1*   IRONSAT  --   --  26 24  --  27   ISH  --   --  420* 583*  --  587*    < > = values in this interval not displayed.       BMP  Recent Labs   Lab Test 11/16/22  1253 10/29/22  0727 10/28/22  2102 10/28/22  0842 10/19/22  0625 06/02/22  1300 02/28/22  1334 12/09/21  0725 12/08/21  0607 12/06/21  2347 12/06/21  2118    143 141 146* 143   < > 136   < > 139   < > 133*   POTASSIUM 3.3* 3.4 3.3* 2.8* 3.0*   < > 3.5   < > 3.5  3.5   < > 3.5   CHLORIDE 105 110* 109* 111* 105   < > 98   < > 104   < > 92*   CO2 23 24 24 23 28   < > 25   < > 25   < > 25   ANIONGAP 13 9 8 12 10   < > 13   < > 10   < > 16   BUN 25.4* 18.7 18.7 18.5 17.9   < > 18   < > 23*   < > 39*   CR 3.39* 3.12* 3.21* 3.19* 3.32*   < > 1.93*   < > 1.97*   < > 2.54*   GFRESTIMATED 15* 17* 16* 17* 16*   < > 30*   < > 28*   < > 21*   MAG  --   --   --   --  1.6*  --   --   --   --   --  1.9   PROTTOTAL  --   --   --   --   --   --  7.5  --  7.3  --  8.5*    < > = values in this interval not displayed.       CBC  Recent Labs   Lab Test 01/18/23  1139 01/04/23  1130 12/16/22  1240 11/16/22  1253 10/29/22  0727 10/28/22  1425 10/28/22  0842 10/19/22  0625   HGB 8.9* 10.0* 7.4* 8.0* 7.3*   < > 8.9* 10.1*   WBC  --   --   --  5.2 6.4  --  7.6 6.5   HCT 29.5* 33.0* 24.6* 26.0* 24.5*  --  29.9* 33.1*   MCV  --   --   --  71* 73*  --  72* 70*   PLT  --   --   --  190 146*  --  175 155    < > = values in this interval not displayed.       DIABETES  Recent Labs   Lab Test 12/28/22  1506 09/21/22  0814 09/14/22  1518 06/02/22  1300   A1C 7.3* 11.0* 11.1* 10.4*       HYPONATREMIA  Recent Labs   Lab Test 12/06/21  2118   UNAR 57       MBD  Recent Labs   Lab Test 11/16/22  1253 10/29/22  0727 10/28/22  2102 10/28/22  0842 10/19/22  0625  06/02/22  1300 02/28/22  1334 12/09/21  0725 12/08/21  0607   JOSEPH 6.2* 6.0* 6.0* 6.3* 6.6*   < > 8.2*   < > 7.8*   ALBUMIN 3.0*  --   --   --  3.0*  --  3.3*  --  3.3*   PHOS 2.9  --   --   --  3.5  --   --   --   --    PTHI  --   --   --   --  87*  --   --   --   --     < > = values in this interval not displayed.        URINE STUDIES  Recent Labs   Lab Test 10/19/22  0705 12/06/21  2118   COLOR Yellow Light Yellow   APPEARANCE Clear Clear   URINEGLC 250* >1000*   URINEBILI Negative Negative   URINEKETONE Negative Negative   SG 1.025 1.016   UBLD Small* 0.1 mg/dL*   URINEPH 7.0 6.0   PROTEIN 300* 100*   NITRITE Negative Negative   LEUKEST Negative Negative   RBCU 3* 3*   WBCU 1 10*     No lab results found.    ADDITIONAL LABS ORDERED/REVIEWED BY ME:  None    Assessment/Plan  CKD G5A3  Established care with [facility] 10/19/22    Oldest labs I am able to see in the system are from 7/29/2019 when creatinine was 2. Patient was admitted 12/2021 with hyperglycemia and had presumed pre-renal PRUDENCE. Creatinine improved from 2.16 -> 1.57 at time of discharge. At follow up her creatinine has been in the high 1's to mid 2's range (2.53 on 9/14/22) and on 10/19/22 unfortunately up to 3.3. She has hx of kidney stones noted on CT Abd/Pelvis 12/2021 but no proven episodes of obstructive nephropathy. Likely progressive renal impairment from a combination of recurrent pre-renal AKIs in the setting of significantly elevated glucose levels, DKD, and HTN nephrosclerosis. Interestingly on 10/19/22 she was found to have severe Nephrotic range proteinuria with UPCR suggestive of 15g/day. This level of nephrotic range proteinuria is certainly atypical for DN. Renal biopsy was obtained 10/28/22 with advanced/diffuse glomerulosclerosis. Proceeding with RRT/Transplant planning.     Seeing patient monthly until all RRT/Transplant planning steps are in place and to monitor for development of Uremic symptoms.     Plan:  -Discussed RRT  planning and next steps as outlined below under GOC problem  -Discussed Transplant referral and next steps as outlined below under GOC problem -> Sending message to see where things are at with getting an evaluation appt set up. Fritz thinks she submitted the consent documents/etc that Transplant sent to them.   -Continue with DM and HTN medications as prescribed.  -Optimize BP. Currently well controlled. No changes.     History of Nephrolithiasis  Stones noted on CT Abd/Pelvis 12/2021. Renal US 10/2022 without stones.    Anemia of Renal Disease:  -Hemoglobin 7.4  -Ferritin: Acceptable  -TSAT: 27 (acceptable)    Plan:  -Enrolled in anemia clinic (message sent 11/16/2022).    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 If Simvastatin used alone, 40mg qDay is referenced.   Other options include: Atorvastatin 20mg qDay (4D trial) and Rosuvastatin 10mg qDay (RHONDA trial)    Current regimen: Atorvastatin 80mg qDay, Fenofibrate 54mg qDay    RRT/GOC:  We have discussed the following regarding Renal Replacement Therapy:  Is RRT within this patient's GOC?: Yes  -Longterm vs Time-limited trial: Longterm    --Review of Conservative Kidney Management:  ---We discussed options for medical management of symptoms related to renal failure (High dose diuretics, potassium binders, medications for nausea/itching) and some benefits associated with this strategy (less interactions with healthcare system, less appointments, less medications, typically less time  spent in the hospital)  ---We discussed that patients with no residual kidney function will have a rapid accumulation of toxins/fluid whereas patients with some residual kidney function can be managed medically for quite some time.     Modalities:  -Hemodialysis vs Peritoneal Dialysis  --Home vs In-center  ---Favor HD and In-center. Discussed PD vs HD extensively 11/16/22. Discussed how patient and daughter (or other family member) would be educated at the PD center until they felt comfortable doing PD alone at home. Discussed that PD offers more freedom but more responsibility compared to in-center HD. Patient was hesitant to make a decision on PD vs HD but after questioning her multiple times and explaining the difference between the two modalities multiple times (including reviewing technique, pictures, and impact on lifestyle) the patient ultimately decided that she would prefer to go to a HD center 3x per week and have HD performed via an AVF.     Access:  -AVG vs AVF vs PD Cath vs TDC vs Temp Vascath  --We have discussed the benefits/risks/timing of placement of the various types of RRT access  ---Referral for placement/estimated timing of placement: Message sent for access eval/placement 11/17/22. Appt scheduled for 1/9/2023 and access placement planned for 2/14/2023  ---Has already been referred to [facility]    Transplant:  -eGFR < 20 on two or more readings?: Yes  --Referral placed for eval?: Placed 11/16/2022. Will send message to help coordinate and make sure evaluation appointment gets on the books.    Return to clinic: 1 month    Sahil Waterman MD   of Medicine  Division of Nephrology and Hypertension  [facility]    35 minutes spent on the date of the encounter doing chart review, history and exam, documentation and further activities as noted above

## 2023-01-26 ENCOUNTER — HOSPITAL ENCOUNTER (OUTPATIENT)
Dept: PHYSICAL THERAPY | Facility: REHABILITATION | Age: 53
Discharge: HOME | End: 2023-01-26
Payer: COMMERCIAL

## 2023-01-26 DIAGNOSIS — G62.9 NEUROPATHY: ICD-10-CM

## 2023-01-26 DIAGNOSIS — Z74.09 IMPAIRED FUNCTIONAL MOBILITY, BALANCE, GAIT, AND ENDURANCE: Primary | ICD-10-CM

## 2023-01-26 DIAGNOSIS — R53.81 PHYSICAL DECONDITIONING: ICD-10-CM

## 2023-01-26 DIAGNOSIS — M62.81 MUSCLE WEAKNESS (GENERALIZED): ICD-10-CM

## 2023-01-26 PROCEDURE — 97110 THERAPEUTIC EXERCISES: CPT | Mod: GP

## 2023-01-26 PROCEDURE — 97750 PHYSICAL PERFORMANCE TEST: CPT | Mod: GP

## 2023-01-27 ENCOUNTER — LAB (OUTPATIENT)
Dept: LAB | Facility: CLINIC | Age: 53
End: 2023-01-27
Payer: COMMERCIAL

## 2023-01-27 ENCOUNTER — OFFICE VISIT (OUTPATIENT)
Dept: NEPHROLOGY | Facility: CLINIC | Age: 53
End: 2023-01-27
Attending: STUDENT IN AN ORGANIZED HEALTH CARE EDUCATION/TRAINING PROGRAM
Payer: COMMERCIAL

## 2023-01-27 VITALS
WEIGHT: 107 LBS | OXYGEN SATURATION: 96 % | BODY MASS INDEX: 19.57 KG/M2 | DIASTOLIC BLOOD PRESSURE: 70 MMHG | SYSTOLIC BLOOD PRESSURE: 123 MMHG | HEART RATE: 88 BPM

## 2023-01-27 DIAGNOSIS — N18.4 STAGE 4 CHRONIC KIDNEY DISEASE (H): Primary | ICD-10-CM

## 2023-01-27 DIAGNOSIS — N18.4 CKD (CHRONIC KIDNEY DISEASE) STAGE 4, GFR 15-29 ML/MIN (H): ICD-10-CM

## 2023-01-27 DIAGNOSIS — D63.1 ANEMIA OF CHRONIC RENAL FAILURE, STAGE 4 (SEVERE) (H): ICD-10-CM

## 2023-01-27 DIAGNOSIS — N18.4 ANEMIA OF CHRONIC RENAL FAILURE, STAGE 4 (SEVERE) (H): ICD-10-CM

## 2023-01-27 DIAGNOSIS — N18.4 STAGE 4 CHRONIC KIDNEY DISEASE (H): ICD-10-CM

## 2023-01-27 LAB
ALBUMIN SERPL BCG-MCNC: 3.3 G/DL (ref 3.5–5.2)
ANION GAP SERPL CALCULATED.3IONS-SCNC: 10 MMOL/L (ref 7–15)
BUN SERPL-MCNC: 27.5 MG/DL (ref 6–20)
CALCIUM SERPL-MCNC: 6.6 MG/DL (ref 8.6–10)
CHLORIDE SERPL-SCNC: 103 MMOL/L (ref 98–107)
CREAT SERPL-MCNC: 4.23 MG/DL (ref 0.51–0.95)
DEPRECATED HCO3 PLAS-SCNC: 29 MMOL/L (ref 22–29)
FERRITIN SERPL-MCNC: 516 NG/ML (ref 11–328)
GFR SERPL CREATININE-BSD FRML MDRD: 12 ML/MIN/1.73M2
GLUCOSE SERPL-MCNC: 140 MG/DL (ref 70–99)
HCT VFR BLD AUTO: 29.7 % (ref 35–47)
HGB BLD-MCNC: 9.2 G/DL (ref 11.7–15.7)
IRON BINDING CAPACITY (ROCHE): 253 UG/DL (ref 240–430)
IRON SATN MFR SERPL: 30 % (ref 15–46)
IRON SERPL-MCNC: 77 UG/DL (ref 37–145)
PHOSPHATE SERPL-MCNC: 3.8 MG/DL (ref 2.5–4.5)
POTASSIUM SERPL-SCNC: 3.5 MMOL/L (ref 3.4–5.3)
SODIUM SERPL-SCNC: 142 MMOL/L (ref 136–145)

## 2023-01-27 PROCEDURE — 83550 IRON BINDING TEST: CPT | Performed by: PATHOLOGY

## 2023-01-27 PROCEDURE — 82728 ASSAY OF FERRITIN: CPT | Performed by: STUDENT IN AN ORGANIZED HEALTH CARE EDUCATION/TRAINING PROGRAM

## 2023-01-27 PROCEDURE — 85018 HEMOGLOBIN: CPT | Performed by: PATHOLOGY

## 2023-01-27 PROCEDURE — 99214 OFFICE O/P EST MOD 30 MIN: CPT | Performed by: STUDENT IN AN ORGANIZED HEALTH CARE EDUCATION/TRAINING PROGRAM

## 2023-01-27 PROCEDURE — G0463 HOSPITAL OUTPT CLINIC VISIT: HCPCS | Performed by: STUDENT IN AN ORGANIZED HEALTH CARE EDUCATION/TRAINING PROGRAM

## 2023-01-27 PROCEDURE — 85014 HEMATOCRIT: CPT | Performed by: PATHOLOGY

## 2023-01-27 PROCEDURE — 36415 COLL VENOUS BLD VENIPUNCTURE: CPT | Performed by: PATHOLOGY

## 2023-01-27 PROCEDURE — 83540 ASSAY OF IRON: CPT | Performed by: PATHOLOGY

## 2023-01-27 PROCEDURE — 80069 RENAL FUNCTION PANEL: CPT | Performed by: PATHOLOGY

## 2023-01-27 NOTE — PATIENT INSTRUCTIONS
"It was a pleasure to see you in nephrology clinic today. I've included a brief summary of our discussion and care plan from today's visit below.  _______________________________________________________________________    My recommendations are summarized as follows:  -Keep a Blood Pressure log. Please make sure that you are using a validated blood pressure device (check \"www.validatebp.org\").  -Avoid all NSAID's. Examples include Ibuprofen (Advil, Motrin), naprosyn (Aleve), celebrex among others. Acetaminophen (Tylenol) is ok with maximum dose in 24 hours of 3000mg.  -Healthy lifestyle measures will keep your kidney's functioning at their current best. This includes regular exercise, maintaining a healthy body weight and smoking cessation.   -Your blood pressure looks much better, please continue taking your blood pressure medications.  -I will reach out to our transplant folks to figure out what is going on with your appointment status  -I will see you shortly after your AVF is placed.    Please return to Nephrology Clinic in 1 months to review your progress.     Who do I call with any questions after my visit?  There are multiple ways to contact your nephrology care team:    -To schedule or reschedule an appointment, please call [phone].  -Reach out via [employer]. These messages are answered by your nurse or doctor during business hours and typically in 1-2 days. [employer] messages are best for quick questions/clarifications/updates. Frequently, your doctor or nurse will recommend setting up a follow up appointment to address any significant questions/concerns.  -For urgent questions after business hours, you may reach the on-call Nephrology Fellow by contacting the [facility] at [phone].    To schedule imaging:   -Please call [phone]     To schedule your lab appointment at the [facility]:  -Please call [phone]    Sincerely,    Dr. Sahil Waterman   " of Medicine  Division of Nephrology and Hypertension  [facility]

## 2023-01-27 NOTE — LETTER
1/27/2023       RE: Nithya Matthews  [address] [place] [address]     Dear Colleague,    Thank you for referring your patient, Nithya Matthews, to the [facility] [place] at [facility]. Please see a copy of my visit note below.        Nephrology Clinic Visit  Nithya Matthews MRN: [id number] YOB: 1970  Primary Care Provider: Giancarlo Arizmendi Tac  History Obtained From: Patient  External Document Review: Primary Care Provider  -------------------------------------------------------------------------------------------------------------------------------  Visit 1/27/2023:  -BP Control: Bp looks great in office. 123/70.  -Transplant Planning: Adjusted the primary phoen number to her Daughter's number at their request as this is usually the number they are able to answer.   -RRT Planning: AVF placement planned 2/14/2023.   -UOP/LE Swelling: Swelling is much better.   -Uremic Symptoms: No uremic symptoms today  -Having some discomfort in her feet. Had a procedure? Reports her feet feels somewhat better now.     Visit 12/16/2022  -BP control: Discussed that we really need home BP readings. She is usually in the 130-140's and even had issues not that long ago with severe hypotension (prompting holding of hydrochlorothiazide). Elevated today in clinic. Some LE edema. Likely will need a diuretic on board again soon. Send me home readings if able otherwise low threshold to add on Lasix if LE worsens or if Systolic BP > 140 at her next appointment.  -Transplant planning: Yes, she has been contacted by Transplant. She is not sure if she has a number to contact them. I see one message saved to the chart from transplant outreach. Will have them contact her again.   -RRT planning: Looks like she has access appointment scheduled for 1/9/2023. She has vein mapping scheduled for this same day at 1PM. We have discussed this today to make sure she is aware of this appointment  as we are certainly approaching the point where I need her to have this fistula in place and maturing so we can safely start dialysis when needed.   -UOP:   -Lower extremity swelling: Yes, b/l 1+  -Uremic Symptoms: She denies significant uremic symptoms today. She is eating well, no nausea, no metallic taste in mouth  -Anemia clinic: getting set up for EPO injections. Looks like she is currently set up for 1/4/2023 to get an EPO injection.    Visit 11/16/22  -Here for follow up visit with [facility]  -BP control: Okay at this point.   -Understanding of renal impairment: We again discussed her severe renal impairment and how biopsy findings do not suggest any reversible cause. We need to make preparations for RRT.  -Plan for RRT: The patient had RRT education yesterday. Leaning towards in center hemodialysis.  -Plan for Transplant: Says her son is willing to potentially be a living donor. He lives out in [place]. Needs to be referred for transplant eval.    Visit 10/19/22:  -Here to establish care with [facility]  -here with daughter today  -DM Control: Poorly controlled. Long term issue. Follows with PCP and referred to Endo  -HTN Control: Had some issues with hypotension recently so hydrochlorothiazide discontinued and BP improved. Continues on Lisinopril. BP in the 140's at todays appointments. She doesn't check her BP at home.   -Hx of Kidney Stones: She denies a history of any symptomatic kidney stones. She denies gross hematuria.  -Nocturia: 1-2x per night  -NSAIDs: Doesn't sound that way   -Herbal/OTC Medications: Denies  -RRT Planning/GOC:   --Understanding of kidney dysfunction: knows that her kidneys have been weak for a long time but not clear on how bad her function is. She is very reserved, asks few questions, and has a difficult time summarizing what we talked about today. This is not surprising as she is clearly nervous/scared about kidney failure/dialysis.   --We discussed that she is right at  the border of stage 5 kidney disease and that we need to start planning for RRT.  --We had a long discussion about RRT and what kidney failure is using interpretor.  -Uremic symptoms:   --Appetite: She thinks her appetite is good.  --Metallic taste: No  --Nausea: She is often having nausea. She thinks lime juice helps.   --Day/Night Reversal: Sleep is okay  --Swelling in legs: She notices a bit of leg swelling.  -She has been eating and drinking well leading up to today's appointment.    Objective:  PAST MEDICAL HISTORY:  Past Medical History:   Diagnosis Date     Arthritis      Diabetes (H)      Hypertension       DM  HTN    PAST SURGICAL HISTORY:  Past Surgical History:   Procedure Laterality Date     ABDOMEN SURGERY       APPENDECTOMY       BIOPSY       CATARACT IOL, RT/LT         MEDICATIONS:  Current Outpatient Medications   Medication Instructions     acetaminophen (TYLENOL) 500-1,000 mg, Oral, EVERY 6 HOURS PRN     alcohol swab prep pads Use to swab area of injection/amadeo as directed.     ammonium lactate (AMLACTIN) 12 % external cream Topical, DAILY     aspirin 81 mg, Oral, DAILY     atorvastatin (LIPITOR) 80 mg, Oral, DAILY     blood glucose (NO BRAND SPECIFIED) test strip Use to test blood sugar 3 times daily or as directed. To accompany: Blood Glucose Monitor Brands: per insurance.     blood glucose calibration (NO BRAND SPECIFIED) solution To accompany: Blood Glucose Monitor Brands: per insurance.     blood glucose monitoring (NO BRAND SPECIFIED) meter device kit Use to test blood sugar 3 times daily or as directed. Preferred blood glucose meter OR supplies to accompany: Blood Glucose Monitor Brands: per insurance.     carboxymethylcellulose-glycerin (REFRESH OPTIVE) 0.5-0.9 % ophthalmic solution 1 drop, Both Eyes, 3 TIMES DAILY PRN     Continuous Blood Gluc Sensor (FREESTYLE MICKIE 14 DAY SENSOR) [facility] Device, Does not apply, EVERY 14 DAYS, Change sensor as directed every 14 days     cyanocobalamin  (VITAMIN B-12) 1,000 mcg, Oral, DAILY     escitalopram (LEXAPRO) 10 mg, Oral, DAILY     fenofibrate (LOFIBRA) 54 mg, Oral, DAILY     ferrous sulfate (FEROSUL) 325 mg, Oral, EVERY OTHER DAY     fish oil-omega-3 fatty acids 1 g, Oral, DAILY     gabapentin (NEURONTIN) 300 mg, Oral, 2 TIMES DAILY     insulin aspart (NOVOLOG PEN) 18 Units, Subcutaneous, 3 TIMES DAILY WITH MEALS     insulin glargine (LANTUS PEN) 80 Units, Subcutaneous, EVERY MORNING, 40 unit(s) on each side     insulin pen needle (32G X 4 MM) 32G X 4 MM miscellaneous Use 6x pen needles daily or as directed (victoza, 3 for novolog, 2 for lantus)     liraglutide (VICTOZA) 1.8 mg, Subcutaneous, DAILY     lisinopril (ZESTRIL) 40 mg, Oral, DAILY     melatonin 3 mg, Oral, AT BEDTIME PRN     menthol, Topical Analgesic, 2.5% (BENGAY VANISHING SCENT) 2.5 % GEL topical gel Topical, 3 TIMES DAILY (Diuretics and Nitrates), Right shoulder pain      metoprolol succinate ER (TOPROL XL) 200 mg, Oral, DAILY     mirtazapine (REMERON) 7.5 mg, Oral, AT BEDTIME     omeprazole 20 mg, Oral, DAILY     polyethylene glycol (MIRALAX) 17 g, Oral, DAILY     silver sulfADIAZINE (SILVADENE) 1 % external cream Topical, 2 TIMES DAILY     thin (NO BRAND SPECIFIED) lancets Use with lanceting device. To accompany: Blood Glucose Monitor Brands: per insurance.     urea (GORMEL) 20 % external cream Topical, DAILY, Apply to feet daily     vitamin D2 (ERGOCALCIFEROL) 50,000 Units, Oral, WEEKLY       FAMILY MEDICAL HISTORY:   Family History   Problem Relation Age of Onset     Glaucoma No family hx of      Macular Degeneration No family hx of      Breast Cancer No family hx of      Ovarian Cancer No family hx of        PHYSICAL EXAM:   /70 (BP Location: Right arm, Patient Position: Sitting, Cuff Size: Adult Regular)   Pulse 88   Wt 48.5 kg (107 lb)   SpO2 96%   BMI 19.57 kg/m    GENERAL APPEARANCE: appears older than stated age, uses a cane when ambulating  EYES: nonicteric  HENT: mouth  without ulcers or lesions  RESP: lungs clear to auscultation   CV: regular rhythm, normal rate, no rub  ABDOMEN: soft, nontender, normal bowel sounds, no HSM   Extremities: No LE edema   MS: no evidence of inflammation in joints, no muscle tenderness  SKIN: no rash  NEURO: mentation intact and speech normal  PSYCH: affect normal    LABS REVIEWED BY ME:   ANEMIA  Recent Labs   Lab Test 01/18/23  1139 01/04/23  1130 12/16/22  1240 11/16/22  1253 10/28/22  0842 10/19/22  0625   HGB 8.9* 10.0* 7.4* 8.0*   < > 10.1*   IRONSAT  --   --  26 24  --  27   ISH  --   --  420* 583*  --  587*    < > = values in this interval not displayed.       BMP  Recent Labs   Lab Test 11/16/22  1253 10/29/22  0727 10/28/22  2102 10/28/22  0842 10/19/22  0625 06/02/22  1300 02/28/22  1334 12/09/21  0725 12/08/21  0607 12/06/21  2347 12/06/21  2118    143 141 146* 143   < > 136   < > 139   < > 133*   POTASSIUM 3.3* 3.4 3.3* 2.8* 3.0*   < > 3.5   < > 3.5  3.5   < > 3.5   CHLORIDE 105 110* 109* 111* 105   < > 98   < > 104   < > 92*   CO2 23 24 24 23 28   < > 25   < > 25   < > 25   ANIONGAP 13 9 8 12 10   < > 13   < > 10   < > 16   BUN 25.4* 18.7 18.7 18.5 17.9   < > 18   < > 23*   < > 39*   CR 3.39* 3.12* 3.21* 3.19* 3.32*   < > 1.93*   < > 1.97*   < > 2.54*   GFRESTIMATED 15* 17* 16* 17* 16*   < > 30*   < > 28*   < > 21*   MAG  --   --   --   --  1.6*  --   --   --   --   --  1.9   PROTTOTAL  --   --   --   --   --   --  7.5  --  7.3  --  8.5*    < > = values in this interval not displayed.       CBC  Recent Labs   Lab Test 01/18/23  1139 01/04/23  1130 12/16/22  1240 11/16/22  1253 10/29/22  0727 10/28/22  1425 10/28/22  0842 10/19/22  0625   HGB 8.9* 10.0* 7.4* 8.0* 7.3*   < > 8.9* 10.1*   WBC  --   --   --  5.2 6.4  --  7.6 6.5   HCT 29.5* 33.0* 24.6* 26.0* 24.5*  --  29.9* 33.1*   MCV  --   --   --  71* 73*  --  72* 70*   PLT  --   --   --  190 146*  --  175 155    < > = values in this interval not displayed.       DIABETES  Recent  Labs   Lab Test 12/28/22  1506 09/21/22  0814 09/14/22  1518 06/02/22  1300   A1C 7.3* 11.0* 11.1* 10.4*       HYPONATREMIA  Recent Labs   Lab Test 12/06/21 2118   UNAR 57       MBD  Recent Labs   Lab Test 11/16/22  1253 10/29/22  0727 10/28/22  2102 10/28/22  0842 10/19/22  0625 06/02/22  1300 02/28/22  1334 12/09/21  0725 12/08/21  0607   JOSEPH 6.2* 6.0* 6.0* 6.3* 6.6*   < > 8.2*   < > 7.8*   ALBUMIN 3.0*  --   --   --  3.0*  --  3.3*  --  3.3*   PHOS 2.9  --   --   --  3.5  --   --   --   --    PTHI  --   --   --   --  87*  --   --   --   --     < > = values in this interval not displayed.        URINE STUDIES  Recent Labs   Lab Test 10/19/22  0705 12/06/21  2118   COLOR Yellow Light Yellow   APPEARANCE Clear Clear   URINEGLC 250* >1000*   URINEBILI Negative Negative   URINEKETONE Negative Negative   SG 1.025 1.016   UBLD Small* 0.1 mg/dL*   URINEPH 7.0 6.0   PROTEIN 300* 100*   NITRITE Negative Negative   LEUKEST Negative Negative   RBCU 3* 3*   WBCU 1 10*     No lab results found.    ADDITIONAL LABS ORDERED/REVIEWED BY ME:  None    Assessment/Plan  CKD G5A3  Established care with [facility] 10/19/22    Oldest labs I am able to see in the system are from 7/29/2019 when creatinine was 2. Patient was admitted 12/2021 with hyperglycemia and had presumed pre-renal PRUDENCE. Creatinine improved from 2.16 -> 1.57 at time of discharge. At follow up her creatinine has been in the high 1's to mid 2's range (2.53 on 9/14/22) and on 10/19/22 unfortunately up to 3.3. She has hx of kidney stones noted on CT Abd/Pelvis 12/2021 but no proven episodes of obstructive nephropathy. Likely progressive renal impairment from a combination of recurrent pre-renal AKIs in the setting of significantly elevated glucose levels, DKD, and HTN nephrosclerosis. Interestingly on 10/19/22 she was found to have severe Nephrotic range proteinuria with UPCR suggestive of 15g/day. This level of nephrotic range proteinuria is certainly atypical for DN.  Renal biopsy was obtained 10/28/22 with advanced/diffuse glomerulosclerosis. Proceeding with RRT/Transplant planning.     Seeing patient monthly until all RRT/Transplant planning steps are in place and to monitor for development of Uremic symptoms.     Plan:  -Discussed RRT planning and next steps as outlined below under GOC problem  -Discussed Transplant referral and next steps as outlined below under GOC problem -> Sending message to see where things are at with getting an evaluation appt set up. Fritz thinks she submitted the consent documents/etc that Transplant sent to them.   -Continue with DM and HTN medications as prescribed.  -Optimize BP. Currently well controlled. No changes.     History of Nephrolithiasis  Stones noted on CT Abd/Pelvis 12/2021. Renal US 10/2022 without stones.    Anemia of Renal Disease:  -Hemoglobin 7.4  -Ferritin: Acceptable  -TSAT: 27 (acceptable)    Plan:  -Enrolled in anemia clinic (message sent 11/16/2022).    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 If Simvastatin used alone, 40mg qDay is referenced.   Other options include: Atorvastatin 20mg qDay (4D trial) and Rosuvastatin 10mg qDay (RHONDA trial)    Current regimen: Atorvastatin 80mg qDay, Fenofibrate 54mg qDay    RRT/GOC:  We have discussed the following regarding Renal Replacement Therapy:  Is RRT within this patient's GOC?: Yes  -Longterm vs Time-limited trial: Longterm    --Review of Conservative Kidney Management:  ---We discussed options for  medical management of symptoms related to renal failure (High dose diuretics, potassium binders, medications for nausea/itching) and some benefits associated with this strategy (less interactions with healthcare system, less appointments, less medications, typically less time spent in the hospital)  ---We discussed that patients with no residual kidney function will have a rapid accumulation of toxins/fluid whereas patients with some residual kidney function can be managed medically for quite some time.     Modalities:  -Hemodialysis vs Peritoneal Dialysis  --Home vs In-center  ---Favor HD and In-center. Discussed PD vs HD extensively 11/16/22. Discussed how patient and daughter (or other family member) would be educated at the PD center until they felt comfortable doing PD alone at home. Discussed that PD offers more freedom but more responsibility compared to in-center HD. Patient was hesitant to make a decision on PD vs HD but after questioning her multiple times and explaining the difference between the two modalities multiple times (including reviewing technique, pictures, and impact on lifestyle) the patient ultimately decided that she would prefer to go to a HD center 3x per week and have HD performed via an AVF.     Access:  -AVG vs AVF vs PD Cath vs TDC vs Temp Vascath  --We have discussed the benefits/risks/timing of placement of the various types of RRT access  ---Referral for placement/estimated timing of placement: Message sent for access eval/placement 11/17/22. Appt scheduled for 1/9/2023 and access placement planned for 2/14/2023  ---Has already been referred to [facility]    Transplant:  -eGFR < 20 on two or more readings?: Yes  --Referral placed for eval?: Placed 11/16/2022. Will send message to help coordinate and make sure evaluation appointment gets on the books.    Return to clinic: 1 month    Sahil Waterman MD   of Medicine  Division of Nephrology and  Hypertension  [facility]    35 minutes spent on the date of the encounter doing chart review, history and exam, documentation and further activities as noted above

## 2023-01-27 NOTE — NURSING NOTE
Chief Complaint   Patient presents with     RECHECK     CKD     Blood pressure 123/70, pulse 88, weight 48.5 kg (107 lb), SpO2 96 %.    Yasir Bailey on 1/27/2023 at 2:46 PM

## 2023-01-30 NOTE — TELEPHONE ENCOUNTER
Called patient again this morning with the assistance of a Language Services [facility] , Ebony ID# [id number], and spoke with patient again today at length for 2 hours. Pt confirmed she is interested in proceeding with a kidney/pancreas transplant evaluation. Pt and daughter, Fritz, explained the reason she wants to pursue a kidney and pancreas transplant is because the doctor told them without a transplant she will die. I did review the benefits more specifically of a successful kidney and pancreas transplant. Pt stating today her total insulin dose for 24 hours on average is 91 units.  Pt confirmed today that she is incontinent of stool for the last 2 years, added she has never had this evaluated and does not know what is causing this. Pt states she wears diapers all the time. I asked pt if she has ever had a colonoscopy because there is none on file here, she stated yet at [facility] # [phone], [place], [place]. Pt stating she does not know what her previous colonoscopy showed. Pt stating she is continent of urine.     Pt stating she can only ambulate short distances in her house with a walker, has been this way for for well over a year, pt stating she does not know what is causing this, is already attending physical therapy. Pt stating she has fallen twice in the last week. Pt states she cannot use her right hand either because it's stiffened for some reason, she does not know why. Pt stating she has had some teeth extracted prior to immigrating to the [place] in [place]. Pt stating she has not seen a dentist since being in the [facility] because she is afraid they will extract the remainder of her teeth. I did explain that that she will need to make an appointment for dental check-up now, explained reasoning for this, pt will make appt now. Very difficult conversations with patient due to apparently low health literacy. Fritz is somewhat better with conversation as she  does speak English, but still may have low health literacy as well.     Confirmed Referring Provider, Dialysis Center, and Primary Care Physician. Notified patient of the importance of continued communication with referring providers and primary care physicians.    Reviewed components of transplant evaluation process including necessary appointments, tests, and procedures.    Answered questions for patient regarding evaluation, provided my name and contact information and requested they call with any additional questions.     Explained to both pt and [place] that a  will be calling them to confirm a pre transplant evaluation date. Instructed that pt, her  and [place] should all attend the appointment date. Instructed to eat/ drink normally on evaluation day. Instructed to take all medications as prescribed on evaluation day.  I then specifically explained instruction to Fritz on use of [facility] and to view pre kidney eval parts 1 and 2. Instructed Fritz that she will receive an email prior to eval date with a Receipt of Info and patient educational materials - instructed to read/ electronically sign consent and read educational materials prior to eval date. Instructed her on use of www.unos.org and www.srtr.org.  Fritz expressed good understanding of was in agreement with the plan.     Smart set orders into Epic today for pre kidney pancreas transplant evaluation.

## 2023-01-31 ENCOUNTER — TRANSFERRED RECORDS (OUTPATIENT)
Dept: HEALTH INFORMATION MANAGEMENT | Facility: CLINIC | Age: 53
End: 2023-01-31

## 2023-02-01 ENCOUNTER — DOCUMENTATION ONLY (OUTPATIENT)
Dept: PHARMACY | Facility: CLINIC | Age: 53
End: 2023-02-01

## 2023-02-01 ENCOUNTER — INFUSION THERAPY VISIT (OUTPATIENT)
Dept: INFUSION THERAPY | Facility: HOSPITAL | Age: 53
End: 2023-02-01
Attending: STUDENT IN AN ORGANIZED HEALTH CARE EDUCATION/TRAINING PROGRAM
Payer: COMMERCIAL

## 2023-02-01 VITALS
OXYGEN SATURATION: 96 % | HEART RATE: 85 BPM | DIASTOLIC BLOOD PRESSURE: 86 MMHG | RESPIRATION RATE: 18 BRPM | TEMPERATURE: 98 F | SYSTOLIC BLOOD PRESSURE: 159 MMHG

## 2023-02-01 DIAGNOSIS — N18.4 ANEMIA OF CHRONIC RENAL FAILURE, STAGE 4 (SEVERE) (H): ICD-10-CM

## 2023-02-01 DIAGNOSIS — N18.4 CKD (CHRONIC KIDNEY DISEASE) STAGE 4, GFR 15-29 ML/MIN (H): Primary | ICD-10-CM

## 2023-02-01 DIAGNOSIS — D63.1 ANEMIA OF CHRONIC RENAL FAILURE, STAGE 4 (SEVERE) (H): ICD-10-CM

## 2023-02-01 LAB
HCT VFR BLD AUTO: 30.1 % (ref 35–47)
HGB BLD-MCNC: 9 G/DL (ref 11.7–15.7)

## 2023-02-01 PROCEDURE — 96372 THER/PROPH/DIAG INJ SC/IM: CPT | Performed by: STUDENT IN AN ORGANIZED HEALTH CARE EDUCATION/TRAINING PROGRAM

## 2023-02-01 PROCEDURE — 85014 HEMATOCRIT: CPT | Performed by: STUDENT IN AN ORGANIZED HEALTH CARE EDUCATION/TRAINING PROGRAM

## 2023-02-01 PROCEDURE — 250N000011 HC RX IP 250 OP 636: Performed by: STUDENT IN AN ORGANIZED HEALTH CARE EDUCATION/TRAINING PROGRAM

## 2023-02-01 PROCEDURE — 85018 HEMOGLOBIN: CPT | Performed by: STUDENT IN AN ORGANIZED HEALTH CARE EDUCATION/TRAINING PROGRAM

## 2023-02-01 PROCEDURE — 36415 COLL VENOUS BLD VENIPUNCTURE: CPT | Performed by: STUDENT IN AN ORGANIZED HEALTH CARE EDUCATION/TRAINING PROGRAM

## 2023-02-01 RX ORDER — ALBUTEROL SULFATE 0.83 MG/ML
2.5 SOLUTION RESPIRATORY (INHALATION)
Status: DISCONTINUED | OUTPATIENT
Start: 2023-02-01 | End: 2023-02-01 | Stop reason: HOSPADM

## 2023-02-01 RX ORDER — ALBUTEROL SULFATE 0.83 MG/ML
2.5 SOLUTION RESPIRATORY (INHALATION)
Status: CANCELLED | OUTPATIENT
Start: 2023-02-01

## 2023-02-01 RX ORDER — MEPERIDINE HYDROCHLORIDE 25 MG/ML
25 INJECTION INTRAMUSCULAR; INTRAVENOUS; SUBCUTANEOUS EVERY 30 MIN PRN
Status: CANCELLED | OUTPATIENT
Start: 2023-02-01

## 2023-02-01 RX ORDER — EPINEPHRINE 1 MG/ML
0.3 INJECTION, SOLUTION INTRAMUSCULAR; SUBCUTANEOUS EVERY 5 MIN PRN
Status: DISCONTINUED | OUTPATIENT
Start: 2023-02-01 | End: 2023-02-01 | Stop reason: HOSPADM

## 2023-02-01 RX ORDER — METHYLPREDNISOLONE SODIUM SUCCINATE 125 MG/2ML
125 INJECTION, POWDER, LYOPHILIZED, FOR SOLUTION INTRAMUSCULAR; INTRAVENOUS
Status: DISCONTINUED | OUTPATIENT
Start: 2023-02-01 | End: 2023-02-01 | Stop reason: HOSPADM

## 2023-02-01 RX ORDER — ALBUTEROL SULFATE 90 UG/1
1-2 AEROSOL, METERED RESPIRATORY (INHALATION)
Status: CANCELLED
Start: 2023-02-01

## 2023-02-01 RX ORDER — DIPHENHYDRAMINE HYDROCHLORIDE 50 MG/ML
50 INJECTION INTRAMUSCULAR; INTRAVENOUS
Status: CANCELLED
Start: 2023-02-01

## 2023-02-01 RX ORDER — EPINEPHRINE 1 MG/ML
0.3 INJECTION, SOLUTION INTRAMUSCULAR; SUBCUTANEOUS EVERY 5 MIN PRN
Status: CANCELLED | OUTPATIENT
Start: 2023-02-01

## 2023-02-01 RX ORDER — METHYLPREDNISOLONE SODIUM SUCCINATE 125 MG/2ML
125 INJECTION, POWDER, LYOPHILIZED, FOR SOLUTION INTRAMUSCULAR; INTRAVENOUS
Status: CANCELLED
Start: 2023-02-01

## 2023-02-01 RX ORDER — DIPHENHYDRAMINE HYDROCHLORIDE 50 MG/ML
50 INJECTION INTRAMUSCULAR; INTRAVENOUS
Status: DISCONTINUED | OUTPATIENT
Start: 2023-02-01 | End: 2023-02-01 | Stop reason: HOSPADM

## 2023-02-01 RX ORDER — MEPERIDINE HYDROCHLORIDE 25 MG/ML
25 INJECTION INTRAMUSCULAR; INTRAVENOUS; SUBCUTANEOUS EVERY 30 MIN PRN
Status: DISCONTINUED | OUTPATIENT
Start: 2023-02-01 | End: 2023-02-01 | Stop reason: HOSPADM

## 2023-02-01 RX ORDER — ALBUTEROL SULFATE 90 UG/1
1-2 AEROSOL, METERED RESPIRATORY (INHALATION)
Status: DISCONTINUED | OUTPATIENT
Start: 2023-02-01 | End: 2023-02-01 | Stop reason: HOSPADM

## 2023-02-01 RX ADMIN — DARBEPOETIN ALFA 40 MCG: 40 INJECTION, SOLUTION INTRAVENOUS; SUBCUTANEOUS at 11:29

## 2023-02-01 ASSESSMENT — PAIN SCALES - GENERAL: PAINLEVEL: NO PAIN (0)

## 2023-02-01 NOTE — PROGRESS NOTES
Anemia Management Note  SUBJECTIVE/OBJECTIVE:  Referred by Dr. Sahil Waterman on 2022  Primary Diagnosis: Anemia in Chronic Kidney Disease (N18.4, D63.1)     Secondary Diagnosis:  Chronic Kidney Disease, Stage 4 (N18.4)   Hgb goal range:  9-10  Epo/Darbo: Aranesp 40mcg every 14 days for Hgb 10.0. In Clinic.   Iron regimen:  Ferrous Sulfate one tab every other day.   Labs : 2023  Recent VIK use, transfusion, IV iron: NA  RX/TX plans : 2023     [facility] : [phone]     No history of stroke, MI and blood clots or cancers.     Contact: OK to speak with Fritz Matthews (daughter) regarding Scheduling, Medical, and Billing Information per consent to communicate dated 3/17/2022.     Anemia Latest Ref Rng & Units 10/29/2022 2022 2022 2023 2023 2023 2023   VIK Dose - - - - - 40mcg - 40mcg   Hemoglobin 11.7 - 15.7 g/dL 7.3(L) 8.0(L) 7.4(L) 10.0(L) 8.9(L) 9.2(L) 9.0(L)   Ferritin 11 - 328 ng/mL - 583(H) 420(H) - - 516(H) -     BP Readings from Last 3 Encounters:   23 (!) 159/86   23 123/70   23 (!) 150/72     Wt Readings from Last 2 Encounters:   23 107 lb (48.5 kg)   23 108 lb (49 kg)           ASSESSMENT:  Hgb:at goal - received dose in clinic - recommend continue current regimen  TSat: at goal >30% Ferritin: At goal (>100ng/mL)    PLAN:  Dose with aranesp and RTC for hgb then aranesp if needed in 2 week(s)    Orders needed to be renewed (for next follow-up date) in EPIC: None    Iron labs due:  End of 2023    Plan discussed with:  No call, chart review      NEXT FOLLOW-UP DATE:  23    Geri Mortensen RN   [facility]  [address] [address]   [email or web address]   Office : [phone]  Fax: [phone]

## 2023-02-01 NOTE — PROGRESS NOTES
Infusion Nursing Note:  Nithya Matthews presents today for labs and possible aranesp.    Patient seen by provider today: No   present during visit today: Yes, Language: Jamrai .     Note: Patient arrives today via ambulatory accompanied by her daughter.  Patient is alert and oriented. VSS with exception of elevated blood pressure which is common according to her previous readings.   Patient also advised to come back in two weeks to recheck her blood for possible aranesp.  Labs drawn without difficulty.  Aranesp given in right arm.    Patient and her daughterverbalized understanding via .      Intravenous Access:  Lab draw site right AC, Needle type butterfly, Gauge 23.    Treatment Conditions:  Lab Results   Component Value Date    HGB 9.0 (L) 02/01/2023    WBC 5.2 11/16/2022    ANEUTAUTO 4.2 10/28/2022     11/16/2022      Results reviewed, labs did meet treatment parameters: Hgb 9.0.    Post Infusion Assessment:  Not applicable.     Discharge Plan:   Discharge instructions reviewed with: Patient and Family.  Patient and/or family verbalized understanding of discharge instructions and all questions answered.  Copy of AVS reviewed with patient and/or family.  Patient will return im two weeks for next appointment.  Patient discharged in stable condition accompanied by: self and .  Departure Mode: Wheelchair.      BLAZE ACE RN

## 2023-02-06 DIAGNOSIS — R79.89 LOW VITAMIN D LEVEL: Primary | ICD-10-CM

## 2023-02-07 RX ORDER — LANOLIN ALCOHOL/MO/W.PET/CERES
1000 CREAM (GRAM) TOPICAL DAILY
Qty: 90 TABLET | Refills: 3 | Status: SHIPPED | OUTPATIENT
Start: 2023-02-07 | End: 2024-01-08

## 2023-02-07 NOTE — TELEPHONE ENCOUNTER
"Routing refill request to provider for review/approval because:  Early refill requested.    Last Written Prescription Date:  3/17/22  Last Fill Quantity: 90,  # refills: 3   Last office visit provider:  1/10/23     Requested Prescriptions   Pending Prescriptions Disp Refills     cyanocobalamin (VITAMIN B-12) 1000 MCG tablet [Pharmacy Med Name: VITAMIN B-12 1000 MCG TABS 1000 Tablet] 30 tablet 3     Sig: TAKE 1 TABLET (1,000 MCG) BY MOUTH DAILY       Vitamin Supplements (Adult) Protocol Passed - 2/7/2023  9:28 AM        Passed - High dose Vitamin D not ordered        Passed - Recent (12 mo) or future (30 days) visit within the authorizing provider's specialty     Patient has had an office visit with the authorizing provider or a provider within the authorizing providers department within the previous 12 mos or has a future within next 30 days. See \"Patient Info\" tab in inbasket, or \"Choose Columns\" in Meds & Orders section of the refill encounter.              Passed - Medication is active on med list             Miguel Mejia RN 02/07/23 9:28 AM  "

## 2023-02-08 ENCOUNTER — HOSPITAL ENCOUNTER (OUTPATIENT)
Dept: PHYSICAL THERAPY | Facility: REHABILITATION | Age: 53
Discharge: HOME | End: 2023-02-08
Payer: COMMERCIAL

## 2023-02-08 DIAGNOSIS — Z74.09 IMPAIRED FUNCTIONAL MOBILITY, BALANCE, GAIT, AND ENDURANCE: Primary | ICD-10-CM

## 2023-02-08 PROCEDURE — 97110 THERAPEUTIC EXERCISES: CPT | Mod: GP

## 2023-02-09 ENCOUNTER — PATIENT OUTREACH (OUTPATIENT)
Dept: NEPHROLOGY | Facility: CLINIC | Age: 53
End: 2023-02-09
Payer: COMMERCIAL

## 2023-02-09 NOTE — TELEPHONE ENCOUNTER
FUTURE VISIT INFORMATION      SURGERY INFORMATION:    Date: 23    Location: uu or    Surgeon:  Markell Langford MD    Anesthesia Type:  General    Procedure: left versus right upper extremity Arteriovenous fistula surgery    RECORDS REQUESTED FROM:       [facility]    Pertinent Medical History: hypertension    Most recent EKG+ Tracin22    Most recent ECHO: 22

## 2023-02-09 NOTE — TELEPHONE ENCOUNTER
Called patient's daughter to schedule PAC and post-op appointment for surgery next week. Scheduled PAC for 2/13 and post-op for 3/6. Neph tracking flowsheet updated.    Rebeca Mckay RN  Dialysis Access Care Coordinator  Phone: [phone]  Pool: Dialysis Access Nurse ([id number])

## 2023-02-13 ENCOUNTER — RESULTS ONLY (OUTPATIENT)
Dept: SURGERY | Facility: CLINIC | Age: 53
End: 2023-02-13

## 2023-02-13 ENCOUNTER — PRE VISIT (OUTPATIENT)
Dept: SURGERY | Facility: CLINIC | Age: 53
End: 2023-02-13

## 2023-02-13 ENCOUNTER — LAB (OUTPATIENT)
Dept: LAB | Facility: CLINIC | Age: 53
End: 2023-02-13
Payer: COMMERCIAL

## 2023-02-13 ENCOUNTER — TRANSFERRED RECORDS (OUTPATIENT)
Dept: SURGERY | Facility: CLINIC | Age: 53
End: 2023-02-13

## 2023-02-13 ENCOUNTER — OFFICE VISIT (OUTPATIENT)
Dept: SURGERY | Facility: CLINIC | Age: 53
End: 2023-02-13
Payer: COMMERCIAL

## 2023-02-13 ENCOUNTER — ANESTHESIA EVENT (OUTPATIENT)
Dept: SURGERY | Facility: CLINIC | Age: 53
End: 2023-02-13
Payer: COMMERCIAL

## 2023-02-13 VITALS
RESPIRATION RATE: 16 BRPM | TEMPERATURE: 98.3 F | BODY MASS INDEX: 19.88 KG/M2 | SYSTOLIC BLOOD PRESSURE: 161 MMHG | OXYGEN SATURATION: 95 % | DIASTOLIC BLOOD PRESSURE: 95 MMHG | HEIGHT: 62 IN | WEIGHT: 108 LBS | HEART RATE: 95 BPM

## 2023-02-13 DIAGNOSIS — I10 PRIMARY HYPERTENSION: ICD-10-CM

## 2023-02-13 DIAGNOSIS — N18.4 CKD (CHRONIC KIDNEY DISEASE) STAGE 4, GFR 15-29 ML/MIN (H): ICD-10-CM

## 2023-02-13 DIAGNOSIS — N18.5 CHRONIC KIDNEY DISEASE, STAGE V (H): ICD-10-CM

## 2023-02-13 DIAGNOSIS — Z01.818 PREOP EXAMINATION: Primary | ICD-10-CM

## 2023-02-13 DIAGNOSIS — I10 ESSENTIAL HYPERTENSION: ICD-10-CM

## 2023-02-13 DIAGNOSIS — N18.4 ANEMIA OF CHRONIC RENAL FAILURE, STAGE 4 (SEVERE) (H): ICD-10-CM

## 2023-02-13 DIAGNOSIS — N18.6 ESRD (END STAGE RENAL DISEASE) (H): ICD-10-CM

## 2023-02-13 DIAGNOSIS — D63.1 ANEMIA OF CHRONIC RENAL FAILURE, STAGE 4 (SEVERE) (H): ICD-10-CM

## 2023-02-13 DIAGNOSIS — E11.9 DIABETES MELLITUS, TYPE 2 (H): ICD-10-CM

## 2023-02-13 DIAGNOSIS — Z01.818 PREOP EXAMINATION: ICD-10-CM

## 2023-02-13 DIAGNOSIS — Z01.818 ENCOUNTER FOR PRE-TRANSPLANT EVALUATION FOR KIDNEY AND PANCREAS TRANSPLANT: ICD-10-CM

## 2023-02-13 DIAGNOSIS — N18.4 STAGE 4 CHRONIC KIDNEY DISEASE (H): ICD-10-CM

## 2023-02-13 DIAGNOSIS — Z76.82 ORGAN TRANSPLANT CANDIDATE: ICD-10-CM

## 2023-02-13 LAB
ANION GAP SERPL CALCULATED.3IONS-SCNC: 11 MMOL/L (ref 7–15)
ATRIAL RATE - MUSE: 90 BPM
BUN SERPL-MCNC: 23.5 MG/DL (ref 6–20)
CALCIUM SERPL-MCNC: 6 MG/DL (ref 8.6–10)
CHLORIDE SERPL-SCNC: 106 MMOL/L (ref 98–107)
CREAT SERPL-MCNC: 3.89 MG/DL (ref 0.51–0.95)
DEPRECATED HCO3 PLAS-SCNC: 26 MMOL/L (ref 22–29)
DIASTOLIC BLOOD PRESSURE - MUSE: NORMAL MMHG
ERYTHROCYTE [DISTWIDTH] IN BLOOD BY AUTOMATED COUNT: 20.3 % (ref 10–15)
FERRITIN SERPL-MCNC: 501 NG/ML (ref 11–328)
GFR SERPL CREATININE-BSD FRML MDRD: 13 ML/MIN/1.73M2
GLUCOSE SERPL-MCNC: 199 MG/DL (ref 70–99)
HCT VFR BLD AUTO: 30.3 % (ref 35–47)
HGB BLD-MCNC: 9.1 G/DL (ref 11.7–15.7)
INTERPRETATION ECG - MUSE: NORMAL
IRON BINDING CAPACITY (ROCHE): 229 UG/DL (ref 240–430)
IRON SATN MFR SERPL: 29 % (ref 15–46)
IRON SERPL-MCNC: 67 UG/DL (ref 37–145)
MAGNESIUM SERPL-MCNC: 1.7 MG/DL (ref 1.7–2.3)
MCH RBC QN AUTO: 21.7 PG (ref 26.5–33)
MCHC RBC AUTO-ENTMCNC: 30 G/DL (ref 31.5–36.5)
MCV RBC AUTO: 72 FL (ref 78–100)
P AXIS - MUSE: 65 DEGREES
P AXIS - MUSE: 67 DEGREES
P AXIS - MUSE: 67 DEGREES
PLATELET # BLD AUTO: 148 10E3/UL (ref 150–450)
POTASSIUM SERPL-SCNC: 3.5 MMOL/L (ref 3.4–5.3)
PR INTERVAL - MUSE: 176 MS
QRS DURATION - MUSE: 86 MS
QT - MUSE: 400 MS
QT - MUSE: 440 MS
QT - MUSE: 452 MS
QTC - MUSE: 490 MS
QTC - MUSE: 539 MS
QTC - MUSE: 552 MS
R AXIS - MUSE: 18 DEGREES
RBC # BLD AUTO: 4.19 10E6/UL (ref 3.8–5.2)
SODIUM SERPL-SCNC: 143 MMOL/L (ref 136–145)
SYSTOLIC BLOOD PRESSURE - MUSE: NORMAL MMHG
T AXIS - MUSE: 21 DEGREES
T AXIS - MUSE: 9 DEGREES
T AXIS - MUSE: 9 DEGREES
VENTRICULAR RATE- MUSE: 90 BPM
WBC # BLD AUTO: 6.5 10E3/UL (ref 4–11)

## 2023-02-13 PROCEDURE — 83540 ASSAY OF IRON: CPT | Performed by: PATHOLOGY

## 2023-02-13 PROCEDURE — 80048 BASIC METABOLIC PNL TOTAL CA: CPT | Performed by: PATHOLOGY

## 2023-02-13 PROCEDURE — 85027 COMPLETE CBC AUTOMATED: CPT | Performed by: PATHOLOGY

## 2023-02-13 PROCEDURE — 36415 COLL VENOUS BLD VENIPUNCTURE: CPT | Performed by: PATHOLOGY

## 2023-02-13 PROCEDURE — 93000 ELECTROCARDIOGRAM COMPLETE: CPT | Performed by: INTERNAL MEDICINE

## 2023-02-13 PROCEDURE — 82728 ASSAY OF FERRITIN: CPT | Mod: 90 | Performed by: PATHOLOGY

## 2023-02-13 PROCEDURE — 83735 ASSAY OF MAGNESIUM: CPT | Performed by: PATHOLOGY

## 2023-02-13 PROCEDURE — 99000 SPECIMEN HANDLING OFFICE-LAB: CPT | Performed by: PATHOLOGY

## 2023-02-13 PROCEDURE — 83550 IRON BINDING TEST: CPT | Performed by: PATHOLOGY

## 2023-02-13 PROCEDURE — 99204 OFFICE O/P NEW MOD 45 MIN: CPT | Performed by: PHYSICIAN ASSISTANT

## 2023-02-13 ASSESSMENT — PAIN SCALES - GENERAL: PAINLEVEL: MILD PAIN (3)

## 2023-02-13 ASSESSMENT — LIFESTYLE VARIABLES: TOBACCO_USE: 0

## 2023-02-13 ASSESSMENT — ENCOUNTER SYMPTOMS: SEIZURES: 0

## 2023-02-13 NOTE — PROGRESS NOTES
Nephrology Clinic Visit  Nithya [facility] MRN: [id number] YOB: 1970  Primary Care Provider: Giancarlo Arizmendi  History Obtained From: Patient  External Document Review: Primary Care Provider  -------------------------------------------------------------------------------------------------------------------------------  Visit 2/15/2023  -Bp control: 154/82 today in office likely pain mediated.  -Transplant planning: Evaluation coming up on 2/20/2023  -RRT planning: R AVF 2/14/2023. Tolerated procedure well. Has some pain and mild swelling today but overall the site looks really good. Advised to keep arm elevated to help with mild swelling.  -UOP/LE swelling: Denies  -Uremic symptoms: Denies  -Hypocalcemia: Continued low calcium with Calcium 6 (last albumin was 3 so corrects up to the mid 6's or so). We discussed dietary calcium intake today. Her diet seems likely to be very low in calcium and we discussed small adjustments to move this in the right direction. She also has an upcoming appointment with dietician. May need to consider calcium supplementation if we can't get things moving in the right direction. Phos was okay at last check.   -Reports having some stomach pain yesterday (post op). No N/V since returning home. Tolerating PO intake.     Visit 1/27/2023:  -BP Control: Bp looks great in office. 123/70.  -Transplant Planning: Adjusted the primary phoen number to her Daughter's number at their request as this is usually the number they are able to answer.   -RRT Planning: AVF placement planned 2/14/2023.   -UOP/LE Swelling: Swelling is much better.   -Uremic Symptoms: No uremic symptoms today  -Having some discomfort in her feet. Had a procedure? Reports her feet feels somewhat better now.     Visit 12/16/2022  -BP control: Discussed that we really need home BP readings. She is usually in the 130-140's and even had issues not that long ago with severe hypotension (prompting holding of hydrochlorothiazide).  Elevated today in clinic. Some LE edema. Likely will need a diuretic on board again soon. Send me home readings if able otherwise low threshold to add on Lasix if LE worsens or if Systolic BP > 140 at her next appointment.  -Transplant planning: Yes, she has been contacted by Transplant. She is not sure if she has a number to contact them. I see one message saved to the chart from transplant outreach. Will have them contact her again.   -RRT planning: Looks like she has access appointment scheduled for 1/9/2023. She has vein mapping scheduled for this same day at 1PM. We have discussed this today to make sure she is aware of this appointment as we are certainly approaching the point where I need her to have this fistula in place and maturing so we can safely start dialysis when needed.   -UOP:   -Lower extremity swelling: Yes, b/l 1+  -Uremic Symptoms: She denies significant uremic symptoms today. She is eating well, no nausea, no metallic taste in mouth  -Anemia clinic: getting set up for EPO injections. Looks like she is currently set up for 1/4/2023 to get an EPO injection.    Visit 11/16/22  -Here for follow up visit with [facility]  -BP control: Okay at this point.   -Understanding of renal impairment: We again discussed her severe renal impairment and how biopsy findings do not suggest any reversible cause. We need to make preparations for RRT.  -Plan for RRT: The patient had RRT education yesterday. Leaning towards in center hemodialysis.  -Plan for Transplant: Says her son is willing to potentially be a living donor. He lives out in [place]. Needs to be referred for transplant eval.    Visit 10/19/22:  -Here to establish care with [facility]  -here with daughter today  -DM Control: Poorly controlled. Long term issue. Follows with PCP and referred to Endo  -HTN Control: Had some issues with hypotension recently so hydrochlorothiazide discontinued and BP improved. Continues on Lisinopril. BP in the 140's  at todays appointments. She doesn't check her BP at home.   -Hx of Kidney Stones: She denies a history of any symptomatic kidney stones. She denies gross hematuria.  -Nocturia: 1-2x per night  -NSAIDs: Doesn't sound that way   -Herbal/OTC Medications: Denies  -RRT Planning/GOC:   --Understanding of kidney dysfunction: knows that her kidneys have been weak for a long time but not clear on how bad her function is. She is very reserved, asks few questions, and has a difficult time summarizing what we talked about today. This is not surprising as she is clearly nervous/scared about kidney failure/dialysis.   --We discussed that she is right at the border of stage 5 kidney disease and that we need to start planning for RRT.  --We had a long discussion about RRT and what kidney failure is using interpretor.  -Uremic symptoms:   --Appetite: She thinks her appetite is good.  --Metallic taste: No  --Nausea: She is often having nausea. She thinks lime juice helps.   --Day/Night Reversal: Sleep is okay  --Swelling in legs: She notices a bit of leg swelling.  -She has been eating and drinking well leading up to today's appointment.    Objective:  PAST MEDICAL HISTORY:  Past Medical History:   Diagnosis Date     Arthritis      Depression      Diabetes (H)      End stage renal disease (H)      Hypertension       DM  HTN    PAST SURGICAL HISTORY:  Past Surgical History:   Procedure Laterality Date     ABDOMEN SURGERY       APPENDECTOMY       BIOPSY       CATARACT IOL, RT/LT         MEDICATIONS:  Current Outpatient Medications   Medication Instructions     acetaminophen (TYLENOL) 500-1,000 mg, Oral, EVERY 6 HOURS PRN     alcohol swab prep pads Use to swab area of injection/amadeo as directed.     ammonium lactate (AMLACTIN) 12 % external cream Topical, DAILY     aspirin 81 mg, Oral, DAILY     atorvastatin (LIPITOR) 80 mg, Oral, DAILY     blood glucose (NO BRAND SPECIFIED) test strip Use to test blood sugar 3 times daily or as  directed. To accompany: Blood Glucose Monitor Brands: per insurance.     blood glucose calibration (NO BRAND SPECIFIED) solution To accompany: Blood Glucose Monitor Brands: per insurance.     blood glucose monitoring (NO BRAND SPECIFIED) meter device kit Use to test blood sugar 3 times daily or as directed. Preferred blood glucose meter OR supplies to accompany: Blood Glucose Monitor Brands: per insurance.     carboxymethylcellulose-glycerin (REFRESH OPTIVE) 0.5-0.9 % ophthalmic solution 1 drop, Both Eyes, 3 TIMES DAILY PRN     Continuous Blood Gluc Sensor (FREESTYLE MICKIE 14 DAY SENSOR) MIS 1 Device, Does not apply, EVERY 14 DAYS, Change sensor as directed every 14 days     cyanocobalamin (VITAMIN B-12) 1,000 mcg, Oral, DAILY     escitalopram (LEXAPRO) 10 mg, Oral, DAILY     fenofibrate (LOFIBRA) 54 mg, Oral, DAILY     ferrous sulfate (FEROSUL) 325 mg, Oral, EVERY OTHER DAY     fish oil-omega-3 fatty acids 1 g, Oral, DAILY     gabapentin (NEURONTIN) 300 mg, Oral, 2 TIMES DAILY     insulin aspart (NOVOLOG PEN) 18 Units, Subcutaneous, 3 TIMES DAILY WITH MEALS     insulin glargine (LANTUS PEN) 80 Units, Subcutaneous, EVERY MORNING, 40 unit(s) on each side     insulin pen needle (32G X 4 MM) 32G X 4 MM miscellaneous Use 6x pen needles daily or as directed (victoza, 3 for novolog, 2 for lantus)     liraglutide (VICTOZA) 1.8 mg, Subcutaneous, DAILY     lisinopril (ZESTRIL) 40 mg, Oral, DAILY     melatonin 3 mg, Oral, AT BEDTIME PRN     menthol, Topical Analgesic, 2.5% (BENGAY VANISHING SCENT) 2.5 % GEL topical gel Topical, 3 TIMES DAILY (Diuretics and Nitrates), Right shoulder pain      metoprolol succinate ER (TOPROL XL) 200 mg, Oral, DAILY     mirtazapine (REMERON) 7.5 mg, Oral, AT BEDTIME     omeprazole 20 mg, Oral, DAILY     polyethylene glycol (MIRALAX) 17 g, Oral, DAILY     silver sulfADIAZINE (SILVADENE) 1 % external cream Topical, 2 TIMES DAILY     thin (NO BRAND SPECIFIED) lancets Use with lanceting device. To  accompany: Blood Glucose Monitor Brands: per insurance.     urea (GORMEL) 20 % external cream Topical, DAILY, Apply to feet daily     vitamin D2 (ERGOCALCIFEROL) 50,000 Units, Oral, WEEKLY       FAMILY MEDICAL HISTORY:   Family History   Problem Relation Age of Onset     Glaucoma No family hx of      Macular Degeneration No family hx of      Breast Cancer No family hx of      Ovarian Cancer No family hx of      Anesthesia Reaction No family hx of      Venous thrombosis No family hx of        PHYSICAL EXAM:   BP (!) 154/82   Pulse 88   GENERAL APPEARANCE: appears older than stated age, uses a cane when ambulating  EYES: nonicteric  HENT: mouth without ulcers or lesions  RESP: lungs clear to auscultation   CV: regular rhythm, normal rate, no rub  ABDOMEN: soft, nontender, normal bowel sounds, no HSM   Extremities: No LE edema   MS: no evidence of inflammation in joints, no muscle tenderness  SKIN: no rash  NEURO: mentation intact and speech normal  PSYCH: affect normal  Access: RUE AVF site looks good. Minor swelling. No signs of infection.     LABS REVIEWED BY ME:   ANEMIA  Recent Labs   Lab Test 02/13/23  0908 02/01/23  0759 01/27/23  1415 01/18/23  1139 01/04/23  1130 12/16/22  1240 11/16/22  1253   HGB 9.1* 9.0* 9.2* 8.9*   < > 7.4* 8.0*   IRONSAT 29  --  30  --   --  26 24   *  --  516*  --   --  420* 583*    < > = values in this interval not displayed.       BMP  Recent Labs   Lab Test 02/13/23  0908 01/27/23  1415 11/16/22  1253 10/29/22  0727 10/28/22  0842 10/19/22  0625 06/02/22  1300 02/28/22  1334 12/09/21  0725 12/08/21  0607 12/06/21  2347 12/06/21  2118    142 141 143   < > 143   < > 136   < > 139   < > 133*   POTASSIUM 3.5 3.5 3.3* 3.4   < > 3.0*   < > 3.5   < > 3.5  3.5   < > 3.5   CHLORIDE 106 103 105 110*   < > 105   < > 98   < > 104   < > 92*   CO2 26 29 23 24   < > 28   < > 25   < > 25   < > 25   ANIONGAP 11 10 13 9   < > 10   < > 13   < > 10   < > 16   BUN 23.5* 27.5* 25.4* 18.7    < > 17.9   < > 18   < > 23*   < > 39*   CR 3.89* 4.23* 3.39* 3.12*   < > 3.32*   < > 1.93*   < > 1.97*   < > 2.54*   GFRESTIMATED 13* 12* 15* 17*   < > 16*   < > 30*   < > 28*   < > 21*   MAG 1.7  --   --   --   --  1.6*  --   --   --   --   --  1.9   PROTTOTAL  --   --   --   --   --   --   --  7.5  --  7.3  --  8.5*    < > = values in this interval not displayed.       CBC  Recent Labs   Lab Test 02/13/23  0908 02/01/23  0759 01/27/23  1415 01/18/23  1139 12/16/22  1240 11/16/22  1253 10/29/22  0727 10/28/22  1425 10/28/22  0842   HGB 9.1* 9.0* 9.2* 8.9*   < > 8.0* 7.3*   < > 8.9*   WBC 6.5  --   --   --   --  5.2 6.4  --  7.6   HCT 30.3* 30.1* 29.7* 29.5*   < > 26.0* 24.5*  --  29.9*   MCV 72*  --   --   --   --  71* 73*  --  72*   *  --   --   --   --  190 146*  --  175    < > = values in this interval not displayed.       DIABETES  Recent Labs   Lab Test 12/28/22  1506 09/21/22  0814 09/14/22  1518 06/02/22  1300   A1C 7.3* 11.0* 11.1* 10.4*       HYPONATREMIA  Recent Labs   Lab Test 12/06/21  2118   UNAR 57       MBD  Recent Labs   Lab Test 02/13/23  0908 01/27/23  1415 11/16/22  1253 10/29/22  0727 10/28/22  0842 10/19/22  0625 06/02/22  1300 02/28/22  1334   JOSEPH 6.0* 6.6* 6.2* 6.0*   < > 6.6*   < > 8.2*   ALBUMIN  --  3.3* 3.0*  --   --  3.0*  --  3.3*   PHOS  --  3.8 2.9  --   --  3.5  --   --    PTHI  --   --   --   --   --  87*  --   --     < > = values in this interval not displayed.        URINE STUDIES  Recent Labs   Lab Test 10/19/22  0705 12/06/21  2118   COLOR Yellow Light Yellow   APPEARANCE Clear Clear   URINEGLC 250* >1000*   URINEBILI Negative Negative   URINEKETONE Negative Negative   SG 1.025 1.016   UBLD Small* 0.1 mg/dL*   URINEPH 7.0 6.0   PROTEIN 300* 100*   NITRITE Negative Negative   LEUKEST Negative Negative   RBCU 3* 3*   WBCU 1 10*     No lab results found.    ADDITIONAL LABS ORDERED/REVIEWED BY ME:  None    Assessment/Plan  CKD G5A3  Established care with [facility]  10/19/22    Oldest labs I am able to see in the system are from 7/29/2019 when creatinine was 2. Patient was admitted 12/2021 with hyperglycemia and had presumed pre-renal PRUDENCE. Creatinine improved from 2.16 -> 1.57 at time of discharge. At follow up her creatinine has been in the high 1's to mid 2's range (2.53 on 9/14/22) and on 10/19/22 unfortunately up to 3.3. She has hx of kidney stones noted on CT Abd/Pelvis 12/2021 but no proven episodes of obstructive nephropathy. Likely progressive renal impairment from a combination of recurrent pre-renal AKIs in the setting of significantly elevated glucose levels, DKD, and HTN nephrosclerosis. Interestingly on 10/19/22 she was found to have severe Nephrotic range proteinuria with UPCR suggestive of 15g/day. This level of nephrotic range proteinuria is certainly atypical for DN. Renal biopsy was obtained 10/28/22 with advanced/diffuse glomerulosclerosis. Proceeding with RRT/Transplant planning.     Seeing patient monthly until all RRT/Transplant planning steps are in place and to monitor for development of Uremic symptoms.     Plan:  -Continue to monitor for RRT initiation need. Thankfully no indication today. Keep follow up for post op evaluations.  -Has Appt with Transplant for evaluation. Advised to keep this appointment.  -Continue with DM and HTN medications as prescribed.  -Optimize BP as needed. No adjustments today as likely elevated BP in setting of arm discomfort.     History of Nephrolithiasis  Stones noted on CT Abd/Pelvis 12/2021. Renal US 10/2022 without stones.    Anemia of Renal Disease:  -Hemoglobin 7.4  -Ferritin: Acceptable  -TSAT: 27 (acceptable)    Plan:  -Enrolled in anemia clinic (message sent 11/16/2022).    MBD:  Phos: 3.5 (accpetable)  PTH: 87 (acceptable)  Calcium: Corrects to mid 6's 2/15/2023. Increase calcium itnake. Low threshold to add on supplement as long as Phos remains acceptable.    Lipid Management:  KDIGO 2013 Guidelines recommend the  following for patients with CKD:  Adults >/= 51 yo w/ CKD stage 1 or 2: Statin  Adults >/= 51 yo w/ CKD stage 3-5: Statin + Ezetmibe or Statin alone  Adults 18-49 + 1 of the following (CAD, DM, Ischemic stroke, 10 yr ASCVD risk >10%): Statin    KDIGO guidelines recommend Simvastatin 20mg/Ezetmibe 10mg qDay for patients with CKD G3a-G5 (in line with the SHARP trial). If Simvastatin used alone, 40mg qDay is referenced.   Other options include: Atorvastatin 20mg qDay (4D trial) and Rosuvastatin 10mg qDay (RHONDA trial)    Current regimen: Atorvastatin 80mg qDay, Fenofibrate 54mg qDay    RRT/GOC:  We have discussed the following regarding Renal Replacement Therapy:  Is RRT within this patient's GOC?: Yes  -Longterm vs Time-limited trial: Longterm    --Review of Conservative Kidney Management:  ---We discussed options for medical management of symptoms related to renal failure (High dose diuretics, potassium binders, medications for nausea/itching) and some benefits associated with this strategy (less interactions with healthcare system, less appointments, less medications, typically less time spent in the hospital)  ---We discussed that patients with no residual kidney function will have a rapid accumulation of toxins/fluid whereas patients with some residual kidney function can be managed medically for quite some time.     Modalities:  -Hemodialysis vs Peritoneal Dialysis  --Home vs In-center  ---Favor HD and In-center. Discussed PD vs HD extensively 11/16/22. Discussed how patient and daughter (or other family member) would be educated at the PD center until they felt comfortable doing PD alone at home. Discussed that PD offers more freedom but more responsibility compared to in-center HD. Patient was hesitant to make a decision on PD vs HD but after questioning her multiple times and explaining the difference between the two modalities multiple times (including reviewing technique, pictures, and impact on lifestyle)  the patient ultimately decided that she would prefer to go to a HD center 3x per week and have HD performed via an AVF.     Access:  -AVG vs AVF vs PD Cath vs TDC vs Temp Vascath  --We have discussed the benefits/risks/timing of placement of the various types of RRT access  ---Referral for placement/estimated timing of placement: Message sent for access eval/placement 11/17/22. Appt scheduled for 1/9/2023 and access placement planned for 2/14/2023. Now s/p RUE AVF placement.  ---Has already been referred to [facility]    Transplant:  -eGFR < 20 on two or more readings?: Yes  --Referral placed for eval?: Placed 11/16/2022. Has appt 2/20/2023.    Return to clinic: 6 weeks    Sahil Waterman MD   of Medicine  Division of Nephrology and Hypertension  [facility]    35 minutes spent on the date of the encounter doing chart review, history and exam, documentation and further activities as noted above

## 2023-02-13 NOTE — PATIENT INSTRUCTIONS
Preparing for Your Surgery      Name:  Nithya [facility]   MRN:  [id number]   :  1970   Today's Date:  2023       Arriving for surgery:  Surgery date:  23  Arrival time:  6:00 am     Surgeries and procedures: Adult patients can have 2 visitors all through the surgery process.   Visiting hours: 8 a.m. to 8:30 p.m.   Hospital: Adult patients and children under age 18 can have 4 visitor at a time     No visitors under the age of 5 are allowed for hospital patients.  Double occupancy rooms: Patients can have only two visitors at a time.   Patients with disabilities: Can have a support person with them (family member, service provider     Or someone well informed about their needs) plus the allowed number of visitors   Patients confirmed or suspected to have symptoms of COVID 19 or flu:     No visitors allowed for adult patients.   Children (under age 18) can have 1 named visitor.   People who are sick or showing symptoms of COVID 19 or flu:    Are not allowed to visit patients--we can only make exceptions in special situations.     Please follow these guidelines for your visit:   Arrive wearing a mask over your mouth and nose; we will give you a medical mask to wear    If you arrive wearing a cloth mask.   Keep it on during your entire visit, even when in patient's room.   If you don't wear a mask we'll ask you to leave.   Clean your hands with alcohol hand . Do this when you arrive at and leave the building and patient room,    And again after you touch your mask or anything in the room.   You can t visit if you have a fever, cough, shortness of breath, muscle aches, headaches, sore throat    Or diarrhea    Stay 6 feet away from others during your visit and between visits   Go directly to and from the room you are visiting.   Stay in the patient s room during your visit. Limit going to other places in the hospital as much as possible   Leave bags and jackets at home or in the car.   For everyone s  health, please don t come and go during your visit. That includes for smoking   during your visit.     Please come to:     [facility] [address]  [address] [place]  [address]    - ? parking is available in front of the hospital      -   Parking is available in the Patient Visitor Ramp on [address] and [address].     -   When entering the hospital you will be asked COVID screening questions, you will then be directed to Registration.  Registration will direct you to the 3rd floor Surgery waiting room.     -   Please ask if you need an escort or a wheelchair to the Surgery Waiting Room.  Preop number- [phone]      -    Please proceed to [address] on the 3rd floor. [phone]?     - ?If you are in need of directions, wheelchair or escort please stop at the Information Desk in the lobby.  Inform the information person that you are here for surgery; a wheelchair and escort to [address] will be provided.?     What can I eat or drink?  -  You may eat and drink normally up to 8 hours prior to arrival time. (Until 10:00 pm on 2/13/23)  -  You may have clear liquids until 2 hours prior to arrival time. (Until 4:00 am on 2/14/23)    Examples of clear liquids:  Water  Clear broth  Juices (apple, white grape, white cranberry  and cider) without pulp  Noncarbonated, powder based beverages  (lemonade and Raphael-Aid)  Sodas (Sprite, 7-Up, ginger ale and seltzer)  Coffee or tea (without milk or cream)  Gatorade    -  No Alcohol for at least 24 hours before surgery.     Which medicines can I take?    Hold Aspirin for 7 days before surgery.   Hold Multivitamins for 7 days before surgery.  Hold Supplements for 7 days before surgery.  Hold Ibuprofen (Advil, Motrin) for 1 day before surgery--unless otherwise directed by surgeon.  Hold Naproxen (Aleve) for 4 days before surgery.    -  DO NOT take these medications the day of surgery:  Aspirin  Vitamin  B-12  Diclofenac (Voltaren) gel  Fenofibrate (Lofibra)  Ferrous sulfate (Iron)  Fish oil-Omega-3 fatty acids  Insulin Aspart (Novolog pen)  Liraglutide (Victoza)  Lisinopril (Zestril)  Vitamin D2 (Ergocalciferol)    -  PLEASE TAKE these medications the day of surgery or the night before depending on your usual schedule:  Tylenol if needed  Atorvastatin (Lipitor)  Escitalopram (Lexapro)  Gabapentin (Neurontin)  Take 80% of your usual dose of Insulin Glargine (Lantus Pen) - take 64 units (32 units on each side)  Metoprolol succinate (Toprol)  Mirtazapine (Remeron)  Omeprazole (Prilosec)  Betadine to right foot ulcer  Refresh eye drops    How do I prepare myself?  - Please take 2 showers before surgery using Scrubcare or Hibiclens soap.    Use this soap only from the neck to your toes.     Leave the soap on your skin for one minute--then rinse thoroughly.      You may use your own shampoo and conditioner. No other hair products.   - Please remove all jewelry and body piercings.  - No lotions, deodorants or fragrance.  - No makeup or fingernail polish.   - Bring your ID and insurance card.    -If you have a Deep Brain Stimulator, Spinal Cord Stimulator, or any Neuro Stimulator device---you must bring the remote control to the hospital.      ALL PATIENTS GOING HOME THE SAME DAY OF SURGERY ARE REQUIRED TO HAVE A RESPONSIBLE ADULT TO DRIVE AND BE IN ATTENDANCE WITH THEM FOR 24 HOURS FOLLOWING SURGERY.    Covid testing policy as of 12/06/2022  Your surgeon will notify and schedule you for a COVID test if one is needed before surgery--please direct any questions or COVID symptoms to your surgeon      Questions or Concerns:    - For any questions regarding the day of surgery or your hospital stay, please contact the Pre Admission Nursing Office at [phone].     - If you have health changes between today and your surgery, please call your surgeon.     - For questions after surgery, please call your surgeons office.

## 2023-02-13 NOTE — ANESTHESIA PREPROCEDURE EVALUATION
Anesthesia Pre-Procedure Evaluation    Patient: [facility]   MRN: [id number] : 1970        Procedure : Procedure(s):  left versus right upper extremity Arteriovenous fistula surgery          Past Medical History:   Diagnosis Date     Arthritis      Depression      Diabetes (H)      End stage renal disease (H)      Hypertension       Past Surgical History:   Procedure Laterality Date     ABDOMEN SURGERY       APPENDECTOMY       BIOPSY       CATARACT IOL, RT/LT        No Known Allergies   Social History     Tobacco Use     Smoking status: Never     Smokeless tobacco: Never   Substance Use Topics     Alcohol use: Not Currently      Wt Readings from Last 1 Encounters:   23 49 kg (108 lb)        Anesthesia Evaluation   Pt has had prior anesthetic. Type: General.    No history of anesthetic complications       ROS/MED HX  ENT/Pulmonary:  - neg pulmonary ROS  (-) tobacco use, asthma and recent URI   Neurologic:  - neg neurologic ROS  (-) no seizures and no CVA   Cardiovascular:     (+) Dyslipidemia hypertension-----Previous cardiac testing   Echo: Date: Results:    Stress Test: Date: Results:    ECG Reviewed: Date: 10/28/22 Results:  Sinus rhythm   Possible Left atrial enlargement   Septal infarct (cited on or before 06-DEC-2021)   Prolonged QT   Abnormal ECG   When compared with ECG of 06-DEC-2021 20:57,   Nonspecific T wave abnormality now evident in Inferior leads   QT has lengthened   Cath: Date: Results:      METS/Exercise Tolerance:  Comment: <4   Hematologic: Comments: Beta thalassemia minor    (+) anemia,  (-) history of blood clots   Musculoskeletal: Comment: Pt reports bilateral leg pain      GI/Hepatic:     (+) GERD, Asymptomatic on medication,     Renal/Genitourinary:     (+) renal disease, type: ESRD, Pt requires dialysis, Nephrolithiasis ,     Endo:     (+) type II DM, Last HgA1c: 7.3, date: 22, Using insulin,  (-) thyroid disease   Psychiatric/Substance Use:     (+) psychiatric history  depression     Infectious Disease:  - neg infectious disease ROS     Malignancy:  - neg malignancy ROS     Other:  - neg other ROS          Physical Exam    Airway        Mallampati: II   TM distance: > 3 FB   Neck ROM: full     Respiratory Devices and Support         Dental           Cardiovascular   cardiovascular exam normal          Pulmonary   pulmonary exam normal                OUTSIDE LABS:  CBC:   Lab Results   Component Value Date    WBC 6.5 02/13/2023    WBC 5.2 11/16/2022    HGB 9.1 (L) 02/13/2023    HGB 9.0 (L) 02/01/2023    HCT 30.3 (L) 02/13/2023    HCT 30.1 (L) 02/01/2023     (L) 02/13/2023     11/16/2022     BMP:   Lab Results   Component Value Date     02/13/2023     01/27/2023    POTASSIUM 3.5 02/13/2023    POTASSIUM 3.5 01/27/2023    CHLORIDE 106 02/13/2023    CHLORIDE 103 01/27/2023    CO2 26 02/13/2023    CO2 29 01/27/2023    BUN 23.5 (H) 02/13/2023    BUN 27.5 (H) 01/27/2023    CR 3.89 (H) 02/13/2023    CR 4.23 (H) 01/27/2023     (H) 02/13/2023     (H) 01/27/2023     COAGS:   Lab Results   Component Value Date    PTT 31 10/28/2022    INR 0.91 10/28/2022    FIBR 302 10/28/2022     POC: No results found for: BGM, HCG, HCGS  HEPATIC:   Lab Results   Component Value Date    ALBUMIN 3.3 (L) 01/27/2023    PROTTOTAL 7.5 02/28/2022    ALT 21 02/28/2022    AST 23 02/28/2022    ALKPHOS 88 02/28/2022    BILITOTAL 0.5 02/28/2022     OTHER:   Lab Results   Component Value Date    A1C 7.3 (H) 12/28/2022    JOSEPH 6.0 (L) 02/13/2023    PHOS 3.8 01/27/2023    MAG 1.7 02/13/2023    LIPASE 108 (H) 12/06/2021    TSH 2.31 12/28/2022       Anesthesia Plan    ASA Status:  3   NPO Status:  NPO Appropriate    Anesthesia Type: MAC.     - Reason for MAC: straight local not clinically adequate   Induction: N/a.   Maintenance: Other.        Consents    Anesthesia Plan(s) and associated risks, benefits, and realistic alternatives discussed. Questions answered and  patient/representative(s) expressed understanding.    - Discussed:     - Discussed with:  Patient,       - Extended Intubation/Ventilatory Support Discussed: No.      - Patient is DNR/DNI Status: No    Use of blood products discussed: No .     Postoperative Care    Pain management: Peripheral nerve block (Single Shot).        Comments:    Other Comments: Discussed MAC and Block vs GA. Given new onset of long QT without clear etiology, will try light MAC with Block to avoid medications which may prolong her QT further.  If it is necessary to convert to general, will do so with propofol alone.            Tremayne Cyr MD

## 2023-02-13 NOTE — H&P
Pre-Operative H & P     CC:  Preoperative exam to assess for increased cardiopulmonary risk while undergoing surgery and anesthesia.    Date of Encounter: 2/13/2023  Primary Care Physician:  Giancarlo Arizmendi     Reason for visit:   Encounter Diagnoses   Name Primary?     Preop examination Yes     ESRD (end stage renal disease) (H)        HPI  Nithya Matthews is a 53 year old female who presents for pre-operative H & P in preparation for  Procedure Information     Case: [id number] Date/Time: 02/14/23 0800    Procedure: left versus right upper extremity Arteriovenous fistula surgery (Left: Arm)    Anesthesia type: General    Diagnosis: ESRD (end stage renal disease) (H) [N18.6]    Pre-op diagnosis: ESRD (end stage renal disease) (H) [N18.6]    Location:  OR  /  OR    Providers: Markell Langford MD          Nithya Matthews is a 53 year old female with PMH significant for hypertension, dyslipidemia, anemia, depression, 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  She is followed by nephrology and working towards transplant. The above procedure is planned.    History is obtained from the patient and chart review.  This visit was completed with the aide of Tabula  via iPad.      Hx of abnormal bleeding or anti-platelet use: ASA 81mg    Menstrual history: No LMP recorded. Patient is postmenopausal.:      Past Medical History  Past Medical History:   Diagnosis Date     Arthritis      Depression      Diabetes (H)      End stage renal disease (H)      Hypertension        Past Surgical History  Past Surgical History:   Procedure Laterality Date     ABDOMEN SURGERY       APPENDECTOMY       BIOPSY       CATARACT IOL, RT/LT         Prior to Admission Medications  Current Outpatient Medications   Medication Sig Dispense Refill     acetaminophen (TYLENOL) 500 MG tablet Take 1-2 tablets (500-1,000 mg) by mouth every 6 hours as needed for mild pain 100 tablet 3      aspirin 81 MG EC tablet Take 1 tablet (81 mg) by mouth daily 90 tablet 3     atorvastatin (LIPITOR) 80 MG tablet Take 1 tablet (80 mg) by mouth daily (Patient taking differently: Take 80 mg by mouth every morning) 90 tablet 3     cyanocobalamin (VITAMIN B-12) 1000 MCG tablet Take 1 tablet (1,000 mcg) by mouth daily (Patient taking differently: Take 1,000 mcg by mouth every morning) 90 tablet 3     escitalopram (LEXAPRO) 10 MG tablet Take 1 tablet (10 mg) by mouth daily (Patient taking differently: Take 10 mg by mouth every evening) 90 tablet 3     fenofibrate (LOFIBRA) 54 MG tablet Take 1 tablet (54 mg) by mouth daily (Patient taking differently: Take 54 mg by mouth every morning) 90 tablet 1     ferrous sulfate (FEROSUL) 325 (65 Fe) MG tablet Take 1 tablet (325 mg) by mouth every other day 70 tablet 3     fish oil-omega-3 fatty acids 1000 MG capsule Take 1 capsule (1 g) by mouth daily (Patient taking differently: Take 1 g by mouth every morning) 90 capsule 3     gabapentin (NEURONTIN) 300 MG capsule Take 1 capsule (300 mg) by mouth 2 times daily 60 capsule 5     insulin aspart (NOVOLOG PEN) 100 UNIT/ML pen Inject 15 Units Subcutaneous 3 times daily (with meals) 45 mL 3     insulin glargine (LANTUS PEN) 100 UNIT/ML pen Inject 80 Units Subcutaneous every morning 40 unit(s) on each side (Patient taking differently: Inject 40 Units Subcutaneous every morning 40 unit(s) on each side) 75 mL 3     liraglutide (VICTOZA) 18 MG/3ML solution Inject 1.8 mg Subcutaneous daily (Patient taking differently: Inject 1.8 mg Subcutaneous every morning) 9 mL 11     lisinopril (ZESTRIL) 40 MG tablet Take 1 tablet (40 mg) by mouth daily (Patient taking differently: Take 40 mg by mouth every morning) 90 tablet 3     metoprolol succinate ER (TOPROL-XL) 200 MG 24 hr tablet Take 1 tablet (200 mg) by mouth daily (Patient taking differently: Take 200 mg by mouth every morning) 90 tablet 3     mirtazapine (REMERON) 7.5 MG tablet TAKE 1  TABLET (7.5 MG) BY MOUTH AT BEDTIME 90 tablet 3     omeprazole 20 MG tablet Take 1 tablet (20 mg) by mouth daily (Patient taking differently: Take 20 mg by mouth every morning) 90 tablet 3     Continuous Blood Gluc Sensor (FREESTYLE MICKIE 14 DAY SENSOR) MIS USE 1 DEVICE EVERY 14 DAYS CHANGE SENSOR AS DIRECTED EVERY 14 DAYS       diclofenac (VOLTAREN) 1 % topical gel Apply 4 g topically 4 times daily as needed (rib/chest pain) 350 g 3     insulin pen needle (32G X 4 MM) 32G X 4 MM miscellaneous Use 6x pen needles daily or as directed (victoza, 3 for novolog, 2 for lantus) 200 each 11     povidone-iodine (BETADINE) 10 % topical solution Apply topically daily Apply to a cottonball and then paint over the ulcer on the right foot. 30 mL 0     REFRESH OPTIVE ADVANCED 0.5-1-0.5 % SOLN PLACE 1 DROP INTO BOTH EYES 3 TIMES DAILY AS NEEDED (AS NEEDED FOR DRY EYE/EYE PAIN)       vitamin D2 (ERGOCALCIFEROL) 84507 units (1250 mcg) capsule Take 1 capsule (50,000 Units) by mouth twice a week (Patient taking differently: Take 50,000 Units by mouth once a week Saturday) 8 capsule 4       Allergies  No Known Allergies    Social History  Social History     Socioeconomic History     Marital status:      Spouse name: Not on file     Number of children: Not on file     Years of education: Not on file     Highest education level: Not on file   Occupational History     Not on file   Tobacco Use     Smoking status: Never     Smokeless tobacco: Never   Vaping Use     Vaping Use: Never used   Substance and Sexual Activity     Alcohol use: Not Currently     Drug use: Never     Sexual activity: Yes   Other Topics Concern     Not on file   Social History Narrative     Not on file     Social Determinants of Health     Financial Resource Strain: Medium Risk     Difficulty of Paying Living Expenses: Somewhat hard   Food Insecurity: No Food Insecurity     Worried About Running Out of Food in the Last Year: Never true     Ran Out of Food in  the Last Year: Never true   Transportation Needs: Unmet Transportation Needs     Lack of Transportation (Medical): No     Lack of Transportation (Non-Medical): Yes   Physical Activity: Sufficiently Active     Days of Exercise per Week: 7 days     Minutes of Exercise per Session: 30 min   Stress: Stress Concern Present     Feeling of Stress : To some extent   Social Connections: Moderately Integrated     Frequency of Communication with Friends and Family: Twice a week     Frequency of Social Gatherings with Friends and Family: Twice a week     Attends Baptism Services: 1 to 4 times per year     Active Member of Clubs or Organizations: No     Attends Club or Organization Meetings: Never     Marital Status:    Intimate Partner Violence: Not At Risk     Fear of Current or Ex-Partner: No     Emotionally Abused: No     Physically Abused: No     Sexually Abused: No   Housing Stability: Low Risk      Unable to Pay for Housing in the Last Year: No     Number of Places Lived in the Last Year: 2     Unstable Housing in the Last Year: No       Family History  Family History   Problem Relation Age of Onset     Glaucoma No family hx of      Macular Degeneration No family hx of      Breast Cancer No family hx of      Ovarian Cancer No family hx of      Anesthesia Reaction No family hx of      Venous thrombosis No family hx of        Review of Systems  The complete review of systems is negative other than noted in the HPI or here.     Anesthesia Evaluation   Pt has had prior anesthetic.     No history of anesthetic complications       ROS/MED HX  ENT/Pulmonary:  - neg pulmonary ROS  (-) tobacco use, asthma and recent URI   Neurologic:  - neg neurologic ROS  (-) no seizures and no CVA   Cardiovascular:     (+) Dyslipidemia hypertension-----Previous cardiac testing   Echo: Date: Results:    Stress Test: Date: Results:    ECG Reviewed: Date: 10/28/22 Results:  Sinus rhythm   Possible Left atrial enlargement   Septal infarct  "(cited on or before 06-DEC-2021)   Prolonged QT   Abnormal ECG   When compared with ECG of 06-DEC-2021 20:57,   Nonspecific T wave abnormality now evident in Inferior leads   QT has lengthened   Cath: Date: Results:      METS/Exercise Tolerance:  Comment: <4   Hematologic: Comments: Beta thalassemia minor    (+) anemia,  (-) history of blood clots   Musculoskeletal: Comment: Pt reports bilateral leg pain      GI/Hepatic:     (+) GERD, Asymptomatic on medication,     Renal/Genitourinary:     (+) renal disease, type: ESRD, Pt requires dialysis, Nephrolithiasis ,     Endo:     (+) type II DM, Last HgA1c: 7.3, date: 12/28/22, Using insulin,  (-) thyroid disease   Psychiatric/Substance Use:     (+) psychiatric history depression     Infectious Disease:  - neg infectious disease ROS     Malignancy:  - neg malignancy ROS     Other:  - neg other ROS          BP (!) 161/95 (BP Location: Right arm, Patient Position: Sitting, Cuff Size: Adult Regular)   Pulse 95   Temp 98.3  F (36.8  C) (Oral)   Resp 16   Ht 1.575 m (5' 2\")   Wt 49 kg (108 lb)   SpO2 95%   Breastfeeding No   BMI 19.75 kg/m      Physical Exam   Constitutional: Awake, alert, cooperative, no apparent distress, and appears stated age.  Eyes: Pupils equal, round and reactive to light, extra ocular muscles intact, sclera clear, conjunctiva normal.  HENT: Normocephalic, oral pharynx with moist mucus membranes, fair dentition. No goiter appreciated.   Respiratory: Clear to auscultation bilaterally, no crackles or wheezing.  Cardiovascular: Regular rate and rhythm, normal S1 and S2, and no murmur noted.  Carotids +2, no bruits. No edema. Palpable pulses to radial and PT arteries.   GI: Normal bowel sounds, soft, non-distended, non-tender  Lymph/Hematologic: No cervical lymphadenopathy and no supraclavicular lymphadenopathy.  Genitourinary:  deferred  Skin: Warm and dry.  No rashes at anticipated surgical site.   Musculoskeletal: Full ROM of neck. There is no " redness, warmth, or swelling of the joints. Gross motor strength is normal.    Neurologic: Awake, alert, oriented to name, place and time. Cranial nerves II-XII are grossly intact.   Neuropsychiatric: Calm, cooperative. Normal affect.     Prior Labs/Diagnostic Studies   All labs and imaging personally reviewed     Component      Latest Ref Rng & Units 2/1/2023   Hemoglobin      11.7 - 15.7 g/dL 9.0 (L)   Hematocrit      35.0 - 47.0 % 30.1 (L)     Component      Latest Ref Rng & Units 2/13/2023   WBC      4.0 - 11.0 10e3/uL 6.5   RBC Count      3.80 - 5.20 10e6/uL 4.19   Hemoglobin      11.7 - 15.7 g/dL 9.1 (L)   Hematocrit      35.0 - 47.0 % 30.3 (L)   MCV      78 - 100 fL 72 (L)   MCH      26.5 - 33.0 pg 21.7 (L)   MCHC      31.5 - 36.5 g/dL 30.0 (L)   RDW      10.0 - 15.0 % 20.3 (H)   Platelet Count      150 - 450 10e3/uL 148 (L)     Component      Latest Ref Rng & Units 2/13/2023   Sodium      136 - 145 mmol/L 143   Potassium      3.4 - 5.3 mmol/L 3.5   Chloride      98 - 107 mmol/L 106   Carbon Dioxide (CO2)      22 - 29 mmol/L 26   Anion Gap      7 - 15 mmol/L 11   Urea Nitrogen      6.0 - 20.0 mg/dL 23.5 (H)   Creatinine      0.51 - 0.95 mg/dL 3.89 (H)   Calcium      8.6 - 10.0 mg/dL 6.0 (L)   Glucose      70 - 99 mg/dL 199 (H)   GFR Estimate      >60 mL/min/1.73m2 13 (L)     Component      Latest Ref Rng & Units 2/13/2023   Magnesium      1.7 - 2.3 mg/dL 1.7       EKG/ stress test - if available please see in ROS above       The patient's records and results personally reviewed by this provider.     Outside records reviewed from: No outside records available        Assessment      Barriga Byron is a 53 year old female seen as a PAC referral for risk assessment and optimization for anesthesia.    Plan/Recommendations  Pt will be optimized for the proposed procedure.  See below for details on the assessment, risk, and preoperative recommendations    NEUROLOGY  - No history of TIA, CVA or seizure    -Post Op delirium  "risk factors:  No risk identified    ENT  - No current airway concerns.  Will need to be reassessed day of surgery.  Mallampati: III  TM: > 3    CARDIAC  - Hypertension, hold lisinopril DOS, continue metoprolol  - HLD, hold ASA 81mg on DOS, continue Lipitor  - denies chest pain, chest pressure    - previous EKG 10/28/22 with QTc of 557.    - pt denies any syncope, near syncope  - EKG today shows NSR, voltage criteria for LVH, prolonged QT: 454/555  - K today of 3.5, Na 143, Mg 1.7    - METS (Metabolic Equivalents), <4 METS.  Pt reports she is limited by her bilateral leg pain and weakness     RCRI-High risk: Class 4  >11% complication reate            Total Score: 3    RCRI: High Risk Surgery    RCRI: Diabetes    RCRI: Elevated Creatinine      - discussed with Dr. Bowman.  Concern for prolonging QT with anesthetics; can this be done under MAC with regional?  Can we delay for cardiac work up?     - called transplant and spoke to Mariah RIVERA RN dialysis access care coordinator.  She will discuss with Dr. Langford this afternoon when he is in clinic and call me back. Will update when able.   - pt will hold Lexapro this evening due to possibility that this is contributing to QT prolongation.     - Dr. Bowman discussed case with Dr. Cyr who will be anesthesia on DOS.      PULMONARY    VIVIENNE Low Risk            Total Score: 2    VIVIENNE: Hypertension    VIVIENNE: Over 50 ys old      - Denies asthma or inhaler use  - Tobacco History      History   Smoking Status     Never   Smokeless Tobacco     Never       GI  - GERD  Controlled on medications: Proton Pump Inhibitor  PONV High Risk  Total Score: 3           1 AN PONV: Pt is Female    1 AN PONV: Patient is not a current smoker    1 AN PONV: Intended Post Op Opioids        /RENAL  - Creatinine today 3.89, GFR 13    ENDOCRINE    - BMI: Estimated body mass index is 19.75 kg/m  as calculated from the following:    Height as of this encounter: 1.575 m (5' 2\").    Weight as of this " encounter: 49 kg (108 lb).  Healthy Weight (BMI 18.5-24.9)  - Diabetes  Hemoglobin A1C (%)   Date Value   12/28/2022 7.3 (H)     Diabetes Type 2, insulin dependent. Hold morning oral hypoglycemic medications and short acting insulin DOS. Take 80% of last scheduled dose of long-acting insulin prior to procedure.  Recommend close monitoring of the patient's blood glucose levels throughout the perioperative period.    HEME  VTE Low Risk 0.26%            Total Score: 1    VTE: Greater than 59 yrs old      - Platelet disfunction second to Aspirin (Ze, many others)  - Chronic anemia, followed by nephrology and referred to anemia management clinic.  Receives aranesp every 2 weeks  Recommend perioperative use of blood conservation techniques intraoperatively and close monitoring for postoperative bleeding.  A type and screen has not been done and deferred to the team day of surgery      PSYCH  - depression, continue Lexapro  - Remeron at HS    OTHER    Different anesthesia methods/types have been discussed with the patient, but they are aware that the final plan will be decided by the assigned anesthesia provider on the date of service.      The patient is optimized for their procedure. AVS with information on surgery time/arrival time, meds and NPO status given by nursing staff. No further diagnostic testing indicated.      On the day of service:     Prep time: 21 minutes  Visit time: 16 minutes  Documentation time: 20 minutes  ------------------------------------------  Total time: 57 minutes      Jennifer Alegre PA-C  Preoperative Assessment Center  [facility]  Phone: [phone]  Fax: [phone]

## 2023-02-13 NOTE — H&P (VIEW-ONLY)
Pre-Operative H & P     CC:  Preoperative exam to assess for increased cardiopulmonary risk while undergoing surgery and anesthesia.    Date of Encounter: 2/13/2023  Primary Care Physician:  Giancarlo Arizmendi     Reason for visit:   Encounter Diagnoses   Name Primary?     Preop examination Yes     ESRD (end stage renal disease) (H)        HPI  Nithya Matthews is a 53 year old female who presents for pre-operative H & P in preparation for  Procedure Information     Case: [id number] Date/Time: 02/14/23 0800    Procedure: left versus right upper extremity Arteriovenous fistula surgery (Left: Arm)    Anesthesia type: General    Diagnosis: ESRD (end stage renal disease) (H) [N18.6]    Pre-op diagnosis: ESRD (end stage renal disease) (H) [N18.6]    Location:  OR  /  OR    Providers: Markell Langford MD          Nithya Matthews is a 53 year old female with PMH significant for hypertension, dyslipidemia, anemia, depression, 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  She is followed by nephrology and working towards transplant. The above procedure is planned.    History is obtained from the patient and chart review.  This visit was completed with the aide of [employer]  via iPad.      Hx of abnormal bleeding or anti-platelet use: ASA 81mg    Menstrual history: No LMP recorded. Patient is postmenopausal.:      Past Medical History  Past Medical History:   Diagnosis Date     Arthritis      Depression      Diabetes (H)      End stage renal disease (H)      Hypertension        Past Surgical History  Past Surgical History:   Procedure Laterality Date     ABDOMEN SURGERY       APPENDECTOMY       BIOPSY       CATARACT IOL, RT/LT         Prior to Admission Medications  Current Outpatient Medications   Medication Sig Dispense Refill     acetaminophen (TYLENOL) 500 MG tablet Take 1-2 tablets (500-1,000 mg) by mouth every 6 hours as needed for mild pain 100 tablet 3      aspirin 81 MG EC tablet Take 1 tablet (81 mg) by mouth daily 90 tablet 3     atorvastatin (LIPITOR) 80 MG tablet Take 1 tablet (80 mg) by mouth daily (Patient taking differently: Take 80 mg by mouth every morning) 90 tablet 3     cyanocobalamin (VITAMIN B-12) 1000 MCG tablet Take 1 tablet (1,000 mcg) by mouth daily (Patient taking differently: Take 1,000 mcg by mouth every morning) 90 tablet 3     escitalopram (LEXAPRO) 10 MG tablet Take 1 tablet (10 mg) by mouth daily (Patient taking differently: Take 10 mg by mouth every evening) 90 tablet 3     fenofibrate (LOFIBRA) 54 MG tablet Take 1 tablet (54 mg) by mouth daily (Patient taking differently: Take 54 mg by mouth every morning) 90 tablet 1     ferrous sulfate (FEROSUL) 325 (65 Fe) MG tablet Take 1 tablet (325 mg) by mouth every other day 70 tablet 3     fish oil-omega-3 fatty acids 1000 MG capsule Take 1 capsule (1 g) by mouth daily (Patient taking differently: Take 1 g by mouth every morning) 90 capsule 3     gabapentin (NEURONTIN) 300 MG capsule Take 1 capsule (300 mg) by mouth 2 times daily 60 capsule 5     insulin aspart (NOVOLOG PEN) 100 UNIT/ML pen Inject 15 Units Subcutaneous 3 times daily (with meals) 45 mL 3     insulin glargine (LANTUS PEN) 100 UNIT/ML pen Inject 80 Units Subcutaneous every morning 40 unit(s) on each side (Patient taking differently: Inject 40 Units Subcutaneous every morning 40 unit(s) on each side) 75 mL 3     liraglutide (VICTOZA) 18 MG/3ML solution Inject 1.8 mg Subcutaneous daily (Patient taking differently: Inject 1.8 mg Subcutaneous every morning) 9 mL 11     lisinopril (ZESTRIL) 40 MG tablet Take 1 tablet (40 mg) by mouth daily (Patient taking differently: Take 40 mg by mouth every morning) 90 tablet 3     metoprolol succinate ER (TOPROL-XL) 200 MG 24 hr tablet Take 1 tablet (200 mg) by mouth daily (Patient taking differently: Take 200 mg by mouth every morning) 90 tablet 3     mirtazapine (REMERON) 7.5 MG tablet TAKE 1  TABLET (7.5 MG) BY MOUTH AT BEDTIME 90 tablet 3     omeprazole 20 MG tablet Take 1 tablet (20 mg) by mouth daily (Patient taking differently: Take 20 mg by mouth every morning) 90 tablet 3     Continuous Blood Gluc Sensor (FREESTYLE MICKIE 14 DAY SENSOR) MIS USE 1 DEVICE EVERY 14 DAYS CHANGE SENSOR AS DIRECTED EVERY 14 DAYS       diclofenac (VOLTAREN) 1 % topical gel Apply 4 g topically 4 times daily as needed (rib/chest pain) 350 g 3     insulin pen needle (32G X 4 MM) 32G X 4 MM miscellaneous Use 6x pen needles daily or as directed (victoza, 3 for novolog, 2 for lantus) 200 each 11     povidone-iodine (BETADINE) 10 % topical solution Apply topically daily Apply to a cottonball and then paint over the ulcer on the right foot. 30 mL 0     REFRESH OPTIVE ADVANCED 0.5-1-0.5 % SOLN PLACE 1 DROP INTO BOTH EYES 3 TIMES DAILY AS NEEDED (AS NEEDED FOR DRY EYE/EYE PAIN)       vitamin D2 (ERGOCALCIFEROL) 83873 units (1250 mcg) capsule Take 1 capsule (50,000 Units) by mouth twice a week (Patient taking differently: Take 50,000 Units by mouth once a week Saturday) 8 capsule 4       Allergies  No Known Allergies    Social History  Social History     Socioeconomic History     Marital status:      Spouse name: Not on file     Number of children: Not on file     Years of education: Not on file     Highest education level: Not on file   Occupational History     Not on file   Tobacco Use     Smoking status: Never     Smokeless tobacco: Never   Vaping Use     Vaping Use: Never used   Substance and Sexual Activity     Alcohol use: Not Currently     Drug use: Never     Sexual activity: Yes   Other Topics Concern     Not on file   Social History Narrative     Not on file     Social Determinants of Health     Financial Resource Strain: Medium Risk     Difficulty of Paying Living Expenses: Somewhat hard   Food Insecurity: No Food Insecurity     Worried About Running Out of Food in the Last Year: Never true     Ran Out of Food in  the Last Year: Never true   Transportation Needs: Unmet Transportation Needs     Lack of Transportation (Medical): No     Lack of Transportation (Non-Medical): Yes   Physical Activity: Sufficiently Active     Days of Exercise per Week: 7 days     Minutes of Exercise per Session: 30 min   Stress: Stress Concern Present     Feeling of Stress : To some extent   Social Connections: Moderately Integrated     Frequency of Communication with Friends and Family: Twice a week     Frequency of Social Gatherings with Friends and Family: Twice a week     Attends Confucianism Services: 1 to 4 times per year     Active Member of Clubs or Organizations: No     Attends Club or Organization Meetings: Never     Marital Status:    Intimate Partner Violence: Not At Risk     Fear of Current or Ex-Partner: No     Emotionally Abused: No     Physically Abused: No     Sexually Abused: No   Housing Stability: Low Risk      Unable to Pay for Housing in the Last Year: No     Number of Places Lived in the Last Year: 2     Unstable Housing in the Last Year: No       Family History  Family History   Problem Relation Age of Onset     Glaucoma No family hx of      Macular Degeneration No family hx of      Breast Cancer No family hx of      Ovarian Cancer No family hx of      Anesthesia Reaction No family hx of      Venous thrombosis No family hx of        Review of Systems  The complete review of systems is negative other than noted in the HPI or here.     Anesthesia Evaluation   Pt has had prior anesthetic.     No history of anesthetic complications       ROS/MED HX  ENT/Pulmonary:  - neg pulmonary ROS  (-) tobacco use, asthma and recent URI   Neurologic:  - neg neurologic ROS  (-) no seizures and no CVA   Cardiovascular:     (+) Dyslipidemia hypertension-----Previous cardiac testing   Echo: Date: Results:    Stress Test: Date: Results:    ECG Reviewed: Date: 10/28/22 Results:  Sinus rhythm   Possible Left atrial enlargement   Septal infarct  "(cited on or before 06-DEC-2021)   Prolonged QT   Abnormal ECG   When compared with ECG of 06-DEC-2021 20:57,   Nonspecific T wave abnormality now evident in Inferior leads   QT has lengthened   Cath: Date: Results:      METS/Exercise Tolerance:  Comment: <4   Hematologic: Comments: Beta thalassemia minor    (+) anemia,  (-) history of blood clots   Musculoskeletal: Comment: Pt reports bilateral leg pain      GI/Hepatic:     (+) GERD, Asymptomatic on medication,     Renal/Genitourinary:     (+) renal disease, type: ESRD, Pt requires dialysis, Nephrolithiasis ,     Endo:     (+) type II DM, Last HgA1c: 7.3, date: 12/28/22, Using insulin,  (-) thyroid disease   Psychiatric/Substance Use:     (+) psychiatric history depression     Infectious Disease:  - neg infectious disease ROS     Malignancy:  - neg malignancy ROS     Other:  - neg other ROS          BP (!) 161/95 (BP Location: Right arm, Patient Position: Sitting, Cuff Size: Adult Regular)   Pulse 95   Temp 98.3  F (36.8  C) (Oral)   Resp 16   Ht 1.575 m (5' 2\")   Wt 49 kg (108 lb)   SpO2 95%   Breastfeeding No   BMI 19.75 kg/m      Physical Exam   Constitutional: Awake, alert, cooperative, no apparent distress, and appears stated age.  Eyes: Pupils equal, round and reactive to light, extra ocular muscles intact, sclera clear, conjunctiva normal.  HENT: Normocephalic, oral pharynx with moist mucus membranes, fair dentition. No goiter appreciated.   Respiratory: Clear to auscultation bilaterally, no crackles or wheezing.  Cardiovascular: Regular rate and rhythm, normal S1 and S2, and no murmur noted.  Carotids +2, no bruits. No edema. Palpable pulses to radial and PT arteries.   GI: Normal bowel sounds, soft, non-distended, non-tender  Lymph/Hematologic: No cervical lymphadenopathy and no supraclavicular lymphadenopathy.  Genitourinary:  deferred  Skin: Warm and dry.  No rashes at anticipated surgical site.   Musculoskeletal: Full ROM of neck. There is no " redness, warmth, or swelling of the joints. Gross motor strength is normal.    Neurologic: Awake, alert, oriented to name, place and time. Cranial nerves II-XII are grossly intact.   Neuropsychiatric: Calm, cooperative. Normal affect.     Prior Labs/Diagnostic Studies   All labs and imaging personally reviewed     Component      Latest Ref Rng & Units 2/1/2023   Hemoglobin      11.7 - 15.7 g/dL 9.0 (L)   Hematocrit      35.0 - 47.0 % 30.1 (L)     Component      Latest Ref Rng & Units 2/13/2023   WBC      4.0 - 11.0 10e3/uL 6.5   RBC Count      3.80 - 5.20 10e6/uL 4.19   Hemoglobin      11.7 - 15.7 g/dL 9.1 (L)   Hematocrit      35.0 - 47.0 % 30.3 (L)   MCV      78 - 100 fL 72 (L)   MCH      26.5 - 33.0 pg 21.7 (L)   MCHC      31.5 - 36.5 g/dL 30.0 (L)   RDW      10.0 - 15.0 % 20.3 (H)   Platelet Count      150 - 450 10e3/uL 148 (L)     Component      Latest Ref Rng & Units 2/13/2023   Sodium      136 - 145 mmol/L 143   Potassium      3.4 - 5.3 mmol/L 3.5   Chloride      98 - 107 mmol/L 106   Carbon Dioxide (CO2)      22 - 29 mmol/L 26   Anion Gap      7 - 15 mmol/L 11   Urea Nitrogen      6.0 - 20.0 mg/dL 23.5 (H)   Creatinine      0.51 - 0.95 mg/dL 3.89 (H)   Calcium      8.6 - 10.0 mg/dL 6.0 (L)   Glucose      70 - 99 mg/dL 199 (H)   GFR Estimate      >60 mL/min/1.73m2 13 (L)     Component      Latest Ref Rng & Units 2/13/2023   Magnesium      1.7 - 2.3 mg/dL 1.7       EKG/ stress test - if available please see in ROS above       The patient's records and results personally reviewed by this provider.     Outside records reviewed from: No outside records available        Assessment      Barriga Byron is a 53 year old female seen as a PAC referral for risk assessment and optimization for anesthesia.    Plan/Recommendations  Pt will be optimized for the proposed procedure.  See below for details on the assessment, risk, and preoperative recommendations    NEUROLOGY  - No history of TIA, CVA or seizure    -Post Op delirium  "risk factors:  No risk identified    ENT  - No current airway concerns.  Will need to be reassessed day of surgery.  Mallampati: III  TM: > 3    CARDIAC  - Hypertension, hold lisinopril DOS, continue metoprolol  - HLD, hold ASA 81mg on DOS, continue Lipitor  - denies chest pain, chest pressure    - previous EKG 10/28/22 with QTc of 557.    - pt denies any syncope, near syncope  - EKG today shows NSR, voltage criteria for LVH, prolonged QT: 454/555  - K today of 3.5, Na 143, Mg 1.7    - METS (Metabolic Equivalents), <4 METS.  Pt reports she is limited by her bilateral leg pain and weakness     RCRI-High risk: Class 4  >11% complication reate            Total Score: 3    RCRI: High Risk Surgery    RCRI: Diabetes    RCRI: Elevated Creatinine      - discussed with Dr. Bowman.  Concern for prolonging QT with anesthetics; can this be done under MAC with regional?  Can we delay for cardiac work up?     - called transplant and spoke to Mariah RIVERA RN dialysis access care coordinator.  She will discuss with Dr. Langford this afternoon when he is in clinic and call me back. Will update when able.   - pt will hold Lexapro this evening due to possibility that this is contributing to QT prolongation.     - Dr. Bowman discussed case with Dr. Cyr who will be anesthesia on DOS.      PULMONARY    VIVIENNE Low Risk            Total Score: 2    VIVIENNE: Hypertension    VIVIENNE: Over 50 ys old      - Denies asthma or inhaler use  - Tobacco History      History   Smoking Status     Never   Smokeless Tobacco     Never       GI  - GERD  Controlled on medications: Proton Pump Inhibitor  PONV High Risk  Total Score: 3           1 AN PONV: Pt is Female    1 AN PONV: Patient is not a current smoker    1 AN PONV: Intended Post Op Opioids        /RENAL  - Creatinine today 3.89, GFR 13    ENDOCRINE    - BMI: Estimated body mass index is 19.75 kg/m  as calculated from the following:    Height as of this encounter: 1.575 m (5' 2\").    Weight as of this " encounter: 49 kg (108 lb).  Healthy Weight (BMI 18.5-24.9)  - Diabetes  Hemoglobin A1C (%)   Date Value   12/28/2022 7.3 (H)     Diabetes Type 2, insulin dependent. Hold morning oral hypoglycemic medications and short acting insulin DOS. Take 80% of last scheduled dose of long-acting insulin prior to procedure.  Recommend close monitoring of the patient's blood glucose levels throughout the perioperative period.    HEME  VTE Low Risk 0.26%            Total Score: 1    VTE: Greater than 59 yrs old      - Platelet disfunction second to Aspirin (Ze, many others)  - Chronic anemia, followed by nephrology and referred to anemia management clinic.  Receives aranesp every 2 weeks  Recommend perioperative use of blood conservation techniques intraoperatively and close monitoring for postoperative bleeding.  A type and screen has not been done and deferred to the team day of surgery      PSYCH  - depression, continue Lexapro  - Remeron at HS    OTHER    Different anesthesia methods/types have been discussed with the patient, but they are aware that the final plan will be decided by the assigned anesthesia provider on the date of service.      The patient is optimized for their procedure. AVS with information on surgery time/arrival time, meds and NPO status given by nursing staff. No further diagnostic testing indicated.      On the day of service:     Prep time: 21 minutes  Visit time: 16 minutes  Documentation time: 20 minutes  ------------------------------------------  Total time: 57 minutes      Jennifer Alegre PA-C  Preoperative Assessment Center  [facility]  Clinic and Surgery Center  Phone: [phone]  Fax: [phone]

## 2023-02-14 ENCOUNTER — HOSPITAL ENCOUNTER (OUTPATIENT)
Facility: CLINIC | Age: 53
Discharge: HOME | End: 2023-02-14
Attending: SURGERY | Admitting: SURGERY
Payer: COMMERCIAL

## 2023-02-14 ENCOUNTER — ANESTHESIA (OUTPATIENT)
Dept: SURGERY | Facility: CLINIC | Age: 53
End: 2023-02-14
Payer: COMMERCIAL

## 2023-02-14 VITALS
BODY MASS INDEX: 20.08 KG/M2 | TEMPERATURE: 98.5 F | SYSTOLIC BLOOD PRESSURE: 180 MMHG | HEIGHT: 62 IN | DIASTOLIC BLOOD PRESSURE: 90 MMHG | RESPIRATION RATE: 16 BRPM | OXYGEN SATURATION: 96 % | HEART RATE: 79 BPM | WEIGHT: 109.13 LBS

## 2023-02-14 DIAGNOSIS — N18.4 CKD (CHRONIC KIDNEY DISEASE) STAGE 4, GFR 15-29 ML/MIN (H): Primary | ICD-10-CM

## 2023-02-14 DIAGNOSIS — E11.69 TYPE 2 DIABETES MELLITUS WITH OTHER SPECIFIED COMPLICATION, UNSPECIFIED WHETHER LONG TERM INSULIN USE (H): ICD-10-CM

## 2023-02-14 LAB
GLUCOSE BLDC GLUCOMTR-MCNC: 207 MG/DL (ref 70–99)
GLUCOSE BLDC GLUCOMTR-MCNC: 241 MG/DL (ref 70–99)
HBA1C MFR BLD: 7.1 %

## 2023-02-14 PROCEDURE — 360N000076 HC SURGERY LEVEL 3, PER MIN: Performed by: SURGERY

## 2023-02-14 PROCEDURE — 76998 US GUIDE INTRAOP: CPT | Mod: 26 | Performed by: SURGERY

## 2023-02-14 PROCEDURE — 258N000003 HC RX IP 258 OP 636: Performed by: ANESTHESIOLOGY

## 2023-02-14 PROCEDURE — 36821 AV FUSION DIRECT ANY SITE: CPT | Mod: RT | Performed by: SURGERY

## 2023-02-14 PROCEDURE — 82962 GLUCOSE BLOOD TEST: CPT

## 2023-02-14 PROCEDURE — 370N000017 HC ANESTHESIA TECHNICAL FEE, PER MIN: Performed by: SURGERY

## 2023-02-14 PROCEDURE — 272N000001 HC OR GENERAL SUPPLY STERILE: Performed by: SURGERY

## 2023-02-14 PROCEDURE — 250N000011 HC RX IP 250 OP 636: Performed by: ANESTHESIOLOGY

## 2023-02-14 PROCEDURE — 250N000009 HC RX 250: Performed by: ANESTHESIOLOGY

## 2023-02-14 PROCEDURE — 250N000011 HC RX IP 250 OP 636: Performed by: SURGERY

## 2023-02-14 PROCEDURE — 999N000141 HC STATISTIC PRE-PROCEDURE NURSING ASSESSMENT: Performed by: SURGERY

## 2023-02-14 PROCEDURE — 258N000003 HC RX IP 258 OP 636: Performed by: SURGERY

## 2023-02-14 PROCEDURE — 83036 HEMOGLOBIN GLYCOSYLATED A1C: CPT | Performed by: ANESTHESIOLOGY

## 2023-02-14 PROCEDURE — 710N000009 HC RECOVERY PHASE 1, LEVEL 1, PER MIN: Performed by: SURGERY

## 2023-02-14 PROCEDURE — 36415 COLL VENOUS BLD VENIPUNCTURE: CPT | Performed by: ANESTHESIOLOGY

## 2023-02-14 PROCEDURE — 710N000012 HC RECOVERY PHASE 2, PER MINUTE: Performed by: SURGERY

## 2023-02-14 PROCEDURE — 250N000011 HC RX IP 250 OP 636: Performed by: STUDENT IN AN ORGANIZED HEALTH CARE EDUCATION/TRAINING PROGRAM

## 2023-02-14 RX ORDER — ONDANSETRON 4 MG/1
4 TABLET, ORALLY DISINTEGRATING ORAL EVERY 30 MIN PRN
Status: DISCONTINUED | OUTPATIENT
Start: 2023-02-14 | End: 2023-02-14 | Stop reason: HOSPADM

## 2023-02-14 RX ORDER — TRAMADOL HYDROCHLORIDE 50 MG/1
50 TABLET ORAL EVERY 6 HOURS PRN
Qty: 15 TABLET | Refills: 0 | Status: SHIPPED | OUTPATIENT
Start: 2023-02-14 | End: 2023-02-17

## 2023-02-14 RX ORDER — OXYCODONE HYDROCHLORIDE 5 MG/1
5 TABLET ORAL EVERY 4 HOURS PRN
Status: DISCONTINUED | OUTPATIENT
Start: 2023-02-14 | End: 2023-02-14 | Stop reason: HOSPADM

## 2023-02-14 RX ORDER — DEXTROSE MONOHYDRATE 100 MG/ML
INJECTION, SOLUTION INTRAVENOUS CONTINUOUS PRN
Status: DISCONTINUED | OUTPATIENT
Start: 2023-02-14 | End: 2023-02-14 | Stop reason: HOSPADM

## 2023-02-14 RX ORDER — NALOXONE HYDROCHLORIDE 0.4 MG/ML
0.4 INJECTION, SOLUTION INTRAMUSCULAR; INTRAVENOUS; SUBCUTANEOUS
Status: DISCONTINUED | OUTPATIENT
Start: 2023-02-14 | End: 2023-02-14 | Stop reason: HOSPADM

## 2023-02-14 RX ORDER — BUPIVACAINE HYDROCHLORIDE 5 MG/ML
INJECTION, SOLUTION EPIDURAL; INTRACAUDAL
Status: DISCONTINUED | OUTPATIENT
Start: 2023-02-14 | End: 2023-02-14

## 2023-02-14 RX ORDER — FLUMAZENIL 0.1 MG/ML
0.2 INJECTION, SOLUTION INTRAVENOUS
Status: DISCONTINUED | OUTPATIENT
Start: 2023-02-14 | End: 2023-02-14 | Stop reason: HOSPADM

## 2023-02-14 RX ORDER — LIDOCAINE 40 MG/G
CREAM TOPICAL
Status: DISCONTINUED | OUTPATIENT
Start: 2023-02-14 | End: 2023-02-14 | Stop reason: HOSPADM

## 2023-02-14 RX ORDER — HYDRALAZINE HYDROCHLORIDE 20 MG/ML
2.5-5 INJECTION INTRAMUSCULAR; INTRAVENOUS EVERY 10 MIN PRN
Status: DISCONTINUED | OUTPATIENT
Start: 2023-02-14 | End: 2023-02-14 | Stop reason: HOSPADM

## 2023-02-14 RX ORDER — ACETAMINOPHEN 325 MG/1
975 TABLET ORAL ONCE
Status: CANCELLED | OUTPATIENT
Start: 2023-02-14 | End: 2023-02-14

## 2023-02-14 RX ORDER — DIPHENHYDRAMINE HCL 25 MG
25 CAPSULE ORAL EVERY 6 HOURS PRN
Status: DISCONTINUED | OUTPATIENT
Start: 2023-02-14 | End: 2023-02-14 | Stop reason: HOSPADM

## 2023-02-14 RX ORDER — NICOTINE POLACRILEX 4 MG
15-30 LOZENGE BUCCAL
Status: DISCONTINUED | OUTPATIENT
Start: 2023-02-14 | End: 2023-02-14 | Stop reason: HOSPADM

## 2023-02-14 RX ORDER — LABETALOL HYDROCHLORIDE 5 MG/ML
10 INJECTION, SOLUTION INTRAVENOUS
Status: DISCONTINUED | OUTPATIENT
Start: 2023-02-14 | End: 2023-02-14 | Stop reason: HOSPADM

## 2023-02-14 RX ORDER — PROPOFOL 10 MG/ML
INJECTION, EMULSION INTRAVENOUS PRN
Status: DISCONTINUED | OUTPATIENT
Start: 2023-02-14 | End: 2023-02-14

## 2023-02-14 RX ORDER — NALOXONE HYDROCHLORIDE 0.4 MG/ML
0.2 INJECTION, SOLUTION INTRAMUSCULAR; INTRAVENOUS; SUBCUTANEOUS
Status: DISCONTINUED | OUTPATIENT
Start: 2023-02-14 | End: 2023-02-14 | Stop reason: HOSPADM

## 2023-02-14 RX ORDER — PROPOFOL 10 MG/ML
INJECTION, EMULSION INTRAVENOUS CONTINUOUS PRN
Status: DISCONTINUED | OUTPATIENT
Start: 2023-02-14 | End: 2023-02-14

## 2023-02-14 RX ORDER — HYDROMORPHONE HCL IN WATER/PF 6 MG/30 ML
0.2 PATIENT CONTROLLED ANALGESIA SYRINGE INTRAVENOUS EVERY 5 MIN PRN
Status: DISCONTINUED | OUTPATIENT
Start: 2023-02-14 | End: 2023-02-14 | Stop reason: HOSPADM

## 2023-02-14 RX ORDER — HALOPERIDOL 5 MG/ML
1 INJECTION INTRAMUSCULAR
Status: DISCONTINUED | OUTPATIENT
Start: 2023-02-14 | End: 2023-02-14 | Stop reason: HOSPADM

## 2023-02-14 RX ORDER — OXYCODONE HYDROCHLORIDE 5 MG/1
5 TABLET ORAL EVERY 4 HOURS PRN
Status: CANCELLED | OUTPATIENT
Start: 2023-02-14

## 2023-02-14 RX ORDER — MAGNESIUM SULFATE HEPTAHYDRATE 40 MG/ML
2 INJECTION, SOLUTION INTRAVENOUS ONCE
Status: COMPLETED | OUTPATIENT
Start: 2023-02-14 | End: 2023-02-14

## 2023-02-14 RX ORDER — SODIUM CHLORIDE 9 MG/ML
INJECTION, SOLUTION INTRAVENOUS CONTINUOUS PRN
Status: DISCONTINUED | OUTPATIENT
Start: 2023-02-14 | End: 2023-02-14

## 2023-02-14 RX ORDER — FENTANYL CITRATE 50 UG/ML
25-50 INJECTION, SOLUTION INTRAMUSCULAR; INTRAVENOUS
Status: DISCONTINUED | OUTPATIENT
Start: 2023-02-14 | End: 2023-02-14 | Stop reason: HOSPADM

## 2023-02-14 RX ORDER — HYDROMORPHONE HCL IN WATER/PF 6 MG/30 ML
0.4 PATIENT CONTROLLED ANALGESIA SYRINGE INTRAVENOUS EVERY 5 MIN PRN
Status: DISCONTINUED | OUTPATIENT
Start: 2023-02-14 | End: 2023-02-14 | Stop reason: HOSPADM

## 2023-02-14 RX ORDER — PHENYLEPHRINE HCL IN 0.9% NACL 50MG/250ML
.5-1.25 PLASTIC BAG, INJECTION (ML) INTRAVENOUS CONTINUOUS
Status: DISCONTINUED | OUTPATIENT
Start: 2023-02-14 | End: 2023-02-14 | Stop reason: HOSPADM

## 2023-02-14 RX ORDER — SODIUM CHLORIDE, SODIUM LACTATE, POTASSIUM CHLORIDE, CALCIUM CHLORIDE 600; 310; 30; 20 MG/100ML; MG/100ML; MG/100ML; MG/100ML
INJECTION, SOLUTION INTRAVENOUS CONTINUOUS
Status: DISCONTINUED | OUTPATIENT
Start: 2023-02-14 | End: 2023-02-14 | Stop reason: HOSPADM

## 2023-02-14 RX ORDER — DEXTROSE MONOHYDRATE 25 G/50ML
25-50 INJECTION, SOLUTION INTRAVENOUS
Status: DISCONTINUED | OUTPATIENT
Start: 2023-02-14 | End: 2023-02-14 | Stop reason: HOSPADM

## 2023-02-14 RX ORDER — DIPHENHYDRAMINE HYDROCHLORIDE 50 MG/ML
25 INJECTION INTRAMUSCULAR; INTRAVENOUS EVERY 6 HOURS PRN
Status: DISCONTINUED | OUTPATIENT
Start: 2023-02-14 | End: 2023-02-14 | Stop reason: HOSPADM

## 2023-02-14 RX ORDER — ONDANSETRON 2 MG/ML
4 INJECTION INTRAMUSCULAR; INTRAVENOUS EVERY 30 MIN PRN
Status: DISCONTINUED | OUTPATIENT
Start: 2023-02-14 | End: 2023-02-14 | Stop reason: HOSPADM

## 2023-02-14 RX ORDER — MEPERIDINE HYDROCHLORIDE 25 MG/ML
12.5 INJECTION INTRAMUSCULAR; INTRAVENOUS; SUBCUTANEOUS EVERY 5 MIN PRN
Status: DISCONTINUED | OUTPATIENT
Start: 2023-02-14 | End: 2023-02-14 | Stop reason: HOSPADM

## 2023-02-14 RX ORDER — OXYCODONE HYDROCHLORIDE 10 MG/1
10 TABLET ORAL EVERY 4 HOURS PRN
Status: CANCELLED | OUTPATIENT
Start: 2023-02-14

## 2023-02-14 RX ORDER — OXYCODONE HYDROCHLORIDE 10 MG/1
10 TABLET ORAL EVERY 4 HOURS PRN
Status: DISCONTINUED | OUTPATIENT
Start: 2023-02-14 | End: 2023-02-14 | Stop reason: HOSPADM

## 2023-02-14 RX ORDER — DIAZEPAM 10 MG/2ML
2.5 INJECTION, SOLUTION INTRAMUSCULAR; INTRAVENOUS
Status: DISCONTINUED | OUTPATIENT
Start: 2023-02-14 | End: 2023-02-14 | Stop reason: HOSPADM

## 2023-02-14 RX ORDER — CEFAZOLIN SODIUM/WATER 2 G/20 ML
SYRINGE (ML) INTRAVENOUS PRN
Status: DISCONTINUED | OUTPATIENT
Start: 2023-02-14 | End: 2023-02-14

## 2023-02-14 RX ORDER — SODIUM CHLORIDE 9 MG/ML
500 INJECTION, SOLUTION INTRAVENOUS CONTINUOUS
Status: DISCONTINUED | OUTPATIENT
Start: 2023-02-14 | End: 2023-02-14 | Stop reason: HOSPADM

## 2023-02-14 RX ORDER — FENTANYL CITRATE 50 UG/ML
25 INJECTION, SOLUTION INTRAMUSCULAR; INTRAVENOUS EVERY 5 MIN PRN
Status: DISCONTINUED | OUTPATIENT
Start: 2023-02-14 | End: 2023-02-14 | Stop reason: HOSPADM

## 2023-02-14 RX ORDER — FENTANYL CITRATE 50 UG/ML
50 INJECTION, SOLUTION INTRAMUSCULAR; INTRAVENOUS EVERY 5 MIN PRN
Status: DISCONTINUED | OUTPATIENT
Start: 2023-02-14 | End: 2023-02-14 | Stop reason: HOSPADM

## 2023-02-14 RX ADMIN — HYDROMORPHONE HYDROCHLORIDE 0.25 MG: 1 INJECTION, SOLUTION INTRAMUSCULAR; INTRAVENOUS; SUBCUTANEOUS at 10:01

## 2023-02-14 RX ADMIN — PROPOFOL 100 MG: 10 INJECTION, EMULSION INTRAVENOUS at 09:12

## 2023-02-14 RX ADMIN — Medication 2 G: at 09:25

## 2023-02-14 RX ADMIN — BUPIVACAINE HYDROCHLORIDE 30 ML: 5 INJECTION, SOLUTION EPIDURAL; INTRACAUDAL at 08:11

## 2023-02-14 RX ADMIN — SODIUM CHLORIDE: 9 INJECTION, SOLUTION INTRAVENOUS at 09:08

## 2023-02-14 RX ADMIN — PROPOFOL 150 MCG/KG/MIN: 10 INJECTION, EMULSION INTRAVENOUS at 09:17

## 2023-02-14 RX ADMIN — SUCCINYLCHOLINE CHLORIDE 100 MG: 20 INJECTION, SOLUTION INTRAMUSCULAR; INTRAVENOUS; PARENTERAL at 09:13

## 2023-02-14 RX ADMIN — FENTANYL CITRATE 50 MCG: 50 INJECTION, SOLUTION INTRAMUSCULAR; INTRAVENOUS at 08:23

## 2023-02-14 RX ADMIN — HYDRALAZINE HYDROCHLORIDE 5 MG: 20 INJECTION INTRAMUSCULAR; INTRAVENOUS at 12:07

## 2023-02-14 RX ADMIN — SODIUM CHLORIDE 500 ML: 0.9 INJECTION, SOLUTION INTRAVENOUS at 09:05

## 2023-02-14 RX ADMIN — MAGNESIUM SULFATE HEPTAHYDRATE 2 G: 40 INJECTION, SOLUTION INTRAVENOUS at 09:33

## 2023-02-14 ASSESSMENT — ACTIVITIES OF DAILY LIVING (ADL)
ADLS_ACUITY_SCORE: 35

## 2023-02-14 ASSESSMENT — ENCOUNTER SYMPTOMS: SEIZURES: 0

## 2023-02-14 ASSESSMENT — LIFESTYLE VARIABLES: TOBACCO_USE: 0

## 2023-02-14 NOTE — ANESTHESIA CARE TRANSFER NOTE
Patient: Nithya Matthews    Procedure: Procedure(s):  right upper extremity Arteriovenous fistula creation       Diagnosis: ESRD (end stage renal disease) (H) [N18.6]  Diagnosis Additional Information: No value filed.    Anesthesia Type:   MAC     Note:    Oropharynx: oropharynx clear of all foreign objects and spontaneously breathing  Level of Consciousness: awake  Oxygen Supplementation: face mask  Level of Supplemental Oxygen (L/min / FiO2): 6  Independent Airway: airway patency satisfactory and stable  Dentition: dentition unchanged  Vital Signs Stable: post-procedure vital signs reviewed and stable  Report to RN Given: handoff report given  Patient transferred to: PACU    Handoff Report: Identifed the Patient, Identified the Reponsible Provider, Reviewed the pertinent medical history, Discussed the surgical course, Reviewed Intra-OP anesthesia mangement and issues during anesthesia, Set expectations for post-procedure period and Allowed opportunity for questions and acknowledgement of understanding      Vitals:  Vitals Value Taken Time   BP     Temp     Pulse     Resp     SpO2         Electronically Signed By: ANTHONY Arias CRNA  February 14, 2023  11:22 AM

## 2023-02-14 NOTE — ANESTHESIA PROCEDURE NOTES
Brachial plexus (With Intercostobrachial) Procedure Note    Pre-Procedure   Staff -        Anesthesiologist:  Dony Bunn MD       Performed By: anesthesiologist       Location: pre-op       Pre-Anesthestic Checklist: patient identified, IV checked, site marked, risks and benefits discussed, informed consent, monitors and equipment checked, pre-op evaluation, at physician/surgeon's request and post-op pain management  Timeout:       Correct Patient: Yes        Correct Procedure: Yes        Correct Site: Yes        Correct Position: Yes        Correct Laterality: Yes        Site Marked: Yes  Procedure Documentation  Procedure: Brachial plexus (With Intercostobrachial)       Diagnosis: POST OPERATIVE PAIN       Laterality: left       Patient Position: supine       Patient Prep/Sterile Barriers: sterile gloves, mask       Skin prep: Chloraprep (supraclavicular approach).       Needle Type: short bevel       Needle Gauge: 21.        Needle Length (millimeters): 110        Ultrasound guided       1. Ultrasound was used to identify targeted nerve, plexus, vascular marker, or fascial plane and place a needle adjacent to it in real-time.       2. Ultrasound was used to visualize the spread of anesthetic in close proximity to the above referenced structure.       3. A permanent image is entered into the patient's record.       4. The visualized anatomic structures appeared normal.       5. There were no apparent abnormal pathologic findings.    Assessment/Narrative         The placement was negative for: blood aspirated, painful injection and site bleeding       Paresthesias: No.       Bolus given via needle..        Secured via.        Insertion/Infusion Method: Single Shot       Complications: none       Injection made incrementally with aspirations every 5 mL.    Medication(s) Administered   Bupivacaine 0.5% PF (Infiltration) - Infiltration   30 mL - 2/14/2023 8:11:00 AM    FOR [facility] ([facility] [place]) ONLY:   Pain  "Team Contact information: please page the Pain Team Via [facility]. Search \"Pain\". During daytime hours, please page the attending first. At night please page the resident first.    "

## 2023-02-14 NOTE — ANESTHESIA PROCEDURE NOTES
Airway       Patient location during procedure: OR       Procedure Start/Stop Times: 2/14/2023 9:17 AM  Staff -        Performed By: anesthesiologist  Consent for Airway        Urgency: elective  Indications and Patient Condition       Indications for airway management: jamarcus-procedural       Induction type:RSI       Mask difficulty assessment: 0 - not attempted    Final Airway Details       Final airway type: endotracheal airway       Successful airway: ETT - single  Endotracheal Airway Details        ETT size (mm): 7.0       Cuffed: yes       Cuff volume (mL): 8       Successful intubation technique: video laryngoscopy       VL Blade Size: MAC 3       Grade View of Cords: 1       Adjucts: stylet       Position: Right       Measured from: lips       Secured at (cm): 21       Bite block used: None    Post intubation assessment        Placement verified by: capnometry, equal breath sounds and chest rise        Number of attempts at approach: 2       Secured with: pink tape       Ease of procedure: easy       Dentition: Intact and Unchanged    Medication(s) Administered   Medication Administration Time: 2/14/2023 9:17 AM    Additional Comments       DL attempt x1 by KATHY, no view. DL attempt x1 by Dr. Cyr. C-MAC with grade 1 view.

## 2023-02-14 NOTE — DISCHARGE INSTRUCTIONS
[facility], [place]  Same-Day Surgery   Adult Discharge Orders & Instructions     For 24 hours after surgery    Get plenty of rest.  A responsible adult must stay with you for at least 24 hours after you leave the hospital.   Do not drive or use heavy equipment.  If you have weakness or tingling, don't drive or use heavy equipment until this feeling goes away.  Do not drink alcohol.  Avoid strenuous or risky activities.  Ask for help when climbing stairs.   You may feel lightheaded.  IF so, sit for a few minutes before standing.  Have someone help you get up.   If you have nausea (feel sick to your stomach): Drink only clear liquids such as apple juice, ginger ale, broth or 7-Up.  Rest may also help.  Be sure to drink enough fluids.  Move to a regular diet as you feel able.  You may have a slight fever. Call the doctor if your fever is over 100 F (37.7 C) (taken under the tongue) or lasts longer than 24 hours.  You may have a dry mouth, a sore throat, muscle aches or trouble sleeping.  These should go away after 24 hours.  Do not make important or legal decisions.   Call your doctor for any of the followin.  Signs of infection (fever, growing tenderness at the surgery site, a large amount of drainage or bleeding, severe pain, foul-smelling drainage, redness, swelling).    2. It has been over 8 to 10 hours since surgery and you are still not able to urinate (pass water).    3.  Headache for over 24 hours.    4.  Numbness, tingling or weakness the day after surgery (if you had spinal anesthesia).    '   [phone] and ask for the resident on call for   '   Emergency Department:    [facility]: [phone]       (TTY for hearing impaired: [phone])    [facility]: [phone]       (TTY for hearing impaired: [phone])

## 2023-02-14 NOTE — ANESTHESIA POSTPROCEDURE EVALUATION
Patient: Nithya Matthews    Procedure: Procedure(s):  right upper extremity Arteriovenous fistula creation       Anesthesia Type:  MAC    Note:  Disposition: Outpatient   Postop Pain Control: Uneventful            Sign Out: Well controlled pain   PONV: No   Neuro/Psych: Uneventful            Sign Out: Acceptable/Baseline neuro status   Airway/Respiratory: Uneventful            Sign Out: Acceptable/Baseline resp. status   CV/Hemodynamics: Uneventful            Sign Out: Acceptable CV status; No obvious hypovolemia; No obvious fluid overload   Other NRE: NONE   DID A NON-ROUTINE EVENT OCCUR? YES    Event details/Postop Comments:  Vomit in OR prior to procedure. Could be attributed to 50 mcg of fentanyl for the block.  The laterality of the procedure was not clear prior to surgery, however, the surgery fellow was certain and adamant that the surgery will be performed on L and they would like to do the procedure under MAC with block, thus L supraclavicular block was performed. However, in the OR the surgery team changed the plans and operated on the R arm. No additional local was used.           Last vitals:  Vitals Value Taken Time   /86 02/14/23 1245   Temp 36.9  C (98.4  F) 02/14/23 1120   Pulse 81 02/14/23 1257   Resp 16 02/14/23 1145   SpO2 97 % 02/14/23 1257   Vitals shown include unvalidated device data.    Electronically Signed By: Helga Hall MD  February 14, 2023  12:58 PM

## 2023-02-14 NOTE — BRIEF OP NOTE
[facility]    Brief Operative Note    Pre-operative diagnosis: ESRD (end stage renal disease) (H) [N18.6]  Post-operative diagnosis Same as pre-operative diagnosis    Procedure: Procedure(s):  right upper extremity Arteriovenous fistula creation  Surgeon: Surgeon(s) and Role:     * Markell Langford MD - Primary     * Benjy Thomas MD - Fellow - Assisting  Anesthesia: General with Block   Estimated Blood Loss: Less than 10 ml    Drains: None  Specimens: * No specimens in log *  Findings:   RIGHT brachio-cephalic AVF; flow measured 500.  Complications: None.  Implants: * No implants in log *

## 2023-02-14 NOTE — PROGRESS NOTES
Gave patient 50 mcg fentanyl for the supraclavicular nerve block done in preop/3C. Pt's HR 90's, NSR with a prolonged QT, 's-190's/90's-100's, 2lNC Spo2 95%. After the block was complete patient vomited 200 ml of bile, had a large bowel movement, sleepy, arouses to voice and stimuli. Gave warm handoff with RN, CRNA, surgeon at bedside.

## 2023-02-14 NOTE — INTERVAL H&P NOTE
I have reviewed the surgical (or preoperative) H&P that is linked to this encounter, and examined the patient. There are no significant changes    Clinical Conditions Present on Arrival:  Clinically Significant Risk Factors Present on Admission             # Hypocalcemia: Lowest Ca = 6 mg/dL in last 30 days, will monitor and replace as appropriate    # Hypoalbuminemia: Lowest albumin = 3.3 g/dL in the past 30 days , will monitor as appropriate     # DMII: A1C = 7.3 % (Ref range: 0.0 - 5.6 %) within past 3 months

## 2023-02-15 ENCOUNTER — OFFICE VISIT (OUTPATIENT)
Dept: NEPHROLOGY | Facility: CLINIC | Age: 53
End: 2023-02-15
Attending: STUDENT IN AN ORGANIZED HEALTH CARE EDUCATION/TRAINING PROGRAM
Payer: COMMERCIAL

## 2023-02-15 VITALS — HEART RATE: 88 BPM | SYSTOLIC BLOOD PRESSURE: 154 MMHG | DIASTOLIC BLOOD PRESSURE: 82 MMHG

## 2023-02-15 DIAGNOSIS — N18.4 CKD (CHRONIC KIDNEY DISEASE) STAGE 4, GFR 15-29 ML/MIN (H): Primary | ICD-10-CM

## 2023-02-15 PROCEDURE — G0463 HOSPITAL OUTPT CLINIC VISIT: HCPCS | Performed by: STUDENT IN AN ORGANIZED HEALTH CARE EDUCATION/TRAINING PROGRAM

## 2023-02-15 PROCEDURE — 99214 OFFICE O/P EST MOD 30 MIN: CPT | Performed by: STUDENT IN AN ORGANIZED HEALTH CARE EDUCATION/TRAINING PROGRAM

## 2023-02-15 ASSESSMENT — PAIN SCALES - GENERAL: PAINLEVEL: MODERATE PAIN (5)

## 2023-02-15 NOTE — PATIENT INSTRUCTIONS
"It was a pleasure to see you in nephrology clinic today. I've included a brief summary of our discussion and care plan from today's visit below.  _______________________________________________________________________    My recommendations are summarized as follows:  -Keep a Blood Pressure log. Please make sure that you are using a validated blood pressure device (check \"www.validatebp.org\").  -Avoid all NSAID's. Examples include Ibuprofen (Advil, Motrin), naprosyn (Aleve), celebrex among others. Acetaminophen (Tylenol) is ok with maximum dose in 24 hours of 3000mg.  -Healthy lifestyle measures will keep your kidney's functioning at their current best. This includes regular exercise, maintaining a healthy body weight and smoking cessation.   -Please try to increase your calcium intake  -Please try to keep your arm elevated to help with swelling.    Please return to Nephrology Clinic in 6 weeks to review your progress.     Who do I call with any questions after my visit?  There are multiple ways to contact your nephrology care team:    -To schedule or reschedule an appointment, please call [phone].  -Reach out via [facility]. These messages are answered by your nurse or doctor during business hours and typically in 1-2 days. [facility] messages are best for quick questions/clarifications/updates. Frequently, your doctor or nurse will recommend setting up a follow up appointment to address any significant questions/concerns.  -For urgent questions after business hours, you may reach the on-call Nephrology Fellow by contacting the [facility]  at [phone].    To schedule imaging:   -Please call [phone]     To schedule your lab appointment at the Clinics and Surgery Center:  -Please call [phone]    Sincerely,    Dr. Sahil Waterman   of Medicine  Division of Nephrology and Hypertension  [facility]    "

## 2023-02-15 NOTE — LETTER
2/15/2023       RE: Nithya Matthews  [address] [place] [address]     Dear Colleague,    Thank you for referring your patient, Nithya Matthews, to the [facility] [place] at [facility]. Please see a copy of my visit note below.        Nephrology Clinic Visit  Nithya Matthews MRN: [id number] YOB: 1970  Primary Care Provider: Giancarlo Arizmendi Tac  History Obtained From: Patient  External Document Review: Primary Care Provider  -------------------------------------------------------------------------------------------------------------------------------  Visit 2/15/2023  -Bp control: 154/82 today in office likely pain mediated.  -Transplant planning: Evaluation coming up on 2/20/2023  -RRT planning: R AVF 2/14/2023. Tolerated procedure well. Has some pain and mild swelling today but overall the site looks really good. Advised to keep arm elevated to help with mild swelling.  -UOP/LE swelling: Denies  -Uremic symptoms: Denies  -Hypocalcemia: Continued low calcium with Calcium 6 (last albumin was 3 so corrects up to the mid 6's or so). We discussed dietary calcium intake today. Her diet seems likely to be very low in calcium and we discussed small adjustments to move this in the right direction. She also has an upcoming appointment with dietician. May need to consider calcium supplementation if we can't get things moving in the right direction. Chelsea was okay at last check.   -Reports having some stomach pain yesterday (post op). No N/V since returning home. Tolerating PO intake.     Visit 1/27/2023:  -BP Control: Bp looks great in office. 123/70.  -Transplant Planning: Adjusted the primary phoen number to her Daughter's number at their request as this is usually the number they are able to answer.   -RRT Planning: AVF placement planned 2/14/2023.   -UOP/LE Swelling: Swelling is much better.   -Uremic Symptoms: No uremic symptoms today  -Having some discomfort  in her feet. Had a procedure? Reports her feet feels somewhat better now.     Visit 12/16/2022  -BP control: Discussed that we really need home BP readings. She is usually in the 130-140's and even had issues not that long ago with severe hypotension (prompting holding of hydrochlorothiazide). Elevated today in clinic. Some LE edema. Likely will need a diuretic on board again soon. Send me home readings if able otherwise low threshold to add on Lasix if LE worsens or if Systolic BP > 140 at her next appointment.  -Transplant planning: Yes, she has been contacted by Transplant. She is not sure if she has a number to contact them. I see one message saved to the chart from transplant outreach. Will have them contact her again.   -RRT planning: Looks like she has access appointment scheduled for 1/9/2023. She has vein mapping scheduled for this same day at 1PM. We have discussed this today to make sure she is aware of this appointment as we are certainly approaching the point where I need her to have this fistula in place and maturing so we can safely start dialysis when needed.   -UOP:   -Lower extremity swelling: Yes, b/l 1+  -Uremic Symptoms: She denies significant uremic symptoms today. She is eating well, no nausea, no metallic taste in mouth  -Anemia clinic: getting set up for EPO injections. Looks like she is currently set up for 1/4/2023 to get an EPO injection.    Visit 11/16/22  -Here for follow up visit with [facility]  -BP control: Okay at this point.   -Understanding of renal impairment: We again discussed her severe renal impairment and how biopsy findings do not suggest any reversible cause. We need to make preparations for RRT.  -Plan for RRT: The patient had RRT education yesterday. Leaning towards in center hemodialysis.  -Plan for Transplant: Says her son is willing to potentially be a living donor. He lives out in [place]. Needs to be referred for transplant eval.    Visit 10/19/22:  -Here to  establish care with [facility]  -here with daughter today  -DM Control: Poorly controlled. Long term issue. Follows with PCP and referred to Endo  -HTN Control: Had some issues with hypotension recently so hydrochlorothiazide discontinued and BP improved. Continues on Lisinopril. BP in the 140's at todays appointments. She doesn't check her BP at home.   -Hx of Kidney Stones: She denies a history of any symptomatic kidney stones. She denies gross hematuria.  -Nocturia: 1-2x per night  -NSAIDs: Doesn't sound that way   -Herbal/OTC Medications: Denies  -RRT Planning/GOC:   --Understanding of kidney dysfunction: knows that her kidneys have been weak for a long time but not clear on how bad her function is. She is very reserved, asks few questions, and has a difficult time summarizing what we talked about today. This is not surprising as she is clearly nervous/scared about kidney failure/dialysis.   --We discussed that she is right at the border of stage 5 kidney disease and that we need to start planning for RRT.  --We had a long discussion about RRT and what kidney failure is using interpretor.  -Uremic symptoms:   --Appetite: She thinks her appetite is good.  --Metallic taste: No  --Nausea: She is often having nausea. She thinks lime juice helps.   --Day/Night Reversal: Sleep is okay  --Swelling in legs: She notices a bit of leg swelling.  -She has been eating and drinking well leading up to today's appointment.    Objective:  PAST MEDICAL HISTORY:  Past Medical History:   Diagnosis Date     Arthritis      Depression      Diabetes (H)      End stage renal disease (H)      Hypertension       DM  HTN    PAST SURGICAL HISTORY:  Past Surgical History:   Procedure Laterality Date     ABDOMEN SURGERY       APPENDECTOMY       BIOPSY       CATARACT IOL, RT/LT         MEDICATIONS:  Current Outpatient Medications   Medication Instructions     acetaminophen (TYLENOL) 500-1,000 mg, Oral, EVERY 6 HOURS PRN     alcohol swab  prep pads Use to swab area of injection/amadeo as directed.     ammonium lactate (AMLACTIN) 12 % external cream Topical, DAILY     aspirin 81 mg, Oral, DAILY     atorvastatin (LIPITOR) 80 mg, Oral, DAILY     blood glucose (NO BRAND SPECIFIED) test strip Use to test blood sugar 3 times daily or as directed. To accompany: Blood Glucose Monitor Brands: per insurance.     blood glucose calibration (NO BRAND SPECIFIED) solution To accompany: Blood Glucose Monitor Brands: per insurance.     blood glucose monitoring (NO BRAND SPECIFIED) meter device kit Use to test blood sugar 3 times daily or as directed. Preferred blood glucose meter OR supplies to accompany: Blood Glucose Monitor Brands: per insurance.     carboxymethylcellulose-glycerin (REFRESH OPTIVE) 0.5-0.9 % ophthalmic solution 1 drop, Both Eyes, 3 TIMES DAILY PRN     Continuous Blood Gluc Sensor (FREESTYLE MICKIE 14 DAY SENSOR) MIS 1 Device, Does not apply, EVERY 14 DAYS, Change sensor as directed every 14 days     cyanocobalamin (VITAMIN B-12) 1,000 mcg, Oral, DAILY     escitalopram (LEXAPRO) 10 mg, Oral, DAILY     fenofibrate (LOFIBRA) 54 mg, Oral, DAILY     ferrous sulfate (FEROSUL) 325 mg, Oral, EVERY OTHER DAY     fish oil-omega-3 fatty acids 1 g, Oral, DAILY     gabapentin (NEURONTIN) 300 mg, Oral, 2 TIMES DAILY     insulin aspart (NOVOLOG PEN) 18 Units, Subcutaneous, 3 TIMES DAILY WITH MEALS     insulin glargine (LANTUS PEN) 80 Units, Subcutaneous, EVERY MORNING, 40 unit(s) on each side     insulin pen needle (32G X 4 MM) 32G X 4 MM miscellaneous Use 6x pen needles daily or as directed (victoza, 3 for novolog, 2 for lantus)     liraglutide (VICTOZA) 1.8 mg, Subcutaneous, DAILY     lisinopril (ZESTRIL) 40 mg, Oral, DAILY     melatonin 3 mg, Oral, AT BEDTIME PRN     menthol, Topical Analgesic, 2.5% (BENGAY VANISHING SCENT) 2.5 % GEL topical gel Topical, 3 TIMES DAILY (Diuretics and Nitrates), Right shoulder pain      metoprolol succinate ER (TOPROL XL) 200  mg, Oral, DAILY     mirtazapine (REMERON) 7.5 mg, Oral, AT BEDTIME     omeprazole 20 mg, Oral, DAILY     polyethylene glycol (MIRALAX) 17 g, Oral, DAILY     silver sulfADIAZINE (SILVADENE) 1 % external cream Topical, 2 TIMES DAILY     thin (NO BRAND SPECIFIED) lancets Use with lanceting device. To accompany: Blood Glucose Monitor Brands: per insurance.     urea (GORMEL) 20 % external cream Topical, DAILY, Apply to feet daily     vitamin D2 (ERGOCALCIFEROL) 50,000 Units, Oral, WEEKLY       FAMILY MEDICAL HISTORY:   Family History   Problem Relation Age of Onset     Glaucoma No family hx of      Macular Degeneration No family hx of      Breast Cancer No family hx of      Ovarian Cancer No family hx of      Anesthesia Reaction No family hx of      Venous thrombosis No family hx of        PHYSICAL EXAM:   BP (!) 154/82   Pulse 88   GENERAL APPEARANCE: appears older than stated age, uses a cane when ambulating  EYES: nonicteric  HENT: mouth without ulcers or lesions  RESP: lungs clear to auscultation   CV: regular rhythm, normal rate, no rub  ABDOMEN: soft, nontender, normal bowel sounds, no HSM   Extremities: No LE edema   MS: no evidence of inflammation in joints, no muscle tenderness  SKIN: no rash  NEURO: mentation intact and speech normal  PSYCH: affect normal  Access: RUE AVF site looks good. Minor swelling. No signs of infection.     LABS REVIEWED BY ME:   ANEMIA  Recent Labs   Lab Test 02/13/23  0908 02/01/23  0759 01/27/23  1415 01/18/23  1139 01/04/23  1130 12/16/22  1240 11/16/22  1253   HGB 9.1* 9.0* 9.2* 8.9*   < > 7.4* 8.0*   IRONSAT 29  --  30  --   --  26 24   *  --  516*  --   --  420* 583*    < > = values in this interval not displayed.       BMP  Recent Labs   Lab Test 02/13/23  0908 01/27/23  1415 11/16/22  1253 10/29/22  0727 10/28/22  0842 10/19/22  0625 06/02/22  1300 02/28/22  1334 12/09/21  0725 12/08/21  0607 12/06/21  2347 12/06/21  2118    142 141 143   < > 143   < > 136   < >  139   < > 133*   POTASSIUM 3.5 3.5 3.3* 3.4   < > 3.0*   < > 3.5   < > 3.5  3.5   < > 3.5   CHLORIDE 106 103 105 110*   < > 105   < > 98   < > 104   < > 92*   CO2 26 29 23 24   < > 28   < > 25   < > 25   < > 25   ANIONGAP 11 10 13 9   < > 10   < > 13   < > 10   < > 16   BUN 23.5* 27.5* 25.4* 18.7   < > 17.9   < > 18   < > 23*   < > 39*   CR 3.89* 4.23* 3.39* 3.12*   < > 3.32*   < > 1.93*   < > 1.97*   < > 2.54*   GFRESTIMATED 13* 12* 15* 17*   < > 16*   < > 30*   < > 28*   < > 21*   MAG 1.7  --   --   --   --  1.6*  --   --   --   --   --  1.9   PROTTOTAL  --   --   --   --   --   --   --  7.5  --  7.3  --  8.5*    < > = values in this interval not displayed.       CBC  Recent Labs   Lab Test 02/13/23  0908 02/01/23  0759 01/27/23  1415 01/18/23  1139 12/16/22  1240 11/16/22  1253 10/29/22  0727 10/28/22  1425 10/28/22  0842   HGB 9.1* 9.0* 9.2* 8.9*   < > 8.0* 7.3*   < > 8.9*   WBC 6.5  --   --   --   --  5.2 6.4  --  7.6   HCT 30.3* 30.1* 29.7* 29.5*   < > 26.0* 24.5*  --  29.9*   MCV 72*  --   --   --   --  71* 73*  --  72*   *  --   --   --   --  190 146*  --  175    < > = values in this interval not displayed.       DIABETES  Recent Labs   Lab Test 12/28/22  1506 09/21/22  0814 09/14/22  1518 06/02/22  1300   A1C 7.3* 11.0* 11.1* 10.4*       HYPONATREMIA  Recent Labs   Lab Test 12/06/21  2118   UNAR 57       MBD  Recent Labs   Lab Test 02/13/23  0908 01/27/23  1415 11/16/22  1253 10/29/22  0727 10/28/22  0842 10/19/22  0625 06/02/22  1300 02/28/22  1334   JOSEPH 6.0* 6.6* 6.2* 6.0*   < > 6.6*   < > 8.2*   ALBUMIN  --  3.3* 3.0*  --   --  3.0*  --  3.3*   PHOS  --  3.8 2.9  --   --  3.5  --   --    PTHI  --   --   --   --   --  87*  --   --     < > = values in this interval not displayed.        URINE STUDIES  Recent Labs   Lab Test 10/19/22  0705 12/06/21 2118   COLOR Yellow Light Yellow   APPEARANCE Clear Clear   URINEGLC 250* >1000*   URINEBILI Negative Negative   URINEKETONE Negative Negative   SG  1.025 1.016   UBLD Small* 0.1 mg/dL*   URINEPH 7.0 6.0   PROTEIN 300* 100*   NITRITE Negative Negative   LEUKEST Negative Negative   RBCU 3* 3*   WBCU 1 10*     No lab results found.    ADDITIONAL LABS ORDERED/REVIEWED BY ME:  None    Assessment/Plan  CKD G5A3  Established care with [facility] 10/19/22    Oldest labs I am able to see in the system are from 7/29/2019 when creatinine was 2. Patient was admitted 12/2021 with hyperglycemia and had presumed pre-renal PRUDENCE. Creatinine improved from 2.16 -> 1.57 at time of discharge. At follow up her creatinine has been in the high 1's to mid 2's range (2.53 on 9/14/22) and on 10/19/22 unfortunately up to 3.3. She has hx of kidney stones noted on CT Abd/Pelvis 12/2021 but no proven episodes of obstructive nephropathy. Likely progressive renal impairment from a combination of recurrent pre-renal AKIs in the setting of significantly elevated glucose levels, DKD, and HTN nephrosclerosis. Interestingly on 10/19/22 she was found to have severe Nephrotic range proteinuria with UPCR suggestive of 15g/day. This level of nephrotic range proteinuria is certainly atypical for DN. Renal biopsy was obtained 10/28/22 with advanced/diffuse glomerulosclerosis. Proceeding with RRT/Transplant planning.     Seeing patient monthly until all RRT/Transplant planning steps are in place and to monitor for development of Uremic symptoms.     Plan:  -Continue to monitor for RRT initiation need. Thankfully no indication today. Keep follow up for post op evaluations.  -Has Appt with Transplant for evaluation. Advised to keep this appointment.  -Continue with DM and HTN medications as prescribed.  -Optimize BP as needed. No adjustments today as likely elevated BP in setting of arm discomfort.     History of Nephrolithiasis  Stones noted on CT Abd/Pelvis 12/2021. Renal US 10/2022 without stones.    Anemia of Renal Disease:  -Hemoglobin 7.4  -Ferritin: Acceptable  -TSAT: 27  (acceptable)    Plan:  -Enrolled in anemia clinic (message sent 11/16/2022).    MBD:  Phos: 3.5 (accpetable)  PTH: 87 (acceptable)  Calcium: Corrects to mid 6's 2/15/2023. Increase calcium itnake. Low threshold to add on supplement as long as Phos remains acceptable.    Lipid Management:  KDIGO 2013 Guidelines recommend the following for patients with CKD:  Adults >/= 49 yo w/ CKD stage 1 or 2: Statin  Adults >/= 49 yo w/ CKD stage 3-5: Statin + Ezetmibe or Statin alone  Adults 18-49 + 1 of the following (CAD, DM, Ischemic stroke, 10 yr ASCVD risk >10%): Statin    KDIGO guidelines recommend Simvastatin 20mg/Ezetmibe 10mg qDay for patients with CKD G3a-G5 (in line with the SHARP trial). If Simvastatin used alone, 40mg qDay is referenced.   Other options include: Atorvastatin 20mg qDay (4D trial) and Rosuvastatin 10mg qDay (RHONDA trial)    Current regimen: Atorvastatin 80mg qDay, Fenofibrate 54mg qDay    RRT/GOC:  We have discussed the following regarding Renal Replacement Therapy:  Is RRT within this patient's GOC?: Yes  -Longterm vs Time-limited trial: Longterm    --Review of Conservative Kidney Management:  ---We discussed options for medical management of symptoms related to renal failure (High dose diuretics, potassium binders, medications for nausea/itching) and some benefits associated with this strategy (less interactions with healthcare system, less appointments, less medications, typically less time spent in the hospital)  ---We discussed that patients with no residual kidney function will have a rapid accumulation of toxins/fluid whereas patients with some residual kidney function can be managed medically for quite some time.     Modalities:  -Hemodialysis vs Peritoneal Dialysis  --Home vs In-center  ---Favor HD and In-center. Discussed PD vs HD extensively 11/16/22. Discussed how patient and daughter (or other family member) would be educated at the PD center until they felt comfortable doing PD alone at  home. Discussed that PD offers more freedom but more responsibility compared to in-center HD. Patient was hesitant to make a decision on PD vs HD but after questioning her multiple times and explaining the difference between the two modalities multiple times (including reviewing technique, pictures, and impact on lifestyle) the patient ultimately decided that she would prefer to go to a HD center 3x per week and have HD performed via an AVF.     Access:  -AVG vs AVF vs PD Cath vs TDC vs Temp Vascath  --We have discussed the benefits/risks/timing of placement of the various types of RRT access  ---Referral for placement/estimated timing of placement: Message sent for access eval/placement 11/17/22. Appt scheduled for 1/9/2023 and access placement planned for 2/14/2023. Now s/p RUE AVF placement.  ---Has already been referred to [facility]    Transplant:  -eGFR < 20 on two or more readings?: Yes  --Referral placed for eval?: Placed 11/16/2022. Has appt 2/20/2023.    Return to clinic: 6 weeks    Sahil Waterman MD   of Medicine  Division of Nephrology and Hypertension  [facility]    35 minutes spent on the date of the encounter doing chart review, history and exam, documentation and further activities as noted above

## 2023-02-15 NOTE — NURSING NOTE
Chief Complaint   Patient presents with     RECHECK     Follow up with CKD     BP (!) 154/82   Pulse 88   Damaris Ybarra CMA on 2/15/2023 at 1:34 PM

## 2023-02-15 NOTE — PROGRESS NOTES
Neph Tracking Flowsheet Last Filled Values     CKD Education Status Complete    CKD Education Referral Date 10/21/22    CKD Education Completed Date 11/15/22    CKD Education Type Kidney Smart Modality Education; Kidney Smart CKD Basics    Preferred Modality Hemodialysis    Patient's Referral Dates Auto Populate Patient's Referral Dates    Anemia Services Referral 11/18/22    Dietician Referral 1/30/23    Journey Referral 10/19/22    Vein Mapping/US Order 11/17/22    Vein Mapping/US  01/09/23    Access Surgeon Referral Status  Referred  Fistula consult with Dr. Langford    Dialysis Access Referral 11/17/22  ok to schedule surgery post consult per Dr. Waterman    Access Surgical Consult 01/09/23  Plan for left vs right AV fistula with Dr. Langford (first choice left radiocephalic AVF).  Request sent to schedule.  Pt will need PAC prior.    Diaylsis Access Type AV Fistula    Dialysis Access Site [email or web address]  [email or web address]    Dialysis Access Surgery 02/14/23    Dialysis Access Surgeon Dr. Langford    Transplant Evaluation Referral 11/16/22    Transplant Status  Referred

## 2023-02-15 NOTE — OP NOTE
Transplant Surgery  Operative Note  PREOP DIAGNOSIS: End Stage Renal Failure   POSTOP DIAGNOSIS: Same  OPERATION: Arteriovenous Fistula creation, right brachial artery to cephalic vein. Intraoperative ultrasound  FACULTY: Markell Langford M.D.  FELLOW: Benjy Thomas MD ,   ASSISTANT: None  ANESTHESIA: General  EBL: 10 ml.  SPECIMEN: none  FLUIDS: None   DRAIN: None    INDICATION: The patient was referred for permanent dialysis access creation due to renal failure. The indications, benefits, and risks of permanent vascular access creation were discussed, questions answered and informed consent was provided.   FINDINGS:   Artery quality was: Normal, with diameter of 5mm.  Anastomosis diameter: 5 mm.  Vein quality was: Normal with diameter of 3mm.   Blood flow was 500 ml/min as measured by Transonic flow probe.  COMPLICATIONS: None.    PROCEDURE: The patient was positioned supine, and the right upper extremity was positioned, prepped and draped in the usual sterile fashion. An ultrasound was performed to assess the size, quality, and position of the artery and vein. The patient received preoperative IV antibiotics. An incision was made and extended through the subcutaneous tissues above the and antecubital crease. The brachial artery and cephalic vein were circumferentially dissected. The patient was not heparinized. Arterial clamps were placed and arteriotomy was made.  The distal aspect of the vein was then transected, the proximal vein dilated with heparinized saline and secured with a Heifitz clip. The vein was tailored and anastomosed with 7-0 Prolene. The clamps were removed sequentially. Hemostasis was obtained.  Fistula flow was good. The distal radial artery pulse was excellent and capillary refill excellent. The wound was closed in layers with absorbable suture and dressed with Dermabond. Counts were correct. Faculty was present for the key portions of the procedure. The patient was then awakened and  transferred to PACU in good condition.     I was present during the key portions of the procedure, and I was immediately available for the entire procedure. There was no qualified resident available to assist with the entire procedure. The fellow noted above participated as the first assistant in all parts of the dictated procedure and was the primary in the opening and closure with assistance from me as needed.

## 2023-02-16 ENCOUNTER — TELEPHONE (OUTPATIENT)
Dept: TRANSPLANT | Facility: CLINIC | Age: 53
End: 2023-02-16
Payer: COMMERCIAL

## 2023-02-16 RX ORDER — CHLORAL HYDRATE 500 MG
1 CAPSULE ORAL DAILY
Qty: 30 CAPSULE | Refills: 3 | Status: SHIPPED | OUTPATIENT
Start: 2023-02-16 | End: 2023-05-25

## 2023-02-16 NOTE — TELEPHONE ENCOUNTER
"Last Written Prescription Date:  3/17/22  Last Fill Quantity: 90,  # refills: 3   Last office visit provider:  1/10/23     Requested Prescriptions   Pending Prescriptions Disp Refills     fish oil-omega-3 fatty acids 1000 MG capsule [Pharmacy Med Name: FISH OIL 1,000 MG SOFTGEL 1000 Capsule] 30 capsule 3     Sig: TAKE 1 CAPSULE (1 G) BY MOUTH DAILY       Vitamin Supplements (Adult) Protocol Passed - 2/14/2023  2:35 PM        Passed - High dose Vitamin D not ordered        Passed - Recent (12 mo) or future (30 days) visit within the authorizing provider's specialty     Patient has had an office visit with the authorizing provider or a provider within the authorizing providers department within the previous 12 mos or has a future within next 30 days. See \"Patient Info\" tab in inbasket, or \"Choose Columns\" in Meds & Orders section of the refill encounter.              Passed - Medication is active on med list             Valeriano Ytaes RN 02/16/23 3:55 PM  "

## 2023-02-16 NOTE — TELEPHONE ENCOUNTER
Appointment reminder call for [facility] appointments 2/20/23.  No [facility]  available.  Unable to make call to patient.

## 2023-02-17 NOTE — NURSING NOTE
Barriga is able to get her Aranesp on 2/20/23 at 2pm on the 3rd floor at the [facility].  LiquidText message sent to Barriga.       Geri Mortensen RN   [address] [address]   [email or web address]   Office : [phone]  Fax: [phone]

## 2023-02-17 NOTE — PROGRESS NOTES
[facility] Solid Organ Transplant  Outpatient MNT: Kidney Pancreas Transplant Evaluation    Current BMI: 21.7 (HT 61.3 in,  lbs/53 kg)  BMI guideline for kidney transplant up to a BMI of 40 / per surgeon discretion  BMI guideline for pancreas transplant up to a BMI of 35 / per surgeon discretion    Frailty Assessment -- Frail (4/5 points)- reported exhaustion, slowness, inability to perform  test (reports sore arm from fistula surgery), no activity     Recommended Interventions to Address Frailty:  Physical therapy with goal to walk 1 block for txp candidacy      Time Spent: 30 minutes  Visit Type: Initial   Referring Physician: Primitivo   Pt accompanied by: [email or web address]  & dtr     History of previous txp: none   Dialysis: no, but had fistula placed last week     Nutrition Assessment  H/o DM II, celiac dz   Frequency of BG checks: multiple times a day   Hypoglycemia: yes, unawareness: unsure  Last A1c: 7.1 (2/14/23)  Pt reported to me on 3 different times during visit that she often feels so shakey that she lays down and wants to pass out, yet does treat her low BG.    Per coordinator pre-eval note: incontinent of stool x 2 years- unknown etiology. Ambulates short distances at home w/ walker- unsure why this is the case. Has a fall hx.     Recent IP stay beg of Feb    - Appetite: good/baseline   - Food allergies/intolerances: no   - Meal prep & grocery shopping: daughters &   - Issues chewing or swallowing: no   - N/V/D/C: occasional upset stomach- no vomiting   - Food access concerns: not asked     Vitamins, Supplements, Pertinent Meds: fish oil, iron, vit D, vit B12   Herbal Medicines/Supplements: none     Edema: none    Weight hx: unsure, but reports some fluctuations   Wt Readings from Last 10 Encounters:   02/14/23 49.5 kg (109 lb 2 oz)   02/13/23 49 kg (108 lb)   01/27/23 48.5 kg (107 lb)   01/24/23 49 kg (108 lb)   01/10/23 49 kg (108 lb)   01/09/23 49.2 kg (108 lb 8 oz)    12/28/22 52.2 kg (115 lb)   12/16/22 53.5 kg (118 lb)   11/22/22 47.6 kg (105 lb)   11/16/22 49.5 kg (109 lb 3.2 oz)     Diet Recall  Breakfast Broth    Lunch Rice, strawberry juice   Dinner Rice; reports some protein- every day - 2 oz at a meal (2x/day)   Snacks Fruit (apple, orange, honeydew)   Beverages Fresh lime juice, tea, water    Alcohol None    Dining out None      Physical Activity  Pt c/o numbness in feet & some sort of sore/wound on her foot  She reports she has had a cane >1 year since some hospital stay  She is in a wheelchair today, but also uses a wheelchair for the most part at home  She was working with PT for 1 week, but got too sore so stopped. She reports mostly doing UE exercises, but they tried to help her with walking, yet she kept tripping so this was unproductive.   Needs help w/ all ADLs. She was able to walk down the jay today with both her cane & holding onto her daughter    Labs  2/13 K 3.5   1/27 Phos 3.8     Nutrition Diagnosis  No nutrition diagnosis identified at this time.     Nutrition Intervention  Nutrition education provided:  Discussed sodium intake (low sodium foods and drinks, seasoning food without salt and tips for low sodium diet).  Reviewed wnl K/Phos labs. Spent most of discussion reviewing functional status and need to work with PT to determine if walking/mobility can improve, etc.     Did not review post txp diet guidelines at this time.     Patient Understanding: Pt verbalized understanding of education provided.  Expected Engagement: Fair  Follow-Up Plans: PRN     Nutrition Goals  No nutrition goals identified at this time     Maribeth Coemr, RD, LD, CCTD

## 2023-02-20 ENCOUNTER — OFFICE VISIT (OUTPATIENT)
Dept: TRANSPLANT | Facility: CLINIC | Age: 53
End: 2023-02-20
Attending: NURSE PRACTITIONER
Payer: COMMERCIAL

## 2023-02-20 ENCOUNTER — DOCUMENTATION ONLY (OUTPATIENT)
Dept: TRANSPLANT | Facility: CLINIC | Age: 53
End: 2023-02-20

## 2023-02-20 VITALS
BODY MASS INDEX: 21.9 KG/M2 | DIASTOLIC BLOOD PRESSURE: 83 MMHG | RESPIRATION RATE: 22 BRPM | WEIGHT: 116 LBS | TEMPERATURE: 98.4 F | SYSTOLIC BLOOD PRESSURE: 164 MMHG | OXYGEN SATURATION: 93 % | HEIGHT: 61 IN | HEART RATE: 94 BPM

## 2023-02-20 DIAGNOSIS — I10 ESSENTIAL HYPERTENSION: ICD-10-CM

## 2023-02-20 DIAGNOSIS — Z01.818 ENCOUNTER FOR PRE-TRANSPLANT EVALUATION FOR KIDNEY AND PANCREAS TRANSPLANT: ICD-10-CM

## 2023-02-20 DIAGNOSIS — E11.21 TYPE 2 DIABETES MELLITUS WITH DIABETIC NEPHROPATHY, WITH LONG-TERM CURRENT USE OF INSULIN (H): ICD-10-CM

## 2023-02-20 DIAGNOSIS — I10 PRIMARY HYPERTENSION: ICD-10-CM

## 2023-02-20 DIAGNOSIS — Z76.82 ORGAN TRANSPLANT CANDIDATE: ICD-10-CM

## 2023-02-20 DIAGNOSIS — N18.5 CHRONIC KIDNEY DISEASE, STAGE V (H): ICD-10-CM

## 2023-02-20 DIAGNOSIS — N18.5 TYPE 2 DIABETES MELLITUS WITH STAGE 5 CHRONIC KIDNEY DISEASE NOT ON CHRONIC DIALYSIS, WITH LONG-TERM CURRENT USE OF INSULIN (H): ICD-10-CM

## 2023-02-20 DIAGNOSIS — E11.9 DIABETES MELLITUS, TYPE 2 (H): ICD-10-CM

## 2023-02-20 DIAGNOSIS — Z79.4 TYPE 2 DIABETES MELLITUS WITH STAGE 5 CHRONIC KIDNEY DISEASE NOT ON CHRONIC DIALYSIS, WITH LONG-TERM CURRENT USE OF INSULIN (H): ICD-10-CM

## 2023-02-20 DIAGNOSIS — Z79.4 TYPE 2 DIABETES MELLITUS WITH DIABETIC NEPHROPATHY, WITH LONG-TERM CURRENT USE OF INSULIN (H): ICD-10-CM

## 2023-02-20 DIAGNOSIS — E11.22 TYPE 2 DIABETES MELLITUS WITH STAGE 5 CHRONIC KIDNEY DISEASE NOT ON CHRONIC DIALYSIS, WITH LONG-TERM CURRENT USE OF INSULIN (H): ICD-10-CM

## 2023-02-20 PROCEDURE — G0463 HOSPITAL OUTPT CLINIC VISIT: HCPCS

## 2023-02-20 PROCEDURE — 99215 OFFICE O/P EST HI 40 MIN: CPT | Mod: 24 | Performed by: SURGERY

## 2023-02-20 PROCEDURE — 99207 PR NO CHARGE COORDINATED CARE PS: CPT

## 2023-02-20 PROCEDURE — 99215 OFFICE O/P EST HI 40 MIN: CPT

## 2023-02-20 RX ORDER — LANOLIN ALCOHOL/MO/W.PET/CERES
1 CREAM (GRAM) TOPICAL DAILY
COMMUNITY
Start: 2023-02-07 | End: 2023-02-20

## 2023-02-20 ASSESSMENT — PAIN SCALES - GENERAL: PAINLEVEL: MODERATE PAIN (4)

## 2023-02-20 NOTE — PROGRESS NOTES
TRANSPLANT NEPHROLOGY RECIPIENT EVALUATION NOTE    Assessment and Plan:  # Kidney/Pancreas Transplant Evaluation: Patient is a marginal candidate overall. Benefits of a living donor transplant were discussed.    # CKD: from unclear etiology, but likely type 2 diabetes,  HTN, pre renal AKIs. Kidney biopsy done on 10/28/22 that was a suboptimal sample with advanced/diffuse glomerulosclerosis. GFR 13, AVF placed. No donors. When ready, may benefit from a kidney transplant.     # DM Type 2: A1c 7.1% while using 120-130 units of insulin +Victoza daily.     # BMI 21     # Cardiac Risk: would need risk assessment.     # PAD Screenin2021 CT available for review.     # Physical Function: screened FRAIL. Daughter is PCA needs helps with all ADLs. In wheelchair today,  limited to walking around her house with a cane. Recommend PT and returning once at goal of walking one block.     # Social support / financial issues: appreciate social work input.     # Health Maintenance: Colonoscopy: Likely Not up to date but will need to request records from [place], [place] to review, Mammogram: Up to date, PAP: Reportedly up to date but will need records and Dental: Not up to date    Discussed the risks and benefits of a transplant, including the risk of surgery and immunosuppression medications.  Patient's overall evaluation will be discussed in the Transplant Program's regular meeting with a final recommendation on the patients suitability for transplant to be made at that time.    Pending completion of the full evaluation, patient presently appears to be enough of an acceptable kidney transplant recipient candidate to have any potential kidney donors start the evaluation process.      Evaluation:  Nithya Matthews was seen in consultation at the request of Dr. Markell Langford for evaluation as a potential kidney/pancreas transplant recipient.    Reason for Visit:  Nithya Matthews is a 53 year old female with CKD from unknown etiology, who presents for  kidney/pancreas transplant evaluation.    History of Present Illness:     Kidney Disease Hx:   [facility]   Nithya Matthews is a 53-year-old female, originally from [facility], who presents with CKD from unclear etiology.  Likely from  type 2 diabetes,  HTN, pre renal AKIs.  CKD since at at least 2019 with a sCr of 2.0. Moved to [place] from [place] in 9/2021. Established nephrology care here in 10/2022 with a sCr of 3.3 and found to a have [facility] P/C 15 g/g. Native kidney biopsy done 10/28/22: subptimal sample with advanced/diffuse glomerulosclerosis. GFR now 13, AVF placed 2/14/23.                    Kidney Disease Dx: Unknown etiology       Biopsy Proven: Yes; as above          On Dialysis: No       Primary Nephrologist: Dr. Waterman        H/o Kidney Stones: Yes; nonobstructive stones on imaging, but denies every passing        H/o Recurrent/Frequent UTI: No         Diabetic Hx: Type 2        Diagnosis Date: 21 years ago with gestational diabetes and continued to stay on oral meds, went without meds for sometime when she  moved to the  10 + years ago, then started insulin. Currently using 40/40 Lantus, 15  Novolog with meals and Victoza 1.8 mg daily.            Diabetic Control: Borderline control (HbA1c 7-9%)   Last HbA1c: 7.1% (2/2023)       Hypoglycemic Unawareness: No, using CGM ~80s-100s       End-Organ Damage due to DM: Retinopathy, Nephropathy and Peripheral neuropathy          Cardiac/Vascular Disease Risk Factors:        Cardiac Risk Factors: Diabetes, Hypertension and CKD       Known CAD: No       Known PAD/Caludication Symptoms: No       Known Heart Failure: No       Arrhythmia: No       Pulmonary Hypertension: No       Valvular Disease: No       Other: None         Viral Serology Status       CMV IgG Antibody: Unknown       EBV IgG Antibody: Unknown         Volume Status/Weight:        Volume status: Mildly hypervolemic       Weight:  Acceptable BMI       BMI: Body mass  index is 21.7 kg/m .         Functional Capacity/Frailty:        Daughter is PCA, 9.5 hours daily, helps with all ADLs. In wheelchair today limited to walking around house with a cane, likely unable to walk a block d/t leg weakness/balance issues/ tripping. Unable to do stairs.     Fatigue/Decreased Energy: [] No [x] Yes    Chest Pain or SOB with Exertion: [x] No [] Yes    Significant Weight Change: [x] No [] Yes BMI 21, and relatively stable in recent year   Nausea, Vomiting or Diarrhea: [] No [x] Yes Occasional diarrhea   Fever, Sweats or Chills:  [x] No [] Yes    Leg Swelling [x] No [] Yes        History of Cancer: None    Other Significant Medical Issues: None      Allergy Testing Questions:   Medication that caused a reaction None   Antibiotics used that didn't give an allergic reaction?  None    COVID Vaccination Up To Date: Yes    Potential Living Kidney Donors: No    Review of Systems:  A comprehensive review of systems was obtained and negative, except as noted in the HPI or PMH.    Past Medical History:   Medical record was reviewed and PMH was discussed with patient and noted below.  Past Medical History:   Diagnosis Date     Anuria      Arthritis      Depression      Diabetes (H)      End stage renal disease (H)      Hypertension        Past Social History:   Past Surgical History:   Procedure Laterality Date     ABDOMEN SURGERY       APPENDECTOMY       BIOPSY       CATARACT IOL, RT/LT       CREATE FISTULA ARTERIOVENOUS UPPER EXTREMITY Right 2/14/2023    Procedure: right upper extremity Arteriovenous fistula creation;  Surgeon: Markell Langford MD;  Location: [facility]     Personal history of bleeding or anesthesia problems: No    Family History:  Family History   Problem Relation Age of Onset     Glaucoma No family hx of      Macular Degeneration No family hx of      Breast Cancer No family hx of      Ovarian Cancer No family hx of      Anesthesia Reaction No family hx of      Venous thrombosis No family  hx of        Personal History:   Social History     Socioeconomic History     Marital status:      Spouse name: Not on file     Number of children: Not on file     Years of education: Not on file     Highest education level: Not on file   Occupational History     Not on file   Tobacco Use     Smoking status: Never     Smokeless tobacco: Never   Vaping Use     Vaping Use: Never used   Substance and Sexual Activity     Alcohol use: Not Currently     Drug use: Never     Sexual activity: Yes   Other Topics Concern     Not on file   Social History Narrative     Not on file     Social Determinants of Health     Financial Resource Strain: Medium Risk     Difficulty of Paying Living Expenses: Somewhat hard   Food Insecurity: No Food Insecurity     Worried About Running Out of Food in the Last Year: Never true     Ran Out of Food in the Last Year: Never true   Transportation Needs: Unmet Transportation Needs     Lack of Transportation (Medical): No     Lack of Transportation (Non-Medical): Yes   Physical Activity: Sufficiently Active     Days of Exercise per Week: 7 days     Minutes of Exercise per Session: 30 min   Stress: Stress Concern Present     Feeling of Stress : To some extent   Social Connections: Moderately Integrated     Frequency of Communication with Friends and Family: Twice a week     Frequency of Social Gatherings with Friends and Family: Twice a week     Attends Synagogue Services: 1 to 4 times per year     Active Member of Clubs or Organizations: No     Attends Club or Organization Meetings: Never     Marital Status:    Intimate Partner Violence: Not At Risk     Fear of Current or Ex-Partner: No     Emotionally Abused: No     Physically Abused: No     Sexually Abused: No   Housing Stability: Low Risk      Unable to Pay for Housing in the Last Year: No     Number of Places Lived in the Last Year: 2     Unstable Housing in the Last Year: No       Allergies:  No Known  Allergies    Medications:  Current Outpatient Medications   Medication Sig     acetaminophen (TYLENOL) 500 MG tablet Take 1-2 tablets (500-1,000 mg) by mouth every 6 hours as needed for mild pain     aspirin 81 MG EC tablet Take 1 tablet (81 mg) by mouth daily     atorvastatin (LIPITOR) 80 MG tablet Take 1 tablet (80 mg) by mouth daily (Patient taking differently: Take 80 mg by mouth every morning)     Continuous Blood Gluc Sensor (FREESTYLE MICKIE 14 DAY SENSOR) MISC USE 1 DEVICE EVERY 14 DAYS CHANGE SENSOR AS DIRECTED EVERY 14 DAYS     cyanocobalamin (VITAMIN B-12) 1000 MCG tablet Take 1 tablet (1,000 mcg) by mouth daily (Patient taking differently: Take 1,000 mcg by mouth every morning)     diclofenac (VOLTAREN) 1 % topical gel Apply 4 g topically 4 times daily as needed (rib/chest pain)     escitalopram (LEXAPRO) 10 MG tablet Take 1 tablet (10 mg) by mouth daily (Patient taking differently: Take 10 mg by mouth every evening)     fenofibrate (LOFIBRA) 54 MG tablet Take 1 tablet (54 mg) by mouth daily (Patient taking differently: Take 54 mg by mouth every morning)     ferrous sulfate (FEROSUL) 325 (65 Fe) MG tablet Take 1 tablet (325 mg) by mouth every other day     fish oil-omega-3 fatty acids 1000 MG capsule TAKE 1 CAPSULE (1 G) BY MOUTH DAILY     gabapentin (NEURONTIN) 300 MG capsule Take 1 capsule (300 mg) by mouth 2 times daily     insulin aspart (NOVOLOG PEN) 100 UNIT/ML pen Inject 15 Units Subcutaneous 3 times daily (with meals)     insulin glargine (LANTUS PEN) 100 UNIT/ML pen Inject 80 Units Subcutaneous every morning 40 unit(s) on each side     insulin pen needle (32G X 4 MM) 32G X 4 MM miscellaneous Use 6x pen needles daily or as directed (victoza, 3 for novolog, 2 for lantus)     liraglutide (VICTOZA) 18 MG/3ML solution Inject 1.8 mg Subcutaneous daily (Patient taking differently: Inject 1.8 mg Subcutaneous every morning)     lisinopril (ZESTRIL) 40 MG tablet Take 1 tablet (40 mg) by mouth daily  "(Patient taking differently: Take 40 mg by mouth every morning)     metoprolol succinate ER (TOPROL-XL) 200 MG 24 hr tablet Take 1 tablet (200 mg) by mouth daily (Patient taking differently: Take 200 mg by mouth every morning)     mirtazapine (REMERON) 7.5 MG tablet TAKE 1 TABLET (7.5 MG) BY MOUTH AT BEDTIME     omeprazole 20 MG tablet Take 1 tablet (20 mg) by mouth daily (Patient taking differently: Take 20 mg by mouth every morning)     REFRESH OPTIVE ADVANCED 0.5-1-0.5 % SOLN PLACE 1 DROP INTO BOTH EYES 3 TIMES DAILY AS NEEDED (AS NEEDED FOR DRY EYE/EYE PAIN)     vitamin D2 (ERGOCALCIFEROL) 23682 units (1250 mcg) capsule Take 1 capsule (50,000 Units) by mouth twice a week     povidone-iodine (BETADINE) 10 % topical solution Apply topically daily Apply to a cottonball and then paint over the ulcer on the right foot. (Patient not taking: Reported on 2/20/2023)     No current facility-administered medications for this visit.       Vitals:  BP (!) 164/83 (BP Location: Left arm, Patient Position: Sitting, Cuff Size: Adult Small)   Pulse 94   Temp 98.4  F (36.9  C) (Oral)   Resp 22   Ht 1.557 m (5' 1.3\")   Wt 52.6 kg (116 lb)   SpO2 93%   BMI 21.70 kg/m      Exam:  GENERAL APPEARANCE: alert and no distress  HENT: mouth without ulcers or lesions  LYMPHATICS: no cervical or supraclavicular nodes  RESP: lungs clear to auscultation - no rales, rhonchi or wheezes  CV: regular rhythm, normal rate, no rub, no murmur  FEMORAL PULSES: normal  EDEMA: no LE edema bilaterally  ABDOMEN: soft, nondistended, nontender, bowel sounds normal  MS: extremities normal - no gross deformities noted, no evidence of inflammation in joints, no muscle tenderness  SKIN: no rash    Results:   Recent Results (from the past 336 hour(s))   EKG 12-lead complete w/read - Clinics    Collection Time: 02/13/23  8:25 AM   Result Value Ref Range    Systolic Blood Pressure  mmHg    Diastolic Blood Pressure  mmHg    Ventricular Rate 90 BPM    Atrial " Rate 90 BPM    OH Interval 176 ms    QRS Duration 86 ms     ms    QTc 552 ms    P Axis 67 degrees    R AXIS 18 degrees    T Axis 9 degrees    Interpretation ECG       Sinus rhythm  Voltage criteria for left ventricular hypertrophy  Nonspecific ST and T wave abnormality  Prolonged QT  Abnormal ECG  When compared with ECG of 28-OCT-2022 20:02,  Criteria for Septal infarct are no longer Present  T wave amplitude has increased in Anterior leads     EKG 12-lead, tracing only [EKG1]    Collection Time: 02/13/23  8:25 AM   Result Value Ref Range    Systolic Blood Pressure  mmHg    Diastolic Blood Pressure  mmHg    Ventricular Rate 90 BPM    Atrial Rate 90 BPM    OH Interval 176 ms    QRS Duration 86 ms     ms    QTc 490 ms    P Axis 67 degrees    R AXIS 18 degrees    T Axis 9 degrees    Interpretation ECG       Sinus rhythm  Voltage criteria for left ventricular hypertrophy  Nonspecific ST and T wave abnormality  Prolonged QT  Abnormal ECG  When compared with ECG of 28-OCT-2022 20:02,  Criteria for Septal infarct are no longer Present  T wave amplitude has increased in Anterior leads  Confirmed by MD REINA JANE ([id number]) on 2/13/2023 8:58:17 AM  Also confirmed by MD REINA JANE ([id number]),  Dary Oakes ([id number])  on 2/13/2023 11:50:52 AM     EKG 12-lead complete w/read - Clinics    Collection Time: 02/13/23  8:26 AM   Result Value Ref Range    Systolic Blood Pressure  mmHg    Diastolic Blood Pressure  mmHg    Ventricular Rate 90 BPM    Atrial Rate 90 BPM    OH Interval 176 ms    QRS Duration 86 ms     ms    QTc 539 ms    P Axis 65 degrees    R AXIS 18 degrees    T Axis 21 degrees    Interpretation ECG       Sinus rhythm  Voltage criteria for left ventricular hypertrophy  Prolonged QT  Abnormal ECG  When compared with ECG of 13-FEB-2023 08:25, (unconfirmed)  No significant change was found  Confirmed by MD REINA JANE ([id number]) on 2/13/2023 8:57:49 AM     Ferritin    Collection Time: 02/13/23  9:08 AM    Result Value Ref Range    Ferritin 501 (H) 11 - 328 ng/mL   Iron & Iron Binding Capacity    Collection Time: 02/13/23  9:08 AM   Result Value Ref Range    Iron 67 37 - 145 ug/dL    Iron Binding Capacity 229 (L) 240 - 430 ug/dL    Iron Sat Index 29 15 - 46 %   Basic metabolic panel    Collection Time: 02/13/23  9:08 AM   Result Value Ref Range    Sodium 143 136 - 145 mmol/L    Potassium 3.5 3.4 - 5.3 mmol/L    Chloride 106 98 - 107 mmol/L    Carbon Dioxide (CO2) 26 22 - 29 mmol/L    Anion Gap 11 7 - 15 mmol/L    Urea Nitrogen 23.5 (H) 6.0 - 20.0 mg/dL    Creatinine 3.89 (H) 0.51 - 0.95 mg/dL    Calcium 6.0 (L) 8.6 - 10.0 mg/dL    Glucose 199 (H) 70 - 99 mg/dL    GFR Estimate 13 (L) >60 mL/min/1.73m2   CBC with platelets    Collection Time: 02/13/23  9:08 AM   Result Value Ref Range    WBC Count 6.5 4.0 - 11.0 10e3/uL    RBC Count 4.19 3.80 - 5.20 10e6/uL    Hemoglobin 9.1 (L) 11.7 - 15.7 g/dL    Hematocrit 30.3 (L) 35.0 - 47.0 %    MCV 72 (L) 78 - 100 fL    MCH 21.7 (L) 26.5 - 33.0 pg    MCHC 30.0 (L) 31.5 - 36.5 g/dL    RDW 20.3 (H) 10.0 - 15.0 %    Platelet Count 148 (L) 150 - 450 10e3/uL   Magnesium    Collection Time: 02/13/23  9:08 AM   Result Value Ref Range    Magnesium 1.7 1.7 - 2.3 mg/dL   Glucose by meter    Collection Time: 02/14/23  7:01 AM   Result Value Ref Range    GLUCOSE BY METER POCT 241 (H) 70 - 99 mg/dL   Hemoglobin A1c    Collection Time: 02/14/23  7:49 AM   Result Value Ref Range    Hemoglobin A1C 7.1 (H) <5.7 %   Glucose by meter    Collection Time: 02/14/23 12:25 PM   Result Value Ref Range    GLUCOSE BY METER POCT 207 (H) 70 - 99 mg/dL

## 2023-02-20 NOTE — PROGRESS NOTES
Transplant Surgery Consult Note    Medical record number: [id number]  YOB: 1970,   Consult requested by Dr. Barnes for evaluation of kidney and pancreas transplant candidacy.    Assessment and Recommendations: Ms. Matthews is a fair candidate for kidney transplantation and has a fair understanding of the risks and benefits of this approach to management of renal failure and diabetes. The following issues should be addressed prior to transplant:     52 yo frail type 2 DM  ON insulin a tthis time  Not on dialysis  GFR 13  Is in a wheel;chair  Walks with a cane - neuropathy- - very short distance, just a few feet beofre she has to stop  WIll need PT to see if she can build up strength to 1 block  Abd profile reasonably flat  H/O C section midline scar  RLQ scar - ?appendix, patient does not know  Very poor historian   Daughter is full time PCA, she's 21 with limited knowledge as well  No live donors    Too frail for KP  If she can rehab to 1 block ambulation we could try doing a LD kidney tx  That's the only pathway I see for tx  Patient is currently unwilling to look for live donors  I suggest we suspend eval now and patient can get re-referred if   1. She is able to rehab to 1 block ambulation and   2. Has live donor potential    Risks of the surgical procedure including but not limited to the rare risk of mortality discussed in detail. Patient verbalized good understanding and had several pertinent questions which were answered satisfactorily.     Immunosuppressive regimen, management and long term risks discussed in detail.               Ms. Matthews has Chronic renal failure due to diabetes mellitus type 2 whose condition is not expected to resolve, is expected to progress, and is expected to continue to develop related comorbid conditions.  Cardiology consult for cardiac risk stratification to be ordered: No  CT abdomen and pelvis without contrast to be ordered for assessment of vascular targets:  No  Transplant listing labs ordered to include HLA, ABOx2, Cr, etc.  Dietician consult ordered: Yes  Social work consult ordered: Yes  Imaging reports reviewed:  yes  Radiology images reviewed:no  Recipient suitable to move forward with work up of living donors:  No      The majority of our visit was spent in counselling, discussing the medical and surgical risks of living or  donor kidney and pancreas transplantation, either in a simultaneous or sequential fashion. I contrasted approximate wait time for SPK vs living vs  donor kidneys from normal (0-85%) or higher (%) kidney donor profile index (KDPI) donors and their associated outcomes. I would not recommend this individual to consider kidneys from high KDPI donors. The reason for this decision is best summarized as: frailty.  Access to transplant will be impacted by living donor availability and overall candidacy for SPK, as well as the influence of blood type and degree of sensitization. We discussed advantages of preemptive transplant as well as living donor kidney transplant, and graft and patient survival outcomes associated with these options. Potential surgical complications of kidney and pancreas transplantation include bleeding, clotting, infection, wound complications, anastomotic failure and other issues such as cardiac complications, pneumonia, deep venous thrombosis, pulmonary embolism, post transplant diabetes and death. We discussed the need for protocol biopsy of the kidney and the possible need for a ureteral stent (and subsequent removal). We discussed benefits and risks associated with different approaches to exocrine drainage of pancreatic secretions. We also discussed differences in the average length of stay, recovery process, and posttransplant lab and monitoring protocol. We discussed the risk of graft rejection and recurrent diabetic nephropathy in the setting of poor glycemic control. I emphasized the need for  strict immunosuppression adherence and the potential for complications of immunosuppression such as skin cancer or lymphoma, as well as a very low but not zero risk of donor-derived disease transmission risks (infection, cancer). Ms. Matthews asked good questions and the patient's candidacy will be reviewed at our Multidisciplinary Selection Committee. Thank you for the opportunity to participate in Ms. Matthews's care.    Total time: 60 minutes  Counselling time: 30 minutes    .  ---------------------------------------------------------------------------------------------------    HPI: Ms. Matthews has Chronic renal failure due to diabetes mellitus type 2. The patient has had diabetes for 20 years. Management is by Ana per diabetic educator  Humalog per diabetic educator. The patient usually checks her blood sugar 3 times/day.  The diabetes is uncontrolled.    Complications of diabetes include:    Retinopathy:  Yes   Neuropathy: Yes   Gastroparesis:  No    The patient is not on dialysis.    Has potential kidney donors:  No.  Interested in participation in paired exchange if a donor is willing: Doesn't know     The patient has the following pertinent history:       No    Yes  Dialysis:    []      [] via:       Blood Transfusion                  []      []  Number of units:   Most recently:  Pregnancy:    []      [] Number:       Previous Transplant:  []      [] Details:    Cancer    []      [] Comment:   Kidney stones   []      [] Comment:      Recurrent infections  []      []  Type:                  Bladder dysfunction  []      [] Cause:    Claudication   []      [] Distance:    Previous Amputation  []      [] Cause:     Chronic anticoagulation  []      [] Indication:  Latter-day  []      []     Past Medical History:   Diagnosis Date     Anuria      Arthritis      Depression      Diabetes (H)      End stage renal disease (H)      Hypertension      Past Surgical History:   Procedure Laterality Date     ABDOMEN SURGERY        APPENDECTOMY       BIOPSY       CATARACT IOL, RT/LT       CREATE FISTULA ARTERIOVENOUS UPPER EXTREMITY Right 2/14/2023    Procedure: right upper extremity Arteriovenous fistula creation;  Surgeon: Markell Langford MD;  Location:  OR     Family History   Problem Relation Age of Onset     Glaucoma No family hx of      Macular Degeneration No family hx of      Breast Cancer No family hx of      Ovarian Cancer No family hx of      Anesthesia Reaction No family hx of      Venous thrombosis No family hx of      Heart Disease No family hx of      Diabetes No family hx of      Social History     Socioeconomic History     Marital status:      Spouse name: Not on file     Number of children: Not on file     Years of education: Not on file     Highest education level: Not on file   Occupational History     Not on file   Tobacco Use     Smoking status: Never     Smokeless tobacco: Never   Vaping Use     Vaping Use: Never used   Substance and Sexual Activity     Alcohol use: Not Currently     Drug use: Never     Sexual activity: Yes   Other Topics Concern     Not on file   Social History Narrative     Not on file     Social Determinants of Health     Financial Resource Strain: Medium Risk     Difficulty of Paying Living Expenses: Somewhat hard   Food Insecurity: No Food Insecurity     Worried About Running Out of Food in the Last Year: Never true     Ran Out of Food in the Last Year: Never true   Transportation Needs: Unmet Transportation Needs     Lack of Transportation (Medical): No     Lack of Transportation (Non-Medical): Yes   Physical Activity: Sufficiently Active     Days of Exercise per Week: 7 days     Minutes of Exercise per Session: 30 min   Stress: Stress Concern Present     Feeling of Stress : To some extent   Social Connections: Moderately Integrated     Frequency of Communication with Friends and Family: Twice a week     Frequency of Social Gatherings with Friends and Family: Twice a week     Attends  [employer] Services: 1 to 4 times per year     Active Member of Clubs or Organizations: No     Attends Club or Organization Meetings: Never     Marital Status:    Intimate Partner Violence: Not At Risk     Fear of Current or Ex-Partner: No     Emotionally Abused: No     Physically Abused: No     Sexually Abused: No   Housing Stability: Low Risk      Unable to Pay for Housing in the Last Year: No     Number of Places Lived in the Last Year: 2     Unstable Housing in the Last Year: No       ROS:   CONSTITUTIONAL:  No fevers or chills  EYES: negative for icterus  ENT:  negative for hearing loss, tinnitus and sore throat  RESPIRATORY:  negative for cough, sputum, dyspnea  CARDIOVASCULAR:  negative for chest pain Fatigue  GASTROINTESTINAL:  negative for nausea, vomiting, diarrhea or constipation  GENITOURINARY:  negative for incontinence, dysuria, bladder emptying problems  HEME:  No easy bruising  INTEGUMENT:  negative for rash and pruritus  NEURO:  Negative for headache, seizure disorder    Allergies:   No Known Allergies    Medications:  Prescription Medications as of 2/20/2023       Rx Number Disp Refills Start End Last Dispensed Date Next Fill Date Owning Pharmacy    acetaminophen (TYLENOL) 500 MG tablet  100 tablet 3 1/10/2023    [facility] - [place], [place] - [address]    Sig: Take 1-2 tablets (500-1,000 mg) by mouth every 6 hours as needed for mild pain    Class: E-Prescribe    Route: Oral    aspirin 81 MG EC tablet  90 tablet 3 11/5/2022    [facility] - [place], [place] - [address]    Sig: Take 1 tablet (81 mg) by mouth daily    Class: E-Prescribe    Route: Oral    atorvastatin (LIPITOR) 80 MG tablet  90 tablet 3 3/17/2022    [facility] - [place], [place] - [address]    Sig: Take 1 tablet (80 mg) by mouth daily    Class: E-Prescribe    Route: Oral    Continuous Blood Gluc Sensor (FREESTYLE MICKIE 14 DAY SENSOR) [facility]    1/8/2023        Sig: USE 1 DEVICE  EVERY 14 DAYS CHANGE SENSOR AS DIRECTED EVERY 14 DAYS    Class: Historical    cyanocobalamin (VITAMIN B-12) 1000 MCG tablet  90 tablet 3 2/7/2023    [facility] - [place] - [address]    Sig: Take 1 tablet (1,000 mcg) by mouth daily    Class: E-Prescribe    Route: Oral    diclofenac (VOLTAREN) 1 % topical gel  350 g 3 1/10/2023    [facility] - [place], [place] - [address]    Sig: Apply 4 g topically 4 times daily as needed (rib/chest pain)    Class: E-Prescribe    Route: Topical    escitalopram (LEXAPRO) 10 MG tablet  90 tablet 3 3/17/2022    [facility] - [place] - [address]    Sig: Take 1 tablet (10 mg) by mouth daily    Class: E-Prescribe    Route: Oral    fenofibrate (LOFIBRA) 54 MG tablet  90 tablet 1 9/14/2022    [facility] - [place] - [address]    Sig: Take 1 tablet (54 mg) by mouth daily    Class: E-Prescribe    Route: Oral    ferrous sulfate (FEROSUL) 325 (65 Fe) MG tablet  70 tablet 3 3/17/2022    [facility] - [place] - [address]    Sig: Take 1 tablet (325 mg) by mouth every other day    Class: E-Prescribe    Route: Oral    fish oil-omega-3 fatty acids 1000 MG capsule  30 capsule 3 2/16/2023    [facility] - [place] - [address]    Sig: TAKE 1 CAPSULE (1 G) BY MOUTH DAILY    Class: E-Prescribe    Route: Oral    gabapentin (NEURONTIN) 300 MG capsule  60 capsule 5 8/1/2022    [facility] - [place] - [address]    Sig: Take 1 capsule (300 mg) by mouth 2 times daily    Class: E-Prescribe    Route: Oral    insulin aspart (NOVOLOG PEN) 100 UNIT/ML pen  45 mL 3 1/10/2023    [facility] - [place] - [address]    Sig: Inject 15 Units Subcutaneous 3 times daily (with meals)    Class: E-Prescribe    Route: Subcutaneous    insulin glargine (LANTUS PEN) 100 UNIT/ML pen  75 mL 3 8/25/2022    [facility] [place]  [facility] [address]    Sig: Inject 80 Units Subcutaneous every morning 40 unit(s) on each side    Class: E-Prescribe    Notes to Pharmacy: If Lantus is not covered by insurance, may substitute Basaglar or Semglee or other insulin glargine product per insurance preference at same dose and frequency.      Route: Subcutaneous    insulin pen needle (32G X 4 MM) 32G X 4 MM miscellaneous  200 each 11 7/28/2022    [facility] - [place] - [address]    Sig: Use 6x pen needles daily or as directed (victoza, 3 for novolog, 2 for lantus)    Class: E-Prescribe    liraglutide (VICTOZA) 18 MG/3ML solution  9 mL 11 6/30/2022    [facility] - [place] - [address]    Sig: Inject 1.8 mg Subcutaneous daily    Class: E-Prescribe    Route: Subcutaneous    lisinopril (ZESTRIL) 40 MG tablet  90 tablet 3 3/17/2022    [facility] - [place] - [address]    Sig: Take 1 tablet (40 mg) by mouth daily    Class: E-Prescribe    Route: Oral    metoprolol succinate ER (TOPROL-XL) 200 MG 24 hr tablet  90 tablet 3 3/17/2022    [facility] - [place] - [address]    Sig: Take 1 tablet (200 mg) by mouth daily    Class: E-Prescribe    Route: Oral    mirtazapine (REMERON) 7.5 MG tablet  90 tablet 3 6/27/2022    [facility] - [place] - [address]    Sig: TAKE 1 TABLET (7.5 MG) BY MOUTH AT BEDTIME    Class: E-Prescribe    Route: Oral    omeprazole 20 MG tablet  90 tablet 3 3/17/2022    [facility] - [place] - [address]    Sig: Take 1 tablet (20 mg) by mouth daily    Class: E-Prescribe    Route: Oral    povidone-iodine (BETADINE) 10 % topical solution  30 mL 0 1/24/2023    [facility] - [place] - [address]    Sig: Apply topically daily Apply to a cottonball and then paint over the ulcer on the right foot.    Class: E-Prescribe    Route: Topical    REFRESH OPTIVE ADVANCED  0.5-1-0.5 % SOLN    10/6/2022        Sig: PLACE 1 DROP INTO BOTH EYES 3 TIMES DAILY AS NEEDED (AS NEEDED FOR DRY EYE/EYE PAIN)    Class: Historical    vitamin D2 (ERGOCALCIFEROL) 19548 units (1250 mcg) capsule  8 capsule 4 12/29/2022    [facility] - [place] - [address]    Sig: Take 1 capsule (50,000 Units) by mouth twice a week    Class: E-Prescribe    Route: Oral          Exam:   Temp:  [98.4  F (36.9  C)] 98.4  F (36.9  C)  Pulse:  [94] 94  Resp:  [22] 22  BP: (164)/(83) 164/83  SpO2:  [93 %] 93 %  Appearance: in no apparent distress.   Skin: normal  Head and Neck: Normal, no rashes or jaundice  Respiratory: easy respirations, no audible wheezing.  Cardiovascular: RRR  Abdomen: flat, Surgical scars consistent with history       Diagnostics:   Recent Results (from the past 672 hour(s))   Ferritin    Collection Time: 01/27/23  2:15 PM   Result Value Ref Range    Ferritin 516 (H) 11 - 328 ng/mL   Iron & Iron Binding Capacity    Collection Time: 01/27/23  2:15 PM   Result Value Ref Range    Iron 77 37 - 145 ug/dL    Iron Binding Capacity 253 240 - 430 ug/dL    Iron Sat Index 30 15 - 46 %   Hemoglobin and hematocrit    Collection Time: 01/27/23  2:15 PM   Result Value Ref Range    Hemoglobin 9.2 (L) 11.7 - 15.7 g/dL    Hematocrit 29.7 (L) 35.0 - 47.0 %   Renal panel    Collection Time: 01/27/23  2:15 PM   Result Value Ref Range    Sodium 142 136 - 145 mmol/L    Potassium 3.5 3.4 - 5.3 mmol/L    Chloride 103 98 - 107 mmol/L    Carbon Dioxide (CO2) 29 22 - 29 mmol/L    Anion Gap 10 7 - 15 mmol/L    Glucose 140 (H) 70 - 99 mg/dL    Urea Nitrogen 27.5 (H) 6.0 - 20.0 mg/dL    Creatinine 4.23 (H) 0.51 - 0.95 mg/dL    GFR Estimate 12 (L) >60 mL/min/1.73m2    Calcium 6.6 (L) 8.6 - 10.0 mg/dL    Albumin 3.3 (L) 3.5 - 5.2 g/dL    Phosphorus 3.8 2.5 - 4.5 mg/dL   Hemoglobin    Collection Time: 02/01/23  7:59 AM   Result Value Ref Range    Hemoglobin 9.0 (L) 11.7 - 15.7 g/dL   Hematocrit    Collection  Time: 02/01/23  7:59 AM   Result Value Ref Range    Hematocrit 30.1 (L) 35.0 - 47.0 %   EKG 12-lead complete w/read - Clinics    Collection Time: 02/13/23  8:25 AM   Result Value Ref Range    Systolic Blood Pressure  mmHg    Diastolic Blood Pressure  mmHg    Ventricular Rate 90 BPM    Atrial Rate 90 BPM    TX Interval 176 ms    QRS Duration 86 ms     ms    QTc 552 ms    P Axis 67 degrees    R AXIS 18 degrees    T Axis 9 degrees    Interpretation ECG       Sinus rhythm  Voltage criteria for left ventricular hypertrophy  Nonspecific ST and T wave abnormality  Prolonged QT  Abnormal ECG  When compared with ECG of 28-OCT-2022 20:02,  Criteria for Septal infarct are no longer Present  T wave amplitude has increased in Anterior leads     EKG 12-lead, tracing only [EKG1]    Collection Time: 02/13/23  8:25 AM   Result Value Ref Range    Systolic Blood Pressure  mmHg    Diastolic Blood Pressure  mmHg    Ventricular Rate 90 BPM    Atrial Rate 90 BPM    TX Interval 176 ms    QRS Duration 86 ms     ms    QTc 490 ms    P Axis 67 degrees    R AXIS 18 degrees    T Axis 9 degrees    Interpretation ECG       Sinus rhythm  Voltage criteria for left ventricular hypertrophy  Nonspecific ST and T wave abnormality  Prolonged QT  Abnormal ECG  When compared with ECG of 28-OCT-2022 20:02,  Criteria for Septal infarct are no longer Present  T wave amplitude has increased in Anterior leads  Confirmed by MD REINA JANE ([id number]) on 2/13/2023 8:58:17 AM  Also confirmed by MD REINA JANE ([id number]),  Dary Oakes ([id number])  on 2/13/2023 11:50:52 AM     EKG 12-lead complete w/read - Clinics    Collection Time: 02/13/23  8:26 AM   Result Value Ref Range    Systolic Blood Pressure  mmHg    Diastolic Blood Pressure  mmHg    Ventricular Rate 90 BPM    Atrial Rate 90 BPM    TX Interval 176 ms    QRS Duration 86 ms     ms    QTc 539 ms    P Axis 65 degrees    R AXIS 18 degrees    T Axis 21 degrees    Interpretation ECG       Sinus  rhythm  Voltage criteria for left ventricular hypertrophy  Prolonged QT  Abnormal ECG  When compared with ECG of 13-FEB-2023 08:25, (unconfirmed)  No significant change was found  Confirmed by MD REINA JANE ([id number]) on 2/13/2023 8:57:49 AM     Ferritin    Collection Time: 02/13/23  9:08 AM   Result Value Ref Range    Ferritin 501 (H) 11 - 328 ng/mL   Iron & Iron Binding Capacity    Collection Time: 02/13/23  9:08 AM   Result Value Ref Range    Iron 67 37 - 145 ug/dL    Iron Binding Capacity 229 (L) 240 - 430 ug/dL    Iron Sat Index 29 15 - 46 %   Basic metabolic panel    Collection Time: 02/13/23  9:08 AM   Result Value Ref Range    Sodium 143 136 - 145 mmol/L    Potassium 3.5 3.4 - 5.3 mmol/L    Chloride 106 98 - 107 mmol/L    Carbon Dioxide (CO2) 26 22 - 29 mmol/L    Anion Gap 11 7 - 15 mmol/L    Urea Nitrogen 23.5 (H) 6.0 - 20.0 mg/dL    Creatinine 3.89 (H) 0.51 - 0.95 mg/dL    Calcium 6.0 (L) 8.6 - 10.0 mg/dL    Glucose 199 (H) 70 - 99 mg/dL    GFR Estimate 13 (L) >60 mL/min/1.73m2   CBC with platelets    Collection Time: 02/13/23  9:08 AM   Result Value Ref Range    WBC Count 6.5 4.0 - 11.0 10e3/uL    RBC Count 4.19 3.80 - 5.20 10e6/uL    Hemoglobin 9.1 (L) 11.7 - 15.7 g/dL    Hematocrit 30.3 (L) 35.0 - 47.0 %    MCV 72 (L) 78 - 100 fL    MCH 21.7 (L) 26.5 - 33.0 pg    MCHC 30.0 (L) 31.5 - 36.5 g/dL    RDW 20.3 (H) 10.0 - 15.0 %    Platelet Count 148 (L) 150 - 450 10e3/uL   Magnesium    Collection Time: 02/13/23  9:08 AM   Result Value Ref Range    Magnesium 1.7 1.7 - 2.3 mg/dL   Glucose by meter    Collection Time: 02/14/23  7:01 AM   Result Value Ref Range    GLUCOSE BY METER POCT 241 (H) 70 - 99 mg/dL   Hemoglobin A1c    Collection Time: 02/14/23  7:49 AM   Result Value Ref Range    Hemoglobin A1C 7.1 (H) <5.7 %   Glucose by meter    Collection Time: 02/14/23 12:25 PM   Result Value Ref Range    GLUCOSE BY METER POCT 207 (H) 70 - 99 mg/dL     No results found for: CPRA

## 2023-02-20 NOTE — NURSING NOTE
"Chief Complaint   Patient presents with     Transplant Evaluation     Pre kidney transplant eval     Vital signs:  Temp: 98.4  F (36.9  C) Temp src: Oral BP: (!) 164/83 Pulse: 94   Resp: 22 SpO2: 93 %     Height: 155.7 cm (5' 1.3\") (shoe off) Weight: 52.6 kg (116 lb)  Estimated body mass index is 21.7 kg/m  as calculated from the following:    Height as of this encounter: 1.557 m (5' 1.3\").    Weight as of this encounter: 52.6 kg (116 lb).      Анна Gilman, [facility]  2/20/2023 8:07 AM      "

## 2023-02-20 NOTE — PROGRESS NOTES
Psychosocial Assessment For KidneyTransplantation  Patient Name/ Age: Nithya [facility] 53 year old   Medical Record #: [id number]  Duration of Interview:     30 min  Process:   Face-to-Face Interview                (counseling < 50%)   Present at Appointment: BARRIGA, daughter Fritz and in person          : CEDRIC Hastings  Date:  February 20, 2023        Type of transplant: Kidney     Donor type:      Cadaver   Prior Transplants:    No Status of Transplant:           Current Living Situation    Location:   [address] [place] [address]  With Whom: lives with their family       Family/ Social Support:    Nithya lives in [place], [place] with her spouse Jane and daughter Frizt. Nithya is originally from [place]. She recently got citizenship. She moved from [place], [place] to be closer to her children. She has two sons and one daughter. She does not have any existing parents. She has no siblings in the state. Her one son is in [place].     Her daughter Fritz is her primary support person- it is unclear who will assist post transplant.  available, helpful   Committed Relationship:  Spouse   Stable/Supportive   Other Supports:   Limited    available with reservation       Activities/ Functional Ability    Current Level: cane/walker, wheelchair and dependent with ADL's     Transportation Other:daughter provides transportation        Vocational/Employment/Financial     Employment   disabled   Job Description  Nithya has not worked in several years- likely will not qualify for ESRD Medicare     Income  Barriga reported her finances as a large stressor. She appeared to be confused when asked about her finances. She noted that her and her spouse are unable to work. Per report, she recently qualified for disability and gets emergency cash assistance ($250 a month). Her daughter pays the majority of their mortgage with her PCA wages.  [place], General Assistance through County and other: daughter    Insurance      At this time, patient can  afford medication costs:  Yes  MA       Medical Status    Current Mode of Treatment for ESRD None   Complications None       Behavioral    Tobacco Use No Chemical Dependency No         Psychiatric Impairment No  Barriga denied any mental health concerns. She reports that she does get stressed due to health concerns.     Reading Ability: Poor  Education Level: none  Recent Legal History No      Coping Style/Strategies: visiting with friends        Ability to Adhere to Complex Medical Regime: Yes     Adherence History: Barriga reports compliance. She relies heavily on daughter for assistance.         Education  _X_ Medicare  _X_ Rehabilitation  _X_ Donor issues  _X_ Community resources  _X_ Post discharge housing  _X_ Financial resources  _X_ Medical insurance options  _X_ Psych adjustment  _X_ Family adjustment  _X_ Health Care Directive Provided Education   Psychosocial Risks of Transplant Reviewed and Discussed:  _X_ Increased stress related to emotional,            family, social, employment or financial           situation  _X_ Effect on work and/or disability benefits  _X_ Effect on future health and life           insurance  _X_ Transplant outcome expectations may           not be met  _X_ Mental Health Risks: anxiety,           depression, PTSD, guilt, grief and           chronic fatigue     Notable Items:   Mau speaking- in person interpreters needed       Final Evaluation/Assessment   Patient seemed to process information well. Appeared well informed, motivated and able to follow post transplant requirements. Behavior was appropriate during interview. Has adequate income and insurance coverage. Adequate social support. No major contraindications noted for transplant.  At this time patient appears to understand the risks and benefits of transplant.      Recommendation  Conditional  1. Unclear support plan- daughter did not engage much during conversation   2. Struggles with finances- on [place], food support and  disability. Barriga worries about the costs of medications post transplant. She has MA- should have minimum co-pays.   Selection Criteria Met:  Plan for support No  Chemical Dependence Yes   Smoking Yes   Mental Health Yes   Adequate Finances No   Signature[facility]ito Lucas [facility]   Title: Clinical

## 2023-02-20 NOTE — PROGRESS NOTES
Kidney, Pancreas Transplant Referral - 11/16/2022  Patient attended appointments accompanied by daughter and Jamari in person   Patient completed AM appointments with all [facility] providers.  PM appointments cancelled per Surgery.   Patient needs Physical Therapy to increase mobility to walking 1 block.  Patient instructed next contact from Transplant Coordinator will be following Selection Committee  Patient stated understanding  Daughter stated she has received the educational information via email and Receipt of Information for Organ Transplant Recipient form has been signed via bitmovin  [employer] form deferred.  Patient's daughter stated she watched My Transplant Place Pre Kidney Transplant videos 1&2.      Summary    Team s concerns/comments:   1) Cardiac risk assessment  2) PAD assessment  3) Frailty  4) Financial concerns  5) GI incontinence    Candidacy category: RED    Action/Plan: EKG, CXR, labwork canceled.  Patient is not Pancreas Transplant Candidate per Surgery.  For Kidney Transplant:  Physical Therapy to increase mobility to 1 block before further Evaluation appointments.  Potential living donor only    Expected Selection Meeting Discussion: 3/1/23

## 2023-02-20 NOTE — LETTER
2/20/2023         RE: Nithya Matthews  [address] [place] [address]        Dear Colleague,    Thank you for referring your patient, Nithya Matthews, to the [facility]. Please see a copy of my visit note below.    Transplant Surgery Consult Note    Medical record number: [id number]  YOB: 1970,   Consult requested by Dr. Barnes for evaluation of kidney and pancreas transplant candidacy.    Assessment and Recommendations: Ms. Matthews is a fair candidate for kidney transplantation and has a fair understanding of the risks and benefits of this approach to management of renal failure and diabetes. The following issues should be addressed prior to transplant:     52 yo frail type 2 DM  ON insulin a tthis time  Not on dialysis  GFR 13  Is in a wheel;chair  Walks with a cane - neuropathy- - very short distance, just a few feet beofre she has to stop  WIll need PT to see if she can build up strength to 1 block  Abd profile reasonably flat  H/O C section midline scar  RLQ scar - ?appendix, patient does not know  Very poor historian   Daughter is full time PCA, she's 21 with limited knowledge as well  No live donors    Too frail for KP  If she can rehab to 1 block ambulation we could try doing a LD kidney tx  That's the only pathway I see for tx  Patient is currently unwilling to look for live donors  I suggest we suspend eval now and patient can get re-referred if   1. She is able to rehab to 1 block ambulation and   2. Has live donor potential    Risks of the surgical procedure including but not limited to the rare risk of mortality discussed in detail. Patient verbalized good understanding and had several pertinent questions which were answered satisfactorily.     Immunosuppressive regimen, management and long term risks discussed in detail.               Ms. Matthews has Chronic renal failure due to diabetes mellitus type 2 whose condition is not expected to resolve, is expected to progress, and is  expected to continue to develop related comorbid conditions.  Cardiology consult for cardiac risk stratification to be ordered: No  CT abdomen and pelvis without contrast to be ordered for assessment of vascular targets: No  Transplant listing labs ordered to include HLA, ABOx2, Cr, etc.  Dietician consult ordered: Yes  Social work consult ordered: Yes  Imaging reports reviewed:  yes  Radiology images reviewed:no  Recipient suitable to move forward with work up of living donors:  No      The majority of our visit was spent in counselling, discussing the medical and surgical risks of living or  donor kidney and pancreas transplantation, either in a simultaneous or sequential fashion. I contrasted approximate wait time for SPK vs living vs  donor kidneys from normal (0-85%) or higher (%) kidney donor profile index (KDPI) donors and their associated outcomes. I would not recommend this individual to consider kidneys from high KDPI donors. The reason for this decision is best summarized as: frailty.  Access to transplant will be impacted by living donor availability and overall candidacy for SPK, as well as the influence of blood type and degree of sensitization. We discussed advantages of preemptive transplant as well as living donor kidney transplant, and graft and patient survival outcomes associated with these options. Potential surgical complications of kidney and pancreas transplantation include bleeding, clotting, infection, wound complications, anastomotic failure and other issues such as cardiac complications, pneumonia, deep venous thrombosis, pulmonary embolism, post transplant diabetes and death. We discussed the need for protocol biopsy of the kidney and the possible need for a ureteral stent (and subsequent removal). We discussed benefits and risks associated with different approaches to exocrine drainage of pancreatic secretions. We also discussed differences in the average length of  stay, recovery process, and posttransplant lab and monitoring protocol. We discussed the risk of graft rejection and recurrent diabetic nephropathy in the setting of poor glycemic control. I emphasized the need for strict immunosuppression adherence and the potential for complications of immunosuppression such as skin cancer or lymphoma, as well as a very low but not zero risk of donor-derived disease transmission risks (infection, cancer). Ms. Matthews asked good questions and the patient's candidacy will be reviewed at our Multidisciplinary Selection Committee. Thank you for the opportunity to participate in Ms. Matthews's care.    Total time: 60 minutes  Counselling time: 30 minutes    .  ---------------------------------------------------------------------------------------------------    HPI: Ms. Matthews has Chronic renal failure due to diabetes mellitus type 2. The patient has had diabetes for 20 years. Management is by Lantus per diabetic educator  Humalog per diabetic educator. The patient usually checks her blood sugar 3 times/day.  The diabetes is uncontrolled.    Complications of diabetes include:    Retinopathy:  Yes   Neuropathy: Yes   Gastroparesis:  No    The patient is not on dialysis.    Has potential kidney donors:  No.  Interested in participation in paired exchange if a donor is willing: Doesn't know     The patient has the following pertinent history:       No    Yes  Dialysis:    []      [] via:       Blood Transfusion                  []      []  Number of units:   Most recently:  Pregnancy:    []      [] Number:       Previous Transplant:  []      [] Details:    Cancer    []      [] Comment:   Kidney stones   []      [] Comment:      Recurrent infections  []      []  Type:                  Bladder dysfunction  []      [] Cause:    Claudication   []      [] Distance:    Previous Amputation  []      [] Cause:     Chronic anticoagulation  []      [] Indication:  [facility]  []      []     Past Medical  History:   Diagnosis Date     Anuria      Arthritis      Depression      Diabetes (H)      End stage renal disease (H)      Hypertension      Past Surgical History:   Procedure Laterality Date     ABDOMEN SURGERY       APPENDECTOMY       BIOPSY       CATARACT IOL, RT/LT       CREATE FISTULA ARTERIOVENOUS UPPER EXTREMITY Right 2/14/2023    Procedure: right upper extremity Arteriovenous fistula creation;  Surgeon: Markell Langford MD;  Location:  OR     Family History   Problem Relation Age of Onset     Glaucoma No family hx of      Macular Degeneration No family hx of      Breast Cancer No family hx of      Ovarian Cancer No family hx of      Anesthesia Reaction No family hx of      Venous thrombosis No family hx of      Heart Disease No family hx of      Diabetes No family hx of      Social History     Socioeconomic History     Marital status:      Spouse name: Not on file     Number of children: Not on file     Years of education: Not on file     Highest education level: Not on file   Occupational History     Not on file   Tobacco Use     Smoking status: Never     Smokeless tobacco: Never   Vaping Use     Vaping Use: Never used   Substance and Sexual Activity     Alcohol use: Not Currently     Drug use: Never     Sexual activity: Yes   Other Topics Concern     Not on file   Social History Narrative     Not on file     Social Determinants of Health     Financial Resource Strain: Medium Risk     Difficulty of Paying Living Expenses: Somewhat hard   Food Insecurity: No Food Insecurity     Worried About Running Out of Food in the Last Year: Never true     Ran Out of Food in the Last Year: Never true   Transportation Needs: Unmet Transportation Needs     Lack of Transportation (Medical): No     Lack of Transportation (Non-Medical): Yes   Physical Activity: Sufficiently Active     Days of Exercise per Week: 7 days     Minutes of Exercise per Session: 30 min   Stress: Stress Concern Present     Feeling of Stress  : To some extent   Social Connections: Moderately Integrated     Frequency of Communication with Friends and Family: Twice a week     Frequency of Social Gatherings with Friends and Family: Twice a week     Attends Confucianism Services: 1 to 4 times per year     Active Member of Clubs or Organizations: No     Attends Club or Organization Meetings: Never     Marital Status:    Intimate Partner Violence: Not At Risk     Fear of Current or Ex-Partner: No     Emotionally Abused: No     Physically Abused: No     Sexually Abused: No   Housing Stability: Low Risk      Unable to Pay for Housing in the Last Year: No     Number of Places Lived in the Last Year: 2     Unstable Housing in the Last Year: No       ROS:   CONSTITUTIONAL:  No fevers or chills  EYES: negative for icterus  ENT:  negative for hearing loss, tinnitus and sore throat  RESPIRATORY:  negative for cough, sputum, dyspnea  CARDIOVASCULAR:  negative for chest pain Fatigue  GASTROINTESTINAL:  negative for nausea, vomiting, diarrhea or constipation  GENITOURINARY:  negative for incontinence, dysuria, bladder emptying problems  HEME:  No easy bruising  INTEGUMENT:  negative for rash and pruritus  NEURO:  Negative for headache, seizure disorder    Allergies:   No Known Allergies    Medications:  Prescription Medications as of 2/20/2023       Rx Number Disp Refills Start End Last Dispensed Date Next Fill Date Owning Pharmacy    acetaminophen (TYLENOL) 500 MG tablet  100 tablet 3 1/10/2023    [facility] - [place], [place] - [address]    Sig: Take 1-2 tablets (500-1,000 mg) by mouth every 6 hours as needed for mild pain    Class: E-Prescribe    Route: Oral    aspirin 81 MG EC tablet  90 tablet 3 11/5/2022    [facility] - [place], [place] - [address]    Sig: Take 1 tablet (81 mg) by mouth daily    Class: E-Prescribe    Route: Oral    atorvastatin (LIPITOR) 80 MG tablet  90 tablet 3 3/17/2022    [facility] - [place] - [address]    Sig: Take 1 tablet (80 mg) by mouth daily    Class: E-Prescribe    Route: Oral    Continuous Blood Gluc Sensor (FREESTYLE MICKIE 14 DAY SENSOR) [facility] [place]    1/8/2023        Sig: USE 1 DEVICE EVERY 14 DAYS CHANGE SENSOR AS DIRECTED EVERY 14 DAYS    Class: Historical    cyanocobalamin (VITAMIN B-12) 1000 MCG tablet  90 tablet 3 2/7/2023    [facility] - [place], [place] - [address]    Sig: Take 1 tablet (1,000 mcg) by mouth daily    Class: E-Prescribe    Route: Oral    diclofenac (VOLTAREN) 1 % topical gel  350 g 3 1/10/2023    [facility] - [place], [place] - [address]    Sig: Apply 4 g topically 4 times daily as needed (rib/chest pain)    Class: E-Prescribe    Route: Topical    escitalopram (LEXAPRO) 10 MG tablet  90 tablet 3 3/17/2022    [facility] - [place], [place] - [address]    Sig: Take 1 tablet (10 mg) by mouth daily    Class: E-Prescribe    Route: Oral    fenofibrate (LOFIBRA) 54 MG tablet  90 tablet 1 9/14/2022    [facility] - [place] - [address]    Sig: Take 1 tablet (54 mg) by mouth daily    Class: E-Prescribe    Route: Oral    ferrous sulfate (FEROSUL) 325 (65 Fe) MG tablet  70 tablet 3 3/17/2022    [facility] - [place] - [address]    Sig: Take 1 tablet (325 mg) by mouth every other day    Class: E-Prescribe    Route: Oral    fish oil-omega-3 fatty acids 1000 MG capsule  30 capsule 3 2/16/2023    [facility] - [place] - [address]    Sig: TAKE 1 CAPSULE (1 G) BY MOUTH DAILY    Class: E-Prescribe    Route: Oral    gabapentin (NEURONTIN) 300 MG capsule  60 capsule 5 8/1/2022    [facility] - [place] - [address]    Sig: Take 1 capsule (300 mg) by mouth 2 times daily    Class: E-Prescribe    Route: Oral    insulin aspart (NOVOLOG PEN) 100 UNIT/ML pen  45 mL 3 1/10/2023    [facility] - [place], [address]    Sig: Inject 15 Units Subcutaneous 3 times daily (with meals)    Class: E-Prescribe    Route: Subcutaneous    insulin glargine (LANTUS PEN) 100 UNIT/ML pen  75 mL 3 8/25/2022    [facility] - [place], [place] - [address]    Sig: Inject 80 Units Subcutaneous every morning 40 unit(s) on each side    Class: E-Prescribe    Notes to Pharmacy: If Lantus is not covered by insurance, may substitute Basaglar or Semglee or other insulin glargine product per insurance preference at same dose and frequency.      Route: Subcutaneous    insulin pen needle (32G X 4 MM) 32G X 4 MM miscellaneous  200 each 11 7/28/2022    [facility] - [place], [place] - [address]    Sig: Use 6x pen needles daily or as directed (victoza, 3 for novolog, 2 for lantus)    Class: E-Prescribe    liraglutide (VICTOZA) 18 MG/3ML solution  9 mL 11 6/30/2022    [facility] - [place] - [address]    Sig: Inject 1.8 mg Subcutaneous daily    Class: E-Prescribe    Route: Subcutaneous    lisinopril (ZESTRIL) 40 MG tablet  90 tablet 3 3/17/2022    [facility] - [place] - [address]    Sig: Take 1 tablet (40 mg) by mouth daily    Class: E-Prescribe    Route: Oral    metoprolol succinate ER (TOPROL-XL) 200 MG 24 hr tablet  90 tablet 3 3/17/2022    [facility] - [place] - [address]    Sig: Take 1 tablet (200 mg) by mouth daily    Class: E-Prescribe    Route: Oral    mirtazapine (REMERON) 7.5 MG tablet  90 tablet 3 6/27/2022    [facility] - [place] - [address]    Sig: TAKE 1 TABLET (7.5 MG) BY MOUTH AT BEDTIME    Class: E-Prescribe    Route: Oral    omeprazole 20 MG tablet  90 tablet 3 3/17/2022    [facility] - [place] - [address]    Sig: Take 1 tablet (20 mg) by mouth daily    Class: E-Prescribe    Route: Oral    povidone-iodine (BETADINE) 10 % topical solution  30 mL 0 1/24/2023    [facility] - [place], [place] - [address]    Sig: Apply topically daily Apply to a cottonball and then paint over the ulcer on the right foot.    Class: E-Prescribe    Route: Topical    REFRESH OPTIVE ADVANCED 0.5-1-0.5 % SOLN    10/6/2022        Sig: PLACE 1 DROP INTO BOTH EYES 3 TIMES DAILY AS NEEDED (AS NEEDED FOR DRY EYE/EYE PAIN)    Class: Historical    vitamin D2 (ERGOCALCIFEROL) 68452 units (1250 mcg) capsule  8 capsule 4 12/29/2022    [facility] - [place] - [address]    Sig: Take 1 capsule (50,000 Units) by mouth twice a week    Class: E-Prescribe    Route: Oral          Exam:   Temp:  [98.4  F (36.9  C)] 98.4  F (36.9  C)  Pulse:  [94] 94  Resp:  [22] 22  BP: (164)/(83) 164/83  SpO2:  [93 %] 93 %  Appearance: in no apparent distress.   Skin: normal  Head and Neck: Normal, no rashes or jaundice  Respiratory: easy respirations, no audible wheezing.  Cardiovascular: RRR  Abdomen: flat, Surgical scars consistent with history       Diagnostics:   Recent Results (from the past 672 hour(s))   Ferritin    Collection Time: 01/27/23  2:15 PM   Result Value Ref Range    Ferritin 516 (H) 11 - 328 ng/mL   Iron & Iron Binding Capacity    Collection Time: 01/27/23  2:15 PM   Result Value Ref Range    Iron 77 37 - 145 ug/dL    Iron Binding Capacity 253 240 - 430 ug/dL    Iron Sat Index 30 15 - 46 %   Hemoglobin and hematocrit    Collection Time: 01/27/23  2:15 PM   Result Value Ref Range    Hemoglobin 9.2 (L) 11.7 - 15.7 g/dL    Hematocrit 29.7 (L) 35.0 - 47.0 %   Renal panel    Collection Time: 01/27/23  2:15 PM   Result Value Ref Range    Sodium 142 136 - 145 mmol/L    Potassium 3.5 3.4 - 5.3 mmol/L    Chloride 103 98 - 107 mmol/L    Carbon Dioxide (CO2) 29 22 - 29 mmol/L    Anion Gap 10 7 - 15 mmol/L    Glucose 140 (H) 70 - 99 mg/dL    Urea Nitrogen 27.5 (H) 6.0 - 20.0 mg/dL    Creatinine 4.23 (H) 0.51 - 0.95 mg/dL    GFR Estimate 12 (L) >60 mL/min/1.73m2    Calcium 6.6 (L) 8.6 - 10.0  mg/dL    Albumin 3.3 (L) 3.5 - 5.2 g/dL    Phosphorus 3.8 2.5 - 4.5 mg/dL   Hemoglobin    Collection Time: 02/01/23  7:59 AM   Result Value Ref Range    Hemoglobin 9.0 (L) 11.7 - 15.7 g/dL   Hematocrit    Collection Time: 02/01/23  7:59 AM   Result Value Ref Range    Hematocrit 30.1 (L) 35.0 - 47.0 %   EKG 12-lead complete w/read - Clinics    Collection Time: 02/13/23  8:25 AM   Result Value Ref Range    Systolic Blood Pressure  mmHg    Diastolic Blood Pressure  mmHg    Ventricular Rate 90 BPM    Atrial Rate 90 BPM    ND Interval 176 ms    QRS Duration 86 ms     ms    QTc 552 ms    P Axis 67 degrees    R AXIS 18 degrees    T Axis 9 degrees    Interpretation ECG       Sinus rhythm  Voltage criteria for left ventricular hypertrophy  Nonspecific ST and T wave abnormality  Prolonged QT  Abnormal ECG  When compared with ECG of 28-OCT-2022 20:02,  Criteria for Septal infarct are no longer Present  T wave amplitude has increased in Anterior leads     EKG 12-lead, tracing only [EKG1]    Collection Time: 02/13/23  8:25 AM   Result Value Ref Range    Systolic Blood Pressure  mmHg    Diastolic Blood Pressure  mmHg    Ventricular Rate 90 BPM    Atrial Rate 90 BPM    ND Interval 176 ms    QRS Duration 86 ms     ms    QTc 490 ms    P Axis 67 degrees    R AXIS 18 degrees    T Axis 9 degrees    Interpretation ECG       Sinus rhythm  Voltage criteria for left ventricular hypertrophy  Nonspecific ST and T wave abnormality  Prolonged QT  Abnormal ECG  When compared with ECG of 28-OCT-2022 20:02,  Criteria for Septal infarct are no longer Present  T wave amplitude has increased in Anterior leads  Confirmed by MD REINA JANE ([id number]) on 2/13/2023 8:58:17 AM  Also confirmed by MD REINA JANE ([id number]),  Dary Oakes ([id number])  on 2/13/2023 11:50:52 AM     EKG 12-lead complete w/read - Clinics    Collection Time: 02/13/23  8:26 AM   Result Value Ref Range    Systolic Blood Pressure  mmHg    Diastolic Blood Pressure  mmHg     Ventricular Rate 90 BPM    Atrial Rate 90 BPM    OH Interval 176 ms    QRS Duration 86 ms     ms    QTc 539 ms    P Axis 65 degrees    R AXIS 18 degrees    T Axis 21 degrees    Interpretation ECG       Sinus rhythm  Voltage criteria for left ventricular hypertrophy  Prolonged QT  Abnormal ECG  When compared with ECG of 13-FEB-2023 08:25, (unconfirmed)  No significant change was found  Confirmed by MD REINA JANE ([id number]) on 2/13/2023 8:57:49 AM     Ferritin    Collection Time: 02/13/23  9:08 AM   Result Value Ref Range    Ferritin 501 (H) 11 - 328 ng/mL   Iron & Iron Binding Capacity    Collection Time: 02/13/23  9:08 AM   Result Value Ref Range    Iron 67 37 - 145 ug/dL    Iron Binding Capacity 229 (L) 240 - 430 ug/dL    Iron Sat Index 29 15 - 46 %   Basic metabolic panel    Collection Time: 02/13/23  9:08 AM   Result Value Ref Range    Sodium 143 136 - 145 mmol/L    Potassium 3.5 3.4 - 5.3 mmol/L    Chloride 106 98 - 107 mmol/L    Carbon Dioxide (CO2) 26 22 - 29 mmol/L    Anion Gap 11 7 - 15 mmol/L    Urea Nitrogen 23.5 (H) 6.0 - 20.0 mg/dL    Creatinine 3.89 (H) 0.51 - 0.95 mg/dL    Calcium 6.0 (L) 8.6 - 10.0 mg/dL    Glucose 199 (H) 70 - 99 mg/dL    GFR Estimate 13 (L) >60 mL/min/1.73m2   CBC with platelets    Collection Time: 02/13/23  9:08 AM   Result Value Ref Range    WBC Count 6.5 4.0 - 11.0 10e3/uL    RBC Count 4.19 3.80 - 5.20 10e6/uL    Hemoglobin 9.1 (L) 11.7 - 15.7 g/dL    Hematocrit 30.3 (L) 35.0 - 47.0 %    MCV 72 (L) 78 - 100 fL    MCH 21.7 (L) 26.5 - 33.0 pg    MCHC 30.0 (L) 31.5 - 36.5 g/dL    RDW 20.3 (H) 10.0 - 15.0 %    Platelet Count 148 (L) 150 - 450 10e3/uL   Magnesium    Collection Time: 02/13/23  9:08 AM   Result Value Ref Range    Magnesium 1.7 1.7 - 2.3 mg/dL   Glucose by meter    Collection Time: 02/14/23  7:01 AM   Result Value Ref Range    GLUCOSE BY METER POCT 241 (H) 70 - 99 mg/dL   Hemoglobin A1c    Collection Time: 02/14/23  7:49 AM   Result Value Ref Range    Hemoglobin A1C  7.1 (H) <5.7 %   Glucose by meter    Collection Time: 02/14/23 12:25 PM   Result Value Ref Range    GLUCOSE BY METER POCT 207 (H) 70 - 99 mg/dL     No results found for: CPRA    Again, thank you for allowing me to participate in the care of your patient.      Sincerely,        TAZ

## 2023-02-20 NOTE — LETTER
2023         RE: Nithya Matthews  [address] [place] [address]        Dear Colleague,    Thank you for referring your patient, Nithya Matthews, to the [facility]ANSPLANT [facility]. Please see a copy of my visit note below.    TRANSPLANT NEPHROLOGY RECIPIENT EVALUATION NOTE    Assessment and Plan:  # Kidney/Pancreas Transplant Evaluation: Patient is a marginal candidate overall. Benefits of a living donor transplant were discussed.    # CKD: from unclear etiology, but likely type 2 diabetes,  HTN, pre renal AKIs. Kidney biopsy done on 10/28/22 that was a suboptimal sample with advanced/diffuse glomerulosclerosis. GFR 13, AVF placed. No donors. When ready, may benefit from a kidney transplant.     # DM Type 2: A1c 7.1% while using 120-130 units of insulin +Victoza daily.     # BMI 21     # Cardiac Risk: would need risk assessment.     # PAD Screenin2021 CT available for review.     # Physical Function: screened FRAIL. Daughter is PCA needs helps with all ADLs. In wheelchair today,  limited to walking around her house with a cane. Recommend PT and returning once at goal of walking one block.     # Social support / financial issues: appreciate social work input.     # Health Maintenance: Colonoscopy: Likely Not up to date but will need to request records from [place], [place] to review, Mammogram: Up to date, PAP: Reportedly up to date but will need records and Dental: Not up to date    Discussed the risks and benefits of a transplant, including the risk of surgery and immunosuppression medications.  Patient's overall evaluation will be discussed in the Transplant Program's regular meeting with a final recommendation on the patients suitability for transplant to be made at that time.    Pending completion of the full evaluation, patient presently appears to be enough of an acceptable kidney transplant recipient candidate to have any potential kidney donors start the evaluation process.      Evaluation:  Nithya Matthews  was seen in consultation at the request of Dr. Markell Langford for evaluation as a potential kidney/pancreas transplant recipient.    Reason for Visit:  Nithya Matthews is a 53 year old female with CKD from unknown etiology, who presents for kidney/pancreas transplant evaluation.    History of Present Illness:     Kidney Disease Hx:   [facility]   Nithya Matthews is a 53-year-old female, originally from [facility], who presents with CKD from unclear etiology.  Likely from  type 2 diabetes,  HTN, pre renal AKIs.  CKD since at at least 2019 with a sCr of 2.0. Moved to [place] from [place] in 9/2021. Established nephrology care here in 10/2022 with a sCr of 3.3 and found to a have U P/C 15 g/g. Native kidney biopsy done 10/28/22: subptimal sample with advanced/diffuse glomerulosclerosis. GFR now 13, AVF placed 2/14/23.                    Kidney Disease Dx: Unknown etiology       Biopsy Proven: Yes; as above          On Dialysis: No       Primary Nephrologist: Dr. Waterman        H/o Kidney Stones: Yes; nonobstructive stones on imaging, but denies every passing        H/o Recurrent/Frequent UTI: No         Diabetic Hx: Type 2        Diagnosis Date: 21 years ago with gestational diabetes and continued to stay on oral meds, went without meds for sometime when she  moved to the  10 + years ago, then started insulin. Currently using 40/40 Lantus, 15  Novolog with meals and Victoza 1.8 mg daily.            Diabetic Control: Borderline control (HbA1c 7-9%)   Last HbA1c: 7.1% (2/2023)       Hypoglycemic Unawareness: No, using CGM ~80s-100s       End-Organ Damage due to DM: Retinopathy, Nephropathy and Peripheral neuropathy          Cardiac/Vascular Disease Risk Factors:        Cardiac Risk Factors: Diabetes, Hypertension and CKD       Known CAD: No       Known PAD/Caludication Symptoms: No       Known Heart Failure: No       Arrhythmia: No       Pulmonary Hypertension: No       Valvular Disease: No        Other: None         Viral Serology Status       CMV IgG Antibody: Unknown       EBV IgG Antibody: Unknown         Volume Status/Weight:        Volume status: Mildly hypervolemic       Weight:  Acceptable BMI       BMI: Body mass index is 21.7 kg/m .         Functional Capacity/Frailty:        Daughter is PCA, 9.5 hours daily, helps with all ADLs. In wheelchair today limited to walking around house with a cane, likely unable to walk a block d/t leg weakness/balance issues/ tripping. Unable to do stairs.     Fatigue/Decreased Energy: [] No [x] Yes    Chest Pain or SOB with Exertion: [x] No [] Yes    Significant Weight Change: [x] No [] Yes BMI 21, and relatively stable in recent year   Nausea, Vomiting or Diarrhea: [] No [x] Yes Occasional diarrhea   Fever, Sweats or Chills:  [x] No [] Yes    Leg Swelling [x] No [] Yes        History of Cancer: None    Other Significant Medical Issues: None      Allergy Testing Questions:   Medication that caused a reaction None   Antibiotics used that didn't give an allergic reaction?  None    COVID Vaccination Up To Date: Yes    Potential Living Kidney Donors: No    Review of Systems:  A comprehensive review of systems was obtained and negative, except as noted in the HPI or PMH.    Past Medical History:   Medical record was reviewed and PMH was discussed with patient and noted below.  Past Medical History:   Diagnosis Date     Anuria      Arthritis      Depression      Diabetes (H)      End stage renal disease (H)      Hypertension        Past Social History:   Past Surgical History:   Procedure Laterality Date     ABDOMEN SURGERY       APPENDECTOMY       BIOPSY       CATARACT IOL, RT/LT       CREATE FISTULA ARTERIOVENOUS UPPER EXTREMITY Right 2/14/2023    Procedure: right upper extremity Arteriovenous fistula creation;  Surgeon: Markell Langford MD;  Location:  OR     Personal history of bleeding or anesthesia problems: No    Family History:  Family History   Problem Relation  Age of Onset     Glaucoma No family hx of      Macular Degeneration No family hx of      Breast Cancer No family hx of      Ovarian Cancer No family hx of      Anesthesia Reaction No family hx of      Venous thrombosis No family hx of        Personal History:   Social History     Socioeconomic History     Marital status:      Spouse name: Not on file     Number of children: Not on file     Years of education: Not on file     Highest education level: Not on file   Occupational History     Not on file   Tobacco Use     Smoking status: Never     Smokeless tobacco: Never   Vaping Use     Vaping Use: Never used   Substance and Sexual Activity     Alcohol use: Not Currently     Drug use: Never     Sexual activity: Yes   Other Topics Concern     Not on file   Social History Narrative     Not on file     Social Determinants of Health     Financial Resource Strain: Medium Risk     Difficulty of Paying Living Expenses: Somewhat hard   Food Insecurity: No Food Insecurity     Worried About Running Out of Food in the Last Year: Never true     Ran Out of Food in the Last Year: Never true   Transportation Needs: Unmet Transportation Needs     Lack of Transportation (Medical): No     Lack of Transportation (Non-Medical): Yes   Physical Activity: Sufficiently Active     Days of Exercise per Week: 7 days     Minutes of Exercise per Session: 30 min   Stress: Stress Concern Present     Feeling of Stress : To some extent   Social Connections: Moderately Integrated     Frequency of Communication with Friends and Family: Twice a week     Frequency of Social Gatherings with Friends and Family: Twice a week     Attends Mu-ism Services: 1 to 4 times per year     Active Member of Clubs or Organizations: No     Attends Club or Organization Meetings: Never     Marital Status:    Intimate Partner Violence: Not At Risk     Fear of Current or Ex-Partner: No     Emotionally Abused: No     Physically Abused: No     Sexually Abused:  No   Housing Stability: Low Risk      Unable to Pay for Housing in the Last Year: No     Number of Places Lived in the Last Year: 2     Unstable Housing in the Last Year: No       Allergies:  No Known Allergies    Medications:  Current Outpatient Medications   Medication Sig     acetaminophen (TYLENOL) 500 MG tablet Take 1-2 tablets (500-1,000 mg) by mouth every 6 hours as needed for mild pain     aspirin 81 MG EC tablet Take 1 tablet (81 mg) by mouth daily     atorvastatin (LIPITOR) 80 MG tablet Take 1 tablet (80 mg) by mouth daily (Patient taking differently: Take 80 mg by mouth every morning)     Continuous Blood Gluc Sensor ([employer] MedicalSTYLE MICKIE 14 DAY SENSOR) MISC USE 1 DEVICE EVERY 14 DAYS CHANGE SENSOR AS DIRECTED EVERY 14 DAYS     cyanocobalamin (VITAMIN B-12) 1000 MCG tablet Take 1 tablet (1,000 mcg) by mouth daily (Patient taking differently: Take 1,000 mcg by mouth every morning)     diclofenac (VOLTAREN) 1 % topical gel Apply 4 g topically 4 times daily as needed (rib/chest pain)     escitalopram (LEXAPRO) 10 MG tablet Take 1 tablet (10 mg) by mouth daily (Patient taking differently: Take 10 mg by mouth every evening)     fenofibrate (LOFIBRA) 54 MG tablet Take 1 tablet (54 mg) by mouth daily (Patient taking differently: Take 54 mg by mouth every morning)     ferrous sulfate (FEROSUL) 325 (65 Fe) MG tablet Take 1 tablet (325 mg) by mouth every other day     fish oil-omega-3 fatty acids 1000 MG capsule TAKE 1 CAPSULE (1 G) BY MOUTH DAILY     gabapentin (NEURONTIN) 300 MG capsule Take 1 capsule (300 mg) by mouth 2 times daily     insulin aspart (NOVOLOG PEN) 100 UNIT/ML pen Inject 15 Units Subcutaneous 3 times daily (with meals)     insulin glargine (LANTUS PEN) 100 UNIT/ML pen Inject 80 Units Subcutaneous every morning 40 unit(s) on each side     insulin pen needle (32G X 4 MM) 32G X 4 MM miscellaneous Use 6x pen needles daily or as directed (victoza, 3 for novolog, 2 for lantus)     liraglutide (VICTOZA) 18  "MG/3ML solution Inject 1.8 mg Subcutaneous daily (Patient taking differently: Inject 1.8 mg Subcutaneous every morning)     lisinopril (ZESTRIL) 40 MG tablet Take 1 tablet (40 mg) by mouth daily (Patient taking differently: Take 40 mg by mouth every morning)     metoprolol succinate ER (TOPROL-XL) 200 MG 24 hr tablet Take 1 tablet (200 mg) by mouth daily (Patient taking differently: Take 200 mg by mouth every morning)     mirtazapine (REMERON) 7.5 MG tablet TAKE 1 TABLET (7.5 MG) BY MOUTH AT BEDTIME     omeprazole 20 MG tablet Take 1 tablet (20 mg) by mouth daily (Patient taking differently: Take 20 mg by mouth every morning)     REFRESH OPTIVE ADVANCED 0.5-1-0.5 % SOLN PLACE 1 DROP INTO BOTH EYES 3 TIMES DAILY AS NEEDED (AS NEEDED FOR DRY EYE/EYE PAIN)     vitamin D2 (ERGOCALCIFEROL) 19560 units (1250 mcg) capsule Take 1 capsule (50,000 Units) by mouth twice a week     povidone-iodine (BETADINE) 10 % topical solution Apply topically daily Apply to a cottonball and then paint over the ulcer on the right foot. (Patient not taking: Reported on 2/20/2023)     No current facility-administered medications for this visit.       Vitals:  BP (!) 164/83 (BP Location: Left arm, Patient Position: Sitting, Cuff Size: Adult Small)   Pulse 94   Temp 98.4  F (36.9  C) (Oral)   Resp 22   Ht 1.557 m (5' 1.3\")   Wt 52.6 kg (116 lb)   SpO2 93%   BMI 21.70 kg/m      Exam:  GENERAL APPEARANCE: alert and no distress  HENT: mouth without ulcers or lesions  LYMPHATICS: no cervical or supraclavicular nodes  RESP: lungs clear to auscultation - no rales, rhonchi or wheezes  CV: regular rhythm, normal rate, no rub, no murmur  FEMORAL PULSES: normal  EDEMA: no LE edema bilaterally  ABDOMEN: soft, nondistended, nontender, bowel sounds normal  MS: extremities normal - no gross deformities noted, no evidence of inflammation in joints, no muscle tenderness  SKIN: no rash    Results:   Recent Results (from the past 336 hour(s))   EKG 12-lead " complete w/read - Clinics    Collection Time: 02/13/23  8:25 AM   Result Value Ref Range    Systolic Blood Pressure  mmHg    Diastolic Blood Pressure  mmHg    Ventricular Rate 90 BPM    Atrial Rate 90 BPM    NC Interval 176 ms    QRS Duration 86 ms     ms    QTc 552 ms    P Axis 67 degrees    R AXIS 18 degrees    T Axis 9 degrees    Interpretation ECG       Sinus rhythm  Voltage criteria for left ventricular hypertrophy  Nonspecific ST and T wave abnormality  Prolonged QT  Abnormal ECG  When compared with ECG of 28-OCT-2022 20:02,  Criteria for Septal infarct are no longer Present  T wave amplitude has increased in Anterior leads     EKG 12-lead, tracing only [EKG1]    Collection Time: 02/13/23  8:25 AM   Result Value Ref Range    Systolic Blood Pressure  mmHg    Diastolic Blood Pressure  mmHg    Ventricular Rate 90 BPM    Atrial Rate 90 BPM    NC Interval 176 ms    QRS Duration 86 ms     ms    QTc 490 ms    P Axis 67 degrees    R AXIS 18 degrees    T Axis 9 degrees    Interpretation ECG       Sinus rhythm  Voltage criteria for left ventricular hypertrophy  Nonspecific ST and T wave abnormality  Prolonged QT  Abnormal ECG  When compared with ECG of 28-OCT-2022 20:02,  Criteria for Septal infarct are no longer Present  T wave amplitude has increased in Anterior leads  Confirmed by MD REINA JANE ([id number]) on 2/13/2023 8:58:17 AM  Also confirmed by MD REINA JANE [id number]),  Dary Oakes ([id number])  on 2/13/2023 11:50:52 AM     EKG 12-lead complete w/read - Clinics    Collection Time: 02/13/23  8:26 AM   Result Value Ref Range    Systolic Blood Pressure  mmHg    Diastolic Blood Pressure  mmHg    Ventricular Rate 90 BPM    Atrial Rate 90 BPM    NC Interval 176 ms    QRS Duration 86 ms     ms    QTc 539 ms    P Axis 65 degrees    R AXIS 18 degrees    T Axis 21 degrees    Interpretation ECG       Sinus rhythm  Voltage criteria for left ventricular hypertrophy  Prolonged QT  Abnormal ECG  When compared  with ECG of 13-FEB-2023 08:25, (unconfirmed)  No significant change was found  Confirmed by MD REINA JANE ([id number]) on 2/13/2023 8:57:49 AM     Ferritin    Collection Time: 02/13/23  9:08 AM   Result Value Ref Range    Ferritin 501 (H) 11 - 328 ng/mL   Iron & Iron Binding Capacity    Collection Time: 02/13/23  9:08 AM   Result Value Ref Range    Iron 67 37 - 145 ug/dL    Iron Binding Capacity 229 (L) 240 - 430 ug/dL    Iron Sat Index 29 15 - 46 %   Basic metabolic panel    Collection Time: 02/13/23  9:08 AM   Result Value Ref Range    Sodium 143 136 - 145 mmol/L    Potassium 3.5 3.4 - 5.3 mmol/L    Chloride 106 98 - 107 mmol/L    Carbon Dioxide (CO2) 26 22 - 29 mmol/L    Anion Gap 11 7 - 15 mmol/L    Urea Nitrogen 23.5 (H) 6.0 - 20.0 mg/dL    Creatinine 3.89 (H) 0.51 - 0.95 mg/dL    Calcium 6.0 (L) 8.6 - 10.0 mg/dL    Glucose 199 (H) 70 - 99 mg/dL    GFR Estimate 13 (L) >60 mL/min/1.73m2   CBC with platelets    Collection Time: 02/13/23  9:08 AM   Result Value Ref Range    WBC Count 6.5 4.0 - 11.0 10e3/uL    RBC Count 4.19 3.80 - 5.20 10e6/uL    Hemoglobin 9.1 (L) 11.7 - 15.7 g/dL    Hematocrit 30.3 (L) 35.0 - 47.0 %    MCV 72 (L) 78 - 100 fL    MCH 21.7 (L) 26.5 - 33.0 pg    MCHC 30.0 (L) 31.5 - 36.5 g/dL    RDW 20.3 (H) 10.0 - 15.0 %    Platelet Count 148 (L) 150 - 450 10e3/uL   Magnesium    Collection Time: 02/13/23  9:08 AM   Result Value Ref Range    Magnesium 1.7 1.7 - 2.3 mg/dL   Glucose by meter    Collection Time: 02/14/23  7:01 AM   Result Value Ref Range    GLUCOSE BY METER POCT 241 (H) 70 - 99 mg/dL   Hemoglobin A1c    Collection Time: 02/14/23  7:49 AM   Result Value Ref Range    Hemoglobin A1C 7.1 (H) <5.7 %   Glucose by meter    Collection Time: 02/14/23 12:25 PM   Result Value Ref Range    GLUCOSE BY METER POCT 207 (H) 70 - 99 mg/dL       Again, thank you for allowing me to participate in the care of your patient.      Sincerely,    TAZ

## 2023-02-21 ENCOUNTER — OFFICE VISIT (OUTPATIENT)
Dept: ENDOCRINOLOGY | Facility: CLINIC | Age: 53
End: 2023-02-21
Payer: COMMERCIAL

## 2023-02-21 ENCOUNTER — DOCUMENTATION ONLY (OUTPATIENT)
Dept: PHARMACY | Facility: CLINIC | Age: 53
End: 2023-02-21

## 2023-02-21 VITALS
DIASTOLIC BLOOD PRESSURE: 70 MMHG | HEART RATE: 94 BPM | WEIGHT: 117 LBS | BODY MASS INDEX: 21.89 KG/M2 | SYSTOLIC BLOOD PRESSURE: 136 MMHG

## 2023-02-21 DIAGNOSIS — E04.1 THYROID NODULE: ICD-10-CM

## 2023-02-21 DIAGNOSIS — E11.319 TYPE 2 DIABETES MELLITUS WITH RETINOPATHY, WITH LONG-TERM CURRENT USE OF INSULIN, MACULAR EDEMA PRESENCE UNSPECIFIED, UNSPECIFIED LATERALITY, UNSPECIFIED RETINOPATHY SEVERITY (H): ICD-10-CM

## 2023-02-21 DIAGNOSIS — E11.65 TYPE 2 DIABETES MELLITUS WITH HYPERGLYCEMIA, WITH LONG-TERM CURRENT USE OF INSULIN (H): Primary | ICD-10-CM

## 2023-02-21 DIAGNOSIS — Z79.4 TYPE 2 DIABETES MELLITUS WITH DIABETIC POLYNEUROPATHY, WITH LONG-TERM CURRENT USE OF INSULIN (H): ICD-10-CM

## 2023-02-21 DIAGNOSIS — Z79.4 TYPE 2 DIABETES MELLITUS WITH RETINOPATHY, WITH LONG-TERM CURRENT USE OF INSULIN, MACULAR EDEMA PRESENCE UNSPECIFIED, UNSPECIFIED LATERALITY, UNSPECIFIED RETINOPATHY SEVERITY (H): ICD-10-CM

## 2023-02-21 DIAGNOSIS — Z79.4 LONG TERM (CURRENT) USE OF INSULIN (H): ICD-10-CM

## 2023-02-21 DIAGNOSIS — Z79.4 TYPE 2 DIABETES MELLITUS WITH HYPERGLYCEMIA, WITH LONG-TERM CURRENT USE OF INSULIN (H): Primary | ICD-10-CM

## 2023-02-21 DIAGNOSIS — I10 HYPERTENSION, UNSPECIFIED TYPE: ICD-10-CM

## 2023-02-21 DIAGNOSIS — K76.0 HEPATIC STEATOSIS: ICD-10-CM

## 2023-02-21 DIAGNOSIS — E55.9 VITAMIN D DEFICIENCY: ICD-10-CM

## 2023-02-21 DIAGNOSIS — Z79.4 TYPE 2 DIABETES MELLITUS WITH DIABETIC NEPHROPATHY, WITH LONG-TERM CURRENT USE OF INSULIN (H): ICD-10-CM

## 2023-02-21 DIAGNOSIS — Z78.9 LANGUAGE BARRIER TO COMMUNICATION: ICD-10-CM

## 2023-02-21 DIAGNOSIS — E11.42 TYPE 2 DIABETES MELLITUS WITH DIABETIC POLYNEUROPATHY, WITH LONG-TERM CURRENT USE OF INSULIN (H): ICD-10-CM

## 2023-02-21 DIAGNOSIS — E11.21 TYPE 2 DIABETES MELLITUS WITH DIABETIC NEPHROPATHY, WITH LONG-TERM CURRENT USE OF INSULIN (H): ICD-10-CM

## 2023-02-21 DIAGNOSIS — E78.2 MIXED HYPERLIPIDEMIA: ICD-10-CM

## 2023-02-21 PROCEDURE — 95251 CONT GLUC MNTR ANALYSIS I&R: CPT | Performed by: INTERNAL MEDICINE

## 2023-02-21 PROCEDURE — 99215 OFFICE O/P EST HI 40 MIN: CPT | Mod: 25 | Performed by: INTERNAL MEDICINE

## 2023-02-21 RX ORDER — GLUCAGON 3 MG/1
3 POWDER NASAL DAILY PRN
Qty: 2 EACH | Refills: 3 | Status: SHIPPED | OUTPATIENT
Start: 2023-02-21 | End: 2023-06-27

## 2023-02-21 NOTE — LETTER
2023         RE: Nithya Matthews  [address] [place] [address]        Dear Colleague,    Thank you for referring your patient, Nithya Matthews, to the [facility]. Please see a copy of my visit note below.    Subjective:    Established patient    Nithya Matthews is a 53 year old female who presents for DM.     Diagnosed with DM: diagnosed 20 years ago, she currently has a BMI of 21.89 kg/m2 and notes she was never heavier than her current weight throughout her life     History of DKA/HHS: has had severe hyperglycemia at times without acidosis      FH of DM: no     Glycemic control over the years:      3/23/2021 was the first ever HbA1c     2023: HbA1c 7.1% - note microcytic anemia      Current DM therapy:  -Glargine 80 units in the morning (given as 2 separate injections)   -NovoLo units before her only meal of the day, which is in the morning, the injection is given 5-10 minutes before eating     -Victoza 1.8 mg daily; she denies GI symptoms     Her daughter does the insulin injections. They deny missed doses.       Prior DM therapy:  -Metformin stopped 10/2022 because of her GFR      Current glycemic control: CGM reviewed in detail      Diet: 1 meal/day in the morning - appetite is described as limited, she does drink juice during the day - possibly only when having symptoms of hypoglycemia, this is unclear      Physical activity: limited      Tobacco/alcohol use: no     Complications noted in the A/P section.      No personal/FH: pancreatitis, pancreatic cancer, thyroid cancer, MEN    Objective:    BMI 21.89 kg/meters squared, blood pressure 136/70.  No cushingoid features.  No thyroid eye disease.  Thyroid examined and likely bilateral nodularity without cervical lymphadenopathy.    Abdomen without lipohypertrophy at her insulin injection sites.    DM foot exam performed - DP pulses strong and symmetric bilaterally, absent sensation to 10-gram monofilament sensation bilaterally, she does have  calluses, no active ulceration.     Assessment/Plan:    # DM-2  -12/2021 CT A/P: pancreas normal per radiology   -12/2022: C-peptide 14.4 ng/mL with concurrent glucose 234 mg/dL   # Moderate nonproliferative diabetic retinopathy, most recent eye exam 10/2022   # CKD, hypertension, on lisinopril    -Follows with nephrology at [facility], most recent appointment 2/2023, per note 2/2023 she is not currently a transplant candidate   -2/2023: GFR 13 (baseline)   -12/2022: urine microalbumin > upper limit of the ref range   # Peripheral neuropathy, she endorses prior foot ulcerations    -Saw podiatry 1/2023   # No prior ASCVD event  # Mixed hyperlipidemia, on ASA, on atorvastatin 80 mg daily, on fenofibrate 54 mg, on fish oil    -12/2022: ,  (previously were 1,627), HDL 34, LDL 82  -No known pancreatitis   # Hepatic steatosis     This appointment was done with an I-pad  and Nithya's daughter was also present.       Nithya has a freestyle nathan CGM, which we reviewed in detail.  Glycemic control has improved although she is having more hypoglycemia than is ideal.  Her time in range is 70% with 8% hypoglycemia.  Her hypoglycemia appears to be mainly postprandial.    Based on her CGM review we will reduce her NovoLog to 14 units before meals and reduce her Lantus to 70 units which is given as two 35 unit injections on each side of the abdomen every morning.  Continue Victoza 1.8 mg daily.    We reviewed hypoglycemia management in detail and I prescribed intranasal glucagon.    We will have her meet with a CDE to review basics of diabetes management.  She will return to see me in the fall and will see Sharona in a few months with labs ordered.    # Likely bilateral thyroid nodularity palpated on exam    She denies compressive symptoms.  No family history of thyroid cancer.  No prior head/neck radiation.  TSH checked 12/2022 was 2.31.  I did order a neck ultrasound.     # CKD-MBD     Has non obstructing nephrolithiasis  on imaging. Denies ever passing a stone. No prior hypercalcemia. PTH checked once 10/2022 and mildly elevated. 7/2022: low 25-OH vitamin D at 12 (ref range 20 - 75 ug/L). 12/2022: 25-OH vitamin D 17 (ref range 20 - 75 ug/L).     No prior DEXA. Denies prior fractures.     Vitamin D2 50,000 international unit(s) weekly was rx end of 10/2022 and she notes taking this.      45 minutes spent on the date of the encounter doing chart review, history and exam, documentation and further activities as noted above.  This did not include time spent on CGM review.           Again, thank you for allowing me to participate in the care of your patient.        Sincerely,        Davis Villa MD

## 2023-02-21 NOTE — PROGRESS NOTES
Subjective:    Established patient    Nithya Matthews is a 53 year old female who presents for DM.     Diagnosed with DM: diagnosed 20 years ago, she currently has a BMI of 21.89 kg/m2 and notes she was never heavier than her current weight throughout her life     History of DKA/HHS: has had severe hyperglycemia at times without acidosis      FH of DM: no     Glycemic control over the years:      3/23/2021 was the first ever HbA1c     2023: HbA1c 7.1% - note microcytic anemia      Current DM therapy:  -Glargine 80 units in the morning (given as 2 separate injections)   -NovoLo units before her only meal of the day, which is in the morning, the injection is given 5-10 minutes before eating     -Victoza 1.8 mg daily; she denies GI symptoms     Her daughter does the insulin injections. They deny missed doses.       Prior DM therapy:  -Metformin stopped 10/2022 because of her GFR      Current glycemic control: CGM reviewed in detail      Diet: 1 meal/day in the morning - appetite is described as limited, she does drink juice during the day - possibly only when having symptoms of hypoglycemia, this is unclear      Physical activity: limited      Tobacco/alcohol use: no     Complications noted in the A/P section.      No personal/FH: pancreatitis, pancreatic cancer, thyroid cancer, MEN    Objective:    BMI 21.89 kg/meters squared, blood pressure 136/70.  No cushingoid features.  No thyroid eye disease.  Thyroid examined and likely bilateral nodularity without cervical lymphadenopathy.    Abdomen without lipohypertrophy at her insulin injection sites.    DM foot exam performed - DP pulses strong and symmetric bilaterally, absent sensation to 10-gram monofilament sensation bilaterally, she does have calluses, no active ulceration.     Assessment/Plan:    # DM-2  -2021 CT A/P: pancreas normal per radiology   -2022: C-peptide 14.4 ng/mL with concurrent glucose 234 mg/dL   # Moderate nonproliferative diabetic retinopathy,  most recent eye exam 10/2022   # CKD, hypertension, on lisinopril    -Follows with nephrology at [facility], most recent appointment 2/2023, per note 2/2023 she is not currently a transplant candidate   -2/2023: GFR 13 (baseline)   -12/2022: urine microalbumin > upper limit of the ref range   # Peripheral neuropathy, she endorses prior foot ulcerations    -Saw podiatry 1/2023   # No prior ASCVD event  # Mixed hyperlipidemia, on ASA, on atorvastatin 80 mg daily, on fenofibrate 54 mg, on fish oil    -12/2022: ,  (previously were 1,627), HDL 34, LDL 82  -No known pancreatitis   # Hepatic steatosis     This appointment was done with an I-pad  and Nithya's daughter was also present.       Nithya has a freestyle nathan CGM, which we reviewed in detail.  Glycemic control has improved although she is having more hypoglycemia than is ideal.  Her time in range is 70% with 8% hypoglycemia.  Her hypoglycemia appears to be mainly postprandial.    Based on her CGM review we will reduce her NovoLog to 14 units before meals and reduce her Lantus to 70 units which is given as two 35 unit injections on each side of the abdomen every morning.  Continue Victoza 1.8 mg daily.    We reviewed hypoglycemia management in detail and I prescribed intranasal glucagon.    We will have her meet with a CDE to review basics of diabetes management.  She will return to see me in the fall and will see Sharona in a few months with labs ordered.    # Likely bilateral thyroid nodularity palpated on exam    She denies compressive symptoms.  No family history of thyroid cancer.  No prior head/neck radiation.  TSH checked 12/2022 was 2.31.  I did order a neck ultrasound.     # CKD-MBD     Has non obstructing nephrolithiasis on imaging. Denies ever passing a stone. No prior hypercalcemia. PTH checked once 10/2022 and mildly elevated. 7/2022: low 25-OH vitamin D at 12 (ref range 20 - 75 ug/L). 12/2022: 25-OH vitamin D 17 (ref range 20 - 75 ug/L).      No prior DEXA. Denies prior fractures.     Vitamin D2 50,000 international unit(s) weekly was rx end of 10/2022 and she notes taking this.      45 minutes spent on the date of the encounter doing chart review, history and exam, documentation and further activities as noted above.  This did not include time spent on CGM review.

## 2023-02-21 NOTE — PROGRESS NOTES
Follow-up with anemia management service:    Multiple appts were canceled yesterday including labs.   Barriga did not read [employer] message and did not get her Aranesp dose.     Attempted to call pt. No [facility]  available.     Anemia Latest Ref Rng & Units 11/16/2022 12/16/2022 1/4/2023 1/18/2023 1/27/2023 2/1/2023 2/13/2023   VIK Dose - - - - 40mcg - 40mcg -   Hemoglobin 11.7 - 15.7 g/dL 8.0(L) 7.4(L) 10.0(L) 8.9(L) 9.2(L) 9.0(L) 9.1(L)   Ferritin 11 - 328 ng/mL 583(H) 420(H) - - 516(H) - 501(H)         Follow-up call date: 2/27/23    Geri Mortensen RN   Anemia Services  [address] [address]   [email or web address]   Office : [phone]  Fax: [phone]

## 2023-02-27 ENCOUNTER — TELEPHONE (OUTPATIENT)
Dept: PHARMACY | Facility: CLINIC | Age: 53
End: 2023-02-27
Payer: COMMERCIAL

## 2023-02-27 ENCOUNTER — PATIENT OUTREACH (OUTPATIENT)
Dept: CARE COORDINATION | Facility: CLINIC | Age: 53
End: 2023-02-27
Payer: COMMERCIAL

## 2023-02-27 NOTE — PROGRESS NOTES
Clinic Care Coordination Contact  Care Coordination Clinician Chart Review    Situation: Patient chart reviewed by Care Coordinator.       Background: Care Coordination Program started: 3/1/2022. Initial assessment completed 3/1/23 and patient-centered care plan(s) were developed with participation from patient. Lead CC handed patient off to CHW for continued outreaches.       Assessment: Per chart review, patient outreach completed by CC CHW on 1/25/23. Patient is actively working to accomplish goal(s). Patient's goal(s) appropriate and relevant at this time.     Patient is not due for updated Plan of Care.      Assessments will be completed annually or as needed/with change of patient status. Due 3/1/23.        Care Plan: DME     Problem: I want Pull-Ups in the next 60 days so that I may better manage my incontinence symptoms.     Goal: Incontinent supplies     Start Date: 1/25/2023 Expected End Date: 3/25/2023    Note:     Goal Statement: I want Pull-Ups in the next 60 days so that I may better manage my incontinence symptoms.     Barriers: Language  Strengths: Accepting of support  Patient expressed understanding of goal: Yes    Action steps to achieve this goal:  1. I will answer my phone when [employer] [phone]  contacts me to deliver or  my pull-ups.  3. I will follow up with CCC in the next month regarding this goal for additional coordination support.    Note: Order for pull-ups was sent to DME Provider 1/10/23.                            Plan/Recommendations: The patient will continue working with Care Coordination to achieve goal(s) as above.     CHW will continue outreaches at minimum every 30 days and will involve Lead CC as needed or if patient is ready to move to Maintenance. Please schedule annual assessment with lead PARDEEP PARIKH 3/1/23.    Lead CC will continue to monitor CHW outreaches and patient's progress to goal(s) every 6 weeks.     Plan of Care updated and sent to  patient: JORGE Vizcarra   Social Work [facility] Care Coordinator   [facility]

## 2023-02-27 NOTE — TELEPHONE ENCOUNTER
Follow-up with anemia management service:    Was on hold for 10 min for a [employer]  with no answer.   Sent [employer] message to Barriga, She needs to schedule Aranesp.     Anemia Latest Ref Rng & Units 11/16/2022 12/16/2022 1/4/2023 1/18/2023 1/27/2023 2/1/2023 2/13/2023   VIK Dose - - - - 40mcg - 40mcg -   Hemoglobin 11.7 - 15.7 g/dL 8.0(L) 7.4(L) 10.0(L) 8.9(L) 9.2(L) 9.0(L) 9.1(L)   Ferritin 11 - 328 ng/mL 583(H) 420(H) - - 516(H) - 501(H)           Follow-up call date: 3/6/23    Geri Mortensen RN   Anemia Services  [address] [address]   [email or web address]   Office : [phone]  Fax: [phone]

## 2023-02-28 ENCOUNTER — PATIENT OUTREACH (OUTPATIENT)
Dept: CARE COORDINATION | Facility: CLINIC | Age: 53
End: 2023-02-28
Payer: COMMERCIAL

## 2023-02-28 NOTE — PROGRESS NOTES
Clinic Care Coordination Contact    Community Health Worker Follow Up  Spoke with patient and Fritz Matthesw (Daughter) through [id number]    Care Gaps:     Health Maintenance Due   Topic Date Due     ZOSTER IMMUNIZATION (1 of 2) Never done     COVID-19 Vaccine (3 - Mixed Product risk series) 06/30/2022     DIABETIC FOOT EXAM  02/28/2023     MICROALBUMIN  03/28/2023     Postponed to next outreach.     Care Plan:   Care Plan: DME Completed 2/28/2023    Problem: I want Pull-Ups in the next 60 days so that I may better manage my incontinence symptoms.  Resolved 2/28/2023    Goal: Incontinent supplies  Completed 2/28/2023    Start Date: 1/25/2023 Expected End Date: 3/25/2023    This Visit's Progress: 100%    Note:     Goal Statement: I want Pull-Ups in the next 60 days so that I may better manage my incontinence symptoms.     Barriers: Language  Strengths: Accepting of support  Patient expressed understanding of goal: Yes    Action steps to achieve this goal:  1. I will answer my phone when [employer] [phone]  contacts me to deliver or  my pull-ups.  3. I will follow up with [facility] in the next month regarding this goal for additional coordination support.    Note: Order for pull-ups was sent to DME Provider 1/10/23.                     Care Plan: [facility] Benefits     Problem: I will apply for [facility] financial benefits within the next 90 days.     Goal: Rent Assistance     Start Date: 2/28/2023 Expected End Date: 5/28/2023    Note:     Goal statement: I will apply for [facility] [employer] benefits within the next 90 days.     Barriers: Language   Strengths: Accepting of support.  Patient expressed understanding of goal: Yes     Action steps to achieve this goal:   1.  I will answer my phone when I am contacted by the [facility] FRW to schedule an initial appointment.   2.  I will provide all necessary documentation and information to complete a [facility] application for benefits with the  support of the FRW.   3.  I will call my FRW if I have questions or concerns regarding my county benefits application.     Note: Benefits requesting is Rental, referral submitted by CHW on 2/28/23.                       Care Plan: SSI     Problem: I will find out if I am eligible to apply for Social Security benefits.     Goal: SSI     Start Date: 2/28/2023 Expected End Date: 2/28/2024    Note:     Goal statement: I will find out if I am eligible to apply for Social Security benefits.    Barriers: Language  Strengths: Agreeable to support.   Patient expressed understanding of goal: Yes    Action steps to achieve this goal:  1.  I will follow the instructions that Disability Specialists recommended on 9/30/22. Steps per Ayleen at :     Once patient receives her [place] Citizenship paperwork,  a) Patient should go to the Social Security office in person.  b) Patient should show the Social  the original paperwork of her [employer] Citizenship.  During this time, request an in person appointment to apply for Social Security benefits.    2.  I will request assistance as needed and clarify if my [facility] Work[facility] can take me to the SSA Office.   3.  I will attend an in-person appointment to apply for social security income as scheduled on. TBD  4.  I will follow up with CCC in the next month regarding this goal.    Note: See encounter 9/30/22, for instructions from .                       Intervention and Education during outreach:     Sruthi confirmed they know how to order pull-up briefs through [employer] [phone]. Patient shares that her  and daughter usually help her call to place orders. Goal completed.       CHW inquired about rental assistance, patient was going to have daughter call CHW back to completed FRW referral. Patient confirmed she's still interested in applying for rent assistance. FRW referral completed with Fritz, she  will watch for follow up call. New goal created.       Fritz inquired about support with applying for Social Security Income. CHW reviewed encounter 9/30/22, instructions given per Gracie at :  Once patient receives her [place] Citizenship paperwork,  a) Patient should go to the Social Security office in person.  b) Patient should show the Social  the original paperwork of her [place] Citizenship.  During this time, request an in person appointment to apply for Social Security benefits.    Fritz confirmed, she has all of patients [place] Citizenship paperwork and is ready to help patient re-apply. CHW reviewed instructions and asked if Fritz feels comfortable taking patient to SSA Office in person to request an appointment to apply for SSI in person. Fritz replied that she doesn't think they can do it on her own.     CHW informed patient and Fritz, will reach out to [facility] Worker to see if they can assist. Fritz shares that they have asked [facility] Worker's  Avani Nelson for support many times but he does not help them.       Conference called Betsy Lopez [facility] Worker with [facility] [phone], states patients new [facility] Worker is Albert Moe [phone], the  is still Avani Nelson [phone]. CHW reviewed above concerns with applying for SSI in person, family will need support with getting to SSA Office. Betsy will notify Albert and have him follow up with patient.     CHW called Albert, left message regarding support with applying for SSI in person. Please return call so CHW can review next step. New goal created.    CHW Plan: CHW to follow up in 1 month    Irene Samson  [facility] Care Coordination  [facility]    Phone: [phone]

## 2023-03-01 ENCOUNTER — PATIENT OUTREACH (OUTPATIENT)
Dept: NEPHROLOGY | Facility: CLINIC | Age: 53
End: 2023-03-01

## 2023-03-01 NOTE — PROGRESS NOTES
Spoke to Nithya's daughter, Fritz to check in. Nithya had fistula surgery on 2/14 and writer checking in to see how Nithya is doing. Per Fritz, she still has a little bit of pain. Writer encouraged [place] to give her tylenol if necessary for that pain. We talked about next visit on 4/5. We also talked about uremic signs and symptoms for [place] to watch for. Writer asked Fritz if she had questions on the fistula or dialysis. She did not at this time. Contact information provided again to Fritz.

## 2023-03-02 ENCOUNTER — PATIENT OUTREACH (OUTPATIENT)
Dept: CARE COORDINATION | Facility: CLINIC | Age: 53
End: 2023-03-02
Payer: COMMERCIAL

## 2023-03-02 NOTE — PROGRESS NOTES
Clinic Care Coordination Contact  Program: Emergency Assistance  County:  [facility] Case #:  [facility] Worker:   [place] #:   Subscriber #:   Renewal:  Date Applied:     FRW Outreach:   3/2/23: I called pt's daughter and set up an appointment for 3/16 to apply for Emergency Assistance     Health Insurance:      Referral/Screening:  Patient would like to apply for emergency assisstance for her rent. Please call Fritz Matthews (Daughter) [phone]. Per Fritz, family help pay for patients rent.

## 2023-03-06 NOTE — TELEPHONE ENCOUNTER
Admitted to [facility] on 3/4/23.  Dialysis was initiated during admission.  Will follow up in a few days.     Geri Mortensen RN   [facility] Services  [address] [address]   [email or web address]   Office : [phone]  Fax: [phone]

## 2023-03-08 ENCOUNTER — PATIENT OUTREACH (OUTPATIENT)
Dept: NEPHROLOGY | Facility: CLINIC | Age: 53
End: 2023-03-08

## 2023-03-09 ENCOUNTER — DOCUMENTATION ONLY (OUTPATIENT)
Dept: PHARMACY | Facility: CLINIC | Age: 53
End: 2023-03-09
Payer: COMMERCIAL

## 2023-03-09 ENCOUNTER — TELEPHONE (OUTPATIENT)
Dept: TRANSPLANT | Facility: CLINIC | Age: 53
End: 2023-03-09
Payer: COMMERCIAL

## 2023-03-09 NOTE — LETTER
March 9, 2023    Nithya Matthews  [address] [place] [address]      Dear Ms. Matthews,   The purpose of this letter is to let you know that on 3/1/2023 the [facility] Multi-Disciplinary Selection Team reviewed the results of your transplant evaluation.  Based on the results of your evaluation and the selection criteria used by our program, the decision was made to not list you on the kidney transplant list.  This is because of your poor physical functioning overall, testing frail, poor financial resources and poor social support. You can be re-referred in the future when you are able to walk a full city block, have a sustained financial income and have better social support  Important things you should know:    If you would like to discuss the decision, or if your medical status changes you may schedule a return visits with your doctor by calling [phone] and asking to speak to your transplant coordinator.    We recommend that you continue to follow up with your primary care doctor in order to manage your health concerns.  We want you to know that our program has physician and surgeon coverage 24 hours a day, 365 days a year. In addition, our transplant surgeons and physicians will not be on call for two or more transplant programs more than 30 miles apart unless the circumstances have been reviewed and approved by the United Network for Organ Sharing (UNOS) Membership and Professional Standards Committee (MPSC). Finally, our primary physician and primary surgeons are not designated as the primary surgeon or primary physician at more than 1 transplant hospital. If this coverage changes or there are substantial program changes, you will be notified in writing by letter.   Attached is a letter from UNOS that describes the services and information offered to patients by UNOS and the Organ Procurement and Transplantation Network (OPTN).    Thank you for allowing us to participate in  your care.  We wish you well.  Sincerely,  Emma MARQUES Pre Transplant Coordinator     Solid Organ Transplant  [facility]    Enclosures: UNOS Letter  cc: Dr. Giancarlo Arizmendi, Dr. Carl Rosas, [facility]             The Organ Procurement and Transplantation Network  Toll-free patient services line:     Your resource for organ transplant information    If you have a question regarding your own medical care, you always should call your transplant hospital first. However, for general organ transplant-related information, you can call the Organ Procurement and Transplantation Network (OPTN) toll-free patient services line at [phone]- [phone]. Anyone, including potential transplant candidates, candidates, recipients, family members, friends, living donors, and donor family members, can call this number to:          Talk about organ donation, living donation, the transplant process, the donation process, and transplant policies.    Get a free patient information kit with helpful booklets, waiting list and transplant information, and a list of all transplant hospitals.    Ask questions about the OPTN website (https://optn.transplant.hrsa.gov/), the United Network for Organ Sharing s (UNOS) website (https://unos.org/), or the UNOS website for living donors and transplant recipients. (https://www.transplantliving.org/).    Learn how the OPTN can help you.    Talk about any concerns that you may have with a transplant hospital.    The [facility] s transplant system, the OPTN, is managed under federal contract by the United Network for Organ Sharing (UNOS), which is a non-profit charitable organization. The OPTN helps create and define organ sharing policies that make the best use of donated organs. This process continuously evaluating new advances and discoveries so policies can be adapted to best serve patients waiting for transplants. To do so, the OPTN works  closely with transplant professionals, transplant patients, transplant candidates, donor families, living donors, and the public. All transplant programs and organ procurement organizations throughout the country are OPTN members and are obligated to follow the policies the OPTN creates for allocating organs.    The OPTN also is responsible for:      Providing educational material for patients, the public, and professionals.    Raising awareness of the need for donated organs and tissue.    Coordinating organ procurement, matching, and placement.    Collecting information about every organ transplant and donation that occurs in the [place].    Remember, you should contact your transplant hospital directly if you have questions or concerns about your own medical care including medical records, work-up progress, and test results.    We are not your transplant hospital, and our staff will not be able to answer questions about your case, so please keep your transplant hospital s phone number handy.    However, while you research your transplant needs and learn as much as you can about transplantation and donation, we welcome your call to our toll-free patient services line at [phone].          Updated 4/1/2019

## 2023-03-09 NOTE — TELEPHONE ENCOUNTER
I called pt today with the assistance of a [facility] , [facility] ID [id number], reached pt's voicemail, LM that I am calling to review the outcome of her transplant evaluation.     Called [facility] today and spoke with nurse who confirmed pt just started there today. I explained pt is declined her for kidney transplant due to poor overall physical functioning, frail, poor financial resources. I explained I will be sending a letter. Nurse will let Dr. Carl Rosas know.     Generated Declined as a kidney transplant candidate letter today in Epic - electronically sent to pt/ providers. Also asked admins to regular mail it to pt.

## 2023-03-09 NOTE — PROGRESS NOTES
Referred by Dr. Sahil Waterman on 11/18/2022    Barriga started Dialysis.  Anemia will be monitored and treated at the dialysis clinic. Closing Anemia Services.     Anemia Latest Ref Rng & Units 11/16/2022 12/16/2022 1/4/2023 1/18/2023 1/27/2023 2/1/2023 2/13/2023   VIK Dose - - - - 40mcg - 40mcg -   Hemoglobin 11.7 - 15.7 g/dL 8.0(L) 7.4(L) 10.0(L) 8.9(L) 9.2(L) 9.0(L) 9.1(L)   Ferritin 11 - 328 ng/mL 583(H) 420(H) - - 516(H) - 501(H)       Anemia labs discontinued, therapy plans cancelled, removed from active patient list, and referring provider notified.    Geri Mortensen RN   Anemia Services  [address] [address]   [email or web address]   Office : [phone]  Fax: [phone]

## 2023-03-10 ENCOUNTER — MEDICAL CORRESPONDENCE (OUTPATIENT)
Dept: HEALTH INFORMATION MANAGEMENT | Facility: CLINIC | Age: 53
End: 2023-03-10

## 2023-03-10 ENCOUNTER — TELEPHONE (OUTPATIENT)
Dept: FAMILY MEDICINE | Facility: CLINIC | Age: 53
End: 2023-03-10
Payer: COMMERCIAL

## 2023-03-10 DIAGNOSIS — I10 PRIMARY HYPERTENSION: Primary | ICD-10-CM

## 2023-03-10 DIAGNOSIS — E78.5 HYPERLIPIDEMIA LDL GOAL <100: ICD-10-CM

## 2023-03-10 DIAGNOSIS — F32.A DEPRESSION, UNSPECIFIED DEPRESSION TYPE: ICD-10-CM

## 2023-03-10 NOTE — TELEPHONE ENCOUNTER
CELINA, Home nurse called regarding rx from hospital. CELINA would like PCP to take a look at discharge medications and advise on whether pt should continue to take medications that were prescribed in hospital. If pt is to switch to discharge medication, CELINA stated they need a new order to be sent to [facility].    CELINA would like an updated medication list as there has been changes to pt's medications after discharge from hospital.    CELINA home health care Call back [facility] [phone].    Paresh Kirk, BSN RN  [facility]

## 2023-03-11 NOTE — TELEPHONE ENCOUNTER
"Routing refill request to provider for review/approval because:  Labs out of range:  CR    Last Written Prescription Date:  3/17/2022  Last Fill Quantity: 90,  # refills: 3   Last office visit provider:  1/10/2023     Requested Prescriptions   Pending Prescriptions Disp Refills     lisinopril (ZESTRIL) 40 MG tablet [Pharmacy Med Name: LISINOPRIL 40 MG TABS 40 Tablet] 90 tablet 3     Sig: TAKE 1 TABLET (40 MG) BY MOUTH DAILY FOR BLOOD PRESSURE       ACE Inhibitors (Including Combos) Protocol Failed - 3/10/2023  9:05 AM        Failed - Normal serum creatinine on file in past 12 months     Recent Labs   Lab Test 02/13/23  0908   CR 3.89*       Ok to refill medication if creatinine is low          Passed - Blood pressure under 140/90 in past 12 months     BP Readings from Last 3 Encounters:   02/21/23 136/70   02/20/23 (!) 164/83   02/15/23 (!) 154/82                 Passed - Recent (12 mo) or future (30 days) visit within the authorizing provider's specialty     Patient has had an office visit with the authorizing provider or a provider within the authorizing providers department within the previous 12 mos or has a future within next 30 days. See \"Patient Info\" tab in inbasket, or \"Choose Columns\" in Meds & Orders section of the refill encounter.              Passed - Medication is active on med list        Passed - Patient is age 18 or older        Passed - No active pregnancy on record        Passed - Normal serum potassium on file in past 12 months     Recent Labs   Lab Test 02/13/23  0908 12/06/21  1050 09/24/21  0952   POTASSIUM 3.5   < >  --    82740  --   --  3.6    < > = values in this interval not displayed.             Passed - No positive pregnancy test within past 12 months      Routing refill request to provider for review/approval because:  Patient is taking it differently warning note    Last Written Prescription Date:  3/17/2022  Last Fill Quantity: 90,  # refills: 3        atorvastatin (LIPITOR) 80 MG " "tablet [Pharmacy Med Name: ATORVASTATIN CALCIUM 80 MG 80 Tablet] 90 tablet 3     Sig: TAKE 1 TABLET (80 MG) BY MOUTH DAILY FOR CHOLESTEROL       Statins Protocol Passed - 3/10/2023  9:05 AM        Passed - LDL on file in past 12 months     Recent Labs   Lab Test 12/28/22  1506   LDL 82             Passed - No abnormal creatine kinase in past 12 months     No lab results found.             Passed - Recent (12 mo) or future (30 days) visit within the authorizing provider's specialty     Patient has had an office visit with the authorizing provider or a provider within the authorizing providers department within the previous 12 mos or has a future within next 30 days. See \"Patient Info\" tab in inbasket, or \"Choose Columns\" in Meds & Orders section of the refill encounter.              Passed - Medication is active on med list        Passed - Patient is age 18 or older        Passed - No active pregnancy on record        Passed - No positive pregnancy test in past 12 months        Routing refill request to provider for review/approval because:  PHQ-9    Last Written Prescription Date:  32/17/2022  Last Fill Quantity: 90,  # refills: 3        escitalopram (LEXAPRO) 10 MG tablet [Pharmacy Med Name: ESCITALOPRAM 10 MG TABLET 10 Tablet] 90 tablet 3     Sig: TAKE 1 TABLET (10 MG) BY MOUTH DAILY FOR DEPRESSION       SSRIs Protocol Failed - 3/10/2023  9:05 AM        Failed - PHQ-9 score less than 5 in past 6 months     Please review last PHQ-9 score.           Passed - Medication is active on med list        Passed - Patient is age 18 or older        Passed - No active pregnancy on record        Passed - No positive pregnancy test in last 12 months        Passed - Recent (6 mo) or future (30 days) visit within the authorizing provider's specialty     Patient had office visit in the last 6 months or has a visit in the next 30 days with authorizing provider or within the authorizing provider's specialty.  See \"Patient Info\" tab " "in inbasket, or \"Choose Columns\" in Meds & Orders section of the refill encounter.            Routing refill request to provider for review/approval because:  Pt taking differently warning note    Last Written Prescription Date:  3/17/2022  Last Fill Quantity: 90,  # refills: 3        metoprolol succinate ER (TOPROL XL) 200 MG 24 hr tablet [Pharmacy Med Name: METOPROLOL SUCCINATE  200 Tablet] 90 tablet 3     Sig: TAKE 1 TABLET (200 MG) BY MOUTH DAILY FOR BLOOD PRESSURE       Beta-Blockers Protocol Passed - 3/10/2023  9:05 AM        Passed - Blood pressure under 140/90 in past 12 months     BP Readings from Last 3 Encounters:   02/21/23 136/70   02/20/23 (!) 164/83   02/15/23 (!) 154/82                 Passed - Patient is age 6 or older        Passed - Recent (12 mo) or future (30 days) visit within the authorizing provider's specialty     Patient has had an office visit with the authorizing provider or a provider within the authorizing providers department within the previous 12 mos or has a future within next 30 days. See \"Patient Info\" tab in inbasket, or \"Choose Columns\" in Meds & Orders section of the refill encounter.              Passed - Medication is active on med list             Alyssa Oh RN 03/10/23 9:56 PM  "

## 2023-03-13 RX ORDER — ESCITALOPRAM OXALATE 10 MG/1
TABLET ORAL
Qty: 90 TABLET | Refills: 3 | Status: SHIPPED | OUTPATIENT
Start: 2023-03-13 | End: 2024-03-04

## 2023-03-13 RX ORDER — LISINOPRIL 40 MG/1
TABLET ORAL
Qty: 90 TABLET | Refills: 3 | Status: SHIPPED | OUTPATIENT
Start: 2023-03-13 | End: 2023-03-22 | Stop reason: DRUGHIGH

## 2023-03-13 RX ORDER — ATORVASTATIN CALCIUM 80 MG/1
TABLET, FILM COATED ORAL
Qty: 90 TABLET | Refills: 3 | Status: SHIPPED | OUTPATIENT
Start: 2023-03-13 | End: 2024-04-01

## 2023-03-13 RX ORDER — METOPROLOL SUCCINATE 200 MG/1
TABLET, EXTENDED RELEASE ORAL
Qty: 90 TABLET | Refills: 3 | Status: SHIPPED | OUTPATIENT
Start: 2023-03-13 | End: 2023-03-22

## 2023-03-15 ENCOUNTER — PATIENT OUTREACH (OUTPATIENT)
Dept: CARE COORDINATION | Facility: CLINIC | Age: 53
End: 2023-03-15

## 2023-03-16 ENCOUNTER — APPOINTMENT (OUTPATIENT)
Dept: CARE COORDINATION | Facility: CLINIC | Age: 53
End: 2023-03-16
Payer: COMMERCIAL

## 2023-03-16 NOTE — PROGRESS NOTES
Clinic Care Coordination Contact  Program: Emergency Assistance  County:  [facility] Case #:  [facility] Worker:   Cailin #:   Subscriber #:   Renewal:  Date Applied:     FRW Outreach:   3/16/23: FRW called pt's daughter 2x and left VM. FRW will make another outreach in 2 weeks.   3/2/23: I called pt's daughter and set up an appointment for 3/16 to apply for Emergency Assistance     Health Insurance:      Referral/Screening:  Patient would like to apply for emergency assisstance for her rent. Please call Fritz Matthews (Daughter) [phone]. Per Fritz, family help pay for patients rent.

## 2023-03-20 ENCOUNTER — MEDICAL CORRESPONDENCE (OUTPATIENT)
Dept: HEALTH INFORMATION MANAGEMENT | Facility: CLINIC | Age: 53
End: 2023-03-20

## 2023-03-22 ENCOUNTER — OFFICE VISIT (OUTPATIENT)
Dept: FAMILY MEDICINE | Facility: CLINIC | Age: 53
End: 2023-03-22
Payer: COMMERCIAL

## 2023-03-22 VITALS
HEART RATE: 69 BPM | DIASTOLIC BLOOD PRESSURE: 59 MMHG | BODY MASS INDEX: 20.16 KG/M2 | SYSTOLIC BLOOD PRESSURE: 107 MMHG | OXYGEN SATURATION: 97 % | TEMPERATURE: 97.9 F | RESPIRATION RATE: 16 BRPM | WEIGHT: 107.75 LBS

## 2023-03-22 DIAGNOSIS — D63.1 ANEMIA OF CHRONIC RENAL FAILURE, STAGE 4 (SEVERE) (H): ICD-10-CM

## 2023-03-22 DIAGNOSIS — E11.69 TYPE 2 DIABETES MELLITUS WITH OTHER SPECIFIED COMPLICATION, UNSPECIFIED WHETHER LONG TERM INSULIN USE (H): ICD-10-CM

## 2023-03-22 DIAGNOSIS — I10 PRIMARY HYPERTENSION: ICD-10-CM

## 2023-03-22 DIAGNOSIS — N18.4 ANEMIA OF CHRONIC RENAL FAILURE, STAGE 4 (SEVERE) (H): ICD-10-CM

## 2023-03-22 DIAGNOSIS — K90.0 CELIAC DISEASE: ICD-10-CM

## 2023-03-22 DIAGNOSIS — R79.89 LOW VITAMIN B12 LEVEL: ICD-10-CM

## 2023-03-22 DIAGNOSIS — G62.9 NEUROPATHY: ICD-10-CM

## 2023-03-22 DIAGNOSIS — K21.00 GASTROESOPHAGEAL REFLUX DISEASE WITH ESOPHAGITIS WITHOUT HEMORRHAGE: ICD-10-CM

## 2023-03-22 DIAGNOSIS — Z74.09 IMPAIRED MOBILITY: ICD-10-CM

## 2023-03-22 DIAGNOSIS — M62.81 GENERALIZED MUSCLE WEAKNESS: ICD-10-CM

## 2023-03-22 DIAGNOSIS — Z99.2 ESRD (END STAGE RENAL DISEASE) ON DIALYSIS (H): ICD-10-CM

## 2023-03-22 DIAGNOSIS — R54 FRAILTY: ICD-10-CM

## 2023-03-22 DIAGNOSIS — N18.6 ESRD (END STAGE RENAL DISEASE) ON DIALYSIS (H): ICD-10-CM

## 2023-03-22 DIAGNOSIS — Z09 HOSPITAL DISCHARGE FOLLOW-UP: Primary | ICD-10-CM

## 2023-03-22 PROCEDURE — 99214 OFFICE O/P EST MOD 30 MIN: CPT | Performed by: FAMILY MEDICINE

## 2023-03-22 RX ORDER — CARVEDILOL 12.5 MG/1
12.5 TABLET ORAL 2 TIMES DAILY WITH MEALS
Qty: 180 TABLET | Refills: 3 | Status: SHIPPED | OUTPATIENT
Start: 2023-03-22 | End: 2023-06-28

## 2023-03-22 RX ORDER — GABAPENTIN 300 MG/1
300 CAPSULE ORAL AT BEDTIME
Qty: 90 CAPSULE | Refills: 3 | Status: SHIPPED | OUTPATIENT
Start: 2023-03-22 | End: 2024-02-26

## 2023-03-22 RX ORDER — CALCIUM ACETATE 667 MG/1
CAPSULE ORAL
COMMUNITY
Start: 2023-03-08 | End: 2023-06-27

## 2023-03-22 RX ORDER — CALCITRIOL 0.5 UG/1
CAPSULE, LIQUID FILLED ORAL
COMMUNITY
Start: 2023-03-08 | End: 2023-06-27

## 2023-03-22 RX ORDER — INSULIN GLARGINE 100 [IU]/ML
40 INJECTION, SOLUTION SUBCUTANEOUS EVERY MORNING
COMMUNITY
Start: 2023-03-08 | End: 2023-09-11

## 2023-03-22 RX ORDER — LANOLIN ALCOHOL/MO/W.PET/CERES
CREAM (GRAM) TOPICAL
COMMUNITY
Start: 2023-03-10 | End: 2023-03-22

## 2023-03-22 RX ORDER — CARVEDILOL 12.5 MG/1
TABLET ORAL
COMMUNITY
Start: 2023-03-08 | End: 2023-03-22

## 2023-03-22 RX ORDER — INSULIN LISPRO 100 [IU]/ML
INJECTION, SOLUTION INTRAVENOUS; SUBCUTANEOUS
COMMUNITY
Start: 2023-03-08 | End: 2023-03-22

## 2023-03-22 RX ORDER — LISINOPRIL 20 MG/1
TABLET ORAL
COMMUNITY
Start: 2023-03-08 | End: 2023-03-22

## 2023-03-22 RX ORDER — LISINOPRIL 20 MG/1
20 TABLET ORAL DAILY
Qty: 90 TABLET | Refills: 3 | Status: SHIPPED | OUTPATIENT
Start: 2023-03-22 | End: 2024-03-20

## 2023-03-22 RX ORDER — CHLORAL HYDRATE 500 MG
CAPSULE ORAL
COMMUNITY
Start: 2023-01-13 | End: 2023-03-22

## 2023-03-22 RX ORDER — ASPIRIN 81 MG
TABLET,CHEWABLE ORAL
COMMUNITY
Start: 2023-03-08 | End: 2023-03-22

## 2023-03-22 RX ORDER — AMLODIPINE BESYLATE 5 MG/1
5 TABLET ORAL
COMMUNITY
Start: 2023-03-09 | End: 2023-06-27

## 2023-03-22 RX ORDER — GABAPENTIN 300 MG/1
300 CAPSULE ORAL
COMMUNITY
Start: 2023-03-08 | End: 2023-03-22

## 2023-03-22 NOTE — Clinical Note
I apologize for the delay, I finished documentation for her wheelchair and I added an order for her cushion as requested by home care for the wheelchair.  Can we fax this to wherever it needs to go?  Thank you!

## 2023-03-22 NOTE — PROGRESS NOTES
Assessment & Plan     Hospital discharge follow-up  ESRD (end stage renal disease) on dialysis (H)  Starting dialysis soon. Will continue medications from hospitalization.   - Wheelchair Order for DME - ONLY FOR DME  - Miscellaneous Order for DME - ONLY FOR DME  - lisinopril (ZESTRIL) 20 MG tablet  Dispense: 90 tablet; Refill: 3  - carvedilol (COREG) 12.5 MG tablet  Dispense: 180 tablet; Refill: 3    Anemia of chronic renal failure, stage 4 (severe) (H)  Type 2 diabetes mellitus with other specified complication, unspecified whether long term insulin use (H)  No changes  - insulin aspart (NOVOLOG PEN) 100 UNIT/ML pen  Dispense: 45 mL; Refill: 3    Primary hypertension  Controlled.     Celiac disease  ? Not following a particular diet but this is on her chart.     Gastroesophageal reflux disease with esophagitis without hemorrhage    Neuropathy  Frailty  Impaired mobility  Generalized muscle weakness  Patient would be good candidate for wheelchair. Due to anemia, CKD, and neuropathy she is at high risk for falls, unable to bear her own weight for than a few mintues and unable to ambulate more than a few feet without having to sit.  She is starting dialysis soon and that has increased her weakness and increased her necessity for mobility.  With her neuropathy, it is difficult for her to bathe, lift anything, do laundry, cook on her own and she needs assistance with that.  She is currently using a walker for mobility, but again, she is unable to go more than 1 or 2 feet without having to sit.  She is at high risk for falls even with her walker because of her increased weakness due to her worsening kidney failure and the impending start of dialysis.  Her home does have adequate access, maneuvering space, and surfaces so she can self propel the manual wheelchair.  She also has family and a caregiver who can assist, that includes a PCA.  The use of a manual wheelchair will significantly improve her ability to  participate in her ADLs and she will use it on a regular basis.  She does want a wheelchair because that would allow her to sit and do a lot of her independent cares, and allow her caregivers to help her mobilize to her appointments and around the house.  She cannot propel in a standard wheelchair because of her weight and weakness, but she can use a light weight wheelchair without risk of shoulder pain or injury.  She currently weighs 107 pounds.  With the impending start of dialysis, she may lose more weight because of the severity of her disease and the taxing nature of the treatment.  - Wheelchair Order for DME - ONLY FOR DME  - Miscellaneous Order for DME - ONLY FOR DME  - Miscellaneous Order for DME - ONLY FOR DME      Low vitamin B12 level  Continue replacement.              MED REC REQUIRED  Post Medication Reconciliation Status: discharge medications reconciled and changed, per note/orders      Giancarlo Arizmendi MD  [facility] SHANKAR Barriga is a 53 year old, presenting for the following health issues:  Hospital F/U      [facility]       Hospital Follow-up Visit:    Hospital/Nursing Home/ Rehab Facility: [facility]  Date of Admission: 03/08/2023  Date of Discharge: 03/08/2023  Reason(s) for Admission:   End stage renal disease ([facility])    SOB (shortness of breath)    Pulmonary embolism, unspecified chronicity, unspecified pulmonary embolism type, unspecified whether acute cor pulmonale present ([facility]C)      Review of hospital discharge:   Other Issues for Outpatient Follow-up:   1. Scheduled TTS iHD at [facility]  2. RUE AV fisula still maturing. Has RIJ HD line for now.  3. Monitor BP and adjust BP meds as needed.  4. Monitor BG and adjust insulin as needed.  5. Follow up with GI for diarrhea. Microscopic colitis?  6. Follow-up echo to assess for resolution of pericardial effusion following dialysis initiation.      ESRD  AV fistula placed 2/14/23. Not yet mature.  RIJ HD line placed 3/6/23  -  calcitriol 0.5 qday  - Phoslo TID  - Outpatient HD at [facility] at 11:50am    Primary HTN  Hypertensive emergency - resolved  Was on nitro gtt  - discharged on coreg 12.5 BID  - discharged on lisinopril 20 qday  - discharged on amlodipine 5 qday    DM2 on insulin  - discharged on glargine 15 qday  - discharged on lispro 5u TID with meals    Diarrhea  C diff and GPP negative  Has hx of celiac per chart review. Pt and family unaware of this  - gluten free diet  - Recommend GI outpatient referral. May have microscopic colitis?  - Imodium prn    Pericardial effusion  Will need follow-up echo to assess for resolution of pericardial effusion following dialysis initiation.       Was your hospitalization related to COVID-19? No   Problems taking medications regularly:  None  Medication changes since discharge: None  Problems adhering to non-medication therapy:  None    Summary of hospitalization:  CareEverywhere information obtained and reviewed  Diagnostic Tests/Treatments reviewed.  Follow up needed: none  Other Healthcare Providers Involved in Patient s Care:         nephrology  Update since discharge: stable   Plan of care communicated with patient and family           Review of Systems   Constitutional, HEENT, cardiovascular, pulmonary, gi and gu systems are negative, except as otherwise noted.      Objective    /59   Pulse 69   Temp 97.9  F (36.6  C) (Oral)   Resp 16   Wt 48.9 kg (107 lb 12 oz)   SpO2 97%   BMI 20.16 kg/m    Body mass index is 20.16 kg/m .  Physical Exam   GENERAL: healthy, alert and no distress  NECK: no adenopathy, no asymmetry, masses, or scars and thyroid normal to palpation  RESP: lungs clear to auscultation - no rales, rhonchi or wheezes  CV: regular rate and rhythm, normal S1 S2, no S3 or S4, no murmur, click or rub, no peripheral edema and peripheral pulses strong  ABDOMEN: soft, nontender, no hepatosplenomegaly, no masses and bowel sounds normal  MS: no gross  musculoskeletal defects noted, no edema    No results found for any visits on 03/22/23.  No results found for this or any previous visit (from the past 24 hour(s)).

## 2023-03-23 ENCOUNTER — MEDICAL CORRESPONDENCE (OUTPATIENT)
Dept: HEALTH INFORMATION MANAGEMENT | Facility: CLINIC | Age: 53
End: 2023-03-23

## 2023-03-23 DIAGNOSIS — Z53.9 DIAGNOSIS NOT YET DEFINED: Primary | ICD-10-CM

## 2023-03-23 PROCEDURE — G0180 MD CERTIFICATION HHA PATIENT: HCPCS | Performed by: FAMILY MEDICINE

## 2023-03-27 ENCOUNTER — MYC MEDICAL ADVICE (OUTPATIENT)
Dept: NEPHROLOGY | Facility: CLINIC | Age: 53
End: 2023-03-27
Payer: COMMERCIAL

## 2023-03-28 ENCOUNTER — PATIENT OUTREACH (OUTPATIENT)
Dept: CARE COORDINATION | Facility: CLINIC | Age: 53
End: 2023-03-28
Payer: COMMERCIAL

## 2023-03-28 NOTE — PROGRESS NOTES
Clinic Care Coordination Contact  Program: Emergency Assistance  County:  [facility] Case #:  [facility] Worker:   Cailin #:   Subscriber #:   Renewal:  Date Applied:     FRW Outreach:   3/28/23: FRW called pt's daughter and rescheduled appointment to 4/7. No  needed.   3/16/23: FRW called pt's daughter 2x and left VM. FRW will make another outreach in 2 weeks.   3/2/23: I called pt's daughter and set up an appointment for 3/16 to apply for Emergency Assistance     Health Insurance:      Referral/Screening:  Patient would like to apply for emergency assisstance for her rent. Please call Fritz Matthews (Daughter) [phone]. Per Fritz, family help pay for patients rent.

## 2023-03-30 ENCOUNTER — PATIENT OUTREACH (OUTPATIENT)
Dept: NEPHROLOGY | Facility: CLINIC | Age: 53
End: 2023-03-30
Payer: COMMERCIAL

## 2023-03-30 ENCOUNTER — PATIENT OUTREACH (OUTPATIENT)
Dept: CARE COORDINATION | Facility: CLINIC | Age: 53
End: 2023-03-30
Payer: COMMERCIAL

## 2023-03-30 DIAGNOSIS — E78.5 HYPERLIPIDEMIA LDL GOAL <100: ICD-10-CM

## 2023-03-30 NOTE — PROGRESS NOTES
Clinic Care Coordination Contact    Community Health Worker Follow Up  Spoke with Fritz Matthews (Daughter) Jamari  ID [place]     Care Gaps:     Health Maintenance Due   Topic Date Due     ZOSTER IMMUNIZATION (1 of 2) Never done     COVID-19 Vaccine (3 - Mixed Product risk series) 06/30/2022     DIABETIC FOOT EXAM  02/28/2023     MICROALBUMIN  03/28/2023     Scheduled 4/10/23 at 12PM with Dr. Arizmendi.      Care Plan:   Care Plan: [facility] Benefits     Problem: I will apply for [facility] benefits within the next 90 days.     Goal: Rent Assistance     Start Date: 2/28/2023 Expected End Date: 5/28/2023    This Visit's Progress: 10%    Note:     Goal statement: I will apply for [facility] benefits within the next 90 days.     Barriers: Language   Strengths: Accepting of support.  Patient expressed understanding of goal: Yes     Action steps to achieve this goal:   1.  I will answer my phone when I am contacted by the [facility] FRW to schedule an initial appointment.   2.  I will provide all necessary documentation and information to complete a [facility] application for benefits with the support of the FRW.   3.  I will call my FRW if I have questions or concerns regarding my [facility] benefits application.     Note: Benefits requesting is Rental, referral submitted by CHW on 2/28/23.                       Care Plan: SSI     Problem: I will find out if I am eligible to apply for Social Security benefits.     Goal: SSI     Start Date: 2/28/2023 Expected End Date: 2/28/2024    This Visit's Progress: 40%    Note:     Goal statement: I will find out if I am eligible to apply for Social Security benefits.    Barriers: Language  Strengths: Agreeable to support.   Patient expressed understanding of goal: Yes    Action steps to achieve this goal:  1.  I will follow the instructions that Disability Specialists recommended on 9/30/22. Steps per Ayleen at :     Once patient receives her [place] Citizenship  paperwork,  a) Patient should go to the Social Security office in person.  b) Patient should show the Social  the original paperwork of her [place] Citizenship.  During this time, request an in person appointment to apply for Social Security benefits.    2.  I will request assistance as needed and clarify if my [facility] Worker can take me to the SSA Office.   3.  I will attend an in-person appointment to apply for social security income as scheduled on. TBD  4.  I will follow up with [facility] in the next month regarding this goal.    Note: See encounter 9/30/22, for instructions from .                       Intervention and Education during outreach:     Fritz will continue to work with FRW on applying for rent assistance for patient. Fritz states, FRW was going to call her on 3/16 but told her she would call next week. CHW informed Jackson, per chart review, FRW is calling on 4/7. Fritz will watch for call.       CHW inquired about applying for disability in person and if patients [facility] worker followed up with them. Fritz states, [facility] is not helping, they've asked many times. A teacher took them to [facility] to apply for disability in person on 3/12 or 3/13, she can't remember. The teacher will continue to support them.     CHW encouraged reaching out to [facility] team for support as needed and asked if okay for CHW to share this with [facility]. Fritz agreed. CHW reviewed that patient will receive follow up from [facility] by mail or phone. It's important that she have her their support help review paperwork and return in a timely manner. Fritz expressed understanding.     CHW called Albert Payal [phone], left message requesting return call to clarify if he's still the [facility] worker. Patient will need ongoing support with paperwork from [facility]. Please let me know if KRISTINA is needed.      Per chart review, patient started dialysis per encounter 3/8/23. Per 3/9/233 encounter, patient was declined as kidney transplant candidate.  Patient is going to [facility]. CHW encouraged follow up with CCRN for assessment of medical needs. Fritz agreed, phone appointment scheduled on  4/4 at 9AM.       Message received on 3/30/23 at 2:51PM: Carlos [facility] Worker at [facility] called, left message stating he doesn't think he received CHW's voicemail. Once he connects with CHW he'd like to set up communication process maybe by email. He thinks patient recently received her citizenship. He's willing to be a part of this process. He just needs to know from patient what she needs help with. Patient can notify the  on her [facility] team, Avani Nelson [phone]. CHW can reach him at  [phone] or [email or web address].    CHW Plan: CHW to follow up in 1 month. Routing to lead clinician CCSW and CCRN for review.    Irene Samson  [facility] Care Coordination  [facility]    Phone: [phone]

## 2023-03-30 NOTE — PROGRESS NOTES
"Pt had RUE brachiocephalic AVF created 2/14/23 by Dr. Langford.  Pt has missed her follow up appointments scheduled x2.  Pt has since initiated HD via [facility].  Called [facility] and spoke with cortney Zazueta RN.  Pt at dialysis clinic now.  RN will assess RUE brachiocephalic AVF and discuss need for follow up appointment with pt.    Per Florin MARQUES, Carlita, RUE brachiocephalic AVF has healed well, \"looks great, feels great and sounds great\".  +thrill and + bruit noted.  Reports the access is nice and straight, and feels big enough to cannulate once given the okay.    Writer discussed with Florin  plan for pt to be seen for follow up on a Monday afternoon.    Per Dr. Langford, pt can be seen for a Nurse Visit for follow up on a day that Dr. Langford is present to give the ok to use.    Called pt daughter to schedule appointment.  Pt scheduled for fistula follow up on Monday, 4/3/23 at 1p.  Pt daughter requested writer to fax and communicate appointment info with Florin to assist with reminders/ appointment communications.  Appointment faxed to Florin.    Will see pt for follow up on Monday.    BLANCHE BALDWIN RN on 3/30/2023 at 2:18 PM  Dialysis Access Care Coordinator  Phone: [phone]  Pool: P_Dialysis_Access_Nurse        "

## 2023-03-31 RX ORDER — FENOFIBRATE 54 MG/1
54 TABLET ORAL EVERY MORNING
Qty: 90 TABLET | Refills: 2 | Status: SHIPPED | OUTPATIENT
Start: 2023-03-31 | End: 2023-12-20

## 2023-03-31 NOTE — TELEPHONE ENCOUNTER
"Last Written Prescription Date:  9/14/22  Last Fill Quantity: 90,  # refills: 1   Last office visit provider:  3/2223     Requested Prescriptions   Pending Prescriptions Disp Refills     fenofibrate (LOFIBRA) 54 MG tablet [Pharmacy Med Name: FENOFIBRATE 54 MG TABS 54 Tablet] 90 tablet 3     Sig: TAKE 1 TABLET (54 MG) BY MOUTH DAILY       Fibrates Passed - 3/30/2023  9:17 AM        Passed - Lipid panel on file in past 12 months     Recent Labs   Lab Test 12/28/22  1506   CHOL 189   TRIG 365*   HDL 34*   LDL 82   NHDL 155*               Passed - No abnormal creatine kinase in past 12 months     No lab results found.             Passed - Recent (12 mo) or future (30 days) visit within the authorizing provider's specialty     Patient has had an office visit with the authorizing provider or a provider within the authorizing providers department within the previous 12 mos or has a future within next 30 days. See \"Patient Info\" tab in inbasket, or \"Choose Columns\" in Meds & Orders section of the refill encounter.              Passed - Medication is active on med list        Passed - Patient is age 18 or older        Passed - No active pregnancy on record        Passed - No positive pregnancy test in past 12 months             TANGELA LEIGH RN 03/31/23 10:58 AM  "

## 2023-04-03 ENCOUNTER — OFFICE VISIT (OUTPATIENT)
Dept: TRANSPLANT | Facility: CLINIC | Age: 53
End: 2023-04-03
Attending: SURGERY
Payer: COMMERCIAL

## 2023-04-03 ENCOUNTER — TELEPHONE (OUTPATIENT)
Dept: FAMILY MEDICINE | Facility: CLINIC | Age: 53
End: 2023-04-03

## 2023-04-03 ENCOUNTER — MEDICAL CORRESPONDENCE (OUTPATIENT)
Dept: HEALTH INFORMATION MANAGEMENT | Facility: CLINIC | Age: 53
End: 2023-04-03

## 2023-04-03 DIAGNOSIS — N18.6 ENCOUNTER REGARDING VASCULAR ACCESS FOR DIALYSIS FOR END-STAGE RENAL DISEASE (H): Primary | ICD-10-CM

## 2023-04-03 DIAGNOSIS — Z99.2 ENCOUNTER REGARDING VASCULAR ACCESS FOR DIALYSIS FOR END-STAGE RENAL DISEASE (H): Primary | ICD-10-CM

## 2023-04-03 PROCEDURE — 99024 POSTOP FOLLOW-UP VISIT: CPT | Performed by: SURGERY

## 2023-04-03 NOTE — PROGRESS NOTES
Seen in f/u S/p R arm brachio cephalic fistula  Matured well  Good thrill  No evidence of steal sx  Incision healing well  Cleared for use    Patient was counseled on complications of incision and short and long term care to minimize infection/thrombosis risk.    Total time: 20 min  Counseling time: 10 min

## 2023-04-03 NOTE — TELEPHONE ENCOUNTER
Home Health Care    Reason for call:  PCP ordered a wheelchair and bath lift chair from [facility], - they do not carry either item.  HC RN requesting go to [facility]  At [phone]    Pt Provider: Ugo Basilio    Could we send this information to you in [employer] or would you prefer to receive a phone call?:   No preference     Okay to leave a detailed message?: No  Faxing orders with face sheet to [facility] today. thanks

## 2023-04-03 NOTE — TELEPHONE ENCOUNTER
[facility] called in to request a cushion along with the wheelchair order.   Please advise.  Thanks!

## 2023-04-03 NOTE — LETTER
4/3/2023         RE: Nithya Matthews  [address] [place] [address]        Dear Colleague,    Thank you for referring your patient, Nithya Matthews, to the [facility]. Please see a copy of my visit note below.    Seen in f/u S/p R arm brachio cephalic fistula  Matured well  Good thrill  No evidence of steal sx  Incision healing well  Cleared for use    Patient was counseled on complications of incision and short and long term care to minimize infection/thrombosis risk.    Total time: 20 min  Counseling time: 10 min          Again, thank you for allowing me to participate in the care of your patient.        Sincerely,        Markell Langford MD

## 2023-04-04 ENCOUNTER — PATIENT OUTREACH (OUTPATIENT)
Dept: NURSING | Facility: CLINIC | Age: 53
End: 2023-04-04
Payer: COMMERCIAL

## 2023-04-04 NOTE — PROGRESS NOTES
Clinic Care Coordination Contact    Follow Up Progress Note      Assessment: CCRN to follow up post transplant evaluation. CCRN spoke with patient via phone with assist from professional Eladio  ID #[id number] today. Patient states she's getting ready to go to dialysis and she would like CCRN to call a different day. Patient goes to dialysis on Tues, thurs, and Sat at 11am-3pm. Reminded patient of her upcoming appt on 4/10/23.     Plan: CCRN plans to follow up with patient by next week.

## 2023-04-06 DIAGNOSIS — E61.1 LOW IRON: ICD-10-CM

## 2023-04-07 ENCOUNTER — PATIENT OUTREACH (OUTPATIENT)
Dept: CARE COORDINATION | Facility: CLINIC | Age: 53
End: 2023-04-07

## 2023-04-07 ENCOUNTER — APPOINTMENT (OUTPATIENT)
Dept: CARE COORDINATION | Facility: CLINIC | Age: 53
End: 2023-04-07
Payer: COMMERCIAL

## 2023-04-07 RX ORDER — FERROUS SULFATE 325(65) MG
TABLET ORAL
Qty: 70 TABLET | Refills: 3 | Status: SHIPPED | OUTPATIENT
Start: 2023-04-07 | End: 2024-04-24

## 2023-04-07 NOTE — TELEPHONE ENCOUNTER
"Routing refill request to provider for review/approval because:  PCP please review for refill    Last Written Prescription Date:  3/17/2022  Last Fill Quantity: 70,  # refills: 3   Last office visit provider:  3/22/2023     Requested Prescriptions   Pending Prescriptions Disp Refills     ferrous sulfate (FEROSUL) 325 (65 Fe) MG tablet [Pharmacy Med Name: FERROUS SULFATE 325 MG  (65 FE) Tablet] 15 tablet 3     Sig: TAKE 1 TABLET (325 MG) BY MOUTH EVERY OTHER DAY FOR IRON       Iron Supplements Passed - 4/6/2023  9:07 AM        Passed - Patient is 12 years of age or older        Passed - Recent (12 mo) or future (30 days) visit within the authorizing provider's specialty     Patient has had an office visit with the authorizing provider or a provider within the authorizing providers department within the previous 12 mos or has a future within next 30 days. See \"Patient Info\" tab in inbasket, or \"Choose Columns\" in Meds & Orders section of the refill encounter.              Passed - Hgb OR Hct on record within the past 12 mos.     Patient need only have had a HGB or HCT on file in the past 12 mos. That result does not need to be normal.    Recent Labs   Lab Test 02/13/23  0908 02/01/23  0759 01/27/23  1415   HGB 9.1* 9.0* 9.2*     Recent Labs   Lab Test 02/13/23  0908 02/01/23  0759 01/27/23  1415   HCT 30.3* 30.1* 29.7*       Please verify a HGB or HCT has been checked SINCE THE LAST DOSE CHANGE.            Passed - Medication is active on med list             Alyssa Oh RN 04/06/23 8:29 PM  "

## 2023-04-07 NOTE — PROGRESS NOTES
Clinic Care Coordination Contact  Program: Emergency Assistance  County:  [facility] Case #:  [facility] Worker:   Cailin #:   Subscriber #:   Renewal:  Date Applied:     FRW Outreach:   4/7/23: FRW called pt's daughter. Family is receiving SNAP/CASH/rental assistance. Family is not being evicted out of home. No further FRW needs. Daughter is speaking with [facility] to see if there is anymore money [facility] can give for rent.   3/28/23: FRW called pt's daughter and rescheduled appointment to 4/7. No  needed.   3/16/23: FRW called pt's daughter 2x and left VM. FRW will make another outreach in 2 weeks.   3/2/23: I called pt's daughter and set up an appointment for 3/16 to apply for Emergency Assistance     Health Insurance:      Referral/Screening:  Patient would like to apply for emergency assisstance for her rent. Please call Fritz Matthews (Daughter) [phone]. Per Fritz, family help pay for patients rent.

## 2023-04-10 ENCOUNTER — OFFICE VISIT (OUTPATIENT)
Dept: FAMILY MEDICINE | Facility: CLINIC | Age: 53
End: 2023-04-10
Payer: COMMERCIAL

## 2023-04-10 VITALS
SYSTOLIC BLOOD PRESSURE: 130 MMHG | DIASTOLIC BLOOD PRESSURE: 68 MMHG | WEIGHT: 107.5 LBS | BODY MASS INDEX: 20.11 KG/M2 | RESPIRATION RATE: 16 BRPM | OXYGEN SATURATION: 96 % | HEART RATE: 85 BPM | TEMPERATURE: 98.4 F

## 2023-04-10 DIAGNOSIS — Z99.2 ESRD (END STAGE RENAL DISEASE) ON DIALYSIS (H): Primary | ICD-10-CM

## 2023-04-10 DIAGNOSIS — E11.69 TYPE 2 DIABETES MELLITUS WITH OTHER SPECIFIED COMPLICATION, UNSPECIFIED WHETHER LONG TERM INSULIN USE (H): ICD-10-CM

## 2023-04-10 DIAGNOSIS — N18.6 ESRD (END STAGE RENAL DISEASE) ON DIALYSIS (H): Primary | ICD-10-CM

## 2023-04-10 PROCEDURE — 99207 PR FOOT EXAM NO CHARGE: CPT | Performed by: FAMILY MEDICINE

## 2023-04-10 PROCEDURE — 99214 OFFICE O/P EST MOD 30 MIN: CPT | Performed by: FAMILY MEDICINE

## 2023-04-10 NOTE — PROGRESS NOTES
Assessment & Plan     ESRD (end stage renal disease) on dialysis (H)  Type 2 diabetes mellitus with other specified complication, unspecified whether long term insulin use (H)  With dialysis, best read of her diabetes status is her daily glucose readings. No symptomatic highs or lows. No changes in meds today. Discussed pca hours, she was decreased by 30 min because she was able to demonstrate that she can feed herself. This evaluation was done prior to starting dialysis. I do anticipate that she will need increased needs in the near future, but if she got an evaluation at this time (within the next month) there will be no evidence of increased needs or changes in her health - it is unfortunate that we have to wait until she is struggling to get this accomplished. Will have her see me in 1- 2 months after she starts dialysis and reevaluate if a new evaluation for pca hours is warranted. Did discuss with patient and daughter that I cannot request an increase - I can only request an evaluation if she has a change in her health, and it can result in a decrease depending on the observer's opinion/evaluation.   - FOOT EXAM        Return in about 2 months (around 6/10/2023) for Routine preventive.    30 minutes spent on the date of the encounter doing chart review, history and exam, documentation and further activities per the note      Giancarlo Arizmendi MD  [facility] SHANKAR Barriga is a 53 year old, presenting for the following health issues:  med check      History of Present Illness       Reason for visit:  Med check    She eats 0-1 servings of fruits and vegetables daily.She consumes 0 sweetened beverage(s) daily.She exercises with enough effort to increase her heart rate 9 or less minutes per day.  She exercises with enough effort to increase her heart rate 3 or less days per week.   She is taking medications regularly.     Patient is stable, no changes to medications or plans.     We did  discuss her PCA hours. She had them decreased by 30 min and patient wondering why. Documents reviewed and will be scanned into chart. She was able to demonstrate that she can feed herself and that is why it was decreased.       Review of Systems   Constitutional, HEENT, cardiovascular, pulmonary, gi and gu systems are negative, except as otherwise noted.      Objective    /68   Pulse 85   Temp 98.4  F (36.9  C) (Oral)   Resp 16   Wt 48.8 kg (107 lb 8 oz)   SpO2 96%   BMI 20.11 kg/m    Body mass index is 20.11 kg/m .  Physical Exam   GENERAL: healthy, alert and no distress  NECK: no adenopathy, no asymmetry, masses, or scars and thyroid normal to palpation  RESP: lungs clear to auscultation - no rales, rhonchi or wheezes  CV: regular rate and rhythm, normal S1 S2, no S3 or S4, no murmur, click or rub, no peripheral edema and peripheral pulses strong  ABDOMEN: soft, nontender, no hepatosplenomegaly, no masses and bowel sounds normal  MS: no gross musculoskeletal defects noted, no edema  Diabetic foot exam: normal DP and PT pulses, no trophic changes or ulcerative lesions and reduced sensation at entire forefoot bilaterally      Admission on 02/14/2023, Discharged on 02/14/2023   Component Date Value Ref Range Status     GLUCOSE BY METER POCT 02/14/2023 241 (H)  70 - 99 mg/dL Final     Hemoglobin A1C 02/14/2023 7.1 (H)  <5.7 % Final    Normal <5.7%   Prediabetes 5.7-6.4%    Diabetes 6.5% or higher     Note: Adopted from ADA consensus guidelines.     GLUCOSE BY METER POCT 02/14/2023 207 (H)  70 - 99 mg/dL Final     No results found for this or any previous visit (from the past 24 hour(s)).

## 2023-04-11 ENCOUNTER — TELEPHONE (OUTPATIENT)
Dept: NEPHROLOGY | Facility: CLINIC | Age: 53
End: 2023-04-11
Payer: COMMERCIAL

## 2023-04-12 ENCOUNTER — PATIENT OUTREACH (OUTPATIENT)
Dept: NURSING | Facility: CLINIC | Age: 53
End: 2023-04-12
Payer: COMMERCIAL

## 2023-04-12 NOTE — PROGRESS NOTES
Clinic Care Coordination Contact     Assessment: CCRN wasn't able to reach patient but spoke with grand daughter today. Per grand daughter, she's not home but requesting a call back on Friday anytime.     Plan: CCRN plans to call patient on Friday, 4/14/23

## 2023-04-18 DIAGNOSIS — E11.69 TYPE 2 DIABETES MELLITUS WITH OTHER SPECIFIED COMPLICATION, UNSPECIFIED WHETHER LONG TERM INSULIN USE (H): ICD-10-CM

## 2023-04-19 ENCOUNTER — PATIENT OUTREACH (OUTPATIENT)
Dept: CARE COORDINATION | Facility: CLINIC | Age: 53
End: 2023-04-19
Payer: COMMERCIAL

## 2023-04-19 RX ORDER — BLOOD SUGAR DIAGNOSTIC
1 STRIP MISCELLANEOUS
Qty: 200 EACH | Refills: 3 | Status: SHIPPED | OUTPATIENT
Start: 2023-04-19 | End: 2024-07-05

## 2023-04-19 RX ORDER — UBIQUINOL 100 MG
CAPSULE ORAL
Refills: 3 | OUTPATIENT
Start: 2023-04-19

## 2023-04-19 NOTE — TELEPHONE ENCOUNTER
daughter  calls back inquiring about status of request below.  Can PCP please fill alcohol pads.      Writer inform caller message was sent to provider- awaiting for response. Writer will send provider another message. Caller verbalizes understanding.     Michelle Ordonez CMA ,   [facility]  April 19, 2023 10:54 AM

## 2023-04-19 NOTE — PROGRESS NOTES
Clinic Care Coordination Contact  Care Coordination Clinician Chart Review    Situation: Patient chart reviewed by Care Coordinator.       Background: Care Coordination Program started: 3/1/2022. Initial assessment completed and patient-centered care plan(s) were developed with participation from patient. Lead CC handed patient off to CHW for continued outreaches.       Assessment: Per chart review, patient outreach completed by CC CHW on 4/4/23  Patient is actively working to accomplish goal(s). Patient's goal(s) appropriate and relevant at this time. Patient is due for updated Plan of Care.  Assessments will be completed annually or as needed/with change of patient status.      Care Plan: [facility] Benefits     Problem: I will apply for [facility] [employer] benefits within the next 90 days.     Goal: Rent Assistance     Start Date: 2/28/2023 Expected End Date: 5/28/2023    This Visit's Progress: 10%    Note:     Goal statement: I will apply for [facility] financial benefits within the next 90 days.     Barriers: Language   Strengths: Accepting of support.  Patient expressed understanding of goal: Yes     Action steps to achieve this goal:   1.  I will answer my phone when I am contacted by the [facility] FRW to schedule an initial appointment.   2.  I will provide all necessary documentation and information to complete a [facility] application for benefits with the support of the FRW.   3.  I will call my FRW if I have questions or concerns regarding my [facility] benefits application.     Note: Benefits requesting is Rental, referral submitted by CHW on 2/28/23.                       Care Plan: SSI     Problem: I will find out if I am eligible to apply for Social Security benefits.     Goal: SSI     Start Date: 2/28/2023 Expected End Date: 2/28/2024    This Visit's Progress: 40%    Note:     Goal statement: I will find out if I am eligible to apply for Social Security benefits.    Barriers: Language  Strengths: Agreeable to support.    Patient expressed understanding of goal: Yes    Action steps to achieve this goal:  1.  I will follow the instructions that Disability Specialists recommended on 9/30/22. Steps per Ayleen at :     Once patient receives her [place] Citizenship paperwork,  a) Patient should go to the Social Security office in person.  b) Patient should show the Social  the original paperwork of her [place] Citizenship.  During this time, request an in person appointment to apply for Social Security benefits.    2.  I will request assistance as needed and clarify if my [employer] Worker can take me to the SSA Office.   3.  I will attend an in-person appointment to apply for social security income as scheduled on. TBD  4.  I will follow up with CCC in the next month regarding this goal.    Note: See encounter 9/30/22, for instructions from .                              Plan/Recommendations: The patient will continue working with Care Coordination to achieve goal(s) as above. CHW will continue outreaches at minimum every 30 days and will involve Lead CC as needed or if patient is ready to move to Maintenance. Lead CC will continue to monitor CHW outreaches and patient's progress to goal(s) every 6 weeks.     Plan of Care updated and sent to patient: Yes, via [employer]     CCSW will hand off to CCRN to follow for medical needs.     Cris Smith, MSW, LGSW  Social Work Care Coordinator

## 2023-04-19 NOTE — LETTER
[facility]  Patient Centered Plan of Care  About Me:        Patient Name:  Nithya Matthews    YOB: 1970  Age:         53 year old   Johann MRN:    [id number] Telephone Information:  Home Phone [phone]   Mobile [phone]   Home Phone [phone]   Mobile Not on file.       Address:  [address] [place] [address] Email address:  [email or web address]      Emergency Contact(s)    Name Relationship Lgl Grd Work Phone Home Phone Mobile Phone   JESSY VAUGHN Daughter    [phone]           Primary language:  [facility]      needed? Yes   [employer]:  [phone] op. 1  Other communication barriers:Language barrier; Physical impairment; Lack of coping    Preferred Method of Communication:     Current living arrangement: I live in a private home with family    Mobility Status/ Medical Equipment: Independent w/Device        Health Maintenance  Health Maintenance Reviewed: Due/Overdue   Health Maintenance Due   Topic Date Due     ZOSTER IMMUNIZATION (1 of 2) Never done     COVID-19 Vaccine (3 - Mixed Product risk series) 06/30/2022     BMP  05/13/2023          My Access Plan  Medical Emergency 911   Primary Clinic Line [facility] [phone]   24 Hour Appointment Line [phone] or  [phone]) (toll-free)   24 Hour Nurse Line [phone] (toll-free)   Preferred Urgent Care [facility] [phone]       [facility]  [phone]       Preferred Pharmacy [facility] - [place], [place] - [address]     Behavioral Health Crisis Line The National Suicide Prevention Lifeline at 1-655.187.3578 or Text/Call 168             My Care Team Members  Patient Care Team         Relationship Specialty Notifications Start End    Giancarlo Arizmendi MD PCP - General Family Medicine  2/14/22     Referring to Eye    Phone: [phone] Fax: [phone]          [address] [place] [address] [address] [place] [address]    Giancarlo Arizmendi MD Assigned PCP   2/20/22     Phone: [phone] Fax: [phone]         [address] [place] [address] [address] [place] [address]    Irene Samson [facility] Worker Primary Care - CC Admissions 3/1/22     Albert Dallasg [employer] worker   3/2/22     [facility]. Call [facility] worker  Avani Nelson [phone] for assistance.    Phone: [phone]         Miracle Mi OD  Optometry  3/18/22     Phone: [phone] Fax: [phone]         [address] [place] [address]    Shital Hernandez, PharmD Pharmacist Pharmacist  4/6/22     Phone: [phone]          [facility] [address] [place] [address]    Miracle Mi OD Assigned Surgical Provider   4/10/22     Phone: [phone] Fax: [phone]         [address] [place] [address]    Grecia Hearn DPM, Podiatry/Foot and Ankle Surgery Assigned Musculoskeletal Provider   5/1/22     Phone: [phone] Pager: [phone] Fax: [phone] [address] [place]  [address] [address]    Davis Villa MD MD Endocrinology, Diabetes, and Metabolism  6/2/22     Phone: [phone] Fax: [phone]         [facility] [address]    Shital Hernandez, PharmD Assigned MTM Pharmacist   9/28/22     Phone: [phone]          [address] [place] [address]    Sahil Waterman MD Assigned Nephrology Provider   10/29/22     Phone: [phone] Fax: [phone]         [address] [place] [address]    Mariah Luther, RN Specialty Care Coordinator Nephrology Admissions 11/17/22     Dialysis Access Care Coordinator  [phone]    Phone: [phone]         Rebeca Mckay, RN Specialty Care Coordinator Nephrology Admissions 11/17/22     Dialysis Access Care Coordinator  [phone]    Phone: [phone]         Geri Mortensen, RN Registered Nurse    11/20/22     Phone: [phone]           [facility] [address] [facility] [address]    Davis Villa MD Assigned Endocrinology Provider   12/24/22     Phone: [phone] Fax: [phone]         [facility] [address]    Carl Rosas MD Nephrologist Nephrology  3/9/23     Phone: [phone] Fax: [phone]         [place] [address] [address] [facility] [address]    Janel Jhaveri RN Registered Nurse Primary Care - CC Admissions 4/19/23               My Care Plans  Self Management and Treatment Plan  Care Plan  Care Plan: [facility] Benefits       Problem: I will apply for [facility] [employer] benefits within the next 90 days.       Goal: Rent Assistance       Start Date: 2/28/2023 Expected End Date: 5/28/2023    This Visit's Progress: 10%    Note:     Goal statement: I will apply for [facility] financial benefits within the next 90 days.     Barriers: Language   Strengths: Accepting of support.  Patient expressed understanding of goal: Yes     Action steps to achieve this goal:   1.  I will answer my phone when I am contacted by the [facility] FRW to schedule an initial appointment.   2.  I will provide all necessary documentation and information to complete a [facility] application for benefits with the support of the FRW.   3.  I will call my FRW if I have questions or concerns regarding my [facility] benefits application.     Note: Benefits requesting is Rental, referral submitted by CHW on 2/28/23.                               Care Plan: SSI       Problem: I will find out if I am eligible to apply for Social Security benefits.       Goal: SSI       Start Date: 2/28/2023 Expected End Date: 2/28/2024    This Visit's Progress: 40%    Note:     Goal statement: I will find out if I am eligible to apply for Social Security benefits.    Barriers: Language  Strengths: Agreeable to support.   Patient expressed understanding of goal: Yes    Action steps to achieve this goal:  1.  I  will follow the instructions that Disability Specialists recommended on 9/30/22. Steps per Ayleen at :     Once patient receives her US Citizenship paperwork,  a) Patient should go to the Social Security office in person.  b) Patient should show the Social  the original paperwork of her [place] Citizenship.  During this time, request an in person appointment to apply for Social Security benefits.    2.  I will request assistance as needed and clarify if my [employer] Worker can take me to the SSA Office.   3.  I will attend an in-person appointment to apply for social security income as scheduled on. TBD  4.  I will follow up with CCC in the next month regarding this goal.    Note: See encounter 9/30/22, for instructions from .                                  Action Plans on File:                       Advance Care Plans/Directives Type:   No data recorded    My Medical and Care Information  Problem List   Patient Active Problem List   Diagnosis     Primary hypertension     Hyperlipidemia LDL goal <100     Depression, unspecified depression type     Hyperglycemia     Generalized muscle weakness     Low iron     Gastroesophageal reflux disease with esophagitis without hemorrhage     Low vitamin B12 level     Low vitamin D level     Celiac disease     Beta thalassemia minor     Type 2 diabetes mellitus with other specified complication, unspecified whether long term insulin use (H)     Anemia of chronic renal failure, stage 4 (severe) (H)     Incontinence of feces with fecal urgency     Diabetic ulcer of other part of right foot associated with type 2 diabetes mellitus, with fat layer exposed (H)     ESRD (end stage renal disease) on dialysis (H)      Current Medications and Allergies:  See printed Medication Report.    Care Coordination Start Date: 3/1/2022   Frequency of Care Coordination: monthly       Form Last Updated: 04/19/2023

## 2023-04-19 NOTE — TELEPHONE ENCOUNTER
Routing refill request to provider for review/approval because:  Drug not active on patient's medication list    Last Written Prescription Date:  7/28/2022 end date 12/19/22  Last Fill Quantity: 200,  # refills: 11   Last office visit provider:  4/10/2023     Requested Prescriptions   Pending Prescriptions Disp Refills     Alcohol Swabs (ALCOHOL PREP) 70 % PADS [Pharmacy Med Name: ALCOHOL PREP 70 % PADS 70 Pad]  3     Sig: USE TO SWAB AREA OF INJECTION/HIMA AS DIRECTED.       There is no refill protocol information for this order          Fanny Campo RN 04/19/23 7:47 AM

## 2023-04-20 ENCOUNTER — MEDICAL CORRESPONDENCE (OUTPATIENT)
Dept: FAMILY MEDICINE | Facility: CLINIC | Age: 53
End: 2023-04-20
Payer: COMMERCIAL

## 2023-04-24 ENCOUNTER — PATIENT OUTREACH (OUTPATIENT)
Dept: NURSING | Facility: CLINIC | Age: 53
End: 2023-04-24
Payer: COMMERCIAL

## 2023-04-24 NOTE — PROGRESS NOTES
Clinic Care Coordination Contact    Follow Up Progress Note      Assessment: Patient was recently transferred from CCSW to CCRN to assist with medical needs.     DMEs  - wheelchair - not yet  - cushion for wheelchair - not yet  - bath lift chair - not yet  - has a cane and walker   - new goal created.    [facility]   [phone]    [facility] benefits/Rental assistance   - receiving SNAP  - SNAP - $530 for 2 people   - rental assistance $260 per month  - rent - $1150  - goal completed.     Dialysis   - R AVF  placed on 2023  - goes on Tues, Thurs, and Friday 11am - 4pm.   - transplant evaluation completed on 2023, was told not ready to get on the list because she needs to work on few things.   - recommended PT - CHW to assist with PT appt if she agrees - PCP need to place an order.     # Kidney/Pancreas Transplant Evaluation: Patient is a marginal candidate overall. Benefits of a living donor transplant were discussed.     # CKD: from unclear etiology, but likely type 2 diabetes,  HTN, pre renal AKIs. Kidney biopsy done on 10/28/22 that was a suboptimal sample with advanced/diffuse glomerulosclerosis. GFR 13, AVF placed. No donors. When ready, may benefit from a kidney transplant.      # DM Type 2: A1c 7.1% while using 120-130 units of insulin +Victoza daily.      # BMI 21      # Cardiac Risk: would need risk assessment.      # PAD Screenin2021 CT available for review.      # Physical Function: screened FRAIL. Daughter is PCA needs helps with all ADLs. In wheelchair today,  limited to walking around her house with a cane. Recommend PT and returning once at goal of walking one block.      # Social support / financial issues: appreciate social work input.      # Health Maintenance: Colonoscopy: Likely Not up to date but will need to request records from [place], [place] to review, Mammogram: Up to date, PAP: Reportedly up to date but will need records and Dental: Not up to date      PCA service   - 9.5 hours  Letter faxed. per day  - Daughter - Fritz Allenh ( 6.5 hours/day) and Son - Muriel thomas ( 3 hours per day)    SSI  - approved for $609 per month starting April, 2023.   - goal completed today.     Care Gaps:    Health Maintenance Due   Topic Date Due     ZOSTER IMMUNIZATION (1 of 2) Never done     COVID-19 Vaccine (3 - Mixed Product risk series) 06/30/2022     BMP  05/13/2023       Patient will address overdue care gaps with PCP on 6/19/2023.     Care Plans  Care Plan: DME Completed 2/28/2023    Problem: I want Pull-Ups in the next 60 days so that I may better manage my incontinence symptoms.  Resolved 2/28/2023    Goal: Incontinent supplies  Completed 2/28/2023    Start Date: 1/25/2023 Expected End Date: 3/25/2023    This Visit's Progress: 100%    Note:     Goal Statement: I want Pull-Ups in the next 60 days so that I may better manage my incontinence symptoms.     Barriers: Language  Strengths: Accepting of support  Patient expressed understanding of goal: Yes    Action steps to achieve this goal:  1. I will answer my phone when [employer] [phone]  contacts me to deliver or  my pull-ups.  3. I will follow up with [facility] in the next month regarding this goal for additional coordination support.    Note: Order for pull-ups was sent to DME Provider 1/10/23.                     Care Plan: [place] Benefits Completed 4/24/2023    Problem: I will apply for [facility] financial benefits within the next 90 days.  Resolved 4/24/2023    Goal: Rent Assistance  Completed 4/24/2023    Start Date: 2/28/2023 Expected End Date: 5/28/2023    Recent Progress: 10%    Note:     Goal statement: I will apply for [facility] financial benefits within the next 90 days.     Barriers: Language   Strengths: Accepting of support.  Patient expressed understanding of goal: Yes     Action steps to achieve this goal:   1.  I will answer my phone when I am contacted by the [facility] FRW to schedule an initial appointment.   2.  I will provide all  necessary documentation and information to complete a [facility] application for benefits with the support of the FRW.   3.  I will call my FRW if I have questions or concerns regarding my [facility] benefits application.     Note: Benefits requesting is Rental, referral submitted by CHW on 2/28/23.     Updated note on 4/25/2023 by CCRN.   - SNAP - $530 for 2 people   - rental assistance $260 per month - max received for 2 people.   - rent - $1150                      Care Plan: SSI Completed 4/24/2023    Problem: I will find out if I am eligible to apply for Social Security benefits.  Resolved 4/24/2023    Goal: SSI  Completed 4/24/2023    Start Date: 2/28/2023 Expected End Date: 2/28/2024    Recent Progress: 40%    Note:     Goal statement: I will find out if I am eligible to apply for Social Security benefits.    Barriers: Language  Strengths: Agreeable to support.   Patient expressed understanding of goal: Yes    Action steps to achieve this goal:  1.  I will follow the instructions that Disability Specialists recommended on 9/30/22. Steps per Ayleen at :     Once patient receives her [place] Citizenship paperwork,  a) Patient should go to the Social Security office in person.  b) Patient should show the Social  the original paperwork of her [place] Citizenship.  During this time, request an in person appointment to apply for Social Security benefits.    2.  I will request assistance as needed and clarify if my appbackr Worker can take me to the SSA Office.   3.  I will attend an in-person appointment to apply for social security income as scheduled on. TBD  4.  I will follow up with [facility] in the next month regarding this goal.    Note: See encounter 9/30/22, for instructions from .     SSI starting April, 2023 - $609/month.nd 4/25/2023                        Care Plan: DEMs     Problem: Impaired mobility     Goal: I want a wheelchair and shower chair in the next 90 days.      Start Date: 4/24/2023 Expected End Date: 7/24/2023    Note:     Barriers: language  Strengths: Accepting of support  Patient expressed understanding of goal: Yes    Action steps to achieve this goal:  1. I will complete a face-to-face appointment with my PCP on 3/22/2023. ( Completed)  2. I will complete an OT/PT evaluation if it is recommended for me.  3. I will answer my phone when DME Provider [facility] contacts me to deliver or  my item.  4. I will follow up with [facility] in the next month regarding this goal for additional coordination support.    Note: Order for wheel chair and shower chair were sent to DME Provider PCP on 3/22/2023.       [facility]   [phone]                      Plan:   1) CHW to assist with PT appt if patient agrees. PCP needs to place PT order once patient agrees.   2) CHW to follow up with [facility] on w/c, w/c cushion and shower chair status with next outreach.   3) Transferred from CCSW to CCRN as of today.

## 2023-04-26 DIAGNOSIS — E11.69 TYPE 2 DIABETES MELLITUS WITH OTHER SPECIFIED COMPLICATION, UNSPECIFIED WHETHER LONG TERM INSULIN USE (H): Primary | ICD-10-CM

## 2023-04-27 ENCOUNTER — MEDICAL CORRESPONDENCE (OUTPATIENT)
Dept: HEALTH INFORMATION MANAGEMENT | Facility: CLINIC | Age: 53
End: 2023-04-27
Payer: COMMERCIAL

## 2023-04-27 RX ORDER — FLASH GLUCOSE SENSOR
KIT MISCELLANEOUS
Qty: 2 EACH | Refills: 4 | Status: SHIPPED | OUTPATIENT
Start: 2023-04-27 | End: 2023-06-19

## 2023-04-27 NOTE — TELEPHONE ENCOUNTER
Routing refill request to provider for review/approval because:  Drug not on the [facility] refill protocol   Patient reported    Last Written Prescription Date:  NA  Last Fill Quantity: NA,  # refills: NA   Last office visit provider:  4/10/2023     Requested Prescriptions   Pending Prescriptions Disp Refills     Continuous Blood Gluc Sensor (FREESTYLE MICKIE 14 DAY SENSOR) MISC [Pharmacy Med Name: FREESTYLE MICKIE 14 DAY SENS Miscellaneous] 2 each 4     Sig: USE 1 DEVICE EVERY 14 DAYS CHANGE SENSOR AS DIRECTED EVERY 14 DAYS       There is no refill protocol information for this order          Fanny Campo, RN 04/27/23 10:50 AM

## 2023-04-28 ENCOUNTER — PATIENT OUTREACH (OUTPATIENT)
Dept: CARE COORDINATION | Facility: CLINIC | Age: 53
End: 2023-04-28
Payer: COMMERCIAL

## 2023-04-28 NOTE — PROGRESS NOTES
Clinic Care Coordination Contact    Community Health Worker Follow Up  Spoke with Fritz Matthews (Daughter) Jamari  ID [id number]    Care Gaps:     Health Maintenance Due   Topic Date Due     ZOSTER IMMUNIZATION (1 of 2) Never done     COVID-19 Vaccine (3 - Mixed Product risk series) 06/30/2022     BMP  05/13/2023     Postponed to next outreach.     Care Plan:   Care Plan: DEMs     Problem: Impaired mobility     Goal: I want a wheelchair. w/c cushion and shower chair in the next 90 days.     Start Date: 4/24/2023 Expected End Date: 7/24/2023    This Visit's Progress: 20%    Note:     Barriers: language  Strengths: Accepting of support  Patient expressed understanding of goal: Yes    Action steps to achieve this goal:  1. I will complete a face-to-face appointment with my PCP on 3/22/2023. ( Completed)  2. I will complete an OT/PT evaluation if it is recommended for me.  3. I will answer my phone when DME Provider [facility] contacts me to deliver or  my item.  4. I will follow up with [facility] in the next month regarding this goal for additional coordination support.    Note: Order for wheel chair and shower chair were sent to DME Provider PCP on 3/22/2023.     [facility]   [phone]   Fax [phone]                    Intervention and Education during outreach:     CHW inquired if patient received DME wheelchair, cushion for wheelchair, and bath lift chair. Fritz states, patient has not. CHW offered to call [facility] to check status. Fritz agreed.     Conference called Rachel at [facility] [phone], confirmed they received all order, they are awaiting demographic information. CHW and Fritz gave demographic information. Next step, they will fax additional paperwork that's needed to Dr. Arizmendi. Not sure of the turn around time, will depend on how soon they get documents back. Fritz expressed understanding.     CHW Plan: CHW to follow up in 1 month. Routing to PCP and lead clinician for review.     Irene  Chapin  Clinic Care Coordination  [facility]    Phone: [phone]

## 2023-05-08 ENCOUNTER — MEDICAL CORRESPONDENCE (OUTPATIENT)
Dept: HEALTH INFORMATION MANAGEMENT | Facility: CLINIC | Age: 53
End: 2023-05-08
Payer: COMMERCIAL

## 2023-05-11 ENCOUNTER — MEDICAL CORRESPONDENCE (OUTPATIENT)
Dept: HEALTH INFORMATION MANAGEMENT | Facility: CLINIC | Age: 53
End: 2023-05-11
Payer: COMMERCIAL

## 2023-05-11 NOTE — PROGRESS NOTES
[facility]    Outpatient Physical Therapy Discharge Note  Patient: Nithya [facility]  : 1970    Beginning/End Dates of Reporting Period:  2023 to 2023    Referring Provider: Giancarlo Arizmendi MD    Therapy Diagnosis: Impaired functional mobility, gait, and balance, impaired strength, low endurance     Client Self Report: Patient reports continued low back pain. She only gets relief when resting. She is only able to walk a very short distance at a time.    Objective Measurements:  Objective Measure: FGA  Details:  without AD  Objective Measure: 10 meter walk test  Details: 15.3 seconds: 0.39 m/s gait speed    Goals:  Goal Identifier FGA   Goal Description Barriga will improve score on FGA by 4 or more points in order to demonstrate meaningful improvement in balance reactions and decrease risk for falls.   Target Date 23   Date Met      Progress (detail required for progress note): to be tested at next session     Goal Identifier Functional LE strength   Goal Description Barriga will decrease amount of time taken to complete 5x STS by 3 or more seconds in order to demonstrate improved functional LE strength and balance.   Target Date 23   Date Met      Progress (detail required for progress note): baseline: 29 seconds     Goal Identifier Gait speed   Goal Description Barriga will improve gait speed with or without AD by 0.2 m/s without overt instabilities or LOB in order to improve independence through home and community.   Target Date 23   Date Met      Progress (detail required for progress note): baseline: 0.39 m/s using SPC     Plan:  Discharge from therapy.    Discharge:    Reason for Discharge: Patient has failed to schedule further appointments    Discharge Plan: Patient did not return to plan discharge.         Mo Macdonald, PT on 2023 at 12:39 PM

## 2023-05-12 ENCOUNTER — TELEPHONE (OUTPATIENT)
Dept: FAMILY MEDICINE | Facility: CLINIC | Age: 53
End: 2023-05-12
Payer: COMMERCIAL

## 2023-05-12 NOTE — TELEPHONE ENCOUNTER
Forms/Letter Request    Type of form/letter: medical opinion    Have you been seen for this request: Yes 4/10/23    Do we have the form/letter: Yes:     Who is the form from? Patient    Where did/will the form come from? Patient or family brought in       When is form/letter needed by: 06/01/2023    How would you like the form/letter returned: Fax : [phone]    Patient Notified form requests are processed in 3-5 business days:Yes    Could we send this information to you in [employer] or would you prefer to receive a phone call?:   Patient would prefer a phone call   Okay to leave a detailed message?: Yes at Home number on file [phone] (home)

## 2023-05-15 NOTE — TELEPHONE ENCOUNTER
CMT collected or printed forms from . Forms pre-filled for provider review, completion, and signature. Forms placed in provider s blue folder at  today. Thanks.

## 2023-05-16 ENCOUNTER — OFFICE VISIT (OUTPATIENT)
Dept: FAMILY MEDICINE | Facility: CLINIC | Age: 53
End: 2023-05-16
Payer: MEDICAID

## 2023-05-16 VITALS
SYSTOLIC BLOOD PRESSURE: 118 MMHG | TEMPERATURE: 98 F | RESPIRATION RATE: 12 BRPM | OXYGEN SATURATION: 96 % | HEART RATE: 88 BPM | HEIGHT: 62 IN | DIASTOLIC BLOOD PRESSURE: 80 MMHG | WEIGHT: 143.6 LBS | BODY MASS INDEX: 26.43 KG/M2

## 2023-05-16 DIAGNOSIS — Z79.4 TYPE 2 DIABETES MELLITUS WITH OTHER SPECIFIED COMPLICATION, WITH LONG-TERM CURRENT USE OF INSULIN (H): Primary | ICD-10-CM

## 2023-05-16 DIAGNOSIS — Z11.59 NEED FOR HEPATITIS C SCREENING TEST: ICD-10-CM

## 2023-05-16 DIAGNOSIS — Z11.4 SCREENING FOR HIV (HUMAN IMMUNODEFICIENCY VIRUS): ICD-10-CM

## 2023-05-16 DIAGNOSIS — E11.69 TYPE 2 DIABETES MELLITUS WITH OTHER SPECIFIED COMPLICATION, WITH LONG-TERM CURRENT USE OF INSULIN (H): Primary | ICD-10-CM

## 2023-05-16 LAB
ALBUMIN UR-MCNC: NEGATIVE MG/DL
ANION GAP SERPL CALCULATED.3IONS-SCNC: 12 MMOL/L (ref 7–15)
APPEARANCE UR: CLEAR
BACTERIA #/AREA URNS HPF: ABNORMAL /HPF
BILIRUB UR QL STRIP: NEGATIVE
BUN SERPL-MCNC: 8.2 MG/DL (ref 6–20)
CALCIUM SERPL-MCNC: 9.6 MG/DL (ref 8.6–10)
CHLORIDE SERPL-SCNC: 96 MMOL/L (ref 98–107)
COLOR UR AUTO: YELLOW
CREAT SERPL-MCNC: 0.4 MG/DL (ref 0.51–0.95)
DEPRECATED HCO3 PLAS-SCNC: 24 MMOL/L (ref 22–29)
GFR SERPL CREATININE-BSD FRML MDRD: >90 ML/MIN/1.73M2
GLUCOSE SERPL-MCNC: 585 MG/DL (ref 70–99)
GLUCOSE UR STRIP-MCNC: 500 MG/DL
HBA1C MFR BLD: 13.7 % (ref 0–5.6)
HGB UR QL STRIP: NEGATIVE
KETONES UR STRIP-MCNC: NEGATIVE MG/DL
LEUKOCYTE ESTERASE UR QL STRIP: NEGATIVE
NITRATE UR QL: NEGATIVE
PH UR STRIP: 5 [PH] (ref 5–8)
POTASSIUM SERPL-SCNC: 4 MMOL/L (ref 3.4–5.3)
RBC #/AREA URNS AUTO: ABNORMAL /HPF
SODIUM SERPL-SCNC: 132 MMOL/L (ref 136–145)
SP GR UR STRIP: <=1.005 (ref 1–1.03)
SQUAMOUS #/AREA URNS AUTO: ABNORMAL /LPF
UROBILINOGEN UR STRIP-ACNC: 0.2 E.U./DL
WBC #/AREA URNS AUTO: ABNORMAL /HPF

## 2023-05-16 PROCEDURE — 99204 OFFICE O/P NEW MOD 45 MIN: CPT | Performed by: FAMILY MEDICINE

## 2023-05-16 PROCEDURE — 80048 BASIC METABOLIC PNL TOTAL CA: CPT | Performed by: FAMILY MEDICINE

## 2023-05-16 PROCEDURE — 81001 URINALYSIS AUTO W/SCOPE: CPT | Performed by: FAMILY MEDICINE

## 2023-05-16 PROCEDURE — 83036 HEMOGLOBIN GLYCOSYLATED A1C: CPT | Performed by: FAMILY MEDICINE

## 2023-05-16 PROCEDURE — 86803 HEPATITIS C AB TEST: CPT | Performed by: FAMILY MEDICINE

## 2023-05-16 PROCEDURE — 36415 COLL VENOUS BLD VENIPUNCTURE: CPT | Performed by: FAMILY MEDICINE

## 2023-05-16 PROCEDURE — 82043 UR ALBUMIN QUANTITATIVE: CPT | Performed by: FAMILY MEDICINE

## 2023-05-16 PROCEDURE — 87389 HIV-1 AG W/HIV-1&-2 AB AG IA: CPT | Performed by: FAMILY MEDICINE

## 2023-05-16 PROCEDURE — 82570 ASSAY OF URINE CREATININE: CPT | Performed by: FAMILY MEDICINE

## 2023-05-16 PROCEDURE — 80061 LIPID PANEL: CPT | Performed by: FAMILY MEDICINE

## 2023-05-16 RX ORDER — GLIPIZIDE 5 MG/1
5 TABLET ORAL
Qty: 60 TABLET | Refills: 0 | Status: SHIPPED | OUTPATIENT
Start: 2023-05-16 | End: 2023-06-23

## 2023-05-16 RX ORDER — GLIPIZIDE 5 MG/1
5 TABLET ORAL
COMMUNITY
Start: 2023-01-16 | End: 2023-05-16

## 2023-05-16 RX ORDER — INSULIN GLARGINE 100 [IU]/ML
INJECTION, SOLUTION SUBCUTANEOUS
COMMUNITY
Start: 2023-01-02 | End: 2023-05-16

## 2023-05-16 RX ORDER — PEN NEEDLE, DIABETIC 31 GX3/16"
NEEDLE, DISPOSABLE MISCELLANEOUS
COMMUNITY
Start: 2023-01-17 | End: 2023-12-18

## 2023-05-16 RX ORDER — LISINOPRIL 5 MG/1
5 TABLET ORAL
Qty: 90 TABLET | Refills: 0 | Status: SHIPPED | OUTPATIENT
Start: 2023-05-16 | End: 2023-07-17

## 2023-05-16 RX ORDER — METFORMIN HCL 500 MG
500 TABLET, EXTENDED RELEASE 24 HR ORAL 2 TIMES DAILY WITH MEALS
Qty: 60 TABLET | Refills: 3 | Status: SHIPPED | OUTPATIENT
Start: 2023-05-16 | End: 2023-05-24

## 2023-05-16 RX ORDER — LISINOPRIL 5 MG/1
1 TABLET ORAL
COMMUNITY
Start: 2023-01-27 | End: 2023-05-16

## 2023-05-16 RX ORDER — INSULIN GLARGINE 100 [IU]/ML
20 INJECTION, SOLUTION SUBCUTANEOUS DAILY
Qty: 15 ML | Refills: 1 | Status: SHIPPED | OUTPATIENT
Start: 2023-05-16 | End: 2023-05-19

## 2023-05-16 RX ORDER — GABAPENTIN 300 MG/1
300 CAPSULE ORAL 3 TIMES DAILY
COMMUNITY
Start: 2023-02-28 | End: 2023-05-16

## 2023-05-16 RX ORDER — GLUCOSAM/CHON-MSM1/C/MANG/BOSW 500-416.6
TABLET ORAL
COMMUNITY
Start: 2022-12-11

## 2023-05-16 RX ORDER — GABAPENTIN 300 MG/1
300 CAPSULE ORAL 3 TIMES DAILY
Qty: 270 CAPSULE | Refills: 1 | Status: SHIPPED | OUTPATIENT
Start: 2023-05-16 | End: 2023-07-17

## 2023-05-16 ASSESSMENT — ENCOUNTER SYMPTOMS
DIZZINESS: 1
CONSTIPATION: 0
WEAKNESS: 0
ABDOMINAL PAIN: 0
EYE PAIN: 0
FREQUENCY: 0
JOINT SWELLING: 0
COUGH: 0
BREAST MASS: 0
NAUSEA: 0
HEMATOCHEZIA: 0
ARTHRALGIAS: 0
DYSURIA: 0
CHILLS: 0
PALPITATIONS: 0
SHORTNESS OF BREATH: 0
FEVER: 0
MYALGIAS: 1
HEMATURIA: 0
HEARTBURN: 0
HEADACHES: 0
PARESTHESIAS: 0
SORE THROAT: 0
NERVOUS/ANXIOUS: 0
DIARRHEA: 0

## 2023-05-16 NOTE — PATIENT INSTRUCTIONS
Please start taking 20 units of the LONG acting INSULIN.  Please take blood sugar FOUR times a day and write it down   Come back to see the NURSE on Friday. Early morning is OK.   Call if the blood sugar gets below 100 for now, below sixty please give her juice or cupcake frosting.     Your a1c is 13.7 which AVERAGES a blood sugar of about 300-400. It is very high. We are going to start more medication to help her.     Bring the paperwork so I can review it fully.

## 2023-05-16 NOTE — PROGRESS NOTES
SUBJECTIVE:   CC: Mumtaz is an 53 year old who presents for preventive health visit.       2023    12:55 PM   Additional Questions   Roomed by Kesha MOLINA CMA   Accompanied by Daughter-in-law     Patient has been advised of split billing requirements and indicates understanding: Yes  Healthy Habits:     Getting at least 3 servings of Calcium per day:  Yes    Bi-annual eye exam:  NO    Dental care twice a year:  NO    Sleep apnea or symptoms of sleep apnea:  None    Diet:  Diabetic    Frequency of exercise:  None    Taking medications regularly:  Yes    Medication side effects:  None    PHQ-2 Total Score: 0    Additional concerns today:  No    {Add if <65 person on Medicare  - Required Questions (Optional):339891}  {Outside tests to abstract? :041220}    Diabetes Follow-up    How often are you checking your blood sugar? Two times daily  Blood sugar testing frequency justification:  On insulin  What time of day are you checking your blood sugars (select all that apply)?  Before meals and before bedtime  Have you had any blood sugars above 200?  Yes - patient's high is over 300. Patient has not been doing it for the last year as she thought the test strips were   Have you had any blood sugars below 70?  No    What symptoms do you notice when your blood sugar is low?  None and Not applicable    What concerns do you have today about your diabetes? None and Other: thought machine was      Do you have any of these symptoms? (Select all that apply)  Blurry vision and Weight loss     Hypertension Follow-up      Do you check your blood pressure regularly outside of the clinic? No     Are you following a low salt diet? No    Are your blood pressures ever more than 140 on the top number (systolic) OR more   than 90 on the bottom number (diastolic), for example 140/90? No - do not know.     Patient has actually been hospitalized for condition in [place], which is where she moved from. She left the papers at  home.     BP Readings from Last 2 Encounters:   23 118/80     No results found for: A1C, LDL      Today's PHQ-2 Score:       2023    12:57 PM   PHQ-2 (  [employer])   Q1: Little interest or pleasure in doing things 0   Q2: Feeling down, depressed or hopeless 0   PHQ-2 Score 0   Q1: Little interest or pleasure in doing things Not at all    Not at all   Q2: Feeling down, depressed or hopeless Not at all    Not at all   PHQ-2 Score 0    0       Have you ever done Advance Care Planning? (For example, a Health Directive, POLST, or a discussion with a medical provider or your loved ones about your wishes): No, advance care planning information given to patient to review.  Advanced care planning was discussed at today's visit.    Social History     Tobacco Use     Smoking status: Never     Smokeless tobacco: Never   Vaping Use     Vaping status: Never Used   Substance Use Topics     Alcohol use: Not on file         2023    12:57 PM   Alcohol Use   Prescreen: >3 drinks/day or >7 drinks/week? Not Applicable     Reviewed orders with patient.  Reviewed health maintenance and updated orders accordingly - Yes  Lab work is in process    Breast Cancer Screenin/16/2023    12:58 PM   Breast CA Risk Assessment (FHS-7)   Do you have a family history of breast, colon, or ovarian cancer? No / Unknown       Mammogram Screening: Recommended mammography every 1-2 years with patient discussion and risk factor consideration  Pertinent mammograms are reviewed under the imaging tab.    History of abnormal Pap smear: NO - age 30-65 PAP every 5 years with negative HPV co-testing recommended     Reviewed and updated as needed this visit by clinical staff   Tobacco  Allergies  Meds  Problems  Med Hx  Surg Hx  Fam Hx          Reviewed and updated as needed this visit by Provider   Tobacco  Allergies  Meds  Problems  Med Hx  Surg Hx  Fam Hx         {HISTORY OPTIONS (Optional):951802}    Review of Systems  "  Constitutional: Negative for chills and fever.   HENT: Negative for congestion, ear pain, hearing loss and sore throat.    Eyes: Positive for visual disturbance. Negative for pain.   Respiratory: Negative for cough and shortness of breath.    Cardiovascular: Negative for chest pain, palpitations and peripheral edema.   Gastrointestinal: Negative for abdominal pain, constipation, diarrhea, heartburn, hematochezia and nausea.   Breasts:  Negative for tenderness, breast mass and discharge.   Genitourinary: Positive for vaginal discharge. Negative for dysuria, frequency, genital sores, hematuria, pelvic pain, urgency and vaginal bleeding.   Musculoskeletal: Positive for myalgias. Negative for arthralgias and joint swelling.   Skin: Negative for rash.   Neurological: Positive for dizziness. Negative for weakness, headaches and paresthesias.   Psychiatric/Behavioral: Negative for mood changes. The patient is not nervous/anxious.      {FEMALE ROS (Optional):156275}     OBJECTIVE:   /80 (BP Location: Right arm, Patient Position: Sitting, Cuff Size: Adult Regular)   Pulse 88   Temp 98  F (36.7  C) (Oral)   Resp 12   Ht 1.575 m (5' 2\")   Wt 65.1 kg (143 lb 9.6 oz)   LMP  (LMP Unknown)   SpO2 96%   BMI 26.26 kg/m    Physical Exam  {Exam Choices (Optional):057675}    {Diagnostic Test Results (Optional):090305::\"Diagnostic Test Results:\",\"Labs reviewed in Epic\"}    ASSESSMENT/PLAN:   {Diag Picklist:804952}    {Patient advised of split billing (Optional):944655}      COUNSELING:  {FEMALE COUNSELING MESSAGES:582554::\"Reviewed preventive health counseling, as reflected in patient instructions\"}      BMI:   Estimated body mass index is 26.26 kg/m  as calculated from the following:    Height as of this encounter: 1.575 m (5' 2\").    Weight as of this encounter: 65.1 kg (143 lb 9.6 oz).   {Weight Management Plan needed for ACO:510395}      She reports that she has never smoked. She has never used smokeless " tobacco.      {Counseling Resources  [place] Preventive Services Task Force  Cholesterol Screening  Health diet/nutrition  Pooled Cohorts Equation Calculator  USDA's MyPlate  ASA Prophylaxis  Lung CA Screening  Osteoporosis prevention/bone health :981912}  {Breast Cancer Risk Calculator  BRCA-Related Cancer Risk Assessment FHS-7 Tool :063966}  Mayela Dowd DO  [facility]

## 2023-05-16 NOTE — LETTER
May 17, 2023      Mumtaz [facility]  [address]   [place] [address]        Dear ,    We are writing to inform you of your test results.    Please attend the appointment on 05/19/2023 with your provider to review or follow up on your test results.      Resulted Orders   Lipid panel reflex to direct LDL Non-fasting   Result Value Ref Range    Cholesterol 213 (H) <200 mg/dL    Triglycerides 285 (H) <150 mg/dL    Direct Measure HDL 32 (L) >=50 mg/dL    LDL Cholesterol Calculated 124 (H) <=100 mg/dL    Non HDL Cholesterol 181 (H) <130 mg/dL    Narrative    Cholesterol  Desirable:  <200 mg/dL    Triglycerides  Normal:  Less than 150 mg/dL  Borderline High:  150-199 mg/dL  High:  200-499 mg/dL  Very High:  Greater than or equal to 500 mg/dL    Direct Measure HDL  Female:  Greater than or equal to 50 mg/dL   Male:  Greater than or equal to 40 mg/dL    LDL Cholesterol  Desirable:  <100mg/dL  Above Desirable:  100-129 mg/dL   Borderline High:  130-159 mg/dL   High:  160-189 mg/dL   Very High:  >= 190 mg/dL    Non HDL Cholesterol  Desirable:  130 mg/dL  Above Desirable:  130-159 mg/dL  Borderline High:  160-189 mg/dL  High:  190-219 mg/dL  Very High:  Greater than or equal to 220 mg/dL   Hemoglobin A1c   Result Value Ref Range    Hemoglobin A1C 13.7 (H) 0.0 - 5.6 %    Narrative    Results confirmed by repeat test.     Basic metabolic panel  (Ca, Cl, CO2, Creat, Gluc, K, Na, BUN)   Result Value Ref Range    Sodium 132 (L) 136 - 145 mmol/L    Potassium 4.0 3.4 - 5.3 mmol/L    Chloride 96 (L) 98 - 107 mmol/L    Carbon Dioxide (CO2) 24 22 - 29 mmol/L    Anion Gap 12 7 - 15 mmol/L    Urea Nitrogen 8.2 6.0 - 20.0 mg/dL    Creatinine 0.40 (L) 0.51 - 0.95 mg/dL    Calcium 9.6 8.6 - 10.0 mg/dL    Glucose 585 (HH) 70 - 99 mg/dL    GFR Estimate >90 >60 mL/min/1.73m2      Comment:      eGFR calculated using 2021 CKD-EPI equation.   Albumin Random Urine Quantitative with Creat Ratio   Result Value Ref Range    Creatinine Urine  mg/dL 23.0 mg/dL      Comment:      The reference ranges have not been established in urine creatinine. The results should be integrated into the clinical context for interpretation.    Albumin Urine mg/L <12.0 mg/L      Comment:      The reference ranges have not been established in urine albumin. The results should be integrated into the clinical context for interpretation.    Albumin Urine mg/g Cr        Comment:      Unable to calculate, urine albumin and/or urine creatinine is outside detectable limits.  Microalbuminuria is defined as an albumin:creatinine ratio of 17 to 299 for males and 25 to 299 for females. A ratio of albumin:creatinine of 300 or higher is indicative of overt proteinuria.  Due to biologic variability, positive results should be confirmed by a second, first-morning random or 24-hour timed urine specimen. If there is discrepancy, a third specimen is recommended. When 2 out of 3 results are in the microalbuminuria range, this is evidence for incipient nephropathy and warrants increased efforts at glucose control, blood pressure control, and institution of therapy with an angiotensin-converting-enzyme (ACE) inhibitor (if the patient can tolerate it).     UA with Microscopic reflex to Culture - lab collect   Result Value Ref Range    Color Urine Yellow Colorless, Straw, Light Yellow, Yellow    Appearance Urine Clear Clear    Glucose Urine 500 (A) Negative mg/dL    Bilirubin Urine Negative Negative    Ketones Urine Negative Negative mg/dL    Specific Gravity Urine <=1.005 1.005 - 1.030    Blood Urine Negative Negative    pH Urine 5.0 5.0 - 8.0    Protein Albumin Urine Negative Negative mg/dL    Urobilinogen Urine 0.2 0.2, 1.0 E.U./dL    Nitrite Urine Negative Negative    Leukocyte Esterase Urine Negative Negative   UA Microscopic with Reflex to Culture   Result Value Ref Range    Bacteria Urine None Seen None Seen /HPF    RBC Urine None Seen 0-2 /HPF /HPF    WBC Urine None Seen 0-5 /HPF /HPF     Squamous Epithelials Urine Few (A) None Seen /LPF    Narrative    Urine Culture not indicated       If you have any questions or concerns, please call the clinic at the number listed above.       Sincerely,      Mayela Dowd, DO

## 2023-05-17 LAB
CHOLEST SERPL-MCNC: 213 MG/DL
CREAT UR-MCNC: 23 MG/DL
HCV AB SERPL QL IA: NONREACTIVE
HDLC SERPL-MCNC: 32 MG/DL
HIV 1+2 AB+HIV1 P24 AG SERPL QL IA: NONREACTIVE
LDLC SERPL CALC-MCNC: 124 MG/DL
MICROALBUMIN UR-MCNC: <12 MG/L
MICROALBUMIN/CREAT UR: NORMAL MG/G{CREAT}
NONHDLC SERPL-MCNC: 181 MG/DL
TRIGL SERPL-MCNC: 285 MG/DL

## 2023-05-17 ASSESSMENT — ENCOUNTER SYMPTOMS
HEMATOCHEZIA: 0
FEVER: 0
MYALGIAS: 1
HEMATURIA: 0
ARTHRALGIAS: 0
PARESTHESIAS: 0
WEAKNESS: 0
COUGH: 0
DIARRHEA: 0
PALPITATIONS: 0
DIZZINESS: 1
ABDOMINAL PAIN: 0
SORE THROAT: 0
SHORTNESS OF BREATH: 0
HEADACHES: 0
BREAST MASS: 0
NAUSEA: 0
CHILLS: 0
HEARTBURN: 0
DYSURIA: 0
JOINT SWELLING: 0
EYE PAIN: 0
FREQUENCY: 0
CONSTIPATION: 0
NERVOUS/ANXIOUS: 0

## 2023-05-19 ENCOUNTER — ALLIED HEALTH/NURSE VISIT (OUTPATIENT)
Dept: FAMILY MEDICINE | Facility: CLINIC | Age: 53
End: 2023-05-19
Payer: MEDICAID

## 2023-05-19 DIAGNOSIS — Z79.4 TYPE 2 DIABETES MELLITUS WITH OTHER SPECIFIED COMPLICATION, WITH LONG-TERM CURRENT USE OF INSULIN (H): ICD-10-CM

## 2023-05-19 DIAGNOSIS — E11.69 TYPE 2 DIABETES MELLITUS WITH OTHER SPECIFIED COMPLICATION, WITH LONG-TERM CURRENT USE OF INSULIN (H): ICD-10-CM

## 2023-05-19 PROCEDURE — 99207 PR NO CHARGE NURSE ONLY: CPT

## 2023-05-19 RX ORDER — INSULIN GLARGINE 100 [IU]/ML
20 INJECTION, SOLUTION SUBCUTANEOUS DAILY
Qty: 15 ML | Refills: 1 | Status: SHIPPED | OUTPATIENT
Start: 2023-05-19 | End: 2023-05-24

## 2023-05-19 NOTE — PROGRESS NOTES
Was asked by PCP to see pt to go over BG log  5/16= AC dinner 420, PC dinner 487,   5/17= AC breakfast 228, PC breakfast 261, AC lunch 199, PC lunch 436  5/18= AC breakfast 287, PC breakfast 435, AC lunch 369, PC lunch 392  5/19= AC breakfast 238    Discussed medications:  Per pt, she has been using medications as prescribed since 5/16    Biggest meal of day: patient states is eating the same amount for each meal   Meal plan: rice with vegetables & broderick is eaten with most meals (cucumber, mustard greens, bamboo shoots, meats)    Dr Dowd came into room to discuss medication changes with patient:   basaglar twice daily  - 20units at bedtime & 10units in AM    Referral to DM ed- assisted in scheduling for 6/19 at [facility]    Patient advised to bring in BG log & medications to upcoming appt with PCP 5/25

## 2023-05-20 ENCOUNTER — HEALTH MAINTENANCE LETTER (OUTPATIENT)
Age: 53
End: 2023-05-20

## 2023-05-22 ENCOUNTER — TRANSFERRED RECORDS (OUTPATIENT)
Dept: FAMILY MEDICINE | Facility: CLINIC | Age: 53
End: 2023-05-22
Payer: MEDICAID

## 2023-05-22 DIAGNOSIS — K75.4 AUTOIMMUNE HEPATITIS (H): ICD-10-CM

## 2023-05-22 DIAGNOSIS — R76.8 ANTI-CYCLIC CITRULLINATED PEPTIDE ANTIBODY POSITIVE: Primary | ICD-10-CM

## 2023-05-22 NOTE — PROGRESS NOTES
Updated problem list- prior records from [facility] in [facility] revealing that patient anti mitochondrial antibody positive, anti CCP positive. Referrals placed.    Given patient's poorly controlled diabetes, may not be good candidate for steroids. Main concern per patient is blood sugar, but will need follow up for all other issues.     Mayela Dowd, DO

## 2023-05-24 ENCOUNTER — OFFICE VISIT (OUTPATIENT)
Dept: FAMILY MEDICINE | Facility: CLINIC | Age: 53
End: 2023-05-24
Payer: MEDICAID

## 2023-05-24 VITALS
DIASTOLIC BLOOD PRESSURE: 84 MMHG | RESPIRATION RATE: 12 BRPM | SYSTOLIC BLOOD PRESSURE: 118 MMHG | WEIGHT: 145.7 LBS | HEIGHT: 62 IN | TEMPERATURE: 98.1 F | BODY MASS INDEX: 26.81 KG/M2 | OXYGEN SATURATION: 98 % | HEART RATE: 84 BPM

## 2023-05-24 DIAGNOSIS — R76.8 ANTI-CYCLIC CITRULLINATED PEPTIDE ANTIBODY POSITIVE: ICD-10-CM

## 2023-05-24 DIAGNOSIS — K75.4 AUTOIMMUNE HEPATITIS (H): ICD-10-CM

## 2023-05-24 DIAGNOSIS — E11.69 TYPE 2 DIABETES MELLITUS WITH OTHER SPECIFIED COMPLICATION, WITH LONG-TERM CURRENT USE OF INSULIN (H): Primary | ICD-10-CM

## 2023-05-24 DIAGNOSIS — Z79.4 TYPE 2 DIABETES MELLITUS WITH OTHER SPECIFIED COMPLICATION, WITH LONG-TERM CURRENT USE OF INSULIN (H): Primary | ICD-10-CM

## 2023-05-24 DIAGNOSIS — E11.69 TYPE 2 DIABETES MELLITUS WITH OTHER SPECIFIED COMPLICATION, UNSPECIFIED WHETHER LONG TERM INSULIN USE (H): ICD-10-CM

## 2023-05-24 PROCEDURE — 99214 OFFICE O/P EST MOD 30 MIN: CPT | Performed by: FAMILY MEDICINE

## 2023-05-24 RX ORDER — PEN NEEDLE, DIABETIC 32GX 5/32"
NEEDLE, DISPOSABLE MISCELLANEOUS
Qty: 100 EACH | Refills: 3 | Status: SHIPPED | OUTPATIENT
Start: 2023-05-24 | End: 2023-05-24

## 2023-05-24 RX ORDER — PEN NEEDLE, DIABETIC 32GX 5/32"
NEEDLE, DISPOSABLE MISCELLANEOUS
Qty: 100 EACH | Refills: 3 | Status: SHIPPED | OUTPATIENT
Start: 2023-05-24

## 2023-05-24 RX ORDER — INSULIN GLARGINE 100 [IU]/ML
30 INJECTION, SOLUTION SUBCUTANEOUS AT BEDTIME
Qty: 45 ML | Refills: 0 | Status: SHIPPED | OUTPATIENT
Start: 2023-05-24 | End: 2023-09-11

## 2023-05-24 RX ORDER — INSULIN GLARGINE 100 [IU]/ML
40 INJECTION, SOLUTION SUBCUTANEOUS AT BEDTIME
Qty: 45 ML | Refills: 0 | Status: SHIPPED | OUTPATIENT
Start: 2023-05-24 | End: 2023-05-24

## 2023-05-24 RX ORDER — INSULIN GLARGINE 100 [IU]/ML
40 INJECTION, SOLUTION SUBCUTANEOUS AT BEDTIME
Qty: 15 ML | Refills: 3 | Status: SHIPPED | OUTPATIENT
Start: 2023-05-24 | End: 2023-05-24

## 2023-05-24 RX ORDER — GLUCOSAMINE HCL/CHONDROITIN SU 500-400 MG
CAPSULE ORAL
Qty: 100 EACH | Refills: 3 | Status: SHIPPED | OUTPATIENT
Start: 2023-05-24

## 2023-05-24 NOTE — PATIENT INSTRUCTIONS
We are getting you another injection medicine. That injection is ONCE A WEEK. That medication is called OZEMPIC.   Keep your insulin to 30 units at night.   Please check the blood sugar every meal and at night. Use the FREE STYLE MISTY.

## 2023-05-24 NOTE — PROGRESS NOTES
"  Assessment & Plan     Type 2 diabetes mellitus with other specified complication, with long-term current use of insulin (H)  Chronic, poorly controlled. Recommend increase of lantus insulin 20 ->30. Likely will need further increase to 40. Unable to recommend more frequent insulin dosing due to patient having difficulty administering medication to herself and she relies on children for the medication. Unable to tolerate metformin as she experienced \"diarrhea everywhere\"   - alcohol swab prep pads  Dispense: 100 each; Refill: 3  - insulin pen needle (ULTICARE MICRO) 32G X 4 MM miscellaneous  Dispense: 100 each; Refill: 3  - semaglutide (OZEMPIC) 2 MG/3ML pen  Dispense: 3 mL; Refill: 1  - insulin glargine (LANTUS SOLOSTAR) 100 UNIT/ML pen  Dispense: 45 mL; Refill: 0  - PRIMARY CARE FOLLOW-UP SCHEDULING    Anti-cyclic citrullinated peptide antibody positive  Chronic, stable. Patient was evaluated by rheumatology, who suspected patient with rheumatoid arthritis. Will repeat titers.   - Cyclic Citrullinated Peptide Antibody IgG  - Anti Nuclear Ning IgG by IFA with Reflex    Autoimmune hepatitis (H)  Chronic, stable. Patient with increased liver enzymes, needing work up for AI hepatitis below, tested positive at prior doctor's visit with previous provider.   - Mitochondrial M2 Antibody IgG  - Hepatic panel  - GGT  - IgG  - F Actin EIA with reflex      Ordering of each unique test  Prescription drug management  I spent a total of 35 minutes on the day of the visit.   Time spent by me doing chart review, history and exam, documentation and further activities per the note       See Patient Instructions    Mayela Dowd DO  [facility]    Tammi Pandya is a 53 year old, presenting for the following health issues:  Follow Up (Daughter-in-law states she is not taking the metformin because it is making her diarrhea so she doesn't want to take it)        5/24/2023     3:31 PM   Additional Questions " "  Roomed by Kesha MOLINA CMA   Accompanied by Daughter-in-law, , and son     History of Present Illness       Diabetes:   She presents for follow up of diabetes.  She is checking home blood glucose four or more times daily. She checks blood glucose before and after meals and at bedtime.  Blood glucose is sometimes over 200 and never under 70. When her blood glucose is low, the patient is asymptomatic for confusion, blurred vision, lethargy and reports not feeling dizzy, shaky, or weak.  She is concerned about blood sugar frequently over 200. She is having numbness in feet, blurry vision and weight loss. The patient has not had a diabetic eye exam in the last 12 months.         She eats 2-3 servings of fruits and vegetables daily.She consumes 1 sweetened beverage(s) daily.She exercises with enough effort to increase her heart rate 9 or less minutes per day.  She exercises with enough effort to increase her heart rate 3 or less days per week.   She is taking medications regularly.    Review of Systems   Constitutional, HEENT, cardiovascular, pulmonary, gi and gu systems are negative, except as otherwise noted.      Objective    /84 (BP Location: Left arm, Patient Position: Sitting, Cuff Size: Adult Regular)   Pulse 84   Temp 98.1  F (36.7  C) (Oral)   Resp 12   Ht 1.575 m (5' 2\")   Wt 66.1 kg (145 lb 11.2 oz)   LMP  (LMP Unknown)   SpO2 98%   BMI 26.65 kg/m    Body mass index is 26.65 kg/m .  Physical Exam   GENERAL: healthy, alert and no distress  EYES: Eyes grossly normal to inspection, PERRL and conjunctivae and sclerae normal  NECK: no adenopathy, no asymmetry, masses, or scars and thyroid normal to palpation  MS: no gross musculoskeletal defects noted, no edema  SKIN: no suspicious lesions or rashes  PSYCH: mentation appears normal, affect normal/bright    Office Visit on 05/16/2023   Component Date Value Ref Range Status     Hepatitis C Antibody 05/16/2023 Nonreactive  Nonreactive Final     " Cholesterol 05/16/2023 213 (H)  <200 mg/dL Final     Triglycerides 05/16/2023 285 (H)  <150 mg/dL Final     Direct Measure HDL 05/16/2023 32 (L)  >=50 mg/dL Final     LDL Cholesterol Calculated 05/16/2023 124 (H)  <=100 mg/dL Final     Non HDL Cholesterol 05/16/2023 181 (H)  <130 mg/dL Final     HIV Antigen Antibody Combo 05/16/2023 Nonreactive  Nonreactive Final    HIV-1 p24 Ag & HIV-1/HIV-2 Ab Not Detected     Hemoglobin A1C 05/16/2023 13.7 (H)  0.0 - 5.6 % Final     Sodium 05/16/2023 132 (L)  136 - 145 mmol/L Final     Potassium 05/16/2023 4.0  3.4 - 5.3 mmol/L Final     Chloride 05/16/2023 96 (L)  98 - 107 mmol/L Final     Carbon Dioxide (CO2) 05/16/2023 24  22 - 29 mmol/L Final     Anion Gap 05/16/2023 12  7 - 15 mmol/L Final     Urea Nitrogen 05/16/2023 8.2  6.0 - 20.0 mg/dL Final     Creatinine 05/16/2023 0.40 (L)  0.51 - 0.95 mg/dL Final     Calcium 05/16/2023 9.6  8.6 - 10.0 mg/dL Final     Glucose 05/16/2023 585 (HH)  70 - 99 mg/dL Final     GFR Estimate 05/16/2023 >90  >60 mL/min/1.73m2 Final    eGFR calculated using 2021 CKD-EPI equation.     Creatinine Urine mg/dL 05/16/2023 23.0  mg/dL Final    The reference ranges have not been established in urine creatinine. The results should be integrated into the clinical context for interpretation.     Albumin Urine mg/L 05/16/2023 <12.0  mg/L Final    The reference ranges have not been established in urine albumin. The results should be integrated into the clinical context for interpretation.     Albumin Urine mg/g Cr 05/16/2023    Final    Unable to calculate, urine albumin and/or urine creatinine is outside detectable limits.  Microalbuminuria is defined as an albumin:creatinine ratio of 17 to 299 for males and 25 to 299 for females. A ratio of albumin:creatinine of 300 or higher is indicative of overt proteinuria.  Due to biologic variability, positive results should be confirmed by a second, first-morning random or 24-hour timed urine specimen. If there is  discrepancy, a third specimen is recommended. When 2 out of 3 results are in the microalbuminuria range, this is evidence for incipient nephropathy and warrants increased efforts at glucose control, blood pressure control, and institution of therapy with an angiotensin-converting-enzyme (ACE) inhibitor (if the patient can tolerate it).       Color Urine 05/16/2023 Yellow  Colorless, Straw, Light Yellow, Yellow Final     Appearance Urine 05/16/2023 Clear  Clear Final     Glucose Urine 05/16/2023 500 (A)  Negative mg/dL Final     Bilirubin Urine 05/16/2023 Negative  Negative Final     Ketones Urine 05/16/2023 Negative  Negative mg/dL Final     Specific Gravity Urine 05/16/2023 <=1.005  1.005 - 1.030 Final     Blood Urine 05/16/2023 Negative  Negative Final     pH Urine 05/16/2023 5.0  5.0 - 8.0 Final     Protein Albumin Urine 05/16/2023 Negative  Negative mg/dL Final     Urobilinogen Urine 05/16/2023 0.2  0.2, 1.0 E.U./dL Final     Nitrite Urine 05/16/2023 Negative  Negative Final     Leukocyte Esterase Urine 05/16/2023 Negative  Negative Final     Bacteria Urine 05/16/2023 None Seen  None Seen /HPF Final     RBC Urine 05/16/2023 None Seen  0-2 /HPF /HPF Final     WBC Urine 05/16/2023 None Seen  0-5 /HPF /HPF Final     Squamous Epithelials Urine 05/16/2023 Few (A)  None Seen /LPF Final

## 2023-05-25 ENCOUNTER — TELEPHONE (OUTPATIENT)
Dept: FAMILY MEDICINE | Facility: CLINIC | Age: 53
End: 2023-05-25

## 2023-05-25 RX ORDER — CHLORAL HYDRATE 500 MG
1 CAPSULE ORAL DAILY
Qty: 30 CAPSULE | Refills: 3 | Status: SHIPPED | OUTPATIENT
Start: 2023-05-25 | End: 2023-09-11

## 2023-05-25 NOTE — TELEPHONE ENCOUNTER
"Last Written Prescription Date:  2/16/23  Last Fill Quantity: 30,  # refills: 3   Last office visit provider:  4/10/23     Requested Prescriptions   Pending Prescriptions Disp Refills     fish oil-omega-3 fatty acids 1000 MG capsule [Pharmacy Med Name: FISH OIL 1,000 MG CAPSULE 1000 Capsule] 30 capsule 3     Sig: TAKE 1 CAPSULE (1 G) BY MOUTH DAILY       Vitamin Supplements (Adult) Protocol Passed - 5/24/2023 11:06 AM        Passed - High dose Vitamin D not ordered        Passed - Recent (12 mo) or future (30 days) visit within the authorizing provider's specialty     Patient has had an office visit with the authorizing provider or a provider within the authorizing providers department within the previous 12 mos or has a future within next 30 days. See \"Patient Info\" tab in inbasket, or \"Choose Columns\" in Meds & Orders section of the refill encounter.              Passed - Medication is active on med list             Anai Guzman RN 05/25/23 4:05 AM  "

## 2023-05-26 ENCOUNTER — TELEPHONE (OUTPATIENT)
Dept: FAMILY MEDICINE | Facility: CLINIC | Age: 53
End: 2023-05-26
Payer: COMMERCIAL

## 2023-05-26 ENCOUNTER — PATIENT OUTREACH (OUTPATIENT)
Dept: CARE COORDINATION | Facility: CLINIC | Age: 53
End: 2023-05-26
Payer: COMMERCIAL

## 2023-05-26 NOTE — PROGRESS NOTES
Clinic Care Coordination Contact    Community Health Worker Follow Up  Spoke with Fritz Matthews) through [facility]  ID [id number]. Meeker speaks English and declined .    Care Gaps:     Health Maintenance Due   Topic Date Due     ZOSTER IMMUNIZATION (1 of 2) Never done     COVID-19 Vaccine (3 - Moderna risk series) 06/30/2022     BMP  05/13/2023     A1C  05/14/2023     YEARLY PREVENTIVE VISIT  06/02/2023     HEMOGLOBIN  08/13/2023     Scheduled 6/19/23 at 1:40PM with Dr. Arizmnedi for preventive care visit.      Care Plan:   Care Plan: DEMs     Problem: Impaired mobility     Goal: I want a wheelchair. w/c cushion and shower chair in the next 90 days.     Start Date: 4/24/2023 Expected End Date: 7/24/2023    This Visit's Progress: 50% Recent Progress: 20%    Note:     Barriers: language  Strengths: Accepting of support  Patient expressed understanding of goal: Yes    Action steps to achieve this goal:  1. I will complete a face-to-face appointment with my PCP on 3/22/2023. ( Completed)  2. I will complete an OT/PT evaluation if it is recommended for me.  3. I will answer my phone when DME Provider [facility] contacts me to deliver or  my item.  4. I will follow up with CCC in the next month regarding this goal for additional coordination support.    Note: Order for wheel chair and shower chair were sent to DME Provider PCP on 3/22/2023. 5/26 Orders are at [facility] - Wheel Chair/cushion delivered.     [facility] [phone]                    Intervention and Education during outreach:     Fritz confirmed that patient received manual wheelchair and cushion last week through [facility] not [facility]. Fritz has not received any update on the bath bench lift.       Conference called Valeri at [facility] [phone], confirmed they provided a hospital bed and delivered wheelchair last week. Valeri was not aware of bath bench lift order, she checked their system and  confirmed she found 4/14 order. Call transferred to correct department, Complex Rehab Team, left message requesting return call for next step on bath bench lift.       CHW called Albert at [facility], per Fritz, informed of above, orders cancelled. Patient will work with [facility] [employer].     CHW Plan: CHW to follow up in 1 month    [facility] Care Coordination  [facility]    Phone: [phone]

## 2023-05-26 NOTE — TELEPHONE ENCOUNTER
Forms/Letter Request    Type of form/letter: form for wheelchair ([facility] medical equipment.) ilana is requesting update regarding form.    Have you been seen for this request: N/A    Do we have the form/letter: not sure. Ilana stated that she faxed over form on 5/2/23 and 5/16/23.    Who is the form from? Patient    Where did/will the form come from? form was faxed in    When is form/letter needed by: as soon as possible    How would you like the form/letter returned:     Patient Notified form requests are processed in 3-5 business days:No    Could we send this information to you in [employer] or would you prefer to receive a phone call?:   No preference       Okay to leave a detailed message?: Yes at Other phone number:  Call back ilana: [phone], okay to leave detailed message        Orlando Kirk, BSN RN  [facility]

## 2023-05-30 DIAGNOSIS — Z53.9 DIAGNOSIS NOT YET DEFINED: Primary | ICD-10-CM

## 2023-05-30 PROCEDURE — 99207 PR MD RECERTIFICATION HHA PT: CPT | Performed by: FAMILY MEDICINE

## 2023-06-01 ENCOUNTER — MEDICAL CORRESPONDENCE (OUTPATIENT)
Dept: FAMILY MEDICINE | Facility: CLINIC | Age: 53
End: 2023-06-01
Payer: COMMERCIAL

## 2023-06-01 NOTE — TELEPHONE ENCOUNTER
Rx for basaglar was deactivated on the pt's chart. Still want to pursue a pa? If yes, please route encounter back to the pa team. If no, please close encounter. Thanks

## 2023-06-01 NOTE — PROGRESS NOTES
Fanny, Complex Rehab Team at [facility] returned call, left message stating she's following up on the bath bench lift. Fanny will call patient or daughter directly and start this process with her. Please call with any questions, [phone].      [facility] reviewed above. Fritz confirmed that she just got off the phone with Fanny at [facility]. Fanny told her that she's faxing document to Dr. Arizmendi for additional information, once received back, next step is to schedule evaluation at the [facility] Rehab department. Fritz will notify [facility] team if she needs any support.     Irene Samson  [facility] Care Coordination  [facility]    Phone: [phone]

## 2023-06-03 DIAGNOSIS — Z53.9 DIAGNOSIS NOT YET DEFINED: Primary | ICD-10-CM

## 2023-06-03 PROCEDURE — G0179 MD RECERTIFICATION HHA PT: HCPCS | Performed by: FAMILY MEDICINE

## 2023-06-05 ENCOUNTER — MEDICAL CORRESPONDENCE (OUTPATIENT)
Dept: HEALTH INFORMATION MANAGEMENT | Facility: CLINIC | Age: 53
End: 2023-06-05

## 2023-06-05 ENCOUNTER — PATIENT OUTREACH (OUTPATIENT)
Dept: CARE COORDINATION | Facility: CLINIC | Age: 53
End: 2023-06-05

## 2023-06-05 NOTE — PROGRESS NOTES
Clinic Care Coordination Contact  Care Coordination Clinician Chart Review    Situation: Patient chart reviewed by Care Coordinator.       Background: Care Coordination Program started: 3/1/2022. Initial assessment completed and patient-centered care plan(s) were developed with participation from patient. Lead CC handed patient off to CHW for continued outreaches.       Assessment: Per chart review, patient outreach completed by CC CHW on 5/26/2023.  Patient is actively working to accomplish goal(s). Patient's goal(s) appropriate and relevant at this time. Patient is not due for updated Plan of Care.  Assessments will be completed annually or as needed/with change of patient status. Per chart review, patient still working on getting a wheel chair and shower bench.       Care Plan: DEMs     Problem: Impaired mobility     Goal: I want a wheelchair. w/c cushion and shower chair in the next 90 days.     Start Date: 4/24/2023 Expected End Date: 7/24/2023    Recent Progress: 50%    Note:     Barriers: language  Strengths: Accepting of support  Patient expressed understanding of goal: Yes    Action steps to achieve this goal:  1. I will complete a face-to-face appointment with my PCP on 3/22/2023. ( Completed)  2. I will complete an OT/PT evaluation if it is recommended for me.  3. I will answer my phone when DME Provider [facility] contacts me to deliver or  my item.  4. I will follow up with CCC in the next month regarding this goal for additional coordination support.    Note: Order for wheel chair and shower chair were sent to DME Provider PCP on 3/22/2023. 5/26 Orders are at [facility] not [facility] - Wheel Chair/cushion delivered.     [facility] [phone]                           Plan/Recommendations: The patient will continue working with Care Coordination to achieve goal(s) as above. CHW will continue outreaches at minimum every 30 days and will involve Lead CC as needed or if patient is ready  to move to Maintenance. Lead CC will continue to monitor CHW outreaches and patient's progress to goal(s) every 6 weeks.     Plan of Care updated and sent to patient: No

## 2023-06-05 NOTE — LETTER
[facility]  Patient Centered Plan of Care  About Me:        Patient Name:  Nithya Matthews    YOB: 1970  Age:         53 year old   Johann MRN:    [id number] Telephone Information:  Home Phone [phone]   Mobile [phone]   Home Phone [phone]   Mobile Not on file.       Address:  [address] [place] [address] Email address:  [email or web address]      Emergency Contact(s)    Name Relationship Lgl Grd Work Phone Home Phone Mobile Phone   JESSY VAUGHN Daughter    [phone]           Primary language:  Sarah      needed? Yes   [facility] [employer]:  [phone] op. 1  Other communication barriers:Language barrier; Physical impairment; Lack of coping    Preferred Method of Communication:     Current living arrangement: I live in a private home with family    Mobility Status/ Medical Equipment: Independent w/Device        Health Maintenance  Health Maintenance Reviewed: Due/Overdue       My Access Plan  Medical Emergency 911   Primary Clinic Line [place] [phone]   24 Hour Appointment Line [phone] or  [phone]) (toll-free)   24 Hour Nurse Line [phone] (toll-free)   Preferred Urgent Care [facility] [phone]     Preferred Hospital [facility]  [phone]     Preferred Pharmacy [facility] - [place], [place] - [address]     Behavioral Health Crisis Line The National Suicide Prevention Lifeline at 1-758.666.8321 or Text/Call 858             My Care Team Members  Patient Care Team         Relationship Specialty Notifications Start End    Giancarlo Arizmendi MD PCP - General Family Medicine  2/14/22     Referring to Eye    Phone: [phone] Fax: [phone]         [address] [place] [address]    Giancarlo Arizmendi MD Assigned PCP   2/20/22     Phone: [phone] Fax: [phone]         [address] [place]  [address]    Irene Samson [facility] Health Worker Primary Care - CC Admissions 3/1/22     Albert Payal ARM worker   3/2/22     [facility]. Call [facility] worker  Avani Nelson [phone] for assistance.    Phone: [phone]         Miracle Mi OD  Optometry  3/18/22     Phone: [phone] Fax: [phone]         [address] [place] [address]    Shital Hernandez, PharmD Pharmacist Pharmacist  4/6/22     Phone: [phone]          [facility] [address] [place] [address]    Grecia Hearn DPM, Podiatry/Foot and Ankle Surgery Assigned Musculoskeletal Provider   5/1/22     Phone: [phone] Pager: [phone] Fax: [phone]        [address] [address] [address]    Davis Villa MD MD Endocrinology, Diabetes, and Metabolism  6/2/22     Phone: [phone] Fax: [phone]         [facility] [address]    Shital Hernandez PharmD Assigned [facility] Pharmacist   9/28/22     Phone: [phone]          [facility] [address] [place] [address]    Sahil Waterman MD Assigned Nephrology Provider   10/29/22     Phone: [phone] Fax: [phone]         [address] [place] [address]    Geri Mortensen, RN Registered Nurse   11/20/22     Phone: [phone]           [facility] [address] [place] [address]    Davis Villa MD Assigned Endocrinology Provider   12/24/22     Phone: [phone] Fax: [phone]         [facility] [address]    Carl Rosas MD Nephrologist Nephrology  3/9/23     Phone: [phone] Fax: [phone]         [address] [address]    Janel Jhaveri, RN Registered Nurse Primary Care - CC Admissions 4/19/23     Janel Jhaveri, JERALD Lead Care Coordinator Primary Care - CC Admissions 4/24/23     Markell Langford MD Assigned Surgical Provider   5/6/23     Phone: [phone]  Fax: [phone]          [facility] [address] [facility] [address]    Negin Nunes NP Nurse Practitioner   5/30/23     Phone: [phone] Fax: [phone]         [facility] [address] [facility] [address]              My Care Plans  Self Management and Treatment Plan  Care Plan  Care Plan: DEMs       Problem: Impaired mobility       Goal: I want a wheelchair. w/c cushion and shower chair in the next 90 days.       Start Date: 4/24/2023 Expected End Date: 7/24/2023    This Visit's Progress: 50% Recent Progress: 50%    Note:     Barriers: language  Strengths: Accepting of support  Patient expressed understanding of goal: Yes    Action steps to achieve this goal:  1. I will complete a face-to-face appointment with my PCP on 3/22/2023. ( Completed)  2. I will complete an OT/PT evaluation if it is recommended for me.  3. I will answer my phone when DME Provider [facility] contacts me to deliver or  my item.  4. I will follow up with CCC in the next month regarding this goal for additional coordination support.    Note: Order for wheel chair and shower chair were sent to DME Provider PCP on 3/22/2023. 5/26 Orders are at [facility] not [facility] - Wheel Chair/cushion delivered.     [facility] [phone]  Complex Rehab Team at [facility] [phone] (bath bench lift)                               Action Plans on File:                       Advance Care Plans/Directives Type:   No data recorded    My Medical and Care Information  Problem List   Patient Active Problem List   Diagnosis     Primary hypertension     Hyperlipidemia LDL goal <100     Depression, unspecified depression type     Hyperglycemia     Generalized muscle weakness     Low iron     Gastroesophageal reflux disease with esophagitis without hemorrhage     Low vitamin B12 level     Low vitamin D level     Celiac disease     Beta thalassemia minor     Type 2 diabetes mellitus with other specified complication,  unspecified whether long term insulin use (H)     Anemia of chronic renal failure, stage 4 (severe) (H)     Incontinence of feces with fecal urgency     Diabetic ulcer of other part of right foot associated with type 2 diabetes mellitus, with fat layer exposed (H)     ESRD (end stage renal disease) on dialysis (H)     Hyperkalemia     Weakness     Acute cough      Current Medications and Allergies:  See printed Medication Report.    Care Coordination Start Date: 3/1/2022   Frequency of Care Coordination: monthly     Form Last Updated: 07/19/2023

## 2023-06-07 ENCOUNTER — ALLIED HEALTH/NURSE VISIT (OUTPATIENT)
Dept: FAMILY MEDICINE | Facility: CLINIC | Age: 53
End: 2023-06-07
Payer: COMMERCIAL

## 2023-06-07 DIAGNOSIS — Z79.4 TYPE 2 DIABETES MELLITUS WITH OTHER SPECIFIED COMPLICATION, WITH LONG-TERM CURRENT USE OF INSULIN (H): Primary | ICD-10-CM

## 2023-06-07 DIAGNOSIS — E11.69 TYPE 2 DIABETES MELLITUS WITH OTHER SPECIFIED COMPLICATION, WITH LONG-TERM CURRENT USE OF INSULIN (H): Primary | ICD-10-CM

## 2023-06-07 NOTE — PROGRESS NOTES
"Patient here with her son today with some assistance to change her Freestyle Herson 2 sensor. Pt states the sensor came off on 6/2 therefore, she does not have any BS readings for after 6/2. Writer noted that there were a few \"HI\" logged readings- informed pt that when this shows on her screen, it means she needs to check her BS with via finger poke. Pt states since she has been using the sensor, she has not used the finger poke again.     Pt questioned about whether she was actually getting any readings or not as she expressed some concerns of whether she was doing this accurately or not. Writer informed and showed both pt and her son how to get into the logbook via the speedy- this will record all logs. Both pt and her son verbalized understanding.     RN assisted in placing on new sensor on the LT arm.     Informed would send these readings to PCP for review. We would reach out to pt with any new information or recommendations from PCP.    From AM to PM    5/24  HI ^    5/25  214  191  256  270   258     5/25  214  191  256  270  258    5/26  196  381  263  230  144  267    5/27  238  200  318  360  234  248  306    5/28  224  168  389    5/29  207  166  89  350    5/30  170  368  336  343  HI     5/31  137  133  243  387  257    6/1  188  191  222    6/2  279  287  223        "

## 2023-06-12 NOTE — PROGRESS NOTES
Relayed PCP's message below to pt using a phone . Informed pt it looks like the Ozempic PA was denied so will see what PCP would like to do. Also sent in Freestyle Herson Sensor 2 to the pharmacy. Will see if they cover.     Informed clinic would reach back out to pt with any new information.     Pt verbalized understanding and thanked for call.

## 2023-06-19 ENCOUNTER — OFFICE VISIT (OUTPATIENT)
Dept: FAMILY MEDICINE | Facility: CLINIC | Age: 53
End: 2023-06-19
Payer: COMMERCIAL

## 2023-06-19 ENCOUNTER — PATIENT OUTREACH (OUTPATIENT)
Dept: NURSING | Facility: CLINIC | Age: 53
End: 2023-06-19
Payer: COMMERCIAL

## 2023-06-19 VITALS
RESPIRATION RATE: 16 BRPM | WEIGHT: 107.25 LBS | TEMPERATURE: 98 F | BODY MASS INDEX: 20.25 KG/M2 | SYSTOLIC BLOOD PRESSURE: 110 MMHG | HEIGHT: 61 IN | DIASTOLIC BLOOD PRESSURE: 63 MMHG | HEART RATE: 81 BPM | OXYGEN SATURATION: 96 %

## 2023-06-19 DIAGNOSIS — D56.3 BETA THALASSEMIA MINOR: ICD-10-CM

## 2023-06-19 DIAGNOSIS — N18.6 ESRD (END STAGE RENAL DISEASE) ON DIALYSIS (H): ICD-10-CM

## 2023-06-19 DIAGNOSIS — Z00.00 ROUTINE GENERAL MEDICAL EXAMINATION AT A HEALTH CARE FACILITY: Primary | ICD-10-CM

## 2023-06-19 DIAGNOSIS — N18.5 CHRONIC KIDNEY DISEASE, STAGE V (H): ICD-10-CM

## 2023-06-19 DIAGNOSIS — E11.69 TYPE 2 DIABETES MELLITUS WITH OTHER SPECIFIED COMPLICATION, UNSPECIFIED WHETHER LONG TERM INSULIN USE (H): ICD-10-CM

## 2023-06-19 DIAGNOSIS — M62.81 GENERALIZED MUSCLE WEAKNESS: ICD-10-CM

## 2023-06-19 DIAGNOSIS — N18.4 ANEMIA OF CHRONIC RENAL FAILURE, STAGE 4 (SEVERE) (H): ICD-10-CM

## 2023-06-19 DIAGNOSIS — F32.A DEPRESSION, UNSPECIFIED DEPRESSION TYPE: ICD-10-CM

## 2023-06-19 DIAGNOSIS — D63.1 ANEMIA OF CHRONIC RENAL FAILURE, STAGE 4 (SEVERE) (H): ICD-10-CM

## 2023-06-19 DIAGNOSIS — I10 PRIMARY HYPERTENSION: ICD-10-CM

## 2023-06-19 DIAGNOSIS — Z99.2 ESRD (END STAGE RENAL DISEASE) ON DIALYSIS (H): ICD-10-CM

## 2023-06-19 PROCEDURE — 99214 OFFICE O/P EST MOD 30 MIN: CPT | Mod: 25 | Performed by: FAMILY MEDICINE

## 2023-06-19 PROCEDURE — 99396 PREV VISIT EST AGE 40-64: CPT | Performed by: FAMILY MEDICINE

## 2023-06-19 RX ORDER — FLASH GLUCOSE SENSOR
KIT MISCELLANEOUS
Qty: 6 EACH | Refills: 3 | Status: SHIPPED | OUTPATIENT
Start: 2023-06-19 | End: 2024-05-22

## 2023-06-19 ASSESSMENT — ENCOUNTER SYMPTOMS
EYE PAIN: 0
ABDOMINAL PAIN: 0
NERVOUS/ANXIOUS: 0
BREAST MASS: 0
PALPITATIONS: 0
MYALGIAS: 1
HEADACHES: 1
HEARTBURN: 1
PARESTHESIAS: 0
DYSURIA: 0
NAUSEA: 1
CONSTIPATION: 0
SORE THROAT: 0
WEAKNESS: 1
DIARRHEA: 1
COUGH: 0
CHILLS: 1
FREQUENCY: 0
DIZZINESS: 1
HEMATOCHEZIA: 0
ARTHRALGIAS: 0
SHORTNESS OF BREATH: 1
HEMATURIA: 0
JOINT SWELLING: 0
FEVER: 0

## 2023-06-19 NOTE — Clinical Note
Before you come to the hospital        Arrival time: AS DIRECTED BY OFFICE     YOU MAY TAKE THE FOLLOWING MEDICATION(S) THE MORNING OF SURGERY WITH A SIP OF WATER: ***  Ativan            ALL OTHER HOME MEDICATION CHECK WITH YOUR PHYSICIAN (especially if   you are taking diabetes medicines or blood thinners)      If you were given and instructed to use a germ- killing soap, use as directed the night before surgery and again the morning of surgery or as directed by your surgeon. (Use one-half of the bottle with each shower.)   See attached information for How to Use Chlorhexidine for Bathing if applicable.            Eating and drinking restrictions prior to scheduled arrival time    2 Hours before arrival time STOP   Drinking Clear liquids (water, apple juice-no pulp)     6 Hours before arrival time STOP   Milk or drinks that contain milk, full liquids    6 Hours before arrival time STOP   Light meals or foods, such as toast or cereal    8 Hours before arrival time STOP   Heavy foods, such as meat, fried foods, or fatty foods    (It is extremely important that you follow these guidelines to prevent delay or cancelation of your procedure)     Clear Liquids  Water and flavored water                                                                      Clear Fruit juices, such as cranberry juice and apple juice.  Black coffee (NO cream of any kind, including powdered).  Plain tea  Clear bouillon or broth.  Flavored gelatin.  Soda.  Gatorade or Powerade.  Full liquid examples  Juices that have pulp.  Frozen ice pops that contain fruit pieces.  Coffee with creamer  Milk.  Yogurt.                MANAGING PAIN AFTER SURGERY    We know you are probably wondering what your pain will be like after surgery.  Following surgery it is unrealistic to expect you will not have pain.   Pain is how our bodies let us know that something is wrong or cautions us to be careful.  That said, our goal is to make your pain tolerable.    Methods  Patient requested a reevaluation of her PCA hours, and I do think that is a good idea.  Since her last evaluation, she has started dialysis and I have noticed a significant change in her physical health.  Letter written, should be in her chart.  Can we help her request an increase in hours?  \ we may use to treat your pain include (oral or IV medications, PCAs, epidurals, nerve blocks, etc.)   While some procedures require IV pain medications for a short time after surgery, transitioning to pain medications by mouth allows for better management of pain.   Your nurse will encourage you to take oral pain medications whenever possible.  IV medications work almost immediately, but only last a short while.  Taking medications by mouth allows for a more constant level of medication in your blood stream for a longer period of time.      Once your pain is out of control it is harder to get back under control.  It is important you are aware when your next dose of pain medication is due.  If you are admitted, your nurse may write the time of your next dose on the white board in your room to help you remember.      We are interested in your pain and encourage you to inform us about aggravating factors during your visit.   Many times a simple repositioning every few hours can make a big difference.    If your physician says it is okay, do not let your pain prevent you from getting out of bed. Be sure to call your nurse for assistance prior to getting up so you do not fall.      Before surgery, please decide your tolerable pain goal.  These faces help describe the pain ratings we use on a 0-10 scale.   Be prepared to tell us your goal and whether or not you take pain or anxiety medications at home.          Preparing for Surgery  Preparing for surgery is an important part of your care. It can make things go more smoothly and help you avoid complications. The steps leading up to surgery may vary among hospitals. Follow all instructions given to you by your health care providers. Ask questions if you do not understand something. Talk about any concerns that you have.  Here are some questions to consider asking before your surgery:  If my surgery is not an emergency (is elective), when would be the best time to have the  surgery?  What arrangements do I need to make for work, home, or school?  What will my recovery be like? How long will it be before I can return to normal activities?  Will I need to prepare my home? Will I need to arrange care for me or my children?  Should I expect to have pain after surgery? What are my pain management options? Are there nonmedical options that I can try for pain?  Tell a health care provider about:  Any allergies you have.  All medicines you are taking, including vitamins, herbs, eye drops, creams, and over-the-counter medicines.  Any problems you or family members have had with anesthetic medicines.  Any blood disorders you have.  Any surgeries you have had.  Any medical conditions you have.  Whether you are pregnant or may be pregnant.  What are the risks?  The risks and complications of surgery depend on the specific procedure that you have. Discuss all the risks with your health care providers before your surgery. Ask about common surgical complications, which may include:  Infection.  Bleeding or a need for blood replacement (transfusion).  Allergic reactions to medicines.  Damage to surrounding nerves, tissues, or structures.  A blood clot.  Scarring.  Failure of the surgery to correct the problem.  Follow these instructions before the procedure:  Several days or weeks before your procedure  You may have a physical exam by your primary health care provider to make sure it is safe for you to have surgery.  You may have testing. This may include a chest X-ray, blood and urine tests, electrocardiogram (ECG), or other testing.  Ask your health care provider about:  Changing or stopping your regular medicines. This is especially important if you are taking diabetes medicines or blood thinners.  Taking medicines such as aspirin and ibuprofen. These medicines can thin your blood. Do not take these medicines unless your health care provider tells you to take them.  Taking over-the-counter  medicines, vitamins, herbs, and supplements.  Do not use any products that contain nicotine or tobacco, such as cigarettes and e-cigarettes. If you need help quitting, ask your health care provider.  Avoid alcohol.  Ask your health care provider if there are exercises you can do to prepare for surgery.  Eat a healthy diet.   Plan to have someone take you home from the hospital or clinic.  Plan to have a responsible adult care for you for at least 24 hours after you leave the hospital or clinic. This is important.  The day before your procedure  You may be given antibiotic medicine to take by mouth to help prevent infection. Take it as told by your health care provider.  You may be asked to shower with a germ-killing soap.  Follow instructions from your health care provider about eating and drinking restrictions. This includes gum, mints and hard candy.  Pack comfortable clothes according to your procedure.   The day of your procedure  You may need to take another shower with a germ-killing soap before you leave home in the morning.  With a small sip of water, take only the medicines that you are told to take.  Remove all jewelry including rings.   Leave anything you consider valuable at home except hearing aids if needed.  You do not need to bring your home medications into the hospital.   Do not wear any makeup, nail polish, powder, deodorant, lotion, hair accessories, or anything on your skin or body except your clothes.  If you will be staying in the hospital, bring a case to hold your glasses, contacts, or dentures. You may also want to bring your robe and non-skid footwear.       (Do not use denture adhesives since you will be asked to remove them during  surgery).   If you wear oxygen at home, bring it with you the day of surgery.  If instructed by your health care provider, bring your sleep apnea device with you on the day of your surgery (if this applies to you).  You may want to leave your suitcase and  sleep apnea device in the car until after surgery.   Arrive at the hospital as scheduled.  Bring a friend or family member with you who can help to answer questions and be present while you meet with your health care provider.  At the hospital  When you arrive at the hospital:  Go to registration located at the main entrance of the hospital. You will be registered and given a beeper and a sticker sheet. Take the stickers to the Outpatient nurses desk and place in the black tray. This is to notify staff that you have arrived. Then return to the lobby to wait.   When your beeper lights up and vibrates proceed through the double doors, under the stairs, and a member of the Outpatient Surgery staff will escort you to your preoperative room.  You may have to wear compression sleeves. These help to prevent blood clots and reduce swelling in your legs.  An IV may be inserted into one of your veins.              In the operating room, you may be given one or more of the following:        A medicine to help you relax (sedative).        A medicine to numb the area (local anesthetic).        A medicine to make you fall asleep (general anesthetic).        A medicine that is injected into an area of your body to numb everything below the                      injection site (regional anesthetic).  You may be given an antibiotic through your IV to help prevent infection.  Your surgical site will be marked or identified.    Contact a health care provider if you:  Develop a fever of more than 100.4°F (38°C) or other feelings of illness during the 48 hours before your surgery.  Have symptoms that get worse.  Have questions or concerns about your surgery.  Summary  Preparing for surgery can make the procedure go more smoothly and lower your risk of complications.  Before surgery, make a list of questions and concerns to discuss with your surgeon. Ask about the risks and possible complications.  In the days or weeks before your  surgery, follow all instructions from your health care provider. You may need to stop smoking, avoid alcohol, follow eating restrictions, and change or stop your regular medicines.  Contact your surgeon if you develop a fever or other signs of illness during the few days before your surgery.  This information is not intended to replace advice given to you by your health care provider. Make sure you discuss any questions you have with your health care provider.  Document Revised: 12/21/2018 Document Reviewed: 10/23/2018  Elsevier Patient Education © 2021 [employer]

## 2023-06-19 NOTE — PROGRESS NOTES
Clinic Care Coordination Contact    Follow Up Progress Note      Assessment: CCRN spoke with patient via phone. CCRN spoke Taiwanese with patient.     Wheel chair -received already per patient.    W/c Cushion - received already per patient.     Shower Chair - still has not received it yet.     CCRN tried calling [facility] today to check on shower chair status but the office was closed due to holiday.     Patient will discuss PT with PCP today. If patient agrees then will assist with appt. Possible will need new order. Message sent to PCP to discuss this with patient today during preventative care appt.     Care Gaps:    Health Maintenance Due   Topic Date Due     ZOSTER IMMUNIZATION (1 of 2) Never done     COVID-19 Vaccine (3 - Moderna risk series) 06/30/2022     BMP  05/13/2023     A1C  05/14/2023     YEARLY PREVENTIVE VISIT  06/02/2023     HEMOGLOBIN  08/13/2023       with PCP today    Care Plans  Care Plan: DEMs     Problem: Impaired mobility     Goal: I want a wheelchair. w/c cushion and shower chair in the next 90 days.     Start Date: 4/24/2023 Expected End Date: 7/24/2023    Recent Progress: 50%    Note:     Barriers: language  Strengths: Accepting of support  Patient expressed understanding of goal: Yes    Action steps to achieve this goal:  1. I will complete a face-to-face appointment with my PCP on 3/22/2023. ( Completed)  2. I will complete an OT/PT evaluation if it is recommended for me.  3. I will answer my phone when DME Provider [facility] contacts me to deliver or  my item.  4. I will follow up with CCC in the next month regarding this goal for additional coordination support.    Note: Order for wheel chair and shower chair were sent to DME Provider PCP on 3/22/2023. 5/26 Orders are at [facility] not [facility] - Wheel Chair/cushion delivered.     [facility] [phone]                        Plan:   CHW to follow up on shower chair status with next outreach.

## 2023-06-19 NOTE — PROGRESS NOTES
Assessment & Plan     There are no diagnoses linked to this encounter.        No follow-ups on file.    *** minutes spent on the date of the encounter doing chart review, history and exam, documentation and further activities per the note      Giancarlo Arizmendi MD  [facility] SHANKAR Barriga is a 53 year old, presenting for the following health issues:  No chief complaint on file.      HPI       Need for wheelchair: received.   Shower chair still pending.   ***    Increasing PCA hours:   Patient had PCA hours decreased by half hour because in the new evaluation she could feed herself, I guess something that changed from her previous evaluation.   ***    Review of Systems   Constitutional, HEENT, cardiovascular, pulmonary, gi and gu systems are negative, except as otherwise noted.      Objective    There were no vitals taken for this visit.  There is no height or weight on file to calculate BMI.  Physical Exam   GENERAL: healthy, alert and no distress  NECK: no adenopathy, no asymmetry, masses, or scars and thyroid normal to palpation  RESP: lungs clear to auscultation - no rales, rhonchi or wheezes  CV: regular rate and rhythm, normal S1 S2, no S3 or S4, no murmur, click or rub, no peripheral edema and peripheral pulses strong  ABDOMEN: soft, nontender, no hepatosplenomegaly, no masses and bowel sounds normal  MS: no gross musculoskeletal defects noted, no edema    Admission on 02/14/2023, Discharged on 02/14/2023   Component Date Value Ref Range Status     GLUCOSE BY METER POCT 02/14/2023 241 (H)  70 - 99 mg/dL Final     Hemoglobin A1C 02/14/2023 7.1 (H)  <5.7 % Final    Normal <5.7%   Prediabetes 5.7-6.4%    Diabetes 6.5% or higher     Note: Adopted from ADA consensus guidelines.     GLUCOSE BY METER POCT 02/14/2023 207 (H)  70 - 99 mg/dL Final

## 2023-06-19 NOTE — PROGRESS NOTES
SUBJECTIVE:   CC: Nithya is an 53 year old who presents for preventive health visit.     Healthy Habits:     Getting at least 3 servings of Calcium per day:  Yes    Bi-annual eye exam:  Yes    Dental care twice a year:  NO    Sleep apnea or symptoms of sleep apnea:  None    Diet:  Diabetic    Frequency of exercise:  None    Taking medications regularly:  Yes    Medication side effects:  None    PHQ-2 Total Score: 2    Additional concerns today:  No    Received wheelchair but not shower chair.   She started dialysis recently.   She has lost 10lbs in the past few months.   She has been weaker, nauseated, hard to eat.       Today's PHQ-2 Score:       2023     1:47 PM   PHQ-2 (  [employer])   Q1: Little interest or pleasure in doing things 1   Q2: Feeling down, depressed or hopeless 1   PHQ-2 Score 2   Q1: Little interest or pleasure in doing things Several days   Q2: Feeling down, depressed or hopeless Several days   PHQ-2 Score 2         Social History     Tobacco Use     Smoking status: Never     Passive exposure: Never     Smokeless tobacco: Never   Substance Use Topics     Alcohol use: Not Currently             2023     1:46 PM   Alcohol Use   Prescreen: >3 drinks/day or >7 drinks/week? No     Reviewed orders with patient.  Reviewed health maintenance and updated orders accordingly - Yes  Labs reviewed in EPIC    Breast Cancer Screenin/1/2022     3:34 PM   Breast CA Risk Assessment (FHS-7)   Do you have a family history of breast, colon, or ovarian cancer? No / Unknown       click delete button to remove this line now  Mammogram Screening: Recommended mammography every 1-2 years with patient discussion and risk factor consideration  Pertinent mammograms are reviewed under the imaging tab.    History of abnormal Pap smear: NO - age 30-65 PAP every 5 years with negative HPV co-testing recommended     Reviewed and updated as needed this visit by clinical staff   Tobacco  Allergies  Meds   "Problems  Med Hx  Surg Hx  Fam Hx          Reviewed and updated as needed this visit by Provider   Tobacco  Allergies  Meds  Problems  Med Hx  Surg Hx  Fam Hx             Review of Systems   Constitutional: Positive for chills. Negative for fever.   HENT: Negative for congestion, ear pain, hearing loss and sore throat.    Eyes: Negative for pain and visual disturbance.   Respiratory: Positive for shortness of breath. Negative for cough.    Cardiovascular: Positive for peripheral edema. Negative for chest pain and palpitations.   Gastrointestinal: Positive for diarrhea, heartburn and nausea. Negative for abdominal pain, constipation and hematochezia.   Breasts:  Negative for tenderness, breast mass and discharge.   Genitourinary: Negative for dysuria, frequency, genital sores, hematuria, pelvic pain, urgency, vaginal bleeding and vaginal discharge.   Musculoskeletal: Positive for myalgias. Negative for arthralgias and joint swelling.   Skin: Negative for rash.   Neurological: Positive for dizziness, weakness and headaches. Negative for paresthesias.   Psychiatric/Behavioral: Negative for mood changes. The patient is not nervous/anxious.         OBJECTIVE:   /63   Pulse 81   Temp 98  F (36.7  C) (Oral)   Resp 16   Ht 1.557 m (5' 1.3\")   Wt 48.6 kg (107 lb 4 oz)   SpO2 96%   BMI 20.07 kg/m    Physical Exam  GENERAL: healthy, alert and no distress  NECK: no adenopathy, no asymmetry, masses, or scars and thyroid normal to palpation  RESP: lungs clear to auscultation - no rales, rhonchi or wheezes  CV: regular rate and rhythm, normal S1 S2, no S3 or S4, no murmur, click or rub, no peripheral edema and peripheral pulses strong  ABDOMEN: soft, nontender, no hepatosplenomegaly, no masses and bowel sounds normal  MS: no gross musculoskeletal defects noted, no edema    Diagnostic Test Results:  Labs reviewed in [facility]    Wt Readings from Last 4 Encounters:   06/19/23 48.6 kg (107 lb 4 oz)   04/10/23 48.8 " kg (107 lb 8 oz)   03/22/23 48.9 kg (107 lb 12 oz)   02/21/23 53.1 kg (117 lb)       BP Readings from Last 3 Encounters:   06/19/23 110/63   04/10/23 130/68   04/03/23 123/70         ASSESSMENT/PLAN:   Barriga was seen today for physical.    Diagnoses and all orders for this visit:    Routine general medical examination at a health care facility    Primary hypertension  Chronic kidney disease, stage V (H)  ESRD (end stage renal disease) on dialysis (H)  Generalized muscle weakness  Anemia of chronic renal failure, stage 4 (severe) (H)  Beta thalassemia minor  Patient recently started dialysis, significant weakness but tolerating it okay.  Patient recently had her PCA hours decreased by 30 minutes at her evaluation but has had a significant change in her health status and treatment plan since then.  I will write a letter recommending a reevaluation hopefully for an increase in hours.  Risk of having decreased hours discussed with patient and her family.  I cannot ask for an increase in hours, I can only ask for a reevaluation due to a change in health status.  Labs should be checked with her dialysis team, not available in chart.  I do not think I need to check her BMP and hemoglobin, will request an KRISTINA.        Depression, unspecified depression type  Stable, no changes in medications today.  Following with therapist    Type 2 diabetes mellitus with other specified complication, unspecified whether long term insulin use (H)  -     Continuous Blood Gluc Sensor (FREESTYLE MICKIE 14 DAY SENSOR) MISC; USE 1 DEVICE EVERY 14 DAYS CHANGE SENSOR AS DIRECTED EVERY 14 DAYS    Other orders  -     PRIMARY CARE FOLLOW-UP SCHEDULING; Future        Dialysis 3 times per week.   She feels very weak and has a poor appetite. She somewhat recovers after 48 hours but then she gets her next dialysis treatment.   She is incontinent of feces and urine. Though she is on dialysis she is not yet anuric.   She would would like something to sit on in  the living room, but I don't think that's something we can provide. She would likely need to use her wheelchair.   They check her blood when she does dialysis, I do not think we need to do it here.     She hasn't had any censors so she has not been able to check her glucose for a few days.   Discussed with MTM, they will call to check on it.   She gets symptomatic lows once per week, in the 70s-60s.   She does not have her meter today.   Since starting dialysis she doesn't feel hungry at all.     Will check on shower chair and PCA hours.   I do think she would benefit from more pca hours since starting dialysis.         COUNSELING:  Reviewed preventive health counseling, as reflected in patient instructions        She reports that she has never smoked. She has never been exposed to tobacco smoke. She has never used smokeless tobacco.      Giancarlo Arizmendi MD  [facility]

## 2023-06-19 NOTE — PATIENT INSTRUCTIONS
Keep lantus the same.   If your sugars are low, skip the novolog.     You should be able to get censors delivered from the pharmacy soon.     Bring your meter to your appointment with the specialist.           Preventive Health Recommendations  Female Ages 50 - 64    Yearly exam: See your health care provider every year in order to  Review health changes.   Discuss preventive care.    Review your medicines if your doctor has prescribed any.    Get a Pap test every three years (unless you have an abnormal result and your provider advises testing more often).  If you get Pap tests with HPV test, you only need to test every 5 years, unless you have an abnormal result.   You do not need a Pap test if your uterus was removed (hysterectomy) and you have not had cancer.  You should be tested each year for STDs (sexually transmitted diseases) if you're at risk.   Have a mammogram every 1 to 2 years.  Have a colonoscopy at age 50, or have a yearly FIT test (stool test). These exams screen for colon cancer.    Have a cholesterol test every 5 years, or more often if advised.  Have a diabetes test (fasting glucose) every three years. If you are at risk for diabetes, you should have this test more often.   If you are at risk for osteoporosis (brittle bone disease), think about having a bone density scan (DEXA).    Shots: Get a flu shot each year. Get a tetanus shot every 10 years.    Nutrition:   Eat at least 5 servings of fruits and vegetables each day.  Eat whole-grain bread, whole-wheat pasta and brown rice instead of white grains and rice.  Get adequate Calcium and Vitamin D.     Lifestyle  Exercise at least 150 minutes a week (30 minutes a day, 5 days a week). This will help you control your weight and prevent disease.  Limit alcohol to one drink per day.  No smoking.   Wear sunscreen to prevent skin cancer.   See your dentist every six months for an exam and cleaning.  See your eye doctor every 1 to 2 years.

## 2023-06-19 NOTE — LETTER
June 23, 2023      Nithya Matthews  [address] [place] [address]        To Whom It May Concern,     Nithya Matthews is a patient at our clinic and I am her primary care provider.  I have been working with the patient since 2022.  I am writing this letter in support of having a reevaluation for PCA hours.  Since her last evaluation, which was only a few months ago, she says that significant change in her health status that is expected to not improve, it may get worse but at best it will remain the same as it is today when I saw her in clinic.  After her recent evaluation, her hours were decreased by 30 minutes because she was now able to feed herself on her own in comparison to the evaluation done a year ago.  That has not changed, however she has significant changes as explained below.    Patient has a diagnosis of complex diabetes, hypertension, worsening renal function due to multiple health issues.  Within the past 2 months, she had a fistula placed in preparation for dialysis due to end-stage renal disease.  She started dialysis 3 times per week within the past month which is a significant change for her.  She is experienced increased weakness, fatigue, increased needs and transportation due to the 3 times a week appointments.  Within the past month, she has lost 10 pounds due to chronic illness.  I do not know if she will regain her strength, but the dialysis has taken a significant impact on her mental health as well as her physical state.  She needs more help with transfers, ambulation, day-to-day activities like cooking, cleaning, personal hygiene, laundry, medication management, and as mentioned before, transportation to multiple appointments with specialists and her dialysis appointments 3 times a week.  Her diabetes needs closer monitoring and will need frequent adjustments and visits with multiple specialists because of the dialysis.    I support her request for reevaluation of her PCA hours.  This is a  significant change in her health and treatment plans/regimen since her last evaluation. She has been completely compliant with treatment plans from her specialists and with her primary care team.    Please contact me with any questions or concerns.      Sincerely,    Electronically signed by Giancarlo Arizmendi MD  06/23/23 at 12:55 PM

## 2023-06-23 ENCOUNTER — PATIENT OUTREACH (OUTPATIENT)
Dept: CARE COORDINATION | Facility: CLINIC | Age: 53
End: 2023-06-23
Payer: COMMERCIAL

## 2023-06-23 DIAGNOSIS — Z79.4 TYPE 2 DIABETES MELLITUS WITH OTHER SPECIFIED COMPLICATION, WITH LONG-TERM CURRENT USE OF INSULIN (H): ICD-10-CM

## 2023-06-23 DIAGNOSIS — E11.69 TYPE 2 DIABETES MELLITUS WITH OTHER SPECIFIED COMPLICATION, WITH LONG-TERM CURRENT USE OF INSULIN (H): ICD-10-CM

## 2023-06-23 RX ORDER — GLIPIZIDE 5 MG/1
5 TABLET ORAL
Qty: 60 TABLET | Refills: 0 | Status: CANCELLED | OUTPATIENT
Start: 2023-06-23

## 2023-06-23 RX ORDER — GLIPIZIDE 5 MG/1
5 TABLET ORAL
Qty: 60 TABLET | Refills: 0 | Status: SHIPPED | OUTPATIENT
Start: 2023-06-23 | End: 2023-07-17

## 2023-06-23 NOTE — Clinical Note
BIJAN Arizmendi,  [facility] will fax new Certificate of Need form, patient will be due for renewal after 8/23. This form ensures that  assists with transporting from door to door. Thank you.  Irene Samson [facility] Care Coordination [facility]   Phone: [phone]

## 2023-06-23 NOTE — TELEPHONE ENCOUNTER
"Routing refill request to provider for review/approval because:  Labs out of range:  CR is 0.40 Low. This is a pass.    Last Written Prescription Date:  5/16/23  Last Fill Quantity: 60,  # refills: 0   Last office visit provider:  5/24/23     Requested Prescriptions   Pending Prescriptions Disp Refills     glipiZIDE (GLUCOTROL) 5 MG tablet 60 tablet 3     Sig: Take 1 tablet (5 mg) by mouth 2 times daily (before meals)       Sulfonylurea Agents Failed - 6/23/2023  1:15 PM        Failed - Patient has a recent creatinine (normal) within the past 12 mos.     Recent Labs   Lab Test 05/16/23  1357   CR 0.40*       Ok to refill medication if creatinine is low          Passed - Patient has documented A1c within the specified period of time.     If HgbA1C is 8 or greater, it needs to be on file within the past 3 months.  If less than 8, must be on file within the past 6 months.     Recent Labs   Lab Test 05/16/23  1357   A1C 13.7*             Passed - Medication is active on med list        Passed - Patient is age 18 or older        Passed - No active pregnancy on record        Passed - Patient has not had a positive pregnancy test within the past 12 mos.        Passed - Recent (6 mo) or future (30 days) visit within the authorizing provider's specialty     Patient had office visit in the last 6 months or has a visit in the next 30 days with authorizing provider or within the authorizing provider's specialty.  See \"Patient Info\" tab in inbasket, or \"Choose Columns\" in Meds & Orders section of the refill encounter.                 Marixa Trinidad RN 06/23/23 1:15 PM  "

## 2023-06-23 NOTE — PROGRESS NOTES
Clinic Care Coordination Contact    Community Health Worker Follow Up  Spoke with ByronFritz (Daughter). Fritz speaks English and declined [employer]       Care Gaps:     Health Maintenance Due   Topic Date Due     ZOSTER IMMUNIZATION (1 of 2) Never done     COVID-19 Vaccine (3 - Moderna risk series) 06/30/2022     BMP  05/13/2023     A1C  05/14/2023     HEMOGLOBIN  08/13/2023     Scheduled 9/20 at 11:40AM with Dr. Arizmendi.      Care Plan:   Care Plan: DEMs     Problem: Impaired mobility     Goal: I want a wheelchair. w/c cushion and shower chair in the next 90 days.     Start Date: 4/24/2023 Expected End Date: 7/24/2023    This Visit's Progress: 50% Recent Progress: 50%    Note:     Barriers: language  Strengths: Accepting of support  Patient expressed understanding of goal: Yes    Action steps to achieve this goal:  1. I will complete a face-to-face appointment with my PCP on 3/22/2023. ( Completed)  2. I will complete an OT/PT evaluation if it is recommended for me.  3. I will answer my phone when DME Provider [employer] contacts me to deliver or  my item.  4. I will follow up with CCC in the next month regarding this goal for additional coordination support.    Note: Order for wheel chair and shower chair were sent to DME Provider PCP on 3/22/2023. 5/26 Orders are at [facility] not [facility] - Wheel Chair/cushion delivered.     [facility] [phone]  Complex Rehab Team at [facility] [phone] (bath bench lift)                    Intervention and Education during outreach:     6/1/23 Fanny, Complex Rehab Team at [facility] returned call, left message stating she's following up on the bath bench lift. Fanny will call patient or daughter directly and start this process with her. Please call with any questions, [phone].     Fritz confirmed that Complex Rehab Team called her and said they would be calling back to schedule measurement for bath bench lift but she has no received follow  up call yet.       Conference called Rehab Team at [facility] [phone], left message requesting return call to Fritz or CHW for assistance, checking status of appointment for measurement for bath bench lift. Fritz will watch for follow up call and will notify CCC team if she needs any support.       CHW reviewed upcoming appointments for patient and will assist with transportation as needed:  -7/12 at 11:15AM with Negin Nunes, DENISE. Fritz confirmed, she will attend visit with patient, total of two riding. CHW called [facility]  [phone], ride confirmed with [employer] [phone].  Male scheduled at [facility] states, patients Certificate of Need for door to door assistance will be good from 6/23 - 8/23. He will fax new Certificate of Need from for renewal. CHW made update, patient will need wheelchair accessibility.     -9/20 at 11:40AM Dr. Arizmendi   -10/06 at 10:05 Miracle Mi CHW Plan: CHW to follow up in 1 month    Irene Samson  [facility] Care Coordination  [facility]    Phone: [phone]

## 2023-06-27 ENCOUNTER — APPOINTMENT (OUTPATIENT)
Dept: RADIOLOGY | Facility: HOSPITAL | Age: 53
End: 2023-06-27
Attending: EMERGENCY MEDICINE
Payer: COMMERCIAL

## 2023-06-27 ENCOUNTER — HOSPITAL ENCOUNTER (OUTPATIENT)
Facility: HOSPITAL | Age: 53
Setting detail: OBSERVATION
Discharge: HOME | End: 2023-06-28
Attending: EMERGENCY MEDICINE | Admitting: HOSPITALIST
Payer: COMMERCIAL

## 2023-06-27 ENCOUNTER — APPOINTMENT (OUTPATIENT)
Dept: CT IMAGING | Facility: HOSPITAL | Age: 53
End: 2023-06-27
Attending: EMERGENCY MEDICINE
Payer: COMMERCIAL

## 2023-06-27 DIAGNOSIS — R05.1 ACUTE COUGH: ICD-10-CM

## 2023-06-27 DIAGNOSIS — E87.5 HYPERKALEMIA: ICD-10-CM

## 2023-06-27 DIAGNOSIS — J40 BRONCHITIS: ICD-10-CM

## 2023-06-27 DIAGNOSIS — I10 PRIMARY HYPERTENSION: Primary | ICD-10-CM

## 2023-06-27 DIAGNOSIS — R53.1 WEAKNESS: ICD-10-CM

## 2023-06-27 LAB
ALBUMIN SERPL BCG-MCNC: 3.6 G/DL (ref 3.5–5.2)
ALP SERPL-CCNC: 65 U/L (ref 35–104)
ALT SERPL W P-5'-P-CCNC: 17 U/L (ref 0–50)
ANION GAP SERPL CALCULATED.3IONS-SCNC: 14 MMOL/L (ref 7–15)
AST SERPL W P-5'-P-CCNC: 29 U/L (ref 0–45)
BASOPHILS # BLD AUTO: 0 10E3/UL (ref 0–0.2)
BASOPHILS NFR BLD AUTO: 0 %
BILIRUB SERPL-MCNC: 0.4 MG/DL
BUN SERPL-MCNC: 44 MG/DL (ref 6–20)
CALCIUM SERPL-MCNC: 7.3 MG/DL (ref 8.6–10)
CHLORIDE SERPL-SCNC: 99 MMOL/L (ref 98–107)
CREAT SERPL-MCNC: 7.86 MG/DL (ref 0.51–0.95)
DEPRECATED HCO3 PLAS-SCNC: 20 MMOL/L (ref 22–29)
EOSINOPHIL # BLD AUTO: 0.4 10E3/UL (ref 0–0.7)
EOSINOPHIL NFR BLD AUTO: 4 %
ERYTHROCYTE [DISTWIDTH] IN BLOOD BY AUTOMATED COUNT: 21.1 % (ref 10–15)
FERRITIN SERPL-MCNC: 1198 NG/ML (ref 11–328)
FLUAV RNA SPEC QL NAA+PROBE: NEGATIVE
FLUBV RNA RESP QL NAA+PROBE: NEGATIVE
GFR SERPL CREATININE-BSD FRML MDRD: 6 ML/MIN/1.73M2
GLUCOSE BLDC GLUCOMTR-MCNC: 116 MG/DL (ref 70–99)
GLUCOSE BLDC GLUCOMTR-MCNC: 154 MG/DL (ref 70–99)
GLUCOSE BLDC GLUCOMTR-MCNC: 167 MG/DL (ref 70–99)
GLUCOSE BLDC GLUCOMTR-MCNC: 242 MG/DL (ref 70–99)
GLUCOSE BLDC GLUCOMTR-MCNC: 97 MG/DL (ref 70–99)
GLUCOSE SERPL-MCNC: 255 MG/DL (ref 70–99)
HCT VFR BLD AUTO: 26.1 % (ref 35–47)
HEMOCCULT STL QL: NEGATIVE
HGB BLD-MCNC: 8 G/DL (ref 11.7–15.7)
IMM GRANULOCYTES # BLD: 0.1 10E3/UL
IMM GRANULOCYTES NFR BLD: 1 %
IRON BINDING CAPACITY (ROCHE): 249 UG/DL (ref 240–430)
IRON SATN MFR SERPL: 7 % (ref 15–46)
IRON SERPL-MCNC: 18 UG/DL (ref 37–145)
LACTATE SERPL-SCNC: 1.7 MMOL/L (ref 0.7–2)
LYMPHOCYTES # BLD AUTO: 0.8 10E3/UL (ref 0.8–5.3)
LYMPHOCYTES NFR BLD AUTO: 8 %
MCH RBC QN AUTO: 22 PG (ref 26.5–33)
MCHC RBC AUTO-ENTMCNC: 30.7 G/DL (ref 31.5–36.5)
MCV RBC AUTO: 72 FL (ref 78–100)
MONOCYTES # BLD AUTO: 0.8 10E3/UL (ref 0–1.3)
MONOCYTES NFR BLD AUTO: 8 %
NEUTROPHILS # BLD AUTO: 8 10E3/UL (ref 1.6–8.3)
NEUTROPHILS NFR BLD AUTO: 79 %
NRBC # BLD AUTO: 0 10E3/UL
NRBC BLD AUTO-RTO: 0 /100
PLATELET # BLD AUTO: 152 10E3/UL (ref 150–450)
POTASSIUM SERPL-SCNC: 5.8 MMOL/L (ref 3.4–5.3)
PROT SERPL-MCNC: 7.4 G/DL (ref 6.4–8.3)
RBC # BLD AUTO: 3.63 10E6/UL (ref 3.8–5.2)
RSV RNA SPEC NAA+PROBE: NEGATIVE
SARS-COV-2 RNA RESP QL NAA+PROBE: NEGATIVE
SODIUM SERPL-SCNC: 133 MMOL/L (ref 136–145)
VIT B12 SERPL-MCNC: 1095 PG/ML (ref 232–1245)
WBC # BLD AUTO: 10.2 10E3/UL (ref 4–11)

## 2023-06-27 PROCEDURE — 83550 IRON BINDING TEST: CPT | Performed by: HOSPITALIST

## 2023-06-27 PROCEDURE — 93005 ELECTROCARDIOGRAM TRACING: CPT | Performed by: EMERGENCY MEDICINE

## 2023-06-27 PROCEDURE — 90935 HEMODIALYSIS ONE EVALUATION: CPT

## 2023-06-27 PROCEDURE — 90935 HEMODIALYSIS ONE EVALUATION: CPT | Performed by: INTERNAL MEDICINE

## 2023-06-27 PROCEDURE — 80053 COMPREHEN METABOLIC PANEL: CPT | Performed by: EMERGENCY MEDICINE

## 2023-06-27 PROCEDURE — 99285 EMERGENCY DEPT VISIT HI MDM: CPT | Mod: 25

## 2023-06-27 PROCEDURE — 87486 CHLMYD PNEUM DNA AMP PROBE: CPT | Performed by: STUDENT IN AN ORGANIZED HEALTH CARE EDUCATION/TRAINING PROGRAM

## 2023-06-27 PROCEDURE — 82962 GLUCOSE BLOOD TEST: CPT

## 2023-06-27 PROCEDURE — 250N000013 HC RX MED GY IP 250 OP 250 PS 637: Performed by: EMERGENCY MEDICINE

## 2023-06-27 PROCEDURE — 999N000074 HC STATISTIC HEMODIALYSIS > 4 HR PER HR

## 2023-06-27 PROCEDURE — G0378 HOSPITAL OBSERVATION PER HR: HCPCS

## 2023-06-27 PROCEDURE — 74176 CT ABD & PELVIS W/O CONTRAST: CPT

## 2023-06-27 PROCEDURE — 250N000013 HC RX MED GY IP 250 OP 250 PS 637: Performed by: HOSPITALIST

## 2023-06-27 PROCEDURE — 82607 VITAMIN B-12: CPT | Performed by: HOSPITALIST

## 2023-06-27 PROCEDURE — 250N000011 HC RX IP 250 OP 636: Performed by: HOSPITALIST

## 2023-06-27 PROCEDURE — 99223 1ST HOSP IP/OBS HIGH 75: CPT | Performed by: HOSPITALIST

## 2023-06-27 PROCEDURE — 82272 OCCULT BLD FECES 1-3 TESTS: CPT | Performed by: HOSPITALIST

## 2023-06-27 PROCEDURE — 96375 TX/PRO/DX INJ NEW DRUG ADDON: CPT

## 2023-06-27 PROCEDURE — 82728 ASSAY OF FERRITIN: CPT | Performed by: HOSPITALIST

## 2023-06-27 PROCEDURE — 36415 COLL VENOUS BLD VENIPUNCTURE: CPT | Performed by: EMERGENCY MEDICINE

## 2023-06-27 PROCEDURE — 87040 BLOOD CULTURE FOR BACTERIA: CPT | Performed by: EMERGENCY MEDICINE

## 2023-06-27 PROCEDURE — 250N000013 HC RX MED GY IP 250 OP 250 PS 637: Performed by: STUDENT IN AN ORGANIZED HEALTH CARE EDUCATION/TRAINING PROGRAM

## 2023-06-27 PROCEDURE — 85025 COMPLETE CBC W/AUTO DIFF WBC: CPT | Performed by: EMERGENCY MEDICINE

## 2023-06-27 PROCEDURE — 250N000012 HC RX MED GY IP 250 OP 636 PS 637: Performed by: HOSPITALIST

## 2023-06-27 PROCEDURE — 99204 OFFICE O/P NEW MOD 45 MIN: CPT | Mod: 25 | Performed by: INTERNAL MEDICINE

## 2023-06-27 PROCEDURE — 250N000012 HC RX MED GY IP 250 OP 636 PS 637: Performed by: STUDENT IN AN ORGANIZED HEALTH CARE EDUCATION/TRAINING PROGRAM

## 2023-06-27 PROCEDURE — 96365 THER/PROPH/DIAG IV INF INIT: CPT | Mod: XU

## 2023-06-27 PROCEDURE — 90937 HEMODIALYSIS REPEATED EVAL: CPT

## 2023-06-27 PROCEDURE — 83605 ASSAY OF LACTIC ACID: CPT | Performed by: EMERGENCY MEDICINE

## 2023-06-27 PROCEDURE — 93005 ELECTROCARDIOGRAM TRACING: CPT | Performed by: HOSPITALIST

## 2023-06-27 PROCEDURE — 250N000009 HC RX 250: Performed by: STUDENT IN AN ORGANIZED HEALTH CARE EDUCATION/TRAINING PROGRAM

## 2023-06-27 PROCEDURE — 87637 SARSCOV2&INF A&B&RSV AMP PRB: CPT | Performed by: EMERGENCY MEDICINE

## 2023-06-27 PROCEDURE — 71046 X-RAY EXAM CHEST 2 VIEWS: CPT

## 2023-06-27 PROCEDURE — 96366 THER/PROPH/DIAG IV INF ADDON: CPT

## 2023-06-27 RX ORDER — ATORVASTATIN CALCIUM 40 MG/1
80 TABLET, FILM COATED ORAL EVERY EVENING
Status: DISCONTINUED | OUTPATIENT
Start: 2023-06-27 | End: 2023-06-28 | Stop reason: HOSPADM

## 2023-06-27 RX ORDER — ESCITALOPRAM OXALATE 10 MG/1
10 TABLET ORAL DAILY
Status: DISCONTINUED | OUTPATIENT
Start: 2023-06-27 | End: 2023-06-28 | Stop reason: HOSPADM

## 2023-06-27 RX ORDER — NICOTINE POLACRILEX 4 MG
15-30 LOZENGE BUCCAL
Status: DISCONTINUED | OUTPATIENT
Start: 2023-06-27 | End: 2023-06-27

## 2023-06-27 RX ORDER — ACETAMINOPHEN 325 MG/1
650 TABLET ORAL EVERY 6 HOURS PRN
Status: DISCONTINUED | OUTPATIENT
Start: 2023-06-27 | End: 2023-06-28 | Stop reason: HOSPADM

## 2023-06-27 RX ORDER — AMLODIPINE BESYLATE 5 MG/1
5 TABLET ORAL DAILY
Status: DISCONTINUED | OUTPATIENT
Start: 2023-06-27 | End: 2023-06-27

## 2023-06-27 RX ORDER — ONDANSETRON 4 MG/1
4 TABLET, ORALLY DISINTEGRATING ORAL EVERY 6 HOURS PRN
Status: DISCONTINUED | OUTPATIENT
Start: 2023-06-27 | End: 2023-06-28 | Stop reason: HOSPADM

## 2023-06-27 RX ORDER — LIRAGLUTIDE 6 MG/ML
1.8 INJECTION SUBCUTANEOUS EVERY MORNING
Status: DISCONTINUED | OUTPATIENT
Start: 2023-06-27 | End: 2023-06-28 | Stop reason: HOSPADM

## 2023-06-27 RX ORDER — DOXYCYCLINE 100 MG/10ML
100 INJECTION, POWDER, LYOPHILIZED, FOR SOLUTION INTRAVENOUS EVERY 12 HOURS
Status: DISCONTINUED | OUTPATIENT
Start: 2023-06-27 | End: 2023-06-28 | Stop reason: HOSPADM

## 2023-06-27 RX ORDER — FERROUS SULFATE 325(65) MG
325 TABLET ORAL DAILY
Status: DISCONTINUED | OUTPATIENT
Start: 2023-06-27 | End: 2023-06-28 | Stop reason: HOSPADM

## 2023-06-27 RX ORDER — ASPIRIN 81 MG/1
81 TABLET ORAL DAILY
Status: DISCONTINUED | OUTPATIENT
Start: 2023-06-27 | End: 2023-06-28 | Stop reason: HOSPADM

## 2023-06-27 RX ORDER — ESCITALOPRAM OXALATE 10 MG/1
10 TABLET ORAL DAILY
Status: DISCONTINUED | OUTPATIENT
Start: 2023-06-27 | End: 2023-06-27

## 2023-06-27 RX ORDER — CARVEDILOL 3.12 MG/1
3.12 TABLET ORAL 2 TIMES DAILY WITH MEALS
Status: DISCONTINUED | OUTPATIENT
Start: 2023-06-27 | End: 2023-06-28 | Stop reason: HOSPADM

## 2023-06-27 RX ORDER — METOPROLOL SUCCINATE 200 MG/1
200 TABLET, EXTENDED RELEASE ORAL DAILY
COMMUNITY
End: 2023-12-20

## 2023-06-27 RX ORDER — ALBUMIN (HUMAN) 12.5 G/50ML
50 SOLUTION INTRAVENOUS
Status: DISCONTINUED | OUTPATIENT
Start: 2023-06-27 | End: 2023-06-27

## 2023-06-27 RX ORDER — DEXTROSE MONOHYDRATE 25 G/50ML
25-50 INJECTION, SOLUTION INTRAVENOUS
Status: DISCONTINUED | OUTPATIENT
Start: 2023-06-27 | End: 2023-06-28 | Stop reason: HOSPADM

## 2023-06-27 RX ORDER — ACETAMINOPHEN 650 MG/1
650 SUPPOSITORY RECTAL EVERY 6 HOURS PRN
Status: DISCONTINUED | OUTPATIENT
Start: 2023-06-27 | End: 2023-06-28 | Stop reason: HOSPADM

## 2023-06-27 RX ORDER — GABAPENTIN 300 MG/1
300 CAPSULE ORAL AT BEDTIME
Status: DISCONTINUED | OUTPATIENT
Start: 2023-06-27 | End: 2023-06-28 | Stop reason: HOSPADM

## 2023-06-27 RX ORDER — CALCIUM CARBONATE 500 MG/1
3 TABLET, CHEWABLE ORAL 2 TIMES DAILY
COMMUNITY

## 2023-06-27 RX ORDER — NICOTINE POLACRILEX 4 MG
15-30 LOZENGE BUCCAL
Status: DISCONTINUED | OUTPATIENT
Start: 2023-06-27 | End: 2023-06-28 | Stop reason: HOSPADM

## 2023-06-27 RX ORDER — DEXTROSE MONOHYDRATE 25 G/50ML
25-50 INJECTION, SOLUTION INTRAVENOUS
Status: DISCONTINUED | OUTPATIENT
Start: 2023-06-27 | End: 2023-06-27

## 2023-06-27 RX ORDER — DEXTROSE MONOHYDRATE 25 G/50ML
50 INJECTION, SOLUTION INTRAVENOUS ONCE
Status: COMPLETED | OUTPATIENT
Start: 2023-06-27 | End: 2023-06-27

## 2023-06-27 RX ORDER — ASPIRIN 81 MG/1
81 TABLET ORAL DAILY
Status: DISCONTINUED | OUTPATIENT
Start: 2023-06-27 | End: 2023-06-27

## 2023-06-27 RX ORDER — CARVEDILOL 12.5 MG/1
12.5 TABLET ORAL 2 TIMES DAILY WITH MEALS
Status: DISCONTINUED | OUTPATIENT
Start: 2023-06-27 | End: 2023-06-27

## 2023-06-27 RX ORDER — ONDANSETRON 2 MG/ML
4 INJECTION INTRAMUSCULAR; INTRAVENOUS EVERY 6 HOURS PRN
Status: DISCONTINUED | OUTPATIENT
Start: 2023-06-27 | End: 2023-06-28 | Stop reason: HOSPADM

## 2023-06-27 RX ORDER — CALCIUM GLUCONATE 20 MG/ML
1 INJECTION, SOLUTION INTRAVENOUS ONCE
Status: COMPLETED | OUTPATIENT
Start: 2023-06-27 | End: 2023-06-27

## 2023-06-27 RX ORDER — IPRATROPIUM BROMIDE AND ALBUTEROL SULFATE 2.5; .5 MG/3ML; MG/3ML
3 SOLUTION RESPIRATORY (INHALATION)
Status: DISCONTINUED | OUTPATIENT
Start: 2023-06-27 | End: 2023-06-28 | Stop reason: HOSPADM

## 2023-06-27 RX ORDER — ATORVASTATIN CALCIUM 40 MG/1
80 TABLET, FILM COATED ORAL AT BEDTIME
Status: DISCONTINUED | OUTPATIENT
Start: 2023-06-27 | End: 2023-06-27

## 2023-06-27 RX ORDER — INSULIN ASPART 100 [IU]/ML
15 INJECTION, SOLUTION INTRAVENOUS; SUBCUTANEOUS
COMMUNITY
End: 2023-09-20

## 2023-06-27 RX ORDER — FENOFIBRATE 54 MG/1
54 TABLET ORAL EVERY MORNING
Status: DISCONTINUED | OUTPATIENT
Start: 2023-06-27 | End: 2023-06-28 | Stop reason: HOSPADM

## 2023-06-27 RX ORDER — MIRTAZAPINE 7.5 MG/1
7.5 TABLET, FILM COATED ORAL AT BEDTIME
Status: DISCONTINUED | OUTPATIENT
Start: 2023-06-27 | End: 2023-06-28 | Stop reason: HOSPADM

## 2023-06-27 RX ORDER — ACETAMINOPHEN 325 MG/1
650 TABLET ORAL ONCE
Status: COMPLETED | OUTPATIENT
Start: 2023-06-27 | End: 2023-06-27

## 2023-06-27 RX ORDER — LANOLIN ALCOHOL/MO/W.PET/CERES
1000 CREAM (GRAM) TOPICAL EVERY MORNING
Status: DISCONTINUED | OUTPATIENT
Start: 2023-06-27 | End: 2023-06-28 | Stop reason: HOSPADM

## 2023-06-27 RX ORDER — GUAIFENESIN 600 MG/1
600 TABLET, EXTENDED RELEASE ORAL 2 TIMES DAILY
Status: DISCONTINUED | OUTPATIENT
Start: 2023-06-27 | End: 2023-06-28 | Stop reason: HOSPADM

## 2023-06-27 RX ORDER — CALCIUM CARBONATE 500 MG/1
500 TABLET, CHEWABLE ORAL 3 TIMES DAILY
Status: DISCONTINUED | OUTPATIENT
Start: 2023-06-27 | End: 2023-06-28 | Stop reason: HOSPADM

## 2023-06-27 RX ADMIN — INSULIN ASPART 1 UNITS: 100 INJECTION, SOLUTION INTRAVENOUS; SUBCUTANEOUS at 17:46

## 2023-06-27 RX ADMIN — CARVEDILOL 3.12 MG: 3.12 TABLET, FILM COATED ORAL at 18:09

## 2023-06-27 RX ADMIN — GUAIFENESIN 600 MG: 600 TABLET ORAL at 09:35

## 2023-06-27 RX ADMIN — FERROUS SULFATE TAB 325 MG (65 MG ELEMENTAL FE) 325 MG: 325 (65 FE) TAB at 14:05

## 2023-06-27 RX ADMIN — ANTACID TABLETS 500 MG: 500 TABLET, CHEWABLE ORAL at 21:31

## 2023-06-27 RX ADMIN — FENOFIBRATE 54 MG: 54 TABLET ORAL at 14:24

## 2023-06-27 RX ADMIN — GUAIFENESIN 600 MG: 600 TABLET ORAL at 21:28

## 2023-06-27 RX ADMIN — CALCIUM GLUCONATE 1 G: 20 INJECTION, SOLUTION INTRAVENOUS at 08:21

## 2023-06-27 RX ADMIN — Medication 81 MG: at 09:35

## 2023-06-27 RX ADMIN — GABAPENTIN 300 MG: 300 CAPSULE ORAL at 21:29

## 2023-06-27 RX ADMIN — ACETAMINOPHEN 650 MG: 325 TABLET ORAL at 04:24

## 2023-06-27 RX ADMIN — INSULIN GLARGINE 40 UNITS: 100 INJECTION, SOLUTION SUBCUTANEOUS at 14:20

## 2023-06-27 RX ADMIN — MIRTAZAPINE 7.5 MG: 7.5 TABLET, FILM COATED ORAL at 22:42

## 2023-06-27 RX ADMIN — DOXYCYCLINE 100 MG: 100 INJECTION, POWDER, LYOPHILIZED, FOR SOLUTION INTRAVENOUS at 21:25

## 2023-06-27 RX ADMIN — ATORVASTATIN CALCIUM 80 MG: 40 TABLET, FILM COATED ORAL at 21:30

## 2023-06-27 RX ADMIN — ANTACID TABLETS 500 MG: 500 TABLET, CHEWABLE ORAL at 14:05

## 2023-06-27 RX ADMIN — DOXYCYCLINE 100 MG: 100 INJECTION, POWDER, LYOPHILIZED, FOR SOLUTION INTRAVENOUS at 14:00

## 2023-06-27 RX ADMIN — ESCITALOPRAM OXALATE 10 MG: 10 TABLET ORAL at 14:24

## 2023-06-27 RX ADMIN — LIRAGLUTIDE 1.8 MG: 6 INJECTION SUBCUTANEOUS at 14:26

## 2023-06-27 RX ADMIN — Medication 1000 MCG: at 14:24

## 2023-06-27 ASSESSMENT — ACTIVITIES OF DAILY LIVING (ADL)
ADLS_ACUITY_SCORE: 35
DEPENDENT_IADLS:: CLEANING;COOKING;LAUNDRY;SHOPPING;MEAL PREPARATION;MEDICATION MANAGEMENT;TRANSPORTATION
ADLS_ACUITY_SCORE: 35

## 2023-06-27 ASSESSMENT — ENCOUNTER SYMPTOMS
APPETITE CHANGE: 1
COUGH: 1
FATIGUE: 1
VOMITING: 1
FEVER: 1
WOUND: 0

## 2023-06-27 NOTE — ED NOTES
Bed: JNED-09  Expected date: 6/27/23  Expected time:   Means of arrival:   Comments:  HOLD for [facility] D[facility]

## 2023-06-27 NOTE — ED PROVIDER NOTES
EMERGENCY DEPARTMENT ENCOUNTER      NAME: Nithya Matthews  AGE: 53 year old female  YOB: 1970  MRN: [id number]  EVALUATION DATE & TIME: 2023  4:04 AM    PCP: Giancarlo Arizmendi    ED PROVIDER: Segundo Foote MD        Chief Complaint   Patient presents with     Fever     Generalized Body Aches         FINAL IMPRESSION:  1. Hyperkalemia    2. Weakness    3. Acute cough          ED COURSE & MEDICAL DECISION MAKIN:30 AM No available [facility] interpreters.   4:33 AM Introduced myself to the patient, obtained history of present illness, and performed initial physical exam at this time.   6:42 AM Discussed the case with the hospitalist.         Pertinent Labs & Imaging studies reviewed. (See chart for details)  53 year old female presents to the Emergency Department for evaluation of lysed weakness and malaise and cough      ED Course as of 23 0712      0458 Patient is history of end-stage renal disease does dialysis  last dialysis Saturday presents with 3 weeks of malaise, decreased appetite, generalized weakness, has had some intermittent vomiting, cough   0459 On exam she is tachycardic and has elevated temperature of 100.1.  Concern for possible sepsis therefore sepsis labs initiated including blood culture, lactic acid, CBC, CMP.  Also consider pneumonia with cough fever chest x-ray and COVID and influenza ordered.   0459 Has callus her right foot but infected diabetic ulcer unlikely.   0459 Considered pulmonary emboli but based on history and examination this seems unlikely.  Patient is likely mildly hypervolemic secondary to needing dialysis today.  Potassium is elevated but is scheduled for dialysis at 10 AM.  Elevated BUN and creatinine consistent with end-stage renal disease   0500 Hemoglobin 8 which is likely contributing to patient's weakness   0500 Lactic Acid: 1.7   0500 WBC: 10.2   0500 Urea Nitrogen(!): 44.0   0500 Creatinine(!): 7.86   0500  Potassium(!): 5.8     Labs notable for worsening anemia, elevated potassium and elevated creatinine, elevated BUN, low sodium all likely secondary to need for dialysis.    Suspect patient's anemia is contributing a lot to her symptoms.  With low-grade fever blood cultures were sent.  Plan for admission for observation.    Discussed with the hospitalist who agreed to admit for observation      Medical Decision Making    History:    Supplemental history from: Documented in chart, if applicable and Family Member/Significant Other    External Record(s) reviewed: Documented in chart, if applicable.    Work Up:    Chart documentation includes differential considered and any EKGs or imaging independently interpreted by provider, where specified.    In additional to work up documented, I considered the following work up: Documented in chart, if applicable.    External consultation:    Discussion of management with another provider: Documented in chart, if applicable    Complicating factors:    Care impacted by chronic illness: Chronic Kidney Disease    Care affected by social determinants of health: Access to Medical Care    Disposition considerations: Admit.          Voice recognition software was used in the creation of this note. Any grammatical or nonsensical errors are due to inherent errors with the software and are not the intention of the writer.         At the conclusion of the encounter I discussed the results of all of the tests and the disposition. The questions were answered. The patient or family acknowledged understanding and was agreeable with the care plan.           MEDICATIONS GIVEN IN THE EMERGENCY:  Medications   doxycycline (VIBRAMYCIN) 100 mg vial to attach to  mL bag (has no administration in time range)   guaiFENesin (MUCINEX) 12 hr tablet 600 mg (has no administration in time range)   ipratropium - albuterol 0.5 mg/2.5 mg/3 mL (DUONEB) neb solution 3 mL (has no administration in time range)  "  acetaminophen (TYLENOL) tablet 650 mg (650 mg Oral $Given 6/27/23 0424)       NEW PRESCRIPTIONS STARTED AT TODAY'S ER VISIT  New Prescriptions    No medications on file          =================================================================    HPI    Triage note  \" \"      Patient information was obtained from: Patient     Use of : Yes ( Phone ) - Language Ruben Matthews is a 53 year old female with a pertinent history of HTN, HLD, hyperglycemia, GERD, celiac disease, DM Type 2, anemia of chronic renale failure stage 4, ESRD, diabetic ulcer on right foot who presents to this ED by EMS for evaluation of fever and generalized body aches.    Patient reports 3 weeks of not feeling well including fever and generalized body aches. She endorses a cough, decreased urine, vomiting (on 06/21), decreased appetite, and difficulty walking. She states she dialyzes 3x a week, last on Saturday (6/24) and is due for another appointment today. Reports last bowel movement yesterday.    Denies any open wounds or sores.       REVIEW OF SYSTEMS   Review of Systems   Constitutional: Positive for appetite change (decreased appetite), fatigue and fever.   Respiratory: Positive for cough.    Gastrointestinal: Positive for vomiting.   Genitourinary: Positive for decreased urine volume.   Skin: Negative for wound.   All other systems reviewed and are negative.       PAST MEDICAL HISTORY:  Past Medical History:   Diagnosis Date     Anuria      Arthritis      Depression      Diabetes (H)      End stage renal disease (H)      Hypertension        PAST SURGICAL HISTORY:  Past Surgical History:   Procedure Laterality Date     ABDOMEN SURGERY       APPENDECTOMY       BIOPSY       CATARACT IOL, RT/LT       CREATE FISTULA ARTERIOVENOUS UPPER EXTREMITY Right 2/14/2023    Procedure: right upper extremity Arteriovenous fistula creation;  Surgeon: Markell Langford MD;  Location: [facility] OR           CURRENT MEDICATIONS:    acetaminophen " (TYLENOL) 500 MG tablet  Alcohol Swabs (ALCOHOL PADS) 70 % PADS  amLODIPine (NORVASC) 5 MG tablet  aspirin 81 MG EC tablet  atorvastatin (LIPITOR) 80 MG tablet  calcitRIOL (ROCALTROL) 0.5 MCG capsule  calcium acetate (PHOSLO) 667 MG CAPS capsule  carvedilol (COREG) 12.5 MG tablet  Continuous Blood Gluc Sensor (FREESTYLE MICKIE 14 DAY SENSOR) MISC  cyanocobalamin (VITAMIN B-12) 1000 MCG tablet  diclofenac (VOLTAREN) 1 % topical gel  escitalopram (LEXAPRO) 10 MG tablet  fenofibrate (LOFIBRA) 54 MG tablet  ferrous sulfate (FEROSUL) 325 (65 Fe) MG tablet  fish oil-omega-3 fatty acids 1000 MG capsule  gabapentin (NEURONTIN) 300 MG capsule  Glucagon (BAQSIMI ONE PACK) 3 MG/DOSE POWD  insulin aspart (NOVOLOG PEN) 100 UNIT/ML pen  insulin glargine (LANTUS SOLOSTAR) 100 UNIT/ML pen  insulin pen needle (32G X 4 MM) 32G X 4 MM miscellaneous  liraglutide (VICTOZA) 18 MG/3ML solution  lisinopril (ZESTRIL) 20 MG tablet  mirtazapine (REMERON) 7.5 MG tablet  omeprazole 20 MG tablet  povidone-iodine (BETADINE) 10 % topical solution  REFRESH OPTIVE ADVANCED 0.5-1-0.5 % SOLN  vitamin D2 (ERGOCALCIFEROL) 50986 units (1250 mcg) capsule        ALLERGIES:  No Known Allergies    FAMILY HISTORY:  Family History   Problem Relation Age of Onset     Glaucoma No family hx of      Macular Degeneration No family hx of      Breast Cancer No family hx of      Ovarian Cancer No family hx of      Anesthesia Reaction No family hx of      Venous thrombosis No family hx of      Heart Disease No family hx of      Diabetes No family hx of        SOCIAL HISTORY:   Social History     Socioeconomic History     Marital status:    Tobacco Use     Smoking status: Never     Passive exposure: Never     Smokeless tobacco: Never   Vaping Use     Vaping Use: Never used   Substance and Sexual Activity     Alcohol use: Not Currently     Drug use: Never     Sexual activity: Yes     Social Determinants of Health     Financial Resource Strain: Medium Risk  (3/2/2022)    Overall Financial Resource Strain (CARDIA)      Difficulty of Paying Living Expenses: Somewhat hard   Food Insecurity: No Food Insecurity (7/29/2022)    Hunger Vital Sign      Worried About Running Out of Food in the Last Year: Never true      Ran Out of Food in the Last Year: Never true   Transportation Needs: Unmet Transportation Needs (7/29/2022)    PRAPARE - Transportation      Lack of Transportation (Medical): No      Lack of Transportation (Non-Medical): Yes   Physical Activity: Sufficiently Active (7/29/2022)    Exercise Vital Sign      Days of Exercise per Week: 7 days      Minutes of Exercise per Session: 30 min   Stress: Stress Concern Present (7/29/2022)    [employer] - Occupational Stress Questionnaire      Feeling of Stress : To some extent   Social Connections: Moderately Integrated (7/29/2022)    Social Connection and Isolation Panel [NHANES]      Frequency of Communication with Friends and Family: Twice a week      Frequency of Social Gatherings with Friends and Family: Twice a week      Attends Confucianist Services: 1 to 4 times per year      Active Member of Clubs or Organizations: No      Attends Club or Organization Meetings: Never      Marital Status:    Intimate Partner Violence: Not At Risk (3/2/2022)    Humiliation, Afraid, Rape, and Kick questionnaire      Fear of Current or Ex-Partner: No      Emotionally Abused: No      Physically Abused: No      Sexually Abused: No   Housing Stability: Low Risk  (3/2/2022)    Housing Stability Vital Sign      Unable to Pay for Housing in the Last Year: No      Number of Places Lived in the Last Year: 2      Unstable Housing in the Last Year: No       VITALS:  /70   Pulse 93   Temp 99.2  F (37.3  C) (Oral)   Resp 23   SpO2 94%     PHYSICAL EXAM      Vitals: /70   Pulse 93   Temp 99.2  F (37.3  C) (Oral)   Resp 23   SpO2 94%   General: Appears in no acute distress, awake, alert,  interactive.  Eyes: Conjunctivae non-injected. Sclera anicteric.  HENT: Atraumatic.  Neck: Supple.  Respiratory/Chest: Increased respirations, no crackles or wheezing  Heart: Regular rate, thrill palpable to right fistula  Abdomen: non distended.  No abdominal tenderness  Musculoskeletal: Normal extremities. No edema or erythema.  Skin: Normal color. No rash or diaphoresis. Callus to the bottom of right foot, does not appear infected. Fistula on right arm  Neurologic: Face symmetric, moves all extremities spontaneously. Speech clear.  Psychiatric: Oriented to person, place, and time. Affect appropriate.       LAB:  All pertinent labs reviewed and interpreted.  Results for orders placed or performed during the hospital encounter of 06/27/23   Chest XR,  PA & LAT    Impression    IMPRESSION: Lungs are clear. No pleural effusion or pneumothorax. Enlarged cardiac silhouette, stable. Normal pulmonary vascularity.   CT Abdomen Pelvis w/o Contrast    Impression    IMPRESSION:   Nonobstructive nephrolithiasis on the right.     Comprehensive metabolic panel   Result Value Ref Range    Sodium 133 (L) 136 - 145 mmol/L    Potassium 5.8 (H) 3.4 - 5.3 mmol/L    Chloride 99 98 - 107 mmol/L    Carbon Dioxide (CO2) 20 (L) 22 - 29 mmol/L    Anion Gap 14 7 - 15 mmol/L    Urea Nitrogen 44.0 (H) 6.0 - 20.0 mg/dL    Creatinine 7.86 (H) 0.51 - 0.95 mg/dL    Calcium 7.3 (L) 8.6 - 10.0 mg/dL    Glucose 255 (H) 70 - 99 mg/dL    Alkaline Phosphatase 65 35 - 104 U/L    AST 29 0 - 45 U/L    ALT 17 0 - 50 U/L    Protein Total 7.4 6.4 - 8.3 g/dL    Albumin 3.6 3.5 - 5.2 g/dL    Bilirubin Total 0.4 <=1.2 mg/dL    GFR Estimate 6 (L) >60 mL/min/1.73m2   Lactic acid whole blood   Result Value Ref Range    Lactic Acid 1.7 0.7 - 2.0 mmol/L   Symptomatic Influenza A/B, RSV, & SARS-CoV2 PCR (COVID-19) Nasopharyngeal    Specimen: Nasopharyngeal; Swab   Result Value Ref Range    Influenza A PCR Negative Negative    Influenza B PCR Negative Negative    RSV  PCR Negative Negative    SARS CoV2 PCR Negative Negative   CBC with platelets and differential   Result Value Ref Range    WBC Count 10.2 4.0 - 11.0 10e3/uL    RBC Count 3.63 (L) 3.80 - 5.20 10e6/uL    Hemoglobin 8.0 (L) 11.7 - 15.7 g/dL    Hematocrit 26.1 (L) 35.0 - 47.0 %    MCV 72 (L) 78 - 100 fL    MCH 22.0 (L) 26.5 - 33.0 pg    MCHC 30.7 (L) 31.5 - 36.5 g/dL    RDW 21.1 (H) 10.0 - 15.0 %    Platelet Count 152 150 - 450 10e3/uL    % Neutrophils 79 %    % Lymphocytes 8 %    % Monocytes 8 %    % Eosinophils 4 %    % Basophils 0 %    % Immature Granulocytes 1 %    NRBCs per 100 WBC 0 <1 /100    Absolute Neutrophils 8.0 1.6 - 8.3 10e3/uL    Absolute Lymphocytes 0.8 0.8 - 5.3 10e3/uL    Absolute Monocytes 0.8 0.0 - 1.3 10e3/uL    Absolute Eosinophils 0.4 0.0 - 0.7 10e3/uL    Absolute Basophils 0.0 0.0 - 0.2 10e3/uL    Absolute Immature Granulocytes 0.1 <=0.4 10e3/uL    Absolute NRBCs 0.0 10e3/uL   Glucose by meter   Result Value Ref Range    GLUCOSE BY METER POCT 242 (H) 70 - 99 mg/dL       RADIOLOGY:  Reviewed all pertinent imaging. Please see official radiology report.  CT Abdomen Pelvis w/o Contrast   Final Result   IMPRESSION:    Nonobstructive nephrolithiasis on the right.         Chest XR,  PA & LAT   Final Result   IMPRESSION: Lungs are clear. No pleural effusion or pneumothorax. Enlarged cardiac silhouette, stable. Normal pulmonary vascularity.          EKG:    Performed at: 27-Jun-2023    Impression: Prolonged QT    Rate: 90  Rhythm: Sinus  Axis: 6  AR Interval: 192  QRS Interval: 94  QTc Interval: 486  ST Changes: None  Comparison: Compared to 13-Feb-2023  Nonspecific T wave abnormality no longer evident in Lateral leads  QT has shortened    I have independently reviewed and interpreted the EKG(s) documented above.          ALEXX, Prashant Pereira, am serving as a scribe to document services personally performed by Inder Foote MD based on my observation and the provider's statements to me. I, Dr. Inder Foote, attest  that Prashant Pereira is acting in a scribe capacity, has observed my performance of the services and has documented them in accordance with my direction.    Inder Foote MD  Emergency Medicine  [facility] EMERGENCY DEPARTMENT  [address] [place] [id number]  [phone]       Inder Foote MD  06/27/23 0712

## 2023-06-27 NOTE — ED NOTES
Received a call from dialysis nurse that Pt has orders for dialysis and they are ready for the procedure. Charge Nurse informed about Pt transport once Pt is ready

## 2023-06-27 NOTE — ED NOTES
PT resting in her bed. Dial a Dealer  used to communicate. She reports that she is feeling much better and does not feel out of the ordinary in any way. She has been informed that she will be moved to a different room

## 2023-06-27 NOTE — ED NOTES
Pt for dialysis, spoke to Dr. Castillo, hospitalist, regarding Pt's due medications, with order to not give Carvedilol tab, 50% dextrose IV, and regular insulin IV.

## 2023-06-27 NOTE — ED NOTES
Bed: JNED-09  Expected date: 6/27/23  Expected time:   Means of arrival:   Comments:  [facility]   53 female  Weakness, fever, hyperglycemia

## 2023-06-27 NOTE — CONSULTS
RENAL CONSULT NOTE    REQUESTING PHYSICIAN: Dr. Miller    REASON FOR CONSULT: ESKD, hyperkalemia    ASSESSMENT/PLAN:  ESKD: Runs at [facility] under the care of Dr. Rosas, TTS, 3hr run, TW 48.5kg.  3K bath, 2.5 Ca, Bicarb 35.    Recs:  - due for dialysis today, arranged and will run this morning  - not clear that current cough is volume related  - updated her home dialysis unit    Hyperkalemia - modest, will correct with dialysis    HTN - on lisinopril and metoprolol.  BP soft here, add back as BP comes up    Acute Cough - CXR clear.  Low grade fevers here.  Being treated as bronchitis.      Anemia - Hgb June 15th ~10-->June 20th 8.1.  Some mention in HPs chart about tranfusions in the last few months, I can't find details.  She denies black stools but has chronic incontinence.  On VIK and iron as an outpatient    Incontinence - patient reports chronic fecal incontinence, unclear etiology    HLD - on statin and fenofibrate    DM2 - per [facility] team            HPI: Mrs. Nithya Matthews is a [employer] speaking female with a past medical history significant for diabetes, hypertension, hyperlipidemia and ESKD.  Patient presented to the ED with cough and fever as well as weakness.  Noted to have modest hyperkalemia.  Treated with antibiotics for possible bronchitis.  Nephrology consulted for dialysis needs.      On interview, spoke to patient with [employer] phone .  Hard to get a good history. Both said she has been feeling poorly for several days and for several months.  Mentions chronic incontinence.  Says she's weak after dialysis.  Can't tell me if her BP drops.  Denies issues with her access.    Spoke to [facility], nurse there today not familiar with patient chronically and couldn't provide a lot more details.  She did give me her current Rx and recent hgb labs.      REVIEW OF SYSTEMS: a 12 point review of systems was reviewed and negative other than noted in the HPI above.      Past Medical History:    Diagnosis Date     Anuria      Arthritis      Depression      Diabetes (H)      End stage renal disease (H)      Hypertension        No current facility-administered medications on file prior to encounter.  acetaminophen (TYLENOL) 500 MG tablet, Take 1-2 tablets (500-1,000 mg) by mouth every 6 hours as needed for mild pain  aspirin 81 MG EC tablet, Take 1 tablet (81 mg) by mouth daily  atorvastatin (LIPITOR) 80 MG tablet, TAKE 1 TABLET (80 MG) BY MOUTH DAILY FOR CHOLESTEROL  calcium carbonate (TUMS) 500 MG chewable tablet, Take 1 chew tab by mouth 3 times daily  carvedilol (COREG) 12.5 MG tablet, Take 1 tablet (12.5 mg) by mouth 2 times daily (with meals)  cyanocobalamin (VITAMIN B-12) 1000 MCG tablet, Take 1 tablet (1,000 mcg) by mouth daily (Patient taking differently: Take 1,000 mcg by mouth every morning)  diclofenac (VOLTAREN) 1 % topical gel, Apply 4 g topically 4 times daily as needed (rib/chest pain)  escitalopram (LEXAPRO) 10 MG tablet, TAKE 1 TABLET (10 MG) BY MOUTH DAILY FOR DEPRESSION  fenofibrate (LOFIBRA) 54 MG tablet, Take 1 tablet (54 mg) by mouth every morning  ferrous sulfate (FEROSUL) 325 (65 Fe) MG tablet, TAKE 1 TABLET (325 MG) BY MOUTH EVERY OTHER DAY FOR IRON  fish oil-omega-3 fatty acids 1000 MG capsule, TAKE 1 CAPSULE (1 G) BY MOUTH DAILY  gabapentin (NEURONTIN) 300 MG capsule, Take 1 capsule (300 mg) by mouth At Bedtime  insulin aspart (NOVOLOG FLEXPEN) 100 UNIT/ML pen, Inject 15 Units Subcutaneous 3 times daily (with meals)  insulin glargine (LANTUS SOLOSTAR) 100 UNIT/ML pen, Inject 40 Units Subcutaneous every morning  liraglutide (VICTOZA) 18 MG/3ML solution, Inject 1.8 mg Subcutaneous daily (Patient taking differently: Inject 1.8 mg Subcutaneous every morning)  lisinopril (ZESTRIL) 20 MG tablet, Take 1 tablet (20 mg) by mouth daily  metoprolol succinate ER (TOPROL XL) 200 MG 24 hr tablet, Take 200 mg by mouth daily  mirtazapine (REMERON) 7.5 MG tablet, TAKE 1 TABLET (7.5 MG) BY MOUTH  AT BEDTIME  povidone-iodine (BETADINE) 10 % topical solution, Apply topically daily Apply to a cottonball and then paint over the ulcer on the right foot.  REFRESH OPTIVE ADVANCED 0.5-1-0.5 % SOLN, PLACE 1 DROP INTO BOTH EYES 3 TIMES DAILY AS NEEDED (AS NEEDED FOR DRY EYE/EYE PAIN)  Alcohol Swabs (ALCOHOL PADS) 70 % PADS, 1 Application 4 times daily (before meals and nightly)  Continuous Blood Gluc Sensor (FREESTYLE MICKIE 14 DAY SENSOR) MISC, USE 1 DEVICE EVERY 14 DAYS CHANGE SENSOR AS DIRECTED EVERY 14 DAYS  insulin pen needle (32G X 4 MM) 32G X 4 MM miscellaneous, Use 6x pen needles daily or as directed (victoza, 3 for novolog, 2 for lantus)        acetaminophen (TYLENOL) 500 MG tablet  aspirin 81 MG EC tablet  atorvastatin (LIPITOR) 80 MG tablet  calcium carbonate (TUMS) 500 MG chewable tablet  carvedilol (COREG) 12.5 MG tablet  cyanocobalamin (VITAMIN B-12) 1000 MCG tablet  diclofenac (VOLTAREN) 1 % topical gel  escitalopram (LEXAPRO) 10 MG tablet  fenofibrate (LOFIBRA) 54 MG tablet  ferrous sulfate (FEROSUL) 325 (65 Fe) MG tablet  fish oil-omega-3 fatty acids 1000 MG capsule  gabapentin (NEURONTIN) 300 MG capsule  insulin aspart (NOVOLOG FLEXPEN) 100 UNIT/ML pen  insulin glargine (LANTUS SOLOSTAR) 100 UNIT/ML pen  liraglutide (VICTOZA) 18 MG/3ML solution  lisinopril (ZESTRIL) 20 MG tablet  metoprolol succinate ER (TOPROL XL) 200 MG 24 hr tablet  mirtazapine (REMERON) 7.5 MG tablet  povidone-iodine (BETADINE) 10 % topical solution  REFRESH OPTIVE ADVANCED 0.5-1-0.5 % SOLN  Alcohol Swabs (ALCOHOL PADS) 70 % PADS  Continuous Blood Gluc Sensor (FREESTYLE MICKIE 14 DAY SENSOR) [facility]  insulin pen needle (32G X 4 MM) 32G X 4 MM miscellaneous        ALLERGIES/SENSITIVITIES:  No Known Allergies  Social History     Tobacco Use     Smoking status: Never     Passive exposure: Never     Smokeless tobacco: Never   Vaping Use     Vaping Use: Never used   Substance Use Topics     Alcohol use: Not Currently     Drug use: Never      Family History: Patient denies family history of CKD      PHYSICAL EXAM:  Physical Exam   Temp: 98.2  F (36.8  C) Temp src: Temporal BP: 131/66 Pulse: 82   Resp: 15 SpO2: 97 % O2 Device: None (Room air)    Vitals:    06/27/23 0730   Weight: 48.5 kg (107 lb)     Vital Signs with Ranges  Temp:  [98.2  F (36.8  C)-100.1  F (37.8  C)] 98.2  F (36.8  C)  Pulse:  [] 82  Resp:  [4-30] 15  BP: (104-170)/(59-81) 131/66  SpO2:  [93 %-97 %] 97 %  No intake/output data recorded.    @TMAXR(24)@    Patient Vitals for the past 72 hrs:   Weight   06/27/23 0730 48.5 kg (107 lb)       General - chronically ill appearing, laying flat in bed  HEENT - PERRLA, no scleral icterus  Neck - no carotid bruits, no JVD  Respiratory - Lungs CTA bilat with possible slight wheeze  Cardiovascular - AP RRR with murmur  Abdomen - soft, nontender  Extremities - well perfused, no edema  Integumentary - intact, good turgor, no rash/lesions  Neurologic - grossly intact, diffusely weak  Psych: very flat  :  No cross    Laboratory:     Recent Labs   Lab 06/27/23 0419   WBC 10.2   RBC 3.63*   HGB 8.0*   HCT 26.1*          Basic Metabolic Panel:  Recent Labs   Lab 06/27/23  0811 06/27/23  0419 06/27/23  0418   NA  --  133*  --    POTASSIUM  --  5.8*  --    CHLORIDE  --  99  --    CO2  --  20*  --    BUN  --  44.0*  --    CR  --  7.86*  --    * 255* 242*   JOSEPH  --  7.3*  --        INRNo lab results found in last 7 days.    Recent Labs   Lab Test 06/27/23  0419 02/13/23  0908   POTASSIUM 5.8* 3.5   CHLORIDE 99 106   BUN 44.0* 23.5*      Recent Labs   Lab Test 06/27/23  0419 01/27/23  1415 11/16/22  1253 10/19/22  0705 10/19/22  0625 02/28/22  1334 12/08/21  0607 12/06/21 2118   ALBUMIN 3.6 3.3*   < >  --    < > 3.3*   < > 4.0   BILITOTAL 0.4  --   --   --   --  0.5   < > 0.7   ALT 17  --   --   --   --  21   < > 37   AST 29  --   --   --   --  23   < > 50*   PROTEIN  --   --   --  300*  --   --   --  100*    < > = values in this  interval not displayed.       Personally reviewed today's laboratory studies      Thank you for involving us in the care of this patient. We will continue to follow along with you.      Chava Brock  [facility]  [phone]

## 2023-06-27 NOTE — H&P
[facility]    History and Physical - Hospitalist Service       Date of Admission:  6/27/2023    Assessment & Plan      Nithya Matthews is a 53 year old female admitted on 6/27/2023.       Acute bronchitis  -blood and sputum cultures pending  -empiric doxycycline  -mucinex  -duoneb  -COVID etc negative    Hyponatremia  -monitor    Hyperkalemia  -D50+iv insulin+calcium gluconate    ESRD, on HD TTS  -consulted nephrologist    DM  -renal diet  -moderate sliding scale insulin  -hold home victoza and lantus    HTN  -reduce coreg dose and hold norvasc due to labile BP    Hyperlipidemia  -home lipitor  -hold lofibra    Anemia  -iron/b12/occult pending  -patient is told to update endoscopy as outpatient    Depression  -await home med rec        Diet:  renal  DVT Prophylaxis: Pneumatic Compression Devices  Parra Catheter: Not present  Lines: None     Cardiac Monitoring: None  Code Status:   full    Clinically Significant Risk Factors Present on Admission        # Hyperkalemia: Highest K = 5.8 mmol/L in last 2 days, will monitor as appropriate   # Hypocalcemia: Lowest Ca = 7.3 mg/dL in last 2 days, will monitor and replace as appropriate       # Drug Induced Platelet Defect: home medication list includes an antiplatelet medication   # Hypertension: Noted on problem list     # DMII: A1C = N/A within past 6 months             Disposition Plan      Expected Discharge Date: 06/28/2023                  Katelyn Miller MD  Hospitalist Service  [facility]  Securely message with [employer] (more info)  Text page via [employer] Paging/Directory     ______________________________________________________________________    Chief Complaint   General mailaise    History of Present Illness   Nithya Matthews is a 53 year old female with a pertinent history of HTN, HLD, hyperglycemia, GERD, celiac disease, DM Type 2, anemia of chronic renal failure stage 4, ESRD, diabetic ulcer on right foot who presents to this ED by EMS  for evaluation of fever and generalized body aches.     Patient reports 3 weeks of not feeling well including fever and generalized body aches. She endorses a cough productive of off white sputum, decreased urine, vomiting (on ), decreased appetite, and difficulty walking. She states she dialyzes 3x a week, last on Saturday () and is due for another appointment today. Reports last bowel movement yesterday.    Daughter translated for me    Past Medical History    Past Medical History:   Diagnosis Date     Anuria      Arthritis      Depression      Diabetes (H)      End stage renal disease (H)      Hypertension        Past Surgical History   Past Surgical History:   Procedure Laterality Date     ABDOMEN SURGERY       APPENDECTOMY       BIOPSY       CATARACT IOL, RT/LT       CREATE FISTULA ARTERIOVENOUS UPPER EXTREMITY Right 2023    Procedure: right upper extremity Arteriovenous fistula creation;  Surgeon: Markell Langford MD;  Location: [facility] OR       Prior to Admission Medications   Prior to Admission Medications   Prescriptions Last Dose Informant Patient Reported? Taking?   Alcohol Swabs (ALCOHOL PADS) 70 % PADS   No No   Si Application 4 times daily (before meals and nightly)   Continuous Blood Gluc Sensor (BrightQubeSTYLE MICKIE 14 DAY SENSOR) [facility]   No No   Sig: USE 1 DEVICE EVERY 14 DAYS CHANGE SENSOR AS DIRECTED EVERY 14 DAYS   Glucagon (BAQSIMI ONE PACK) 3 MG/DOSE POWD   No No   Sig: Spray 1 spray (3 mg) in nostril daily as needed (severe hypoglycemia)   REFRESH OPTIVE ADVANCED 0.5-1-0.5 % SOLN   Yes No   Sig: PLACE 1 DROP INTO BOTH EYES 3 TIMES DAILY AS NEEDED (AS NEEDED FOR DRY EYE/EYE PAIN)   acetaminophen (TYLENOL) 500 MG tablet   No No   Sig: Take 1-2 tablets (500-1,000 mg) by mouth every 6 hours as needed for mild pain   amLODIPine (NORVASC) 5 MG tablet   Yes No   Sig: Take 5 mg by mouth   aspirin 81 MG EC tablet   No No   Sig: Take 1 tablet (81 mg) by mouth daily   atorvastatin (LIPITOR) 80 MG  tablet   No No   Sig: TAKE 1 TABLET (80 MG) BY MOUTH DAILY FOR CHOLESTEROL   calcitRIOL (ROCALTROL) 0.5 MCG capsule   Yes No   calcium acetate (PHOSLO) 667 MG CAPS capsule   Yes No   carvedilol (COREG) 12.5 MG tablet   No No   Sig: Take 1 tablet (12.5 mg) by mouth 2 times daily (with meals)   cyanocobalamin (VITAMIN B-12) 1000 MCG tablet   No No   Sig: Take 1 tablet (1,000 mcg) by mouth daily   Patient taking differently: Take 1,000 mcg by mouth every morning   diclofenac (VOLTAREN) 1 % topical gel   No No   Sig: Apply 4 g topically 4 times daily as needed (rib/chest pain)   escitalopram (LEXAPRO) 10 MG tablet   No No   Sig: TAKE 1 TABLET (10 MG) BY MOUTH DAILY FOR DEPRESSION   fenofibrate (LOFIBRA) 54 MG tablet   No No   Sig: Take 1 tablet (54 mg) by mouth every morning   ferrous sulfate (FEROSUL) 325 (65 Fe) MG tablet   No No   Sig: TAKE 1 TABLET (325 MG) BY MOUTH EVERY OTHER DAY FOR IRON   fish oil-omega-3 fatty acids 1000 MG capsule   No No   Sig: TAKE 1 CAPSULE (1 G) BY MOUTH DAILY   gabapentin (NEURONTIN) 300 MG capsule   No No   Sig: Take 1 capsule (300 mg) by mouth At Bedtime   insulin aspart (NOVOLOG PEN) 100 UNIT/ML pen   No No   Sig: Inject 5 Units Subcutaneous 3 times daily (with meals)   insulin glargine (LANTUS SOLOSTAR) 100 UNIT/ML pen   Yes No   Sig: Inject 15 Units Subcutaneous   insulin pen needle (32G X 4 MM) 32G X 4 MM miscellaneous   No No   Sig: Use 6x pen needles daily or as directed (victoza, 3 for novolog, 2 for lantus)   liraglutide (VICTOZA) 18 MG/3ML solution   No No   Sig: Inject 1.8 mg Subcutaneous daily   Patient taking differently: Inject 1.8 mg Subcutaneous every morning   lisinopril (ZESTRIL) 20 MG tablet   No No   Sig: Take 1 tablet (20 mg) by mouth daily   mirtazapine (REMERON) 7.5 MG tablet   No No   Sig: TAKE 1 TABLET (7.5 MG) BY MOUTH AT BEDTIME   omeprazole 20 MG tablet   No No   Sig: Take 1 tablet (20 mg) by mouth daily   Patient taking differently: Take 20 mg by mouth every  morning   povidone-iodine (BETADINE) 10 % topical solution   No No   Sig: Apply topically daily Apply to a cottonball and then paint over the ulcer on the right foot.   vitamin D2 (ERGOCALCIFEROL) 95158 units (1250 mcg) capsule   No No   Sig: Take 1 capsule (50,000 Units) by mouth twice a week      Facility-Administered Medications: None        Review of Systems    The 10 point Review of Systems is negative other than noted in the HPI or here.      Physical Exam   Vital Signs: Temp: 99.2  F (37.3  C) Temp src: Oral BP: 133/70 Pulse: 93   Resp: 23 SpO2: 94 % O2 Device: None (Room air)    Weight: 0 lbs 0 oz    Constitutional: Awake, alert, cooperative, no apparent distress.  Eyes: Conjunctiva and pupils examined and normal.  HEENT: Moist mucous membranes, normal dentition.  Respiratory: Clear to auscultation bilaterally, no crackles or wheezing.  Cardiovascular: Regular rate and rhythm, normal S1 and S2, and no murmur noted.  GI: Soft, non-distended, non-tender, normal bowel sounds.  Lymph/Hematologic: No anterior cervical or supraclavicular adenopathy.  Skin: No rashes, no cyanosis, no edema.  Musculoskeletal: No joint swelling, erythema or tenderness.  Neurologic: Cranial nerves 2-12 intact, normal strength and sensation.  Psychiatric: Alert, oriented to person, place and time, no obvious anxiety or depression.    Medical Decision Making       75 MINUTES SPENT BY ME on the date of service doing chart review, history, exam, documentation & further activities per the note.      Data     I have personally reviewed the following data over the past 24 hrs:    10.2  \   8.0 (L)   / 152     133 (L) 99 44.0 (H) /  255 (H)   5.8 (H) 20 (L) 7.86 (H) \       ALT: 17 AST: 29 AP: 65 TBILI: 0.4   ALB: 3.6 TOT PROTEIN: 7.4 LIPASE: N/A       Procal: N/A CRP: N/A Lactic Acid: 1.7

## 2023-06-27 NOTE — PROGRESS NOTES
RENAL  Seen on hemodialysis.  Access working adequately.  Trying for gentle UF.  BP stable.  Patient complains of diarrhea and incontinence that she says is chronic.  Feels wiped out by HD at baseline.      Chava Brock  [facility]  [phone]

## 2023-06-27 NOTE — ED NOTES
Dialysis nurse called and asked regarding where the Pt usually goes for her dialysis, as per the daughter Pt is going to [facility] 3 times a week.

## 2023-06-27 NOTE — PHARMACY-ADMISSION MEDICATION HISTORY
Pharmacist Admission Medication History    Admission medication history is complete. The information provided in this note is only as accurate as the sources available at the time of the update.    Medication reconciliation/reorder completed by provider prior to medication history? No    Information Source(s): Family member, CareEverywhere/SureScripts and pill box via in-person with Jamari    Pertinent Information: Was some single fills in March 2023 at [facility] for amlodipine and calcitriol.  No fills at primary pharmacy since and not in pill box so removed from list.     Changes made to PTA medication list:    Added: Tums, metoprolol    Deleted: amlodipine, calcitriol, omeprazole, miralax     Changed: insulin     Medication Affordability: unknown     Allergies reviewed with patient and updates made in EHR: yes    Medication History Completed By: Yumi Mckeon [facility] 6/27/2023 9:36 AM    Prior to Admission medications    Medication Sig Last Dose Taking? Auth Provider Long Term End Date   acetaminophen (TYLENOL) 500 MG tablet Take 1-2 tablets (500-1,000 mg) by mouth every 6 hours as needed for mild pain Unknown at prn Yes Giancarlo Arizmendi MD     aspirin 81 MG EC tablet Take 1 tablet (81 mg) by mouth daily Unknown at unknown Yes Denis Solano DO     atorvastatin (LIPITOR) 80 MG tablet TAKE 1 TABLET (80 MG) BY MOUTH DAILY FOR CHOLESTEROL 6/26/2023 at am Yes Giancarlo Arizmendi MD Yes    calcium carbonate (TUMS) 500 MG chewable tablet Take 1 chew tab by mouth 3 times daily 6/26/2023 at pm Yes Unknown, Entered By History     carvedilol (COREG) 12.5 MG tablet Take 1 tablet (12.5 mg) by mouth 2 times daily (with meals) 6/26/2023 at pm Yes Giancarlo Arizmendi MD Yes    cyanocobalamin (VITAMIN B-12) 1000 MCG tablet Take 1 tablet (1,000 mcg) by mouth daily  Patient taking differently: Take 1,000 mcg by mouth every morning 6/26/2023 at am Yes Giancarlo Arizmendi MD     diclofenac (VOLTAREN) 1 % topical gel Apply 4 g topically 4 times  daily as needed (rib/chest pain) Unknown at prn Yes Giancarlo Arizmendi MD     escitalopram (LEXAPRO) 10 MG tablet TAKE 1 TABLET (10 MG) BY MOUTH DAILY FOR DEPRESSION 6/26/2023 at am Yes Giancarlo Arizmendi MD Yes    fenofibrate (LOFIBRA) 54 MG tablet Take 1 tablet (54 mg) by mouth every morning 6/26/2023 at am Yes Giancarlo Arizmendi MD Yes    ferrous sulfate (FEROSUL) 325 (65 Fe) MG tablet TAKE 1 TABLET (325 MG) BY MOUTH EVERY OTHER DAY FOR IRON Past Week at am Yes Giancarlo Arizmendi MD     fish oil-omega-3 fatty acids 1000 MG capsule TAKE 1 CAPSULE (1 G) BY MOUTH DAILY 6/26/2023 at am Yes Giancarlo Arizmendi MD     gabapentin (NEURONTIN) 300 MG capsule Take 1 capsule (300 mg) by mouth At Bedtime 6/26/2023 at pm Yes Giancarlo Arizmendi MD Yes    insulin aspart (NOVOLOG FLEXPEN) 100 UNIT/ML pen Inject 15 Units Subcutaneous 3 times daily (with meals) 6/26/2023 at pm Yes Unknown, Entered By History No    insulin glargine (LANTUS SOLOSTAR) 100 UNIT/ML pen Inject 40 Units Subcutaneous every morning 6/26/2023 at am Yes Reported, Patient Yes 3/7/24   liraglutide (VICTOZA) 18 MG/3ML solution Inject 1.8 mg Subcutaneous daily  Patient taking differently: Inject 1.8 mg Subcutaneous every morning 6/26/2023 at am Yes Giancarlo Arizmendi MD Yes    lisinopril (ZESTRIL) 20 MG tablet Take 1 tablet (20 mg) by mouth daily 6/26/2023 at am Yes Giancarlo Arizmendi MD Yes    metoprolol succinate ER (TOPROL XL) 200 MG 24 hr tablet Take 200 mg by mouth daily 6/26/2023 at am Yes Unknown, Entered By History No    mirtazapine (REMERON) 7.5 MG tablet TAKE 1 TABLET (7.5 MG) BY MOUTH AT BEDTIME 6/26/2023 at am Yes Giancarlo Arizmendi MD Yes    povidone-iodine (BETADINE) 10 % topical solution Apply topically daily Apply to a cottonball and then paint over the ulcer on the right foot. 6/26/2023 at daily Yes Grecia Hearn, RADHA, Podiatry/Foot and Ankle Surgery     REFRESH OPTIVE ADVANCED 0.5-1-0.5 % SOLN PLACE 1 DROP INTO BOTH EYES 3 TIMES DAILY AS NEEDED (AS NEEDED FOR DRY EYE/EYE  PAIN) Unknown at prn Yes Reported, Patient     Alcohol Swabs (ALCOHOL PADS) 70 % PADS 1 Application 4 times daily (before meals and nightly)   Giancarlo Arizmendi MD     Continuous Blood Gluc Sensor (FREESTYLE MICKIE 14 DAY SENSOR) MISC USE 1 DEVICE EVERY 14 DAYS CHANGE SENSOR AS DIRECTED EVERY 14 DAYS   Giancarlo Arizmendi MD     insulin pen needle (32G X 4 MM) 32G X 4 MM miscellaneous Use 6x pen needles daily or as directed (victoza, 3 for novolog, 2 for lantus)   Giancarlo Arizmendi MD

## 2023-06-27 NOTE — CONSULTS
Care Management Initial Consult    General Information  Assessment completed with: Children, Fritz  Type of CM/SW Visit: Initial Assessment    Primary Care Provider verified and updated as needed: Yes   Readmission within the last 30 days: no previous admission in last 30 days      Reason for Consult: discharge planning  Advance Care Planning:            Communication Assessment  Patient's communication style: spoken language (English or Bilingual)             Cognitive  Cognitive/Neuro/Behavioral: WDL                      Living Environment:   People in home: child(darcie), adult, spouse     Current living Arrangements: house      Able to return to prior arrangements:         Family/Social Support:  Care provided by: child(darcie)  Provides care for: no one, unable/limited ability to care for self                Description of Support System:           Current Resources:   Patient receiving home care services: No     Community Resources: County Worker, Infrastructure Networks Programs  Equipment currently used at home:    Supplies currently used at home: Diabetic Supplies    Employment/Financial:  Employment Status:          Financial Concerns: No concerns identified           Does the patient's insurance plan have a 3 day qualifying hospital stay waiver?  Yes   Will the waiver be used for post-acute placement? No    Lifestyle & Psychosocial Needs:  Social Determinants of Health     Tobacco Use: Low Risk  (6/19/2023)    Patient History      Smoking Tobacco Use: Never      Smokeless Tobacco Use: Never      Passive Exposure: Never   Alcohol Use: Not At Risk (3/2/2022)    AUDIT-C      Frequency of Alcohol Consumption: Never      Average Number of Drinks: Not on file      Frequency of Binge Drinking: Never   Financial Resource Strain: Medium Risk (3/2/2022)    Overall Financial Resource Strain (CARDIA)      Difficulty of Paying Living Expenses: Somewhat hard   Food Insecurity: No Food Insecurity (7/29/2022)    Hunger Vital Sign      Worried  About Running Out of Food in the Last Year: Never true      Ran Out of Food in the Last Year: Never true   Transportation Needs: Unmet Transportation Needs (7/29/2022)    PRAPARE - Transportation      Lack of Transportation (Medical): No      Lack of Transportation (Non-Medical): Yes   Physical Activity: Sufficiently Active (7/29/2022)    Exercise Vital Sign      Days of Exercise per Week: 7 days      Minutes of Exercise per Session: 30 min   Stress: Stress Concern Present (7/29/2022)    [employer] [place] [employer] - Occupational Stress Questionnaire      Feeling of Stress : To some extent   Social Connections: Moderately Integrated (7/29/2022)    Social Connection and Isolation Panel [NHANES]      Frequency of Communication with Friends and Family: Twice a week      Frequency of Social Gatherings with Friends and Family: Twice a week      Attends Religion Services: 1 to 4 times per year      Active Member of Clubs or Organizations: No      Attends Club or Organization Meetings: Never      Marital Status:    Intimate Partner Violence: Not At Risk (3/2/2022)    Humiliation, Afraid, Rape, and Kick questionnaire      Fear of Current or Ex-Partner: No      Emotionally Abused: No      Physically Abused: No      Sexually Abused: No   Depression: Not at risk (6/19/2023)    PHQ-2      PHQ-2 Score: 2   Housing Stability: Low Risk  (3/2/2022)    Housing Stability Vital Sign      Unable to Pay for Housing in the Last Year: No      Number of Places Lived in the Last Year: 2      Unstable Housing in the Last Year: No       Functional Status:  Prior to admission patient needed assistance:   Dependent ADLs:: Ambulation-walker, Wheelchair-with assist, Transfers, Incontinence, Grooming, Dressing, Bathing  Dependent IADLs:: Cleaning, Cooking, Laundry, Shopping, Meal Preparation, Medication Management, Transportation       Mental Health Status:          Chemical Dependency Status:                 Values/Beliefs:  Spiritual, Cultural Beliefs, Alevism Practices, Values that affect care:                 Additional Information:  Assessed with pt daughter. Pt lives in a house with her son and daughter. She gets assist with ADLs and IADLs. She has 9.5hr/day PCA services with her daughter (Fritz) and son (Byron). She has a WC, hospital bed, cane and walker for equipment. Has SNAP services. In-patient dialysis [facility]. Transport for dialysis is [facility]. CM to follow for medical progression and final discharge needs.      Milagro Jimenez RN

## 2023-06-27 NOTE — ED NOTES
Bed: [place] [address]  Expected date:   Expected time:   Means of arrival:   Comments:  Hold for room 9

## 2023-06-27 NOTE — ED TRIAGE NOTES
Pt brought in by EMS.  Pt is complaining of fever and general body aches.  Pt states this has been going on for a week.  Pt had dialysis on Saturday and is due today at 10:00.  Pt denies nausea vomiting a this time. Pt has not taken anything for the fever or pain today took tylenol yesterday.     Triage Assessment     Row Name 06/27/23 0409       Triage Assessment (Adult)    Airway WDL WDL       Respiratory WDL    Respiratory WDL WDL       Skin Circulation/Temperature WDL    Skin Circulation/Temperature WDL WDL       Cardiac WDL    Cardiac WDL X;all       Cognitive/Neuro/Behavioral WDL    Cognitive/Neuro/Behavioral WDL WDL

## 2023-06-27 NOTE — PROGRESS NOTES
Nithya Matthews was admitted by EMILIANA Miller earlier today. Patient seen and examined at bedside. Family was present during the encounter.      Plan:  - Medication reconciliation   - HD today per nephrology  - will order viral pcr.   - c/w admitting provider's management

## 2023-06-27 NOTE — PROCEDURES
S. Byron dialyzed for 3 hours on a Revaclear 300 dialyzer with a net fluid removal of 1L. A BFR of 350 ml/min was obtained via a RUE AVF. Pt on K2 bath.  Complications: none. VSS post run. Verbal report given to BACILIO Geller RN. Pt education provided concerning dialysis procedure and all questions answered. Consent on file. See flowsheet in Epic for further details. Water alarm in place during treatment. Hepatitis B antigen negative. Date drawn: 3/6/23. Hepatitis B surface antibody Immune. Date drawn 3/6/23.      Geri Lazcano RN, [facility]

## 2023-06-28 VITALS
BODY MASS INDEX: 19.69 KG/M2 | OXYGEN SATURATION: 98 % | SYSTOLIC BLOOD PRESSURE: 124 MMHG | RESPIRATION RATE: 14 BRPM | HEIGHT: 62 IN | TEMPERATURE: 98 F | DIASTOLIC BLOOD PRESSURE: 71 MMHG | WEIGHT: 107 LBS | HEART RATE: 81 BPM

## 2023-06-28 LAB
ALBUMIN SERPL BCG-MCNC: 3.8 G/DL (ref 3.5–5.2)
ALP SERPL-CCNC: 37 U/L (ref 35–104)
ALT SERPL W P-5'-P-CCNC: 17 U/L (ref 0–50)
ANION GAP SERPL CALCULATED.3IONS-SCNC: 14 MMOL/L (ref 7–15)
AST SERPL W P-5'-P-CCNC: 28 U/L (ref 0–45)
BILIRUB SERPL-MCNC: 0.4 MG/DL
BUN SERPL-MCNC: 28.8 MG/DL (ref 6–20)
C PNEUM DNA SPEC QL NAA+PROBE: NOT DETECTED
CALCIUM SERPL-MCNC: 7.8 MG/DL (ref 8.6–10)
CHLORIDE SERPL-SCNC: 97 MMOL/L (ref 98–107)
CREAT SERPL-MCNC: 6.02 MG/DL (ref 0.51–0.95)
DEPRECATED HCO3 PLAS-SCNC: 27 MMOL/L (ref 22–29)
ERYTHROCYTE [DISTWIDTH] IN BLOOD BY AUTOMATED COUNT: 21.3 % (ref 10–15)
FLUAV H1 2009 PAND RNA SPEC QL NAA+PROBE: NOT DETECTED
FLUAV H1 RNA SPEC QL NAA+PROBE: NOT DETECTED
FLUAV H3 RNA SPEC QL NAA+PROBE: NOT DETECTED
FLUAV RNA SPEC QL NAA+PROBE: NOT DETECTED
FLUBV RNA SPEC QL NAA+PROBE: NOT DETECTED
GFR SERPL CREATININE-BSD FRML MDRD: 8 ML/MIN/1.73M2
GLUCOSE BLDC GLUCOMTR-MCNC: 100 MG/DL (ref 70–99)
GLUCOSE BLDC GLUCOMTR-MCNC: 76 MG/DL (ref 70–99)
GLUCOSE SERPL-MCNC: 79 MG/DL (ref 70–99)
HADV DNA SPEC QL NAA+PROBE: NOT DETECTED
HCOV PNL SPEC NAA+PROBE: NOT DETECTED
HCT VFR BLD AUTO: 28.3 % (ref 35–47)
HGB BLD-MCNC: 8.9 G/DL (ref 11.7–15.7)
HMPV RNA SPEC QL NAA+PROBE: NOT DETECTED
HPIV1 RNA SPEC QL NAA+PROBE: NOT DETECTED
HPIV2 RNA SPEC QL NAA+PROBE: NOT DETECTED
HPIV3 RNA SPEC QL NAA+PROBE: NOT DETECTED
HPIV4 RNA SPEC QL NAA+PROBE: NOT DETECTED
M PNEUMO DNA SPEC QL NAA+PROBE: NOT DETECTED
MCH RBC QN AUTO: 22.3 PG (ref 26.5–33)
MCHC RBC AUTO-ENTMCNC: 31.4 G/DL (ref 31.5–36.5)
MCV RBC AUTO: 71 FL (ref 78–100)
PLATELET # BLD AUTO: 143 10E3/UL (ref 150–450)
POTASSIUM SERPL-SCNC: 4.2 MMOL/L (ref 3.4–5.3)
PROT SERPL-MCNC: 7.7 G/DL (ref 6.4–8.3)
RBC # BLD AUTO: 3.99 10E6/UL (ref 3.8–5.2)
RSV RNA SPEC QL NAA+PROBE: NOT DETECTED
RSV RNA SPEC QL NAA+PROBE: NOT DETECTED
RV+EV RNA SPEC QL NAA+PROBE: NOT DETECTED
SODIUM SERPL-SCNC: 138 MMOL/L (ref 136–145)
WBC # BLD AUTO: 7.1 10E3/UL (ref 4–11)

## 2023-06-28 PROCEDURE — 250N000013 HC RX MED GY IP 250 OP 250 PS 637: Performed by: HOSPITALIST

## 2023-06-28 PROCEDURE — 250N000011 HC RX IP 250 OP 636: Performed by: HOSPITALIST

## 2023-06-28 PROCEDURE — 36415 COLL VENOUS BLD VENIPUNCTURE: CPT | Performed by: HOSPITALIST

## 2023-06-28 PROCEDURE — 99239 HOSP IP/OBS DSCHRG MGMT >30: CPT | Performed by: INTERNAL MEDICINE

## 2023-06-28 PROCEDURE — 82962 GLUCOSE BLOOD TEST: CPT

## 2023-06-28 PROCEDURE — 80053 COMPREHEN METABOLIC PANEL: CPT | Performed by: HOSPITALIST

## 2023-06-28 PROCEDURE — 96366 THER/PROPH/DIAG IV INF ADDON: CPT

## 2023-06-28 PROCEDURE — 85027 COMPLETE CBC AUTOMATED: CPT | Performed by: HOSPITALIST

## 2023-06-28 PROCEDURE — 250N000013 HC RX MED GY IP 250 OP 250 PS 637: Performed by: STUDENT IN AN ORGANIZED HEALTH CARE EDUCATION/TRAINING PROGRAM

## 2023-06-28 PROCEDURE — G0378 HOSPITAL OBSERVATION PER HR: HCPCS

## 2023-06-28 RX ORDER — DOXYCYCLINE 100 MG/1
100 CAPSULE ORAL 2 TIMES DAILY
Qty: 14 CAPSULE | Refills: 0 | Status: SHIPPED | OUTPATIENT
Start: 2023-06-28 | End: 2023-07-05

## 2023-06-28 RX ORDER — LIRAGLUTIDE 6 MG/ML
0.6 INJECTION SUBCUTANEOUS DAILY
Qty: 3 ML | Refills: 1 | Status: SHIPPED | OUTPATIENT
Start: 2023-06-28 | End: 2023-07-17

## 2023-06-28 RX ORDER — CARVEDILOL 3.12 MG/1
3.12 TABLET ORAL 2 TIMES DAILY WITH MEALS
Qty: 180 TABLET | Refills: 0 | Status: SHIPPED | OUTPATIENT
Start: 2023-06-28 | End: 2023-12-20 | Stop reason: DRUGHIGH

## 2023-06-28 RX ADMIN — DOXYCYCLINE 100 MG: 100 INJECTION, POWDER, LYOPHILIZED, FOR SOLUTION INTRAVENOUS at 07:07

## 2023-06-28 RX ADMIN — Medication 1000 MCG: at 08:53

## 2023-06-28 RX ADMIN — INSULIN GLARGINE 40 UNITS: 100 INJECTION, SOLUTION SUBCUTANEOUS at 08:53

## 2023-06-28 RX ADMIN — Medication 81 MG: at 08:58

## 2023-06-28 RX ADMIN — ESCITALOPRAM OXALATE 10 MG: 10 TABLET ORAL at 08:53

## 2023-06-28 RX ADMIN — GUAIFENESIN 600 MG: 600 TABLET ORAL at 08:58

## 2023-06-28 RX ADMIN — LIRAGLUTIDE 1.8 MG: 6 INJECTION SUBCUTANEOUS at 08:54

## 2023-06-28 RX ADMIN — FENOFIBRATE 54 MG: 54 TABLET ORAL at 08:53

## 2023-06-28 RX ADMIN — FERROUS SULFATE TAB 325 MG (65 MG ELEMENTAL FE) 325 MG: 325 (65 FE) TAB at 08:58

## 2023-06-28 RX ADMIN — ANTACID TABLETS 500 MG: 500 TABLET, CHEWABLE ORAL at 08:58

## 2023-06-28 RX ADMIN — CARVEDILOL 3.12 MG: 3.12 TABLET, FILM COATED ORAL at 08:53

## 2023-06-28 ASSESSMENT — ACTIVITIES OF DAILY LIVING (ADL)
ADLS_ACUITY_SCORE: 35

## 2023-06-28 NOTE — PLAN OF CARE
"  Problem: Fever  Goal: Fever: Plan of Care  Outcome: Progressing     Problem: Plan of Care - These are the overarching goals to be used throughout the patient stay.    Goal: Optimal Comfort and Wellbeing  Outcome: Progressing   Goal Outcome Evaluation:       Pt A&O X 4,  services used. Denies pain, VSS, no temp this shift. BP (!) 153/77 (BP Location: Left arm)   Pulse 79   Temp (!) 96.1  F (35.6  C) (Axillary)   Resp (!) 9   Ht 1.575 m (5' 2\")   Wt 48.5 kg (107 lb)   SpO2 94%   BMI 19.57 kg/m   On tele, SR with occasional PVCs.  Pt up independent in room, BG stable continues with nonproductive cough.                  "

## 2023-06-28 NOTE — PROGRESS NOTES
Reviewed discharge medications, changes in care, follow-up appointments with patient and the 2 family members bringing her home. All questions and concerns were addressed.

## 2023-06-28 NOTE — DISCHARGE SUMMARY
[facility]  Hospitalist Discharge Summary      Date of Admission:  6/27/2023  Date of Discharge:  6/28/2023  Discharging Provider: Andrea Sexton MD  Discharge Service: Hospitalist Service    Discharge Diagnoses   Acute bronchitis, resolved   End-stage renal disease on hemodialysis  Hyponatremia, resolved   Hyperkalemia, resolved  Hypertension  Diabetes mellitus type 2  Anemia of chronic kidney disease    Clinically Significant Risk Factors     # DMII: A1C = N/A within past 6 months       Follow-ups Needed After Discharge   Follow-up Appointments     Follow-up and recommended labs and tests       Follow up with primary care provider, Giancarlo Arizmendi, within 7 days for   hospital follow- up.  No follow up labs or test are needed.    Follow up with nephrologist as scheduled for dialysis            Unresulted Labs Ordered in the Past 30 Days of this Admission     Date and Time Order Name Status Description    6/27/2023  4:14 AM Blood Culture Peripheral Blood Preliminary     6/27/2023  4:14 AM Blood Culture Peripheral Blood Preliminary       These results will be followed up by nephrology     Discharge Disposition   Discharged to home  Condition at discharge: Stable    Hospital Course   53 year old female with a pertinent history of HTN, HLD, hyperglycemia, GERD, celiac disease, DM Type 2, anemia of chronic renal failure stage 4, ESRD, diabetic ulcer on right foot who presents to this ED by EMS for evaluation of fever and generalized body aches.  Admitted with bronchitis, chest x-ray is negative for pneumonia.  Improved with doxycycline, continued on discharge.  Blood pressure was on the low side so carvedilol dose was adjusted, continue to monitor blood pressure as outpatient  Nephrology consulted for end-stage renal disease and patient resumed dialysis schedule as usual, hyperkalemia and hyponatremia are corrected with dialysis.    Consultations This Hospital Stay   NEPHROLOGY IP CONSULT  CARE  MANAGEMENT / SOCIAL WORK IP CONSULT    Code Status   Full Code    Time Spent on this Encounter   I, Andrea Sexton MD, personally saw the patient today and spent greater than 30 minutes discharging this patient.       Andrea Sexton MD  [facility] EMERGENCY DEPARTMENT  [facility] [facility] [address]  Phone: [phone]  ______________________________________________________________________    Physical Exam   Vital Signs: Temp: 98  F (36.7  C) Temp src: Oral BP: 124/71 Pulse: 81   Resp: 14 SpO2: 98 % O2 Device: None (Room air)    Weight: 107 lbs 0 oz  Constitutional: Awake, alert, cooperative, no apparent distress.  Respiratory: Clear to auscultation bilaterally, no crackles or wheezing.  Cardiovascular: Regular rate and rhythm, normal S1 and S2, and no murmur noted.  GI: Soft, non-distended, non-tender, normal bowel sounds.  Lymph/Hematologic: No anterior cervical or supraclavicular adenopathy.  Skin: No rashes, no cyanosis, no edema.  Musculoskeletal: No joint swelling, erythema or tenderness.  Neurologic: Cranial nerves 2-12 intact, normal strength and sensation.    Primary Care Physician   Giancarlo Arizmendi    Discharge Orders      Reason for your hospital stay    Acute bronchitis     Follow-up and recommended labs and tests     Follow up with primary care provider, Giancarlo Arizmendi, within 7 days for hospital follow- up.  No follow up labs or test are needed.    Follow up with nephrologist as scheduled for dialysis     Activity    Your activity upon discharge: activity as tolerated     Diet    Follow this diet upon discharge: Orders Placed This Encounter      Renal Diet (dialysis)       Significant Results and Procedures   Most Recent 3 CBC's:Recent Labs   Lab Test 06/28/23  0756 06/27/23  0419 02/13/23  0908   WBC 7.1 10.2 6.5   HGB 8.9* 8.0* 9.1*   MCV 71* 72* 72*   * 152 148*     Most Recent 3 BMP's:Recent Labs   Lab Test 06/28/23  1128 06/28/23  0758 06/28/23  0756  06/27/23  0811 06/27/23  0419 02/14/23  0701 02/13/23  0908   NA  --   --  138  --  133*  --  143   POTASSIUM  --   --  4.2  --  5.8*  --  3.5   CHLORIDE  --   --  97*  --  99  --  106   CO2  --   --  27  --  20*  --  26   BUN  --   --  28.8*  --  44.0*  --  23.5*   CR  --   --  6.02*  --  7.86*  --  3.89*   ANIONGAP  --   --  14  --  14  --  11   JOSEPH  --   --  7.8*  --  7.3*  --  6.0*   * 76 79   < > 255*   < > 199*    < > = values in this interval not displayed.   ,   Results for orders placed or performed during the hospital encounter of 06/27/23   Chest XR,  PA & LAT    Narrative    EXAM: XR CHEST 2 VIEWS  LOCATION: [facility]  DATE: 6/27/2023    INDICATION: cough  COMPARISON: 03/05/2023      Impression    IMPRESSION: Lungs are clear. No pleural effusion or pneumothorax. Enlarged cardiac silhouette, stable. Normal pulmonary vascularity.   CT Abdomen Pelvis w/o Contrast    Narrative    EXAM: CT ABDOMEN PELVIS W/O CONTRAST  LOCATION: [facility]  DATE: 6/27/2023    INDICATION: vomiting, malaise  COMPARISON: 12/06/2021.  TECHNIQUE: CT scan of the abdomen and pelvis was performed without IV contrast. Multiplanar reformats were obtained. Dose reduction techniques were used.  CONTRAST: None.    FINDINGS:   LOWER CHEST: Normal.    HEPATOBILIARY: Normal.    PANCREAS: Normal.    SPLEEN: Normal.    ADRENAL GLANDS: Normal.    KIDNEYS/BLADDER: 4 mm and 3 mm nonobstructing right intrarenal stones.    BOWEL: Normal.    LYMPH NODES: Normal.    VASCULATURE: Unremarkable.    PELVIC ORGANS: Normal.    MUSCULOSKELETAL: Mild degenerative changes of the spine.      Impression    IMPRESSION:   Nonobstructive nephrolithiasis on the right.           Discharge Medications   Current Discharge Medication List      START taking these medications    Details   doxycycline hyclate (VIBRAMYCIN) 100 MG capsule Take 1 capsule (100 mg) by mouth 2 times daily for 7 days  Qty: 14 capsule,  Refills: 0    Associated Diagnoses: Bronchitis         CONTINUE these medications which have CHANGED    Details   carvedilol (COREG) 3.125 MG tablet Take 1 tablet (3.125 mg) by mouth 2 times daily (with meals) for 90 days  Qty: 180 tablet, Refills: 0    Associated Diagnoses: Primary hypertension         CONTINUE these medications which have NOT CHANGED    Details   acetaminophen (TYLENOL) 500 MG tablet Take 1-2 tablets (500-1,000 mg) by mouth every 6 hours as needed for mild pain  Qty: 100 tablet, Refills: 3    Associated Diagnoses: Rib pain      aspirin 81 MG EC tablet Take 1 tablet (81 mg) by mouth daily  Qty: 90 tablet, Refills: 3    Associated Diagnoses: Type 2 diabetes mellitus with other specified complication, unspecified whether long term insulin use (H)      atorvastatin (LIPITOR) 80 MG tablet TAKE 1 TABLET (80 MG) BY MOUTH DAILY FOR CHOLESTEROL  Qty: 90 tablet, Refills: 3    Associated Diagnoses: Hyperlipidemia LDL goal <100      calcium carbonate (TUMS) 500 MG chewable tablet Take 1 chew tab by mouth 3 times daily      cyanocobalamin (VITAMIN B-12) 1000 MCG tablet Take 1 tablet (1,000 mcg) by mouth daily  Qty: 90 tablet, Refills: 3    Associated Diagnoses: Low vitamin D level      diclofenac (VOLTAREN) 1 % topical gel Apply 4 g topically 4 times daily as needed (rib/chest pain)  Qty: 350 g, Refills: 3    Associated Diagnoses: Rib pain      escitalopram (LEXAPRO) 10 MG tablet TAKE 1 TABLET (10 MG) BY MOUTH DAILY FOR DEPRESSION  Qty: 90 tablet, Refills: 3    Associated Diagnoses: Depression, unspecified depression type      fenofibrate (LOFIBRA) 54 MG tablet Take 1 tablet (54 mg) by mouth every morning  Qty: 90 tablet, Refills: 2    Associated Diagnoses: Hyperlipidemia LDL goal <100      ferrous sulfate (FEROSUL) 325 (65 Fe) MG tablet TAKE 1 TABLET (325 MG) BY MOUTH EVERY OTHER DAY FOR IRON  Qty: 70 tablet, Refills: 3    Associated Diagnoses: Low iron      fish oil-omega-3 fatty acids 1000 MG capsule TAKE 1  CAPSULE (1 G) BY MOUTH DAILY  Qty: 30 capsule, Refills: 3    Associated Diagnoses: Type 2 diabetes mellitus with other specified complication, unspecified whether long term insulin use (H)      gabapentin (NEURONTIN) 300 MG capsule Take 1 capsule (300 mg) by mouth At Bedtime  Qty: 90 capsule, Refills: 3    Associated Diagnoses: Neuropathy      insulin aspart (NOVOLOG FLEXPEN) 100 UNIT/ML pen Inject 15 Units Subcutaneous 3 times daily (with meals)      insulin glargine (LANTUS SOLOSTAR) 100 UNIT/ML pen Inject 40 Units Subcutaneous every morning      liraglutide (VICTOZA) 18 MG/3ML solution Inject 1.8 mg Subcutaneous daily  Qty: 9 mL, Refills: 11    Associated Diagnoses: Type 2 diabetes mellitus with other specified complication, unspecified whether long term insulin use (H)      lisinopril (ZESTRIL) 20 MG tablet Take 1 tablet (20 mg) by mouth daily  Qty: 90 tablet, Refills: 3    Associated Diagnoses: ESRD (end stage renal disease) on dialysis (H)      metoprolol succinate ER (TOPROL XL) 200 MG 24 hr tablet Take 200 mg by mouth daily      mirtazapine (REMERON) 7.5 MG tablet TAKE 1 TABLET (7.5 MG) BY MOUTH AT BEDTIME  Qty: 90 tablet, Refills: 3    Associated Diagnoses: Primary insomnia; Encounter for medication refill      povidone-iodine (BETADINE) 10 % topical solution Apply topically daily Apply to a cottonball and then paint over the ulcer on the right foot.  Qty: 30 mL, Refills: 0    Associated Diagnoses: Diabetic polyneuropathy associated with type 2 diabetes mellitus (H); Diabetic ulcer of other part of right foot associated with type 2 diabetes mellitus, with fat layer exposed (H); Hav (hallux abducto valgus), right; Hav (hallux abducto valgus), left; Pre-ulcerative calluses      REFRESH OPTIVE ADVANCED 0.5-1-0.5 % SOLN PLACE 1 DROP INTO BOTH EYES 3 TIMES DAILY AS NEEDED (AS NEEDED FOR DRY EYE/EYE PAIN)      Alcohol Swabs (ALCOHOL PADS) 70 % PADS 1 Application 4 times daily (before meals and nightly)  Qty: 200  each, Refills: 3    Associated Diagnoses: Type 2 diabetes mellitus with other specified complication, unspecified whether long term insulin use (H)      Continuous Blood Gluc Sensor (FREESTYLE MICKIE 14 DAY SENSOR) MISC USE 1 DEVICE EVERY 14 DAYS CHANGE SENSOR AS DIRECTED EVERY 14 DAYS  Qty: 6 each, Refills: 3    Associated Diagnoses: Type 2 diabetes mellitus with other specified complication, unspecified whether long term insulin use (H)      insulin pen needle (32G X 4 MM) 32G X 4 MM miscellaneous Use 6x pen needles daily or as directed (victoza, 3 for novolog, 2 for lantus)  Qty: 200 each, Refills: 11    Associated Diagnoses: Type 2 diabetes mellitus with other specified complication, unspecified whether long term insulin use (H)           Allergies   No Known Allergies

## 2023-06-28 NOTE — PROGRESS NOTES
RENAL  Spoke to Dr. Sexton, plan for discharge.  I have no objection, will update her home unit to expect her tomorrow.     Chava Brock  [facility]  [phone]

## 2023-06-29 NOTE — PROGRESS NOTES
CELIAI: I will fax over the order next week when I am back in the clinic or Mireille will assist with her outreach next week. Thanks.

## 2023-06-30 LAB
ATRIAL RATE - MUSE: 87 BPM
ATRIAL RATE - MUSE: 90 BPM
DIASTOLIC BLOOD PRESSURE - MUSE: 59 MMHG
DIASTOLIC BLOOD PRESSURE - MUSE: NORMAL MMHG
INTERPRETATION ECG - MUSE: NORMAL
INTERPRETATION ECG - MUSE: NORMAL
P AXIS - MUSE: 60 DEGREES
P AXIS - MUSE: 78 DEGREES
PR INTERVAL - MUSE: 192 MS
PR INTERVAL - MUSE: 208 MS
QRS DURATION - MUSE: 88 MS
QRS DURATION - MUSE: 94 MS
QT - MUSE: 398 MS
QT - MUSE: 426 MS
QTC - MUSE: 486 MS
QTC - MUSE: 512 MS
R AXIS - MUSE: 48 DEGREES
R AXIS - MUSE: 6 DEGREES
SYSTOLIC BLOOD PRESSURE - MUSE: 104 MMHG
SYSTOLIC BLOOD PRESSURE - MUSE: NORMAL MMHG
T AXIS - MUSE: 22 DEGREES
T AXIS - MUSE: 39 DEGREES
VENTRICULAR RATE- MUSE: 87 BPM
VENTRICULAR RATE- MUSE: 90 BPM

## 2023-07-02 LAB
BACTERIA BLD CULT: NO GROWTH
BACTERIA BLD CULT: NO GROWTH

## 2023-07-10 ENCOUNTER — LAB (OUTPATIENT)
Dept: LAB | Facility: CLINIC | Age: 53
End: 2023-07-10
Payer: COMMERCIAL

## 2023-07-10 DIAGNOSIS — E11.65 TYPE 2 DIABETES MELLITUS WITH HYPERGLYCEMIA, WITH LONG-TERM CURRENT USE OF INSULIN (H): ICD-10-CM

## 2023-07-10 DIAGNOSIS — Z79.4 TYPE 2 DIABETES MELLITUS WITH HYPERGLYCEMIA, WITH LONG-TERM CURRENT USE OF INSULIN (H): ICD-10-CM

## 2023-07-10 LAB
FASTING STATUS PATIENT QL REPORTED: YES
GLUCOSE SERPL-MCNC: 130 MG/DL (ref 70–99)
HBA1C MFR BLD: 9.1 % (ref 0–5.6)

## 2023-07-10 PROCEDURE — 82947 ASSAY GLUCOSE BLOOD QUANT: CPT

## 2023-07-10 PROCEDURE — 36415 COLL VENOUS BLD VENIPUNCTURE: CPT

## 2023-07-10 PROCEDURE — 83036 HEMOGLOBIN GLYCOSYLATED A1C: CPT

## 2023-07-12 DIAGNOSIS — K21.00 GASTROESOPHAGEAL REFLUX DISEASE WITH ESOPHAGITIS WITHOUT HEMORRHAGE: ICD-10-CM

## 2023-07-13 NOTE — TELEPHONE ENCOUNTER
"Routing refill request to provider for review/approval because:  Drug not active on patient's medication list    Last office visit provider:  06/19/2023     Requested Prescriptions   Pending Prescriptions Disp Refills     omeprazole (PRILOSEC) 20 MG DR capsule [Pharmacy Med Name: OMEPRAZOLE DR 20 MG CAPSULE 20 Capsule] 90 capsule 3     Sig: TAKE 1 PILL (20 MG) BY MOUTH DAILY FOR STOMACH       PPI Protocol Failed - 7/13/2023  8:33 AM        Failed - Medication is active on med list        Passed - Not on Clopidogrel (unless Pantoprazole ordered)        Passed - No diagnosis of osteoporosis on record        Passed - Recent (12 mo) or future (30 days) visit within the authorizing provider's specialty     Patient has had an office visit with the authorizing provider or a provider within the authorizing providers department within the previous 12 mos or has a future within next 30 days. See \"Patient Info\" tab in inbasket, or \"Choose Columns\" in Meds & Orders section of the refill encounter.              Passed - Patient is age 18 or older        Passed - No active pregnacy on record        Passed - No positive pregnancy test in past 12 months             Marcela Dowling RN 07/13/23 8:33 AM  "

## 2023-07-17 ENCOUNTER — OFFICE VISIT (OUTPATIENT)
Dept: FAMILY MEDICINE | Facility: CLINIC | Age: 53
End: 2023-07-17
Payer: COMMERCIAL

## 2023-07-17 ENCOUNTER — ALLIED HEALTH/NURSE VISIT (OUTPATIENT)
Dept: EDUCATION SERVICES | Facility: CLINIC | Age: 53
End: 2023-07-17
Attending: FAMILY MEDICINE
Payer: COMMERCIAL

## 2023-07-17 VITALS
OXYGEN SATURATION: 96 % | RESPIRATION RATE: 16 BRPM | TEMPERATURE: 97.6 F | HEIGHT: 62 IN | SYSTOLIC BLOOD PRESSURE: 127 MMHG | BODY MASS INDEX: 27.03 KG/M2 | HEART RATE: 72 BPM | WEIGHT: 146.9 LBS | DIASTOLIC BLOOD PRESSURE: 87 MMHG

## 2023-07-17 DIAGNOSIS — Z79.4 TYPE 2 DIABETES MELLITUS WITH OTHER SPECIFIED COMPLICATION, WITH LONG-TERM CURRENT USE OF INSULIN (H): Primary | ICD-10-CM

## 2023-07-17 DIAGNOSIS — R76.8 ANTI-CYCLIC CITRULLINATED PEPTIDE ANTIBODY POSITIVE: ICD-10-CM

## 2023-07-17 DIAGNOSIS — G89.29 CHRONIC LEFT SHOULDER PAIN: ICD-10-CM

## 2023-07-17 DIAGNOSIS — G44.209 TENSION HEADACHE: ICD-10-CM

## 2023-07-17 DIAGNOSIS — E11.69 TYPE 2 DIABETES MELLITUS WITH OTHER SPECIFIED COMPLICATION, UNSPECIFIED WHETHER LONG TERM INSULIN USE (H): ICD-10-CM

## 2023-07-17 DIAGNOSIS — E11.69 TYPE 2 DIABETES MELLITUS WITH OTHER SPECIFIED COMPLICATION, WITH LONG-TERM CURRENT USE OF INSULIN (H): Primary | ICD-10-CM

## 2023-07-17 DIAGNOSIS — Z79.4 TYPE 2 DIABETES MELLITUS WITH OTHER SPECIFIED COMPLICATION, WITH LONG-TERM CURRENT USE OF INSULIN (H): ICD-10-CM

## 2023-07-17 DIAGNOSIS — E11.69 TYPE 2 DIABETES MELLITUS WITH OTHER SPECIFIED COMPLICATION, WITH LONG-TERM CURRENT USE OF INSULIN (H): ICD-10-CM

## 2023-07-17 DIAGNOSIS — M25.512 CHRONIC LEFT SHOULDER PAIN: ICD-10-CM

## 2023-07-17 LAB — HBA1C MFR BLD: 9.6 % (ref 0–5.6)

## 2023-07-17 PROCEDURE — 83036 HEMOGLOBIN GLYCOSYLATED A1C: CPT | Performed by: DIETITIAN, REGISTERED

## 2023-07-17 PROCEDURE — 99214 OFFICE O/P EST MOD 30 MIN: CPT | Performed by: FAMILY MEDICINE

## 2023-07-17 PROCEDURE — G0108 DIAB MANAGE TRN  PER INDIV: HCPCS | Mod: AE | Performed by: DIETITIAN, REGISTERED

## 2023-07-17 PROCEDURE — 36415 COLL VENOUS BLD VENIPUNCTURE: CPT | Performed by: DIETITIAN, REGISTERED

## 2023-07-17 RX ORDER — LISINOPRIL 5 MG/1
5 TABLET ORAL
Qty: 90 TABLET | Refills: 0 | Status: SHIPPED | OUTPATIENT
Start: 2023-07-17 | End: 2023-09-11

## 2023-07-17 RX ORDER — BLOOD-GLUCOSE SENSOR
1 EACH MISCELLANEOUS
Qty: 2 EACH | Refills: 4 | Status: SHIPPED | OUTPATIENT
Start: 2023-07-17 | End: 2023-07-17

## 2023-07-17 RX ORDER — LIRAGLUTIDE 6 MG/ML
0.6 INJECTION SUBCUTANEOUS DAILY
Qty: 3 ML | Refills: 1 | Status: SHIPPED | OUTPATIENT
Start: 2023-07-17 | End: 2023-07-17

## 2023-07-17 RX ORDER — GLIPIZIDE 5 MG/1
5 TABLET ORAL
Qty: 60 TABLET | Refills: 0 | Status: SHIPPED | OUTPATIENT
Start: 2023-07-17 | End: 2023-08-01

## 2023-07-17 RX ORDER — BLOOD-GLUCOSE SENSOR
1 EACH MISCELLANEOUS
Qty: 2 EACH | Refills: 4 | Status: SHIPPED | OUTPATIENT
Start: 2023-07-17 | End: 2023-08-09

## 2023-07-17 RX ORDER — IBUPROFEN 600 MG/1
600 TABLET, FILM COATED ORAL EVERY 6 HOURS PRN
Qty: 100 TABLET | Refills: 0 | Status: SHIPPED | OUTPATIENT
Start: 2023-07-17

## 2023-07-17 RX ORDER — GABAPENTIN 300 MG/1
300 CAPSULE ORAL 3 TIMES DAILY
Qty: 270 CAPSULE | Refills: 1 | Status: SHIPPED | OUTPATIENT
Start: 2023-07-17 | End: 2023-09-11

## 2023-07-17 RX ORDER — LIRAGLUTIDE 6 MG/ML
0.6 INJECTION SUBCUTANEOUS DAILY
Qty: 3 ML | Refills: 1 | Status: SHIPPED | OUTPATIENT
Start: 2023-07-17 | End: 2023-12-18

## 2023-07-17 ASSESSMENT — PAIN SCALES - GENERAL: PAINLEVEL: NO PAIN (0)

## 2023-07-17 NOTE — PROGRESS NOTES
Assessment & Plan     Type 2 diabetes mellitus with other specified complication, with long-term current use of insulin (H)  Chronic, poorly controlled. Patient has been having lack of access to care due to language barriers, mix up with insurance. Unable to start victoza yet due to insurance being unable to verify her , gender, or name. Recommend patient call the insurance company. If unable to resolve, will have to try other oral agents or apply for assistance. Recommend rechecking a1c to see how patient is doing since starting insulin, as she's been unable to provide consistent blood sugars.    She will be seeing diabetic educator today which should help.   - gabapentin (NEURONTIN) 300 MG capsule  Dispense: 270 capsule; Refill: 1  - lisinopril (ZESTRIL) 5 MG tablet  Dispense: 90 tablet; Refill: 0  - glipiZIDE (GLUCOTROL) 5 MG tablet  Dispense: 60 tablet; Refill: 0  - Continuous Blood Gluc Sensor (FREESTYLE MISTY 3 SENSOR) MISC  Dispense: 2 each; Refill: 4  - liraglutide (VICTOZA) 18 MG/3ML solution  Dispense: 3 mL; Refill: 1  - blood glucose (NO BRAND SPECIFIED) lancets standard  Dispense: 100 Lancet; Refill: 11  - Hemoglobin A1c    Chronic left shoulder pain  Chronic, stable. Denies swelling, other joint involvement.   - diclofenac (VOLTAREN) 1 % topical gel  Dispense: 350 g; Refill: 0  - ibuprofen (ADVIL/MOTRIN) 600 MG tablet  Dispense: 100 tablet; Refill: 0    Tension headache  Acute, uncomplicated. Likely uncomplicated tension type headache due to self resolving nature. Recommend routine headache care.   - ibuprofen (ADVIL/MOTRIN) 600 MG tablet  Dispense: 100 tablet; Refill: 0    Anti-cyclic citrullinated peptide antibody positive  Chronic, stable. Patient asymptomatic. Needs repeat titers before seen by rheumatology.       Ordering of each unique test  Prescription drug management  I spent a total of 28 minutes on the day of the visit.   Time spent by me doing chart review, history and exam,  "documentation and further activities per the note       See Patient Instructions    Mayela Dowd DO  [facility] KAR Pandya is a 53 year old, presenting for the following health issues:  Diabetes        5/24/2023     3:31 PM   Additional Questions   Roomed by Kesha MOLINA CMA   Accompanied by Daughter-in-law, , and son     History of Present Illness       Diabetes:   She presents for follow up of diabetes.  She is checking home blood glucose with a continuous glucose monitor.  She checks blood glucose before meals, after meals, before and after meals and at bedtime.  Blood glucose is sometimes over 200 and sometimes under 70. She is aware of hypoglycemia symptoms including shakiness and dizziness. She is concerned about blood sugar frequently over 200. She is not experiencing numbness or burning in feet, excessive thirst, blurry vision, weight changes or redness, sores or blisters on feet. The patient has not had a diabetic eye exam in the last 12 months.         She eats 2-3 servings of fruits and vegetables daily.She consumes 0 sweetened beverage(s) daily.She exercises with enough effort to increase her heart rate 9 or less minutes per day.  She exercises with enough effort to increase her heart rate 3 or less days per week. She is missing 2 dose(s) of medications per week.     Forehead pain  -Happens intermittently.  -Last happened this weekend  -Pain is central forehead. She reports a burning, tingling feeling.   -When it hurts she closes her eyes to provide relief  -Will go away with sleeping.     Review of Systems   Constitutional, HEENT, cardiovascular, pulmonary, gi and gu systems are negative, except as otherwise noted.      Objective    /87 (BP Location: Right arm, Patient Position: Sitting, Cuff Size: Adult Regular)   Pulse 72   Temp 97.6  F (36.4  C) (Oral)   Resp 16   Ht 1.575 m (5' 2\")   Wt 66.6 kg (146 lb 14.4 oz)   LMP  (LMP Unknown)   SpO2 96%   " BMI 26.87 kg/m    Body mass index is 26.87 kg/m .  Physical Exam   GENERAL: healthy, alert and no distress  EYES: Eyes grossly normal to inspection, PERRL and conjunctivae and sclerae normal  NECK: no adenopathy, no asymmetry, masses, or scars and thyroid normal to palpation  MS: no gross musculoskeletal defects noted, no edema  SKIN: no suspicious lesions or rashes  Diabetic foot exam: normal DP and PT pulses, no trophic changes or ulcerative lesions and normal sensory exam    No results found for this or any previous visit (from the past 24 hour(s)).

## 2023-07-17 NOTE — LETTER
7/17/2023         RE: Mumtaz Montoya  [address]  [place] [address]        Dear Colleague,    Thank you for referring your patient, Mumtaz Montoya, to the [facility]. Please see a copy of my visit note below.    Diabetes Self-Management Education & Support    Presents for: Individual review    Type of Service: In Person Visit    Assessment Type:   ASSESSMENT:  Met with patient and son.  present on phone. Patient states that her sensor (herson) fell off a while ago and has not been checking BG. Sent a share code to email linked to account for pt's son to connect to. Patient reports using her phone for BG data review. Demonstrated with video how to place herson for son to help patient at home - she did not have her phone with to connect Herson to.  Provided instructions on insulin use. Pt reports some blurry vision related to BG - discussed reasons for this with patient. Reviewed importance of continuing to take insulin and to call insurance to discuss issues with verification so she can  other medications. Son understands  Pt reports BG were >300 mg/dL most of the time, but now more in the 200-300 mg/dL range.     No BG to review today, so no adjustments to meds, A1C is improving since starting on insulin.   Follow up scheduled for phone with patient and another one in person in September. Reviewed risks of elevated BG and importance of scheduling a eye exam as well. Pt was able to demonstrate how to use insulin.  Pt reports eating fruit and was drinking juice prior to starting insulin - she notices it really rises BG. Discussed appropriate portions of foods and encouraged patient to avoid juice and pop. Pt has been drinking a sugary pop per son.    Patient's most recent   Lab Results   Component Value Date    A1C 9.6 07/17/2023     is not meeting goal of <7.0    Diabetes knowledge and skills assessment:   Patient is knowledgeable in diabetes management concepts related  "to: Taking Medication    Continue education with the following diabetes management concepts: Healthy Eating, Being Active, Monitoring, Taking Medication, Problem Solving, Reducing Risks and Healthy Coping    Based on learning assessment above, most appropriate setting for further diabetes education would be: Individual setting.      PLAN  1. Continue Lantus 30 units once daily  2. Start Victoza at 0.6 mg/day once receives from pharmacy  3. Start checking BG 3-4 times daily with Herson 3  Topics to cover at upcoming visits: Healthy Eating, Being Active, Monitoring, Taking Medication, Problem Solving, Reducing Risks and Healthy Coping    Follow-up: 3-4 weeks    See Care Plan for co-developed, patient-state behavior change goals.  AVS provided for patient today.    Education Materials Provided:  No new materials provided today      SUBJECTIVE/OBJECTIVE:  Presents for: Individual review  Accompanied by: Self, , Son  Diabetes education in the past 24mo: No  Focus of Visit: Patient Unsure  Diabetes type: Type 2  Disease course: Improving  Diabetes management related comments/concerns: sweets  Other concerns:: Language barrier  Cultural Influences/Ethnic Background:  Not  or     Diabetes Symptoms & Complications:  Fatigue: Sometimes  Neuropathy: Sometimes  Polydipsia: Sometimes  Polyphagia: Sometimes  Polyuria: Sometimes  Visual change: Yes  Symptom course: Improving       Patient Problem List and Family Medical History reviewed for relevant medical history, current medical status, and diabetes risk factors.    Vitals:  LMP  (LMP Unknown)   Estimated body mass index is 26.87 kg/m  as calculated from the following:    Height as of an earlier encounter on 7/17/23: 1.575 m (5' 2\").    Weight as of an earlier encounter on 7/17/23: 66.6 kg (146 lb 14.4 oz).   Last 3 BP:   BP Readings from Last 3 Encounters:   07/17/23 127/87   05/24/23 118/84   05/16/23 118/80       History   Smoking Status     Never "   Smokeless Tobacco     Never       Labs:  Lab Results   Component Value Date    A1C 9.6 07/17/2023     Lab Results   Component Value Date     05/16/2023     Lab Results   Component Value Date     05/16/2023     Direct Measure HDL   Date Value Ref Range Status   05/16/2023 32 (L) >=50 mg/dL Final   ]  GFR Estimate   Date Value Ref Range Status   05/16/2023 >90 >60 mL/min/1.73m2 Final     Comment:     eGFR calculated using 2021 CKD-EPI equation.     No results found for: GFRESTBLACK  Lab Results   Component Value Date    CR 0.40 05/16/2023     No results found for: MICROALBUMIN    Healthy Eating:  Healthy Eating Assessed Today: Yes  Meal planning/habits: Avoiding sweets  Meals include: Breakfast, Lunch, Dinner  Breakfast: rice and broderick chicken/pork, green vegetables/cucumber  Lunch: rice and broderick chicken/pork, green vegetables/cucumber  Dinner: rice and broderick chicken/pork, green vegetables/cucumber  Snacks: jermaine,  Other: was drinking orange juice - so no longer drinks it  Beverages: Water  Has patient met with a dietitian in the past?: No    Being Active:       Monitoring:  Monitoring Assessed Today: Yes  Did patient bring glucose meter to appointment? : No  Blood Glucose Meter: CGM (Herson 3)  Times checking blood sugar at home (number): 2  Times checking blood sugar at home (per): Day  Blood glucose trend: Decreasing      Taking Medications:  Diabetes Medication(s)     Insulin       insulin glargine (LANTUS SOLOSTAR) 100 UNIT/ML pen    Inject 30 Units Subcutaneous At Bedtime    Sulfonylureas       glipiZIDE (GLUCOTROL) 5 MG tablet    Take 1 tablet (5 mg) by mouth 2 times daily (before meals)    Incretin Mimetic Agents       liraglutide (VICTOZA) 18 MG/3ML solution    Inject 0.6 mg Subcutaneous daily     Patient not taking: Reported on 7/17/2023          Taking Medication Assessed Today: Yes  Current Treatments: Insulin Injections  Dose schedule: At bedtime  Given by: Patient  Injection/Infusion  sites: Abdomen  Problems taking diabetes medications regularly?: No  Diabetes medication side effects?: No    Problem Solving:  Problem Solving Assessed Today: Yes  Is the patient at risk for hypoglycemia?: Yes  Hypoglycemia Frequency: Never  Hypoglycemia Treatment: Juice              Reducing Risks:  Reducing Risks Assessed Today: Yes  Diabetes Risks: Age over 45 years, Sedentary Lifestyle, Ethnicity  Has dilated eye exam at least once a year?: No    Healthy Coping:  Healthy Coping Assessed Today: Yes  Emotional response to diabetes: Ready to learn  Informal Support system:: Children  Stage of change: ACTION (Actively working towards change)  Patient Activation Measure Survey Score:       No data to display                  Care Plan and Education Provided:  Care Plan: Diabetes   Updates made by Astrid Lemus RD since 7/17/2023 12:00 AM      Problem: HbA1C Not In Goal       Goal: Establish Regular Follow-Ups with PCP       Task: Discuss with PCP the recommended timing for patient's next follow up visit(s)    Responsible User: Asrtid Lemus RD      Task: Discuss schedule for PCP visits with patient    Responsible User: Astrid Lemus RD      Goal: Get HbA1C Level in Goal       Task: Educate patient on diabetes education self-management topics    Responsible User: Astrid Lemus RD      Task: Educate patient on benefits of regular glucose monitoring    Responsible User: Astrid Lemus RD      Task: Refer patient to appropriate extended care team member, as needed (Medication Therapy Management, Behavioral Health, Physical Therapy, etc.)    Responsible User: Astrid Lemus RD      Task: Discuss diabetes treatment plan with patient    Responsible User: Astrid Lemus RD      Problem: Diabetes Self-Management Education Needed to Optimize Self-Care Behaviors       Goal: Understand diabetes pathophysiology and disease progression       Task: Provide education on diabetes  pathophysiology and disease progression specfic to patient's diabetes type    Responsible User: Astrid Lemus RD      Goal: Healthy Eating - follow a healthy eating pattern for diabetes    This Visit's Progress: 60%   Note:    Limit to 1 cup rice and no pop     Task: Provide education on portion control and consistency in amount, composition and timing of food intake Completed 7/17/2023   Responsible User: Astrid Lemus RD      Task: Provide education on managing carbohydrate intake (carbohydrate counting, plate planning method, etc.)    Responsible User: Astrid Lemus RD      Task: Provide education on weight management    Responsible User: Astrid Lemus RD      Task: Provide education on heart healthy eating    Responsible User: Astrid Lemus RD      Task: Provide education on eating out    Responsible User: Astrid Lemus RD      Task: Develop individualized healthy eating plan with patient    Responsible User: Astrid Lemus RD      Goal: Being Active - get regular physical activity, working up to at least 150 minutes per week       Task: Provide education on relationship of activity to glucose and precautions to take if at risk for low glucose    Responsible User: Astrid Lemus RD      Task: Discuss barriers to physical activity with patient    Responsible User: Astrid Lemus RD      Task: Develop physical activity plan with patient    Responsible User: Astrid Lemus RD      Task: Explore community resources including walking groups, assistance programs, and home videos    Responsible User: Astrid Lemus RD      Goal: Monitoring - monitor glucose and ketones as directed    This Visit's Progress: 30%   Note:    Check BG 3 times daily     Task: Provide education on blood glucose monitoring (purpose, proper technique, frequency, glucose targets, interpreting results, when to use glucose control solution, sharps disposal)    Responsible  User: Astrid Lemus RD      Task: Provide education on continuous glucose monitoring (sensor placement, use of speedy or /reader, understanding glucose trends, alerts and alarms, differences between sensor glucose and blood glucose) Completed 7/17/2023   Responsible User: Astrid Lemus RD      Task: Provide education on ketone monitoring (when to monitor, frequency, etc.)    Responsible User: Astrid Lemus RD      Goal: Taking Medication - patient is consistently taking medications as directed       Task: Provide education on action of prescribed medication, including when to take and possible side effects    Responsible User: Astrid Lemus RD      Task: Provide education on insulin and injectable diabetes medications, including administration, storage, site selection and rotation for injection sites Completed 7/17/2023   Responsible User: Astrid Lemus RD      Task: Discuss barriers to medication adherence with patient and provide management technique ideas as appropriate    Responsible User: Astrid Lemus RD      Task: Provide education on frequency and refill details of medications    Responsible User: Astrid Lemus RD      Goal: Problem Solving - know how to prevent and manage short-term diabetes complications       Task: Provide education on high blood glucose - causes, signs/symptoms, prevention and treatment    Responsible User: Astrid Lemus RD      Task: Provide education on low blood glucose - causes, signs/symptoms, prevention, treatment, carrying a carbohydrate source at all times, and medical identification    Responsible User: Astrid Lemus RD      Task: Provide education on safe travel with diabetes    Responsible User: Astrid Lemus RD      Task: Provide education on how to care for diabetes on sick days    Responsible User: Astrid Lemus RD      Task: Provide education on when to call a health care provider     Responsible User: Astrid Lemus RD      Goal: Reducing Risks - know how to prevent and treat long-term diabetes complications       Task: Provide education on major complications of diabetes, prevention, early diagnostic measures and treatment of complications    Responsible User: Astrid Lemus RD      Task: Provide education on recommended care for dental, eye and foot health    Responsible User: Astrid Lemus RD      Task: Provide education on Hemoglobin A1c - goals and relationship to blood glucose levels Completed 7/17/2023   Responsible User: Astrid Lemus RD      Task: Provide education on recommendations for heart health - lipid levels and goals, blood pressure and goals, and aspirin therapy, if indicated    Responsible User: Astrid Lemus RD      Task: Provide education on tobacco cessation    Responsible User: Astrid Lemus RD      Goal: Healthy Coping - use available resources to cope with the challenges of managing diabetes       Task: Discuss recognizing feelings about having diabetes Completed 7/17/2023   Responsible User: Astrid Lemus RD      Task: Provide education on the benefits of making appropriate lifestyle changes    Responsible User: Astrid Lemus RD      Task: Provide education on benefits of utilizing support systems    Responsible User: Astrid Lemus RD      Task: Discuss methods for coping with stress    Responsible User: Astrid Lemus RD      Task: Provide education on when to seek professional counseling    Responsible User: Astrid Lemus RD            Time Spent: 60 minutes  Encounter Type: Individual    Any diabetes medication dose changes were made via the CDE Protocol per the patient's referring provider. A copy of this encounter was shared with the provider.

## 2023-07-17 NOTE — PROGRESS NOTES
Diabetes Self-Management Education & Support    Presents for: Individual review    Type of Service: In Person Visit    Assessment Type:   ASSESSMENT:  Met with patient and son.  present on phone. Patient states that her sensor (herson) fell off a while ago and has not been checking BG. Sent a share code to email linked to account for pt's son to connect to. Patient reports using her phone for BG data review. Demonstrated with video how to place herson for son to help patient at home - she did not have her phone with to connect Herson to.  Provided instructions on insulin use. Pt reports some blurry vision related to BG - discussed reasons for this with patient. Reviewed importance of continuing to take insulin and to call insurance to discuss issues with verification so she can  other medications. Son understands  Pt reports BG were >300 mg/dL most of the time, but now more in the 200-300 mg/dL range.     No BG to review today, so no adjustments to meds, A1C is improving since starting on insulin.   Follow up scheduled for phone with patient and another one in person in September. Reviewed risks of elevated BG and importance of scheduling a eye exam as well. Pt was able to demonstrate how to use insulin.  Pt reports eating fruit and was drinking juice prior to starting insulin - she notices it really rises BG. Discussed appropriate portions of foods and encouraged patient to avoid juice and pop. Pt has been drinking a sugary pop per son.    Patient's most recent   Lab Results   Component Value Date    A1C 9.6 07/17/2023     is not meeting goal of <7.0    Diabetes knowledge and skills assessment:   Patient is knowledgeable in diabetes management concepts related to: Taking Medication    Continue education with the following diabetes management concepts: Healthy Eating, Being Active, Monitoring, Taking Medication, Problem Solving, Reducing Risks and Healthy Coping    Based on learning assessment above,  "most appropriate setting for further diabetes education would be: Individual setting.      PLAN  1. Continue Lantus 30 units once daily  2. Start Victoza at 0.6 mg/day once receives from pharmacy  3. Start checking BG 3-4 times daily with Herson 3  Topics to cover at upcoming visits: Healthy Eating, Being Active, Monitoring, Taking Medication, Problem Solving, Reducing Risks and Healthy Coping    Follow-up: 3-4 weeks    See Care Plan for co-developed, patient-state behavior change goals.  AVS provided for patient today.    Education Materials Provided:  No new materials provided today      SUBJECTIVE/OBJECTIVE:  Presents for: Individual review  Accompanied by: Self, , Son  Diabetes education in the past 24mo: No  Focus of Visit: Patient Unsure  Diabetes type: Type 2  Disease course: Improving  Diabetes management related comments/concerns: sweets  Other concerns:: Language barrier  Cultural Influences/Ethnic Background:  Not  or     Diabetes Symptoms & Complications:  Fatigue: Sometimes  Neuropathy: Sometimes  Polydipsia: Sometimes  Polyphagia: Sometimes  Polyuria: Sometimes  Visual change: Yes  Symptom course: Improving       Patient Problem List and Family Medical History reviewed for relevant medical history, current medical status, and diabetes risk factors.    Vitals:  LMP  (LMP Unknown)   Estimated body mass index is 26.87 kg/m  as calculated from the following:    Height as of an earlier encounter on 7/17/23: 1.575 m (5' 2\").    Weight as of an earlier encounter on 7/17/23: 66.6 kg (146 lb 14.4 oz).   Last 3 BP:   BP Readings from Last 3 Encounters:   07/17/23 127/87   05/24/23 118/84   05/16/23 118/80       History   Smoking Status     Never   Smokeless Tobacco     Never       Labs:  Lab Results   Component Value Date    A1C 9.6 07/17/2023     Lab Results   Component Value Date     05/16/2023     Lab Results   Component Value Date     05/16/2023     Direct Measure HDL "   Date Value Ref Range Status   05/16/2023 32 (L) >=50 mg/dL Final   ]  GFR Estimate   Date Value Ref Range Status   05/16/2023 >90 >60 mL/min/1.73m2 Final     Comment:     eGFR calculated using 2021 CKD-EPI equation.     No results found for: GFRESTBLACK  Lab Results   Component Value Date    CR 0.40 05/16/2023     No results found for: MICROALBUMIN    Healthy Eating:  Healthy Eating Assessed Today: Yes  Meal planning/habits: Avoiding sweets  Meals include: Breakfast, Lunch, Dinner  Breakfast: rice and broderick chicken/pork, green vegetables/cucumber  Lunch: rice and broderick chicken/pork, green vegetables/cucumber  Dinner: rice and broderick chicken/pork, green vegetables/cucumber  Snacks: jermaine,  Other: was drinking orange juice - so no longer drinks it  Beverages: Water  Has patient met with a dietitian in the past?: No    Being Active:       Monitoring:  Monitoring Assessed Today: Yes  Did patient bring glucose meter to appointment? : No  Blood Glucose Meter: CGM (Herson 3)  Times checking blood sugar at home (number): 2  Times checking blood sugar at home (per): Day  Blood glucose trend: Decreasing      Taking Medications:  Diabetes Medication(s)     Insulin       insulin glargine (LANTUS SOLOSTAR) 100 UNIT/ML pen    Inject 30 Units Subcutaneous At Bedtime    Sulfonylureas       glipiZIDE (GLUCOTROL) 5 MG tablet    Take 1 tablet (5 mg) by mouth 2 times daily (before meals)    Incretin Mimetic Agents       liraglutide (VICTOZA) 18 MG/3ML solution    Inject 0.6 mg Subcutaneous daily     Patient not taking: Reported on 7/17/2023          Taking Medication Assessed Today: Yes  Current Treatments: Insulin Injections  Dose schedule: At bedtime  Given by: Patient  Injection/Infusion sites: Abdomen  Problems taking diabetes medications regularly?: No  Diabetes medication side effects?: No    Problem Solving:  Problem Solving Assessed Today: Yes  Is the patient at risk for hypoglycemia?: Yes  Hypoglycemia Frequency:  Never  Hypoglycemia Treatment: Juice              Reducing Risks:  Reducing Risks Assessed Today: Yes  Diabetes Risks: Age over 45 years, Sedentary Lifestyle, Ethnicity  Has dilated eye exam at least once a year?: No    Healthy Coping:  Healthy Coping Assessed Today: Yes  Emotional response to diabetes: Ready to learn  Informal Support system:: Children  Stage of change: ACTION (Actively working towards change)  Patient Activation Measure Survey Score:       No data to display                  Care Plan and Education Provided:  Care Plan: Diabetes   Updates made by Astrid Lemus RD since 7/17/2023 12:00 AM      Problem: HbA1C Not In Goal       Goal: Establish Regular Follow-Ups with PCP       Task: Discuss with PCP the recommended timing for patient's next follow up visit(s)    Responsible User: Astrid Lemus RD      Task: Discuss schedule for PCP visits with patient    Responsible User: Astrid Lemus RD      Goal: Get HbA1C Level in Goal       Task: Educate patient on diabetes education self-management topics    Responsible User: Astrid Lemus RD      Task: Educate patient on benefits of regular glucose monitoring    Responsible User: Astrid Lemus RD      Task: Refer patient to appropriate extended care team member, as needed (Medication Therapy Management, Behavioral Health, Physical Therapy, etc.)    Responsible User: Astrid Lemus RD      Task: Discuss diabetes treatment plan with patient    Responsible User: Astrid Lemus RD      Problem: Diabetes Self-Management Education Needed to Optimize Self-Care Behaviors       Goal: Understand diabetes pathophysiology and disease progression       Task: Provide education on diabetes pathophysiology and disease progression specfic to patient's diabetes type    Responsible User: Astrid Lemus RD      Goal: Healthy Eating - follow a healthy eating pattern for diabetes    This Visit's Progress: 60%   Note:    Limit to  1 cup rice and no pop     Task: Provide education on portion control and consistency in amount, composition and timing of food intake Completed 7/17/2023   Responsible User: Astrid Lemus RD      Task: Provide education on managing carbohydrate intake (carbohydrate counting, plate planning method, etc.)    Responsible User: Astrid Lemus RD      Task: Provide education on weight management    Responsible User: Astrid Lemus RD      Task: Provide education on heart healthy eating    Responsible User: Astrid Lemus RD      Task: Provide education on eating out    Responsible User: Astrid Lemus RD      Task: Develop individualized healthy eating plan with patient    Responsible User: Astrid Lemus RD      Goal: Being Active - get regular physical activity, working up to at least 150 minutes per week       Task: Provide education on relationship of activity to glucose and precautions to take if at risk for low glucose    Responsible User: Astrid Lemus RD      Task: Discuss barriers to physical activity with patient    Responsible User: Astrid Lemus RD      Task: Develop physical activity plan with patient    Responsible User: Astrid Lemus RD      Task: Explore community resources including walking groups, assistance programs, and home videos    Responsible User: Astrid Lemus RD      Goal: Monitoring - monitor glucose and ketones as directed    This Visit's Progress: 30%   Note:    Check BG 3 times daily     Task: Provide education on blood glucose monitoring (purpose, proper technique, frequency, glucose targets, interpreting results, when to use glucose control solution, sharps disposal)    Responsible User: Astrid Lemus RD      Task: Provide education on continuous glucose monitoring (sensor placement, use of speedy or /reader, understanding glucose trends, alerts and alarms, differences between sensor glucose and blood glucose)  Completed 7/17/2023   Responsible User: Astrid Lemus RD      Task: Provide education on ketone monitoring (when to monitor, frequency, etc.)    Responsible User: Astrid Lemus RD      Goal: Taking Medication - patient is consistently taking medications as directed       Task: Provide education on action of prescribed medication, including when to take and possible side effects    Responsible User: Astrid Lemus RD      Task: Provide education on insulin and injectable diabetes medications, including administration, storage, site selection and rotation for injection sites Completed 7/17/2023   Responsible User: Astrid Lemus RD      Task: Discuss barriers to medication adherence with patient and provide management technique ideas as appropriate    Responsible User: Astrid Lemus RD      Task: Provide education on frequency and refill details of medications    Responsible User: Astrid Lemus RD      Goal: Problem Solving - know how to prevent and manage short-term diabetes complications       Task: Provide education on high blood glucose - causes, signs/symptoms, prevention and treatment    Responsible User: Astrid Lemus RD      Task: Provide education on low blood glucose - causes, signs/symptoms, prevention, treatment, carrying a carbohydrate source at all times, and medical identification    Responsible User: Astrid Lemus RD      Task: Provide education on safe travel with diabetes    Responsible User: Astrid Lemus RD      Task: Provide education on how to care for diabetes on sick days    Responsible User: Astrid Lemus RD      Task: Provide education on when to call a health care provider    Responsible User: Astrid Lemus RD      Goal: Reducing Risks - know how to prevent and treat long-term diabetes complications       Task: Provide education on major complications of diabetes, prevention, early diagnostic measures and treatment of  complications    Responsible User: Astrid Lemus RD      Task: Provide education on recommended care for dental, eye and foot health    Responsible User: Astrid Lemus RD      Task: Provide education on Hemoglobin A1c - goals and relationship to blood glucose levels Completed 7/17/2023   Responsible User: Astrid Lemus RD      Task: Provide education on recommendations for heart health - lipid levels and goals, blood pressure and goals, and aspirin therapy, if indicated    Responsible User: Astrid Lemus RD      Task: Provide education on tobacco cessation    Responsible User: Astrid Lemus RD      Goal: Healthy Coping - use available resources to cope with the challenges of managing diabetes       Task: Discuss recognizing feelings about having diabetes Completed 7/17/2023   Responsible User: Astrid Lemus RD      Task: Provide education on the benefits of making appropriate lifestyle changes    Responsible User: Astrid Lemus RD      Task: Provide education on benefits of utilizing support systems    Responsible User: Astrid Lemus RD      Task: Discuss methods for coping with stress    Responsible User: Astrid Lemus RD      Task: Provide education on when to seek professional counseling    Responsible User: Astrid Lemus RD            Time Spent: 60 minutes  Encounter Type: Individual    Any diabetes medication dose changes were made via the CDE Protocol per the patient's referring provider. A copy of this encounter was shared with the provider.

## 2023-07-17 NOTE — RESULT ENCOUNTER NOTE
A1c improved, still recommend she continue current insulin regimen until we figure out what her blood sugars are. We want consistent data for a week.

## 2023-07-17 NOTE — PATIENT INSTRUCTIONS
Please get the blood test.  Please get the sensors from your diabetes educator today. After you get the appointment done please get the insurance matter done.

## 2023-07-17 NOTE — PATIENT INSTRUCTIONS
Goals for Diabetes Care:    1. Eat 3 balanced meals each day - Aim for 1 cup of rice or less at a meal and avoid pop    2. Check blood sugars at least 3-4 times time each day at varying times   Blood Glucose Targets:   1. Fasting and before meal target is 80 - 130   2. 2 hours after a meal target is < 180  Always remember to bring meter and log book to all appointments.    3. Activity really helps improve blood sugars. Try to Incorporate 30 minutes activity into each day - does not need to be all at one time & walking counts!    4. Treating low BG. Rule of 15 = BG 50-70 mg/dL = 15 gram carb (4 oz juice, 4 glucose tabs, OR 4 oz pop), then recheck BG in 15 minutes. If still low repeat. (If BG <50 mg/dL = 8 oz pop/juice or 8 glucose tabs).    5. Take diabetes medications as prescribed   -Lantus 30 units once daily  Glipizide 5 mg - continue twice daily    Follow up with your Diabetic Educator to assess BG targets and need for modifications to medications and/or lifestyle.    Call with any questions.  Thank you!  Astrid Lemus RDN, LD, [facility]   Certified Diabetes Care &   [facility]  Triage [phone]

## 2023-07-17 NOTE — TELEPHONE ENCOUNTER
"Routing refill request to provider for review/approval because:  Labs out of range:  Creatinine  Patient reports taking medication differently.    Last Written Prescription Date:  6/30/22  Last Fill Quantity: 9 ml,  # refills: 11   Last office visit provider:  6/19/23     Requested Prescriptions   Pending Prescriptions Disp Refills     VICTOZA PEN 18 MG/3ML soln [Pharmacy Med Name: VICTOZA 3-AUSTIN 18 MG/3 ML PE 18 Solution Pen-injector] 9 mL 11     Sig: INJECT 1.8 MG SUBCUTANEOUS DAILY       GLP-1 Agonists Protocol Failed - 7/17/2023  1:40 PM        Failed - Normal serum creatinine on file in past 12 months     Recent Labs   Lab Test 06/28/23  0756   CR 6.02*       Ok to refill medication if creatinine is low          Failed - Recent (6 mo) or future (30 days) visit within the authorizing provider's specialty     Patient had office visit in the last 6 months or has a visit in the next 30 days with authorizing provider.  See \"Patient Info\" tab in inbasket, or \"Choose Columns\" in Meds & Orders section of the refill encounter.            Passed - HgbA1C in past 3 or 6 months     If HgbA1C is 8 or greater, it needs to be on file within the past 3 months.  If less than 8, must be on file within the past 6 months.     Recent Labs   Lab Test 07/10/23  1106 12/06/21  1051 09/24/21  0952   A1C 9.1*   < >  --    74531  --   --  9.3*    < > = values in this interval not displayed.             Passed - Medication is active on med list        Passed - Patient is age 18 or older        Passed - No active pregnancy on record        Passed - No positive pregnancy test in past 12 months             Miguel Mejia RN 07/17/23 1:41 PM  "

## 2023-07-19 RX ORDER — LIRAGLUTIDE 6 MG/ML
1.8 INJECTION SUBCUTANEOUS EVERY MORNING
Qty: 9 ML | Refills: 11 | Status: SHIPPED | OUTPATIENT
Start: 2023-07-19 | End: 2024-05-23

## 2023-07-24 DIAGNOSIS — Z53.9 DIAGNOSIS NOT YET DEFINED: Primary | ICD-10-CM

## 2023-07-25 ENCOUNTER — PATIENT OUTREACH (OUTPATIENT)
Dept: CARE COORDINATION | Facility: CLINIC | Age: 53
End: 2023-07-25
Payer: COMMERCIAL

## 2023-07-25 ASSESSMENT — ACTIVITIES OF DAILY LIVING (ADL): DEPENDENT_IADLS:: CLEANING;COOKING;LAUNDRY;SHOPPING;MEAL PREPARATION;MEDICATION MANAGEMENT;TRANSPORTATION

## 2023-07-25 NOTE — PROGRESS NOTES
Clinic Care Coordination Contact  Community Health Worker Follow Up  Spoke with Byron Valdese (Daughter) who speaks English and declined Beegit      Care Gaps:     Health Maintenance Due   Topic Date Due    ZOSTER IMMUNIZATION (1 of 2) Never done    COVID-19 Vaccine (3 - Moderna risk series) 06/30/2022     Postponed to next outreach.      Care Plan:   Care Plan: DEMs       Problem: Impaired mobility       Goal: I want a wheelchair. w/c cushion and shower chair in the next 90 days.       Start Date: 4/24/2023 Expected End Date: 9/24/2023    This Visit's Progress: 60% Recent Progress: 50%    Priority: High    Note:     Barriers: language  Strengths: Accepting of support  Patient expressed understanding of goal: Yes    Action steps to achieve this goal:  1. I will complete a face-to-face appointment with my PCP on 3/22/2023. (Completed)  2. I will complete an OT/PT evaluation for a bath bench lift as scheduled on 8/9 at 10:15AM [facility]. i  3. I will answer my phone when DME Provider [facility] contacts me to deliver or  my item.  4. I will follow up with CCC in the next month regarding this goal for additional coordination support.    Note: Order for wheel chair and shower chair were sent to DME Provider PCP on 3/22/2023. 5/26 Orders are at [facility] not [facility] - Wheel Chair/cushion delivered.     [facility] [phone]  Complex Rehab Team at [facility] [phone] (bath bench lift)                          Intervention and Education during outreach:   CHW inquired if [facility] called to schedule measurement appointment for bath bench. Fritz shares that patient [facility] called and patient is scheduled for measurement on 8/9 at 10:15AM at [facility]. Fritz will take patient to appointment and use own ride.     CHW inquired about name of home care agency for PCA. CHW plans to fax Dr. Arizmendi's letter of support for early reassessment of PCA hours tomorrow when in  clinic. Fritz shares that she's the PCA, patient is receiving 9 hours and 30 minutes daily, name of agency is [address]  [phone].   CHW spoke with Mo at  [facility] [phone], she confirmed fax number [phone] and will watch for letter tomorrow. Once letter is received, they will fax to [facility].     CHW inquired about Dr. Arizmendi's 1/19/23 PT referral for physical deconditioning and neuropathy and if patient is ready to schedule. Fritz shares that she does not think patient can do PT currently due to weakness. Informed, CHW will notify lead clinician CCRN.     CHW inquired if Fritz knows how to schedule medical rides through patients insurance. Fritz confirmed she know how and is aware of patients upcoming appointments on 9/20 at 11:40AM with Dr. Arizmendi and 10/6/23 with Dr. Mi    CHW Plan: CHW to follow up in 1 month    Irene Samson  [facility] Care Coordination  [facility]    Phone: [phone]

## 2023-07-25 NOTE — Clinical Note
Jens Islas,  Per daughter [place], declined to schedule PT at this time as she feels patient is too weak. Thank you.   Mireille

## 2023-07-26 NOTE — PROGRESS NOTES
Dr. Arizmendi's 6/23/23 PCA letter faxed to Attn: Mo at [facility] fax [phone].   Irene Samson  Clinic Care Coordination  [facility]    Phone: [phone]

## 2023-07-31 ENCOUNTER — PATIENT OUTREACH (OUTPATIENT)
Dept: CARE COORDINATION | Facility: CLINIC | Age: 53
End: 2023-07-31
Payer: COMMERCIAL

## 2023-07-31 NOTE — PROGRESS NOTES
Clinic Care Coordination Contact  Care Coordination Clinician Chart Review    Situation: Patient chart reviewed by Care Coordinator.       Background: Care Coordination Program started: 3/1/2022. Initial assessment completed and patient-centered care plan(s) were developed with participation from patient. Lead CC handed patient off to CHW for continued outreaches.       Assessment: Per chart review, patient outreach completed by CC CHW on 7/25/2023.  Patient is actively working to accomplish goal(s). Patient's goal(s) appropriate and relevant at this time. Patient is not due for updated Plan of Care.  Assessments will be completed annually or as needed/with change of patient status. Patient is scheduled for measurement for bath bench on 8/9/2023. CHW/CCRN will follow up on w/c and bath bench status with next outreach.       Care Plan: DEMs       Problem: Impaired mobility       Goal: I want a wheelchair. w/c cushion and shower chair in the next 90 days.       Start Date: 4/24/2023 Expected End Date: 9/24/2023    This Visit's Progress: 60% Recent Progress: 50%    Priority: High    Note:     Barriers: language  Strengths: Accepting of support  Patient expressed understanding of goal: Yes    Action steps to achieve this goal:  1. I will complete a face-to-face appointment with my PCP on 3/22/2023. (Completed)  2. I will complete an OT/PT evaluation for a bath bench lift as scheduled on 8/9 at 10:15AM [facility]. i  3. I will answer my phone when DME Provider [facility] contacts me to deliver or  my item.  4. I will follow up with CCC in the next month regarding this goal for additional coordination support.    Note: Order for wheel chair and shower chair were sent to DME Provider PCP on 3/22/2023. 5/26 Orders are at [facility] not [facility] - Wheel Chair/cushion delivered.     [facility] [phone]  Complex Rehab Team at [facility] [phone] (bath bench lift)                                  Plan/Recommendations: The patient will continue working with Care Coordination to achieve goal(s) as above. CHW will continue outreaches at minimum every 30 days and will involve Lead CC as needed or if patient is ready to move to Maintenance. Lead CC will continue to monitor CHW outreaches and patient's progress to goal(s) every 6 weeks.     Plan of Care updated and sent to patient: No

## 2023-08-01 DIAGNOSIS — Z79.4 TYPE 2 DIABETES MELLITUS WITH OTHER SPECIFIED COMPLICATION, WITH LONG-TERM CURRENT USE OF INSULIN (H): ICD-10-CM

## 2023-08-01 DIAGNOSIS — E11.69 TYPE 2 DIABETES MELLITUS WITH OTHER SPECIFIED COMPLICATION, WITH LONG-TERM CURRENT USE OF INSULIN (H): ICD-10-CM

## 2023-08-01 RX ORDER — GLIPIZIDE 5 MG/1
5 TABLET ORAL
Qty: 180 TABLET | Refills: 1 | Status: SHIPPED | OUTPATIENT
Start: 2023-08-01 | End: 2024-01-24

## 2023-08-01 NOTE — TELEPHONE ENCOUNTER
"Routing refill request to provider for review/approval because:  Labs out of range:  creatinine  Early refill request    Last Written Prescription Date:  7/17/23  Last Fill Quantity: 60,  # refills: 0   Last office visit provider:   7/17/23 with MARYLU jade    Requested Prescriptions   Pending Prescriptions Disp Refills    glipiZIDE (GLUCOTROL) 5 MG tablet 60 tablet 0     Sig: Take 1 tablet (5 mg) by mouth 2 times daily (before meals)       Sulfonylurea Agents Failed - 8/1/2023  3:51 PM        Failed - Patient has a recent creatinine (normal) within the past 12 mos.     Recent Labs   Lab Test 05/16/23  1357   CR 0.40*       Ok to refill medication if creatinine is low          Passed - Patient has documented A1c within the specified period of time.     If HgbA1C is 8 or greater, it needs to be on file within the past 3 months.  If less than 8, must be on file within the past 6 months.     Recent Labs   Lab Test 07/17/23  1221   A1C 9.6*             Passed - Medication is active on med list        Passed - Patient is age 18 or older        Passed - No active pregnancy on record        Passed - Patient has not had a positive pregnancy test within the past 12 mos.        Passed - Recent (6 mo) or future (30 days) visit within the authorizing provider's specialty     Patient had office visit in the last 6 months or has a visit in the next 30 days with authorizing provider or within the authorizing provider's specialty.  See \"Patient Info\" tab in inbasket, or \"Choose Columns\" in Meds & Orders section of the refill encounter.                 PRISCILA TERRELL RN 08/01/23 3:51 PM  "

## 2023-08-01 NOTE — TELEPHONE ENCOUNTER
Received fax from pharmacy requesting refill for Glipizide     Last refill: 7/17/2023  Patient last seen: 7/17/2023    Upcoming appts  PCP:9/11/2023  Diabetes Edc: 8/17/2023  Lab:8/9/2023

## 2023-08-02 ENCOUNTER — OFFICE VISIT (OUTPATIENT)
Dept: PODIATRY | Facility: CLINIC | Age: 53
End: 2023-08-02
Payer: COMMERCIAL

## 2023-08-02 VITALS — SYSTOLIC BLOOD PRESSURE: 124 MMHG | DIASTOLIC BLOOD PRESSURE: 82 MMHG | WEIGHT: 107 LBS | BODY MASS INDEX: 19.57 KG/M2

## 2023-08-02 DIAGNOSIS — E11.42 DIABETIC POLYNEUROPATHY ASSOCIATED WITH TYPE 2 DIABETES MELLITUS (H): Primary | ICD-10-CM

## 2023-08-02 DIAGNOSIS — L97.512 ULCER OF RIGHT FOOT WITH FAT LAYER EXPOSED (H): ICD-10-CM

## 2023-08-02 DIAGNOSIS — M20.11 HAV (HALLUX ABDUCTO VALGUS), RIGHT: ICD-10-CM

## 2023-08-02 PROCEDURE — 99213 OFFICE O/P EST LOW 20 MIN: CPT | Mod: 25 | Performed by: PODIATRIST

## 2023-08-02 PROCEDURE — 11042 DBRDMT SUBQ TIS 1ST 20SQCM/<: CPT | Performed by: PODIATRIST

## 2023-08-02 NOTE — LETTER
8/2/2023         RE: Nithya Matthews  [address] [place] [address]        Dear Colleague,    Thank you for referring your patient, Nithya Matthews, to the [facility] POD[facility]. Please see a copy of my visit note below.    Podiatry / Foot and Ankle Surgery Progress Note    August 2, 2023    Subject: Patient was seen for ulcer right foot.  Here with her daughter who is interpreting for her.  They note that the foot has been a little bit more sore lately.  Denies fever, nausea, vomiting.  Has been wearing the offloading boot.  Has been putting Silvadene cream on the wound.    Objective:  Vitals: /82   Wt 48.5 kg (107 lb)   BMI 19.57 kg/m      A1C: 9.1 (7/10/2023)     General appearance: Patient is alert and fully cooperative with history & exam.  No sign of distress is noted during the visit.     Dermatologic: Preulcerative hyperkeratotic lesions to the plantar aspects of the first and fifth metatarsal heads bilaterally.  Upon debridement of the right first metatarsal head callus there is an ulceration noted.  This measures approximately 1 cm x 0.7 cm x 0.2 cm in depth.  No redness, purulent drainage or malodor noted.  Maceration around the wound edges.     Vascular: DP & PT pulses are intact & regular bilaterally.  No significant edema or varicosities noted.  CFT and skin temperature is normal to both lower extremities.     Neurologic: Lower extremity sensation is diminished to feet.     Musculoskeletal: Patient is ambulatory without assistive device or brace.  Prominent first metatarsal heads medially both feet.     ASSESSMENT:      Diabetic polyneuropathy associated with type 2 diabetes mellitus (H)  Ulcer of right foot with fat layer exposed (H)  Hav (hallux abducto valgus), right     Medical Decision Making/Plan:  Reviewed patient's chart in epic.    At this time the ulcer was debrided.  Please see procedure note below.  We will have her apply Nichole to the ulcer daily with two 2 x 2 gauze  pads and a large Band-Aid.  She will continue wearing the postop shoe to keep pressure off of the foot at all times even around the house.  Currently there are no clinic signs of infection and would hold off on an antibiotic at this time.     . All questions were answered to patient and patient's daughter satisfaction and they will call for the questions or concerns.     Procedure: After verbal consent, excisional debridement was performed on ulcer.  #15 blade was used to debride ulcer down to and including subcutaneous tissue. Bleeding controlled with light pressure.   No drainage noted.  No anesthesia was used due to neuropathy. Dry dressing applied to foot.  Patient tolerated procedure well.      Patient Risk Factor:  Patient is a moderate risk factor for infection.     Durable Medical Equipment Wound Care Orders       Wound Care Order for DME - ONLY FOR DME   As directed      DME Provider: [facility]    Start Date: 8/2/2023    Wound Supply Order Options: Complex Wound    Optional: .dmewound can be used to pull in order specific information into documentation    Wound Number: Wound 1    Wound 1 Location: Right foot also    Wound 1 Dressing Change Frequency: Daily    Wound 1 Length of Need: 30 days    Wound 1 - Dressing Supplies:  Primary  Wrap/Gauze       Wound 1 - Primary Dressing Dispensing Instructions: Ok to Substitute    Wound 1 - Primary Dressing Types: Collagen w/ Silver    Wound 1 - Collagen w/ Silver Types: Nichole AG    Wound 1 - Nichole Size: 4 x 4    Wound 1 - Nichole Quantity: 30    Wound 1 - Wrap/Gauze Types: Pads    Wound 1 - Pad Type: Sterile Gauze Pads    Wound 1 - Gauze Pad Size: 2 x 2 Comment - 2 per dressing change    Wound 1 - Gauze Qty for Dressing/Month: 60              Grecia Hearn DPM, Podiatry/Foot and Ankle Surgery              Again, thank you for allowing me to participate in the care of your patient.        Sincerely,        Grecia Hearn DPM, Podiatry/Foot and Ankle Surgery

## 2023-08-02 NOTE — PROGRESS NOTES
Podiatry / Foot and Ankle Surgery Progress Note    August 2, 2023    Subject: Patient was seen for ulcer right foot.  Here with her daughter who is interpreting for her.  They note that the foot has been a little bit more sore lately.  Denies fever, nausea, vomiting.  Has been wearing the offloading boot.  Has been putting Silvadene cream on the wound.    Objective:  Vitals: /82   Wt 48.5 kg (107 lb)   BMI 19.57 kg/m      A1C: 9.1 (7/10/2023)     General appearance: Patient is alert and fully cooperative with history & exam.  No sign of distress is noted during the visit.     Dermatologic: Preulcerative hyperkeratotic lesions to the plantar aspects of the first and fifth metatarsal heads bilaterally.  Upon debridement of the right first metatarsal head callus there is an ulceration noted.  This measures approximately 1 cm x 0.7 cm x 0.2 cm in depth.  No redness, purulent drainage or malodor noted.  Maceration around the wound edges.     Vascular: DP & PT pulses are intact & regular bilaterally.  No significant edema or varicosities noted.  CFT and skin temperature is normal to both lower extremities.     Neurologic: Lower extremity sensation is diminished to feet.     Musculoskeletal: Patient is ambulatory without assistive device or brace.  Prominent first metatarsal heads medially both feet.     ASSESSMENT:      Diabetic polyneuropathy associated with type 2 diabetes mellitus (H)  Ulcer of right foot with fat layer exposed (H)  Hav (hallux abducto valgus), right     Medical Decision Making/Plan:  Reviewed patient's chart in epic.    At this time the ulcer was debrided.  Please see procedure note below.  We will have her apply Nichole to the ulcer daily with two 2 x 2 gauze pads and a large Band-Aid.  She will continue wearing the postop shoe to keep pressure off of the foot at all times even around the house.  Currently there are no clinic signs of infection and would hold off on an antibiotic at this  time.     . All questions were answered to patient and patient's daughter satisfaction and they will call for the questions or concerns.     Procedure: After verbal consent, excisional debridement was performed on ulcer.  #15 blade was used to debride ulcer down to and including subcutaneous tissue. Bleeding controlled with light pressure.   No drainage noted.  No anesthesia was used due to neuropathy. Dry dressing applied to foot.  Patient tolerated procedure well.      Patient Risk Factor:  Patient is a moderate risk factor for infection.     Durable Medical Equipment Wound Care Orders       Wound Care Order for DME - ONLY FOR DME   As directed      DME Provider: [facility]    Start Date: 8/2/2023    Wound Supply Order Options: Complex Wound    Optional: .dmewound can be used to pull in order specific information into documentation    Wound Number: Wound 1    Wound 1 Location: Right foot also    Wound 1 Dressing Change Frequency: Daily    Wound 1 Length of Need: 30 days    Wound 1 - Dressing Supplies:  Primary  Wrap/Gauze       Wound 1 - Primary Dressing Dispensing Instructions: Ok to Substitute    Wound 1 - Primary Dressing Types: Collagen w/ Silver    Wound 1 - Collagen w/ Silver Types: Nichole AG    Wound 1 - Nichole Size: 4 x 4    Wound 1 - Nichole Quantity: 30    Wound 1 - Wrap/Gauze Types: Pads    Wound 1 - Pad Type: Sterile Gauze Pads    Wound 1 - Gauze Pad Size: 2 x 2 Comment - 2 per dressing change    Wound 1 - Gauze Qty for Dressing/Month: 60              Grecia Hearn DPM, Podiatry/Foot and Ankle Surgery

## 2023-08-07 ENCOUNTER — PATIENT OUTREACH (OUTPATIENT)
Dept: NEPHROLOGY | Facility: CLINIC | Age: 53
End: 2023-08-07
Payer: COMMERCIAL

## 2023-08-09 ENCOUNTER — VIRTUAL VISIT (OUTPATIENT)
Dept: EDUCATION SERVICES | Facility: CLINIC | Age: 53
End: 2023-08-09
Payer: COMMERCIAL

## 2023-08-09 ENCOUNTER — TELEPHONE (OUTPATIENT)
Dept: EDUCATION SERVICES | Facility: CLINIC | Age: 53
End: 2023-08-09

## 2023-08-09 DIAGNOSIS — Z79.4 TYPE 2 DIABETES MELLITUS WITH OTHER SPECIFIED COMPLICATION, WITH LONG-TERM CURRENT USE OF INSULIN (H): Primary | ICD-10-CM

## 2023-08-09 DIAGNOSIS — E11.69 TYPE 2 DIABETES MELLITUS WITH OTHER SPECIFIED COMPLICATION, WITH LONG-TERM CURRENT USE OF INSULIN (H): ICD-10-CM

## 2023-08-09 DIAGNOSIS — Z79.4 TYPE 2 DIABETES MELLITUS WITH OTHER SPECIFIED COMPLICATION, WITH LONG-TERM CURRENT USE OF INSULIN (H): ICD-10-CM

## 2023-08-09 DIAGNOSIS — E11.69 TYPE 2 DIABETES MELLITUS WITH OTHER SPECIFIED COMPLICATION, WITH LONG-TERM CURRENT USE OF INSULIN (H): Primary | ICD-10-CM

## 2023-08-09 PROCEDURE — G0108 DIAB MANAGE TRN  PER INDIV: HCPCS | Mod: 95 | Performed by: DIETITIAN, REGISTERED

## 2023-08-09 RX ORDER — BLOOD-GLUCOSE SENSOR
1 EACH MISCELLANEOUS
Qty: 2 EACH | Refills: 4 | Status: SHIPPED | OUTPATIENT
Start: 2023-08-09 | End: 2023-09-11

## 2023-08-09 NOTE — PATIENT INSTRUCTIONS
Goals for Diabetes Care:    1. Eat 3 balanced meals each day - Monitor carb intake and aim for 45-60 grams per meal ( 1 cup of rice at a meal)  This would be equal to about 4 choices of carbohydrates. Carbohydrate 1 choice = 15 grams  Aim to eat the plate method style - half your plate fruits/veggies, 1/4 the plate protein and 1/4 plate starch (rice, potato, pasta)    2. Check blood sugars at least 2-3 times each day at varying times with glucose monitor or Herson   Blood Glucose Targets:   1. Fasting and before meal target is 80 - 130   2. 2 hours after a meal target is < 180  Always remember to bring meter and log book to all appointments.    3. Activity really helps improve blood sugars. Try to Incorporate 30 minutes activity into each day - does not need to be all at one time & walking counts!    4. Treating low BG. Rule of 15 = BG 50-70 mg/dL = 15 gram carb (4 oz juice, 4 glucose tabs, OR 4 oz pop), then recheck BG in 15 minutes. If still low repeat. (If BG <50 mg/dL = 8 oz pop/juice or 8 glucose tabs).    5. Take diabetes medications as prescribed   -Lantus 25 units/day  -Victoza 0.6 mg/day  -Glipizide 5 mg twice a day    Follow up with your Diabetic Educator to assess BG targets and need for modifications to medications and/or lifestyle.    Call with any questions.  Thank you!  Astrid Lemus RDN, LD, [facility]   Certified Diabetes Care &   [facility]  Triage [phone]

## 2023-08-09 NOTE — TELEPHONE ENCOUNTER
"Patient is requesting someone from Dr. Dowd's office call her about her gabapentin. She states she was unable to fill it from the pharmacy and they state \"the name is wrong\".    Please call with .    Thank you,  Astrid Lemus RD, LD, [facility]  Certified Diabetes Care &   [facility]    "

## 2023-08-09 NOTE — PROGRESS NOTES
Diabetes Self-Management Education & Support    Presents for: Individual review    Type of Service: Telephone Visit    Originating Location (Patient Location): Home  Distant Location (Provider Location): Offsite  Mode of Communication:  Telephone    Telephone Visit Start Time:  11:00 AM  Telephone Visit End Time (telephone visit stop time): 11:31 AM    How would patient like to obtain AVS? Decliend    Assessment Type:   REPORTS:          Pt verbalized understanding of concepts discussed and recommendations provided today.       Continue education with the following diabetes management concepts: Monitoring, Taking Medication, Problem Solving, and Reducing Risks    ASSESSMENT:  Spoke with patient with  ID# [id number] and son Julio.     Patient started taking Victoza at 0.6 mg/day and noticed more low BG as evidenced above in the Herson report. When this happened she felt shaky and stopped the blue pen (Victoza). Patient reports feeling better now off Victoza - during this time she was still using Lantus at 30 units.   We discussed restarting Victoza and reducing Lantus to 25 units/day. Patient and son agreeable and demonstrated understanding.  Reviewed importance of getting a Herson back on to wear. Pt states that she checks BG with a meter but unable to report the numbers - just states that it is up and down.   Reviewed hypoglycemia signs and symptoms.  Also reviewed importance of avoiding juice, which patient states she has not been drinking   Glucose Patterns & Trends:  Hypoglycemia, weekend- nocturnal and weekday- nocturnal - these Herson Reports are from 10 days ago - patient has not replaced sensor.     PLAN  Continue with glipizide 5 mg twice daily  Restart Victoza 0.6 mg/day & Decrease Lantus to 25 units/day  Monitor BG daily with glucometer or restart Herson - sent to pharmacy, son aware.   Topics to cover at upcoming visits: Monitoring, Taking Medication, Problem Solving, and Reducing Risks    Follow-up: 4  "weeks in person    See Care Plan for co-developed, patient-state behavior change goals.  AVS provided for patient today.    Education Materials Provided:  No new materials provided today      SUBJECTIVE/OBJECTIVE:  Presents for: Individual review  Accompanied by: Self, , Son  Diabetes education in the past 24mo: Yes  Focus of Visit: Patient Unsure  Diabetes type: Type 2  Disease course: Improving  Diabetes management related comments/concerns: low BG/what meds to take  Transportation concerns: Yes  Difficulty affording diabetes medication?: Sometimes  Other concerns:: Language barrier  Cultural Influences/Ethnic Background:  Not  or       Diabetes Symptoms & Complications:  Fatigue: No  Neuropathy: No  Polydipsia: No  Polyphagia: No  Polyuria: No  Visual change: Yes  Symptom course: Improving  Complications assessed today?: No    Patient Problem List and Family Medical History reviewed for relevant medical history, current medical status, and diabetes risk factors.    Vitals:  LMP  (LMP Unknown)   Estimated body mass index is 26.87 kg/m  as calculated from the following:    Height as of 7/17/23: 1.575 m (5' 2\").    Weight as of 7/17/23: 66.6 kg (146 lb 14.4 oz).   Last 3 BP:   BP Readings from Last 3 Encounters:   07/17/23 127/87   05/24/23 118/84   05/16/23 118/80       History   Smoking Status    Never   Smokeless Tobacco    Never       Labs:  Lab Results   Component Value Date    A1C 9.6 07/17/2023     Lab Results   Component Value Date     05/16/2023     Lab Results   Component Value Date     05/16/2023     Direct Measure HDL   Date Value Ref Range Status   05/16/2023 32 (L) >=50 mg/dL Final   ]  GFR Estimate   Date Value Ref Range Status   05/16/2023 >90 >60 mL/min/1.73m2 Final     Comment:     eGFR calculated using 2021 CKD-EPI equation.     No results found for: GFRESTBLACK  Lab Results   Component Value Date    CR 0.40 05/16/2023     No results found for: " MICROALBUMIN    Healthy Eating:  Healthy Eating Assessed Today: Yes  Meal planning/habits: Avoiding sweets  Meals include: Breakfast, Lunch, Dinner  Breakfast: rice and broderick chicken/pork, green vegetables/cucumber  Lunch: rice and broderick chicken/pork, green vegetables/cucumber  Dinner: rice and broderick chicken/pork, green vegetables/cucumber  Snacks: jermaine,  Other: was drinking orange juice - so no longer drinks it  Beverages: Water  Has patient met with a dietitian in the past?: No    Being Active:  Being Active Assessed Today: No    Monitoring:  Monitoring Assessed Today: Yes  Did patient bring glucose meter to appointment? : No  Blood Glucose Meter: CGM (Herson 3 and glucose monitor)  Times checking blood sugar at home (number): 2  Times checking blood sugar at home (per): Day  Blood glucose trend: Decreasing      Taking Medications:  Diabetes Medication(s)       Insulin       insulin glargine (LANTUS SOLOSTAR) 100 UNIT/ML pen    Inject 30 Units Subcutaneous At Bedtime      Sulfonylureas       glipiZIDE (GLUCOTROL) 5 MG tablet    Take 1 tablet (5 mg) by mouth 2 times daily (before meals)      Incretin Mimetic Agents       liraglutide (VICTOZA) 18 MG/3ML solution    Inject 0.6 mg Subcutaneous daily     Patient not taking: Reported on 8/9/2023            Taking Medication Assessed Today: Yes  Current Treatments: Insulin Injections  Dose schedule: At bedtime  Given by: Patient  Injection/Infusion sites: Abdomen  Problems taking diabetes medications regularly?: No  Diabetes medication side effects?: No    Problem Solving:  Problem Solving Assessed Today: Yes  Is the patient at risk for hypoglycemia?: Yes  Hypoglycemia Frequency: Never  Hypoglycemia Treatment: Juice              Reducing Risks:  Reducing Risks Assessed Today: Yes  Diabetes Risks: Age over 45 years, Sedentary Lifestyle, Ethnicity  Has dilated eye exam at least once a year?: No    Healthy Coping:  Healthy Coping Assessed Today: Yes  Emotional response to  diabetes: Ready to learn  Informal Support system:: Children  Stage of change: ACTION (Actively working towards change)  Patient Activation Measure Survey Score:       No data to display                  Care Plan and Education Provided:  Care Plan: Diabetes   Updates made by Astrid Lemus RD since 8/9/2023 12:00 AM        Problem: Diabetes Self-Management Education Needed to Optimize Self-Care Behaviors         Goal: Healthy Eating - follow a healthy eating pattern for diabetes    This Visit's Progress: 100%   Recent Progress: 60%   Note:    Limit to 1 cup rice and no pop       Goal: Monitoring - monitor glucose and ketones as directed    This Visit's Progress: 50%   Recent Progress: 30%   Note:    Check BG 3 times daily       Goal: Problem Solving - know how to prevent and manage short-term diabetes complications         Task: Provide education on low blood glucose - causes, signs/symptoms, prevention, treatment, carrying a carbohydrate source at all times, and medical identification Completed 8/9/2023   Responsible User: Astrid Lemus RD              Time Spent: 30 minutes  Encounter Type: Individual    Any diabetes medication dose changes were made via the CDE Protocol per the patient's referring provider. A copy of this encounter was shared with the provider.

## 2023-08-10 ENCOUNTER — PATIENT OUTREACH (OUTPATIENT)
Dept: CARE COORDINATION | Facility: CLINIC | Age: 53
End: 2023-08-10
Payer: COMMERCIAL

## 2023-08-10 NOTE — TELEPHONE ENCOUNTER
Mayela Dowd, DO  [facility]16 hours ago (4:53 PM)     VS  I believe this has to do with her insurance card? Do you know who can help us with that

## 2023-08-10 NOTE — TELEPHONE ENCOUNTER
listed with [employer] is 1968  So unable to bill to insurance- pharmacy did run through insurance with  above so they will be able to fill the Rx now    Requesting that our CC team assist patient with updating  with insurance due to language barrier - referral placed

## 2023-08-10 NOTE — PROGRESS NOTES
Clinic Care Coordination Contact  Community Health Worker Initial Outreach    CHW Initial Information Gathering:  Referral Source: PCP  Preferred Hospital: [facility]  [phone]  Preferred Urgent Care: [facility] [place], [phone]  Current living arrangement:: I live in a private home with family  Type of residence:: Apartment  Community Resources: [facility]  Informal Support system:: Children, Family, Friends  No PCP office visit in Past Year: No  Transportation means:: Family, Regular car  CHW Additional Questions  If ED/Hospital discharge, follow-up appointment scheduled as recommended?: N/A  Medication changes made following ED/Hospital discharge?: N/A  MyChart active?: No  Patient agreeable to assistance with activating MyChart?: No    Patient accepts CC: Yes. Patient scheduled for assessment with CC COLLIN Molina on 8/14/2023 at 2:00pm. Patient noted desire to discuss recent CC referral to assist patient with updating her insurance card.    Consent to communicate on file with son Nor, No  needed son will translate for his mother.     Jesusita PACK  Community Health Worker  [facility] Care Coordination  [facility] CenterChina [email or web address]  Office: [phone]

## 2023-08-14 ENCOUNTER — PATIENT OUTREACH (OUTPATIENT)
Dept: CARE COORDINATION | Facility: CLINIC | Age: 53
End: 2023-08-14

## 2023-08-14 NOTE — PROGRESS NOTES
Contact  [facility]/Voicemail    Referral Source: Care Team  Clinical Data:  Outreach  Outreach attempted x 1. NO VM.    Call from son.  Somehow, the  on her insurance card is incorrect.  Checked [employer] and they also have incorrect birthday.  Gave him phone number to call [facility] to get it corrected.  No other concerns.  Plan:asked him to call back if he needs additional help.  Will leave open for a few more days and if no call back, will close.   Tracy Mays,   [facility]  [phone]

## 2023-08-17 ENCOUNTER — LAB (OUTPATIENT)
Dept: LAB | Facility: CLINIC | Age: 53
End: 2023-08-17
Payer: COMMERCIAL

## 2023-08-17 DIAGNOSIS — R76.8 ANTI-CYCLIC CITRULLINATED PEPTIDE ANTIBODY POSITIVE: ICD-10-CM

## 2023-08-17 DIAGNOSIS — K75.4 AUTOIMMUNE HEPATITIS (H): ICD-10-CM

## 2023-08-17 LAB
ALBUMIN SERPL BCG-MCNC: 4.2 G/DL (ref 3.5–5.2)
ALP SERPL-CCNC: 109 U/L (ref 35–104)
ALT SERPL W P-5'-P-CCNC: 32 U/L (ref 0–50)
AST SERPL W P-5'-P-CCNC: 39 U/L (ref 0–45)
BILIRUB DIRECT SERPL-MCNC: <0.2 MG/DL (ref 0–0.3)
BILIRUB SERPL-MCNC: 0.3 MG/DL
GGT SERPL-CCNC: 125 U/L (ref 5–36)
PROT SERPL-MCNC: 8.6 G/DL (ref 6.4–8.3)

## 2023-08-17 PROCEDURE — 86381 MITOCHONDRIAL ANTIBODY EACH: CPT

## 2023-08-17 PROCEDURE — 86039 ANTINUCLEAR ANTIBODIES (ANA): CPT

## 2023-08-17 PROCEDURE — 86200 CCP ANTIBODY: CPT

## 2023-08-17 PROCEDURE — 83516 IMMUNOASSAY NONANTIBODY: CPT | Mod: 90

## 2023-08-17 PROCEDURE — 99000 SPECIMEN HANDLING OFFICE-LAB: CPT

## 2023-08-17 PROCEDURE — 82977 ASSAY OF GGT: CPT

## 2023-08-17 PROCEDURE — 82784 ASSAY IGA/IGD/IGG/IGM EACH: CPT

## 2023-08-17 PROCEDURE — 36415 COLL VENOUS BLD VENIPUNCTURE: CPT

## 2023-08-17 PROCEDURE — 80076 HEPATIC FUNCTION PANEL: CPT

## 2023-08-17 PROCEDURE — 86038 ANTINUCLEAR ANTIBODIES: CPT

## 2023-08-18 LAB
ANA PAT SER IF-IMP: ABNORMAL
ANA SER QL IF: POSITIVE
ANA TITR SER IF: ABNORMAL {TITER}
CCP AB SER IA-ACNC: >340 U/ML
IGG SERPL-MCNC: 2099 MG/DL (ref 610–1616)
MITOCHONDRIA M2 IGG SER-ACNC: 21 U/ML
SMA IGG SER-ACNC: 16 UNITS

## 2023-08-21 DIAGNOSIS — Z76.0 ENCOUNTER FOR MEDICATION REFILL: ICD-10-CM

## 2023-08-21 DIAGNOSIS — F51.01 PRIMARY INSOMNIA: ICD-10-CM

## 2023-08-21 RX ORDER — MIRTAZAPINE 7.5 MG/1
7.5 TABLET, FILM COATED ORAL AT BEDTIME
Qty: 90 TABLET | Refills: 3 | Status: SHIPPED | OUTPATIENT
Start: 2023-08-21 | End: 2024-08-05

## 2023-08-21 NOTE — RESULT ENCOUNTER NOTE
Team - please call patient with results.    She has multiple positive tests. These are all consistent with her rheumatoid arthritis, and likely her auto immune hepatitis. I did message the rheumatologist, but I am waiting to hear back. Does she have any joint symptoms? Her symptoms will guide our management.     Mayela Dowd, DO

## 2023-08-21 NOTE — TELEPHONE ENCOUNTER
"Last Written Prescription Date:  6/27/22  Last Fill Quantity: 90,  # refills: 3   Last office visit provider:  6/19/23     Requested Prescriptions   Pending Prescriptions Disp Refills    mirtazapine (REMERON) 7.5 MG tablet [Pharmacy Med Name: MIRTAZAPINE 7.5 MG TAB 7.5 Tablet] 90 tablet 3     Sig: TAKE 1 TABLET (7.5 MG) BY MOUTH AT BEDTIME       Atypical Antidepressants Protocol Passed - 8/21/2023  9:12 AM        Passed - Recent (12 mo) or future (30 days) visit within the authorizing provider's specialty     Patient has had an office visit with the authorizing provider or a provider within the authorizing providers department within the previous 12 mos or has a future within next 30 days. See \"Patient Info\" tab in inbasket, or \"Choose Columns\" in Meds & Orders section of the refill encounter.              Passed - Medication active on med list        Passed - Patient is age 18 or older        Passed - No active pregnancy on record        Passed - No positive pregnancy test in past 12 mos           Overridden by Andrea Sexton MD on Jun 28, 2023 1:16 PM   Drug-Drug   1. SELECTIVE SEROTONIN REUPTAKE INHIBITORS / SEROTONERGIC NON-OPIOID CNS DEPRESSANTS [Level: Major] [Reason: Tolerated medication/side effects in past]   Other Orders: escitalopram (LEXAPRO) 10 MG tablet              TANGELA LEIGH RN 08/21/23 1:06 PM  "

## 2023-08-22 ENCOUNTER — TELEPHONE (OUTPATIENT)
Dept: RHEUMATOLOGY | Facility: CLINIC | Age: 53
End: 2023-08-22

## 2023-08-22 NOTE — TELEPHONE ENCOUNTER
Tried calling pt via timeplazza , no  available at the moment.   Called and spoke to pt's son with pt present, appt rescheduled with Dr. Mack for 9/7/23.     Routing to Dr. Mayela Dowd as an FYI.

## 2023-08-22 NOTE — TELEPHONE ENCOUNTER
----- Message from Katherine Irving RN sent at 8/22/2023 12:09 PM CDT -----  Regarding: RE: Specialty Referral  Patient is scheduled w/ Ilan for 2/29/24 and they are requesting a sooner visit.  I understand our new provider Frederick would likely be able to see this patient sooner.      Please call patient to schedule for next available with Dr. Mack for a New Patient x60 min visit.  Thank You,  Katherine Irving RN  Rheumatology Clinic    ----- Message -----  From: Valerie Hunter RN  Sent: 8/22/2023  11:08 AM CDT  To: Katherine Irving RN  Subject: FW: Specialty Referral                           Ronan Murphy,   Being you are working referrals this week, I will let you work your magic with this one.  Thanks  ----- Message -----  From: Teresita Bay  Sent: 8/21/2023   2:39 PM CDT  To: Heavenly De RN  Subject: FW: Specialty Referral                             ----- Message -----  From: Katherine Quinones RN  Sent: 8/21/2023   2:21 PM CDT  To: Teresita Bay; Heavenly Kaplan RN  Subject: RE: Specialty Referral                           Karine Lo  From what I understand, that all acute referrals get routed to our team to triage...  So does this go to Heavenly's box for T to process as she is covering referrals with Heavenly De out?  As I do not know all the operations of Rheum yet and do not work in it much, Heavenly Kaplan would know the best.  Marcellus Burdick can you enlighten us on Referrals that Provider is wanting to expedite?  Thanks  Team Work makes the Dream Work!    Katherine Quinones RN, BSN, PHN  [facility] Nurse Navigator  [phone]    ----- Message -----  From: Teresita Bay  Sent: 8/21/2023  10:20 AM CDT  To: Katherine Quinones RN  Subject: FW: Specialty Referral                           Vesta Murphy,  Do you happen to know which [place] covers for Dr. Quiñones in [place]?    ----- Message -----  From: Katherine Irving RN  Sent: 8/21/2023  10:18 AM CDT  To: Teresita Bay  Subject: RE: Specialty  Referral                           Yes,  I believe it is Dr. Quiñones.  But I do not know what pool they are?? I searched for [facility] but nothing came up.  So sorry!  Katherine SPARKS might know.  ----- Message -----  From: Teresita Bay  Sent: 8/21/2023   9:03 AM CDT  To: Katherine Irving, RN  Subject: FW: Specialty Referral                           Does [facility] have a Rheumatology team? Not sure who to send this to      ----- Message -----  From: Erin Nunez, YARY  Sent: 8/21/2023   7:50 AM CDT  To: [facility]  Subject: FW: Specialty Referral                           Hello team,   This was sent to the GI Pool. Please let me know if this provider works with a different rheumatology department.     Please do not hesitate to contact us with any questions or concerns.     Thanks,  Freya Nunez  RN, BSN  RN care coordinator  [address] [place] [id number]  Phone: [phone]  Fax: [phone]  After hours:[phone]       ----- Message -----  From: Mayela Dowd DO  Sent: 8/18/2023  12:27 PM CDT  To: KAREL Hunter; #  Subject: Specialty Referral                               Hi Dr. Talavera,    I am writing about my patient Mumtaz Montoya. She has poorly controlled type II diabetes, and likely RA (ccp antibody 300+) and is symptomatic but not started on any medication due to language/ medical understanding barrier.    I see she has an appointment in February 2024, is there anyway to get her seen with a colleague sooner to start medication?    She also tested positive for Anti Mitochondrial antibody with elevated GGT, and she has been unable to get into GI. Would there be any relation between the two? Is there other things I should be looking for?     Mayela Dowd, DO

## 2023-08-23 ENCOUNTER — MEDICAL CORRESPONDENCE (OUTPATIENT)
Dept: HEALTH INFORMATION MANAGEMENT | Facility: CLINIC | Age: 53
End: 2023-08-23
Payer: COMMERCIAL

## 2023-08-23 DIAGNOSIS — Z53.9 DIAGNOSIS NOT YET DEFINED: Primary | ICD-10-CM

## 2023-08-23 PROCEDURE — G0179 MD RECERTIFICATION HHA PT: HCPCS | Performed by: FAMILY MEDICINE

## 2023-08-25 ENCOUNTER — PATIENT OUTREACH (OUTPATIENT)
Dept: CARE COORDINATION | Facility: CLINIC | Age: 53
End: 2023-08-25
Payer: COMMERCIAL

## 2023-08-25 ASSESSMENT — ACTIVITIES OF DAILY LIVING (ADL): DEPENDENT_IADLS:: CLEANING;COOKING;LAUNDRY;SHOPPING;MEAL PREPARATION;MEDICATION MANAGEMENT;TRANSPORTATION

## 2023-08-25 NOTE — PROGRESS NOTES
Clinic Care Coordination Contact  Community Health Worker Follow Up  Spoke with ByronFritz (Daughter). Fritz declined [employer] .     Care Gaps:     Health Maintenance Due   Topic Date Due    ZOSTER IMMUNIZATION (1 of 2) Never done    COVID-19 Vaccine (3 - Moderna risk series) 06/30/2022    INFLUENZA VACCINE (1) 09/01/2023     Postponed to next outreach.      Care Plan:   Care Plan: DEMs Completed 8/25/2023      Problem: Impaired mobility  Resolved 8/25/2023      Goal: I want a wheelchair. w/c cushion and shower chair in the next 90 days.  Completed 8/25/2023      Start Date: 4/24/2023 Expected End Date: 9/24/2023    Recent Progress: 60%    Priority: High    Note:     Barriers: language  Strengths: Accepting of support  Patient expressed understanding of goal: Yes    Action steps to achieve this goal:  1. I will complete a face-to-face appointment with my PCP on 3/22/2023. (Completed)  2. I will complete an OT/PT evaluation for a bath bench lift as scheduled on 8/9 at 10:15AM [facility]. (Completed)  3. I will answer my phone when DME Provider [facility] contacts me to deliver or  my item.  4. I will follow up with CCC in the next month regarding this goal for additional coordination support.    Note: Order for wheel chair and shower chair were sent to DME Provider PCP on 3/22/2023. 5/26 Orders are at [facility] not [facility] - Wheel Chair/cushion delivered.     [facility] [phone]  Complex Rehab Team at [facility] [phone] (bath bench lift)                            Care Plan: PCA       Problem: Request for early re-assessment of PCA services.       Goal: I will be re-evaluated for increased PCA services in the next 90 days.       Start Date: 8/25/2023 Expected End Date: 11/25/2023    Priority: High    Note:     Barriers: Language, recent health changes.  Strengths: Willing to accept CCC support.  Patient expressed understanding of goal: Yes    Action steps to achieve  this goal:  1.  I will answer my phone when I am contacted by [facility] to schedule an assessment.   2.  I will make sure I have any supportive family/friends present for my assessment.   3.  I will follow up with CCC in the next month regarding this goal.   Note: PCP's letter of support for early reassessment faxed on 7/25/23. formerly [facility] [phone], she confirmed fax number [phone]                       Intervention and Education during outreach:   Fritz confirmed patient completed measurement appointment for bath bench on  8/9 and has one now. No further assistance is needed.     CHW inquired about PCA reassessment. Fritz has not received any follow up yet and will continue to watch for follow up from [facility]. New goal created, to support patient as needed.     CHW Plan:  CHW to follow up in 1 month    Irene Samson  [facility] Care Coordination  [facility]    Phone: [phone]

## 2023-08-28 NOTE — OR NURSING
states he would like BP treated if over 180 systolic.  
Syncope
Verbal clearance for patient to transfer to phase II care in 3C by Lasha Brar.  
Syncope

## 2023-08-29 ENCOUNTER — MEDICAL CORRESPONDENCE (OUTPATIENT)
Dept: HEALTH INFORMATION MANAGEMENT | Facility: CLINIC | Age: 53
End: 2023-08-29
Payer: COMMERCIAL

## 2023-08-29 DIAGNOSIS — Z53.9 DIAGNOSIS NOT YET DEFINED: Primary | ICD-10-CM

## 2023-08-29 PROCEDURE — G0179 MD RECERTIFICATION HHA PT: HCPCS | Performed by: FAMILY MEDICINE

## 2023-09-05 ENCOUNTER — MEDICAL CORRESPONDENCE (OUTPATIENT)
Dept: HEALTH INFORMATION MANAGEMENT | Facility: CLINIC | Age: 53
End: 2023-09-05
Payer: COMMERCIAL

## 2023-09-05 NOTE — PROGRESS NOTES
NEW PATIENT RHEUMATOLOGY VISIT     Assessment & Plan     Problem List  Rheumatoid Arthritis?   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  This is most concerning for rheumatoid arthritis, however, she does not have clinical symptoms or exam findings consistent with a diagnosis of rheumatoid arthritis--she denies any  pain, stiffness or swelling in her hands, wrists, ankles, or feet.  Although she does have pain in her shoulder is unilateral and more consistent with frozen shoulder or glenohumeral humeral osteoarthritis.      Plan: Will obtain x-rays to further investigate.  If imaging is consistent with rheumatoid arthritis would avoid methotrexate given her history of transaminitis and positive antimitochondrial antibody.  Could consider starting with a TNF alpha inhibitor, however, given minimal symptoms could start with Imuran as this would also treat her autoimmune hepatitis, and TNF inhibitors have been known to induce autoimmune hepatitis.  Will obtain lab work in preparation for possibly starting a DMARD on follow-up.  We will also obtain echocardiogram given her inability to lay down flat due to dyspnea.             (K75.4) Autoimmune hepatitis (H)  Comment: Patient noted to have a positive antimitochondrial antibody and history of elevated liver enzymes, although her most recent liver enzymes are within normal limits.  Also endorses right upper quadrant pain.  Already has a GI referral but no appointment.  Reports she has never been followed by GI doctor in the past  Plan: Instructed patient's son, Nor, to call gastroenterology to schedule an appointment.  Patient's uncontrolled diabetes makes use of steroids not ideal, at least not at higher doses.        Positive MYRON  MYRON by IFA 1:160 speckled, Likely secondary to autoimmune hepatitis.   Rheumatological review of systems is reassuring and subserologies including SSA/SSB, De, Scl-70, RNP, Nicole-1, histone, centromere, dsDNA, anti-smooth, are all negative.     Left shoulder pain and stiffness  Limited active and passive range of motion most consistent with frozen shoulder in the setting of her uncontrolled diabetes also possible is glenohumeral osteoarthritis.  Plan: Shoulder x-ray will likely benefit from physical therapy to be discussed at follow-up    Mechanics hands?  No other signs and symptoms to suggest antisynthetase syndrome, but will check antibodies for completeness    Orders Placed This Encounter   Procedures    XR Hand Bilateral 2 Views    XR Foot Bilateral G/E 3 Views    XR Ankle Bilateral G/E 3 Views    XR Wrist Bilateral G/E 3 Views    XR Cervical Spine Comp w Obl & Flex/Ext    XR Shoulder Left G/E 3 Views    XR Lumbar Spine 2/3 Views    Thiopurine Methyltransferase RBC    CBC with platelets    Polymyositis and Dermatomyositis Panel    Echocardiogram Complete          Over 60 minutes spent on the day of the encounter doing chart review, history and exam, documentation and further activities as noted above.     Subjective         HPI    Mumtaz Montoya is a 53-year-old female with history of uncontrolled diabetes and hypertension who was referred due to high positive CCP and positive MYRON.  She is from [facility] and [employer] [place]. Today's history and exam were completed with the help of a [employer] phone .  [facility] due to her high titer CCP and concern for underlying rheumatoid arthritis.  However, no further work-up was done at that time as she was about to move to [place] and it was felt it was better for her to establish care once she moved here.  She is here today with her son, Nor.  Neither her nor her son are clear as to why they are here.  When asked about joint pain, stiffness, or swelling, she endorses left shoulder  pain and stiffness and pain in her bilateral thumbs.  However, she denies any pain, stiffness, or swelling in the joints of her fingers, wrists, elbows, ankles, or feet.  She does endorse pain in her lower back and her upper back between the shoulder blades.  She has also had knee pain in the past but not currently.  She denies any history of red, painful eyes and has never needed to use steroid drops in her eyes.  She denies any new cough or shortness of breath with activity, but does have significant shortness of breath with laying down and cannot lay down flat.  She does not have any known cardiac history.    She denies any red rash over her face or any photosensitivity but does have a rash on the sides of her fingers.  No rash over her eyelids or her knuckles.  No muscle weakness but does have overall fatigue.  Has shortness of breath with lying flat.Her fingers do not change color in the cold.  She denies any sores in her mouth or nose.  She reports symptoms of acid reflux since her gallbladder was removed many years ago.  She denies any blood in her urine or stool and has not had recurrent fevers.  She does endorse weight loss but cannot quantify how much.  No history of blood clots or miscarriages.  No dry eyes or dry mouth.  No numbness or tingling.    Per review of outside records she was also found to have a positive antimitochondrial antibody and elevated liver enzymes and was told that she has a sick liver.  Her most recent liver enzymes are normal but she does endorse right upper quadrant pain which is dull but constant.      She is from [facility] [place] and lived [place]  [place]  Before she lived in [place] [facility] [place] [place] she lived in [place] for some time.  She has 4 children but only one of her children lives here in [place].  Two live in [place] and one lives in [place] [place].      No family or personal Hx of known autoimmune disease  Not a previous smoker. Not a  current smoker.       Lab and Imaging review:    I reviewed recent labs and imaging including:  From [facility]   Jan 2023:  anti mitochondrial antibody positive (60) (n 0-20),   anti CCP positive (>250),   MYRON by IFA 1:160 speckled,   esr 61, crp 0.4  High LFTs   Neg RF, SSA/SSB, Smith, Scl-70, RNP, Nicole-1, histone, centromere, dsDNA, anti-smooth,   Hep BsAg nonreactive, HCV ab nonreactive   SPEP with polyclonal hypergammaglobulinemia   Uric acid 4.9,   C3, C4 wnl     Labs 8/2023   MYRON 1:320 speckled,   CCP >340  IgG 2,099  AST, ALT wnl, bili wnl, GGT elevated   Anti-mitochondrial 21 elevated   HA1C 9.6  UA wnl,   HIV nonreactive,   Hep C antibody nonreactive   BMP with low Cr       Current Outpatient Medications:     alcohol swab prep pads, Use to swab area of injection/fabiola as directed., Disp: 100 each, Rfl: 3    blood glucose (NO BRAND SPECIFIED) lancets standard, Use to test blood sugar 4 times daily or as directed., Disp: 100 Lancet, Rfl: 11    Continuous Blood Gluc Sensor (FREESTYLE MISTY 3 SENSOR) MISC, 1 Units every 14 days, Disp: 2 each, Rfl: 4    diclofenac (VOLTAREN) 1 % topical gel, Apply 4 g topically 4 times daily, Disp: 350 g, Rfl: 0    glipiZIDE (GLUCOTROL) 5 MG tablet, Take 1 tablet (5 mg) by mouth 2 times daily (before meals), Disp: 180 tablet, Rfl: 1    ibuprofen (ADVIL/MOTRIN) 600 MG tablet, Take 1 tablet (600 mg) by mouth every 6 hours as needed for moderate pain, Disp: 100 tablet, Rfl: 0    insulin glargine (LANTUS SOLOSTAR) 100 UNIT/ML pen, Inject 30 Units Subcutaneous At Bedtime, Disp: 45 mL, Rfl: 0    insulin pen needle (ULTICARE MICRO) 32G X 4 MM miscellaneous, Use 1 pen needles daily or as directed., Disp: 100 each, Rfl: 3    lisinopril (ZESTRIL) 5 MG tablet, Take 1 tablet (5 mg) by mouth daily at 2 pm, Disp: 90 tablet, Rfl: 0    TRUEPLUS 5-BEVEL PEN NEEDLES 31G X 5 MM miscellaneous, USE DAILY AS INSTRUCTED, Disp: , Rfl:     TRUEplus Lancets 33G MISC, use 1  LANCET to TEST BLOOD SUGAR 4 TIMES A DAY, Disp: , Rfl:     gabapentin (NEURONTIN) 300 MG capsule, Take 1 capsule (300 mg) by mouth 3 times daily (Patient not taking: Reported on 9/7/2023), Disp: 270 capsule, Rfl: 1    liraglutide (VICTOZA) 18 MG/3ML solution, Inject 0.6 mg Subcutaneous daily (Patient not taking: Reported on 8/9/2023), Disp: 3 mL, Rfl: 1  Allergies:  No Known Allergies    Medical Hx:  Diabetes Mellitus   HTN     Surgical Hx:  Gallbadder removed       Family Hx:  No known hx of autoimmune disease      Social Hx:  Social History     Tobacco Use    Smoking status: Never    Smokeless tobacco: Never   Vaping Use    Vaping Use: Never used        Objective   Physical Exam   /80   Pulse 80   Wt 67.1 kg (148 lb)   LMP  (LMP Unknown)   BMI 27.07 kg/m    General: alert, well appearing, no distress  HEENT: no alopecia, clear conjunctiva, no oral or nasal ulcers, no cervical lymphadenopathy  Cardiac: normal rate and rhythm, no murmur, rubs or gallops   Pulm: normal respiratory effort, clear to auscultation bilaterally, unable to lay flat due to dyspnea.   GI: abdomen soft and non-distended, no palpable liver, some tenderness in the perigastric area   MSK: Hand, wrist, elbow, and shoulder joints without evidence of synovitis or effusion.  Able to make tight fists.  No hand deformities.  Full active range of motion at the wrist, and elbow joints bilaterally.  + [place] nodes.  Full active and passive range of motion of the right shoulder with limited active range of motion of the right shoulder with abduction past 90 degrees as well as with internal and external rotation.  Similarly, she has limited passive range of motion of the shoulder in the same planes.    Foot/ankle/knee/hip joints without effusion/tenderness to palpation and with intact, full, nonpainful range of motion.  2 soft, mobile, subcutaneous, round, quarter sized, masses over left lower back.  Skin: thickened and scaly plaques over the  lateral aspect of her left pointer finger and right pointer and middle finger.  No nail pitting, digital ulceration, dactylitis, or telangiectasias.  No fingerpad pits.  Raised scar with fibrous tissue over left anterior skin.,  No erythema, rashes.      Sandra Mack MD  Rheumatology

## 2023-09-06 ENCOUNTER — TRANSFERRED RECORDS (OUTPATIENT)
Dept: HEALTH INFORMATION MANAGEMENT | Facility: CLINIC | Age: 53
End: 2023-09-06

## 2023-09-07 ENCOUNTER — OFFICE VISIT (OUTPATIENT)
Dept: RHEUMATOLOGY | Facility: CLINIC | Age: 53
End: 2023-09-07
Attending: FAMILY MEDICINE
Payer: COMMERCIAL

## 2023-09-07 ENCOUNTER — LAB (OUTPATIENT)
Dept: LAB | Facility: CLINIC | Age: 53
End: 2023-09-07
Payer: COMMERCIAL

## 2023-09-07 ENCOUNTER — HOSPITAL ENCOUNTER (OUTPATIENT)
Dept: GENERAL RADIOLOGY | Facility: HOSPITAL | Age: 53
Discharge: HOME | End: 2023-09-07
Attending: STUDENT IN AN ORGANIZED HEALTH CARE EDUCATION/TRAINING PROGRAM
Payer: COMMERCIAL

## 2023-09-07 VITALS
BODY MASS INDEX: 27.07 KG/M2 | HEART RATE: 80 BPM | SYSTOLIC BLOOD PRESSURE: 110 MMHG | DIASTOLIC BLOOD PRESSURE: 80 MMHG | WEIGHT: 148 LBS

## 2023-09-07 DIAGNOSIS — R76.8 ANTI-CYCLIC CITRULLINATED PEPTIDE ANTIBODY POSITIVE: Primary | ICD-10-CM

## 2023-09-07 DIAGNOSIS — R76.8 ANTI-CYCLIC CITRULLINATED PEPTIDE ANTIBODY POSITIVE: ICD-10-CM

## 2023-09-07 DIAGNOSIS — I10 PRIMARY HYPERTENSION: ICD-10-CM

## 2023-09-07 DIAGNOSIS — K75.4 AUTOIMMUNE HEPATITIS (H): ICD-10-CM

## 2023-09-07 LAB
ERYTHROCYTE [DISTWIDTH] IN BLOOD BY AUTOMATED COUNT: 12.4 % (ref 10–15)
HCT VFR BLD AUTO: 45.6 % (ref 35–47)
HGB BLD-MCNC: 15.8 G/DL (ref 11.7–15.7)
MCH RBC QN AUTO: 28.3 PG (ref 26.5–33)
MCHC RBC AUTO-ENTMCNC: 34.6 G/DL (ref 31.5–36.5)
MCV RBC AUTO: 82 FL (ref 78–100)
PLATELET # BLD AUTO: 214 10E3/UL (ref 150–450)
RBC # BLD AUTO: 5.58 10E6/UL (ref 3.8–5.2)
WBC # BLD AUTO: 8.2 10E3/UL (ref 4–11)

## 2023-09-07 PROCEDURE — 86481 TB AG RESPONSE T-CELL SUSP: CPT | Performed by: STUDENT IN AN ORGANIZED HEALTH CARE EDUCATION/TRAINING PROGRAM

## 2023-09-07 PROCEDURE — 85027 COMPLETE CBC AUTOMATED: CPT | Performed by: STUDENT IN AN ORGANIZED HEALTH CARE EDUCATION/TRAINING PROGRAM

## 2023-09-07 PROCEDURE — 99000 SPECIMEN HANDLING OFFICE-LAB: CPT

## 2023-09-07 PROCEDURE — 84182 PROTEIN WESTERN BLOT TEST: CPT | Mod: 90

## 2023-09-07 PROCEDURE — 73110 X-RAY EXAM OF WRIST: CPT | Mod: 50

## 2023-09-07 PROCEDURE — 73610 X-RAY EXAM OF ANKLE: CPT | Mod: 50

## 2023-09-07 PROCEDURE — 73030 X-RAY EXAM OF SHOULDER: CPT | Mod: LT

## 2023-09-07 PROCEDURE — 99205 OFFICE O/P NEW HI 60 MIN: CPT | Performed by: STUDENT IN AN ORGANIZED HEALTH CARE EDUCATION/TRAINING PROGRAM

## 2023-09-07 PROCEDURE — 73630 X-RAY EXAM OF FOOT: CPT | Mod: 50

## 2023-09-07 PROCEDURE — 86235 NUCLEAR ANTIGEN ANTIBODY: CPT | Mod: 90

## 2023-09-07 PROCEDURE — 36415 COLL VENOUS BLD VENIPUNCTURE: CPT | Performed by: STUDENT IN AN ORGANIZED HEALTH CARE EDUCATION/TRAINING PROGRAM

## 2023-09-07 PROCEDURE — 83516 IMMUNOASSAY NONANTIBODY: CPT | Mod: 90

## 2023-09-07 PROCEDURE — 72100 X-RAY EXAM L-S SPINE 2/3 VWS: CPT

## 2023-09-07 PROCEDURE — 73120 X-RAY EXAM OF HAND: CPT | Mod: 50

## 2023-09-07 PROCEDURE — 84433 ASY THIOPURIN S-MTHYLTRNSFRS: CPT | Mod: 90 | Performed by: STUDENT IN AN ORGANIZED HEALTH CARE EDUCATION/TRAINING PROGRAM

## 2023-09-07 PROCEDURE — 72052 X-RAY EXAM NECK SPINE 6/>VWS: CPT

## 2023-09-07 PROCEDURE — 73560 X-RAY EXAM OF KNEE 1 OR 2: CPT | Mod: 50

## 2023-09-07 NOTE — PATIENT INSTRUCTIONS
Please obtain laboratory work  Please obtain x-rays   Please obtain echocardiogram   See me again in 2-3 weeks to discuss results  Call Gastroenterology to make an appointment [phone]

## 2023-09-08 LAB
GAMMA INTERFERON BACKGROUND BLD IA-ACNC: 0.11 IU/ML
M TB IFN-G BLD-IMP: NEGATIVE
M TB IFN-G CD4+ BCKGRND COR BLD-ACNC: 9.89 IU/ML
MITOGEN IGNF BCKGRD COR BLD-ACNC: 0 IU/ML
MITOGEN IGNF BCKGRD COR BLD-ACNC: 0.01 IU/ML
QUANTIFERON MITOGEN: 10 IU/ML
QUANTIFERON NIL TUBE: 0.11 IU/ML
QUANTIFERON TB1 TUBE: 0.12 IU/ML
QUANTIFERON TB2 TUBE: 0.11

## 2023-09-10 DIAGNOSIS — E11.69 TYPE 2 DIABETES MELLITUS WITH OTHER SPECIFIED COMPLICATION, UNSPECIFIED WHETHER LONG TERM INSULIN USE (H): ICD-10-CM

## 2023-09-10 NOTE — TELEPHONE ENCOUNTER
"Routing refill request to provider for review/approval because:  Too early to refill -Vitamin Supplement  Reported by patient, historical - insulin      Last Written Prescription Date: 5/25/23  Last Fill Quantity: 30,  # refills: 3   Last office visit provider:  6/19/23     Insulin  Last Written Prescription Date: historical  Last Fill Quantity: na,  # refills:  na  Last office visit provider:  6/19/23         Requested Prescriptions   Pending Prescriptions Disp Refills    fish oil-omega-3 fatty acids 1000 MG capsule [Pharmacy Med Name: FISH OIL 1,000 MG CAPSULE 1000 Capsule] 30 capsule 3     Sig: TAKE 1 CAPSULE (1 G) BY MOUTH DAILY       Vitamin Supplements (Adult) Protocol Passed - 9/10/2023 10:50 AM        Passed - High dose Vitamin D not ordered        Passed - Recent (12 mo) or future (30 days) visit within the authorizing provider's specialty     Patient has had an office visit with the authorizing provider or a provider within the authorizing providers department within the previous 12 mos or has a future within next 30 days. See \"Patient Info\" tab in inbasket, or \"Choose Columns\" in Meds & Orders section of the refill encounter.              Passed - Medication is active on med list          LANTUS SOLOSTAR 100 UNIT/ML soln [Pharmacy Med Name: LANTUS SOLOSTAR 100 UNITS/M 100 Solution Pen-injector] 15 mL 3     Sig: INJECT 80 UNITS SUBCUTANEOUS EVERY MORNING 40 UNIT(S) ON EACH SIDE       Long Acting Insulin Protocol Passed - 9/10/2023 10:50 AM        Passed - Serum creatinine on file in past 12 months     Recent Labs   Lab Test 06/28/23  0756   CR 6.02*       Ok to refill medication if creatinine is low          Passed - HgbA1C in past 3 or 6 months     If HgbA1C is 8 or greater, it needs to be on file within the past 3 months.  If less than 8, must be on file within the past 6 months.     Recent Labs   Lab Test 07/10/23  1106 12/06/21  1051 09/24/21  0952   A1C 9.1*   < >  --    02670  --   --  9.3*    < > = " "values in this interval not displayed.             Passed - Medication is active on med list        Passed - Patient is age 18 or older        Passed - Recent (6 mo) or future (30 days) visit within the authorizing provider's specialty     Patient had office visit in the last 6 months or has a visit in the next 30 days with authorizing provider or within the authorizing provider's specialty.  See \"Patient Info\" tab in inbasket, or \"Choose Columns\" in Meds & Orders section of the refill encounter.                 Anai Delgado RN 09/10/23 11:18 AM  "

## 2023-09-11 ENCOUNTER — ALLIED HEALTH/NURSE VISIT (OUTPATIENT)
Dept: EDUCATION SERVICES | Facility: CLINIC | Age: 53
End: 2023-09-11
Payer: COMMERCIAL

## 2023-09-11 ENCOUNTER — TELEPHONE (OUTPATIENT)
Dept: EDUCATION SERVICES | Facility: CLINIC | Age: 53
End: 2023-09-11

## 2023-09-11 ENCOUNTER — PATIENT OUTREACH (OUTPATIENT)
Dept: CARE COORDINATION | Facility: CLINIC | Age: 53
End: 2023-09-11
Payer: COMMERCIAL

## 2023-09-11 ENCOUNTER — OFFICE VISIT (OUTPATIENT)
Dept: FAMILY MEDICINE | Facility: CLINIC | Age: 53
End: 2023-09-11
Payer: COMMERCIAL

## 2023-09-11 VITALS
OXYGEN SATURATION: 96 % | SYSTOLIC BLOOD PRESSURE: 120 MMHG | WEIGHT: 149.3 LBS | BODY MASS INDEX: 27.31 KG/M2 | HEART RATE: 92 BPM | DIASTOLIC BLOOD PRESSURE: 88 MMHG

## 2023-09-11 DIAGNOSIS — Z53.9 DIAGNOSIS NOT YET DEFINED: Primary | ICD-10-CM

## 2023-09-11 DIAGNOSIS — Z23 ENCOUNTER FOR IMMUNIZATION: ICD-10-CM

## 2023-09-11 DIAGNOSIS — Z79.4 TYPE 2 DIABETES MELLITUS WITH OTHER SPECIFIED COMPLICATION, WITH LONG-TERM CURRENT USE OF INSULIN (H): ICD-10-CM

## 2023-09-11 DIAGNOSIS — E11.69 TYPE 2 DIABETES MELLITUS WITH OTHER SPECIFIED COMPLICATION, WITH LONG-TERM CURRENT USE OF INSULIN (H): ICD-10-CM

## 2023-09-11 DIAGNOSIS — E11.69 TYPE 2 DIABETES MELLITUS WITH OTHER SPECIFIED COMPLICATION, WITH LONG-TERM CURRENT USE OF INSULIN (H): Primary | ICD-10-CM

## 2023-09-11 DIAGNOSIS — Z79.4 TYPE 2 DIABETES MELLITUS WITH OTHER SPECIFIED COMPLICATION, WITH LONG-TERM CURRENT USE OF INSULIN (H): Primary | ICD-10-CM

## 2023-09-11 PROBLEM — M06.9 RHEUMATOID ARTHRITIS (H): Status: ACTIVE | Noted: 2023-09-11

## 2023-09-11 LAB — TPMT BLD-CCNC: 24.7 U/ML

## 2023-09-11 PROCEDURE — 99214 OFFICE O/P EST MOD 30 MIN: CPT | Mod: 25 | Performed by: FAMILY MEDICINE

## 2023-09-11 PROCEDURE — 90472 IMMUNIZATION ADMIN EACH ADD: CPT | Performed by: FAMILY MEDICINE

## 2023-09-11 PROCEDURE — 90677 PCV20 VACCINE IM: CPT | Performed by: FAMILY MEDICINE

## 2023-09-11 PROCEDURE — 90471 IMMUNIZATION ADMIN: CPT | Performed by: FAMILY MEDICINE

## 2023-09-11 PROCEDURE — G0108 DIAB MANAGE TRN  PER INDIV: HCPCS | Mod: AE | Performed by: DIETITIAN, REGISTERED

## 2023-09-11 PROCEDURE — G0179 MD RECERTIFICATION HHA PT: HCPCS | Performed by: FAMILY MEDICINE

## 2023-09-11 PROCEDURE — 90715 TDAP VACCINE 7 YRS/> IM: CPT | Performed by: FAMILY MEDICINE

## 2023-09-11 PROCEDURE — 90682 RIV4 VACC RECOMBINANT DNA IM: CPT | Performed by: FAMILY MEDICINE

## 2023-09-11 RX ORDER — CHLORAL HYDRATE 500 MG
1 CAPSULE ORAL DAILY
Qty: 30 CAPSULE | Refills: 3 | Status: SHIPPED | OUTPATIENT
Start: 2023-09-11 | End: 2024-01-08

## 2023-09-11 RX ORDER — GABAPENTIN 300 MG/1
300 CAPSULE ORAL 3 TIMES DAILY
Qty: 270 CAPSULE | Refills: 1 | Status: SHIPPED | OUTPATIENT
Start: 2023-09-11

## 2023-09-11 RX ORDER — GABAPENTIN 300 MG/1
300 CAPSULE ORAL 3 TIMES DAILY
Qty: 270 CAPSULE | Refills: 1 | Status: SHIPPED | OUTPATIENT
Start: 2023-09-11 | End: 2023-09-11

## 2023-09-11 RX ORDER — LISINOPRIL 5 MG/1
5 TABLET ORAL
Qty: 90 TABLET | Refills: 1 | Status: SHIPPED | OUTPATIENT
Start: 2023-09-11

## 2023-09-11 RX ORDER — LISINOPRIL 5 MG/1
5 TABLET ORAL
Qty: 90 TABLET | Refills: 1 | Status: SHIPPED | OUTPATIENT
Start: 2023-09-11 | End: 2023-09-11

## 2023-09-11 RX ORDER — INSULIN GLARGINE 100 [IU]/ML
25 INJECTION, SOLUTION SUBCUTANEOUS AT BEDTIME
Qty: 30 ML | Refills: 0 | Status: SHIPPED | OUTPATIENT
Start: 2023-09-11 | End: 2023-09-11

## 2023-09-11 RX ORDER — BLOOD-GLUCOSE SENSOR
1 EACH MISCELLANEOUS
Qty: 2 EACH | Refills: 4 | Status: SHIPPED | OUTPATIENT
Start: 2023-09-11 | End: 2023-11-13

## 2023-09-11 RX ORDER — INSULIN GLARGINE 100 [IU]/ML
INJECTION, SOLUTION SUBCUTANEOUS
Qty: 15 ML | Refills: 3 | Status: SHIPPED | OUTPATIENT
Start: 2023-09-11 | End: 2023-12-04

## 2023-09-11 RX ORDER — INSULIN GLARGINE 100 [IU]/ML
25 INJECTION, SOLUTION SUBCUTANEOUS AT BEDTIME
Qty: 30 ML | Refills: 0 | Status: SHIPPED | OUTPATIENT
Start: 2023-09-11 | End: 2023-12-18

## 2023-09-11 ASSESSMENT — PAIN SCALES - GENERAL: PAINLEVEL: NO PAIN (0)

## 2023-09-11 NOTE — PROGRESS NOTES
Diabetes Self-Management Education & Support    Presents for: Individual review    Type of Service: In Person Visit    Assessment Type:   ASSESSMENT:  Met with patient and daughter. Unfortunately Mumtaz has not been wearing her Herson 3- she didn't get refills that were sent to [facility]. Resent to local pharmacy and provided information to daughter. Patient plans to  and start it again. Patient has not been checking BG, but has been feeling better. Pt reports no more low BG symptoms - feels good now on Lantus 25 units/day and Victoza 0.6 mg/day. Patient had her medications with her and states that she takes Glipizide 5 mg AM and PM (at bedtime). Reviewed importance of taking Glipizide before dinner.   Reviewed BG goals and discussed diabetes along with importance of good control.  Encouraged pt to schedule an eye exam    Patient's most recent   Lab Results   Component Value Date    A1C 9.6 07/17/2023     is not meeting goal of <7.0    Diabetes knowledge and skills assessment:   Patient is knowledgeable in diabetes management concepts related to: Healthy Eating    Continue education with the following diabetes management concepts: Monitoring, Taking Medication, and Reducing Risks    Based on learning assessment above, most appropriate setting for further diabetes education would be: Individual setting.      PLAN  Continue Lantus at 25 units/day  Continue Victoza 0.6 mg/day  Continue Glipizide 5 mg BID (take before meals)  Schedule eye exam  Start checking BG again with Herson 3    Topics to cover at upcoming visits: Monitoring, Taking Medication, and Reducing Risks    Follow-up: 2 weeks to review Herson report    See Care Plan for co-developed, patient-state behavior change goals.  AVS provided for patient today.    Education Materials Provided:  No new materials provided today      SUBJECTIVE/OBJECTIVE:  Presents for: Individual review  Accompanied by: Self, , Daughter  Diabetes  "education in the past 24mo: Yes  Focus of Visit: Patient Unsure  Diabetes type: Type 2  Disease course: Improving  How confident are you filling out medical forms by yourself:: A little bit  Diabetes management related comments/concerns: low BG/what meds to take  Transportation concerns: Yes  Difficulty affording diabetes medication?: Sometimes  Other concerns:: Language barrier  Cultural Influences/Ethnic Background:  Not  or     Diabetes Symptoms & Complications:  Fatigue: No  Neuropathy: No  Polydipsia: No  Polyphagia: No  Polyuria: No  Visual change: Yes  Symptom course: Improving  Weight trend: Stable  Complications assessed today?: No    Patient Problem List and Family Medical History reviewed for relevant medical history, current medical status, and diabetes risk factors.    Vitals:  LMP  (LMP Unknown)   Estimated body mass index is 27.07 kg/m  as calculated from the following:    Height as of 7/17/23: 1.575 m (5' 2\").    Weight as of 9/7/23: 67.1 kg (148 lb).   Last 3 BP:   BP Readings from Last 3 Encounters:   09/07/23 110/80   07/17/23 127/87   05/24/23 118/84       History   Smoking Status    Never   Smokeless Tobacco    Never       Labs:  Lab Results   Component Value Date    A1C 9.6 07/17/2023     Lab Results   Component Value Date     05/16/2023     Lab Results   Component Value Date     05/16/2023     Direct Measure HDL   Date Value Ref Range Status   05/16/2023 32 (L) >=50 mg/dL Final   ]  GFR Estimate   Date Value Ref Range Status   05/16/2023 >90 >60 mL/min/1.73m2 Final     Comment:     eGFR calculated using 2021 CKD-EPI equation.     No results found for: GFRESTBLACK  Lab Results   Component Value Date    CR 0.40 05/16/2023     No results found for: MICROALBUMIN    Healthy Eating:  Healthy Eating Assessed Today: Yes  Meal planning/habits: Avoiding sweets  Meals include: Breakfast, Lunch, Dinner  Breakfast: rice and broderick chicken/pork, green vegetables/cucumber  Lunch: " rice and broderick chicken/pork, green vegetables/cucumber  Dinner: rice and broderick chicken/pork, green vegetables/cucumber  Snacks: jermaine,  Other: was drinking orange juice - so no longer drinks it  Beverages: Water  Has patient met with a dietitian in the past?: No    Being Active:  Being Active Assessed Today: No  Exercise:: Currently not exercising    Monitoring:  Monitoring Assessed Today: Yes  Did patient bring glucose meter to appointment? : No  Blood Glucose Meter: CGM (Herson 3 and glucose monitor)  Times checking blood sugar at home (number): 2  Times checking blood sugar at home (per): Day  Blood glucose trend: Decreasing      Taking Medications:  Diabetes Medication(s)       Insulin       insulin glargine (LANTUS SOLOSTAR) 100 UNIT/ML pen    Inject 30 Units Subcutaneous At Bedtime     Patient taking differently: Inject 25 Units Subcutaneous At Bedtime      Sulfonylureas       glipiZIDE (GLUCOTROL) 5 MG tablet    Take 1 tablet (5 mg) by mouth 2 times daily (before meals)      Incretin Mimetic Agents       liraglutide (VICTOZA) 18 MG/3ML solution    Inject 0.6 mg Subcutaneous daily            Taking Medication Assessed Today: Yes  Current Treatments: Insulin Injections  Dose schedule: At bedtime  Given by: Patient  Injection/Infusion sites: Abdomen  Problems taking diabetes medications regularly?: No  Diabetes medication side effects?: No    Problem Solving:  Problem Solving Assessed Today: Yes  Is the patient at risk for hypoglycemia?: Yes  Hypoglycemia Frequency: Never  Hypoglycemia Treatment: Juice    Hypoglycemia symptoms  Sweats: Yes  Feeling shaky: Yes         Reducing Risks:  Reducing Risks Assessed Today: Yes  Diabetes Risks: Age over 45 years, Sedentary Lifestyle, Ethnicity  CAD Risks: Diabetes Mellitus  Has dilated eye exam at least once a year?: No  Sees dentist every 6 months?: No    Healthy Coping:  Healthy Coping Assessed Today: Yes  Emotional response to diabetes: Ready to learn  Informal Support  system:: Children  Stage of change: ACTION (Actively working towards change)  Patient Activation Measure Survey Score:       No data to display                  Care Plan and Education Provided:  Care Plan: Diabetes   Updates made by Astrid Lemus RD since 9/11/2023 12:00 AM        Problem: Diabetes Self-Management Education Needed to Optimize Self-Care Behaviors         Goal: Healthy Eating - follow a healthy eating pattern for diabetes    This Visit's Progress: 100%   Recent Progress: 100%   Note:    Limit to 1 cup rice and no pop       Goal: Monitoring - monitor glucose and ketones as directed    This Visit's Progress: 0%   Recent Progress: 50%   Note:    Check BG 3 times daily       Goal: Reducing Risks - know how to prevent and treat long-term diabetes complications    This Visit's Progress: 0%   Note:    Schedule eye exam       Task: Provide education on recommended care for dental, eye and foot health Completed 9/11/2023   Responsible User: Astrid Lemus RD        Goal: Healthy Coping - use available resources to cope with the challenges of managing diabetes         Task: Provide education on the benefits of making appropriate lifestyle changes Completed 9/11/2023   Responsible User: Astrid Lemus RD              Time Spent: 30 minutes  Encounter Type: Individual    Any diabetes medication dose changes were made via the CDE Protocol per the patient's referring provider. A copy of this encounter was shared with the provider.

## 2023-09-11 NOTE — PATIENT INSTRUCTIONS
For the insurance [employer], the eligibility department should update your  in 24-48 hours.  The claim is: [id number] and if it is not updated call us back.   Please hold off on picking up your medications until the insurance matches.  The [facility] balance should be reset to zero.

## 2023-09-11 NOTE — PATIENT INSTRUCTIONS
Goals for Diabetes Care:    1. Eat 3 balanced meals each day - Monitor carb intake and aim for 45-60 grams per meal  This would be equal to about 4 choices of carbohydrates. Carbohydrate 1 choice = 15 grams  Aim to eat the plate method style - half your plate fruits/veggies, 1/4 the plate protein and 1/4 plate starch (rice, potato, pasta)    2. Check blood sugars at least 3-4 times each day at varying times with Herson   Blood Glucose Targets:   1. Fasting and before meal target is 80 - 130   2. 2 hours after a meal target is < 180  Always remember to bring meter and log book to all appointments.    3. Activity really helps improve blood sugars. Try to Incorporate 30 minutes activity into each day - does not need to be all at one time & walking counts!    4. Treating low BG. Rule of 15 = BG 50-70 mg/dL = 15 gram carb (4 oz juice, 4 glucose tabs, OR 4 oz pop), then recheck BG in 15 minutes. If still low repeat. (If BG <50 mg/dL = 8 oz pop/juice or 8 glucose tabs).    5. Take diabetes medications as prescribed   -Continue Lantus at 25 units/day  -Continue Victoza 0.6 mg/day  -Continue Glipizide 5 mg BID (take before meals)    Follow up with your Diabetic Educator to assess BG targets and need for modifications to medications and/or lifestyle.    Call with any questions.  Thank you!  Astrid Lemus RDN, LD, [facility]   Certified Diabetes Care &   [facility]  Triage [phone]

## 2023-09-11 NOTE — PROGRESS NOTES
Assessment & Plan     Type 2 diabetes mellitus with other specified complication, with long-term current use of insulin (H)  Chronic. Patient with difficult to control blood sugars, difficult to manage given she will not always have accurate blood sugars. Continue victoza, glipizide, and adjusted insulin. Gabapentin ordered for low back pain. Discussed patient's  with [facility] representative, will follow.   - Primary Care - Care Coordination Referral  - gabapentin (NEURONTIN) 300 MG capsule  Dispense: 270 capsule; Refill: 1  - insulin glargine (LANTUS SOLOSTAR) 100 UNIT/ML pen  Dispense: 30 mL; Refill: 0  - lisinopril (ZESTRIL) 5 MG tablet  Dispense: 90 tablet; Refill: 1    Encounter for immunization  - INFLUENZA VACCINE 18-64Y (FLUBLOK)      Ordering of each unique test  Prescription drug management  I spent a total of 31 minutes on the day of the visit.   Time spent by me doing chart review, history and exam, documentation and further activities per the note       See Patient Instructions    MICHAEL JONAS DO  [facility]    Tammi Pandya is a 53 year old, presenting for the following health issues:  Follow Up        2023     3:02 PM   Additional Questions   Roomed by Giovanni CORTES CMA       HPI     Diabetes Follow-up    How often are you checking your blood sugar? Continuous glucose monitor  Have you had any blood sugars above 200?  Yes   Have you had any blood sugars below 70?  No  What symptoms do you notice when your blood sugar is low?  None  What concerns do you have today about your diabetes? None   Do you have any of these symptoms? (Select all that apply)  Burning in feet  Have you had a diabetic eye exam in the last 12 months? No        BP Readings from Last 2 Encounters:   23 120/88   23 110/80     Hemoglobin A1C (%)   Date Value   2023 9.6 (H)   2023 13.7 (H)     LDL Cholesterol Calculated (mg/dL)   Date Value   2023 124 (H)      Hypertension Follow-up    Do you check your blood pressure regularly outside of the clinic? No   Are you following a low salt diet? No  Are your blood pressures ever more than 140 on the top number (systolic) OR more   than 90 on the bottom number (diastolic), for example 140/90? NA    Review of Systems   Constitutional, HEENT, cardiovascular, pulmonary, gi and gu systems are negative, except as otherwise noted.      Objective    /88 (BP Location: Right arm, Patient Position: Sitting, Cuff Size: Adult Regular)   Pulse 92   Wt 67.7 kg (149 lb 4.8 oz)   LMP  (LMP Unknown)   SpO2 96%   Breastfeeding No   BMI 27.31 kg/m    Body mass index is 27.31 kg/m .  Physical Exam   GENERAL: healthy, alert and no distress  EYES: Eyes grossly normal to inspection, PERRL and conjunctivae and sclerae normal  NECK: no adenopathy, no asymmetry, masses, or scars and thyroid normal to palpation  MS: no gross musculoskeletal defects noted, no edema  SKIN: no suspicious lesions or rashes    No results found for this or any previous visit (from the past 24 hour(s)).

## 2023-09-11 NOTE — LETTER
[facility]  Patient Centered Plan of Care  About Me:        Patient Name:  Nithya Matthews    YOB: 1970  Age:         53 year old   Johann MRN:    [id number] Telephone Information:  Home Phone [phone]   Mobile [phone]   Home Phone [phone]   Mobile Not on file.       Address:  [address] [place] [address] Email address:  [email or web address]      Emergency Contact(s)    Name Relationship Lgl Grd Work Phone Home Phone Mobile Phone   JESSY VAUGHN Daughter    [phone]           Primary language:  Sarah      needed? Yes   [place] [employer]:  [phone] op. 1  Other communication barriers:Language barrier; Physical impairment; Lack of coping    Preferred Method of Communication:     Current living arrangement: I live in a private home with family    Mobility Status/ Medical Equipment: Independent w/Device        Health Maintenance  Health Maintenance Reviewed: Not assessed      My Access Plan  Medical Emergency 911   Primary Clinic Line [place] [phone]   24 Hour Appointment Line [phone] or  [phone] ([phone]) (toll-free)   24 Hour Nurse Line [phone] (toll-free)   Preferred Urgent Care [facility] [phone]     [facility]  [phone]     Preferred Pharmacy [facility] - [place], [place] - [address]     Behavioral Health Crisis Line The National Suicide Prevention Lifeline at 1-202.685.9640 or Text/Call 218             My Care Team Members  Patient Care Team         Relationship Specialty Notifications Start End    Giancarlo Arizmendi MD PCP - General Family Medicine  2/14/22     Referring to Eye    Phone: [phone] Fax: [phone]         [address] [place] [address]    Giancarlo Arizmendi MD Assigned PCP   2/20/22     Phone: [phone] Fax: [phone]         [address] [place]  [address]    Irene Samson [facility] Worker Primary Care - CC Admissions 3/1/22     Albert Payal ARM worker   3/2/22     [facility]. Call [facility] worker  Avani Nelson [phone] for assistance.    Phone: [phone]         Miracle Mi OD  Optometry  3/18/22     Phone: [phone] Fax: [phone]         [address] [place] [address]    Shital Hernandez, PharmD Pharmacist Pharmacist  4/6/22     Phone: [phone]          [facility] [address] [place] [address]    Grecia Hearn DPM, Podiatry/Foot and Ankle Surgery Assigned Musculoskeletal Provider   5/1/22     Phone: [phone] Pager: [phone] Fax: [phone]        [address] [address] [address]    Davis Villa MD MD Endocrinology, Diabetes, and Metabolism  6/2/22     Phone: [phone] Fax: [phone]         [facility] [address]    Shital Hernandez PharmD Assigned [facility] Pharmacist   9/28/22     Phone: [phone]          [facility] [address] [place] [address]    Sahil Waterman MD Assigned Nephrology Provider   10/29/22     Phone: [phone] Fax: [phone]         [address] [place] [address]    Geri Mortensen, RN Registered Nurse   11/20/22     Phone: [phone]           [facility] [address] [place] [address]    Davis Villa MD Assigned Endocrinology Provider   12/24/22     Phone: [phone] Fax: [phone]         [facility] [address]    Carl Rosas MD Nephrologist Nephrology  3/9/23     Phone: [phone] Fax: [phone]         [address] [address]    Janel Jhaveri, RN Registered Nurse Primary Care - CC Admissions 4/19/23     Janel Jhaveri, JERALD Lead Care Coordinator Primary Care - CC Admissions 4/24/23     Markell Langford MD Assigned Surgical Provider   5/6/23     Phone: [phone]  Fax: [phone]         [address] [facility] [address] [address] [facility] [address]    Negin Nunes NP Nurse Practitioner   5/30/23     Phone: [phone] Fax: [phone]         [facility] [address] [facility] [address]              My Care Plans  Self Management and Treatment Plan  Care Plan  Care Plan: PCA       Problem: Request for early re-assessment of PCA services.       Goal: I will be re-evaluated for increased PCA services in the next 90 days.       Start Date: 8/25/2023 Expected End Date: 11/25/2023    This Visit's Progress: 70% Recent Progress: 70%    Priority: High    Note:     Barriers: Language, recent health changes.  Strengths: Willing to accept [facility] support.  Patient expressed understanding of goal: Yes    Action steps to achieve this goal:  1.  I will answer my phone when I am contacted by [facility] to schedule an assessment.   2.  I will make sure I have any supportive family/friends present for my assessment as scheduled on Fri 9/29 at 10AM. (No . Rescheduled)  3. I will make sure I have any supportive family/friends present for my assessment as rescheduled on Fri 10/6/23 at 2:30PM.  4.  I will follow up with [facility] in the next month regarding this goal.   Note: PCP's letter of support for early reassessment faxed on 7/25/23. [facility] [phone], she confirmed fax number [phone].                              Care Plan: Incontinence Supplies       Problem: Incontinence of feces with fecal urgency       Goal: I want check coverage for more Pull-Ups in the next 60 days so that I can better manage my incontinence symptoms.       Start Date: 10/4/2023 Expected End Date: 12/4/2023    This Visit's Progress: 10%    Priority: High    Note:     Barriers: Language  Strengths: Accepting of support  Patient expressed understanding of goal: Yes    Action steps to achieve this goal:  1. I will attend an appointment with my PCP to address my need for pull-ups.  (Completed, 9/20)  2. I will answer my phone when [facility] contacts me to deliver or  my pull-ups.  3. I will follow up with CCC in the next month regarding this goal for additional coordination support.    Note: 9/20/23 Order for pull-ups was sent to [facility] [phone], Fax [phone] on 10/4/23.                                Action Plans on File:                       Advance Care Plans/Directives Type:   No data recorded    My Medical and Care Information  Problem List   Patient Active Problem List   Diagnosis    Primary hypertension    Hyperlipidemia LDL goal <100    Depression, unspecified depression type    Hyperglycemia    Generalized muscle weakness    Low iron    Gastroesophageal reflux disease with esophagitis without hemorrhage    Low vitamin B12 level    Low vitamin D level    Celiac disease    Beta thalassemia minor    Type 2 diabetes mellitus with other specified complication, unspecified whether long term insulin use (H)    Incontinence of feces with fecal urgency    Diabetic ulcer of other part of right foot associated with type 2 diabetes mellitus, with fat layer exposed (H)    ESRD (end stage renal disease) on dialysis (H)    Hyperkalemia    Weakness    Acute cough      Current Medications and Allergies:  See printed Medication Report.    Care Coordination Start Date: 3/1/2022   Frequency of Care Coordination: 6 weeks     Form Last Updated: 10/18/2023

## 2023-09-11 NOTE — PROGRESS NOTES
Clinic Care Coordination Contact  Care Coordination Clinician Chart Review    Situation: Patient chart reviewed by Care Coordinator.       Background: Care Coordination Program started: 3/1/2022. Initial assessment completed and patient-centered care plan(s) were developed with participation from patient. Lead CC handed patient off to CHW for continued outreaches.       Assessment: Per chart review, patient outreach completed by CC CHW on 8/25/2023.  Patient is actively working to accomplish goal(s). Patient's goal(s) appropriate and relevant at this time. Patient is not due for updated Plan of Care.  Assessments will be completed annually or as needed/with change of patient status.      Care Plan: PCA       Problem: Request for early re-assessment of PCA services.       Goal: I will be re-evaluated for increased PCA services in the next 90 days.       Start Date: 8/25/2023 Expected End Date: 11/25/2023    Priority: High    Note:     Barriers: Language, recent health changes.  Strengths: Willing to accept CCC support.  Patient expressed understanding of goal: Yes    Action steps to achieve this goal:  1.  I will answer my phone when I am contacted by [facility] to schedule an assessment.   2.  I will make sure I have any supportive family/friends present for my assessment.   3.  I will follow up with CCC in the next month regarding this goal.   Note: PCP's letter of support for early reassessment faxed on 7/25/23. [facility] [phone], she confirmed fax number [phone]                                   Plan/Recommendations: The patient will continue working with Care Coordination to achieve goal(s) as above. CHW will continue outreaches at minimum every 30 days and will involve Lead CC as needed or if patient is ready to move to Maintenance. Lead CC will continue to monitor CHW outreaches and patient's progress to goal(s) every 6 weeks.     Plan of Care updated and sent to patient:  No

## 2023-09-11 NOTE — LETTER
9/11/2023         RE: Mumtaz Montoya  [address]  [place] [address]        Dear Colleague,    Thank you for referring your patient, Mumtaz Montoya, to the [facility]. Please see a copy of my visit note below.    Diabetes Self-Management Education & Support    Presents for: Individual review    Type of Service: In Person Visit    Assessment Type:   ASSESSMENT:  Met with patient and daughter. Unfortunately Mumtaz has not been wearing her Herson 3- she didn't get refills that were sent to [facility]. Resent to local pharmacy and provided information to daughter. Patient plans to  and start it again. Patient has not been checking BG, but has been feeling better. Pt reports no more low BG symptoms - feels good now on Lantus 25 units/day and Victoza 0.6 mg/day. Patient had her medications with her and states that she takes Glipizide 5 mg AM and PM (at bedtime). Reviewed importance of taking Glipizide before dinner.   Reviewed BG goals and discussed diabetes along with importance of good control.  Encouraged pt to schedule an eye exam    Patient's most recent   Lab Results   Component Value Date    A1C 9.6 07/17/2023     is not meeting goal of <7.0    Diabetes knowledge and skills assessment:   Patient is knowledgeable in diabetes management concepts related to: Healthy Eating    Continue education with the following diabetes management concepts: Monitoring, Taking Medication, and Reducing Risks    Based on learning assessment above, most appropriate setting for further diabetes education would be: Individual setting.      PLAN  Continue Lantus at 25 units/day  Continue Victoza 0.6 mg/day  Continue Glipizide 5 mg BID (take before meals)  Schedule eye exam  Start checking BG again with Herson 3    Topics to cover at upcoming visits: Monitoring, Taking Medication, and Reducing Risks    Follow-up: 2 weeks to review Herson report    See Care Plan for co-developed, patient-state  "behavior change goals.  AVS provided for patient today.    Education Materials Provided:  No new materials provided today      SUBJECTIVE/OBJECTIVE:  Presents for: Individual review  Accompanied by: Self, , Daughter  Diabetes education in the past 24mo: Yes  Focus of Visit: Patient Unsure  Diabetes type: Type 2  Disease course: Improving  How confident are you filling out medical forms by yourself:: A little bit  Diabetes management related comments/concerns: low BG/what meds to take  Transportation concerns: Yes  Difficulty affording diabetes medication?: Sometimes  Other concerns:: Language barrier  Cultural Influences/Ethnic Background:  Not  or     Diabetes Symptoms & Complications:  Fatigue: No  Neuropathy: No  Polydipsia: No  Polyphagia: No  Polyuria: No  Visual change: Yes  Symptom course: Improving  Weight trend: Stable  Complications assessed today?: No    Patient Problem List and Family Medical History reviewed for relevant medical history, current medical status, and diabetes risk factors.    Vitals:  LMP  (LMP Unknown)   Estimated body mass index is 27.07 kg/m  as calculated from the following:    Height as of 7/17/23: 1.575 m (5' 2\").    Weight as of 9/7/23: 67.1 kg (148 lb).   Last 3 BP:   BP Readings from Last 3 Encounters:   09/07/23 110/80   07/17/23 127/87   05/24/23 118/84       History   Smoking Status     Never   Smokeless Tobacco     Never       Labs:  Lab Results   Component Value Date    A1C 9.6 07/17/2023     Lab Results   Component Value Date     05/16/2023     Lab Results   Component Value Date     05/16/2023     Direct Measure HDL   Date Value Ref Range Status   05/16/2023 32 (L) >=50 mg/dL Final   ]  GFR Estimate   Date Value Ref Range Status   05/16/2023 >90 >60 mL/min/1.73m2 Final     Comment:     eGFR calculated using 2021 CKD-EPI equation.     No results found for: GFRESTBLACK  Lab Results   Component Value Date    CR 0.40 05/16/2023     No " results found for: MICROALBUMIN    Healthy Eating:  Healthy Eating Assessed Today: Yes  Meal planning/habits: Avoiding sweets  Meals include: Breakfast, Lunch, Dinner  Breakfast: rice and broderick chicken/pork, green vegetables/cucumber  Lunch: rice and broderick chicken/pork, green vegetables/cucumber  Dinner: rice and broderick chicken/pork, green vegetables/cucumber  Snacks: jermaine,  Other: was drinking orange juice - so no longer drinks it  Beverages: Water  Has patient met with a dietitian in the past?: No    Being Active:  Being Active Assessed Today: No  Exercise:: Currently not exercising    Monitoring:  Monitoring Assessed Today: Yes  Did patient bring glucose meter to appointment? : No  Blood Glucose Meter: CGM (Herson 3 and glucose monitor)  Times checking blood sugar at home (number): 2  Times checking blood sugar at home (per): Day  Blood glucose trend: Decreasing      Taking Medications:  Diabetes Medication(s)       Insulin       insulin glargine (LANTUS SOLOSTAR) 100 UNIT/ML pen    Inject 30 Units Subcutaneous At Bedtime     Patient taking differently: Inject 25 Units Subcutaneous At Bedtime      Sulfonylureas       glipiZIDE (GLUCOTROL) 5 MG tablet    Take 1 tablet (5 mg) by mouth 2 times daily (before meals)      Incretin Mimetic Agents       liraglutide (VICTOZA) 18 MG/3ML solution    Inject 0.6 mg Subcutaneous daily            Taking Medication Assessed Today: Yes  Current Treatments: Insulin Injections  Dose schedule: At bedtime  Given by: Patient  Injection/Infusion sites: Abdomen  Problems taking diabetes medications regularly?: No  Diabetes medication side effects?: No    Problem Solving:  Problem Solving Assessed Today: Yes  Is the patient at risk for hypoglycemia?: Yes  Hypoglycemia Frequency: Never  Hypoglycemia Treatment: Juice    Hypoglycemia symptoms  Sweats: Yes  Feeling shaky: Yes         Reducing Risks:  Reducing Risks Assessed Today: Yes  Diabetes Risks: Age over 45 years, Sedentary Lifestyle,  Ethnicity  CAD Risks: Diabetes Mellitus  Has dilated eye exam at least once a year?: No  Sees dentist every 6 months?: No    Healthy Coping:  Healthy Coping Assessed Today: Yes  Emotional response to diabetes: Ready to learn  Informal Support system:: Children  Stage of change: ACTION (Actively working towards change)  Patient Activation Measure Survey Score:       No data to display                  Care Plan and Education Provided:  Care Plan: Diabetes   Updates made by Astrid Lemus RD since 9/11/2023 12:00 AM        Problem: Diabetes Self-Management Education Needed to Optimize Self-Care Behaviors         Goal: Healthy Eating - follow a healthy eating pattern for diabetes    This Visit's Progress: 100%   Recent Progress: 100%   Note:    Limit to 1 cup rice and no pop       Goal: Monitoring - monitor glucose and ketones as directed    This Visit's Progress: 0%   Recent Progress: 50%   Note:    Check BG 3 times daily       Goal: Reducing Risks - know how to prevent and treat long-term diabetes complications    This Visit's Progress: 0%   Note:    Schedule eye exam       Task: Provide education on recommended care for dental, eye and foot health Completed 9/11/2023   Responsible User: Astrid Lemus RD        Goal: Healthy Coping - use available resources to cope with the challenges of managing diabetes         Task: Provide education on the benefits of making appropriate lifestyle changes Completed 9/11/2023   Responsible User: Astrid Lemus RD              Time Spent: 30 minutes  Encounter Type: Individual    Any diabetes medication dose changes were made via the CDE Protocol per the patient's referring provider. A copy of this encounter was shared with the provider.

## 2023-09-12 ENCOUNTER — PATIENT OUTREACH (OUTPATIENT)
Dept: CARE COORDINATION | Facility: CLINIC | Age: 53
End: 2023-09-12
Payer: COMMERCIAL

## 2023-09-12 NOTE — PROGRESS NOTES
Contact   Chart Review     Situation: Patient chart reviewed by .    Background: referral to care coordination again for same issue.      Assessment: Talked to son and he said he has taken care of the problem with her birthday not being [place] with the county.  He did have a question about his dad's medication co pays. He is also on [employer] MA. Encouraged him to call the clinic of whoever prescribed the medication to discuss.  No other concerns.    Plan/Recommendations: Nor to call if he needs additional help.     Tracy Mays,   [facility]  [phone]

## 2023-09-16 NOTE — TELEPHONE ENCOUNTER
RN called RN, HC nurse to relay provider message below.  Patient information not disclosed as unsure if vm is secure.  Clinic number provided for questions.    PATRICIA Chavez, RN  [facility]      Medications now reconciled and new prescriptions sent to [facility].      Giancarlo Arizmendi MD      Yes

## 2023-09-20 ENCOUNTER — OFFICE VISIT (OUTPATIENT)
Dept: FAMILY MEDICINE | Facility: CLINIC | Age: 53
End: 2023-09-20
Payer: COMMERCIAL

## 2023-09-20 VITALS
RESPIRATION RATE: 16 BRPM | HEIGHT: 61 IN | WEIGHT: 112.75 LBS | HEART RATE: 84 BPM | OXYGEN SATURATION: 92 % | DIASTOLIC BLOOD PRESSURE: 60 MMHG | SYSTOLIC BLOOD PRESSURE: 166 MMHG | TEMPERATURE: 98 F | BODY MASS INDEX: 21.29 KG/M2

## 2023-09-20 DIAGNOSIS — R15.9 INCONTINENCE OF FECES WITH FECAL URGENCY: ICD-10-CM

## 2023-09-20 DIAGNOSIS — L97.512 DIABETIC ULCER OF OTHER PART OF RIGHT FOOT ASSOCIATED WITH TYPE 2 DIABETES MELLITUS, WITH FAT LAYER EXPOSED (H): ICD-10-CM

## 2023-09-20 DIAGNOSIS — L30.9 DERMATITIS: ICD-10-CM

## 2023-09-20 DIAGNOSIS — Z99.2 ESRD (END STAGE RENAL DISEASE) ON DIALYSIS (H): Primary | ICD-10-CM

## 2023-09-20 DIAGNOSIS — E11.621 DIABETIC ULCER OF OTHER PART OF RIGHT FOOT ASSOCIATED WITH TYPE 2 DIABETES MELLITUS, WITH FAT LAYER EXPOSED (H): ICD-10-CM

## 2023-09-20 DIAGNOSIS — I10 PRIMARY HYPERTENSION: ICD-10-CM

## 2023-09-20 DIAGNOSIS — R15.2 INCONTINENCE OF FECES WITH FECAL URGENCY: ICD-10-CM

## 2023-09-20 DIAGNOSIS — N18.6 ESRD (END STAGE RENAL DISEASE) ON DIALYSIS (H): Primary | ICD-10-CM

## 2023-09-20 DIAGNOSIS — E11.69 TYPE 2 DIABETES MELLITUS WITH OTHER SPECIFIED COMPLICATION, UNSPECIFIED WHETHER LONG TERM INSULIN USE (H): ICD-10-CM

## 2023-09-20 DIAGNOSIS — Z23 NEED FOR IMMUNIZATION AGAINST INFLUENZA: ICD-10-CM

## 2023-09-20 PROBLEM — N18.4 ANEMIA OF CHRONIC RENAL FAILURE, STAGE 4 (SEVERE) (H): Status: RESOLVED | Noted: 2022-11-21 | Resolved: 2023-09-20

## 2023-09-20 PROBLEM — D63.1 ANEMIA OF CHRONIC RENAL FAILURE, STAGE 4 (SEVERE) (H): Status: RESOLVED | Noted: 2022-11-21 | Resolved: 2023-09-20

## 2023-09-20 PROCEDURE — 90471 IMMUNIZATION ADMIN: CPT | Performed by: FAMILY MEDICINE

## 2023-09-20 PROCEDURE — 90682 RIV4 VACC RECOMBINANT DNA IM: CPT | Performed by: FAMILY MEDICINE

## 2023-09-20 PROCEDURE — 99214 OFFICE O/P EST MOD 30 MIN: CPT | Mod: 25 | Performed by: FAMILY MEDICINE

## 2023-09-20 RX ORDER — INSULIN ASPART 100 [IU]/ML
10 INJECTION, SOLUTION INTRAVENOUS; SUBCUTANEOUS
Qty: 15 ML | COMMUNITY
Start: 2023-09-20 | End: 2023-10-23

## 2023-09-20 NOTE — PROGRESS NOTES
Assessment & Plan     ESRD (end stage renal disease) on dialysis (H)  Started dialysis recently. New evaluation for PCA hours scheduled this week.     Primary hypertension  Not well controlled but close, she fluctuates significantly. Unsure if she goes low after diaylsis. Hold steady for now, recheck next visit or sooner if concerns from home health.     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   - Adult Eye  Referral  - AMB Adult Diabetes Educator Referral    Need for immunization against influenza  - INFLUENZA VACCINE 18-64Y (FLUBLOK)    Incontinence of feces with fecal urgency  Needing more adult pull ups, going though more than 10 some days.   - Incontinence Supplies Order for DME - ONLY FOR DME            Return in about 4 months (around 1/20/2024) for with me, diabetes follow up.    30 minutes spent on the date of the encounter doing chart review, history and exam, documentation and further activities per the note      Giancarlo Arizmendi MD  [facility] SHANKAR Barriga is a 53 year old, presenting for the following health issues:  Follow Up (meds)      History of Present Illness       Mental Health Follow-up:  Patient presents to follow-up on Depression.Patient's depression since last visit has been:  Good  The patient is not having other symptoms associated with depression.      Any significant life events: No  Patient is not feeling anxious or having panic attacks.  Patient has no concerns about alcohol or drug use.    Diabetes:   She presents for follow up of diabetes.  She is checking home blood glucose three times daily.   She checks blood glucose before meals.  Blood glucose is sometimes  over 200 and sometimes under 70. She is aware of hypoglycemia symptoms including shakiness, dizziness, weakness, blurred vision and confusion.   She is concerned about blood sugar frequently over 200.   She is having numbness in feet.  The patient has not had a diabetic eye exam in the last 12 months.          Hypertension: She presents for follow up of hypertension.  She does not check blood pressure  regularly outside of the clinic. Outpatient blood pressures have not been over 140/90. She does not follow a low salt diet.     She eats 2-3 servings of fruits and vegetables daily.She consumes 1 sweetened beverage(s) daily.She exercises with enough effort to increase her heart rate 9 or less minutes per day.  She exercises with enough effort to increase her heart rate 3 or less days per week.   She is taking medications regularly.     Lantus 40 unit(s) each side.   Victoza 1.8 daily  She does take the novolog for mealtime insulin.     Last visit:   PCA hr increase request - CCC involved, letter faxed.    Depression, unspecified depression type Stable, no changes in medications today.  Following with therapist  Type 2 diabetes mellitus with other specified complication, unspecified whether long term insulin use (H)  CGM sensor sent.      Dialysis 3 times per week.   She feels very weak and has a poor appetite. She somewhat recovers after 48 hours but then she gets her next dialysis treatment.   She is incontinent of feces and urine. Though she is on dialysis she is not yet anuric.   She would would like something to sit on in the living room, but I don't think that's something we can provide. She would likely need to use her wheelchair.   They check her blood when she does dialysis, I do not think we need to do it here.      She hasn't had any censors so she has not been able to check her glucose for a few days.   Discussed with MTM, they will call to check on it.   She gets symptomatic lows once per week, in the  "70s-60s.   She does not have her meter today.   Since starting dialysis she doesn't feel hungry at all.      Will check on shower chair and PCA hours.   I do think she would benefit from more pca hours since starting dialysis.         Review of Systems   Constitutional, HEENT, cardiovascular, pulmonary, gi and gu systems are negative, except as otherwise noted.      Objective    BP (!) 166/60   Pulse 84   Temp 98  F (36.7  C) (Oral)   Resp 16   Ht 1.549 m (5' 1\")   Wt 51.1 kg (112 lb 12 oz)   LMP  (LMP Unknown)   SpO2 92%   Breastfeeding No   BMI 21.30 kg/m    Body mass index is 21.3 kg/m .  Physical Exam   GENERAL: healthy, alert and no distress. Cachectic. Weak.   NECK: no adenopathy, no asymmetry, masses, or scars and thyroid normal to palpation  RESP: lungs clear to auscultation - no rales, rhonchi or wheezes  CV: regular rate and rhythm, normal S1 S2, no S3 or S4, no murmur, click or rub, no peripheral edema and peripheral pulses strong  ABDOMEN: soft, nontender, no hepatosplenomegaly, no masses and bowel sounds normal  MS: no gross musculoskeletal defects noted, no edema    Wt Readings from Last 4 Encounters:   09/27/23 50.8 kg (112 lb)   09/20/23 51.1 kg (112 lb 12 oz)   08/02/23 48.5 kg (107 lb)   06/27/23 48.5 kg (107 lb)         No results found for any visits on 09/20/23.  No results found for this or any previous visit (from the past 24 hour(s)).                  "

## 2023-09-20 NOTE — Clinical Note
Patient is requesting more adult diapers - new order placed, I'm not sure how many she was receiving before but she says she does not have enough per month.   FYI, she had her PCA re-evaluation last week (I'm behind on notes) so we'll see what they say!

## 2023-09-21 LAB
ANA SER QL: NEGATIVE
ANNOTATION COMMENT IMP: NORMAL
EJ AB SER QL: NEGATIVE
ENA JO1 IGG SER-ACNC: 2 AU/ML
MDA5 AB SER QL LINE BLOT: NEGATIVE
MI2 AB SER QL: NEGATIVE
MJ AB SER QL LINE BLOT: NEGATIVE
OJ AB SER QL: NEGATIVE
PL12 AB SER QL: NEGATIVE
PL7 AB SER QL: NEGATIVE
SAE1 AB SER QL LINE BLOT: NEGATIVE
SRP AB SERPL QL: NEGATIVE
TIF1-GAMMA AB SER QL LINE BLOT: NEGATIVE

## 2023-09-22 ENCOUNTER — PATIENT OUTREACH (OUTPATIENT)
Dept: CARE COORDINATION | Facility: CLINIC | Age: 53
End: 2023-09-22
Payer: COMMERCIAL

## 2023-09-22 ASSESSMENT — ACTIVITIES OF DAILY LIVING (ADL): DEPENDENT_IADLS:: CLEANING;COOKING;LAUNDRY;SHOPPING;MEAL PREPARATION;MEDICATION MANAGEMENT;TRANSPORTATION

## 2023-09-22 NOTE — PROGRESS NOTES
Clinic Care Coordination Contact  Community Health Worker Follow Up  Spoke with Byron Germanton (Daughter). Fritz declined [facility] .      Care Gaps:     Health Maintenance Due   Topic Date Due    ZOSTER IMMUNIZATION (1 of 2) Never done    COVID-19 Vaccine (3 - Moderna risk series) 06/30/2022    BMP  09/28/2023    EYE EXAM  10/06/2023     Postponed to next outreach.      Care Plan:   Care Plan: PCA       Problem: Request for early re-assessment of PCA services.       Goal: I will be re-evaluated for increased PCA services in the next 90 days.       Start Date: 8/25/2023 Expected End Date: 11/25/2023    This Visit's Progress: 70%    Priority: High    Note:     Barriers: Language, recent health changes.  Strengths: Willing to accept [facility] support.  Patient expressed understanding of goal: Yes    Action steps to achieve this goal:  1.  I will answer my phone when I am contacted by [facility] to schedule an assessment.   2.  I will make sure I have any supportive family/friends present for my assessment as scheduled on Fri 9/29 at 10AM.   3.  I will follow up with [facility] in the next month regarding this goal.   Note: PCP's letter of support for early reassessment faxed on 7/25/23. [facility] [phone], she confirmed fax number [phone]                            Intervention and Education during outreach:   CHW inquired if [facility] has called regarding reassessment of PCA hours. Fritz shares that [facility] called the other day and patient is scheduled for assessment on Fri 9/29 at 10AM. Fritz will be with patient for the assessment and will help advocate for patients needs.     CHW Plan:  CHW to follow up in 1 month    Irene Samson  Clinic Care Coordination  [facility]    Phone: [phone]

## 2023-09-24 NOTE — PROGRESS NOTES
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  No clinical symptoms or exam findings consistent with a diagnosis of rheumatoid arthritis--she denies any  pain, stiffness or swelling in her hands, wrists, ankles, or feet, and x-rays did not reveal any erosive changes or abnormal alignment beside some mild ulnar variance bilaterally in the hands.  Although she does not have signs or symptoms consistent with a diagnosis of rheumatoid arthritis at this time I discussed with patient that she is at high risk for developing clinical rheumatoid arthritis at some point in the future and that we should monitor her closely.     Plan: We will continue to monitor at this time.  If she should develop signs and symptoms consistent with rheumatoid arthritis would avoid methotrexate given her history of transaminitis and positive antimitochondrial antibody.  Could consider starting with a TNF alpha inhibitor, however, given minimal symptoms could start with Imuran as this would also treat her autoimmune hepatitis, and TNF inhibitors have been known to induce autoimmune hepatitis.            (K75.4) Autoimmune hepatitis (H)  Comment: Patient noted to have a positive antimitochondrial antibody and history of elevated liver enzymes, although her most recent liver enzymes are within normal limits.  Also endorses right upper quadrant pain.  Already has a GI referral but no appointment.  Reports she has never been followed by GI doctor in the past  Plan: provided patient's daughter-in-law with number for gastroenterology to schedule an appointment      Positive MYRON  MYRON by IFA 1:160 speckled, Likely secondary to autoimmune hepatitis.  Rheumatological review of systems is reassuring and subserologies  including SSA/SSB, De, Scl-70, RNP, Nicole-1, histone, centromere, dsDNA, anti-smooth, are all negative.      Left rotator cuff tenosynovitis  Limited active and passive range of motion most consistent with frozen shoulder in the setting of her uncontrolled diabetes.  Shoulder x-rays negative for osteoarthritis  Plan: Discussed importance of physical therapy and option of doing a steroid injection with patient today she would like to hold off on steroid injection but plans to participate in physical therapy.  Physical therapy referral placed     Mechanics hands?  Dyspnea  No other signs and symptoms to suggest antisynthetase syndrome, and myositis panel (Nicole-1, PL-12, PL-7, EJ, OJ, SRP, Mi-2, p155/140/ Tif-1 gamma, SAE1, MDA5, NXP2) was negative.  Does have mildly elevated hemoglobin which may suggest some chronic hypoxia. Recent SPO2 was 96%.  Today she reports her dyspnea with lying flat has improved.  Denies any dyspnea with exertion  Plan  -Echocardiogram  -We will consider PFTs    Orders Placed This Encounter   Procedures    Physical Therapy Referral        50 minutes spent on the day of the encounter doing chart review, history and exam, counseling and documentation.       Sandra Mack MD  Rheumatology     Subjective         Interim Hx:       Work up from last appointment showed negative myositis panel (Nicole-1, PL-12, PL-7, EJ, OJ, SRP, Mi-2, p155/140/ Tif-1 gamma, SAE1, MDA5, NXP2) and mildly elevated Hgb 15.8. x-rays of the knee, wrist, hand, foot, ankle, shoulder, cervical and lumbar spine showed degenerative changes at the lumbar and cervical spine as well as at the hands. No erosive changes or reduced joint spaces or abnormal alignment noted in the ankles, or feet. No erosions in the hands or wrists but did not mild ulnar variance bilaterally. Normal left shoulder x-ray and bilateral knee x-rays.  She presents today with her daughter-in-law patient prefers to have daughter-in-law serve as an   instead of the phone  today.  She continues to deny any hand or feet pain, stiffness, or swelling.  She does continue to have neck pain and left shoulder stiffness which is her primary concern today.  She reports that she is no longer as short of breath with laying down and she denies being short of breath with activity.  She has an echocardiogram scheduled but does not yet have an appointment with GI.        I have reviewed recent labs and images includin2023 x-rays  Knee, wrist, hand, foot, ankle, shoulder, cervical and lumbar spine showed degenerative changes at the lumbar and cervical spine as well as at the hands.   No erosive changes or reduced joint spaces or abnormal alignment noted in the ankles, or feet. No erosions in the hands or wrists but did not mild ulnar variance bilaterally.   Normal left shoulder x-ray and bilateral knee x-rays    23 myositis panel (Nicole-1, PL-12, PL-7, EJ, OJ, SRP, Mi-2, p155/140/ Tif-1 gamma, SAE1, MDA5, NXP2) negative   CBC Hgb 15.8  Neg Quant gold,   TPMT neg       Lab Review  From [facility]   2023:  anti mitochondrial antibody positive (60) (n 0-20),   anti CCP positive (>250),   MYRON by IFA 1:160 speckled,   esr 61, crp 0.4  High LFTs   Neg RF, SSA/SSB, Smith, Scl-70, RNP, Nicole-1, histone, centromere, dsDNA, anti-smooth,   Hep BsAg nonreactive, HCV ab nonreactive   SPEP with polyclonal hypergammaglobulinemia   Uric acid 4.9,   C3, C4 wnl      Labs 2023   MYRON 1:320 speckled,   CCP >340  IgG 2,099  AST, ALT wnl, bili wnl, GGT elevated   Anti-mitochondrial 21 elevated   HA1C 9.6  UA wnl,   HIV nonreactive,   Hep C antibody nonreactive   BMP with low Cr         No past medical history on file.  No Known Allergies  Social History     Tobacco Use    Smoking status: Never    Smokeless tobacco: Never   Vaping Use    Vaping Use: Never used        Current Outpatient Medications:     alcohol swab prep pads, Use to swab  area of injection/fabiola as directed., Disp: 100 each, Rfl: 3    blood glucose (NO BRAND SPECIFIED) lancets standard, Use to test blood sugar 4 times daily or as directed., Disp: 100 Lancet, Rfl: 11    Continuous Blood Gluc Sensor (FREESTYLE MISTY 3 SENSOR) MISC, 1 Units every 14 days, Disp: 2 each, Rfl: 4    diclofenac (VOLTAREN) 1 % topical gel, Apply 4 g topically 4 times daily, Disp: 350 g, Rfl: 0    gabapentin (NEURONTIN) 300 MG capsule, Take 1 capsule (300 mg) by mouth 3 times daily, Disp: 270 capsule, Rfl: 1    glipiZIDE (GLUCOTROL) 5 MG tablet, Take 1 tablet (5 mg) by mouth 2 times daily (before meals), Disp: 180 tablet, Rfl: 1    ibuprofen (ADVIL/MOTRIN) 600 MG tablet, Take 1 tablet (600 mg) by mouth every 6 hours as needed for moderate pain, Disp: 100 tablet, Rfl: 0    insulin glargine (LANTUS SOLOSTAR) 100 UNIT/ML pen, Inject 25 Units Subcutaneous At Bedtime, Disp: 30 mL, Rfl: 0    insulin pen needle (ULTICARE MICRO) 32G X 4 MM miscellaneous, Use 1 pen needles daily or as directed., Disp: 100 each, Rfl: 3    liraglutide (VICTOZA) 18 MG/3ML solution, Inject 0.6 mg Subcutaneous daily, Disp: 3 mL, Rfl: 1    lisinopril (ZESTRIL) 5 MG tablet, Take 1 tablet (5 mg) by mouth daily at 2 pm, Disp: 90 tablet, Rfl: 1    TRUEPLUS 5-BEVEL PEN NEEDLES 31G X 5 MM miscellaneous, USE DAILY AS INSTRUCTED, Disp: , Rfl:     TRUEplus Lancets 33G MISC, use 1 LANCET to TEST BLOOD SUGAR 4 TIMES A DAY, Disp: , Rfl:      Objective   /84 (BP Location: Right arm, Patient Position: Sitting)   Pulse 94   Resp (!) 97   Wt 67.7 kg (149 lb 4.8 oz)   LMP  (LMP Unknown)   BMI 27.31 kg/m    General: alert, well appearing, no distress  HEENT: no alopecia, clear conjunctiva, no oral or nasal ulcers, no cervical lymphadenopathy  Cardiac: normal rate and rhythm, no murmur, rubs or gallops   Pulm: normal respiratory effort, clear to auscultation bilaterally,   MSK: Hand, wrist, elbow, and shoulder joints without evidence of synovitis or  effusion.  Able to make tight fists.  No hand deformities.  Full active range of motion at the wrist, and elbow joints bilaterally.  + [place]berden nodes.  Full active and passive range of motion of the right shoulder with limited active range of motion of the left shoulder with abduction past 90 degrees as well as with internal and external rotation.  Similarly, she has limited passive range of motion of the shoulder in the same planes.  Tenderness to palpation over the cervical paraspinal muscles with tense, protuberant muscle over the left upper back     2 soft, mobile, subcutaneous, round, quarter sized, masses over left lower back.  Skin: thickened and scaly plaques over the lateral aspect of her left pointer finger and right pointer and middle finger.  No nail pitting, digital ulceration, dactylitis, or telangiectasias.  No fingerpad pits.  Raised scar with fibrous tissue over left anterior skin.,  No erythema, rashes.

## 2023-09-25 ENCOUNTER — OFFICE VISIT (OUTPATIENT)
Dept: RHEUMATOLOGY | Facility: CLINIC | Age: 53
End: 2023-09-25
Payer: COMMERCIAL

## 2023-09-25 VITALS
DIASTOLIC BLOOD PRESSURE: 84 MMHG | BODY MASS INDEX: 27.31 KG/M2 | SYSTOLIC BLOOD PRESSURE: 118 MMHG | WEIGHT: 149.3 LBS | RESPIRATION RATE: 97 BRPM | HEART RATE: 94 BPM

## 2023-09-25 DIAGNOSIS — R76.8 ANTI-CYCLIC CITRULLINATED PEPTIDE ANTIBODY POSITIVE: Primary | ICD-10-CM

## 2023-09-25 PROCEDURE — 99215 OFFICE O/P EST HI 40 MIN: CPT | Performed by: STUDENT IN AN ORGANIZED HEALTH CARE EDUCATION/TRAINING PROGRAM

## 2023-09-27 ENCOUNTER — OFFICE VISIT (OUTPATIENT)
Dept: PODIATRY | Facility: CLINIC | Age: 53
End: 2023-09-27
Payer: COMMERCIAL

## 2023-09-27 VITALS — DIASTOLIC BLOOD PRESSURE: 80 MMHG | BODY MASS INDEX: 21.16 KG/M2 | WEIGHT: 112 LBS | SYSTOLIC BLOOD PRESSURE: 142 MMHG

## 2023-09-27 DIAGNOSIS — L97.512 CHRONIC ULCER OF RIGHT FOOT WITH FAT LAYER EXPOSED (H): ICD-10-CM

## 2023-09-27 DIAGNOSIS — L84 PRE-ULCERATIVE CALLUSES: ICD-10-CM

## 2023-09-27 DIAGNOSIS — M20.11 HAV (HALLUX ABDUCTO VALGUS), RIGHT: ICD-10-CM

## 2023-09-27 DIAGNOSIS — E11.42 DIABETIC POLYNEUROPATHY ASSOCIATED WITH TYPE 2 DIABETES MELLITUS (H): Primary | ICD-10-CM

## 2023-09-27 PROCEDURE — 11042 DBRDMT SUBQ TIS 1ST 20SQCM/<: CPT | Performed by: PODIATRIST

## 2023-09-27 PROCEDURE — 99213 OFFICE O/P EST LOW 20 MIN: CPT | Mod: 25 | Performed by: PODIATRIST

## 2023-09-27 NOTE — LETTER
9/27/2023         RE: Nithya Matthews  [address] [place] [address]        Dear Colleague,    Thank you for referring your patient, Nithya Matthews, to the [facility]. Please see a copy of my visit note below.    Podiatry / Foot and Ankle Surgery Progress Note    September 27, 2023    Subject: Patient was seen for follow-up on right foot ulcer.  They have been doing Nichole dressing changes her daughter notes.  She is interpreting for her.  Denies fever, nausea, vomiting.  No pain but has baseline neuropathy.    Objective:  Vitals: BP (!) 142/80   Wt 50.8 kg (112 lb)   LMP  (LMP Unknown)   BMI 21.16 kg/m    BMI= Body mass index is 21.16 kg/m .    A1C: 9.1 (7/10/2023)     General appearance: Patient is alert and fully cooperative with history & exam.  No sign of distress is noted during the visit.     Dermatologic: Preulcerative hyperkeratotic lesions to the plantar aspects of the first and fifth metatarsal heads bilaterally.  Upon debridement of the right first metatarsal head callus there is an ulceration noted.  This measures approximately 1 cm x 0.3cm x 0.2 cm in depth after debridement.   No redness, purulent drainage or malodor noted.  Maceration around the wound edges.     Vascular: DP & PT pulses are intact & regular bilaterally.  No significant edema or varicosities noted.  CFT and skin temperature is normal to both lower extremities.     Neurologic: Lower extremity sensation is diminished to feet.     Musculoskeletal: Patient is ambulatory without assistive device or brace.  Prominent first metatarsal heads medially both feet.     ASSESSMENT:      Diabetic polyneuropathy associated with type 2 diabetes mellitus (H)  Ulcer of right foot with fat layer exposed (H)  Hav (hallux abducto valgus), right     Medical Decision Making/Plan:  Reviewed patient's chart in epic.    At this time the ulcer was debrided.  Please see procedure note below.  We will have her continue to apply  Nichole to the ulcer daily with two 2 x 2 gauze pads and a large Band-Aid.  She will continue wearing the postop shoe to keep pressure off of the foot at all times even around the house.  Currently there are no clinic signs of infection and would hold off on an antibiotic at this time.  Was given an order for diabetic shoes and inserts.     All questions were answered to patient and patient's daughter satisfaction and they will call for the questions or concerns.     Procedure: After verbal consent, excisional debridement was performed on ulcer.  #15 blade was used to debride ulcer down to and including subcutaneous tissue. Bleeding controlled with light pressure.   No drainage noted.  No anesthesia was used due to neuropathy. Dry dressing applied to foot.  Patient tolerated procedure well.        Patient Risk Factor:  Patient is a moderate risk factor for infection.     Grecia Hearn DPM, Podiatry/Foot and Ankle Surgery      Again, thank you for allowing me to participate in the care of your patient.        Sincerely,        Grecia Hearn DPM, Podiatry/Foot and Ankle Surgery

## 2023-09-27 NOTE — PROGRESS NOTES
Podiatry / Foot and Ankle Surgery Progress Note    September 27, 2023    Subject: Patient was seen for follow-up on right foot ulcer.  They have been doing Nichole dressing changes her daughter notes.  She is interpreting for her.  Denies fever, nausea, vomiting.  No pain but has baseline neuropathy.    Objective:  Vitals: BP (!) 142/80   Wt 50.8 kg (112 lb)   LMP  (LMP Unknown)   BMI 21.16 kg/m    BMI= Body mass index is 21.16 kg/m .    A1C: 9.1 (7/10/2023)     General appearance: Patient is alert and fully cooperative with history & exam.  No sign of distress is noted during the visit.     Dermatologic: Preulcerative hyperkeratotic lesions to the plantar aspects of the first and fifth metatarsal heads bilaterally.  Upon debridement of the right first metatarsal head callus there is an ulceration noted.  This measures approximately 1 cm x 0.3cm x 0.2 cm in depth after debridement.   No redness, purulent drainage or malodor noted.  Maceration around the wound edges.     Vascular: DP & PT pulses are intact & regular bilaterally.  No significant edema or varicosities noted.  CFT and skin temperature is normal to both lower extremities.     Neurologic: Lower extremity sensation is diminished to feet.     Musculoskeletal: Patient is ambulatory without assistive device or brace.  Prominent first metatarsal heads medially both feet.     ASSESSMENT:      Diabetic polyneuropathy associated with type 2 diabetes mellitus (H)  Ulcer of right foot with fat layer exposed (H)  Hav (hallux abducto valgus), right     Medical Decision Making/Plan:  Reviewed patient's chart in epic.    At this time the ulcer was debrided.  Please see procedure note below.  We will have her continue to apply Nichole to the ulcer daily with two 2 x 2 gauze pads and a large Band-Aid.  She will continue wearing the postop shoe to keep pressure off of the foot at all times even around the house.  Currently there are no clinic signs of infection and would  hold off on an antibiotic at this time.  Was given an order for diabetic shoes and inserts.     All questions were answered to patient and patient's daughter satisfaction and they will call for the questions or concerns.     Procedure: After verbal consent, excisional debridement was performed on ulcer.  #15 blade was used to debride ulcer down to and including subcutaneous tissue. Bleeding controlled with light pressure.   No drainage noted.  No anesthesia was used due to neuropathy. Dry dressing applied to foot.  Patient tolerated procedure well.        Patient Risk Factor:  Patient is a moderate risk factor for infection.     Grecia Hearn DPM, Podiatry/Foot and Ankle Surgery

## 2023-09-27 NOTE — PATIENT INSTRUCTIONS
Thank you for choosing [facility] Podiatry / Foot & Ankle Surgery!    DR HIRSCH'S CLINIC:  [facility]   [address]   [place] [address]      TRIAGE LINE: [phone]  APPOINTMENTS: [phone]  RADIOLOGY: [phone]  SET UP SURGERY: [phone]  PHYSICAL THERAPY: [phone]   FAX NUMBER: [phone]  BILLING QUESTIONS: [phone]       Follow up: 1 month      [facility] LOCATIONS  [facility] [place]  [address]  [place] [address]  Phone: [phone]  Fax: [phone] [facility]   [address]  [place] [address]  Phone: [phone]  Fax: [phone]   [facility] [place]  [id number] [place] [address] [address]  [place] [address]  Phone: [phone]  Fax: [phone] [facility]  [address]  [place] [address]  Phone: [phone]   Fax: [phone]   * Please call any location listed to make an appointment for a casting/fitting. Your referral was sent to their central office and they will all have the order on file.

## 2023-09-28 ENCOUNTER — HOSPITAL ENCOUNTER (OUTPATIENT)
Dept: CARDIOLOGY | Facility: HOSPITAL | Age: 53
Discharge: HOME | End: 2023-09-28
Attending: STUDENT IN AN ORGANIZED HEALTH CARE EDUCATION/TRAINING PROGRAM | Admitting: STUDENT IN AN ORGANIZED HEALTH CARE EDUCATION/TRAINING PROGRAM
Payer: COMMERCIAL

## 2023-09-28 DIAGNOSIS — R76.8 ANTI-CYCLIC CITRULLINATED PEPTIDE ANTIBODY POSITIVE: ICD-10-CM

## 2023-09-28 DIAGNOSIS — Z53.9 DIAGNOSIS NOT YET DEFINED: Primary | ICD-10-CM

## 2023-09-28 LAB
BI-PLANE LVEF ECHO: NORMAL
LVEF ECHO: NORMAL

## 2023-09-28 PROCEDURE — 93306 TTE W/DOPPLER COMPLETE: CPT | Mod: 26 | Performed by: INTERNAL MEDICINE

## 2023-09-28 PROCEDURE — G0179 MD RECERTIFICATION HHA PT: HCPCS | Performed by: FAMILY MEDICINE

## 2023-09-28 PROCEDURE — 93306 TTE W/DOPPLER COMPLETE: CPT

## 2023-09-28 NOTE — CONFIDENTIAL NOTE
DIAGNOSIS: Autoimmune hepatitis (H    Appt Date:  11.27.2023    NOTES STATUS DETAILS   OFFICE NOTE from referring provider Internal 05.24.2023 Mayela Crews,     OFFICE NOTES from other specialists     DISCHARGE SUMMARY from hospital     MEDICATION LIST Internal    LIVER BIOSPY (IF APPLICABLE)      PATHOLOGY REPORTS      IMAGING     ENDOSCOPY (IF AVAILABLE)     COLONOSCOPY (IF AVAILABLE)     ULTRASOUND LIVER     CT OF ABDOMEN     MRI OF LIVER     FIBROSCAN, US ELASTOGRAPHY, FIBROSIS SCAN, MR ELASTOGRAPHY     LABS     HEPATIC PANEL (LIVER PANEL) Internal 08.17.2023   BASIC METABOLIC PANEL     COMPLETE METABOLIC PANEL Internal 09.07.2023   COMPLETE BLOOD COUNT (CBC) Internal    INTERNATIONAL NORMALIZED RATIO (INR)     HEPATITIS C ANTIBODY Internal 05.16.2023   HEPATITIS C VIRAL LOAD/PCR     HEPATITIS C GENOTYPE     HEPATITIS B SURFACE ANTIGEN     HEPATITIS B SURFACE ANTIBODY     HEPATITIS B DNA QUANT LEVEL     HEPATITIS B CORE ANTIBODY

## 2023-09-29 RX ORDER — NYSTATIN 100000 U/G
CREAM TOPICAL 2 TIMES DAILY
Qty: 60 G | Refills: 3 | Status: SHIPPED | OUTPATIENT
Start: 2023-09-29 | End: 2024-05-23

## 2023-10-03 DIAGNOSIS — Z53.9 DIAGNOSIS NOT YET DEFINED: Primary | ICD-10-CM

## 2023-10-03 PROCEDURE — G0179 MD RECERTIFICATION HHA PT: HCPCS | Performed by: FAMILY MEDICINE

## 2023-10-04 ENCOUNTER — PATIENT OUTREACH (OUTPATIENT)
Dept: CARE COORDINATION | Facility: CLINIC | Age: 53
End: 2023-10-04
Payer: COMMERCIAL

## 2023-10-04 NOTE — PROGRESS NOTES
Clinic Care Coordination Contact  Community Health Worker Follow Up  Spoke with ByronFritz (Daughter). Fritz declined [facility] .    Care Gaps:     Health Maintenance Due   Topic Date Due    ZOSTER IMMUNIZATION (1 of 2) Never done    COVID-19 Vaccine (3 - Moderna risk series) 06/30/2022    BMP  09/28/2023    EYE EXAM  10/06/2023    A1C  10/10/2023    HEMOGLOBIN  12/28/2023     Postponed to next outreach.      Care Plan:   Care Plan: PCA       Problem: Request for early re-assessment of PCA services.       Goal: I will be re-evaluated for increased PCA services in the next 90 days.       Start Date: 8/25/2023 Expected End Date: 11/25/2023    This Visit's Progress: 70% Recent Progress: 70%    Priority: High    Note:     Barriers: Language, recent health changes.  Strengths: Willing to accept [facility] support.  Patient expressed understanding of goal: Yes    Action steps to achieve this goal:  1.  I will answer my phone when I am contacted by [facility] to schedule an assessment.   2.  I will make sure I have any supportive family/friends present for my assessment as scheduled on Fri 9/29 at 10AM. (No . Rescheduled)  3. I will make sure I have any supportive family/friends present for my assessment as rescheduled on Fri 10/6/23 at 2:30PM.  4.  I will follow up with [facility] in the next month regarding this goal.   Note: PCP's letter of support for early reassessment faxed on 7/25/23. [facility] [phone], she confirmed fax number [phone].                              Care Plan: Incontinence Supplies       Problem: Incontinence of feces with fecal urgency       Goal: I want check coverage for more Pull-Ups in the next 60 days so that I can better manage my incontinence symptoms.       Start Date: 10/4/2023 Expected End Date: 12/4/2023    This Visit's Progress: 10%    Priority: High    Note:     Barriers: Language  Strengths: Accepting of support  Patient expressed understanding  of goal: Yes    Action steps to achieve this goal:  1. I will attend an appointment with my PCP to address my need for pull-ups. (Completed, 9/20)  2. I will answer my phone when [facility] [email or web address] contacts me to deliver or  my pull-ups.  3. I will follow up with [facility] in the next month regarding this goal for additional coordination support.    Note: 9/20/23 Order for pull-ups was sent to [facility] [phone], Fax [phone] on 10/4/23.                           Intervention and Education during outreach:   Fritz shares that [facility] could not get an  for PCA reassessment on 9/29 so appointment was rescheduled to Fri 10/6 at 2:30PM. Fritz will be with patient.     Dr. Arizmendi placed order for more pull-up briefs. 9/20/23 incontinence supplies order faxed to [facility] Fax [phone] today.   CHW reviewed above and encouraged Fritz to watch for follow up call and delivery from [facility] [email or web address]. Fritz will notify CHW if she needs any support.     CHW Plan:  CHW to follow up in 1 month    Irene Samson  [facility] Care Coordination  [facility]    Phone: [phone]

## 2023-10-04 NOTE — Clinical Note
Jens Arizmendi, I PCA assessment rescheduled to 10/6, [facility] did not have . Order for briefs faxed to [facility] today. Thank you.   Irene Samson Clinic Care Coordination [facility]   Phone: [phone]

## 2023-10-09 ENCOUNTER — VIRTUAL VISIT (OUTPATIENT)
Dept: EDUCATION SERVICES | Facility: CLINIC | Age: 53
End: 2023-10-09
Payer: COMMERCIAL

## 2023-10-09 DIAGNOSIS — E11.69 TYPE 2 DIABETES MELLITUS WITH OTHER SPECIFIED COMPLICATION, WITH LONG-TERM CURRENT USE OF INSULIN (H): Primary | ICD-10-CM

## 2023-10-09 DIAGNOSIS — Z79.4 TYPE 2 DIABETES MELLITUS WITH OTHER SPECIFIED COMPLICATION, WITH LONG-TERM CURRENT USE OF INSULIN (H): Primary | ICD-10-CM

## 2023-10-09 PROCEDURE — G0108 DIAB MANAGE TRN  PER INDIV: HCPCS | Mod: 95 | Performed by: DIETITIAN, REGISTERED

## 2023-10-09 NOTE — PROGRESS NOTES
Assessment:  1  Osteoarthritis of spine with radiculopathy, cervical region    2  Bilateral carpal tunnel syndrome    3  Lumbar spondylosis    4  Degenerative cervical disc    5  Lumbar degenerative disc disease    6  Acute pain of left shoulder    7  Myofascial pain syndrome        Plan:  Patient is a 78-year-old female with history of chronic neck pain and chronic back pain with radicular symptoms of the upper left extremity complicated by multi joint arthritic pathology presents today for follow-up visit  Patient has a history of cervical and lumbar facet hypertrophy in addition to degenerative disc disease in the lumbar spine and the cervical spine  Patient is currently attending physical therapy for left shoulder pain  Patient has taking gabapentin 300 mg 3 times q h s  And denied any alleviation of her symptoms with this dose  Patient reports having increase facial and scalp numbness with certain positions  It appears is a significant myofascial component to patient's pain  Patient reports having gabapentin failing in the past secondary to GI upset  1  We will schedule the patient for a bilateral rhomboid and lumbosacral trigger point injection  2  We will start patient Aquatherapy and Eric  3  Patient will continue amitriptyline q h s  [place] Prescription Drug Monitoring Program report was reviewed and was appropriate       History of Present Illness: The patient is a 44 y o  female who presents for a follow up office visit in regards to Back Pain; Neck Pain; and Shoulder Pain  The patients current symptoms include  6/10 constant sharp, throbbing,  Cramping, pressure-like, numbness, pins and needles in multiple areas of the body including the neck midback, low back, bilateral hips without any particular pain pattern  Current pain medications includes:    The patient reports that this regimen is providing 0% pain relief    The patient is reporting no side effects Diabetes Self-Management Education & Support    Presents for: CGM Review    Type of Service: Telephone Visit    Originating Location (Patient Location): Home  Distant Location (Provider Location): Offsite  Mode of Communication:  Telephone    Telephone Visit Start Time:  11:03 AM  Telephone Visit End Time (telephone visit stop time): 11:35 AM    How would patient like to obtain AVS? MyChart    Assessment Type:   REPORTS:          Pt verbalized understanding of concepts discussed and recommendations provided today.       Continue education with the following diabetes management concepts: Monitoring, Taking Medication, and Problem Solving    ASSESSMENT:  Patient reports taking the Herson 3 off - she has had some low BG, but states its been better. Patient has been taking 14-16 units of Lantus. She is not able to say why she decreased the dose, but it appears she is doing well with some lows, but not as significant before. Discussed getting the Herson on again and picking it up at the pharmacy. Patient has not been using her glucose meter to check BG anymore either. Also advised that she schedule an eye exam.      Glucose Patterns & Trends:  Hypoglycemia, weekend- nocturnal and weekday- nocturnal    Changes made to sensor settings:   none    PLAN  Check BG 2-3 times daily with Herson or glucose meter  Schedule DM eye exam  No changes to medications today  Advised her to schedule PCP visit for diabetes check   Topics to cover at upcoming visits: Monitoring and Taking Medication    Follow-up: 4-8 weeks    See Care Plan for co-developed, patient-state behavior change goals.  AVS provided for patient today.    Education Materials Provided:  No new materials provided today      SUBJECTIVE/OBJECTIVE:  Presents for: CGM Review  Accompanied by: Self,   Diabetes education in the past 24mo: Yes  Focus of Visit: Patient Unsure  Diabetes type: Type 2  Disease course: Improving  How confident are you filling out medical  "forms by yourself:: A little bit  Diabetes management related comments/concerns: feels shaky lots  Transportation concerns: Yes  Difficulty affording diabetes medication?: Sometimes  Other concerns:: Language barrier  Cultural Influences/Ethnic Background:  Not  or     Diabetes Symptoms & Complications:  Fatigue: No  Neuropathy: No  Polydipsia: No  Polyphagia: No  Polyuria: No  Visual change: Yes  Symptom course: Improving  Weight trend: Stable  Complications assessed today?: No    Patient Problem List and Family Medical History reviewed for relevant medical history, current medical status, and diabetes risk factors.    Vitals:  LMP  (LMP Unknown)   Estimated body mass index is 27.31 kg/m  as calculated from the following:    Height as of 7/17/23: 1.575 m (5' 2\").    Weight as of 9/25/23: 67.7 kg (149 lb 4.8 oz).   Last 3 BP:   BP Readings from Last 3 Encounters:   09/25/23 118/84   09/11/23 120/88   09/07/23 110/80       History   Smoking Status    Never   Smokeless Tobacco    Never       Labs:  Lab Results   Component Value Date    A1C 9.6 07/17/2023     Lab Results   Component Value Date     05/16/2023     Lab Results   Component Value Date     05/16/2023     Direct Measure HDL   Date Value Ref Range Status   05/16/2023 32 (L) >=50 mg/dL Final   ]  GFR Estimate   Date Value Ref Range Status   05/16/2023 >90 >60 mL/min/1.73m2 Final     Comment:     eGFR calculated using 2021 CKD-EPI equation.     No results found for: GFRESTBLACK  Lab Results   Component Value Date    CR 0.40 05/16/2023     No results found for: MICROALBUMIN    Healthy Eating:  Healthy Eating Assessed Today: Yes  Meal planning/habits: Avoiding sweets  Meals include: Breakfast, Lunch, Dinner  Breakfast: rice and broderick chicken/pork, green vegetables/cucumber  Lunch: rice and broderick chicken/pork, green vegetables/cucumber  Dinner: rice and broderick chicken/pork, green vegetables/cucumber  Snacks: jermaine,  Other: was drinking " from this pain medication regimen  I have personally reviewed and/or updated the patient's past medical history, past surgical history, family history, social history, current medications, allergies, and vital signs today  Review of Systems  Review of Systems   Respiratory: Positive for shortness of breath  Musculoskeletal: Positive for arthralgias, back pain and gait problem  Joint stiffness, swelling in hands, pain in legs and hands   Neurological: Positive for dizziness  Memory loss   All other systems reviewed and are negative  Past Medical History:   Diagnosis Date    Acquired ichthyosis     last assessed: 2013    Anxiety     Cervical radiculopathy     last assessed: 2014    Colorectal polyps     last assessed: 3/9/2015    [facility] pulmonary [facility]     Depression     Diverticulosis of colon     Foot pain     GERD (gastroesophageal reflux disease)     Hyperlipemia     Hypertension     Myocardial infarction ([facility] )     at age 26    Osteopenia     last assessed: 2013    Palpitations     last assessed: 2014    PVD (peripheral vascular disease) ([facility] )     last assessed: 12/3/2012    Scapular dyskinesis     last assessed: 2014    Shortness of breath     Tendonitis of left rotator cuff     last assessed: 2014    Vocal cord polyps     last assessed: 2014       Past Surgical History:   Procedure Laterality Date    ABDOMINAL SURGERY      exploratory    CARDIAC SURGERY      cardiac stent      SECTION      last assessed: 2014    CHOLECYSTECTOMY      last assessed: 2014    COLONOSCOPY  10/27/2014    CORONARY ANGIOPLASTY WITH STENT PLACEMENT      LAD stent    DENTAL SURGERY      last assessed: 2014    ELBOW SURGERY Bilateral     arthroplasty    ESOPHAGOGASTRODUODENOSCOPY      ESOPHAGOGASTRODUODENOSCOPY N/A 2016    Procedure: ESOPHAGOGASTRODUODENOSCOPY (EGD);   Surgeon: Jose M Espitia Mo Turner MD;  Location: [facility];  Service:     FOOT SURGERY Right 02/06/2015    x 4    HYSTERECTOMY      last assessed: 8/8/2014    THROAT SURGERY      TOOTH EXTRACTION         Family History   Problem Relation Age of Onset    No Known Problems Mother     No Known Problems Father     No Known Problems Maternal Grandmother     No Known Problems Maternal Grandfather     No Known Problems Paternal Grandmother     No Known Problems Paternal Grandfather        Social History     Occupational History    Not on file       Social History Main Topics    Smoking status: Current Every Day Smoker     Packs/day: 1 50    Smokeless tobacco: Never Used    Alcohol use Yes      Comment: socially    Drug use: No    Sexual activity: Not on file         Current Outpatient Prescriptions:     albuterol (VENTOLIN HFA) 90 mcg/act inhaler, Inhale 2 puffs every 6 (six) hours as needed, Disp: , Rfl:     aspirin 81 mg chewable tablet, Chew 1 tablet daily, Disp: , Rfl:     cyclobenzaprine (FLEXERIL) 10 mg tablet, Take 1 tablet (10 mg total) by mouth 3 (three) times a day as needed for muscle spasms, Disp: 30 tablet, Rfl: 0    nitroglycerin (NITROSTAT) 0 4 mg SL tablet, Place 1 tablet (0 4 mg total) under the tongue every 5 (five) minutes as needed for chest pain, Disp: 90 tablet, Rfl: 3    ondansetron (ZOFRAN-ODT) 4 mg disintegrating tablet, Take 1 tablet (4 mg total) by mouth every 8 (eight) hours as needed for nausea or vomiting , Disp: 10 tablet, Rfl: 0    oxyCODONE-acetaminophen (PERCOCET) 5-325 mg per tablet, Take 1 tablet by mouth every 4 (four) hours as needed for moderate pain , Disp: , Rfl:     ranitidine (ZANTAC) 150 mg tablet, Take 1 tablet (150 mg total) by mouth 2 (two) times a day, Disp: 60 tablet, Rfl: 5    rosuvastatin (CRESTOR) 5 mg tablet, Take 1 tablet (5 mg total) by mouth every other day, Disp: 15 tablet, Rfl: 2    DULoxetine (CYMBALTA) 20 mg capsule, Take 1 capsule (20 mg total) by mouth daily for orange juice - so no longer drinks it  Beverages: Water  Has patient met with a dietitian in the past?: Yes    Being Active:  Being Active Assessed Today: No  Exercise:: Currently not exercising    Monitoring:  Monitoring Assessed Today: Yes  Did patient bring glucose meter to appointment? : No  Blood Glucose Meter: CGM (Herson 3 and glucose monitor)  Times checking blood sugar at home (number): 2  Times checking blood sugar at home (per): Day  Blood glucose trend: Decreasing      Taking Medications:  Diabetes Medication(s)       Insulin       insulin glargine (LANTUS SOLOSTAR) 100 UNIT/ML pen    Inject 25 Units Subcutaneous At Bedtime      Sulfonylureas       glipiZIDE (GLUCOTROL) 5 MG tablet    Take 1 tablet (5 mg) by mouth 2 times daily (before meals)      Incretin Mimetic Agents       liraglutide (VICTOZA) 18 MG/3ML solution    Inject 0.6 mg Subcutaneous daily            Taking Medication Assessed Today: Yes  Current Treatments: Insulin Injections  Dose schedule: At bedtime  Given by: Patient  Injection/Infusion sites: Abdomen  Problems taking diabetes medications regularly?: No  Diabetes medication side effects?: No    Problem Solving:  Problem Solving Assessed Today: Yes  Is the patient at risk for hypoglycemia?: Yes  Hypoglycemia Frequency: Never  Hypoglycemia Treatment: Juice    Hypoglycemia symptoms  Dizziness or Light-Headedness: Yes  Sweats: Yes  Feeling shaky: Yes    Hypoglycemia Complications  Blackouts: No  Hospitalization: No  Nocturnal hypoglycemia: No  Required assistance: No  Required glucagon injection: No  Seizures: No    Reducing Risks:  Reducing Risks Assessed Today: Yes  Diabetes Risks: Age over 45 years, Sedentary Lifestyle, Ethnicity  CAD Risks: Diabetes Mellitus  Has dilated eye exam at least once a year?: No  Sees dentist every 6 months?: No    Healthy Coping:  Healthy Coping Assessed Today: Yes  Emotional response to diabetes: Ready to learn  Informal Support system:: Children  Stage of  30 days, Disp: 30 capsule, Rfl: 0    pregabalin (LYRICA) 50 mg capsule, Take 1 capsule (50 mg total) by mouth 2 (two) times a day for 30 days, Disp: 90 capsule, Rfl: 2    Allergies   Allergen Reactions    Ceclor [Cefaclor] Anaphylaxis    Codeine Itching     Reaction Date: 09Jun2011;     Ticlid [Ticlopidine] Hives    Penicillins Rash       Physical Exam:    /84   Wt 68 2 kg (150 lb 6 4 oz)   BMI 28 42 kg/m²     Constitutional:normal, well developed, well nourished, alert, in no distress and non-toxic and no overt pain behavior  Eyes:anicteric  HEENT:grossly intact  Neck:supple, symmetric, trachea midline and no masses   Pulmonary:even and unlabored  Cardiovascular:No edema or pitting edema present  Skin:Normal without rashes or lesions and well hydrated  Psychiatric:Mood and affect appropriate  Neurologic:Cranial Nerves II-XII grossly intact  Musculoskeletal:normal      Cervical Spine examination demonstrates  Decreased ROM secondary to pain with lateral rotation to the left/right and bending to the left/right, in addition to neck flexion  4/5 left upper extremity strength in all muscle groups  Negative Spurling's maneuver to the b/l Ue, sensitivity to light touch intact b/l Ue  Thoracic Spine examination demonstrates  Bilateral thoracic paraspinal musculature tender to palpation with muscle spasms noted throughout her paraspinals  Lumbar/Sacral Spine examination demonstrates  decreased range of motion lumbar spine with pain upon: flexion, lateral rotation to the left/right, and bending to the left/right  Bilateral lumbar paraspinals tender to palpation  Muscle spasms noted in the lumbar area bilaterally  Multiple trigger points  5/5 lower extremity strength in all muscle groups bilaterally  Positive seated straight leg raise for bilateral lower extremities  Sensitivity to light touch intact bilateral lower extremities  4+ reflexes in the patella and Achilles    No ankle clonus    Imaging  No change: ACTION (Actively working towards change)  Patient Activation Measure Survey Score:       No data to display                  Care Plan and Education Provided:  Care Plan: Diabetes   Updates made by Astrid Lemus RD since 10/9/2023 12:00 AM        Problem: Diabetes Self-Management Education Needed to Optimize Self-Care Behaviors         Goal: Healthy Eating - follow a healthy eating pattern for diabetes    This Visit's Progress: 100%   Recent Progress: 100%   Note:    Limit to 1 cup rice and no pop       Goal: Monitoring - monitor glucose and ketones as directed    This Visit's Progress: 0%   Recent Progress: 0%   Note:    Check BG 3 times daily       Goal: Reducing Risks - know how to prevent and treat long-term diabetes complications    This Visit's Progress: 0%   Recent Progress: 0%   Note:    Schedule eye exam             Time Spent: 30 minutes  Encounter Type: Individual    Any diabetes medication dose changes were made via the CDE Protocol per the patient's referring provider. A copy of this encounter was shared with the provider.     orders to display       No orders of the defined types were placed in this encounter

## 2023-10-09 NOTE — LETTER
10/9/2023         RE: Mumtaz Montoya  [address]  [facility] [place] [address]        Dear Colleague,    Thank you for referring your patient, Mumtaz Montoya, to the [facility]. Please see a copy of my visit note below.    Diabetes Self-Management Education & Support    Presents for: CGM Review    Type of Service: Telephone Visit    Originating Location (Patient Location): Home  Distant Location (Provider Location): Offsite  Mode of Communication:  Telephone    Telephone Visit Start Time:  11:03 AM  Telephone Visit End Time (telephone visit stop time): 11:35 AM    How would patient like to obtain AVS? MyChart    Assessment Type:   REPORTS:          Pt verbalized understanding of concepts discussed and recommendations provided today.       Continue education with the following diabetes management concepts: Monitoring, Taking Medication, and Problem Solving    ASSESSMENT:  Patient reports taking the Herson 3 off - she has had some low BG, but states its been better. Patient has been taking 14-16 units of Lantus. She is not able to say why she decreased the dose, but it appears she is doing well with some lows, but not as significant before. Discussed getting the Herson on again and picking it up at the pharmacy. Patient has not been using her glucose meter to check BG anymore either. Also advised that she schedule an eye exam.      Glucose Patterns & Trends:  Hypoglycemia, weekend- nocturnal and weekday- nocturnal    Changes made to sensor settings:   none    PLAN  Check BG 2-3 times daily with Herson or glucose meter  Schedule DM eye exam  No changes to medications today  Advised her to schedule PCP visit for diabetes check   Topics to cover at upcoming visits: Monitoring and Taking Medication    Follow-up: 4-8 weeks    See Care Plan for co-developed, patient-state behavior change goals.  AVS provided for patient today.    Education Materials Provided:  No new materials provided  "today      SUBJECTIVE/OBJECTIVE:  Presents for: CGM Review  Accompanied by: Self,   Diabetes education in the past 24mo: Yes  Focus of Visit: Patient Unsure  Diabetes type: Type 2  Disease course: Improving  How confident are you filling out medical forms by yourself:: A little bit  Diabetes management related comments/concerns: feels shaky lots  Transportation concerns: Yes  Difficulty affording diabetes medication?: Sometimes  Other concerns:: Language barrier  Cultural Influences/Ethnic Background:  Not  or     Diabetes Symptoms & Complications:  Fatigue: No  Neuropathy: No  Polydipsia: No  Polyphagia: No  Polyuria: No  Visual change: Yes  Symptom course: Improving  Weight trend: Stable  Complications assessed today?: No    Patient Problem List and Family Medical History reviewed for relevant medical history, current medical status, and diabetes risk factors.    Vitals:  LMP  (LMP Unknown)   Estimated body mass index is 27.31 kg/m  as calculated from the following:    Height as of 7/17/23: 1.575 m (5' 2\").    Weight as of 9/25/23: 67.7 kg (149 lb 4.8 oz).   Last 3 BP:   BP Readings from Last 3 Encounters:   09/25/23 118/84   09/11/23 120/88   09/07/23 110/80       History   Smoking Status     Never   Smokeless Tobacco     Never       Labs:  Lab Results   Component Value Date    A1C 9.6 07/17/2023     Lab Results   Component Value Date     05/16/2023     Lab Results   Component Value Date     05/16/2023     Direct Measure HDL   Date Value Ref Range Status   05/16/2023 32 (L) >=50 mg/dL Final   ]  GFR Estimate   Date Value Ref Range Status   05/16/2023 >90 >60 mL/min/1.73m2 Final     Comment:     eGFR calculated using 2021 CKD-EPI equation.     No results found for: GFRESTBLACK  Lab Results   Component Value Date    CR 0.40 05/16/2023     No results found for: MICROALBUMIN    Healthy Eating:  Healthy Eating Assessed Today: Yes  Meal planning/habits: Avoiding sweets  Meals " include: Breakfast, Lunch, Dinner  Breakfast: rice and broderick chicken/pork, green vegetables/cucumber  Lunch: rice and broderick chicken/pork, green vegetables/cucumber  Dinner: rice and broderick chicken/pork, green vegetables/cucumber  Snacks: jermaine,  Other: was drinking orange juice - so no longer drinks it  Beverages: Water  Has patient met with a dietitian in the past?: Yes    Being Active:  Being Active Assessed Today: No  Exercise:: Currently not exercising    Monitoring:  Monitoring Assessed Today: Yes  Did patient bring glucose meter to appointment? : No  Blood Glucose Meter: CGM (Herson 3 and glucose monitor)  Times checking blood sugar at home (number): 2  Times checking blood sugar at home (per): Day  Blood glucose trend: Decreasing      Taking Medications:  Diabetes Medication(s)       Insulin       insulin glargine (LANTUS SOLOSTAR) 100 UNIT/ML pen    Inject 25 Units Subcutaneous At Bedtime      Sulfonylureas       glipiZIDE (GLUCOTROL) 5 MG tablet    Take 1 tablet (5 mg) by mouth 2 times daily (before meals)      Incretin Mimetic Agents       liraglutide (VICTOZA) 18 MG/3ML solution    Inject 0.6 mg Subcutaneous daily            Taking Medication Assessed Today: Yes  Current Treatments: Insulin Injections  Dose schedule: At bedtime  Given by: Patient  Injection/Infusion sites: Abdomen  Problems taking diabetes medications regularly?: No  Diabetes medication side effects?: No    Problem Solving:  Problem Solving Assessed Today: Yes  Is the patient at risk for hypoglycemia?: Yes  Hypoglycemia Frequency: Never  Hypoglycemia Treatment: Juice    Hypoglycemia symptoms  Dizziness or Light-Headedness: Yes  Sweats: Yes  Feeling shaky: Yes    Hypoglycemia Complications  Blackouts: No  Hospitalization: No  Nocturnal hypoglycemia: No  Required assistance: No  Required glucagon injection: No  Seizures: No    Reducing Risks:  Reducing Risks Assessed Today: Yes  Diabetes Risks: Age over 45 years, Sedentary Lifestyle,  Ethnicity  CAD Risks: Diabetes Mellitus  Has dilated eye exam at least once a year?: No  Sees dentist every 6 months?: No    Healthy Coping:  Healthy Coping Assessed Today: Yes  Emotional response to diabetes: Ready to learn  Informal Support system:: Children  Stage of change: ACTION (Actively working towards change)  Patient Activation Measure Survey Score:       No data to display                  Care Plan and Education Provided:  Care Plan: Diabetes   Updates made by Astrid Lemus RD since 10/9/2023 12:00 AM        Problem: Diabetes Self-Management Education Needed to Optimize Self-Care Behaviors         Goal: Healthy Eating - follow a healthy eating pattern for diabetes    This Visit's Progress: 100%   Recent Progress: 100%   Note:    Limit to 1 cup rice and no pop       Goal: Monitoring - monitor glucose and ketones as directed    This Visit's Progress: 0%   Recent Progress: 0%   Note:    Check BG 3 times daily       Goal: Reducing Risks - know how to prevent and treat long-term diabetes complications    This Visit's Progress: 0%   Recent Progress: 0%   Note:    Schedule eye exam             Time Spent: 30 minutes  Encounter Type: Individual    Any diabetes medication dose changes were made via the CDE Protocol per the patient's referring provider. A copy of this encounter was shared with the provider.

## 2023-10-09 NOTE — PATIENT INSTRUCTIONS
Goals for Diabetes Care:    1. Eat 3 balanced meals each day - Monitor carb intake and aim for 45-60 grams per meal  This would be equal to about 4 choices of carbohydrates. Carbohydrate 1 choice = 15 grams  Aim to eat the plate method style - half your plate fruits/veggies, 1/4 the plate protein and 1/4 plate starch (rice, potato, pasta)    2. Check blood sugars at least 2-3 times each day at varying times with glucose meter or CGM   Blood Glucose Targets:   1. Fasting and before meal target is 80 - 130   2. 2 hours after a meal target is < 180  Always remember to bring meter and log book to all appointments.    3. Activity really helps improve blood sugars. Try to Incorporate 30 minutes activity into each day - does not need to be all at one time & walking counts!    4. Treating low BG. Rule of 15 = BG 50-70 mg/dL = 15 gram carb (4 oz juice, 4 glucose tabs, OR 4 oz pop), then recheck BG in 15 minutes. If still low repeat. (If BG <50 mg/dL = 8 oz pop/juice or 8 glucose tabs).    5. Take diabetes medications as prescribed     Follow up with your Diabetic Educator to assess BG targets and need for modifications to medications and/or lifestyle.    Call with any questions.  Thank you!  Astrid Lemus RDN, LD, [facility]   Certified Diabetes Care &   [facility]  Triage [phone]

## 2023-10-22 ENCOUNTER — HEALTH MAINTENANCE LETTER (OUTPATIENT)
Age: 53
End: 2023-10-22

## 2023-10-23 ENCOUNTER — OFFICE VISIT (OUTPATIENT)
Dept: PHARMACY | Facility: CLINIC | Age: 53
End: 2023-10-23
Attending: FAMILY MEDICINE
Payer: COMMERCIAL

## 2023-10-23 ENCOUNTER — LAB (OUTPATIENT)
Dept: LAB | Facility: CLINIC | Age: 53
End: 2023-10-23
Payer: COMMERCIAL

## 2023-10-23 ENCOUNTER — PATIENT OUTREACH (OUTPATIENT)
Dept: CARE COORDINATION | Facility: CLINIC | Age: 53
End: 2023-10-23

## 2023-10-23 VITALS
OXYGEN SATURATION: 93 % | HEART RATE: 88 BPM | BODY MASS INDEX: 21.68 KG/M2 | DIASTOLIC BLOOD PRESSURE: 78 MMHG | SYSTOLIC BLOOD PRESSURE: 152 MMHG | WEIGHT: 114.75 LBS

## 2023-10-23 DIAGNOSIS — E11.69 TYPE 2 DIABETES MELLITUS WITH OTHER SPECIFIED COMPLICATION, UNSPECIFIED WHETHER LONG TERM INSULIN USE (H): Primary | ICD-10-CM

## 2023-10-23 DIAGNOSIS — N18.5 CHRONIC KIDNEY DISEASE, STAGE V (H): ICD-10-CM

## 2023-10-23 DIAGNOSIS — I10 PRIMARY HYPERTENSION: Primary | ICD-10-CM

## 2023-10-23 DIAGNOSIS — E78.5 HYPERLIPIDEMIA LDL GOAL <100: ICD-10-CM

## 2023-10-23 DIAGNOSIS — L97.512 DIABETIC ULCER OF OTHER PART OF RIGHT FOOT ASSOCIATED WITH TYPE 2 DIABETES MELLITUS, WITH FAT LAYER EXPOSED (H): ICD-10-CM

## 2023-10-23 DIAGNOSIS — I10 PRIMARY HYPERTENSION: ICD-10-CM

## 2023-10-23 DIAGNOSIS — E11.69 TYPE 2 DIABETES MELLITUS WITH OTHER SPECIFIED COMPLICATION, UNSPECIFIED WHETHER LONG TERM INSULIN USE (H): ICD-10-CM

## 2023-10-23 DIAGNOSIS — E11.621 DIABETIC ULCER OF OTHER PART OF RIGHT FOOT ASSOCIATED WITH TYPE 2 DIABETES MELLITUS, WITH FAT LAYER EXPOSED (H): ICD-10-CM

## 2023-10-23 LAB
ANION GAP SERPL CALCULATED.3IONS-SCNC: 11 MMOL/L (ref 7–15)
BUN SERPL-MCNC: 30 MG/DL (ref 6–20)
CALCIUM SERPL-MCNC: 7.1 MG/DL (ref 8.6–10)
CHLORIDE SERPL-SCNC: 102 MMOL/L (ref 98–107)
CHOLEST SERPL-MCNC: 124 MG/DL
CREAT SERPL-MCNC: 6.21 MG/DL (ref 0.51–0.95)
CREAT UR-MCNC: 104 MG/DL
DEPRECATED HCO3 PLAS-SCNC: 25 MMOL/L (ref 22–29)
EGFRCR SERPLBLD CKD-EPI 2021: 7 ML/MIN/1.73M2
GLUCOSE SERPL-MCNC: 213 MG/DL (ref 70–99)
HBA1C MFR BLD: 8.2 % (ref 0–5.6)
HDLC SERPL-MCNC: 26 MG/DL
HGB BLD-MCNC: 7.8 G/DL (ref 11.7–15.7)
LDLC SERPL CALC-MCNC: 53 MG/DL
MICROALBUMIN UR-MCNC: >4400 MG/L
MICROALBUMIN/CREAT UR: NORMAL MG/G{CREAT}
NONHDLC SERPL-MCNC: 98 MG/DL
POTASSIUM SERPL-SCNC: 4.6 MMOL/L (ref 3.4–5.3)
SODIUM SERPL-SCNC: 138 MMOL/L (ref 135–145)
TRIGL SERPL-MCNC: 224 MG/DL

## 2023-10-23 PROCEDURE — 80061 LIPID PANEL: CPT

## 2023-10-23 PROCEDURE — 85018 HEMOGLOBIN: CPT

## 2023-10-23 PROCEDURE — 36415 COLL VENOUS BLD VENIPUNCTURE: CPT

## 2023-10-23 PROCEDURE — 82043 UR ALBUMIN QUANTITATIVE: CPT

## 2023-10-23 PROCEDURE — 82570 ASSAY OF URINE CREATININE: CPT

## 2023-10-23 PROCEDURE — 83036 HEMOGLOBIN GLYCOSYLATED A1C: CPT

## 2023-10-23 PROCEDURE — 80048 BASIC METABOLIC PNL TOTAL CA: CPT

## 2023-10-23 PROCEDURE — 99606 MTMS BY PHARM EST 15 MIN: CPT | Performed by: PHARMACIST

## 2023-10-23 RX ORDER — INSULIN ASPART 100 [IU]/ML
5 INJECTION, SOLUTION INTRAVENOUS; SUBCUTANEOUS
Qty: 15 ML | Refills: 3 | Status: SHIPPED | OUTPATIENT
Start: 2023-10-23 | End: 2024-03-20

## 2023-10-23 NOTE — PATIENT INSTRUCTIONS
"Recommendations from today's MTM visit:                                                    Recommended patient bring all medications and glucometer to next MTM visit    Follow-up: Return in about 8 weeks (around 12/20/2023) for With PharmD.    It was great speaking with you today.  I value your experience and would be very thankful for your time in providing feedback in our clinic survey. In the next few days, you may receive an email or text message from [employer] with a link to a survey related to your  clinical pharmacist.\"     To schedule another MTM appointment, please call the clinic directly or you may call the MTM scheduling line at [phone] or toll-free at [phone].     My Clinical Pharmacist's contact information:                                                      Please feel free to contact me with any questions or concerns you have.      Shital Hernandez, PharmD, [facility]  Medication Therapy Management Pharmacist  "

## 2023-10-23 NOTE — PROGRESS NOTES
Clinic Care Coordination Contact  Follow Up Progress Note      Assessment: CCRN spoke with daughter today via phone. Daughter speaks Congolese/English and declined Torqeedo .     PCA service  - approved for 10 hours per day.   - Daughter - Fritz Byron - 22 yrs and son - 24 yrs old are patient's PCA.   - Daughter - 6.5 hours per day. Son - 3.5 hours per day.     Orthopedics   - States no showed on 10/20/23 because they weren't aware of appt. Appt information was sent via [employer] and has not been read.   - agreed to rescheduled.   - Mondays, Wednesday or Fridays. - non-dialysis days.   - CCRN had to leave a message today requesting to call daughter to reschedule appt.   - CHW to follow up with patient make sure ortho appt reschedule.     [facility]   [address] [place], [address]   Phone number: [phone] ext. [phone]      Incontinence supply  - Left a message for [employer] requesting to call daughter to re-orde  - Provided daughter with [employer] phone number to call to re-order - [phone]  - CHW to follow up with upcoming outreach.     Nephrology  - seen by  HP?  - need follow up ?    Carl Rosas MD    [facility]   [address] [place] [address]   [phone]    Diabetic shoes/inserts  - need follow up   - ordered by podiatrist - see visit notes dated on 9/27/23  - need follow up in 1 month with podiatrist?    Care Gaps:    Health Maintenance Due   Topic Date Due    ZOSTER IMMUNIZATION (1 of 2) Never done    COVID-19 Vaccine (3 - Moderna risk series) 06/30/2022    EYE EXAM  10/06/2023       Eye appt is scheduled on 12/1/2023    Care Plans  Care Plan: Incontinence Supplies       Problem: Incontinence of feces with fecal urgency       Goal: I want check coverage for more Pull-Ups in the next 60 days so that I can better manage my incontinence symptoms.       Start Date: 10/4/2023 Expected End Date: 12/4/2023    This  Visit's Progress: 10%    Priority: High    Note:     Barriers: Language  Strengths: Accepting of support  Patient expressed understanding of goal: Yes    Action steps to achieve this goal:  1. I will attend an appointment with my PCP to address my need for pull-ups. (Completed, 9/20)  2. I will answer my phone when [facility] contacts me to deliver or  my pull-ups.  3. I will follow up with CCC in the next month regarding this goal for additional coordination support.    Note: 9/20/23 Order for pull-ups was sent to [facility] [phone], Fax [phone] on 10/4/23.                             Care Plan: Orthopedics       Problem: experiencing painful callus on bottom of her foot.       Goal: Patient will attend her orthopedics appointment in the next 12 months.       Start Date: 10/23/2023 Expected End Date: 10/23/2024    This Visit's Progress: 10%    Note:     Barriers: language barrier, low literacy, noncompliance, and lack of knowledge how to navigate complex health care system  Strengths: motivated to attend appt  Patient expressed understanding of goal: Yes    Action steps to achieve this goal:  1. I will answer my phone when I am contacted to schedule my appointment.  2. I will attend my follow-up appointment as scheduled  TBD  3. I will schedule a follow up appointment with my orthopedics if it is recommended to do so while I am at the clinic.  4. I will follow up with CCC regarding this goal at each outreach until it is completed.                                  Outreach Frequency: 6 weeks    Plan:   1) CHW to follow up on ortho appt - assist with reschedule as needed.   2) CCRN plans to follow up sooner than every 6 weeks.

## 2023-10-23 NOTE — PROGRESS NOTES
Medication Therapy Management (MTM) Encounter    ASSESSMENT:                            Medication Adherence/Access: Unable to fully assess adherence today or all disease states. Recommend patient bring all medications and meter to next visit for comprehensive medication review.     1. Type 2 diabetes mellitus with other specified complication, unspecified whether long term insulin use (H)  A1c improving, though still not at goal <8%.  Unable to fully assess diabetes control today without glucometer present, patient aware to bring to follow-up visit.  For now, recommend patient continue current insulins, will send updated prescription for NovoLog dose today.  Patient due for annual eye exam, scheduled in December. Patient also due for urine albumin.    2. Primary hypertension  BP remains elevated, though unable to fully assess if she is currently taking carvedilol and lisinopril as prescribed, patient aware to bring all medications to follow-up visit for further assessment.    3. Hyperlipidemia LDL goal <100  Rechecking lipid panel today, though unable to verify if patient is currently fasting. Recommend further discussion with PCP regarding patient concerns for myalgias to determine if statin holiday would be appropriate.     PLAN:                            Recommended patient bring all medications and glucometer to next MTM visit  Labs today: A1c, lipid, urine albumin    Follow-up: Return in about 8 weeks (around 12/20/2023) for With PharmD.  CDE 11/8; PCP 1/22  SUBJECTIVE/OBJECTIVE:                          Nithya Matthews is a 53 year old female coming in for a follow-up visit from 12/29/22. Patient was accompanied by . Patient seen with [facility] .  ID# [id number].     Reason for visit: MTM follow up per PCP.    Allergies/ADRs: Reviewed in chart  Past Medical History: Reviewed in chart  Tobacco: She reports that she has never smoked. She has never been exposed to tobacco smoke. She has never used smokeless  tobacco.  Alcohol: reports none    Medication Adherence/Access: States that her daughter helps with managing her medications, though daughter not present at visit today. She has a nurse that prepares the three times daily pillbox for her every 2 weeks and her daughter gives her medications every day. Reports no missed doses of medications.   Patient does NOT bring her medications or glucometer with to visit today.    Diabetes   Lantus 80 units (40 units on each side) daily in the morning  NovoLog 5 units 2-3 times daily before meals  Victoza 1.8 mg daily in the morning  Aspirin 81mg daily  Patient is not experiencing side effects.  Daughter gives her the injections. Patient able to appropriately report correct insulin doses as listed above and reports no missed doses. States she was recently told to take Novolog 5 instead of 10 at recent hospitalization (unsure when or where).   Blood sugar monitoring: Continuous Glucose Monitor - Claribel - though does NOT bring her meter with today, therefore unable to review.   Current diabetes symptoms: hypoglycemia 1-2 times per week. Blood sugar when symptomatic is 50-70, she is aware of how to correct for lows with juice.   Diet/Exercise: Eating 2-3 meals per day.      Eye exam in the last 12 months? No - scheduled for 12/1.   Foot exam is up to date  Urine Albumin:   Lab Results   Component Value Date    [facility]  12/28/2022      Comment:      Unable to calculate, urine albumin and/or urine creatinine is outside detectable limits.  Microalbuminuria is defined as an albumin:creatinine ratio of 17 to 299 for males and 25 to 299 for females. A ratio of albumin:creatinine of 300 or higher is indicative of overt proteinuria.  Due to biologic variability, positive results should be confirmed by a second, first-morning random or 24-hour timed urine specimen. If there is discrepancy, a third specimen is recommended. When 2 out of 3 results are in the microalbuminuria range, this is  evidence for incipient nephropathy and warrants increased efforts at glucose control, blood pressure control, and institution of therapy with an angiotensin-converting-enzyme (ACE) inhibitor (if the patient can tolerate it).        Lab Results   Component Value Date    A1C 8.2 (H) 10/23/2023     Hypertension /CKD  Per chart review should be taking (though unable to verify today):   Lisinopril 20 mg daily  Carvedilol 6.25 mg twice daily   Dialysis Tues/Thurs/Sat.   Patient reports the following medication side effects: dizziness when looking down, has been like this for a long time, >1 year.   Patient does not self-monitor blood pressure.  Was told that her BP was high at last dialysis visit on Saturday.   Nephrologist: Carl Rosas MD       BP Readings from Last 3 Encounters:   10/23/23 (!) 152/78   09/27/23 (!) 142/80   09/20/23 (!) 166/60     Pulse Readings from Last 3 Encounters:   10/23/23 88   09/20/23 84   06/28/23 81     Hyperlipidemia     Per chart review should be taking (though unable to verify today):   Atorvastatin 80mg daily  Fish Oil 1000mg once daily  Fenofibrate 54 mg once daily  Patient reports the following side effects: muscle pain on the whole body for the last 10 years.   The 10-year ASCVD risk score (Slava FRASER, et al., 2019) is: 10.5%    Values used to calculate the score:      Age: 53 years      Sex: Female      Is Non- : No      Diabetic: Yes      Tobacco smoker: No      Systolic Blood Pressure: 152 mmHg      Is BP treated: Yes      HDL Cholesterol: 34 mg/dL      Total Cholesterol: 189 mg/dL     Recent Labs   Lab Test 12/28/22  1506 09/21/22  0814   CHOL 189 310*   HDL 34* 25*   LDL 82 37   TRIG 365* 1,627*     Today's Vitals: BP (!) 152/78   Pulse 88   Wt 114 lb 12 oz (52.1 kg)   LMP  (LMP Unknown)   SpO2 93%   BMI 21.68 kg/m    ----------------      I spent 30 minutes with this patient today. All changes were made via collaborative practice agreement with  Giancarlo Arizmendi MD. A copy of the visit note was provided to the patient's provider(s).    A summary of these recommendations was given to the patient.    Shital Hernandez, PharmD, [facility]  Medication Therapy Management Pharmacist     Medication Therapy Recommendations  No medication therapy recommendations to display

## 2023-10-23 NOTE — Clinical Note
BP elevated again today but not able to see what she is taking... so no med changes today. Not able to see blood sugar readings but she says exactly the right amount of insulin so no changes today until blood sugars are known. Also having myalgias for a long time, not sure if you would want to try a statin holiday or not? Being treated for very elevated trigs so not sure your thoughts on this?   I will see her for full review in December. Also seeing CDE next month.  Scottie

## 2023-10-30 ENCOUNTER — TRANSFERRED RECORDS (OUTPATIENT)
Dept: HEALTH INFORMATION MANAGEMENT | Facility: CLINIC | Age: 53
End: 2023-10-30
Payer: COMMERCIAL

## 2023-10-30 LAB — RETINOPATHY: NEGATIVE

## 2023-11-05 ENCOUNTER — MEDICAL CORRESPONDENCE (OUTPATIENT)
Dept: HEALTH INFORMATION MANAGEMENT | Facility: CLINIC | Age: 53
End: 2023-11-05

## 2023-11-08 ENCOUNTER — PATIENT OUTREACH (OUTPATIENT)
Dept: NURSING | Facility: CLINIC | Age: 53
End: 2023-11-08
Payer: COMMERCIAL

## 2023-11-08 ENCOUNTER — OFFICE VISIT (OUTPATIENT)
Dept: PODIATRY | Facility: CLINIC | Age: 53
End: 2023-11-08
Payer: COMMERCIAL

## 2023-11-08 ENCOUNTER — ANCILLARY PROCEDURE (OUTPATIENT)
Dept: GENERAL RADIOLOGY | Facility: CLINIC | Age: 53
End: 2023-11-08
Attending: PODIATRIST
Payer: COMMERCIAL

## 2023-11-08 VITALS — SYSTOLIC BLOOD PRESSURE: 122 MMHG | BODY MASS INDEX: 21.54 KG/M2 | WEIGHT: 114 LBS | DIASTOLIC BLOOD PRESSURE: 78 MMHG

## 2023-11-08 DIAGNOSIS — E11.42 DIABETIC POLYNEUROPATHY ASSOCIATED WITH TYPE 2 DIABETES MELLITUS (H): ICD-10-CM

## 2023-11-08 DIAGNOSIS — E11.42 DIABETIC POLYNEUROPATHY ASSOCIATED WITH TYPE 2 DIABETES MELLITUS (H): Primary | ICD-10-CM

## 2023-11-08 DIAGNOSIS — M20.11 HAV (HALLUX ABDUCTO VALGUS), RIGHT: ICD-10-CM

## 2023-11-08 DIAGNOSIS — L97.512 ULCER OF RIGHT FOOT WITH FAT LAYER EXPOSED (H): ICD-10-CM

## 2023-11-08 DIAGNOSIS — L03.115 CELLULITIS OF RIGHT FOOT: ICD-10-CM

## 2023-11-08 PROCEDURE — 11042 DBRDMT SUBQ TIS 1ST 20SQCM/<: CPT | Performed by: PODIATRIST

## 2023-11-08 PROCEDURE — 73630 X-RAY EXAM OF FOOT: CPT | Mod: TC | Performed by: RADIOLOGY

## 2023-11-08 PROCEDURE — 99213 OFFICE O/P EST LOW 20 MIN: CPT | Mod: 25 | Performed by: PODIATRIST

## 2023-11-08 RX ORDER — CEPHALEXIN 500 MG/1
500 CAPSULE ORAL 2 TIMES DAILY
Qty: 20 CAPSULE | Refills: 0 | Status: SHIPPED | OUTPATIENT
Start: 2023-11-08 | End: 2023-11-18

## 2023-11-08 NOTE — PROGRESS NOTES
Clinic Care Coordination Contact  Follow Up Progress Note      Assessment: CCRN spoke with daughter - Fritz Matthews today. Daughter speaks English. Daughter requests medical transportation for patient for her appts. Confirmed patient already received incontinence supply. CCRN assisted patient rescheduled orthotics appt on as below. Daughter will request transportation for patient. Daughter is aware of all upcoming appts.     Date: 11/22/2023   Time: 8:15am  Practitioner: Sidney   Address: [address] [place] [address] [address] [place], [address]   Phone number: [phone]. [phone]       Plan: Patient will attend her podiatry appt today.  Moved CHW outreach from today to 12/7/2203. No sooner outreach needed.

## 2023-11-08 NOTE — PROGRESS NOTES
Podiatry / Foot and Ankle Surgery Progress Note    November 8, 2023    Subject: Patient was seen for follow up on right foot ulcer. Daughter notes patient is having more pain. Notes some fevers last 3 days.  Have been doing ricardo dressing changes. Pain is 5/10 at its worst.     Objective:  Vitals: /78   Wt 51.7 kg (114 lb)   LMP  (LMP Unknown)   BMI 21.54 kg/m    BMI= Body mass index is 21.54 kg/m .    A1C: 9.1 (7/10/2023)     General appearance: Patient is alert and fully cooperative with history & exam.  No sign of distress is noted during the visit.     Dermatologic: Preulcerative hyperkeratotic lesions to the plantar aspects of the first and fifth metatarsal heads bilaterally.  Upon debridement of the right first metatarsal head callus there is an ulceration noted.  This measures approximately 4.0cm x 3.5cm x 0.3 cm in depth after debridement which is significantly larger than previous visit.  There is redness noted around the ulceration.  No purulent drainage is noted.  Maceration around the wound edges With moderate amounts of clear serous drainage.     Vascular: DP & PT pulses are intact & regular bilaterally.  No significant edema or varicosities noted.  CFT and skin temperature is normal to both lower extremities.     Neurologic: Lower extremity sensation is diminished to feet.     Musculoskeletal: Patient is ambulatory without assistive device or brace.  Prominent first metatarsal heads medially both feet.     Radiographs: Right foot x-ray - I have looked at and reviewed xrays personally -degenerative changes throughout the foot.  No gas in the tissue.  No evidence of bone infection at this time.    ASSESSMENT:       Diabetic polyneuropathy associated with type 2 diabetes mellitus (H)  Ulcer of right foot with fat layer exposed (H)  Hav (hallux abducto valgus), right  Cellulitis of right foot     Medical Decision Making/Plan:  Reviewed patient's chart in epic.    At this time the ulcer was  debrided.  Please see procedure note below.  The wound is significantly bigger at this time and there is more redness around it.  We will start her on an antibiotic.  We will have her continue to do the Nichole Ag dressing changes daily and covering this with 4 x 4 gauze pads, gauze roll and tape.  We will put her in a post op boot to try to help get further pressure off of this area as I do not feel the shoe is helping enough to relieve pressure.  We discussed that if she develops fevers chills or streaking redness to the foot that she needs to go to the hospital as she may have a worsening infection.  We will get baseline x-rays today.  We will have her follow-up in 1 month for reassessment.     All questions were answered to patient and patient's daughter satisfaction and they will call for the questions or concerns.     Procedure: After verbal consent, excisional debridement was performed on ulcer.  #15 blade was used to debride ulcer down to and including subcutaneous tissue. Bleeding controlled with light pressure.   No drainage noted.  No anesthesia was used due to neuropathy. Dry dressing applied to foot.  Patient tolerated procedure well.     Patient Risk Factor:  Patient is a moderate risk factor for infection.      Durable Medical Equipment Wound Care Orders       Wound Care Order for DME - ONLY FOR DME   As directed      DME Provider: [facility]    Start Date: 11/8/2023    Wound Supply Order Options: Complex Wound    Optional: .dmewound can be used to pull in order specific information into documentation    Wound Number: Wound 1    Wound 1 Location: right foot ulcer    Wound 1 Dressing Change Frequency: Daily    Wound 1 Length of Need: 30 days    Wound 1 - Dressing Supplies:  Primary  Wrap/Gauze  Tape/Securing       Wound 1 - Primary Dressing Dispensing Instructions: Ok to Substitute    Wound 1 - Primary Dressing Types: Collagen w/ Silver    Wound 1 - Collagen w/ Silver Types: Nichole AG    Wound 1 -  Nichole Size: 4 x 4    Wound 1 - Nichole Quantity: 30    Wound 1 - Wrap/Gauze Types:  Rolls  Pads       Wound 1 - Roll Type: Bandage Roll Gauze    Wound 1 - Bandage Roll Gauze Size: 4.5 x 4.1    Wound 1 - Bandage Roll Gauze Quantity: 30 Rolls    Wound 1 - Pad Type: Sterile Gauze Pads    Wound 1 - Gauze Pad Size: 4 x 4 Comment - 2 per dressing change    Wound 1 - Gauze Qty for Dressing/Month: 60    Wound 1 - Tape Type: Medfix    Wound 1 - Medfix Size: 2 x 11    Wound 1 - Medfix Quantity: 1 Roll          Grecia Hearn DPM, Podiatry/Foot and Ankle Surgery

## 2023-11-08 NOTE — LETTER
11/8/2023         RE: Nithya Matthews  [address] [place] [address]        Dear Colleague,    Thank you for referring your patient, Nithya Matthews, to the [facility]. Please see a copy of my visit note below.    Podiatry / Foot and Ankle Surgery Progress Note    November 8, 2023    Subject: Patient was seen for follow up on right foot ulcer. Daughter notes patient is having more pain. Notes some fevers last 3 days.  Have been doing ricardo dressing changes. Pain is 5/10 at its worst.     Objective:  Vitals: /78   Wt 51.7 kg (114 lb)   LMP  (LMP Unknown)   BMI 21.54 kg/m    BMI= Body mass index is 21.54 kg/m .    A1C: 9.1 (7/10/2023)     General appearance: Patient is alert and fully cooperative with history & exam.  No sign of distress is noted during the visit.     Dermatologic: Preulcerative hyperkeratotic lesions to the plantar aspects of the first and fifth metatarsal heads bilaterally.  Upon debridement of the right first metatarsal head callus there is an ulceration noted.  This measures approximately 4.0cm x 3.5cm x 0.3 cm in depth after debridement which is significantly larger than previous visit.  There is redness noted around the ulceration.  No purulent drainage is noted.  Maceration around the wound edges With moderate amounts of clear serous drainage.     Vascular: DP & PT pulses are intact & regular bilaterally.  No significant edema or varicosities noted.  CFT and skin temperature is normal to both lower extremities.     Neurologic: Lower extremity sensation is diminished to feet.     Musculoskeletal: Patient is ambulatory without assistive device or brace.  Prominent first metatarsal heads medially both feet.     Radiographs: Right foot x-ray - I have looked at and reviewed xrays personally -degenerative changes throughout the foot.  No gas in the tissue.  No evidence of bone infection at this time.    ASSESSMENT:       Diabetic polyneuropathy associated with type  2 diabetes mellitus (H)  Ulcer of right foot with fat layer exposed (H)  Hav (hallux abducto valgus), right  Cellulitis of right foot     Medical Decision Making/Plan:  Reviewed patient's chart in epic.    At this time the ulcer was debrided.  Please see procedure note below.  The wound is significantly bigger at this time and there is more redness around it.  We will start her on an antibiotic.  We will have her continue to do the Nichole Ag dressing changes daily and covering this with 4 x 4 gauze pads, gauze roll and tape.  We will put her in a post op boot to try to help get further pressure off of this area as I do not feel the shoe is helping enough to relieve pressure.  We discussed that if she develops fevers chills or streaking redness to the foot that she needs to go to the hospital as she may have a worsening infection.  We will get baseline x-rays today.  We will have her follow-up in 1 month for reassessment.     All questions were answered to patient and patient's daughter satisfaction and they will call for the questions or concerns.     Procedure: After verbal consent, excisional debridement was performed on ulcer.  #15 blade was used to debride ulcer down to and including subcutaneous tissue. Bleeding controlled with light pressure.   No drainage noted.  No anesthesia was used due to neuropathy. Dry dressing applied to foot.  Patient tolerated procedure well.     Patient Risk Factor:  Patient is a moderate risk factor for infection.      Durable Medical Equipment Wound Care Orders       Wound Care Order for DME - ONLY FOR DME   As directed      DME Provider: [facility]    Start Date: 11/8/2023    Wound Supply Order Options: Complex Wound    Optional: .dmewound can be used to pull in order specific information into documentation    Wound Number: Wound 1    Wound 1 Location: right foot ulcer    Wound 1 Dressing Change Frequency: Daily    Wound 1 Length of Need: 30 days    Wound 1 - Dressing  Supplies:  Primary  Wrap/Gauze  Tape/Securing       Wound 1 - Primary Dressing Dispensing Instructions: Ok to Substitute    Wound 1 - Primary Dressing Types: Collagen w/ Silver    Wound 1 - Collagen w/ Silver Types: Nichole AG    Wound 1 - Nichole Size: 4 x 4    Wound 1 - Nichole Quantity: 30    Wound 1 - Wrap/Gauze Types:  Rolls  Pads       Wound 1 - Roll Type: Bandage Roll Gauze    Wound 1 - Bandage Roll Gauze Size: 4.5 x 4.1    Wound 1 - Bandage Roll Gauze Quantity: 30 Rolls    Wound 1 - Pad Type: Sterile Gauze Pads    Wound 1 - Gauze Pad Size: 4 x 4 Comment - 2 per dressing change    Wound 1 - Gauze Qty for Dressing/Month: 60    Wound 1 - Tape Type: Medfix    Wound 1 - Medfix Size: 2 x 11    Wound 1 - Medfix Quantity: 1 Roll          Grecia Hearn DPM, Podiatry/Foot and Ankle Surgery      Again, thank you for allowing me to participate in the care of your patient.        Sincerely,        Grecia Hearn DPM, Podiatry/Foot and Ankle Surgery

## 2023-11-13 ENCOUNTER — VIRTUAL VISIT (OUTPATIENT)
Dept: EDUCATION SERVICES | Facility: CLINIC | Age: 53
End: 2023-11-13
Payer: COMMERCIAL

## 2023-11-13 DIAGNOSIS — Z79.4 TYPE 2 DIABETES MELLITUS WITH OTHER SPECIFIED COMPLICATION, WITH LONG-TERM CURRENT USE OF INSULIN (H): ICD-10-CM

## 2023-11-13 DIAGNOSIS — E11.69 TYPE 2 DIABETES MELLITUS WITH OTHER SPECIFIED COMPLICATION, WITH LONG-TERM CURRENT USE OF INSULIN (H): ICD-10-CM

## 2023-11-13 PROCEDURE — 99207 PR NONPHYSICIAN TELEPHONE ASSESSMENT 11-20 MIN: CPT | Mod: 95 | Performed by: DIETITIAN, REGISTERED

## 2023-11-13 RX ORDER — BLOOD-GLUCOSE SENSOR
1 EACH MISCELLANEOUS
Qty: 2 EACH | Refills: 4 | Status: SHIPPED | OUTPATIENT
Start: 2023-11-13 | End: 2023-12-18

## 2023-11-13 NOTE — PROGRESS NOTES
Diabetes Education Follow-up  Type of Service: Telephone Visit/ 16 minutes     Originating Location (Patient Location): Home  Distant Location (Provider Location): [facility]  Mode of Communication:  Telephone     Telephone Visit Start Time: 11:08 AM  Telephone Visit End Time (telephone visit stop time): 11:24 AM     How would patient like to obtain AVS? Yordy    Subjective/Objective:    Mumtaz Montoya sent in blood glucose log for review. Last date of communication was: 10/9/23.    Diabetes is being managed with Diabetes Medications   Diabetes Medication(s)       Insulin       insulin glargine (LANTUS SOLOSTAR) 100 UNIT/ML pen    Inject 25 Units Subcutaneous At Bedtime      Sulfonylureas       glipiZIDE (GLUCOTROL) 5 MG tablet    Take 1 tablet (5 mg) by mouth 2 times daily (before meals)      Incretin Mimetic Agents       liraglutide (VICTOZA) 18 MG/3ML solution    Inject 0.6 mg Subcutaneous daily            BG/Food Log:   None - not using a glucometer and hasn't placed a new Herson 3 yet    Assessment:    No data to review. Patient reports taking all the same medications, however reports taking 24-36 units of Lantus at night. When we met last time she was taking about 15 units. She does not feel she is getting low BG at night but is not checking BG either. Advised her to get her glucose strips and lancets refilled and to start checking blood sugars. Patient also to restart the Herson 3 continuous glucose monitor    Plan/Response:  See Patient Instructions for co-developed, patient-stated behavior change goals - limited data to review - scheduled in person visit in December.  No changes in the patient's current treatment plan      Any diabetes medication dose changes were made via the CDE Protocol and Collaborative Practice Agreement with the patient's referring provider. A copy of this encounter was shared with the provider.

## 2023-11-13 NOTE — LETTER
11/13/2023         RE: Mumtaz Montoya  [address]  [place] [address]        Dear Colleague,    Thank you for referring your patient, Mumtaz Montoya, to the [facility]. Please see a copy of my visit note below.    No notes on file

## 2023-11-14 DIAGNOSIS — K75.4 AUTOIMMUNE HEPATITIS (H): Primary | ICD-10-CM

## 2023-11-22 DIAGNOSIS — Z53.9 DIAGNOSIS NOT YET DEFINED: Primary | ICD-10-CM

## 2023-11-22 PROCEDURE — G0179 MD RECERTIFICATION HHA PT: HCPCS | Performed by: FAMILY MEDICINE

## 2023-11-27 ENCOUNTER — LAB (OUTPATIENT)
Dept: LAB | Facility: CLINIC | Age: 53
End: 2023-11-27
Attending: INTERNAL MEDICINE
Payer: COMMERCIAL

## 2023-11-27 ENCOUNTER — PRE VISIT (OUTPATIENT)
Dept: GASTROENTEROLOGY | Facility: CLINIC | Age: 53
End: 2023-11-27

## 2023-11-27 ENCOUNTER — OFFICE VISIT (OUTPATIENT)
Dept: GASTROENTEROLOGY | Facility: CLINIC | Age: 53
End: 2023-11-27
Attending: FAMILY MEDICINE
Payer: COMMERCIAL

## 2023-11-27 VITALS
BODY MASS INDEX: 28.19 KG/M2 | RESPIRATION RATE: 18 BRPM | HEART RATE: 85 BPM | OXYGEN SATURATION: 97 % | TEMPERATURE: 97.9 F | WEIGHT: 153.2 LBS | SYSTOLIC BLOOD PRESSURE: 132 MMHG | HEIGHT: 62 IN | DIASTOLIC BLOOD PRESSURE: 91 MMHG

## 2023-11-27 DIAGNOSIS — K75.4 AUTOIMMUNE HEPATITIS (H): ICD-10-CM

## 2023-11-27 DIAGNOSIS — K75.4 AUTOIMMUNE HEPATITIS (H): Primary | ICD-10-CM

## 2023-11-27 LAB
ALBUMIN SERPL BCG-MCNC: 4 G/DL (ref 3.5–5.2)
ALP SERPL-CCNC: 125 U/L (ref 40–150)
ALT SERPL W P-5'-P-CCNC: 45 U/L (ref 0–50)
ANION GAP SERPL CALCULATED.3IONS-SCNC: 10 MMOL/L (ref 7–15)
AST SERPL W P-5'-P-CCNC: 44 U/L (ref 0–45)
BILIRUB DIRECT SERPL-MCNC: <0.2 MG/DL (ref 0–0.3)
BILIRUB SERPL-MCNC: 0.4 MG/DL
BUN SERPL-MCNC: 8.8 MG/DL (ref 6–20)
CALCIUM SERPL-MCNC: 9.6 MG/DL (ref 8.6–10)
CHLORIDE SERPL-SCNC: 104 MMOL/L (ref 98–107)
CREAT SERPL-MCNC: 0.54 MG/DL (ref 0.51–0.95)
DEPRECATED HCO3 PLAS-SCNC: 26 MMOL/L (ref 22–29)
EGFRCR SERPLBLD CKD-EPI 2021: >90 ML/MIN/1.73M2
ERYTHROCYTE [DISTWIDTH] IN BLOOD BY AUTOMATED COUNT: 12.3 % (ref 10–15)
GLUCOSE SERPL-MCNC: 249 MG/DL (ref 70–99)
HCT VFR BLD AUTO: 43.6 % (ref 35–47)
HGB BLD-MCNC: 14.7 G/DL (ref 11.7–15.7)
INR PPP: 1.05 (ref 0.85–1.15)
MCH RBC QN AUTO: 27.8 PG (ref 26.5–33)
MCHC RBC AUTO-ENTMCNC: 33.7 G/DL (ref 31.5–36.5)
MCV RBC AUTO: 83 FL (ref 78–100)
PLATELET # BLD AUTO: 192 10E3/UL (ref 150–450)
POTASSIUM SERPL-SCNC: 3.8 MMOL/L (ref 3.4–5.3)
PROT SERPL-MCNC: 8.6 G/DL (ref 6.4–8.3)
RBC # BLD AUTO: 5.28 10E6/UL (ref 3.8–5.2)
SODIUM SERPL-SCNC: 140 MMOL/L (ref 135–145)
WBC # BLD AUTO: 6.4 10E3/UL (ref 4–11)

## 2023-11-27 PROCEDURE — 82248 BILIRUBIN DIRECT: CPT | Performed by: PATHOLOGY

## 2023-11-27 PROCEDURE — 85027 COMPLETE CBC AUTOMATED: CPT | Performed by: PATHOLOGY

## 2023-11-27 PROCEDURE — 82784 ASSAY IGA/IGD/IGG/IGM EACH: CPT | Performed by: INTERNAL MEDICINE

## 2023-11-27 PROCEDURE — 80053 COMPREHEN METABOLIC PANEL: CPT | Performed by: PATHOLOGY

## 2023-11-27 PROCEDURE — 36415 COLL VENOUS BLD VENIPUNCTURE: CPT | Performed by: PATHOLOGY

## 2023-11-27 PROCEDURE — 86704 HEP B CORE ANTIBODY TOTAL: CPT | Performed by: INTERNAL MEDICINE

## 2023-11-27 PROCEDURE — 85610 PROTHROMBIN TIME: CPT | Performed by: PATHOLOGY

## 2023-11-27 PROCEDURE — 86706 HEP B SURFACE ANTIBODY: CPT | Performed by: INTERNAL MEDICINE

## 2023-11-27 PROCEDURE — 87340 HEPATITIS B SURFACE AG IA: CPT | Performed by: INTERNAL MEDICINE

## 2023-11-27 PROCEDURE — 99000 SPECIMEN HANDLING OFFICE-LAB: CPT | Performed by: PATHOLOGY

## 2023-11-27 PROCEDURE — 99205 OFFICE O/P NEW HI 60 MIN: CPT | Performed by: INTERNAL MEDICINE

## 2023-11-27 PROCEDURE — G0463 HOSPITAL OUTPT CLINIC VISIT: HCPCS | Performed by: INTERNAL MEDICINE

## 2023-11-27 ASSESSMENT — PAIN SCALES - GENERAL: PAINLEVEL: MODERATE PAIN (4)

## 2023-11-27 NOTE — NURSING NOTE
"Chief Complaint   Patient presents with    Consult     Autoimmune hepatitis      Vital signs:  Temp: 97.9  F (36.6  C) Temp src: Oral BP: (!) 132/91 Pulse: 85   Resp: 18 SpO2: 97 %     Height: 157.5 cm (5' 2.01\") Weight: 69.5 kg (153 lb 3.2 oz)  Estimated body mass index is 28.01 kg/m  as calculated from the following:    Height as of this encounter: 1.575 m (5' 2.01\").    Weight as of this encounter: 69.5 kg (153 lb 3.2 oz).      Martha Woo, [facility]  11/27/2023 3:34 PM    "

## 2023-11-27 NOTE — LETTER
11/27/2023         RE: Mumtaz Montoya  [address]  [facility] [address]        Dear Colleague,    Thank you for referring your patient, Mumtaz Montoya, to the [facility] [place]. Please see a copy of my visit note below.    Hepatology Visit  November 27, 2023  Referring Provider: Mayela Crews DO    HISTORY OF PRESENT ILLNESS:   I had the pleasure of seeing Mumtaz Montoya for followup in the Liver Clinic at the [facility] on November 27, 2023. Ms Montoya presents for evaluation of the possibility of autoimmune liver disease.    She recently moved to the [facility].  She had previously had a medical evaluation in [facility] which revealed that she did have some abnormal liver tests and also a significant number of autoantibodies including MYRON, anti-smooth muscle antibody and antimitochondrial antibody.  There may have been a liver biopsy performed as well.    She is however asymptomatic for this.  She denies any abdominal pain, itching or skin rash and has only mild fatigue.  She denies any increased abdominal girth or lower extremity edema.  She has not had gastrointestinal bleeding or any overt signs of hepatic encephalopathy.    She denies any fevers or chills, cough or shortness of breath.  She denies any nausea or vomiting, diarrhea or constipation.  Her appetite has been good and her weight has been stable.  She does not drink any alcohol speak of but does have type 2 diabetes.    History:   Type II diabetes without complication   No past surgical history on file.  Social History     Socioeconomic History    Marital status:      Spouse name: Not on file    Number of children: Not on file    Years of education: Not on file    Highest education level: Not on file   Occupational History    Not on file   Tobacco Use    Smoking status: Never    Smokeless tobacco: Never   Vaping Use    Vaping Use: Never used   Substance and Sexual Activity     "Alcohol use: Never    Drug use: Never    Sexual activity: Not on file   Other Topics Concern    Not on file   Social History Narrative    Not on file     Social Determinants of Health     Financial Resource Strain: Not on file   Food Insecurity: Not on file   Transportation Needs: Not on file   Physical Activity: Not on file   Stress: Not on file   Social Connections: Not on file   Interpersonal Safety: Not on file   Housing Stability: Not on file       Medications:   Current Outpatient Medications   Medication    alcohol swab prep pads    blood glucose (NO BRAND SPECIFIED) lancets standard    blood glucose (NO BRAND SPECIFIED) test strip    Continuous Blood Gluc Sensor (FREESTYLE MISTY 3 SENSOR) MISC    gabapentin (NEURONTIN) 300 MG capsule    glipiZIDE (GLUCOTROL) 5 MG tablet    ibuprofen (ADVIL/MOTRIN) 600 MG tablet    insulin glargine (LANTUS SOLOSTAR) 100 UNIT/ML pen    insulin pen needle (ULTICARE MICRO) 32G X 4 MM miscellaneous    liraglutide (VICTOZA) 18 MG/3ML solution    lisinopril (ZESTRIL) 5 MG tablet    TRUEPLUS 5-BEVEL PEN NEEDLES 31G X 5 MM miscellaneous    TRUEplus Lancets 33G MISC    diclofenac (VOLTAREN) 1 % topical gel     No current facility-administered medications for this visit.      A complete review of systems was negative other than that noted above    Vitals:   BP (!) 132/91 (BP Location: Right arm, Patient Position: Sitting, Cuff Size: Adult Regular)   Pulse 85   Temp 97.9  F (36.6  C) (Oral)   Resp 18   Ht 1.575 m (5' 2.01\")   Wt 69.5 kg (153 lb 3.2 oz)   LMP  (LMP Unknown)   SpO2 97%   BMI 28.01 kg/m      Physical Exam:   In general she looks quite well. HEENT exam shows no scleral icterus or temporal muscle wasting. Chest is clear. Abdominal exam shows no increase in girth. No masses or tenderness to palpation are present. Liver is 10 cm in span without left lobe enlargement. No spleen tip is palpable. Extremity exam shows no edema. Skin exam shows no stigmata of chronic liver " disease. Neurologic exam shows no asterixis.     Labs:   Lab Results   Component Value Date     11/27/2023    POTASSIUM 3.8 11/27/2023    CHLORIDE 104 11/27/2023    ANIONGAP 10 11/27/2023    CO2 26 11/27/2023    BUN 8.8 11/27/2023    CR 0.54 11/27/2023    GFRESTIMATED >90 11/27/2023    NINA 9.6 11/27/2023      Lab Results   Component Value Date    WBC 6.4 11/27/2023    HGB 14.7 11/27/2023    HCT 43.6 11/27/2023    MCV 83 11/27/2023    MCH 27.8 11/27/2023    MCHC 33.7 11/27/2023    RDW 12.3 11/27/2023     11/27/2023     Lab Results   Component Value Date    ALBUMIN 4.0 11/27/2023    ALKPHOS 125 11/27/2023    AST 44 11/27/2023     Lab Results   Component Value Date    INR 1.05 11/27/2023       MELD 3.0: 8 at 11/27/2023  2:21 PM  MELD-Na: 7 at 11/27/2023  2:21 PM  Calculated from:  Serum Creatinine: 0.54 mg/dL (Using min of 1 mg/dL) at 11/27/2023  2:21 PM  Serum Sodium: 140 mmol/L (Using max of 137 mmol/L) at 11/27/2023  2:21 PM  Total Bilirubin: 0.4 mg/dL (Using min of 1 mg/dL) at 11/27/2023  2:21 PM  Serum Albumin: 4.0 g/dL (Using max of 3.5 g/dL) at 11/27/2023  2:21 PM  INR(ratio): 1.05 at 11/27/2023  2:21 PM  Age at listing (hypothetical): 53 years  Sex: Female at 11/27/2023  2:21 PM      Imaging:   No images are attached to the encounter.     Assessment/Plan:   IMPRESSION:     My impression is that Ms. Pandya has a number of autoantibodies that may be associated with autoimmune hepatitis or primary biliary cholangitis.  However, her liver tests are completely normal.    The AMA is quite specific for the disease primary biliary cholangitis and the speckled MYRON would be consistent with autoimmune hepatitis.  We do see people with a PBC/AIH crossover syndrome; the elevated IgG (AIH) and IgM (PBC) values also would be consistent with that.  If we did a biopsy, which I am not recommending at this point, it could show some inflammation, but with normal liver tests I would not treat.  She does need liver tests  checked every 6 months and if they. become abnormal, I would get a liver biopsy.    I also will try to get all the results of the testing she had performed in [place].  I will only see her back in the clinic if her liver tests were become abnormal once again.    I did spend total of 60 minutes (on the date of the encounter), including 45 minutes of face-to-face clinic time, with the assistance of a  including counseling. The rest of the time was spent in documentation and review of records.     Thank you very much for allowing me to participate in the care of this patient.  If you have any questions regarding my recommendations, please do not hesitate to contact me.         Giuseppe Brand MD      Professor of Medicine  [employer]      Executive Medical Director, Solid Organ Transplant Program  [facility]

## 2023-11-27 NOTE — PROGRESS NOTES
Hepatology Visit  November 27, 2023  Referring Provider: Mayela Crews DO    HISTORY OF PRESENT ILLNESS:   I had the pleasure of seeing Mumtaz Montoya for followup in the Liver Clinic at the [facility] on November 27, 2023. Ms Montoya presents for evaluation of the possibility of autoimmune liver disease.    She recently moved to the [facility].  She had previously had a medical evaluation in [facility] which revealed that she did have some abnormal liver tests and also a significant number of autoantibodies including MYRON, anti-smooth muscle antibody and antimitochondrial antibody.  There may have been a liver biopsy performed as well.    She is however asymptomatic for this.  She denies any abdominal pain, itching or skin rash and has only mild fatigue.  She denies any increased abdominal girth or lower extremity edema.  She has not had gastrointestinal bleeding or any overt signs of hepatic encephalopathy.    She denies any fevers or chills, cough or shortness of breath.  She denies any nausea or vomiting, diarrhea or constipation.  Her appetite has been good and her weight has been stable.  She does not drink any alcohol speak of but does have type 2 diabetes.    History:   Type II diabetes without complication   No past surgical history on file.  Social History     Socioeconomic History    Marital status:      Spouse name: Not on file    Number of children: Not on file    Years of education: Not on file    Highest education level: Not on file   Occupational History    Not on file   Tobacco Use    Smoking status: Never    Smokeless tobacco: Never   Vaping Use    Vaping Use: Never used   Substance and Sexual Activity    Alcohol use: Never    Drug use: Never    Sexual activity: Not on file   Other Topics Concern    Not on file   Social History Narrative    Not on file     Social Determinants of Health     Financial Resource Strain: Not on file   Food Insecurity: Not on file  "  Transportation Needs: Not on file   Physical Activity: Not on file   Stress: Not on file   Social Connections: Not on file   Interpersonal Safety: Not on file   Housing Stability: Not on file       Medications:   Current Outpatient Medications   Medication    alcohol swab prep pads    blood glucose (NO BRAND SPECIFIED) lancets standard    blood glucose (NO BRAND SPECIFIED) test strip    Continuous Blood Gluc Sensor (FREESTYLE MISTY 3 SENSOR) MISC    gabapentin (NEURONTIN) 300 MG capsule    glipiZIDE (GLUCOTROL) 5 MG tablet    ibuprofen (ADVIL/MOTRIN) 600 MG tablet    insulin glargine (LANTUS SOLOSTAR) 100 UNIT/ML pen    insulin pen needle (ULTICARE MICRO) 32G X 4 MM miscellaneous    liraglutide (VICTOZA) 18 MG/3ML solution    lisinopril (ZESTRIL) 5 MG tablet    TRUEPLUS 5-BEVEL PEN NEEDLES 31G X 5 MM miscellaneous    TRUEplus Lancets 33G MISC    diclofenac (VOLTAREN) 1 % topical gel     No current facility-administered medications for this visit.      A complete review of systems was negative other than that noted above    Vitals:   BP (!) 132/91 (BP Location: Right arm, Patient Position: Sitting, Cuff Size: Adult Regular)   Pulse 85   Temp 97.9  F (36.6  C) (Oral)   Resp 18   Ht 1.575 m (5' 2.01\")   Wt 69.5 kg (153 lb 3.2 oz)   LMP  (LMP Unknown)   SpO2 97%   BMI 28.01 kg/m      Physical Exam:   In general she looks quite well. HEENT exam shows no scleral icterus or temporal muscle wasting. Chest is clear. Abdominal exam shows no increase in girth. No masses or tenderness to palpation are present. Liver is 10 cm in span without left lobe enlargement. No spleen tip is palpable. Extremity exam shows no edema. Skin exam shows no stigmata of chronic liver disease. Neurologic exam shows no asterixis.     Labs:   Lab Results   Component Value Date     11/27/2023    POTASSIUM 3.8 11/27/2023    CHLORIDE 104 11/27/2023    ANIONGAP 10 11/27/2023    CO2 26 11/27/2023    BUN 8.8 11/27/2023    CR 0.54 11/27/2023 "    GFRESTIMATED >90 11/27/2023    NINA 9.6 11/27/2023      Lab Results   Component Value Date    WBC 6.4 11/27/2023    HGB 14.7 11/27/2023    HCT 43.6 11/27/2023    MCV 83 11/27/2023    MCH 27.8 11/27/2023    MCHC 33.7 11/27/2023    RDW 12.3 11/27/2023     11/27/2023     Lab Results   Component Value Date    ALBUMIN 4.0 11/27/2023    ALKPHOS 125 11/27/2023    AST 44 11/27/2023     Lab Results   Component Value Date    INR 1.05 11/27/2023       MELD 3.0: 8 at 11/27/2023  2:21 PM  MELD-Na: 7 at 11/27/2023  2:21 PM  Calculated from:  Serum Creatinine: 0.54 mg/dL (Using min of 1 mg/dL) at 11/27/2023  2:21 PM  Serum Sodium: 140 mmol/L (Using max of 137 mmol/L) at 11/27/2023  2:21 PM  Total Bilirubin: 0.4 mg/dL (Using min of 1 mg/dL) at 11/27/2023  2:21 PM  Serum Albumin: 4.0 g/dL (Using max of 3.5 g/dL) at 11/27/2023  2:21 PM  INR(ratio): 1.05 at 11/27/2023  2:21 PM  Age at listing (hypothetical): 53 years  Sex: Female at 11/27/2023  2:21 PM      Imaging:   No images are attached to the encounter.     Assessment/Plan:   IMPRESSION:     My impression is that Ms. Pandya has a number of autoantibodies that may be associated with autoimmune hepatitis or primary biliary cholangitis.  However, her liver tests are completely normal.    The AMA is quite specific for the disease primary biliary cholangitis and the speckled MYRON would be consistent with autoimmune hepatitis.  We do see people with a PBC/AIH crossover syndrome; the elevated IgG (AIH) and IgM (PBC) values also would be consistent with that.  If we did a biopsy, which I am not recommending at this point, it could show some inflammation, but with normal liver tests I would not treat.  She does need liver tests checked every 6 months and if they. become abnormal, I would get a liver biopsy.    I also will try to get all the results of the testing she had performed in [place].  I will only see her back in the clinic if her liver tests were become abnormal once  again.    I did spend total of 60 minutes (on the date of the encounter), including 45 minutes of face-to-face clinic time, with the assistance of a  including counseling. The rest of the time was spent in documentation and review of records.     Thank you very much for allowing me to participate in the care of this patient.  If you have any questions regarding my recommendations, please do not hesitate to contact me.         Giuseppe Brand MD      Professor of Medicine  [employer]      Executive Medical Director, Solid Organ Transplant Program  [facility]

## 2023-11-28 LAB
HBV CORE AB SERPL QL IA: REACTIVE
HBV SURFACE AB SERPL IA-ACNC: 857.84 M[IU]/ML
HBV SURFACE AB SERPL IA-ACNC: REACTIVE M[IU]/ML
HBV SURFACE AG SERPL QL IA: NONREACTIVE
IGA SERPL-MCNC: 476 MG/DL (ref 84–499)
IGG SERPL-MCNC: 2128 MG/DL (ref 610–1616)
IGM SERPL-MCNC: 134 MG/DL (ref 35–242)

## 2023-11-30 DIAGNOSIS — Z53.9 DIAGNOSIS NOT YET DEFINED: Primary | ICD-10-CM

## 2023-11-30 PROCEDURE — 99207 PR MD RECERTIFICATION HHA PT: CPT | Performed by: FAMILY MEDICINE

## 2023-11-30 PROCEDURE — G0179 MD RECERTIFICATION HHA PT: HCPCS | Performed by: FAMILY MEDICINE

## 2023-12-04 ENCOUNTER — HOSPITAL ENCOUNTER (OUTPATIENT)
Dept: ULTRASOUND IMAGING | Facility: HOSPITAL | Age: 53
Discharge: HOME | End: 2023-12-04
Attending: INTERNAL MEDICINE | Admitting: INTERNAL MEDICINE
Payer: COMMERCIAL

## 2023-12-04 ENCOUNTER — VIRTUAL VISIT (OUTPATIENT)
Dept: EDUCATION SERVICES | Facility: CLINIC | Age: 53
End: 2023-12-04
Attending: FAMILY MEDICINE
Payer: COMMERCIAL

## 2023-12-04 DIAGNOSIS — E11.69 TYPE 2 DIABETES MELLITUS WITH OTHER SPECIFIED COMPLICATION, UNSPECIFIED WHETHER LONG TERM INSULIN USE (H): ICD-10-CM

## 2023-12-04 DIAGNOSIS — E11.621 DIABETIC ULCER OF OTHER PART OF RIGHT FOOT ASSOCIATED WITH TYPE 2 DIABETES MELLITUS, WITH FAT LAYER EXPOSED (H): ICD-10-CM

## 2023-12-04 DIAGNOSIS — K75.4 AUTOIMMUNE HEPATITIS (H): ICD-10-CM

## 2023-12-04 DIAGNOSIS — L97.512 DIABETIC ULCER OF OTHER PART OF RIGHT FOOT ASSOCIATED WITH TYPE 2 DIABETES MELLITUS, WITH FAT LAYER EXPOSED (H): ICD-10-CM

## 2023-12-04 PROCEDURE — 76705 ECHO EXAM OF ABDOMEN: CPT

## 2023-12-04 PROCEDURE — G0108 DIAB MANAGE TRN  PER INDIV: HCPCS | Mod: 95 | Performed by: DIETITIAN, REGISTERED

## 2023-12-04 RX ORDER — INSULIN GLARGINE 100 [IU]/ML
INJECTION, SOLUTION SUBCUTANEOUS
Qty: 15 ML | Refills: 3 | Status: SHIPPED | OUTPATIENT
Start: 2023-12-04 | End: 2024-02-21

## 2023-12-04 NOTE — PATIENT INSTRUCTIONS
Decrease Lantus insulin to 76 total units given in the mornin units both sides of the abdomen.  NO changes with Novolog dose  No Changes with Victoza dose.  When blood sugar is below 80 and Barriga feels shaky, sweaty or dizzy, have a small glass of juice. Recheck blood sugar in 15 minutes, if the blood glucose is still below 70, have another glass of juice.  Bring all medications and Claribel reader to the clinic visit with Pharmacy on 23

## 2023-12-04 NOTE — LETTER
12/4/2023         RE: Nithya Matthews  [address] [place] [address]        Dear Colleague,    Thank you for referring your patient, Nithya Matthews, to the [facility]. Please see a copy of my visit note below.    Diabetes Self-Management Education & Support 45 minutes through professional  # [id number]    Presents for: Individual review    Type of Service: Telephone Visit    Originating Location (Patient Location): Home  Distant Location (Provider Location): [facility]  Mode of Communication:  Telephone    Telephone Visit Start Time:  2p  Telephone Visit End Time (telephone visit stop time): 2:45    How would patient like to obtain AVS? MyChart      ASSESSMENT:  Nithya's daughter, Fritz Matthews was able to gather date from Nithya's 14 day reader for the last two weeks: 77% of BG are in the target range, 18% are above 180, and 5% are below 70, average blood sugar 135. Nithya has been having 1-2x/week hypoglycemic episodes in the morning in which she feels shaky, she will drink juice. We reviewed the s/s of hypoglycemia and treatment.  I discussed the insulin changes with Fritz, who draws up and does all the insulin and GLP1 injections for Nithya. Nithya is consuming two meals/day, plus snacks and 2 bottles/water, no other beverages. She is limited with mobility and uses a cane and/or walker. She had a recent eye exam and received new glasses.  Nithya has dialysis on Tu/Th/Sa.     Patient's most recent   Lab Results   Component Value Date    A1C 8.2 10/23/2023     is not meeting goal of <8.0    Diabetes knowledge and skills assessment:   Patient is knowledgeable in diabetes management concepts related to: Monitoring    Continue education with the following diabetes management concepts: Healthy Eating and Taking Medication    Based on learning assessment above, most appropriate setting for further diabetes education would be: Individual setting.      PLAN  Decrease Lantus insulin to 76 total units  "given in the mornin units both sides of the abdomen.  NO changes with Novolog dose  No Changes with Victoza dose.  When blood sugar is below 80 and Barriga feels shaky, sweaty or dizzy, have a small glass of juice. Recheck blood sugar in 15 minutes, if the blood glucose is still below 70, have another glass of juice.  Bring all medications and Claribel reader to the clinic visit with Pharmacy on 23    Topics to cover at upcoming visits: Healthy Eating, Monitoring, and Taking Medication    Follow-up: MTM on 23    See Care Plan for co-developed, patient-state behavior change goals.  AVS provided for patient today.    Education Materials Provided:  No new materials provided today      SUBJECTIVE/OBJECTIVE:  Presents for: Individual review  Accompanied by: Self, Daughter,   Focus of Visit: Monitoring, Taking Medication  Diabetes type: Type 2  How confident are you filling out medical forms by yourself:: Not Assessed  Transportation concerns: Yes  Other concerns:: Language barrier, Physical impairment, Lack of coping, Hearing impairment, Visual impairment  Cultural Influences/Ethnic Background:  Not  or       Diabetes Symptoms & Complications:  Polydipsia: No  Polyuria: No  Visual change: Yes (visual impairment)       Patient Problem List and Family Medical History reviewed for relevant medical history, current medical status, and diabetes risk factors.    Vitals:  LMP  (LMP Unknown)   Estimated body mass index is 21.54 kg/m  as calculated from the following:    Height as of 23: 1.549 m (5' 1\").    Weight as of 23: 51.7 kg (114 lb).   Last 3 BP:   BP Readings from Last 3 Encounters:   23 122/78   10/23/23 (!) 152/78   23 (!) 142/80       History   Smoking Status     Never   Smokeless Tobacco     Never       Labs:  Lab Results   Component Value Date    A1C 8.2 10/23/2023     Lab Results   Component Value Date     10/23/2023     2023     " "06/02/2022     Lab Results   Component Value Date    LDL 53 10/23/2023    LDL  04/14/2022      Comment:      Invalid, Triglyceride >1100      Direct Measure HDL   Date Value Ref Range Status   10/23/2023 26 (L) >=50 mg/dL Final   ]  GFR Estimate   Date Value Ref Range Status   10/23/2023 7 (L) >60 mL/min/1.73m2 Final     No results found for: \"GFRESTBLACK\"  Lab Results   Component Value Date    CR 6.21 10/23/2023     No results found for: \"MICROALBUMIN\"    Healthy Eating:  Healthy Eating Assessed Today: Yes  Meals include: Breakfast, Dinner  Breakfast: vegetable soup/rice  Dinner: 3-4pm: vegetable soup/rice  Snacks: pastry or fruit  Other: does not like meat all the time,  sometimes she has fish or eggs with a meal.  Beverages: Water (two water bottles per renal)    Being Active:  Being Active Assessed Today: Yes  Exercise:: Currently not exercising  Barrier to exercise: Physical limitation    Monitoring:  Monitoring Assessed Today: Yes  Blood Glucose Meter: CGM  Times checking blood sugar at home (number): 5+    Data retrieved from reader from daughterFritz:   Last 14 days:  Average blood sugar=135  Time in target=77%  Time below 70=5%  Time above 180/250=18%    Taking Medications:  Diabetes Medication(s)       Insulin       insulin glargine (LANTUS SOLOSTAR) 100 UNIT/ML pen    Inject 76 units subcutaneous every morning 38 units on each side.     insulin aspart (NOVOLOG FLEXPEN) 100 UNIT/ML pen    Inject 5 Units Subcutaneous 3 times daily (with meals)      Incretin Mimetic Agents       liraglutide (VICTOZA PEN) 18 MG/3ML solution    Inject 1.8 mg Subcutaneous every morning            Taking Medication Assessed Today: Yes  Current Treatments: Insulin Injections, Non-insulin Injectables  Dose schedule: Pre-breakfast, Pre-dinner  Given by: Relative  Problems taking diabetes medications regularly?: No  Diabetes medication side effects?: Yes (low blood sugar)    Problem Solving:  Problem Solving Assessed Today: Yes  Is " the patient at risk for hypoglycemia?: Yes  Hypoglycemia Frequency: Weekly  Hypoglycemia Treatment: Juice    Hypoglycemia symptoms  Feeling shaky: Yes         Reducing Risks:  Reducing Risks Assessed Today: Yes  Diabetes Risks: Age over 45 years, Sedentary Lifestyle, Ethnicity  CAD Risks: Diabetes Mellitus, Sedentary lifestyle  Has dilated eye exam at least once a year?: Yes (patient states she went recently and received glasses)    Healthy Coping:  Emotional response to diabetes: Uncertain  Informal Support system:: Family  Stage of change: PREPARATION (Decided to change - considering how)  Patient Activation Measure Survey Score:       No data to display                  Care Plan and Education Provided:  Care Plan: Diabetes   Updates made by Masha Dow RD since 12/4/2023 12:00 AM        Problem: HbA1C Not In Goal         Goal: Establish Regular Follow-Ups with PCP         Task: Discuss with PCP the recommended timing for patient's next follow up visit(s)    Responsible User: Masha Dow RD        Task: Discuss schedule for PCP visits with patient    Responsible User: Masha Dow RD        Goal: Get HbA1C Level in Goal         Task: Educate patient on diabetes education self-management topics    Responsible User: Masha Dow RD        Task: Educate patient on benefits of regular glucose monitoring    Responsible User: Masha Dow RD        Task: Refer patient to appropriate extended care team member, as needed (Medication Therapy Management, Behavioral Health, Physical Therapy, etc.)    Responsible User: Masha Dow RD        Task: Discuss diabetes treatment plan with patient    Responsible User: Masha Dow RD        Problem: Diabetes Self-Management Education Needed to Optimize Self-Care Behaviors         Goal: Understand diabetes pathophysiology and disease progression         Task: Provide education on diabetes pathophysiology and disease progression specfic to patient's diabetes  type    Responsible User: Masha Dow RD        Goal: Healthy Eating - follow a healthy eating pattern for diabetes         Task: Provide education on portion control and consistency in amount, composition and timing of food intake    Responsible User: Masha Dow RD        Task: Provide education on managing carbohydrate intake (carbohydrate counting, plate planning method, etc.)    Responsible User: Masha Dow RD        Task: Provide education on weight management    Responsible User: Masha Dow RD        Task: Provide education on heart healthy eating    Responsible User: Masha Dow RD        Task: Provide education on eating out    Responsible User: Masha Dow RD        Task: Develop individualized healthy eating plan with patient    Responsible User: Masha Dow RD        Goal: Being Active - get regular physical activity, working up to at least 150 minutes per week         Task: Provide education on relationship of activity to glucose and precautions to take if at risk for low glucose    Responsible User: Masha Dow RD        Task: Discuss barriers to physical activity with patient Completed 12/4/2023   Responsible User: Masha Dow RD        Task: Develop physical activity plan with patient    Responsible User: Masha Dow RD        Task: Explore community resources including walking groups, assistance programs, and home videos    Responsible User: Masha Dow RD        Goal: Monitoring - monitor glucose and ketones as directed    This Visit's Progress: 0%   Note:    Scan sensor a minimum of every 8 hours       Task: Provide education on blood glucose monitoring (purpose, proper technique, frequency, glucose targets, interpreting results, when to use glucose control solution, sharps disposal)    Responsible User: Masha Dow RD        Task: Provide education on continuous glucose monitoring (sensor placement, use of sharee or /reader, understanding  glucose trends, alerts and alarms, differences between sensor glucose and blood glucose) Completed 12/4/2023   Responsible User: Masha Dow RD        Task: Provide education on ketone monitoring (when to monitor, frequency, etc.)    Responsible User: Masha Dow RD        Goal: Taking Medication - patient is consistently taking medications as directed         Task: Provide education on action of prescribed medication, including when to take and possible side effects    Responsible User: Masha Dow RD        Task: Provide education on insulin and injectable diabetes medications, including administration, storage, site selection and rotation for injection sites    Responsible User: Masha Dow RD        Task: Discuss barriers to medication adherence with patient and provide management technique ideas as appropriate    Responsible User: Masha Dow RD        Task: Provide education on frequency and refill details of medications    Responsible User: Masha Dow RD        Goal: Problem Solving - know how to prevent and manage short-term diabetes complications    This Visit's Progress: 100%   Note:    If blood sugar is below 80 and your feel shaky, sweaty or dizzy have a small glass of juice       Task: Provide education on high blood glucose - causes, signs/symptoms, prevention and treatment    Responsible User: Masha Dow RD        Task: Provide education on low blood glucose - causes, signs/symptoms, prevention, treatment, carrying a carbohydrate source at all times, and medical identification Completed 12/4/2023   Responsible User: Masha Dow RD        Task: Provide education on safe travel with diabetes    Responsible User: Masha Dow RD        Task: Provide education on how to care for diabetes on sick days    Responsible User: Masha Dow RD        Task: Provide education on when to call a health care provider    Responsible User: Masha Dow RD        Goal: Reducing  Risks - know how to prevent and treat long-term diabetes complications         Task: Provide education on major complications of diabetes, prevention, early diagnostic measures and treatment of complications    Responsible User: Masha Dow RD        Task: Provide education on recommended care for dental, eye and foot health    Responsible User: Masha Dow RD        Task: Provide education on Hemoglobin A1c - goals and relationship to blood glucose levels    Responsible User: Masha Dow RD        Task: Provide education on recommendations for heart health - lipid levels and goals, blood pressure and goals, and aspirin therapy, if indicated    Responsible User: Masha Dow RD        Task: Provide education on tobacco cessation    Responsible User: Masha Dow RD        Goal: Healthy Coping - use available resources to cope with the challenges of managing diabetes         Task: Discuss recognizing feelings about having diabetes    Responsible User: Masha Dow RD        Task: Provide education on the benefits of making appropriate lifestyle changes    Responsible User: Masha Dow RD        Task: Provide education on benefits of utilizing support systems    Responsible User: Masha Dow RD        Task: Discuss methods for coping with stress    Responsible User: Masha Dow RD        Task: Provide education on when to seek professional counseling    Responsible User: Masha Dow RD              Time Spent: 45 minutes  Encounter Type: Individual    Any diabetes medication dose changes were made via the CDE Protocol per the patient's primary care provider. A copy of this encounter was shared with the provider.

## 2023-12-04 NOTE — PROGRESS NOTES
Diabetes Self-Management Education & Support 45 minutes through professional  # [id number]    Presents for: Individual review    Type of Service: Telephone Visit    Originating Location (Patient Location): Home  Distant Location (Provider Location): [facility]  Mode of Communication:  Telephone    Telephone Visit Start Time:  2p  Telephone Visit End Time (telephone visit stop time): 2:45    How would patient like to obtain AVS? [place]      ASSESSMENT:  Nithya's daughter, Fritz Matthews was able to gather date from Nithya's 14 day reader for the last two weeks: 77% of BG are in the target range, 18% are above 180, and 5% are below 70, average blood sugar 135. Barriga has been having 1-2x/week hypoglycemic episodes in the morning in which she feels shaky, she will drink juice. We reviewed the s/s of hypoglycemia and treatment.  I discussed the insulin changes with Fritz, who draws up and does all the insulin and GLP1 injections for Barriga. Nithya is consuming two meals/day, plus snacks and 2 bottles/water, no other beverages. She is limited with mobility and uses a cane and/or walker. She had a recent eye exam and received new glasses.  Barriga has dialysis on .     Patient's most recent   Lab Results   Component Value Date    A1C 8.2 10/23/2023     is not meeting goal of <8.0    Diabetes knowledge and skills assessment:   Patient is knowledgeable in diabetes management concepts related to: Monitoring    Continue education with the following diabetes management concepts: Healthy Eating and Taking Medication    Based on learning assessment above, most appropriate setting for further diabetes education would be: Individual setting.      PLAN  Decrease Lantus insulin to 76 total units given in the mornin units both sides of the abdomen.  NO changes with Novolog dose  No Changes with Victoza dose.  When blood sugar is below 80 and Barriga feels shaky, sweaty or dizzy, have a small glass of juice. Recheck blood sugar  "in 15 minutes, if the blood glucose is still below 70, have another glass of juice.  Bring all medications and Claribel reader to the clinic visit with Pharmacy on 12/20/23    Topics to cover at upcoming visits: Healthy Eating, Monitoring, and Taking Medication    Follow-up: MTM on 12/20/23    See Care Plan for co-developed, patient-state behavior change goals.  AVS provided for patient today.    Education Materials Provided:  No new materials provided today      SUBJECTIVE/OBJECTIVE:  Presents for: Individual review  Accompanied by: Self, Daughter,   Focus of Visit: Monitoring, Taking Medication  Diabetes type: Type 2  How confident are you filling out medical forms by yourself:: Not Assessed  Transportation concerns: Yes  Other concerns:: Language barrier, Physical impairment, Lack of coping, Hearing impairment, Visual impairment  Cultural Influences/Ethnic Background:  Not  or       Diabetes Symptoms & Complications:  Polydipsia: No  Polyuria: No  Visual change: Yes (visual impairment)       Patient Problem List and Family Medical History reviewed for relevant medical history, current medical status, and diabetes risk factors.    Vitals:  LMP  (LMP Unknown)   Estimated body mass index is 21.54 kg/m  as calculated from the following:    Height as of 9/20/23: 1.549 m (5' 1\").    Weight as of 11/8/23: 51.7 kg (114 lb).   Last 3 BP:   BP Readings from Last 3 Encounters:   11/08/23 122/78   10/23/23 (!) 152/78   09/27/23 (!) 142/80       History   Smoking Status    Never   Smokeless Tobacco    Never       Labs:  Lab Results   Component Value Date    A1C 8.2 10/23/2023     Lab Results   Component Value Date     10/23/2023     06/28/2023     06/02/2022     Lab Results   Component Value Date    LDL 53 10/23/2023    LDL  04/14/2022      Comment:      Invalid, Triglyceride >1100      Direct Measure HDL   Date Value Ref Range Status   10/23/2023 26 (L) >=50 mg/dL Final   ]  GFR " "Estimate   Date Value Ref Range Status   10/23/2023 7 (L) >60 mL/min/1.73m2 Final     No results found for: \"GFRESTBLACK\"  Lab Results   Component Value Date    CR 6.21 10/23/2023     No results found for: \"MICROALBUMIN\"    Healthy Eating:  Healthy Eating Assessed Today: Yes  Meals include: Breakfast, Dinner  Breakfast: vegetable soup/rice  Dinner: 3-4pm: vegetable soup/rice  Snacks: pastry or fruit  Other: does not like meat all the time,  sometimes she has fish or eggs with a meal.  Beverages: Water (two water bottles per renal)    Being Active:  Being Active Assessed Today: Yes  Exercise:: Currently not exercising  Barrier to exercise: Physical limitation    Monitoring:  Monitoring Assessed Today: Yes  Blood Glucose Meter: CGM  Times checking blood sugar at home (number): 5+    Data retrieved from reader from daughterFritz:   Last 14 days:  Average blood sugar=135  Time in target=77%  Time below 70=5%  Time above 180/250=18%    Taking Medications:  Diabetes Medication(s)       Insulin       insulin glargine (LANTUS SOLOSTAR) 100 UNIT/ML pen    Inject 76 units subcutaneous every morning 38 units on each side.     insulin aspart (NOVOLOG FLEXPEN) 100 UNIT/ML pen    Inject 5 Units Subcutaneous 3 times daily (with meals)      Incretin Mimetic Agents       liraglutide (VICTOZA PEN) 18 MG/3ML solution    Inject 1.8 mg Subcutaneous every morning            Taking Medication Assessed Today: Yes  Current Treatments: Insulin Injections, Non-insulin Injectables  Dose schedule: Pre-breakfast, Pre-dinner  Given by: Relative  Problems taking diabetes medications regularly?: No  Diabetes medication side effects?: Yes (low blood sugar)    Problem Solving:  Problem Solving Assessed Today: Yes  Is the patient at risk for hypoglycemia?: Yes  Hypoglycemia Frequency: Weekly  Hypoglycemia Treatment: Juice    Hypoglycemia symptoms  Feeling shaky: Yes         Reducing Risks:  Reducing Risks Assessed Today: Yes  Diabetes Risks: Age over " 45 years, Sedentary Lifestyle, Ethnicity  CAD Risks: Diabetes Mellitus, Sedentary lifestyle  Has dilated eye exam at least once a year?: Yes (patient states she went recently and received glasses)    Healthy Coping:  Emotional response to diabetes: Uncertain  Informal Support system:: Family  Stage of change: PREPARATION (Decided to change - considering how)  Patient Activation Measure Survey Score:       No data to display                  Care Plan and Education Provided:  Care Plan: Diabetes   Updates made by Masha Dow RD since 12/4/2023 12:00 AM        Problem: HbA1C Not In Goal         Goal: Establish Regular Follow-Ups with PCP         Task: Discuss with PCP the recommended timing for patient's next follow up visit(s)    Responsible User: Masha Dow RD        Task: Discuss schedule for PCP visits with patient    Responsible User: Masha Dow RD        Goal: Get HbA1C Level in Goal         Task: Educate patient on diabetes education self-management topics    Responsible User: Masha Dow RD        Task: Educate patient on benefits of regular glucose monitoring    Responsible User: Masha Dow RD        Task: Refer patient to appropriate extended care team member, as needed (Medication Therapy Management, Behavioral Health, Physical Therapy, etc.)    Responsible User: Masha Dow RD        Task: Discuss diabetes treatment plan with patient    Responsible User: Masha Dow RD        Problem: Diabetes Self-Management Education Needed to Optimize Self-Care Behaviors         Goal: Understand diabetes pathophysiology and disease progression         Task: Provide education on diabetes pathophysiology and disease progression specfic to patient's diabetes type    Responsible User: Masha Dow RD        Goal: Healthy Eating - follow a healthy eating pattern for diabetes         Task: Provide education on portion control and consistency in amount, composition and timing of food intake     Responsible User: Masha Dow RD        Task: Provide education on managing carbohydrate intake (carbohydrate counting, plate planning method, etc.)    Responsible User: Masha Dow RD        Task: Provide education on weight management    Responsible User: Masha Dow RD        Task: Provide education on heart healthy eating    Responsible User: Masha Dow RD        Task: Provide education on eating out    Responsible User: Masha Dow RD        Task: Develop individualized healthy eating plan with patient    Responsible User: Masha Dow RD        Goal: Being Active - get regular physical activity, working up to at least 150 minutes per week         Task: Provide education on relationship of activity to glucose and precautions to take if at risk for low glucose    Responsible User: Masha Dow RD        Task: Discuss barriers to physical activity with patient Completed 12/4/2023   Responsible User: Masha Dow RD        Task: Develop physical activity plan with patient    Responsible User: Masha Dow RD        Task: Explore community resources including walking groups, assistance programs, and home videos    Responsible User: Masha Dow RD        Goal: Monitoring - monitor glucose and ketones as directed    This Visit's Progress: 0%   Note:    Scan sensor a minimum of every 8 hours       Task: Provide education on blood glucose monitoring (purpose, proper technique, frequency, glucose targets, interpreting results, when to use glucose control solution, sharps disposal)    Responsible User: Masha Dow RD        Task: Provide education on continuous glucose monitoring (sensor placement, use of sharee or /reader, understanding glucose trends, alerts and alarms, differences between sensor glucose and blood glucose) Completed 12/4/2023   Responsible User: Masha Dow RD        Task: Provide education on ketone monitoring (when to monitor, frequency, etc.)     Responsible User: Masha Dow RD        Goal: Taking Medication - patient is consistently taking medications as directed         Task: Provide education on action of prescribed medication, including when to take and possible side effects    Responsible User: Masha Dow RD        Task: Provide education on insulin and injectable diabetes medications, including administration, storage, site selection and rotation for injection sites    Responsible User: Masha Dow RD        Task: Discuss barriers to medication adherence with patient and provide management technique ideas as appropriate    Responsible User: Masha Dow RD        Task: Provide education on frequency and refill details of medications    Responsible User: Masha Dow RD        Goal: Problem Solving - know how to prevent and manage short-term diabetes complications    This Visit's Progress: 100%   Note:    If blood sugar is below 80 and your feel shaky, sweaty or dizzy have a small glass of juice       Task: Provide education on high blood glucose - causes, signs/symptoms, prevention and treatment    Responsible User: Masha Dow RD        Task: Provide education on low blood glucose - causes, signs/symptoms, prevention, treatment, carrying a carbohydrate source at all times, and medical identification Completed 12/4/2023   Responsible User: Masha Dow RD        Task: Provide education on safe travel with diabetes    Responsible User: Masha Dow RD        Task: Provide education on how to care for diabetes on sick days    Responsible User: Masha Dow RD        Task: Provide education on when to call a health care provider    Responsible User: Masha Dow RD        Goal: Reducing Risks - know how to prevent and treat long-term diabetes complications         Task: Provide education on major complications of diabetes, prevention, early diagnostic measures and treatment of complications    Responsible User:  Masha Dow RD        Task: Provide education on recommended care for dental, eye and foot health    Responsible User: Masha Dow RD        Task: Provide education on Hemoglobin A1c - goals and relationship to blood glucose levels    Responsible User: Masha Dow RD        Task: Provide education on recommendations for heart health - lipid levels and goals, blood pressure and goals, and aspirin therapy, if indicated    Responsible User: Masha Dow RD        Task: Provide education on tobacco cessation    Responsible User: Masha Dow RD        Goal: Healthy Coping - use available resources to cope with the challenges of managing diabetes         Task: Discuss recognizing feelings about having diabetes    Responsible User: Masha Dow RD        Task: Provide education on the benefits of making appropriate lifestyle changes    Responsible User: Masha Dow RD        Task: Provide education on benefits of utilizing support systems    Responsible User: Masha Dow RD        Task: Discuss methods for coping with stress    Responsible User: Masha Dow RD        Task: Provide education on when to seek professional counseling    Responsible User: Masha Dow RD              Time Spent: 45 minutes  Encounter Type: Individual    Any diabetes medication dose changes were made via the CDE Protocol per the patient's primary care provider. A copy of this encounter was shared with the provider.

## 2023-12-07 ENCOUNTER — TELEPHONE (OUTPATIENT)
Dept: FAMILY MEDICINE | Facility: CLINIC | Age: 53
End: 2023-12-07
Payer: COMMERCIAL

## 2023-12-07 ENCOUNTER — PATIENT OUTREACH (OUTPATIENT)
Dept: CARE COORDINATION | Facility: CLINIC | Age: 53
End: 2023-12-07
Payer: COMMERCIAL

## 2023-12-07 NOTE — TELEPHONE ENCOUNTER
Form placed on MD desk. Discussed with MD. MD will prepare and complete. Okay to place back in blue folders and MA's can help complete. Thank you!

## 2023-12-07 NOTE — TELEPHONE ENCOUNTER
Forms/Letter Request    Type of form/letter:  Medical Opinion Form    Have you been seen for this request: Yes - Pt was last seen on 9/20/23    Do we have the form/letter: Yes: I will put the form on Dr. Arizmendi's desk since she is out next week    Who is the form from? [facility]    Where did/will the form come from? Patient or family brought in       When is form/letter needed by: ASAP    How would you like the form/letter returned: Fax : to [facility].  Ayana called and is waiting for a call back with the fax number.    Patient Notified form requests are processed in 3-5 business days:Yes    Could we send this information to you in [employer] or would you prefer to receive a phone call?:   Patient would prefer a phone call   Okay to leave a detailed message?: Yes at Cell number on file:    Telephone Information:   Mobile [phone]   Mobile Not on file.

## 2023-12-07 NOTE — PROGRESS NOTES
Clinic Care Coordination Contact  Community Health Worker Follow Up  Spoke with Fritz Matthews (Daughter), declined [facility]      Care Gaps:     Health Maintenance Due   Topic Date Due    ZOSTER IMMUNIZATION (1 of 2) Never done    COVID-19 Vaccine (3 - Moderna risk series) 06/30/2022     Scheduled 1/22/23 at 11:40AM with Dr. Arizmendi       Care Plan:   Care Plan: Incontinence Supplies       Problem: Incontinence of feces with fecal urgency       Goal: I want check coverage for more Pull-Ups in the next 60 days so that I can better manage my incontinence symptoms.       Start Date: 10/4/2023 Expected End Date: 12/4/2023    This Visit's Progress: 100% Recent Progress: 10%    Priority: High    Note:     Barriers: Language  Strengths: Accepting of support  Patient expressed understanding of goal: Yes    Action steps to achieve this goal:  1. I will attend an appointment with my PCP to address my need for pull-ups. (Completed, 9/20)  2. I will answer my phone when [facility] contacts me to deliver or  my pull-ups.  3. I will follow up with CCC in the next month regarding this goal for additional coordination support.    Note: 9/20/23 Order for pull-ups was sent to [employer] [phone], Fax [phone] on 10/4/23.                             Care Plan: Orthopedics       Problem: experiencing painful callus on bottom of her foot.       Goal: Patient will attend her orthopedics appointment in the next 12 months.       Start Date: 10/23/2023 Expected End Date: 10/23/2024    This Visit's Progress: 20% Recent Progress: 10%    Note:     Barriers: language barrier, low literacy, noncompliance, and lack of knowledge how to navigate complex health care system  Strengths: motivated to attend appt  Patient expressed understanding of goal: Yes    Action steps to achieve this goal:  1. I will answer my phone when I am contacted to schedule my appointment.  2. I will attend my follow-up appointment as scheduled on  11/22/23 at 8:15AM. (Completed)  3. I will schedule a follow up appointment with my orthopedics if it is recommended to do so while I am at the clinic.  4. I will follow up with CCC regarding this goal at each outreach until it is completed.                             Intervention and Education during outreach:   Fritz shares that she has not had any issues with reordering briefs for patient from [facility]. Fritz feels comfortable assisting patient with reorders.     Fritz confirmed that patient completed 11/22 orthotic appointment. The clinic will call her when shoes are ready for . Fritz declined needing support with follow up and can assist patient.     CHW Plan:  Routing to lead clinician CCRN to review for maintenance and if okay to complete goals. If accepted, CHW to follow up in 2 months.    Irene Samson  Clinic Care Coordination  [facility]    Phone: [phone]

## 2023-12-08 NOTE — TELEPHONE ENCOUNTER
Per note on blue form form returned to Ayana at [place]  who has information for where to send this document.     Copied made and retained in [facility] office.     Form returned to Ayana  at [facility].

## 2023-12-11 DIAGNOSIS — E11.69 TYPE 2 DIABETES MELLITUS WITH OTHER SPECIFIED COMPLICATION, UNSPECIFIED WHETHER LONG TERM INSULIN USE (H): ICD-10-CM

## 2023-12-11 RX ORDER — PEN NEEDLE, DIABETIC 32GX 5/32"
NEEDLE, DISPOSABLE MISCELLANEOUS
Qty: 200 EACH | Refills: 11 | Status: SHIPPED | OUTPATIENT
Start: 2023-12-11 | End: 2024-07-22

## 2023-12-18 ENCOUNTER — ALLIED HEALTH/NURSE VISIT (OUTPATIENT)
Dept: EDUCATION SERVICES | Facility: CLINIC | Age: 53
End: 2023-12-18
Payer: COMMERCIAL

## 2023-12-18 ENCOUNTER — LAB (OUTPATIENT)
Dept: LAB | Facility: CLINIC | Age: 53
End: 2023-12-18
Payer: COMMERCIAL

## 2023-12-18 DIAGNOSIS — E11.69 TYPE 2 DIABETES MELLITUS WITH OTHER SPECIFIED COMPLICATION, WITH LONG-TERM CURRENT USE OF INSULIN (H): ICD-10-CM

## 2023-12-18 DIAGNOSIS — Z79.4 TYPE 2 DIABETES MELLITUS WITH OTHER SPECIFIED COMPLICATION, WITH LONG-TERM CURRENT USE OF INSULIN (H): ICD-10-CM

## 2023-12-18 LAB — HBA1C MFR BLD: 9 % (ref 0–5.6)

## 2023-12-18 PROCEDURE — 83036 HEMOGLOBIN GLYCOSYLATED A1C: CPT

## 2023-12-18 PROCEDURE — G0108 DIAB MANAGE TRN  PER INDIV: HCPCS | Mod: AE | Performed by: DIETITIAN, REGISTERED

## 2023-12-18 PROCEDURE — 95249 CONT GLUC MNTR PT PROV EQP: CPT | Performed by: DIETITIAN, REGISTERED

## 2023-12-18 PROCEDURE — 36415 COLL VENOUS BLD VENIPUNCTURE: CPT

## 2023-12-18 RX ORDER — GLIPIZIDE 10 MG/1
10 TABLET ORAL
Qty: 180 TABLET | Refills: 3 | Status: SHIPPED | OUTPATIENT
Start: 2023-12-18 | End: 2024-01-24

## 2023-12-18 RX ORDER — PEN NEEDLE, DIABETIC 32GX 5/32"
NEEDLE, DISPOSABLE MISCELLANEOUS
Qty: 100 EACH | Refills: 3 | Status: SHIPPED | OUTPATIENT
Start: 2023-12-18

## 2023-12-18 RX ORDER — INSULIN GLARGINE 100 [IU]/ML
17 INJECTION, SOLUTION SUBCUTANEOUS AT BEDTIME
Qty: 30 ML | Refills: 0 | Status: SHIPPED | OUTPATIENT
Start: 2023-12-18

## 2023-12-18 RX ORDER — LIRAGLUTIDE 6 MG/ML
0.6 INJECTION SUBCUTANEOUS DAILY
Qty: 3 ML | Refills: 1 | Status: SHIPPED | OUTPATIENT
Start: 2023-12-18

## 2023-12-18 RX ORDER — BLOOD-GLUCOSE SENSOR
1 EACH MISCELLANEOUS
Qty: 6 EACH | Refills: 4 | Status: SHIPPED | OUTPATIENT
Start: 2023-12-18

## 2023-12-18 NOTE — PROGRESS NOTES
Diabetes Self-Management Education & Support    Presents for: CGM Review    Type of Service: In Person Visit      ASSESSMENT:    Patient is here today with her son. I was able to place her personal CGM and get it started on her phone. Son helps her at home. Patient states that she has been feeling very tired and her vision is not good. Patient states she feels dizzy. Reviewed importance of checking BG routinely, when to go to ER and to schedule a visit with her primary care provider.  Discussed BG goals and importance of adjusting medications to get her glucose back into a better range. At the end of the visit patient states that she has been out of needles and not taking her insulin and Victoza - she is unable to tell me how long.  Professional  used: #[id number]  Advised close follow up and to get restarted on her medications. A1C rechecked today as well. Resulted at 9% - 12/18/2023. Very little improvement from this summer.      CGM REPORT:  Data is from November - patient has not worn one or checked glucose since then.             Patient's most recent   Lab Results   Component Value Date    A1C 9.6 07/17/2023     is not meeting goal of <8.0    Diabetes knowledge and skills assessment:   Patient is knowledgeable in diabetes management concepts related to: Problem Solving    Continue education with the following diabetes management concepts: Healthy Eating, Being Active, Monitoring, Taking Medication, Problem Solving, Reducing Risks, and Healthy Coping    Based on learning assessment above, most appropriate setting for further diabetes education would be: Individual setting.      PLAN  Increase Lantus from 15 units/day to 17 units/day  Continue Victoza 0.6 units/day  Increase Glipizide to 10 mg BID   Check glucose 4 times daily with CGM  Schedule eye exam  Hemoglobin A1C ordered today.  Topics to cover at upcoming visits: Healthy Eating, Being Active, Monitoring, Taking Medication, Problem Solving,  "Reducing Risks, and Healthy Coping    Follow-up: 4 weeks    See Care Plan for co-developed, patient-state behavior change goals.  AVS provided for patient today.    Education Materials Provided:  No new materials provided today      SUBJECTIVE/OBJECTIVE:  Presents for: CGM Review  Accompanied by: Self, , Son  Diabetes education in the past 24mo: Yes  Focus of Visit: Patient Unsure  Diabetes type: Type 2  Disease course: Improving  How confident are you filling out medical forms by yourself:: A little bit  Diabetes management related comments/concerns: very tired  Transportation concerns: Yes  Difficulty affording diabetes medication?: Sometimes  Other concerns:: Language barrier  Cultural Influences/Ethnic Background:  Not  or     Diabetes Symptoms & Complications:  Fatigue: Yes  Neuropathy: No  Polydipsia: No  Polyphagia: No  Polyuria: No  Visual change: Yes  Symptom course: Improving  Weight trend: Stable  Complications assessed today?: No    Patient Problem List and Family Medical History reviewed for relevant medical history, current medical status, and diabetes risk factors.    Vitals:  LMP  (LMP Unknown)   Estimated body mass index is 28.01 kg/m  as calculated from the following:    Height as of 11/27/23: 1.575 m (5' 2.01\").    Weight as of 11/27/23: 69.5 kg (153 lb 3.2 oz).   Last 3 BP:   BP Readings from Last 3 Encounters:   11/27/23 (!) 132/91   09/25/23 118/84   09/11/23 120/88       History   Smoking Status    Never   Smokeless Tobacco    Never       Labs:  Lab Results   Component Value Date    A1C 9.6 07/17/2023     Lab Results   Component Value Date     11/27/2023     Lab Results   Component Value Date     05/16/2023     Direct Measure HDL   Date Value Ref Range Status   05/16/2023 32 (L) >=50 mg/dL Final   ]  GFR Estimate   Date Value Ref Range Status   11/27/2023 >90 >60 mL/min/1.73m2 Final     No results found for: \"GFRESTBLACK\"  Lab Results   Component Value " "Date    CR 0.54 11/27/2023     No results found for: \"MICROALBUMIN\"    Healthy Eating:  Healthy Eating Assessed Today: Yes  Meal planning/habits: Avoiding sweets  Meals include: Breakfast, Lunch, Dinner  Breakfast: rice and broderick chicken/pork, green vegetables/cucumber  Lunch: rice and broderick chicken/pork, green vegetables/cucumber  Dinner: rice and broderick chicken/pork, green vegetables/cucumber  Snacks: jermaine,  Other: was drinking orange juice - so no longer drinks it  Beverages: Water  Has patient met with a dietitian in the past?: Yes    Being Active:  Being Active Assessed Today: No  Exercise:: Currently not exercising    Monitoring:  Monitoring Assessed Today: Yes  Did patient bring glucose meter to appointment? : No  Blood Glucose Meter: CGM (Herson 3 and glucose monitor)  Times checking blood sugar at home (number): 2  Times checking blood sugar at home (per): Day  Blood glucose trend: Decreasing      Taking Medications:  Diabetes Medication(s)       Insulin       insulin glargine (LANTUS SOLOSTAR) 100 UNIT/ML pen    Inject 17 Units Subcutaneous at bedtime      Sulfonylureas       glipiZIDE (GLUCOTROL) 10 MG tablet    Take 1 tablet (10 mg) by mouth 2 times daily (before meals)     glipiZIDE (GLUCOTROL) 5 MG tablet    Take 1 tablet (5 mg) by mouth 2 times daily (before meals)      Incretin Mimetic Agents       liraglutide (VICTOZA) 18 MG/3ML solution    Inject 0.6 mg Subcutaneous daily            Taking Medication Assessed Today: Yes  Current Treatments: Insulin Injections  Dose schedule: At bedtime  Given by: Patient  Injection/Infusion sites: Abdomen  Problems taking diabetes medications regularly?: No  Diabetes medication side effects?: No    Problem Solving:  Problem Solving Assessed Today: Yes  Is the patient at risk for hypoglycemia?: Yes  Hypoglycemia Frequency: Never  Hypoglycemia Treatment: Juice    Hypoglycemia symptoms  Dizziness or Light-Headedness: Yes  Sweats: Yes  Feeling shaky: Yes    Hypoglycemia " Complications  Blackouts: No  Hospitalization: No  Nocturnal hypoglycemia: No  Required assistance: No  Required glucagon injection: No  Seizures: No    Reducing Risks:  Reducing Risks Assessed Today: Yes  Diabetes Risks: Age over 45 years, Sedentary Lifestyle, Ethnicity  CAD Risks: Diabetes Mellitus  Has dilated eye exam at least once a year?: No (advised to schedule)  Sees dentist every 6 months?: No    Healthy Coping:  Healthy Coping Assessed Today: Yes  Emotional response to diabetes: Ready to learn  Informal Support system:: Children  Stage of change: ACTION (Actively working towards change)  Patient Activation Measure Survey Score:       No data to display                  Care Plan and Education Provided:  Care Plan: Diabetes   Updates made by Astrid Lemus RD since 12/18/2023 12:00 AM        Problem: Diabetes Self-Management Education Needed to Optimize Self-Care Behaviors         Goal: Healthy Eating - follow a healthy eating pattern for diabetes    This Visit's Progress: 100%   Recent Progress: 100%   Note:    Limit to 1 cup rice and no pop       Task: Provide education on managing carbohydrate intake (carbohydrate counting, plate planning method, etc.)    Responsible User: Astrid Lemus RD        Goal: Monitoring - monitor glucose and ketones as directed    This Visit's Progress: 50%   Recent Progress: 0%   Note:    Check BG 3 times daily       Goal: Taking Medication - patient is consistently taking medications as directed         Task: Provide education on action of prescribed medication, including when to take and possible side effects Completed 12/18/2023   Responsible User: Astrid Lemus RD        Goal: Problem Solving - know how to prevent and manage short-term diabetes complications         Task: Provide education on high blood glucose - causes, signs/symptoms, prevention and treatment Completed 12/18/2023   Responsible User: Astrid Lemus RD        Goal: Reducing Risks  - know how to prevent and treat long-term diabetes complications    This Visit's Progress: 0%   Recent Progress: 0%   Note:    Schedule eye exam           Time Spent: 30 minutes  Encounter Type: Individual    Any diabetes medication dose changes were made via the CDE Protocol per the patient's referring provider. A copy of this encounter was shared with the provider.

## 2023-12-18 NOTE — LETTER
12/18/2023         RE: Mumtaz Montoya  [address]  [place], [place] [address]        Dear Colleague,    Thank you for referring your patient, Mumtaz Montoya, to the [facility]. Please see a copy of my visit note below.    Diabetes Self-Management Education & Support    Presents for: CGM Review    Type of Service: In Person Visit      ASSESSMENT:    Patient is here today with her son. I was able to place her personal CGM and get it started on her phone. Son helps her at home. Patient states that she has been feeling very tired and her vision is not good. Patient states she feels dizzy. Reviewed importance of checking BG routinely, when to go to ER and to schedule a visit with her primary care provider.  Discussed BG goals and importance of adjusting medications to get her glucose back into a better range. At the end of the visit patient states that she has been out of needles and not taking her insulin and Victoza - she is unable to tell me how long.  Professional  used: #[id number]  Advised close follow up and to get restarted on her medications. A1C rechecked today as well. Resulted at 9% - 12/18/2023. Very little improvement from this summer.      CGM REPORT:  Data is from November - patient has not worn one or checked glucose since then.             Patient's most recent   Lab Results   Component Value Date    A1C 9.6 07/17/2023     is not meeting goal of <8.0    Diabetes knowledge and skills assessment:   Patient is knowledgeable in diabetes management concepts related to: Problem Solving    Continue education with the following diabetes management concepts: Healthy Eating, Being Active, Monitoring, Taking Medication, Problem Solving, Reducing Risks, and Healthy Coping    Based on learning assessment above, most appropriate setting for further diabetes education would be: Individual setting.      PLAN  Increase Lantus from 15 units/day to 17 units/day  Continue Victoza 0.6  "units/day  Increase Glipizide to 10 mg BID   Check glucose 4 times daily with CGM  Schedule eye exam  Hemoglobin A1C ordered today.  Topics to cover at upcoming visits: Healthy Eating, Being Active, Monitoring, Taking Medication, Problem Solving, Reducing Risks, and Healthy Coping    Follow-up: 4 weeks    See Care Plan for co-developed, patient-state behavior change goals.  AVS provided for patient today.    Education Materials Provided:  No new materials provided today      SUBJECTIVE/OBJECTIVE:  Presents for: CGM Review  Accompanied by: Self, , Son  Diabetes education in the past 24mo: Yes  Focus of Visit: Patient Unsure  Diabetes type: Type 2  Disease course: Improving  How confident are you filling out medical forms by yourself:: A little bit  Diabetes management related comments/concerns: very tired  Transportation concerns: Yes  Difficulty affording diabetes medication?: Sometimes  Other concerns:: Language barrier  Cultural Influences/Ethnic Background:  Not  or     Diabetes Symptoms & Complications:  Fatigue: Yes  Neuropathy: No  Polydipsia: No  Polyphagia: No  Polyuria: No  Visual change: Yes  Symptom course: Improving  Weight trend: Stable  Complications assessed today?: No    Patient Problem List and Family Medical History reviewed for relevant medical history, current medical status, and diabetes risk factors.    Vitals:  LMP  (LMP Unknown)   Estimated body mass index is 28.01 kg/m  as calculated from the following:    Height as of 11/27/23: 1.575 m (5' 2.01\").    Weight as of 11/27/23: 69.5 kg (153 lb 3.2 oz).   Last 3 BP:   BP Readings from Last 3 Encounters:   11/27/23 (!) 132/91   09/25/23 118/84   09/11/23 120/88       History   Smoking Status     Never   Smokeless Tobacco     Never       Labs:  Lab Results   Component Value Date    A1C 9.6 07/17/2023     Lab Results   Component Value Date     11/27/2023     Lab Results   Component Value Date     05/16/2023 " "    Direct Measure HDL   Date Value Ref Range Status   05/16/2023 32 (L) >=50 mg/dL Final   ]  GFR Estimate   Date Value Ref Range Status   11/27/2023 >90 >60 mL/min/1.73m2 Final     No results found for: \"GFRESTBLACK\"  Lab Results   Component Value Date    CR 0.54 11/27/2023     No results found for: \"MICROALBUMIN\"    Healthy Eating:  Healthy Eating Assessed Today: Yes  Meal planning/habits: Avoiding sweets  Meals include: Breakfast, Lunch, Dinner  Breakfast: rice and broderick chicken/pork, green vegetables/cucumber  Lunch: rice and broderick chicken/pork, green vegetables/cucumber  Dinner: rice and broderick chicken/pork, green vegetables/cucumber  Snacks: jermaine,  Other: was drinking orange juice - so no longer drinks it  Beverages: Water  Has patient met with a dietitian in the past?: Yes    Being Active:  Being Active Assessed Today: No  Exercise:: Currently not exercising    Monitoring:  Monitoring Assessed Today: Yes  Did patient bring glucose meter to appointment? : No  Blood Glucose Meter: CGM (Herson 3 and glucose monitor)  Times checking blood sugar at home (number): 2  Times checking blood sugar at home (per): Day  Blood glucose trend: Decreasing      Taking Medications:  Diabetes Medication(s)       Insulin       insulin glargine (LANTUS SOLOSTAR) 100 UNIT/ML pen    Inject 17 Units Subcutaneous at bedtime      Sulfonylureas       glipiZIDE (GLUCOTROL) 10 MG tablet    Take 1 tablet (10 mg) by mouth 2 times daily (before meals)     glipiZIDE (GLUCOTROL) 5 MG tablet    Take 1 tablet (5 mg) by mouth 2 times daily (before meals)      Incretin Mimetic Agents       liraglutide (VICTOZA) 18 MG/3ML solution    Inject 0.6 mg Subcutaneous daily            Taking Medication Assessed Today: Yes  Current Treatments: Insulin Injections  Dose schedule: At bedtime  Given by: Patient  Injection/Infusion sites: Abdomen  Problems taking diabetes medications regularly?: No  Diabetes medication side effects?: No    Problem " Solving:  Problem Solving Assessed Today: Yes  Is the patient at risk for hypoglycemia?: Yes  Hypoglycemia Frequency: Never  Hypoglycemia Treatment: Juice    Hypoglycemia symptoms  Dizziness or Light-Headedness: Yes  Sweats: Yes  Feeling shaky: Yes    Hypoglycemia Complications  Blackouts: No  Hospitalization: No  Nocturnal hypoglycemia: No  Required assistance: No  Required glucagon injection: No  Seizures: No    Reducing Risks:  Reducing Risks Assessed Today: Yes  Diabetes Risks: Age over 45 years, Sedentary Lifestyle, Ethnicity  CAD Risks: Diabetes Mellitus  Has dilated eye exam at least once a year?: No (advised to schedule)  Sees dentist every 6 months?: No    Healthy Coping:  Healthy Coping Assessed Today: Yes  Emotional response to diabetes: Ready to learn  Informal Support system:: Children  Stage of change: ACTION (Actively working towards change)  Patient Activation Measure Survey Score:       No data to display                  Care Plan and Education Provided:  Care Plan: Diabetes   Updates made by Astrid Lemus RD since 12/18/2023 12:00 AM        Problem: Diabetes Self-Management Education Needed to Optimize Self-Care Behaviors         Goal: Healthy Eating - follow a healthy eating pattern for diabetes    This Visit's Progress: 100%   Recent Progress: 100%   Note:    Limit to 1 cup rice and no pop       Task: Provide education on managing carbohydrate intake (carbohydrate counting, plate planning method, etc.)    Responsible User: Astrid Lemus RD        Goal: Monitoring - monitor glucose and ketones as directed    This Visit's Progress: 50%   Recent Progress: 0%   Note:    Check BG 3 times daily       Goal: Taking Medication - patient is consistently taking medications as directed         Task: Provide education on action of prescribed medication, including when to take and possible side effects Completed 12/18/2023   Responsible User: Astrid Lemus RD        Goal: Problem Solving  - know how to prevent and manage short-term diabetes complications         Task: Provide education on high blood glucose - causes, signs/symptoms, prevention and treatment Completed 12/18/2023   Responsible User: Astrid Lemus RD        Goal: Reducing Risks - know how to prevent and treat long-term diabetes complications    This Visit's Progress: 0%   Recent Progress: 0%   Note:    Schedule eye exam           Time Spent: 30 minutes  Encounter Type: Individual    Any diabetes medication dose changes were made via the CDE Protocol per the patient's referring provider. A copy of this encounter was shared with the provider.

## 2023-12-18 NOTE — ADDENDUM NOTE
Addended by: LOBO METZGER on: 12/18/2023 09:52 AM     Modules accepted: Orders, Level of Service

## 2023-12-19 NOTE — PROGRESS NOTES
Is patient due for recheck soon? Since we are not getting anywhere with diabetes management I would like to see what the barriers are, do we need to set up something to make it easier to check?    MICHAEL JONAS, DO

## 2023-12-20 ENCOUNTER — TELEPHONE (OUTPATIENT)
Dept: FAMILY MEDICINE | Facility: CLINIC | Age: 53
End: 2023-12-20

## 2023-12-20 ENCOUNTER — OFFICE VISIT (OUTPATIENT)
Dept: PHARMACY | Facility: CLINIC | Age: 53
End: 2023-12-20
Payer: COMMERCIAL

## 2023-12-20 VITALS
WEIGHT: 111.5 LBS | HEART RATE: 78 BPM | OXYGEN SATURATION: 92 % | BODY MASS INDEX: 21.07 KG/M2 | SYSTOLIC BLOOD PRESSURE: 140 MMHG | DIASTOLIC BLOOD PRESSURE: 74 MMHG

## 2023-12-20 DIAGNOSIS — I10 PRIMARY HYPERTENSION: ICD-10-CM

## 2023-12-20 DIAGNOSIS — K21.00 GASTROESOPHAGEAL REFLUX DISEASE WITH ESOPHAGITIS WITHOUT HEMORRHAGE: ICD-10-CM

## 2023-12-20 DIAGNOSIS — R79.89 LOW VITAMIN D LEVEL: ICD-10-CM

## 2023-12-20 DIAGNOSIS — G62.9 NEUROPATHY: ICD-10-CM

## 2023-12-20 DIAGNOSIS — E11.69 TYPE 2 DIABETES MELLITUS WITH OTHER SPECIFIED COMPLICATION, UNSPECIFIED WHETHER LONG TERM INSULIN USE (H): Primary | ICD-10-CM

## 2023-12-20 DIAGNOSIS — F32.A DEPRESSION, UNSPECIFIED DEPRESSION TYPE: ICD-10-CM

## 2023-12-20 DIAGNOSIS — E78.5 HYPERLIPIDEMIA LDL GOAL <100: ICD-10-CM

## 2023-12-20 PROCEDURE — 99607 MTMS BY PHARM ADDL 15 MIN: CPT | Performed by: PHARMACIST

## 2023-12-20 PROCEDURE — 99606 MTMS BY PHARM EST 15 MIN: CPT | Performed by: PHARMACIST

## 2023-12-20 RX ORDER — CALCIUM ACETATE 667 MG/1
667 CAPSULE ORAL
COMMUNITY

## 2023-12-20 RX ORDER — ERGOCALCIFEROL 1.25 MG/1
50000 CAPSULE, LIQUID FILLED ORAL WEEKLY
Qty: 12 CAPSULE | Refills: 3 | Status: SHIPPED | OUTPATIENT
Start: 2023-12-20

## 2023-12-20 RX ORDER — BLOOD PRESSURE TEST KIT
KIT MISCELLANEOUS
Qty: 1 KIT | Refills: 0 | Status: SHIPPED | OUTPATIENT
Start: 2023-12-20 | End: 2024-05-22

## 2023-12-20 RX ORDER — CARVEDILOL 12.5 MG/1
12.5 TABLET ORAL 2 TIMES DAILY WITH MEALS
COMMUNITY
Start: 2023-12-05

## 2023-12-20 NOTE — TELEPHONE ENCOUNTER
No follow up scheduled with PCP but pt does have follow up scheduled with DM ed on 1/24/2024    Attempted to call son & patient but no answer, unable to leave voicemail    If patient or son call back, please assist in scheduling follow up with PCP

## 2023-12-20 NOTE — PATIENT INSTRUCTIONS
"Recommendations from today's MTM visit:                                                    BP cuff sent today to [facility] pharmacy for DME - patient to check coverage at this pharmacy  Patient to check at home for calcium acetate 667 mg and make sure to start taking three times daily with meals  STOP taking fenofibrate 54 mg (nurse, do not set up in pillboxes anymore)  START ergocalciferol 50,000 units once weekly    Follow-up: Return in about 9 weeks (around 2/21/2024) for With PharmD.    It was great speaking with you today.  I value your experience and would be very thankful for your time in providing feedback in our clinic survey. In the next few days, you may receive an email or text message from "[employer]" with a link to a survey related to your  clinical pharmacist.\"     To schedule another MTM appointment, please call the clinic directly or you may call the MTM scheduling line at [phone] or toll-free at [phone].     My Clinical Pharmacist's contact information:                                                      Please feel free to contact me with any questions or concerns you have.      Shital Hernandez, PharmD, BCACP  Medication Therapy Management Pharmacist    "

## 2023-12-20 NOTE — PROGRESS NOTES
Medication Therapy Management (MTM) Encounter    ASSESSMENT:                            Medication Adherence/Access: Recommended patient bring pillboxes to next visit to further assess medication adherence.    1. Type 2 diabetes mellitus with other specified complication, unspecified whether long term insulin use (H)  A1c not yet at goal <8%.  Patient basal insulin recently reduced per diabetic educator due to hypoglycemia, hypoglycemia events have since improved.  CGM data appears relatively controlled.  For now do not recommend any changes in insulin, especially given close to low events.  If needed in the future, could consider slight increase in middle of the day dose of NovoLog to cover for postprandial elevations.    2. Primary hypertension/CKD  BP elevated.  Patient requesting blood pressure cuff, agreed and prescription sent to [facility] pharmacy today.  For now, recommend continuation of current medications without change, though could consider adding amlodipine in the future.  Medication discrepancy identified today, patient not currently taking calcium acetate as prescribed by the nephrologist.  Recommend patient check at home for medication supply and initiate 3 times daily administration with meals, otherwise patient to get temporary supply from pharmacy until able to be refilled.  Additionally, drug drug interaction identified between calcium acetate and calcium carbonate, though both prescribed per nephrologist due to hypocalcemia, this will likely improve once patient is taking calcium acetate as prescribed.  For now, recommend patient continue current calcium carbonate, could consider increasing dose as recommended by nephrology in the future if needed.    3. Hyperlipidemia LDL goal <100  Fenofibrate use contraindicated in patients with creatinine clearance less than 30 mL/min, therefore we will discontinue therapy today.  Recommend rechecking lipid panel in 1 to 2 months to determine if additional  therapy warranted.    4. Depression, unspecified depression type  Relatively stable, continue current therapy.    5. Gastroesophageal reflux disease with esophagitis without hemorrhage  Stable, continue current therapy.    6. Low vitamin D level  Last vitamin D was low, unclear whether she has been taking supplementation of vitamin D in the last 6 months, therefore will resume ergocalciferol once weekly today.    7. Neuropathy  Recommend readdressing symptoms of neuropathy with PCP at next visit.    PLAN:                            Patient to check at home for calcium acetate 667 mg and make sure to start taking three times daily with meals  Start ergocalciferol 50,000 units once weekly  Discontinue fenofibrate  Rx for BP cuff to [facility]     Follow-up: Return in about 9 weeks (around 2/21/2024) for With PharmD.  PCP 1/22    SUBJECTIVE/OBJECTIVE:                          Nithya Matthews is a 53 year old female coming in for a follow-up visit from 10/23/23. Patient was accompanied by daughter. [facility]  ID# [id number].     Reason for visit: MTM follow up. Looking for a BP cuff to check at home.     Allergies/ADRs: Reviewed in chart  Past Medical History: Reviewed in chart  Tobacco: She reports that she has never smoked. She has never been exposed to tobacco smoke. She has never used smokeless tobacco.  Alcohol: reports none    Medication Adherence/Access: States that her daughter helps with managing her medications. She has a nurse that prepares the three times daily pillbox for her every 2 weeks and her daughter gives her medications every day. Reports no missed doses of medications.   Patient does NOT bring her pillbox today, but does bring the medication vials and glucometer. Patient does not know how she is taking each medication, not sure how they are set up in pillbox.  Pharmacy usually delivers medications to her home.     Diabetes   Lantus 76 (38 units on each side) daily in the  morning - dose reduced per CDE on 12/4 due to hypoglycemia   NovoLog 5 units 2-3 times daily before meals  Victoza 1.8 mg daily in the morning  Aspirin 81mg daily  Patient is experiencing the following side effects: nausea, states that she has this about 3 days per week. Has been having nausea for about 3 months.   Daughter gives her the injections, reports no missed doses.  Blood sugar monitoring: Continuous Glucose Monitor - Claribel 14 day; see below  Current diabetes symptoms: hypoglycemia 1 time in the last 14 days.     Diet/Exercise: Eating 2-3 meals per day.   Med Hx: metformin (CKD)     Eye exam in the last 12 months? Was scheduled for 12/1 unable to confirm with patient today  Foot exam is up to date  Urine Albumin:   Lab Results   Component Value Date    UMALCR  10/23/2023      Comment:      Unable to calculate, urine albumin and/or urine creatinine is outside detectable limits.  Microalbuminuria is defined as an albumin:creatinine ratio of 17 to 299 for males and 25 to 299 for females. A ratio of albumin:creatinine of 300 or higher is indicative of overt proteinuria.  Due to biologic variability, positive results should be confirmed by a second, first-morning random or 24-hour timed urine specimen. If there is discrepancy, a third specimen is recommended. When 2 out of 3 results are in the microalbuminuria range, this is evidence for incipient nephropathy and warrants increased efforts at glucose control, blood pressure control, and institution of therapy with an angiotensin-converting-enzyme (ACE) inhibitor (if the patient can tolerate it).        Lab Results   Component Value Date    A1C 8.2 (H) 10/23/2023           Hypertension /CKD  Lisinopril 20 mg daily  Carvedilol 12.5 mg twice daily  Calcium Acetate 667 mg three times daily with meals - DOES NOT bring this with today, reports likely NOT taking. Called [facility] during visit - they sent 90 day supply on 11/10/23, though patient states that they  don't think they have this. Patient would have to pay out of pocket, no more than $40 until able to refill though insurance.   Calcium carbonate 500 mg 3 times daily (current prescription reads 3 tablets twice daily per nephrologist)  Dialysis Tues/Thurs/Sat.   Patient reports the following medication side effects: dizziness when bending down.    Patient does not self-monitor blood pressure, but told by her nephrologist that she should be monitoring this because it has been high lately.   Nephrologist: Carl Rosas MD at [facility]     BP Readings from Last 3 Encounters:   12/20/23 (!) 140/74   11/08/23 122/78   10/23/23 (!) 152/78     Pulse Readings from Last 3 Encounters:   12/20/23 78   10/23/23 88   09/20/23 84     Hyperlipidemia   Atorvastatin 80mg daily  Fish Oil 1000mg once daily  Fenofibrate 54 mg once daily   Patient reports the following side effects: muscle pain on the whole body for the last 10 years.   The ASCVD Risk score (Slava DK, et al., 2019) failed to calculate for the following reasons:    The valid total cholesterol range is 130 to 320 mg/dL     Recent Labs   Lab Test 10/23/23  1123 12/28/22  1506   CHOL 124 189   HDL 26* 34*   LDL 53 82   TRIG 224* 365*     Depression:   Escitalopram 10 mg daily  Mirtazapine 7.5 mg daily at bedtime  Reports that she does worry about her poor health all the time, otherwise she is ok. States that sometimes she sleeps well and sometimes she doesn't. Appetite has been good and normal.     GERD:  Omeprazole 20 mg daily  Reports no symptoms of heartburn.     Takes dietary supplements:  Vitamin B12 1000 mcg daily  Ferrous sulfate 325 mg every other day  Ergocalciferol discontinued 6/2023 - never resumed. Though per surescripts today, patient may be receiving prescription for vit D 2000 units once daily from [facility] (though patient did not bring with a med vial for this today)   Latest Reference Range & Units 06/27/23 04:19   Vitamin B12 232 - 1,245 pg/mL  1,095     Lab Results   Component Value Date    VITDT 17 (L) 12/28/2022    VITDT 12 (L) 07/27/2022    VITDT 17 (L) 03/17/2022     Neuropathy/Pain:  Gabapentin 300 mg at bedtime  Voltaren gel 1% 4 g 4 times daily as needed for rib/chest pain  Acetaminophen 500 mg to 1000 mg every 6 hours as needed  Reports still experiencing neuropathy, thinks the current medication does help a lot.     Today's Vitals: BP (!) 140/74   Pulse 78   Wt 111 lb 8 oz (50.6 kg)   LMP  (LMP Unknown)   SpO2 92%   BMI 21.07 kg/m    ----------------      I spent 75 minutes with this patient today. All changes were made via collaborative practice agreement with Giancarlo Arizmendi MD. A copy of the visit note was provided to the patient's provider(s).    A summary of these recommendations was given to the patient.    Shital Hernandez, PharmD, BCACP  Medication Therapy Management Pharmacist     Medication Therapy Recommendations  Hyperlipidemia LDL goal <100    Current Medication: fenofibrate (LOFIBRA) 54 MG tablet (Discontinued)   Rationale: Unsafe medication for the patient - Adverse medication event - Safety   Recommendation: Discontinue Medication   Status: Accepted per CPA         Low vitamin D level    Current Medication: vitamin D2 (ERGOCALCIFEROL) 90534 units (1250 mcg) capsule   Rationale: Untreated condition - Needs additional medication therapy - Indication   Recommendation: Start Medication   Status: Accepted per CPA         Primary hypertension    Current Medication: calcium acetate (PHOSLO) 667 MG CAPS capsule   Rationale: Medication product not available - Adherence - Adherence   Recommendation: Provide Education   Status: Patient Agreed - Adherence/Education   Note: see note details - get supply from [facility] if needed

## 2023-12-20 NOTE — Clinical Note
As discussed 1. Please request KRISTINA to nephrologist when you see her in Carl Worthy MD at [facility] 2. If I messed up the DME For BP cuff, please make sure that she gets the prescription so she can fill and start checking BP at home  Thank you! Scottie

## 2023-12-20 NOTE — TELEPHONE ENCOUNTER
Author: Michael Crews DO Service: -- Author Type: Physician   Filed: 12/19/2023  9:52 AM Encounter Date: 12/18/2023 Status: Signed   : Michael Crews DO (Physician)     Is patient due for recheck soon? Since we are not getting anywhere with diabetes management I would like to see what the barriers are, do we need to set up something to make it easier to check?     MICHAEL CREWS DO

## 2023-12-22 ENCOUNTER — PATIENT OUTREACH (OUTPATIENT)
Dept: CARE COORDINATION | Facility: CLINIC | Age: 53
End: 2023-12-22
Payer: COMMERCIAL

## 2023-12-22 NOTE — PROGRESS NOTES
Clinic Care Coordination Contact  Care Coordination Clinician Chart Review    Situation: Patient chart reviewed by Care Coordinator.       Background: Care Coordination Program started: 3/1/2022. Initial assessment completed and patient-centered care plan(s) were developed with participation from patient. Lead CC handed patient off to CHW for continued outreaches.       Assessment: Per chart review, patient outreach completed by CC CHW on 12/7/2023.  Patient is actively working to accomplish goal(s). Patient's goal(s) appropriate and relevant at this time. Patient is not due for updated Plan of Care.  Assessments will be completed annually or as needed/with change of patient status.    Patient had completed all goals. Daughter will continue to assist patient request medical transportation for appts, re-order incontinence supply, and follow up with orthopedics.       Care Plan: DME Completed 2/28/2023      Problem: I want Pull-Ups in the next 60 days so that I may better manage my incontinence symptoms.  Resolved 2/28/2023      Goal: Incontinent supplies  Completed 2/28/2023      Start Date: 1/25/2023 Expected End Date: 3/25/2023    This Visit's Progress: 100%    Note:     Goal Statement: I want Pull-Ups in the next 60 days so that I may better manage my incontinence symptoms.     Barriers: Language  Strengths: Accepting of support  Patient expressed understanding of goal: Yes    Action steps to achieve this goal:  1. I will answer my phone when [employer] [phone]  contacts me to deliver or  my pull-ups.  3. I will follow up with CCC in the next month regarding this goal for additional coordination support.    Note: Order for pull-ups was sent to DME Provider 1/10/23.                             Care Plan: County Benefits Completed 4/24/2023      Problem: I will apply for county financial benefits within the next 90 days.  Resolved 4/24/2023      Goal: Rent Assistance  Completed  4/24/2023      Start Date: 2/28/2023 Expected End Date: 5/28/2023    Recent Progress: 10%    Note:     Goal statement: I will apply for [facility] financial benefits within the next 90 days.     Barriers: Language   Strengths: Accepting of support.  Patient expressed understanding of goal: Yes     Action steps to achieve this goal:   1.  I will answer my phone when I am contacted by the [facility] FRW to schedule an initial appointment.   2.  I will provide all necessary documentation and information to complete a [facility] application for benefits with the support of the FRW.   3.  I will call my FRW if I have questions or concerns regarding my [facility] benefits application.     Note: Benefits requesting is Rental, referral submitted by CHW on 2/28/23.     Updated note on 4/25/2023 by CCRN.   - SNAP - $530 for 2 people   - rental assistance $260 per month - max received for 2 people.   - rent - $1150                              Care Plan: SSI Completed 4/24/2023      Problem: I will find out if I am eligible to apply for Social Security benefits.  Resolved 4/24/2023      Goal: SSI  Completed 4/24/2023      Start Date: 2/28/2023 Expected End Date: 2/28/2024    Recent Progress: 40%    Note:     Goal statement: I will find out if I am eligible to apply for Social Security benefits.    Barriers: Language  Strengths: Agreeable to support.   Patient expressed understanding of goal: Yes    Action steps to achieve this goal:  1.  I will follow the instructions that Disability Specialists recommended on 9/30/22. Steps per Ayleen at :     Once patient receives her [place] Citizenship paperwork,  a) Patient should go to the Social Security office in person.  b) Patient should show the Social  the original paperwork of her [email or web address] Citizenship.  During this time, request an in person appointment to apply for Social Security benefits.    2.  I will request assistance as needed and clarify if my [facility] Worker can take  me to the [facility] Office.   3.  I will attend an in-person appointment to apply for social security income as scheduled on. TBD  4.  I will follow up with CCC in the next month regarding this goal.    Note: See encounter 9/30/22, for instructions from .     SSI starting April, 2023 - $609/month.ndah 4/25/2023                                Care Plan: DEMs Completed 8/25/2023      Problem: Impaired mobility  Resolved 8/25/2023      Goal: I want a wheelchair. w/c cushion and shower chair in the next 90 days.  Completed 8/25/2023      Start Date: 4/24/2023 Expected End Date: 9/24/2023    Recent Progress: 60%    Priority: High    Note:     Barriers: language  Strengths: Accepting of support  Patient expressed understanding of goal: Yes    Action steps to achieve this goal:  1. I will complete a face-to-face appointment with my PCP on 3/22/2023. (Completed)  2. I will complete an OT/PT evaluation for a bath bench lift as scheduled on 8/9 at 10:15AM [facility]. (Completed)  3. I will answer my phone when DME Provider [facility] contacts me to deliver or  my item.  4. I will follow up with CCC in the next month regarding this goal for additional coordination support.    Note: Order for wheel chair and shower chair were sent to DME Provider PCP on 3/22/2023. 5/26 Orders are at [facility] not [facility] - Wheel Chair/cushion delivered.     [facility] [phone]  Complex Rehab Team at [facility] [phone] (bath bench lift)                            Care Plan: PCA Completed 10/23/2023      Problem: Request for early re-assessment of PCA services.  Resolved 10/23/2023      Goal: I will be re-evaluated for increased PCA services in the next 90 days.  Completed 10/23/2023      Start Date: 8/25/2023 Expected End Date: 11/25/2023    Recent Progress: 70%    Priority: High    Note:     Barriers: Language, recent health changes.  Strengths: Willing to accept CCC support.  Patient  expressed understanding of goal: Yes    Action steps to achieve this goal:  1.  I will answer my phone when I am contacted by [facility] to schedule an assessment.   2.  I will make sure I have any supportive family/friends present for my assessment as scheduled on Fri 9/29 at 10AM. (No . Rescheduled)  3. I will make sure I have any supportive family/friends present for my assessment as rescheduled on Fri 10/6/23 at 2:30PM.  4.  I will follow up with [facility] in the next month regarding this goal.   Note: PCP's letter of support for early reassessment faxed on 7/25/23. [facility] [phone], she confirmed fax number [phone].    10/23/2023 - approved for 10 hours per day. Son and daughter are her PCAs.                               Care Plan: Incontinence Supplies Completed 12/22/2023      Problem: Incontinence of feces with fecal urgency  Resolved 12/22/2023      Goal: I want check coverage for more Pull-Ups in the next 60 days so that I can better manage my incontinence symptoms.  Completed 12/22/2023      Start Date: 10/4/2023 Expected End Date: 12/4/2023    Recent Progress: 100%    Priority: High    Note:     Barriers: Language  Strengths: Accepting of support  Patient expressed understanding of goal: Yes    Action steps to achieve this goal:  1. I will attend an appointment with my PCP to address my need for pull-ups. (Completed, 9/20)  2. I will answer my phone when [facility] contacts me to deliver or  my pull-ups.  3. I will follow up with [facility] in the next month regarding this goal for additional coordination support.    Note: 9/20/23 Order for pull-ups was sent to [facility] [phone], Fax [phone] on 10/4/23.                             Care Plan: Orthopedics Completed 12/22/2023      Problem: experiencing painful callus on bottom of her foot.  Resolved 12/22/2023      Goal: Patient will attend her orthopedics appointment in the next 12 months.   Completed 12/22/2023      Start Date: 10/23/2023 Expected End Date: 10/23/2024    Recent Progress: 20%    Note:     Barriers: language barrier, low literacy, noncompliance, and lack of knowledge how to navigate complex health care system  Strengths: motivated to attend appt  Patient expressed understanding of goal: Yes    Action steps to achieve this goal:  1. I will answer my phone when I am contacted to schedule my appointment.  2. I will attend my follow-up appointment as scheduled on 11/22/23 at 8:15AM. (Completed)  3. I will schedule a follow up appointment with my orthopedics if it is recommended to do so while I am at the clinic.  4. I will follow up with CCC regarding this goal at each outreach until it is completed.                                    Plan/Recommendations: Patient transitioned to maintenance the next 2 months.      Plan of Care updated and sent to patient: No

## 2023-12-22 NOTE — LETTER
[facility]  Patient Centered Plan of Care  About Me:        Patient Name:  Nithya Matthews    YOB: 1970  Age:         54 year old   Johann MRN:    [id number] Telephone Information:  Home Phone [phone]   Mobile [phone]   Home Phone [phone]   Mobile Not on file.       Address:  [address] [place] [address] Email address:  [email or web address]      Emergency Contact(s)    Name Relationship Lgl Grd Work Phone Home Phone Mobile Phone   JESSY VAUGHN Daughter    [phone]           Primary language:  Jamari      needed? Yes   [facility] [employer] [facility]:  [phone] op. 1  Other communication barriers:Language barrier    Preferred Method of Communication:     Current living arrangement: I live in a private home with family    Mobility Status/ Medical Equipment: Independent w/Device        Health Maintenance  Health Maintenance Reviewed: Not assessed      My Access Plan  Medical Emergency 911   Primary Clinic Line [facility] [phone]   24 Hour Appointment Line [phone] or  [phone]) (toll-free)   24 Hour Nurse Line [phone] (toll-free)   Preferred Urgent Care [facility] [phone]     [facility]  [phone]     Preferred Pharmacy [facility] - [place], [place] - [address]     Behavioral Health Crisis Line The National Suicide Prevention Lifeline at 1-456.109.1734 or Text/Call 108           My Care Team Members  Patient Care Team         Relationship Specialty Notifications Start End    Giancarlo Arizmendi MD PCP - General Family Medicine  2/14/22     Referring to Eye    Phone: [phone] Fax: [phone]         [address] [facility] [place] [address]    Giancarlo Arizmendi MD Assigned PCP   2/20/22     Phone: [phone] Fax: [phone]         [address] [place] [address]    Irene Samson CHW [facility]  Health Worker Primary Care - CC Admissions 3/1/22     Albert Payal ARM worker   3/2/22     [facility]. Call [facility] worker  vAani Nelson [phone] for assistance.    Phone: [phone]         Miracle Mi OD  Optometry  3/18/22     Phone: [phone] Fax: [phone]         [address] [place] [address]    Shital Hernandez, PharmD Pharmacist Pharmacist  4/6/22     Phone: [phone]          [address] [place] [address]    Grecia Hearn DPM, Podiatry/Foot and Ankle Surgery Assigned Musculoskeletal Provider   5/1/22     Phone: [phone] Pager: [phone] Fax: [phone]        [address] [address] [address]    Davis Villa MD MD Endocrinology, Diabetes, and Metabolism  6/2/22     Phone: [phone] Fax: [phone]         [facility] [address]    Shital Hernandez, PharmD Assigned [facility] Pharmacist   9/28/22     Phone: [phone]          [facility] [address] [place] [address]    Sahil Waterman MD Assigned Nephrology Provider   10/29/22     Phone: [phone] Fax: [phone]         [address] [place] [address]    Geri Mortensen, RN Registered Nurse   11/20/22     Phone: [phone]           [facility] [address] [facility] [address]    Davis Villa MD Assigned Endocrinology Provider   12/24/22     Phone: [phone] Fax: [phone]         [facility] [address]    Carl Rosas MD Nephrologist Nephrology  3/9/23     Phone: [phone] Fax: [phone]         [address] [address]    Dah, Janel, RN Registered Nurse Primary Care - CC Admissions 4/19/23     Markell Langford MD Assigned Surgical Provider   5/6/23     Phone: [phone] Fax: [phone]         [address] [address]    Negin Nunes, DENISE Nurse  Practitioner   5/30/23     Phone: [phone] Fax: [phone]         [facility] [place] [id number]    Lina Delacruz RN Lead Care Coordinator Primary Care - CC Admissions 12/22/23     Phone: [phone]                     My Care Plans  Self Management and Treatment Plan    Care Plan      Action Plans on File:                       Advance Care Plans/Directives:   Advanced Care Plan/Directives on file:   No    Discussed with patient/caregiver(s): No data recorded           My Medical and Care Information  Problem List   Patient Active Problem List   Diagnosis    Primary hypertension    Hyperlipidemia LDL goal <100    Depression, unspecified depression type    Hyperglycemia    Generalized muscle weakness    Low iron    Gastroesophageal reflux disease with esophagitis without hemorrhage    Low vitamin B12 level    Low vitamin D level    Celiac disease    Beta thalassemia minor    Type 2 diabetes mellitus with other specified complication, unspecified whether long term insulin use (H)    Incontinence of feces with fecal urgency    Diabetic ulcer of other part of right foot associated with type 2 diabetes mellitus, with fat layer exposed (H)    ESRD (end stage renal disease) on dialysis (H)    Hyperkalemia    Weakness    Acute cough      Current Medications and Allergies:  See printed Medication Report.    Care Coordination Start Date: 3/1/2022   Frequency of Care Coordination: 2 months, more frequently as needed     Form Last Updated: 01/16/2024

## 2024-01-04 ENCOUNTER — MEDICAL CORRESPONDENCE (OUTPATIENT)
Dept: HEALTH INFORMATION MANAGEMENT | Facility: CLINIC | Age: 54
End: 2024-01-04

## 2024-01-04 DIAGNOSIS — R79.89 LOW VITAMIN D LEVEL: ICD-10-CM

## 2024-01-04 DIAGNOSIS — E11.69 TYPE 2 DIABETES MELLITUS WITH OTHER SPECIFIED COMPLICATION, UNSPECIFIED WHETHER LONG TERM INSULIN USE (H): ICD-10-CM

## 2024-01-07 NOTE — PROGRESS NOTES
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  No clinical symptoms or exam findings consistent with a diagnosis of rheumatoid arthritis--she denies any  pain, stiffness or swelling in her hands, wrists, ankles, or feet, and x-rays did not reveal any erosive changes or abnormal alignment beside some mild ulnar variance bilaterally in the hands.       Plan: We will continue to monitor at this time.  If she should develop signs and symptoms consistent with rheumatoid arthritis would avoid methotrexate given her history of transaminitis and positive antimitochondrial antibody.  Could consider starting with a TNF alpha inhibitor, however, given minimal symptoms could start with Imuran as this would also treat her autoimmune hepatitis, and TNF inhibitors have been known to induce autoimmune hepatitis.            (K75.4) Autoimmune hepatitis (H)  Comment: Patient noted to have a positive antimitochondrial antibody and history of elevated liver enzymes, although her most recent liver enzymes are within normal limits.  Also endorses right upper quadrant pain.    Plan: Continue to follow with GI - monitoring of LFTs every 6 month.     Positive MYRON  MYRON by IFA 1:160 speckled, Likely secondary to autoimmune hepatitis.  Rheumatological review of systems is reassuring and subserologies including SSA/SSB, De, Scl-70, RNP, Nicole-1, histone, centromere, dsDNA, anti-smooth, are all negative.      Left rotator cuff tenosynovitis  Limited active and passive range of motion most consistent with frozen shoulder in the setting of her uncontrolled diabetes.  Shoulder x-rays negative for osteoarthritis  Plan: Discussed importance of physical therapy and option of doing a steroid injection - not  interested in injection at this time. Has not yet done PT.   Replaced PT referral      Mechanics hands?  Dyspnea  No other signs and symptoms to suggest antisynthetase syndrome, and myositis panel (Nicole-1, PL-12, PL-7, EJ, OJ, SRP, Mi-2, p155/140/ Tif-1 gamma, SAE1, MDA5, NXP2) was negative.  Does have mildly elevated hemoglobin which may suggest some chronic hypoxia. Recent SPO2 was 96%.  Echocardiogram 9/2023 normal.     Plan  -Derm referral for evaluation of hand lesions  -PFTs ordered   -will check CK and aldolase     Orders Placed This Encounter   Procedures    CK total    Aldolase    Adult Dermatology  Referral    Physical Therapy Referral    General PFT Lab (Please always keep checked)    Pulmonary Function Test        40 minutes spent on the day of the encounter doing chart review, history and exam, counseling and documentation.     Return to clinic in 2 months    Sandra Mack MD  Rheumatology     Subjective         Interim Hx:     Evaluation today with assistance of telephone Osen     In the interim since I last saw her she was evaluated by GI and given her antibody profile was felt to be consistent with autoimmune hepatitis, however, as her liver enzymes are within normal limits, no indication to treat at this time.  They will monitor her LFTs every 6 months.  She reports she is been having continued chest pain.  When asked to point to where her pain is she points to her right upper quadrant of her stomach.  If this is the same area that she endorsed pain to me previously.  She reports that the pain is not every day and that it responds well to Tylenol.  She continues to deny any joint pain or swelling or joint stiffness in her hands, wrists, elbows, shoulders, knees, or feet.  She does have some pain over her cervical spine.  She reports the rash on her hands have gotten a little worse and she has a new spot over her right hand today.  She reports she continues to be out of  breath but this is unchanged.  No other new symptoms today.      I have reviewed recent labs and images including:      Echocardiogram 9/28/23  Left ventricular size, wall motion and function are normal. The ejection  fraction is 60-65%.  Normal right ventricle size and systolic function.  No hemodynamically significant valvular abnormalities on 2D or color flow  imaging.    9/7/2023 x-rays  Knee, wrist, hand, foot, ankle, shoulder, cervical and lumbar spine showed degenerative changes at the lumbar and cervical spine as well as at the hands.   No erosive changes or reduced joint spaces or abnormal alignment noted in the ankles, or feet. No erosions in the hands or wrists but did not mild ulnar variance bilaterally.   Normal left shoulder x-ray and bilateral knee x-rays    9/7/23 myositis panel (Nicole-1, PL-12, PL-7, EJ, OJ, SRP, Mi-2, p155/140/ Tif-1 gamma, SAE1, MDA5, NXP2) negative   CBC Hgb 15.8  Neg Quant gold,   TPMT neg       Lab Review  [facility]   Jan 2023:  anti mitochondrial antibody positive (60) (n 0-20),   anti CCP positive (>250),   MYRON by IFA 1:160 speckled,   esr 61, crp 0.4  High LFTs   Neg RF, SSA/SSB, Smith, Scl-70, RNP, Nicole-1, histone, centromere, dsDNA, anti-smooth,   Hep BsAg nonreactive, HCV ab nonreactive   SPEP with polyclonal hypergammaglobulinemia   Uric acid 4.9,   C3, C4 wnl      Labs 8/2023   MYRON 1:320 speckled,   CCP >340  IgG 2,099  AST, ALT wnl, bili wnl, GGT elevated   Anti-mitochondrial 21 elevated   HA1C 9.6  UA wnl,   HIV nonreactive,   Hep C antibody nonreactive   BMP with low Cr         No past medical history on file.  No Known Allergies  Social History     Tobacco Use    Smoking status: Never    Smokeless tobacco: Never   Vaping Use    Vaping Use: Never used   Substance Use Topics    Alcohol use: Never    Drug use: Never        Current Outpatient Medications:     alcohol swab prep pads, Use to swab area of injection/fabiola as directed., Disp:  100 each, Rfl: 3    blood glucose (NO BRAND SPECIFIED) lancets standard, Use to test blood sugar 4 times daily or as directed., Disp: 100 Lancet, Rfl: 11    blood glucose (NO BRAND SPECIFIED) test strip, Use to test blood sugar 3 times daily or as directed., Disp: 100 strip, Rfl: 11    Continuous Blood Gluc Sensor (FREESTYLE MISTY 3 SENSOR) MISC, 1 Units every 14 days, Disp: 6 each, Rfl: 4    gabapentin (NEURONTIN) 300 MG capsule, Take 1 capsule (300 mg) by mouth 3 times daily, Disp: 270 capsule, Rfl: 1    glipiZIDE (GLUCOTROL) 10 MG tablet, Take 1 tablet (10 mg) by mouth 2 times daily (before meals), Disp: 180 tablet, Rfl: 3    glipiZIDE (GLUCOTROL) 5 MG tablet, Take 1 tablet (5 mg) by mouth 2 times daily (before meals), Disp: 180 tablet, Rfl: 1    ibuprofen (ADVIL/MOTRIN) 600 MG tablet, Take 1 tablet (600 mg) by mouth every 6 hours as needed for moderate pain, Disp: 100 tablet, Rfl: 0    insulin glargine (LANTUS SOLOSTAR) 100 UNIT/ML pen, Inject 17 Units Subcutaneous at bedtime, Disp: 30 mL, Rfl: 0    insulin pen needle (BD BEORNICA U/F) 32G X 4 MM miscellaneous, USE TWICE DAILY AS INSTRUCTED, Disp: 100 each, Rfl: 3    insulin pen needle (ULTICARE MICRO) 32G X 4 MM miscellaneous, Use 1 pen needles daily or as directed., Disp: 100 each, Rfl: 3    liraglutide (VICTOZA) 18 MG/3ML solution, Inject 0.6 mg Subcutaneous daily, Disp: 3 mL, Rfl: 1    lisinopril (ZESTRIL) 5 MG tablet, Take 1 tablet (5 mg) by mouth daily at 2 pm, Disp: 90 tablet, Rfl: 1    TRUEplus Lancets 33G MISC, use 1 LANCET to TEST BLOOD SUGAR 4 TIMES A DAY, Disp: , Rfl:     diclofenac (VOLTAREN) 1 % topical gel, Apply 4 g topically 4 times daily (Patient not taking: Reported on 11/27/2023), Disp: 350 g, Rfl: 0     Objective   /80 (BP Location: Right arm, Patient Position: Sitting, Cuff Size: Adult Regular)   Pulse 110   Wt 69.4 kg (152 lb 14.4 oz)   LMP  (LMP Unknown)   SpO2 96%   BMI 27.96 kg/m    General: alert, well appearing, no  distress  HEENT: no alopecia, clear conjunctiva,   Cardiac: normal rate and rhythm, no murmur, rubs or gallops   Pulm: normal respiratory effort, clear to auscultation bilaterally,   MSK: Hand, wrist, elbow, and shoulder joints without evidence of synovitis or effusion.  Able to make tight fists.  No hand deformities.  Full active range of motion at the wrist, and elbow joints bilaterally.  + Heberden nodes.    Full active and passive range of motion of the right shoulder with limited active and passive range of motion of the left shoulder with abduction past 90 degrees as well as with internal and external rotation.    Skin: thickened and scaly plaques over the lateral aspect of her left pointer finger and right pointer and middle finger. Today with new red plaque over dorsal aspect of right hand  . 2 soft, mobile, subcutaneous, round, quarter sized, masses over left lower back. No nail pitting, digital ulceration, dactylitis, or telangiectasias.  No fingerpad pits.  Raised scar with fibrous tissue over left anterior skin

## 2024-01-08 ENCOUNTER — OFFICE VISIT (OUTPATIENT)
Dept: RHEUMATOLOGY | Facility: CLINIC | Age: 54
End: 2024-01-08
Payer: COMMERCIAL

## 2024-01-08 VITALS
DIASTOLIC BLOOD PRESSURE: 80 MMHG | BODY MASS INDEX: 27.96 KG/M2 | WEIGHT: 152.9 LBS | SYSTOLIC BLOOD PRESSURE: 112 MMHG | OXYGEN SATURATION: 96 % | HEART RATE: 110 BPM

## 2024-01-08 DIAGNOSIS — R76.8 ANTI-CYCLIC CITRULLINATED PEPTIDE ANTIBODY POSITIVE: Primary | ICD-10-CM

## 2024-01-08 DIAGNOSIS — M75.02 ADHESIVE CAPSULITIS OF LEFT SHOULDER: ICD-10-CM

## 2024-01-08 DIAGNOSIS — K75.4 AUTOIMMUNE HEPATITIS (H): ICD-10-CM

## 2024-01-08 PROCEDURE — 99215 OFFICE O/P EST HI 40 MIN: CPT | Performed by: STUDENT IN AN ORGANIZED HEALTH CARE EDUCATION/TRAINING PROGRAM

## 2024-01-08 RX ORDER — CHLORAL HYDRATE 500 MG
1 CAPSULE ORAL DAILY
Qty: 30 CAPSULE | Refills: 3 | Status: SHIPPED | OUTPATIENT
Start: 2024-01-08 | End: 2024-05-07

## 2024-01-08 RX ORDER — LANOLIN ALCOHOL/MO/W.PET/CERES
1000 CREAM (GRAM) TOPICAL DAILY
Qty: 30 TABLET | Refills: 3 | Status: SHIPPED | OUTPATIENT
Start: 2024-01-08 | End: 2024-05-07

## 2024-01-17 ENCOUNTER — MEDICAL CORRESPONDENCE (OUTPATIENT)
Dept: HEALTH INFORMATION MANAGEMENT | Facility: CLINIC | Age: 54
End: 2024-01-17
Payer: COMMERCIAL

## 2024-01-18 DIAGNOSIS — Z53.9 DIAGNOSIS NOT YET DEFINED: Primary | ICD-10-CM

## 2024-01-18 PROCEDURE — G0179 MD RECERTIFICATION HHA PT: HCPCS | Performed by: FAMILY MEDICINE

## 2024-01-22 ENCOUNTER — OFFICE VISIT (OUTPATIENT)
Dept: FAMILY MEDICINE | Facility: CLINIC | Age: 54
End: 2024-01-22
Payer: COMMERCIAL

## 2024-01-22 VITALS
BODY MASS INDEX: 20.6 KG/M2 | HEART RATE: 83 BPM | SYSTOLIC BLOOD PRESSURE: 160 MMHG | RESPIRATION RATE: 24 BRPM | WEIGHT: 109.12 LBS | TEMPERATURE: 98.3 F | DIASTOLIC BLOOD PRESSURE: 64 MMHG | OXYGEN SATURATION: 94 % | HEIGHT: 61 IN

## 2024-01-22 DIAGNOSIS — R07.81 RIB PAIN: ICD-10-CM

## 2024-01-22 DIAGNOSIS — I10 PRIMARY HYPERTENSION: ICD-10-CM

## 2024-01-22 DIAGNOSIS — N18.6 ANEMIA DUE TO CHRONIC KIDNEY DISEASE, ON CHRONIC DIALYSIS (H): ICD-10-CM

## 2024-01-22 DIAGNOSIS — D63.1 ANEMIA DUE TO CHRONIC KIDNEY DISEASE, ON CHRONIC DIALYSIS (H): ICD-10-CM

## 2024-01-22 DIAGNOSIS — E11.69 TYPE 2 DIABETES MELLITUS WITH OTHER SPECIFIED COMPLICATION, UNSPECIFIED WHETHER LONG TERM INSULIN USE (H): Primary | ICD-10-CM

## 2024-01-22 DIAGNOSIS — E11.621 DIABETIC ULCER OF OTHER PART OF RIGHT FOOT ASSOCIATED WITH TYPE 2 DIABETES MELLITUS, WITH FAT LAYER EXPOSED (H): ICD-10-CM

## 2024-01-22 DIAGNOSIS — Z99.2 ANEMIA DUE TO CHRONIC KIDNEY DISEASE, ON CHRONIC DIALYSIS (H): ICD-10-CM

## 2024-01-22 DIAGNOSIS — M62.838 NECK MUSCLE SPASM: ICD-10-CM

## 2024-01-22 DIAGNOSIS — N18.6 ESRD (END STAGE RENAL DISEASE) ON DIALYSIS (H): ICD-10-CM

## 2024-01-22 DIAGNOSIS — Z99.2 ESRD (END STAGE RENAL DISEASE) ON DIALYSIS (H): ICD-10-CM

## 2024-01-22 DIAGNOSIS — K59.01 SLOW TRANSIT CONSTIPATION: ICD-10-CM

## 2024-01-22 DIAGNOSIS — L97.512 DIABETIC ULCER OF OTHER PART OF RIGHT FOOT ASSOCIATED WITH TYPE 2 DIABETES MELLITUS, WITH FAT LAYER EXPOSED (H): ICD-10-CM

## 2024-01-22 LAB
ANION GAP SERPL CALCULATED.3IONS-SCNC: 13 MMOL/L (ref 7–15)
BASOPHILS # BLD AUTO: 0.1 10E3/UL (ref 0–0.2)
BASOPHILS NFR BLD AUTO: 1 %
BUN SERPL-MCNC: 35.7 MG/DL (ref 6–20)
CALCIUM SERPL-MCNC: 8.4 MG/DL (ref 8.6–10)
CHLORIDE SERPL-SCNC: 100 MMOL/L (ref 98–107)
CREAT SERPL-MCNC: 7.45 MG/DL (ref 0.51–0.95)
DEPRECATED HCO3 PLAS-SCNC: 26 MMOL/L (ref 22–29)
EGFRCR SERPLBLD CKD-EPI 2021: 6 ML/MIN/1.73M2
EOSINOPHIL # BLD AUTO: 0.3 10E3/UL (ref 0–0.7)
EOSINOPHIL NFR BLD AUTO: 3 %
ERYTHROCYTE [DISTWIDTH] IN BLOOD BY AUTOMATED COUNT: 24.6 % (ref 10–15)
GLUCOSE SERPL-MCNC: 231 MG/DL (ref 70–99)
HBA1C MFR BLD: 6.7 % (ref 0–5.6)
HCT VFR BLD AUTO: 28.5 % (ref 35–47)
HGB BLD-MCNC: 8.2 G/DL (ref 11.7–15.7)
IMM GRANULOCYTES # BLD: 0.2 10E3/UL
IMM GRANULOCYTES NFR BLD: 2 %
LYMPHOCYTES # BLD AUTO: 1.5 10E3/UL (ref 0.8–5.3)
LYMPHOCYTES NFR BLD AUTO: 18 %
MCH RBC QN AUTO: 21.2 PG (ref 26.5–33)
MCHC RBC AUTO-ENTMCNC: 28.8 G/DL (ref 31.5–36.5)
MCV RBC AUTO: 74 FL (ref 78–100)
MONOCYTES # BLD AUTO: 0.8 10E3/UL (ref 0–1.3)
MONOCYTES NFR BLD AUTO: 9 %
NEUTROPHILS # BLD AUTO: 5.9 10E3/UL (ref 1.6–8.3)
NEUTROPHILS NFR BLD AUTO: 67 %
NRBC # BLD AUTO: 0.1 10E3/UL
NRBC BLD AUTO-RTO: 1 /100
PLATELET # BLD AUTO: 186 10E3/UL (ref 150–450)
POTASSIUM SERPL-SCNC: 4.7 MMOL/L (ref 3.4–5.3)
RBC # BLD AUTO: 3.86 10E6/UL (ref 3.8–5.2)
SODIUM SERPL-SCNC: 139 MMOL/L (ref 135–145)
WBC # BLD AUTO: 8.7 10E3/UL (ref 4–11)

## 2024-01-22 PROCEDURE — 36415 COLL VENOUS BLD VENIPUNCTURE: CPT | Performed by: FAMILY MEDICINE

## 2024-01-22 PROCEDURE — 85025 COMPLETE CBC W/AUTO DIFF WBC: CPT | Performed by: FAMILY MEDICINE

## 2024-01-22 PROCEDURE — 83036 HEMOGLOBIN GLYCOSYLATED A1C: CPT | Performed by: FAMILY MEDICINE

## 2024-01-22 PROCEDURE — 99214 OFFICE O/P EST MOD 30 MIN: CPT | Performed by: FAMILY MEDICINE

## 2024-01-22 PROCEDURE — 80048 BASIC METABOLIC PNL TOTAL CA: CPT | Performed by: FAMILY MEDICINE

## 2024-01-22 RX ORDER — POLYETHYLENE GLYCOL 3350 17 G/17G
17 POWDER, FOR SOLUTION ORAL DAILY PRN
Qty: 510 G | Refills: 4 | Status: SHIPPED | OUTPATIENT
Start: 2024-01-22 | End: 2024-07-22

## 2024-01-22 RX ORDER — AMLODIPINE BESYLATE 2.5 MG/1
2.5 TABLET ORAL DAILY
Qty: 90 TABLET | Refills: 1 | Status: SHIPPED | OUTPATIENT
Start: 2024-01-22 | End: 2024-02-21

## 2024-01-22 RX ORDER — LANOLIN ALCOHOL/MO/W.PET/CERES
1000 CREAM (GRAM) TOPICAL DAILY
COMMUNITY
Start: 2024-01-08 | End: 2024-03-20

## 2024-01-22 RX ORDER — ACETAMINOPHEN 500 MG
500-1000 TABLET ORAL EVERY 6 HOURS PRN
Qty: 100 TABLET | Refills: 3 | Status: SHIPPED | OUTPATIENT
Start: 2024-01-22

## 2024-01-22 RX ORDER — OXYCODONE HYDROCHLORIDE 5 MG/1
2.5 TABLET ORAL EVERY 8 HOURS PRN
Qty: 12 TABLET | Refills: 0 | Status: SHIPPED | OUTPATIENT
Start: 2024-01-22 | End: 2024-04-22

## 2024-01-22 NOTE — COMMUNITY RESOURCES LIST (ENGLISH)
01/22/2024   [facility]  N/A  For questions about this resource list or additional care needs, please contact your primary care clinic or care manager.  Phone: [phone]   Email: N/A   Address: [address] [place] [address]   Hours: N/A        Food and Nutrition       Food pantry  1  [facility] [employer] Distance: 1.17 miles      Pickup   [address] [place] [address]  Language: English, Sierra Leonean  Hours: Sat 9:30 AM - 11:30 AM  Fees: Free   Phone: (719) 781-3188 Website: http://www.[facility]     2  [facility] Distance: 1.2 miles      [facility]   [address] [place] [address]  Language: English  Hours: Mon 10:00 AM - 11:30 AM , Thu 10:00 AM - 11:30 AM  Fees: Free   Phone: (928) 589-1137 Email: info@[facility].[employer] Website: https://[facility].[employer]ee[employer]/find-support/partner-organizations/housing-assistance/     SNAP application assistance  3  [facility] [place] Distance: 1.81 miles      Phone/Virtual   [address] [place] [address]  Language: English  Hours: Mon - Thu 9:00 AM - 4:00 PM , Fri 9:00 AM - 2:00 PM , Sun 10:00 AM - 12:00 PM  Fees: Free   Phone: (931) 660-8716 Email: cathy@[facility].org Website: http://[facility].org/[facility]/     4  [employer]) [facility] Distance: 2.08 miles      In-Person   [address] [place], [place] [id number]  Language: English, Sierra Leonean  Hours: Mon - Fri 8:30 AM - 5:00 PM  Fees: Free   Phone: (682) 827-4113 Email: info@clues.org Website: http://www.clues.org     Soup kitchen or free meals  5  [facility] [employer] Distance: 0.39 miles      Pickup   [address] [place] [address]  Language: English  Hours: Wed 5:30 PM - 6:30 PM  Fees: Free   Phone: (806) 529-2746 Email: office@[facility] [place].org Website:  https://www.adánavesandfishesmn.org     6  [facility] [place] [facility] Distance: 1.59 miles      Pickup   [address] [place] [address]  Language: English  Hours: Wed 2:00 PM - 4:00 PM , Fri 2:00 PM - 4:00 PM  Fees: Free   Phone: (818) 120-2819 Email: Angelo@.[facility]. Website: https://www.hospitals.[facility]/facilities/yelgoehje-gsmuy-wrsqgrgan-Everetts          Hotlines and Helplines       Hotline - Housing crisis  7  [facility] Distance: 10.86 miles      Phone/Virtual   [address] [place] [address]  Language: English  Hours: Mon - Sun Open 24 Hours   Phone: (112) 791-1582 Email: communications@\[facility]\""[employer]mn.org Website: https://\[facility]\""-[place].org/oursaviourshousing/     8  [facility] Distance: 12.46 miles      Phone/Virtual   [address] [place] [address]  Language: English  Hours: Mon - Sun Open 24 Hours   Phone: (979) 305-9908 Email: info@[facility].[employer] Website: http://www.[facility].org          Housing       Coordinated Entry access point  9  [facility] Distance: 3.93 miles      In-Person, Phone/Virtual   [address] [place] [address]  Language: English  Hours: Mon - Fri 8:30 AM - 4:30 PM  Fees: Free   Phone: (759) 914-9464 Email: info@[facility].org Website: https://www.[facility].org/locations/[facility]/     10  [facility] - Coordinated Access to Housing and Shelter (CAHS) - Coordinated Access - Coordinated Entry access point Distance: 5.82 miles      In-Person, Phone/Virtual   [address] [place] [address]  Language: English  Hours: Mon - Fri 8:00 AM - 4:30 PM  Fees: Free   Phone: (609) 493-1469 Website: https://www.[facility]./residents/assistance-support/assistance/housing-services-support     Drop-in center or day shelter  11  [facility] Distance: 2.41 miles      In-Person   [address] [place] [address]  Language: English   Hours: Mon - Fri 9:00 AM - 4:00 PM  Fees: Free   Phone: (627) 272-3828 Email: tigrek@[employer].org Website: http://[employer].org     12  [facility] Distance: 10.84 miles      In-Person   [address] [place] [address]  Language: English, Citizen of [place], Chinese  Hours: Mon - Sat 7:00 AM - 3:00 PM  Fees: Free, Self Pay   Phone: (583) 904-8179 Email: info@[employer] Website: https://www.[employer].org/locations/opportunity-center/     Housing search assistance  13  [place] [facility] - Housing Help Distance: 3.7 miles      Phone/Virtual   [address] [facility] [address] [place] [address]  Language: English  Hours: Mon - Fri 8:00 AM - 5:00 PM   Phone: (816) 711-5184 Email: [place].housing@[facility].mn. Website: https://housinghel[facility].org/     14  [facility] - Housing Search Assistance Distance: 4.15 miles      Phone/Virtual   [address] [place] [address]  Language: Tajik, English, Hmong, Fanny, Citizen of [place], Chinese  Hours: Mon - Fri Appt. Only  Fees: Free   Phone: (954) 468-4636 Website: https://[facility].org/     Shelter for families  15  [facility] Distance: 12.12 miles      In-Person   [address] [place] [address]  Language: English  Hours: Mon - Fri 3:00 PM - 9:00 AM , Sat - Sun Open 24 Hours  Fees: Free   Phone: (343) 524-7460 Ext.1 Website: https://www.[facility].org/2020/07/03/emergency-family-shelter/     Shelter for individuals  16  [facility] [place] [facility] - [facility] [place] [facility] Distance: 3.96 miles      In-Person   [address] [place] [address]  Language: English  Hours: Mon - Sun 5:00 PM - 10:00 AM  Fees: Free, Self Pay   Phone: (681) 651-9902 Email: info@[employer].org Website: https://www.[employer].org/locations/[facility]/     17  [facility] and  Human Services - Coordinated Access to Housing and Shelter (CAHS) - Coordinated Access - Emergency housing Distance: 5.82 miles      In-Person, Phone/Virtual   [place] [address]  Language: English  Hours: Mon - Fri 8:00 AM - 4:30 PM  Fees: Free   Phone: (675) 394-3042 Website: https://[employer]/residents/assistance-support/assistance/housing-services-support          Important Numbers & Websites       Emergency Services   911  [facility] Services   311  Poison Control   (172) 950-1408  Suicide Prevention Lifeline   (288) 671-8172 (TALK)  Child Abuse Hotline   (974) 225-1765 (4-A-Child)  Sexual Assault Hotline   (897) 973-5916 (HOPE)  National Runaway Safeline   (673) 898-2322 (RUNAWAY)  All-Options Talkline   (488) 578-5612  Substance Abuse Referral   (673) 520-1260 (HELP)

## 2024-01-22 NOTE — PROGRESS NOTES
Assessment & Plan     Type 2 diabetes mellitus with other specified complication, unspecified whether long term insulin use (H)  ESRD (end stage renal disease) on dialysis (H)  Well controlled but likely falsely depressed due to ESRD. 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 Only prn. Avoid nsaids due to ESRD.   - acetaminophen (TYLENOL) 500 MG tablet  Dispense: 100 tablet; Refill: 3  - oxyCODONE (ROXICODONE) 5 MG tablet  Dispense: 12 tablet; Refill: 0    Primary hypertension  Not well controlled, but will be very cautious due to frailty and dialysis. She has dialysis [facility]  - amLODIPine (NORVASC) 2.5 MG tablet  Dispense: 90 tablet; Refill: 1    Slow transit constipation  - polyethylene glycol (MIRALAX) 17 GM/Dose powder  Dispense: 510 g; Refill: 4    Anemia due to chronic kidney disease, on chronic dialysis (H)  Patient says   - CBC with platelets and differential    Food stamps discontinue about 2 months ago and unsure why?   Will forward note to care coordinator.      30 minutes spent on the date of the encounter doing chart review, history and exam, documentation and further activities per the note    Return in about 3 months (around 4/22/2024) for blood pressure recheck.      Subjective   Barriga is a 54 year old, presenting for the following health issues:  Diabetes and Hypertension (Pt request blood pressure monitor/cuff to do it at home.)    History of Present Illness       Diabetes:   She presents for follow up of diabetes.  She is checking home blood glucose three times daily.   She checks blood glucose before and after meals.  " Blood glucose is sometimes over 200 and sometimes under 70. She is aware of hypoglycemia symptoms including shakiness, dizziness, weakness, blurred vision and confusion.   She is concerned about blood sugar frequently over 200 and low blood sugar, several less than 70 in the past few weeks.   She is having numbness in feet and burning in feet.            Hypertension: She presents for follow up of hypertension.  She does not check blood pressure  regularly outside of the clinic. Outside blood pressures have been over 140/90. She does not follow a low salt diet.     She eats 2-3 servings of fruits and vegetables daily.She consumes 3 sweetened beverage(s) daily.She exercises with enough effort to increase her heart rate 9 or less minutes per day.  She exercises with enough effort to increase her heart rate 3 or less days per week.   She is taking medications regularly.       A1c 8.2% last visit, on insulin 76 unit(s) and victoza. Mealtime 5 unit(s)   Lab Results   Component Value Date    A1C 8.2 10/23/2023    A1C 9.1 07/10/2023    A1C 7.1 02/14/2023    A1C 7.3 12/28/2022    A1C 11.0 09/21/2022     Seen by MTM  last month  Patient to check at home for calcium acetate 667 mg and make sure to start taking three times daily with meals  Start ergocalciferol 50,000 units once weekly  Discontinue fenofibrate  Rx for BP cuff to [facility]      Follow-up: Return in about 9 weeks (around 2/21/2024) for With PharmD.  PCP 1/22    [facility]        Objective    BP (!) 160/64   Pulse 83   Temp 98.3  F (36.8  C) (Oral)   Resp 24   Ht 1.549 m (5' 1\")   Wt 49.5 kg (109 lb 1.9 oz)   LMP  (LMP Unknown)   SpO2 94%   BMI 20.62 kg/m    Body mass index is 20.62 kg/m .  Physical Exam   GENERAL: alert and no distress, frail  NECK: no adenopathy, no asymmetry, masses, or scars  RESP: lungs clear to auscultation - no rales, rhonchi or wheezes  CV: regular rate and rhythm, normal S1 S2, no S3 or S4, no murmur, click " or rub, no peripheral edema  ABDOMEN: soft, nontender, no hepatosplenomegaly, no masses and bowel sounds normal  MS: no gross musculoskeletal defects noted, no edema    Results for orders placed or performed in visit on 01/22/24 (from the past 24 hour(s))   Hemoglobin A1c   Result Value Ref Range    Hemoglobin A1C 6.7 (H) 0.0 - 5.6 %   CBC with platelets and differential    Narrative    The following orders were created for panel order CBC with platelets and differential.  Procedure                               Abnormality         Status                     ---------                               -----------         ------                     CBC with platelets and d...[[id number]]                      In process                   Please view results for these tests on the individual orders.           Signed Electronically by: Giancarlo Arizmendi MD

## 2024-01-23 ENCOUNTER — PATIENT OUTREACH (OUTPATIENT)
Dept: CARE COORDINATION | Facility: CLINIC | Age: 54
End: 2024-01-23

## 2024-01-23 NOTE — PROGRESS NOTES
Clinic Care Coordination Contact  RUST/Voicemail       Clinical Data: CHW Outreach    Outreach attempted x 1.  CHW was unable to leave a message on patient's voicemail with call back information and requested return call. Due to automated  was not providing an option to do so.     Plan: CHW will make another attempt to reach the patient via phone or MyChart.  Per RNCC Request; Follow Up regarding [employer] Benefits - Based on PCP outreach, Patient has not received [employer] Benefits for about two months. May need to place an FRW referral.     CHW outreach in the next 2 weeks.      ANDRES Cisse  Clinic Care Coordination   [facility]   Phone: [phone]  [email or web address]

## 2024-01-24 ENCOUNTER — TELEPHONE (OUTPATIENT)
Dept: EDUCATION SERVICES | Facility: CLINIC | Age: 54
End: 2024-01-24

## 2024-01-24 ENCOUNTER — VIRTUAL VISIT (OUTPATIENT)
Dept: EDUCATION SERVICES | Facility: CLINIC | Age: 54
End: 2024-01-24
Payer: COMMERCIAL

## 2024-01-24 ENCOUNTER — TELEPHONE (OUTPATIENT)
Dept: FAMILY MEDICINE | Facility: CLINIC | Age: 54
End: 2024-01-24

## 2024-01-24 DIAGNOSIS — E11.69 TYPE 2 DIABETES MELLITUS WITH OTHER SPECIFIED COMPLICATION, WITH LONG-TERM CURRENT USE OF INSULIN (H): ICD-10-CM

## 2024-01-24 DIAGNOSIS — Z79.4 TYPE 2 DIABETES MELLITUS WITH OTHER SPECIFIED COMPLICATION, WITH LONG-TERM CURRENT USE OF INSULIN (H): ICD-10-CM

## 2024-01-24 PROCEDURE — G0108 DIAB MANAGE TRN  PER INDIV: HCPCS | Mod: 93 | Performed by: DIETITIAN, REGISTERED

## 2024-01-24 RX ORDER — GLIPIZIDE 10 MG/1
10 TABLET ORAL
Qty: 180 TABLET | Refills: 3 | Status: SHIPPED | OUTPATIENT
Start: 2024-01-24

## 2024-01-24 NOTE — LETTER
1/24/2024         RE: Mumtaz Montoya  [address]  [place] [facility] [place] [facility] [address]        Dear Colleague,    Thank you for referring your patient, Mumtaz Montoya, to the [facility]. Please see a copy of my visit note below.    Diabetes Self-Management Education & Support    Presents for: CGM Review    Type of Service: Telephone Visit    Originating Location (Patient Location): Home  Distant Location (Provider Location): Offsite  Mode of Communication:  Telephone    Telephone Visit Start Time:  10:01 AM  Telephone Visit End Time (telephone visit stop time): 10:33 AM    How would patient like to obtain AVS? [email or web address]      REPORTS:  Patient has not worn the Herson for the past 10 days. Data is up until January 14th, 2024.          Pt verbalized understanding of concepts discussed and recommendations provided today.       Continue education with the following diabetes management concepts: Monitoring, Taking Medication, Problem Solving, and Reducing Risks    ASSESSMENT:  Spoke with patient with professional  #[id number]. Patient has not worn the Herson for the past 10 days. She says she needs a new order. I Have reminded her and her son that she can pick these up at the pharmacy - there is an order and refills. Also spoke with son Nor today. Reviewed importance of checking glucose daily.  Patient takes between 14-18 units of Lantus, she has no reason to switch the dose and is unable to tell me why she takes more or less. Emphasized importance of taking 15 units daily and not changing to a different dose.   Reviewed again when to take glipizide - unsure of dose. Both 5 and 10 mg ordered. Will change to 10 mg - to see if this can help with meal time rises - if not we can increase Victoza at next visit.     Per son, patient eats only a small amount of rice at her meals. Pt reports no longer drinking juice - just water.     Glucose Patterns & Trends:  Hyperglycemia, weekend- postmeal and weekday- postmeal,  "Time in range of  mg/dL, 44% of the time., and Time below range of 70 mg/dL, 0% of the time.    PLAN  Continue Lantus at 15 units/day  Continue Victoza 0.6 mg/day  Glipizide 10 mg twice daily - if this doesn't work we can consider increasing Victoza  Topics to cover at upcoming visits: Monitoring, Taking Medication, and Reducing Risks    Follow-up: 8 weeks    See Care Plan for co-developed, patient-state behavior change goals.  AVS provided for patient today.    Education Materials Provided:  No new materials provided today      SUBJECTIVE/OBJECTIVE:  Presents for: CGM Review  Accompanied by: Self,   Diabetes education in the past 24mo: Yes  Focus of Visit: Patient Unsure  Diabetes type: Type 2  Disease course: Worsening  How confident are you filling out medical forms by yourself:: A little bit  Diabetes management related comments/concerns: needs prescriptions for Herson  Transportation concerns: Yes  Difficulty affording diabetes medication?: Sometimes  Difficulty affording diabetes testing supplies?: Sometimes  Other concerns:: Language barrier  Cultural Influences/Ethnic Background:  Not  or       Diabetes Symptoms & Complications:  Weight trend: Stable  Symptom course: Improving  Disease course: Worsening  Complications assessed today?: No    Patient Problem List and Family Medical History reviewed for relevant medical history, current medical status, and diabetes risk factors.    Vitals:  LMP  (LMP Unknown)   Estimated body mass index is 27.96 kg/m  as calculated from the following:    Height as of 11/27/23: 1.575 m (5' 2.01\").    Weight as of 1/8/24: 69.4 kg (152 lb 14.4 oz).   Last 3 BP:   BP Readings from Last 3 Encounters:   01/08/24 112/80   11/27/23 (!) 132/91   09/25/23 118/84       History   Smoking Status     Never   Smokeless Tobacco     Never       Labs:  Lab Results   Component Value Date    A1C 9.0 12/18/2023     Lab Results   Component Value Date     " "11/27/2023     Lab Results   Component Value Date     05/16/2023     Direct Measure HDL   Date Value Ref Range Status   05/16/2023 32 (L) >=50 mg/dL Final   ]  GFR Estimate   Date Value Ref Range Status   11/27/2023 >90 >60 mL/min/1.73m2 Final     No results found for: \"GFRESTBLACK\"  Lab Results   Component Value Date    CR 0.54 11/27/2023     No results found for: \"MICROALBUMIN\"    Healthy Eating:  Healthy Eating Assessed Today: Yes  Meal planning/habits: Avoiding sweets  How many times a week on average do you eat food made away from home (restaurant/take-out)?: 0  Meals include: Breakfast, Lunch, Dinner  Breakfast: rice and broderick chicken/pork, green vegetables/cucumber  Lunch: rice and broderick chicken/pork, green vegetables/cucumber  Dinner: rice and broderick chicken/pork, green vegetables/cucumber  Snacks: jermaine,  Other: was drinking orange juice - so no longer drinks it  Beverages: Water  Has patient met with a dietitian in the past?: Yes    Being Active:  Being Active Assessed Today: No  Exercise:: Currently not exercising    Monitoring:  Monitoring Assessed Today: Yes  Did patient bring glucose meter to appointment? : No  Blood Glucose Meter: CGM (Herson 3 and glucose monitor)  Times checking blood sugar at home (number): Never  Blood glucose trend: Decreasing      Taking Medications:  Diabetes Medication(s)       Insulin       insulin glargine (LANTUS SOLOSTAR) 100 UNIT/ML pen Inject 17 Units Subcutaneous at bedtime       Sulfonylureas       glipiZIDE (GLUCOTROL) 10 MG tablet Take 1 tablet (10 mg) by mouth 2 times daily (before meals)       Incretin Mimetic Agents       liraglutide (VICTOZA) 18 MG/3ML solution Inject 0.6 mg Subcutaneous daily            Taking Medication Assessed Today: Yes  Current Treatments: Insulin Injections  Dose schedule: At bedtime  Given by: Patient  Injection/Infusion sites: Abdomen  Problems taking diabetes medications regularly?: No  Diabetes medication side effects?: " No    Problem Solving:  Problem Solving Assessed Today: Yes  Is the patient at risk for hypoglycemia?: Yes  Hypoglycemia Frequency: Never  Hypoglycemia Treatment: Juice              Reducing Risks:  Reducing Risks Assessed Today: Yes  Diabetes Risks: Age over 45 years, Sedentary Lifestyle, Ethnicity  CAD Risks: Diabetes Mellitus  Has dilated eye exam at least once a year?: No (advised to schedule)  Sees dentist every 6 months?: No    Healthy Coping:  Healthy Coping Assessed Today: Yes  Emotional response to diabetes: Ready to learn  Informal Support system:: Children  Stage of change: ACTION (Actively working towards change)  Patient Activation Measure Survey Score:       No data to display                  Care Plan and Education Provided:  Care Plan: Diabetes   Updates made by Astrid Lemus RD since 1/24/2024 12:00 AM        Problem: Diabetes Self-Management Education Needed to Optimize Self-Care Behaviors         Goal: Healthy Eating - follow a healthy eating pattern for diabetes    This Visit's Progress: 100%   Recent Progress: 100%   Note:    Limit to 1 cup rice and no pop       Goal: Monitoring - monitor glucose and ketones as directed    This Visit's Progress: 0%   Recent Progress: 50%   Note:    Check BG 3 times daily       Goal: Reducing Risks - know how to prevent and treat long-term diabetes complications    This Visit's Progress: 0%   Recent Progress: 0%   Note:    Schedule eye exam           Time Spent: 30 minutes  Encounter Type: Individual    Any diabetes medication dose changes were made via the CDE Protocol per the patient's referring provider. A copy of this encounter was shared with the provider.

## 2024-01-24 NOTE — PROGRESS NOTES
Diabetes Self-Management Education & Support    Presents for: CGM Review    Type of Service: Telephone Visit    Originating Location (Patient Location): Home  Distant Location (Provider Location): Offsite  Mode of Communication:  Telephone    Telephone Visit Start Time:  10:01 AM  Telephone Visit End Time (telephone visit stop time): 10:33 AM    How would patient like to obtain AVS? Y[place]y      REPORTS:  Patient has not worn the Herson for the past 10 days. Data is up until January 14th, 2024.          Pt verbalized understanding of concepts discussed and recommendations provided today.       Continue education with the following diabetes management concepts: Monitoring, Taking Medication, Problem Solving, and Reducing Risks    ASSESSMENT:  Spoke with patient with professional  #[id number]. Patient has not worn the Herson for the past 10 days. She says she needs a new order. I Have reminded her and her son that she can pick these up at the pharmacy - there is an order and refills. Also spoke with son Nor today. Reviewed importance of checking glucose daily.  Patient takes between 14-18 units of Lantus, she has no reason to switch the dose and is unable to tell me why she takes more or less. Emphasized importance of taking 15 units daily and not changing to a different dose.   Reviewed again when to take glipizide - unsure of dose. Both 5 and 10 mg ordered. Will change to 10 mg - to see if this can help with meal time rises - if not we can increase Victoza at next visit.     Per son, patient eats only a small amount of rice at her meals. Pt reports no longer drinking juice - just water.     Glucose Patterns & Trends:  Hyperglycemia, weekend- postmeal and weekday- postmeal, Time in range of  mg/dL, 44% of the time., and Time below range of 70 mg/dL, 0% of the time.    PLAN  Continue Lantus at 15 units/day  Continue Victoza 0.6 mg/day  Glipizide 10 mg twice daily - if this doesn't work we can consider  "increasing Victoza  Topics to cover at upcoming visits: Monitoring, Taking Medication, and Reducing Risks    Follow-up: 8 weeks    See Care Plan for co-developed, patient-state behavior change goals.  AVS provided for patient today.    Education Materials Provided:  No new materials provided today      SUBJECTIVE/OBJECTIVE:  Presents for: CGM Review  Accompanied by: Self,   Diabetes education in the past 24mo: Yes  Focus of Visit: Patient Unsure  Diabetes type: Type 2  Disease course: Worsening  How confident are you filling out medical forms by yourself:: A little bit  Diabetes management related comments/concerns: needs prescriptions for Herson  Transportation concerns: Yes  Difficulty affording diabetes medication?: Sometimes  Difficulty affording diabetes testing supplies?: Sometimes  Other concerns:: Language barrier  Cultural Influences/Ethnic Background:  Not  or       Diabetes Symptoms & Complications:  Weight trend: Stable  Symptom course: Improving  Disease course: Worsening  Complications assessed today?: No    Patient Problem List and Family Medical History reviewed for relevant medical history, current medical status, and diabetes risk factors.    Vitals:  LMP  (LMP Unknown)   Estimated body mass index is 27.96 kg/m  as calculated from the following:    Height as of 11/27/23: 1.575 m (5' 2.01\").    Weight as of 1/8/24: 69.4 kg (152 lb 14.4 oz).   Last 3 BP:   BP Readings from Last 3 Encounters:   01/08/24 112/80   11/27/23 (!) 132/91   09/25/23 118/84       History   Smoking Status    Never   Smokeless Tobacco    Never       Labs:  Lab Results   Component Value Date    A1C 9.0 12/18/2023     Lab Results   Component Value Date     11/27/2023     Lab Results   Component Value Date     05/16/2023     Direct Measure HDL   Date Value Ref Range Status   05/16/2023 32 (L) >=50 mg/dL Final   ]  GFR Estimate   Date Value Ref Range Status   11/27/2023 >90 >60 mL/min/1.73m2 " "Final     No results found for: \"GFRESTBLACK\"  Lab Results   Component Value Date    CR 0.54 11/27/2023     No results found for: \"MICROALBUMIN\"    Healthy Eating:  Healthy Eating Assessed Today: Yes  Meal planning/habits: Avoiding sweets  How many times a week on average do you eat food made away from home (restaurant/take-out)?: 0  Meals include: Breakfast, Lunch, Dinner  Breakfast: rice and broderick chicken/pork, green vegetables/cucumber  Lunch: rice and broderick chicken/pork, green vegetables/cucumber  Dinner: rice and broderick chicken/pork, green vegetables/cucumber  Snacks: jermaine,  Other: was drinking orange juice - so no longer drinks it  Beverages: Water  Has patient met with a dietitian in the past?: Yes    Being Active:  Being Active Assessed Today: No  Exercise:: Currently not exercising    Monitoring:  Monitoring Assessed Today: Yes  Did patient bring glucose meter to appointment? : No  Blood Glucose Meter: CGM (Herson 3 and glucose monitor)  Times checking blood sugar at home (number): Never  Blood glucose trend: Decreasing      Taking Medications:  Diabetes Medication(s)       Insulin       insulin glargine (LANTUS SOLOSTAR) 100 UNIT/ML pen Inject 17 Units Subcutaneous at bedtime       Sulfonylureas       glipiZIDE (GLUCOTROL) 10 MG tablet Take 1 tablet (10 mg) by mouth 2 times daily (before meals)       Incretin Mimetic Agents       liraglutide (VICTOZA) 18 MG/3ML solution Inject 0.6 mg Subcutaneous daily            Taking Medication Assessed Today: Yes  Current Treatments: Insulin Injections  Dose schedule: At bedtime  Given by: Patient  Injection/Infusion sites: Abdomen  Problems taking diabetes medications regularly?: No  Diabetes medication side effects?: No    Problem Solving:  Problem Solving Assessed Today: Yes  Is the patient at risk for hypoglycemia?: Yes  Hypoglycemia Frequency: Never  Hypoglycemia Treatment: Juice              Reducing Risks:  Reducing Risks Assessed Today: Yes  Diabetes Risks: Age " over 45 years, Sedentary Lifestyle, Ethnicity  CAD Risks: Diabetes Mellitus  Has dilated eye exam at least once a year?: No (advised to schedule)  Sees dentist every 6 months?: No    Healthy Coping:  Healthy Coping Assessed Today: Yes  Emotional response to diabetes: Ready to learn  Informal Support system:: Children  Stage of change: ACTION (Actively working towards change)  Patient Activation Measure Survey Score:       No data to display                  Care Plan and Education Provided:  Care Plan: Diabetes   Updates made by Astrid Lemus RD since 1/24/2024 12:00 AM        Problem: Diabetes Self-Management Education Needed to Optimize Self-Care Behaviors         Goal: Healthy Eating - follow a healthy eating pattern for diabetes    This Visit's Progress: 100%   Recent Progress: 100%   Note:    Limit to 1 cup rice and no pop       Goal: Monitoring - monitor glucose and ketones as directed    This Visit's Progress: 0%   Recent Progress: 50%   Note:    Check BG 3 times daily       Goal: Reducing Risks - know how to prevent and treat long-term diabetes complications    This Visit's Progress: 0%   Recent Progress: 0%   Note:    Schedule eye exam           Time Spent: 30 minutes  Encounter Type: Individual    Any diabetes medication dose changes were made via the CDE Protocol per the patient's referring provider. A copy of this encounter was shared with the provider.

## 2024-01-24 NOTE — TELEPHONE ENCOUNTER
Dr. Dowd,   I met with Mumtaz and her son Nor via telephone today. She has not had her Herson on for 10 days, but based on data from 1/1-1/14/24 it appears she is waking up around 100 mg/dL then goes into 250-300 mg/dL after meals. BG look improved somewhat from December.     I sent in [id number] mg. (She has both 5 and 10 mg ordered, so I am not sure what she has been taking- they couldn't tell me). Reordered 10 mg. Continue Lantus at 15 units - patient is taking 14-18 -changes the dose for no real reason. Emphasized importance of taking 15 daily and not changing it. Also still taking 0.6 mg Victoza daily.    Also advised Nor to call and schedule a visit with you, but your team could also maybe call him. He is usually awake after 2 pm (works nights).    Thanks!  Astrid Lemus RD, LD, CDCES  Certified Diabetes Care &   Outpatient Adult Care - [facility]

## 2024-01-24 NOTE — TELEPHONE ENCOUNTER
Reason for Call:  Appointment Request    Patient requesting this type of appt:  F/U    Requested provider: Mayela Crews    Reason patient unable to be scheduled: Not within requested timeframe    When does patient want to be seen/preferred time: 1-2 weeks    Comments: Before March    Okay to leave a detailed message?: Yes at Cell number on file:    Telephone Information:   Mobile [phone]       Call taken on 1/24/2024 at 3:59 PM by Leanne Gay

## 2024-01-25 NOTE — TELEPHONE ENCOUNTER
Spoke with patient regarding appointment. She stated she wants to be seen before March since is trying to move back to [place] and would like to be seen before she leaves.     Please advise if ok to use approval req /blocked slots. Did let patient Dr. Dowd is out of office until February 5th. Patient understood and ok to wait.

## 2024-01-29 ENCOUNTER — OFFICE VISIT (OUTPATIENT)
Dept: PULMONOLOGY | Facility: CLINIC | Age: 54
End: 2024-01-29
Attending: STUDENT IN AN ORGANIZED HEALTH CARE EDUCATION/TRAINING PROGRAM
Payer: COMMERCIAL

## 2024-01-29 DIAGNOSIS — R76.8 ANTI-CYCLIC CITRULLINATED PEPTIDE ANTIBODY POSITIVE: ICD-10-CM

## 2024-01-29 PROCEDURE — 94150 VITAL CAPACITY TEST: CPT | Performed by: INTERNAL MEDICINE

## 2024-01-29 PROCEDURE — 94726 PLETHYSMOGRAPHY LUNG VOLUMES: CPT | Performed by: INTERNAL MEDICINE

## 2024-01-29 PROCEDURE — 94375 RESPIRATORY FLOW VOLUME LOOP: CPT | Performed by: INTERNAL MEDICINE

## 2024-01-29 PROCEDURE — 94729 DIFFUSING CAPACITY: CPT | Performed by: INTERNAL MEDICINE

## 2024-01-30 LAB
DLCOUNC-%PRED-PRE: 110 %
DLCOUNC-PRE: 21.13 ML/MIN/MMHG
DLCOUNC-PRED: 19.08 ML/MIN/MMHG
ERV-%PRED-PRE: 17 %
ERV-PRE: 0.18 L
ERV-PRED: 1.04 L
EXPTIME-PRE: 8.1 SEC
FEF2575-%PRED-PRE: 92 %
FEF2575-PRE: 2.06 L/SEC
FEF2575-PRED: 2.22 L/SEC
FEFMAX-%PRED-PRE: 56 %
FEFMAX-PRE: 3.48 L/SEC
FEFMAX-PRED: 6.19 L/SEC
FEV1-%PRED-PRE: 83 %
FEV1-PRE: 1.88 L
FEV1FEV6-PRE: 82 %
FEV1FEV6-PRED: 81 %
FEV1FVC-PRE: 82 %
FEV1FVC-PRED: 81 %
FEV1SVC-PRE: 80 %
FEV1SVC-PRED: 74 %
FIFMAX-PRE: 3.77 L/SEC
FRCPLETH-%PRED-PRE: 73 %
FRCPLETH-PRE: 1.9 L
FRCPLETH-PRED: 2.58 L
FVC-%PRED-PRE: 81 %
FVC-PRE: 2.3 L
FVC-PRED: 2.81 L
IC-%PRED-PRE: 104 %
IC-PRE: 2.19 L
IC-PRED: 2.09 L
RVPLETH-%PRED-PRE: 98 %
RVPLETH-PRE: 1.72 L
RVPLETH-PRED: 1.75 L
TLCPLETH-%PRED-PRE: 88 %
TLCPLETH-PRE: 4.09 L
TLCPLETH-PRED: 4.6 L
VA-%PRED-PRE: 79 %
VA-PRE: 3.49 L
VC-%PRED-PRE: 76 %
VC-PRE: 2.37 L
VC-PRED: 3.08 L

## 2024-02-05 DIAGNOSIS — I10 PRIMARY HYPERTENSION: Primary | ICD-10-CM

## 2024-02-06 ENCOUNTER — TELEPHONE (OUTPATIENT)
Dept: FAMILY MEDICINE | Facility: CLINIC | Age: 54
End: 2024-02-06
Payer: COMMERCIAL

## 2024-02-06 ENCOUNTER — TRANSFERRED RECORDS (OUTPATIENT)
Dept: HEALTH INFORMATION MANAGEMENT | Facility: CLINIC | Age: 54
End: 2024-02-06
Payer: COMMERCIAL

## 2024-02-06 NOTE — TELEPHONE ENCOUNTER
Forms/Letter Request    Type of form/letter: Home Health Certification: Revision of care: Cert period 9/6/23 to 11/4/2023      Do we have the form/letter: Yes: Dr. Arizmendi    Who is the form from? Home care    Where did/will the form come from? form was faxed in    When is form/letter needed by: asap    How would you like the form/letter returned: Fax : [phone]

## 2024-02-07 ENCOUNTER — OFFICE VISIT (OUTPATIENT)
Dept: FAMILY MEDICINE | Facility: CLINIC | Age: 54
End: 2024-02-07
Payer: COMMERCIAL

## 2024-02-07 VITALS
BODY MASS INDEX: 27.79 KG/M2 | HEIGHT: 62 IN | OXYGEN SATURATION: 96 % | DIASTOLIC BLOOD PRESSURE: 76 MMHG | WEIGHT: 151 LBS | SYSTOLIC BLOOD PRESSURE: 126 MMHG | TEMPERATURE: 97.8 F | RESPIRATION RATE: 18 BRPM | HEART RATE: 88 BPM

## 2024-02-07 DIAGNOSIS — R35.0 URINARY FREQUENCY: Primary | ICD-10-CM

## 2024-02-07 DIAGNOSIS — E11.69 TYPE 2 DIABETES MELLITUS WITH OTHER SPECIFIED COMPLICATION, WITH LONG-TERM CURRENT USE OF INSULIN (H): ICD-10-CM

## 2024-02-07 DIAGNOSIS — Z79.4 TYPE 2 DIABETES MELLITUS WITH OTHER SPECIFIED COMPLICATION, WITH LONG-TERM CURRENT USE OF INSULIN (H): ICD-10-CM

## 2024-02-07 PROBLEM — M06.9 RHEUMATOID ARTHRITIS (H): Status: RESOLVED | Noted: 2023-09-11 | Resolved: 2024-02-07

## 2024-02-07 LAB
ALBUMIN UR-MCNC: NEGATIVE MG/DL
APPEARANCE UR: CLEAR
BILIRUB UR QL STRIP: NEGATIVE
COLOR UR AUTO: YELLOW
GLUCOSE UR STRIP-MCNC: NEGATIVE MG/DL
HGB UR QL STRIP: NEGATIVE
KETONES UR STRIP-MCNC: NEGATIVE MG/DL
LEUKOCYTE ESTERASE UR QL STRIP: NEGATIVE
NITRATE UR QL: NEGATIVE
PH UR STRIP: 6 [PH] (ref 5–8)
SP GR UR STRIP: 1.01 (ref 1–1.03)
UROBILINOGEN UR STRIP-ACNC: 0.2 E.U./DL

## 2024-02-07 PROCEDURE — 81003 URINALYSIS AUTO W/O SCOPE: CPT | Performed by: FAMILY MEDICINE

## 2024-02-07 PROCEDURE — 99214 OFFICE O/P EST MOD 30 MIN: CPT | Performed by: FAMILY MEDICINE

## 2024-02-07 RX ORDER — PHENAZOPYRIDINE HYDROCHLORIDE 100 MG/1
100 TABLET, FILM COATED ORAL 3 TIMES DAILY PRN
Qty: 15 TABLET | Refills: 0 | Status: SHIPPED | OUTPATIENT
Start: 2024-02-07 | End: 2024-02-19

## 2024-02-07 RX ORDER — CLOBETASOL PROPIONATE 0.5 MG/G
OINTMENT TOPICAL 2 TIMES DAILY
COMMUNITY
Start: 2024-01-23

## 2024-02-07 NOTE — PROGRESS NOTES
"  Assessment & Plan     Type 2 diabetes mellitus with other specified complication, with long-term current use of insulin (H)  Chronic, not well controlled. Discussed how to get refills for continuous glucose monitor, but cannot guarentee beyond three months. 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     Urinary frequency  Acute, ongoing for two weeks. Patient is non toxic appearing. Can consider interstitial cystitis. If patient still symptomatic, recommend urology referral.   - UA Macroscopic with reflex to Microscopic and Culture - Clinic Collect  - phenazopyridine (PYRIDIUM) 100 MG tablet  Dispense: 15 tablet; Refill: 0      Ordering of each unique test  Prescription drug management  No LOS data to display   Time spent by me doing chart review, history and exam, documentation and further activities per the note      BMI  Estimated body mass index is 27.53 kg/m  as calculated from the following:    Height as of this encounter: 1.577 m (5' 2.1\").    Weight as of this encounter: 68.5 kg (151 lb).   Weight management plan: Discussed healthy diet and exercise guidelines      See Patient Instructions    Tammi Pandya is a 54 year old, presenting for the following health issues:  Follow Up and Diabetes        2/7/2024    11:18 AM   Additional Questions   Roomed by Carina GARAY     Diabetes Follow-up    How often are you checking your blood sugar? Continuous glucose monitor  What time of day are you checking your blood sugars (select all that apply)?  Before meals  Have you had any blood sugars above 200?  Yes   Have you had any blood sugars below 70?  No  What symptoms do you notice when your blood sugar is low?  None  What concerns do you have today about your diabetes? None   Do you have any of these symptoms? (Select all that apply)  Numbness in feet and Blurry vision  Have you had a diabetic eye exam in " "the last 12 months? No    Patient is moving to [place]. Today she has been having two weeks of burning with urination, increased frequency. Patient endorsing some fever. Left sided flank pain.      BP Readings from Last 2 Encounters:   02/07/24 126/76   01/08/24 112/80     Hemoglobin A1C (%)   Date Value   12/18/2023 9.0 (H)   07/17/2023 9.6 (H)     LDL Cholesterol Calculated (mg/dL)   Date Value   05/16/2023 124 (H)       How many servings of fruits and vegetables do you eat daily?  2-3  On average, how many sweetened beverages do you drink each day (Examples: soda, juice, sweet tea, etc.  Do NOT count diet or artificially sweetened beverages)?   0  How many days per week do you exercise enough to make your heart beat faster? 3 or less  How many minutes a day do you exercise enough to make your heart beat faster? 9 or less  How many days per week do you miss taking your medication? 1  What makes it hard for you to take your medications?  remembering to take      Review of Systems  Constitutional, neuro, ENT, endocrine, pulmonary, cardiac, gastrointestinal, genitourinary, musculoskeletal, integument and psychiatric systems are negative, except as otherwise noted.      Objective    /76 (BP Location: Right arm, Patient Position: Sitting, Cuff Size: Adult Regular)   Pulse 88   Temp 97.8  F (36.6  C) (Oral)   Resp 18   Ht 1.577 m (5' 2.1\")   Wt 68.5 kg (151 lb)   LMP  (LMP Unknown)   SpO2 96%   BMI 27.53 kg/m    Body mass index is 27.53 kg/m .  Physical Exam   GENERAL: alert and no distress  EYES: Eyes grossly normal to inspection, PERRL and conjunctivae and sclerae normal  NECK: no adenopathy, no asymmetry, masses, or scars  ABDOMEN: soft, nontender, no hepatosplenomegaly, no masses and bowel sounds normal  MS: no gross musculoskeletal defects noted, no edema. No CVA tenderness.   SKIN: no suspicious lesions or rashes    Results for orders placed or performed in visit on 02/07/24 (from the past 24 " hour(s))   UA Macroscopic with reflex to Microscopic and Culture - Clinic Collect    Specimen: Urine, Midstream   Result Value Ref Range    Color Urine Yellow Colorless, Straw, Light Yellow, Yellow    Appearance Urine Clear Clear    Glucose Urine Negative Negative mg/dL    Bilirubin Urine Negative Negative    Ketones Urine Negative Negative mg/dL    Specific Gravity Urine 1.010 1.005 - 1.030    Blood Urine Negative Negative    pH Urine 6.0 5.0 - 8.0    Protein Albumin Urine Negative Negative mg/dL    Urobilinogen Urine 0.2 0.2, 1.0 E.U./dL    Nitrite Urine Negative Negative    Leukocyte Esterase Urine Negative Negative    Narrative    Microscopic not indicated           Signed Electronically by: MICHAEL JONAS DO

## 2024-02-08 ENCOUNTER — TELEPHONE (OUTPATIENT)
Dept: FAMILY MEDICINE | Facility: CLINIC | Age: 54
End: 2024-02-08
Payer: COMMERCIAL

## 2024-02-08 ENCOUNTER — MYC MEDICAL ADVICE (OUTPATIENT)
Dept: FAMILY MEDICINE | Facility: CLINIC | Age: 54
End: 2024-02-08

## 2024-02-08 NOTE — TELEPHONE ENCOUNTER
Forms/Letter Request    Type of form/letter: DME (wheelchair, hospital bed)    Type of DME requested: RENTAL HOSPI BED, SEMI ELECTRIC    Do we have the form/letter: Yes: TH    Who is the form from? [employer] (if other please explain)    Where did/will the form come from? form was faxed in    When is form/letter needed by: asap    How would you like the form/letter returned: Fax : [phone]

## 2024-02-09 ENCOUNTER — OFFICE VISIT (OUTPATIENT)
Dept: PODIATRY | Facility: CLINIC | Age: 54
End: 2024-02-09
Payer: COMMERCIAL

## 2024-02-09 VITALS
DIASTOLIC BLOOD PRESSURE: 69 MMHG | WEIGHT: 109 LBS | TEMPERATURE: 98.3 F | BODY MASS INDEX: 20.6 KG/M2 | SYSTOLIC BLOOD PRESSURE: 123 MMHG

## 2024-02-09 DIAGNOSIS — M21.41 PES PLANUS OF BOTH FEET: ICD-10-CM

## 2024-02-09 DIAGNOSIS — M20.11 HAV (HALLUX ABDUCTO VALGUS), RIGHT: ICD-10-CM

## 2024-02-09 DIAGNOSIS — E11.42 DIABETIC POLYNEUROPATHY ASSOCIATED WITH TYPE 2 DIABETES MELLITUS (H): Primary | ICD-10-CM

## 2024-02-09 DIAGNOSIS — M21.42 PES PLANUS OF BOTH FEET: ICD-10-CM

## 2024-02-09 DIAGNOSIS — L84 PRE-ULCERATIVE CALLUSES: ICD-10-CM

## 2024-02-09 DIAGNOSIS — L97.512 ULCER OF RIGHT FOOT WITH FAT LAYER EXPOSED (H): ICD-10-CM

## 2024-02-09 PROCEDURE — 11042 DBRDMT SUBQ TIS 1ST 20SQCM/<: CPT | Performed by: PODIATRIST

## 2024-02-09 PROCEDURE — 99213 OFFICE O/P EST LOW 20 MIN: CPT | Mod: 25 | Performed by: PODIATRIST

## 2024-02-09 NOTE — PATIENT INSTRUCTIONS
Thank you for choosing [facility] Podiatry / Foot & Ankle Surgery!    DR HIRSCH'S CLINIC:  [facility] [place]   [address]   [place] [address]      TRIAGE LINE: [phone]  APPOINTMENTS: [phone]  RADIOLOGY: [phone]  SET UP SURGERY: [phone]  PHYSICAL THERAPY: [phone]   FAX NUMBER: [phone]  BILLING QUESTIONS: [phone]       Follow up: 1 month      Please call to schedule your MRI/CT/Ultrasound/Arthrogram appointment.  The number is [phone].

## 2024-02-09 NOTE — LETTER
2/9/2024         RE: Nithya Matthews  [address] [place] [address]        Dear Colleague,    Thank you for referring your patient, Nithya Matthews, to the [facility] POD[facility]. Please see a copy of my visit note below.    Podiatry / Foot and Ankle Surgery Progress Note    February 9, 2024    Subject: Patient was seen for follow up on right foot ulcer.  Here with her family member who is interpreting for her.  They note that about 2 weeks ago there was a lot of drainage from the foot.  Notes it was way more swollen and she is having fevers and chills.  She has not been this last week.  They have been doing Nichole dressing changes but are out of dressing supplies.      Objective:  Vitals: /69   Temp 98.3  F (36.8  C) (Oral)   Wt 49.4 kg (109 lb)   LMP  (LMP Unknown)   BMI 20.60 kg/m        A1C: 6.7 (1/22/24)     General appearance: Patient is alert and fully cooperative with history & exam.  No sign of distress is noted during the visit.     Dermatologic: Preulcerative hyperkeratotic lesions to the plantar aspects of the first and fifth metatarsal heads bilaterally.  Upon debridement of the right first metatarsal head callus there is an ulceration noted.  This measures approximately 4.5cm x 3.5cm x 0.3 cm in depth after debridement which is significantly larger than previous visit.  No redness noted around the ulceration today.  No purulent drainage noted.  The wound is bigger than previous. .  Maceration around the wound edges With heavy/copious amounts of clear serous drainage.     Vascular: DP & PT pulses are intact & regular bilaterally.  No significant edema or varicosities noted.  CFT and skin temperature is normal to both lower extremities.     Neurologic: Lower extremity sensation is diminished to feet.     Musculoskeletal: Patient is ambulatory without assistive device or brace.  Prominent first metatarsal heads medially both feet.     Radiographs: Right foot x-ray - I have looked  at and reviewed xrays personally -degenerative changes throughout the foot.  No gas in the tissue.  No evidence of bone infection at this time.     ASSESSMENT:       Diabetic polyneuropathy associated with type 2 diabetes mellitus (H)  Ulcer of right foot with fat layer exposed (H)  Pre-ulcerative calluses  Pes planus of both feet  Hav (hallux abducto valgus), right       Medical Decision Making/Plan:  Reviewed patient's chart in epic.    At this time the ulcer was debrided.  Please see procedure note below.  The wound is bigger and with her having fevers and chills and more drainage I would recommend an MRI at this time to assess for deeper bone infection.  Her x-rays at previous visit were negative but the wound is not improving.   We will have her apply Aquacel Ag dressing changes daily to the ulcer and covering this with 4 x 4 gauze pads, gauze roll and tape.  We will put her in a post op boot to try to help get further pressure off of this area as I do not feel the shoe is helping enough to relieve pressure.  She was wearing her shoes again today and again expressed that she should be in the tall Aircast boot.  We discussed that if she develops fevers chills or streaking redness to the foot that she needs to go to the hospital as she may have a worsening infection.  We will call or MyChart her with the MRI results.  We will have her follow-up in 1 month for reassessment.     All questions were answered to patient and patient's daughter satisfaction and they will call for the questions or concerns.     Procedure: After verbal consent, excisional debridement was performed on ulcer.  #15 blade was used to debride ulcer down to and including subcutaneous tissue. Bleeding controlled with light pressure.   No drainage noted.  No anesthesia was used due to neuropathy. Dry dressing applied to foot.  Patient tolerated procedure well.     Patient Risk Factor:  Patient is a moderate risk factor for infection.        Durable Medical Equipment Wound Care Orders       Wound Care Order for DME - ONLY FOR DME   As directed      Medical Equipment (DME) Supplier: [employer]    PATIENT INSTRUCTIONS: If you did not receive this ordered item today, please contact [employer] for availability ([facility] Locations: [phone], [place]: [phone]).    Start Date: 2024    Required Documentation: .dmedocumentationall should be used to pull in required documentation into progress note    Wound Supply Order Options: Complex Wound    Optional: .dmewound can be used to pull in order specific information into documentation    Wound Number: Wound 1    Wound 1 Location: right foot ulcer    Wound 1 Dressing Change Frequency: Daily    Wound 1 Length of Need: 30 days    Wound 1 - Dressing Supplies:  Primary  Wrap/Gauze  Tape/Securing       Wound 1 - Primary Dressing Dispensing Instructions: Ok to Substitute    Wound 1 - Primary Dressing Types: Gelling Fiber w/ Silver    Wound 1 - Gelling Fiber w/ Silver Types: Aquacel AG Advantage    Wound 1 - Aquacel AG Advantage Size: 4 x 5    Wound 1 - Aquacel AG Advantage Quantity: 30    Wound 1 - Wrap/Gauze Types:  Rolls  Pads       Wound 1 - Roll Type: Bandage Roll Gauze    Wound 1 - Bandage Roll Gauze Size: 4.5 x 4.1    Wound 1 - Bandage Roll Gauze Quantity: 30 Rolls    Wound 1 - Pad Type: Sterile Gauze Pads    Wound 1 - Gauze Pad Size: 4 x 4 Comment - 2 per dressing change    Wound 1 - Gauze Qty for Dressing/Month: 60    Wound 1 - Tape Type: Medfix    Wound 1 - Medfix Size: 2 x 11    Wound 1 - Medfix Quantity: 1 Roll            Grecia Hearn DPM, Podiatry/Foot and Ankle Surgery      Again, thank you for allowing me to participate in the care of your patient.        Sincerely,        Grecia Hearn DPM, Podiatry/Foot and Ankle Surgery

## 2024-02-09 NOTE — PROGRESS NOTES
Podiatry / Foot and Ankle Surgery Progress Note    February 9, 2024    Subject: Patient was seen for follow up on right foot ulcer.  Here with her family member who is interpreting for her.  They note that about 2 weeks ago there was a lot of drainage from the foot.  Notes it was way more swollen and she is having fevers and chills.  She has not been this last week.  They have been doing Nichole dressing changes but are out of dressing supplies.      Objective:  Vitals: /69   Temp 98.3  F (36.8  C) (Oral)   Wt 49.4 kg (109 lb)   LMP  (LMP Unknown)   BMI 20.60 kg/m        A1C: 6.7 (1/22/24)     General appearance: Patient is alert and fully cooperative with history & exam.  No sign of distress is noted during the visit.     Dermatologic: Preulcerative hyperkeratotic lesions to the plantar aspects of the first and fifth metatarsal heads bilaterally.  Upon debridement of the right first metatarsal head callus there is an ulceration noted.  This measures approximately 4.5cm x 3.5cm x 0.3 cm in depth after debridement which is significantly larger than previous visit.  No redness noted around the ulceration today.  No purulent drainage noted.  The wound is bigger than previous. .  Maceration around the wound edges With heavy/copious amounts of clear serous drainage.     Vascular: DP & PT pulses are intact & regular bilaterally.  No significant edema or varicosities noted.  CFT and skin temperature is normal to both lower extremities.     Neurologic: Lower extremity sensation is diminished to feet.     Musculoskeletal: Patient is ambulatory without assistive device or brace.  Prominent first metatarsal heads medially both feet.     Radiographs: Right foot x-ray - I have looked at and reviewed xrays personally -degenerative changes throughout the foot.  No gas in the tissue.  No evidence of bone infection at this time.     ASSESSMENT:       Diabetic polyneuropathy associated with type 2 diabetes mellitus  (H)  Ulcer of right foot with fat layer exposed (H)  Pre-ulcerative calluses  Pes planus of both feet  Hav (hallux abducto valgus), right       Medical Decision Making/Plan:  Reviewed patient's chart in epic.    At this time the ulcer was debrided.  Please see procedure note below.  The wound is bigger and with her having fevers and chills and more drainage I would recommend an MRI at this time to assess for deeper bone infection.  Her x-rays at previous visit were negative but the wound is not improving.   We will have her apply Aquacel Ag dressing changes daily to the ulcer and covering this with 4 x 4 gauze pads, gauze roll and tape.  We will put her in a post op boot to try to help get further pressure off of this area as I do not feel the shoe is helping enough to relieve pressure.  She was wearing her shoes again today and again expressed that she should be in the tall Aircast boot.  We discussed that if she develops fevers chills or streaking redness to the foot that she needs to go to the hospital as she may have a worsening infection.  We will call or MyChart her with the MRI results.  We will have her follow-up in 1 month for reassessment.     All questions were answered to patient and patient's daughter satisfaction and they will call for the questions or concerns.     Procedure: After verbal consent, excisional debridement was performed on ulcer.  #15 blade was used to debride ulcer down to and including subcutaneous tissue. Bleeding controlled with light pressure.   No drainage noted.  No anesthesia was used due to neuropathy. Dry dressing applied to foot.  Patient tolerated procedure well.     Patient Risk Factor:  Patient is a moderate risk factor for infection.       Durable Medical Equipment Wound Care Orders       Wound Care Order for DME - ONLY FOR DME   As directed      Medical Equipment (DME) Supplier: [facility]    PATIENT INSTRUCTIONS: If you did not receive this ordered  item today, please contact [facility] for availability ([facility] Locations: [phone], [place]: [phone]).    Start Date: 2024    Required Documentation: .dmedocumentationall should be used to pull in required documentation into progress note    Wound Supply Order Options: Complex Wound    Optional: .dmewound can be used to pull in order specific information into documentation    Wound Number: Wound 1    Wound 1 Location: right foot ulcer    Wound 1 Dressing Change Frequency: Daily    Wound 1 Length of Need: 30 days    Wound 1 - Dressing Supplies:  Primary  Wrap/Gauze  Tape/Securing       Wound 1 - Primary Dressing Dispensing Instructions: Ok to Substitute    Wound 1 - Primary Dressing Types: Gelling Fiber w/ Silver    Wound 1 - Gelling Fiber w/ Silver Types: Aquacel AG Advantage    Wound 1 - Aquacel AG Advantage Size: 4 x 5    Wound 1 - Aquacel AG Advantage Quantity: 30    Wound 1 - Wrap/Gauze Types:  Rolls  Pads       Wound 1 - Roll Type: Bandage Roll Gauze    Wound 1 - Bandage Roll Gauze Size: 4.5 x 4.1    Wound 1 - Bandage Roll Gauze Quantity: 30 Rolls    Wound 1 - Pad Type: Sterile Gauze Pads    Wound 1 - Gauze Pad Size: 4 x 4 Comment - 2 per dressing change    Wound 1 - Gauze Qty for Dressing/Month: 60    Wound 1 - Tape Type: Medfix    Wound 1 - Medfix Size: 2 x 11    Wound 1 - Medfix Quantity: 1 Roll            Grecia Hearn DPM, Podiatry/Foot and Ankle Surgery

## 2024-02-10 NOTE — PROGRESS NOTES
Assessment & Plan     Type 2 diabetes mellitus with other specified complication, with long-term current use of insulin (H)  Chronic, severely progressed. Patient with urgent blood glucose of 585 but without ketonuria or metabolic acidosis on BMP. Patient likely with poorly controlled diabetes for some time. Insulin dependent for many years, however she did not check her blood sugar for the last year due to thinking her strips were . Recommend immediate refill of her medication. Will have basaglar increased to 20 units at night, adding metformin as tolerated to reduce a1c. Low concern for hypoglycemia, will continue glipizide for now. Consider daily GLP1 RA. Additionally, return in three days with blood glucose measurements ACHS to determine basal, meal time needs.   - lisinopril (ZESTRIL) 5 MG tablet  Dispense: 90 tablet; Refill: 0  - glipiZIDE (GLUCOTROL) 5 MG tablet  Dispense: 60 tablet; Refill: 0  - gabapentin (NEURONTIN) 300 MG capsule  Dispense: 270 capsule; Refill: 1  - insulin glargine (BASAGLAR KWIKPEN) 100 UNIT/ML pen  Dispense: 15 mL; Refill: 1  - Lipid panel reflex to direct LDL Non-fasting  - AMB Adult Diabetes Educator Referral  - REVIEW OF HEALTH MAINTENANCE PROTOCOL ORDERS  - Hemoglobin A1c  - Basic metabolic panel  (Ca, Cl, CO2, Creat, Gluc, K, Na, BUN)  - Albumin Random Urine Quantitative with Creat Ratio  - UA with Microscopic reflex to Culture - lab collect  - Lipid panel reflex to direct LDL Non-fasting  - Hemoglobin A1c  - Basic metabolic panel  (Ca, Cl, CO2, Creat, Gluc, K, Na, BUN)  - Albumin Random Urine Quantitative with Creat Ratio  - UA with Microscopic reflex to Culture - lab collect  - UA Microscopic with Reflex to Culture  - metFORMIN (GLUCOPHAGE XR) 500 MG 24 hr tablet  Dispense: 60 tablet; Refill: 3    Need for hepatitis C screening test  - Hepatitis C Screen Reflex to HCV RNA Quant and Genotype  - Hepatitis C Screen Reflex to HCV RNA Quant and Genotype    Screening for HIV  Therapy with vancomycin complete and/or consult discontinued by provider.  Pharmacy will sign off, please re-consult as needed.    "(human immunodeficiency virus)  - HIV Antigen Antibody Combo  - HIV Antigen Antibody Combo    Diagnosis or treatment significantly limited by social determinants of health - language barrier, does not have a written language  Ordering of each unique test  Prescription drug management  I spent a total of 47 minutes on the day of the visit.   Time spent by me doing chart review, history and exam, documentation and further activities per the note     BMI:   Estimated body mass index is 26.26 kg/m  as calculated from the following:    Height as of this encounter: 1.575 m (5' 2\").    Weight as of this encounter: 65.1 kg (143 lb 9.6 oz).   Weight management plan: Discussed healthy diet and exercise guidelines    See Patient Instructions    DO LUIS [facility] KAR Pandya is a 53 year old, presenting for the following health issues:  Physical (Patient moved here from [place])        2023    12:55 PM   Additional Questions   Roomed by Kesha MOLINA CMA   Accompanied by Daughter-in-law     HPI      Diabetes Follow-up    How often are you checking your blood sugar? Two times daily  Blood sugar testing frequency justification:  On insulin  What time of day are you checking your blood sugars (select all that apply)?  Before meals and before bedtime  Have you had any blood sugars above 200?  Yes - patient's high is over 300. Patient has not been doing it for the last year as she thought the test strips were   Have you had any blood sugars below 70?  No    What symptoms do you notice when your blood sugar is low?  None and Not applicable    What concerns do you have today about your diabetes? None and Other: thought machine was      Do you have any of these symptoms? (Select all that apply)  Blurry vision and Weight loss     Hypertension Follow-up      Do you check your blood pressure regularly outside of the clinic? No     Are you following a low salt diet? No    Are " "your blood pressures ever more than 140 on the top number (systolic) OR more   than 90 on the bottom number (diastolic), for example 140/90? No - do not know.     Patient has actually been hospitalized for condition in [place], which is where she moved from. She left the papers at home.     BP Readings from Last 2 Encounters:   05/16/23 118/80     No results found for: A1C, LDL    Review of Systems   Constitutional: Negative for chills and fever.   HENT: Negative for congestion, ear pain, hearing loss and sore throat.    Eyes: Positive for visual disturbance. Negative for pain.   Respiratory: Negative for cough and shortness of breath.    Cardiovascular: Negative for chest pain, palpitations and peripheral edema.   Gastrointestinal: Negative for abdominal pain, constipation, diarrhea, heartburn, hematochezia and nausea.   Breasts:  Negative for tenderness, breast mass and discharge.   Genitourinary: Positive for vaginal discharge. Negative for dysuria, frequency, genital sores, hematuria, pelvic pain, urgency and vaginal bleeding.   Musculoskeletal: Positive for myalgias. Negative for arthralgias and joint swelling.   Skin: Negative for rash.   Neurological: Positive for dizziness. Negative for weakness, headaches and paresthesias.   Psychiatric/Behavioral: Negative for mood changes. The patient is not nervous/anxious.           Objective    /80 (BP Location: Right arm, Patient Position: Sitting, Cuff Size: Adult Regular)   Pulse 88   Temp 98  F (36.7  C) (Oral)   Resp 12   Ht 1.575 m (5' 2\")   Wt 65.1 kg (143 lb 9.6 oz)   LMP  (LMP Unknown)   SpO2 96%   BMI 26.26 kg/m    Body mass index is 26.26 kg/m .  Physical Exam   GENERAL: healthy, alert and no distress  EYES: Eyes grossly normal to inspection, PERRL and conjunctivae and sclerae normal  NECK: no adenopathy, no asymmetry, masses, or scars and thyroid normal to palpation  RESP: lungs clear to auscultation - no rales, rhonchi or wheezes  CV: " regular rate and rhythm, normal S1 S2, no S3 or S4, no murmur, click or rub, no peripheral edema and peripheral pulses strong  MS: no gross musculoskeletal defects noted, no edema  SKIN: no suspicious lesions or rashes  PSYCH: mentation appears normal, affect normal/bright    Results for orders placed or performed in visit on 05/16/23 (from the past 24 hour(s))   Lipid panel reflex to direct LDL Non-fasting   Result Value Ref Range    Cholesterol 213 (H) <200 mg/dL    Triglycerides 285 (H) <150 mg/dL    Direct Measure HDL 32 (L) >=50 mg/dL    LDL Cholesterol Calculated 124 (H) <=100 mg/dL    Non HDL Cholesterol 181 (H) <130 mg/dL    Narrative    Cholesterol  Desirable:  <200 mg/dL    Triglycerides  Normal:  Less than 150 mg/dL  Borderline High:  150-199 mg/dL  High:  200-499 mg/dL  Very High:  Greater than or equal to 500 mg/dL    Direct Measure HDL  Female:  Greater than or equal to 50 mg/dL   Male:  Greater than or equal to 40 mg/dL    LDL Cholesterol  Desirable:  <100mg/dL  Above Desirable:  100-129 mg/dL   Borderline High:  130-159 mg/dL   High:  160-189 mg/dL   Very High:  >= 190 mg/dL    Non HDL Cholesterol  Desirable:  130 mg/dL  Above Desirable:  130-159 mg/dL  Borderline High:  160-189 mg/dL  High:  190-219 mg/dL  Very High:  Greater than or equal to 220 mg/dL   Hemoglobin A1c   Result Value Ref Range    Hemoglobin A1C 13.7 (H) 0.0 - 5.6 %    Narrative    Results confirmed by repeat test.     Basic metabolic panel  (Ca, Cl, CO2, Creat, Gluc, K, Na, BUN)   Result Value Ref Range    Sodium 132 (L) 136 - 145 mmol/L    Potassium 4.0 3.4 - 5.3 mmol/L    Chloride 96 (L) 98 - 107 mmol/L    Carbon Dioxide (CO2) 24 22 - 29 mmol/L    Anion Gap 12 7 - 15 mmol/L    Urea Nitrogen 8.2 6.0 - 20.0 mg/dL    Creatinine 0.40 (L) 0.51 - 0.95 mg/dL    Calcium 9.6 8.6 - 10.0 mg/dL    Glucose 585 (HH) 70 - 99 mg/dL    GFR Estimate >90 >60 mL/min/1.73m2   Albumin Random Urine Quantitative with Creat Ratio   Result Value Ref Range     Creatinine Urine mg/dL 23.0 mg/dL    Albumin Urine mg/L <12.0 mg/L    Albumin Urine mg/g Cr     UA with Microscopic reflex to Culture - lab collect    Specimen: Urine, Midstream   Result Value Ref Range    Color Urine Yellow Colorless, Straw, Light Yellow, Yellow    Appearance Urine Clear Clear    Glucose Urine 500 (A) Negative mg/dL    Bilirubin Urine Negative Negative    Ketones Urine Negative Negative mg/dL    Specific Gravity Urine <=1.005 1.005 - 1.030    Blood Urine Negative Negative    pH Urine 5.0 5.0 - 8.0    Protein Albumin Urine Negative Negative mg/dL    Urobilinogen Urine 0.2 0.2, 1.0 E.U./dL    Nitrite Urine Negative Negative    Leukocyte Esterase Urine Negative Negative   UA Microscopic with Reflex to Culture   Result Value Ref Range    Bacteria Urine None Seen None Seen /HPF    RBC Urine None Seen 0-2 /HPF /HPF    WBC Urine None Seen 0-5 /HPF /HPF    Squamous Epithelials Urine Few (A) None Seen /LPF    Narrative    Urine Culture not indicated

## 2024-02-12 ENCOUNTER — MEDICAL CORRESPONDENCE (OUTPATIENT)
Dept: HEALTH INFORMATION MANAGEMENT | Facility: CLINIC | Age: 54
End: 2024-02-12
Payer: COMMERCIAL

## 2024-02-13 ENCOUNTER — MYC REFILL (OUTPATIENT)
Dept: FAMILY MEDICINE | Facility: CLINIC | Age: 54
End: 2024-02-13
Payer: COMMERCIAL

## 2024-02-13 DIAGNOSIS — H04.123 DRY EYES: Primary | ICD-10-CM

## 2024-02-13 DIAGNOSIS — H04.123 DRY EYES: ICD-10-CM

## 2024-02-13 DIAGNOSIS — E11.69 TYPE 2 DIABETES MELLITUS WITH OTHER SPECIFIED COMPLICATION, UNSPECIFIED WHETHER LONG TERM INSULIN USE (H): ICD-10-CM

## 2024-02-13 RX ORDER — CARBOXYMETHYLCELLULOSE SODIUM, GLYCERIN, AND POLYSORBATE 80 5; 10; 5 MG/ML; MG/ML; MG/ML
SOLUTION/ DROPS OPHTHALMIC
Qty: 10 ML | Refills: 4 | OUTPATIENT
Start: 2024-02-13

## 2024-02-13 RX ORDER — ASPIRIN 81 MG/1
81 TABLET, COATED ORAL DAILY
Qty: 30 TABLET | Refills: 3 | Status: SHIPPED | OUTPATIENT
Start: 2024-02-13 | End: 2024-06-06

## 2024-02-13 RX ORDER — CARBOXYMETHYLCELLULOSE SODIUM, GLYCERIN, AND POLYSORBATE 80 5; 10; 5 MG/ML; MG/ML; MG/ML
SOLUTION/ DROPS OPHTHALMIC
Qty: 10 ML | Refills: 4 | Status: SHIPPED | OUTPATIENT
Start: 2024-02-13 | End: 2024-07-22

## 2024-02-15 ENCOUNTER — PATIENT OUTREACH (OUTPATIENT)
Dept: CARE COORDINATION | Facility: CLINIC | Age: 54
End: 2024-02-15

## 2024-02-15 ENCOUNTER — TRANSCRIBE ORDERS (OUTPATIENT)
Dept: OTHER | Age: 54
End: 2024-02-15

## 2024-02-15 DIAGNOSIS — D63.1 ANEMIA IN ESRD (END-STAGE RENAL DISEASE) (H): Primary | ICD-10-CM

## 2024-02-15 DIAGNOSIS — N18.6 ANEMIA IN ESRD (END-STAGE RENAL DISEASE) (H): Primary | ICD-10-CM

## 2024-02-15 NOTE — PROGRESS NOTES
Clinic Care Coordination Contact  [facility]/Voicemail       Clinical Data: CHW Outreach    Outreach attempted x 2. Spoke to Patient (Barriga)  CHW Introduced intent of call regarding monthly follow up.   Patients reports that she is doing okay, further indicating that no additional support or resources is needed. However,  ID: [id number] indicated that Patient may not fully understand . Patient further requested that she prepares an [employer] .  ID: [id number] connected CHW and Patient to an [employer] .   [employer]  ID: [id number] reintroduce  intent of call regarding monthly follow up/ CCC. Patient dropped call and  made another attempt to reach Patient.   CHW &  Left message on patient's voicemail with call back information and requested return call.    Plan: CHW will send message to Lead CC ([facility]) to determine whether a disenrollment letter should be sent to Patient or if additional attempt should be made to Patient. No further outreach attempts will be made. Should they return my call, I will discuss clinic care coordination per standard work.    ANDRES Cisse  Clinic Care Coordination   [facility]   Phone: [phone]  [email or web address]

## 2024-02-16 ENCOUNTER — PRE VISIT (OUTPATIENT)
Dept: ONCOLOGY | Facility: CLINIC | Age: 54
End: 2024-02-16
Payer: COMMERCIAL

## 2024-02-16 NOTE — TELEPHONE ENCOUNTER
Date/Description  HOLLEY     N Navigator February 16, 2024    2:17 PM  Referring Provider/Location:  Carl Rosas MD  [facility]  Dx and Code: N18.6, D63.1 (ICD-10-CM) - Anemia in ESRD (end-stage renal disease) (H)   Records Needed: [facility]    Call to [facility] [place] [phone] and transferred [phone].  Recording provided F:[phone]      Records Requested  TJ   Clinic name Comments/Action Taken   [facility] February 16, 2024 2:35 PM    Faxed request for all records  February 20, 2024  [facility] for status of request and details to send records

## 2024-02-19 ENCOUNTER — HOSPITAL ENCOUNTER (OUTPATIENT)
Dept: GENERAL RADIOLOGY | Facility: HOSPITAL | Age: 54
Discharge: HOME | End: 2024-02-19
Attending: FAMILY MEDICINE
Payer: COMMERCIAL

## 2024-02-19 ENCOUNTER — OFFICE VISIT (OUTPATIENT)
Dept: FAMILY MEDICINE | Facility: CLINIC | Age: 54
End: 2024-02-19
Payer: COMMERCIAL

## 2024-02-19 VITALS
HEART RATE: 91 BPM | DIASTOLIC BLOOD PRESSURE: 78 MMHG | RESPIRATION RATE: 16 BRPM | HEIGHT: 62 IN | OXYGEN SATURATION: 95 % | SYSTOLIC BLOOD PRESSURE: 124 MMHG | WEIGHT: 151 LBS | TEMPERATURE: 98.3 F | BODY MASS INDEX: 27.79 KG/M2

## 2024-02-19 DIAGNOSIS — R35.0 URINARY FREQUENCY: ICD-10-CM

## 2024-02-19 DIAGNOSIS — M54.50 ACUTE LEFT-SIDED LOW BACK PAIN WITHOUT SCIATICA: ICD-10-CM

## 2024-02-19 DIAGNOSIS — Z79.4 TYPE 2 DIABETES MELLITUS WITH OTHER SPECIFIED COMPLICATION, WITH LONG-TERM CURRENT USE OF INSULIN (H): ICD-10-CM

## 2024-02-19 DIAGNOSIS — E11.69 TYPE 2 DIABETES MELLITUS WITH OTHER SPECIFIED COMPLICATION, WITH LONG-TERM CURRENT USE OF INSULIN (H): ICD-10-CM

## 2024-02-19 DIAGNOSIS — N30.90 CYSTITIS: ICD-10-CM

## 2024-02-19 DIAGNOSIS — M54.50 ACUTE LEFT-SIDED LOW BACK PAIN WITHOUT SCIATICA: Primary | ICD-10-CM

## 2024-02-19 PROCEDURE — 72220 X-RAY EXAM SACRUM TAILBONE: CPT

## 2024-02-19 PROCEDURE — 72100 X-RAY EXAM L-S SPINE 2/3 VWS: CPT

## 2024-02-19 PROCEDURE — 99214 OFFICE O/P EST MOD 30 MIN: CPT | Performed by: FAMILY MEDICINE

## 2024-02-19 RX ORDER — PHENAZOPYRIDINE HYDROCHLORIDE 100 MG/1
100 TABLET, FILM COATED ORAL 3 TIMES DAILY PRN
Qty: 30 TABLET | Refills: 0 | Status: SHIPPED | OUTPATIENT
Start: 2024-02-19

## 2024-02-19 RX ORDER — CYCLOBENZAPRINE HCL 10 MG
10 TABLET ORAL 3 TIMES DAILY PRN
Qty: 30 TABLET | Refills: 0 | Status: SHIPPED | OUTPATIENT
Start: 2024-02-19

## 2024-02-19 ASSESSMENT — PATIENT HEALTH QUESTIONNAIRE - PHQ9
SUM OF ALL RESPONSES TO PHQ QUESTIONS 1-9: 16
SUM OF ALL RESPONSES TO PHQ QUESTIONS 1-9: 16
10. IF YOU CHECKED OFF ANY PROBLEMS, HOW DIFFICULT HAVE THESE PROBLEMS MADE IT FOR YOU TO DO YOUR WORK, TAKE CARE OF THINGS AT HOME, OR GET ALONG WITH OTHER PEOPLE: SOMEWHAT DIFFICULT

## 2024-02-19 NOTE — PATIENT INSTRUCTIONS
There is no broken bones in your back.  I want you to get the lump on your back imaged when you go to [place].   PLEASE see the UROGYNECOLOGIST. This is a special doctor for your bladder pain.   I ordered pain medication for the back. It is too relax the muscles. If this does not help the pain PLEASE CALL US.

## 2024-02-19 NOTE — TELEPHONE ENCOUNTER
Thank you for the updates. Can you please set up ride for her upcoming appointments MRI on 2/27 and podiatry on 3/6. I can call patient the day before to remind her of the appts.

## 2024-02-19 NOTE — RESULT ENCOUNTER NOTE
Results discussed directly with patient while patient was present. Any further details documented in the note.   MICHAEL JONAS, DO

## 2024-02-19 NOTE — PROGRESS NOTES
Assessment & Plan     Acute left-sided low back pain without sciatica  Acute on chronic, likely MSK, however there is a soft tissue mass concerning for possible malignancy. Could consider lipoma, however irregular shape and quite tender. Recommend ultrasound, however patient defers study until she can return to [place].   - XR Lumbar Spine 2/3 Views  - XR Sacrum and Coccyx 2 Views  - cyclobenzaprine (FLEXERIL) 10 MG tablet  Dispense: 30 tablet; Refill: 0  - US Soft Tissue Abdominal Wall or Lower Back    Urinary frequency  Acute, not well controlled. Minimal relief with pyridium, afebrile today. Recommend Urogynecology referral for cystoscopy.   - phenazopyridine (PYRIDIUM) 100 MG tablet  Dispense: 30 tablet; Refill: 0    Cystitis  As above.   - Adult Uro/Gyn  Referral    Type 2 diabetes mellitus with other specified complication, with long-term current use of insulin (H)  Chronic, not well controlled. Urged patient to establish care very early in her move to maintain adequate care.       Ordering of each unique test  Prescription drug management  I spent a total of 29 minutes on the day of the visit.   Time spent by me doing chart review, history and exam, documentation and further activities per the note      Depression Screening Follow Up        2/19/2024     2:00 PM   PHQ   PHQ-9 Total Score 16   Q9: Thoughts of better off dead/self-harm past 2 weeks Not at all       Follow Up Actions Taken  Patient counseled, no additional follow up at this time.       See Patient Instructions    Subjective   Mumtaz is a 54 year old, presenting for the following health issues:  Follow Up (burning with urination -Pt states I take the medication, is improving but is not completely go away. )        2/19/2024     2:19 PM   Additional Questions   Roomed by Carina Weldon     History of Present Illness       Back Pain:  She presents for follow up of back pain. Patient's back pain is a new problem.    Original cause of back pain:  other  First noticed back pain: in the last week  Patient feels back pain: dailyLocation of back pain:  Right middle of back  Description of back pain: burning, dull ache, sharp and stabbing  Back pain spreads: right buttocks and left thigh    Since patient first noticed back pain, pain is: rapidly worsening  Does back pain interfere with her job:  Yes  On a scale of 1-10 (10 being the worst), patient describes pain as:  5  What makes back pain worse: lying down, sitting, standing and twisting   Acupuncture: not helpful  Acetaminophen: not helpful  Activity or exercise: not helpful  Cold: not helpful  Heat: not helpful  Massage: not helpful  Muscle relaxants: not helpful  NSAIDS: not helpful  Opioids: not helpful  Physical Therapy: not tried  Rest: helpful  Steroid Injection: not tried  Stretching: not tried  Surgery: not tried  TENS unit: not tried  Topical pain relievers: not tried  Other healthcare providers patient is seeing for back pain: None    Diabetes:   She presents for follow up of diabetes.  She is checking home blood glucose four or more times daily.   She checks blood glucose before meals.  Blood glucose is sometimes over 200 and never under 70. She is aware of hypoglycemia symptoms including shakiness, dizziness, weakness, blurred vision and confusion.   She is concerned about blood sugar frequently over 200 and other.   She is having numbness in feet, burning in feet, redness, sores, or blisters on feet and blurry vision.  The patient has not had a diabetic eye exam in the last 12 months.          Hypertension: She presents for follow up of hypertension.  She does check blood pressure  regularly outside of the clinic. Outside blood pressures have been over 140/90. She follows a low salt diet.     She eats 0-1 servings of fruits and vegetables daily.She consumes 0 sweetened beverage(s) daily.She exercises with enough effort to increase her heart rate 9 or less minutes per day.  She exercises with  "enough effort to increase her heart rate 3 or less days per week.   She is taking medications regularly.     Pain over the left side/ over the flank area. Tends to worsen with changing position. Ongoing through out the day. Worsened recently, but ongoing chronically, patient unable to recall.           Objective    /78 (BP Location: Right arm, Patient Position: Sitting, Cuff Size: Adult Regular)   Pulse 91   Temp 98.3  F (36.8  C) (Oral)   Resp 16   Ht 1.577 m (5' 2.1\")   Wt 68.5 kg (151 lb)   LMP  (LMP Unknown)   SpO2 95%   BMI 27.53 kg/m    Body mass index is 27.53 kg/m .  Physical Exam   GENERAL: alert and no distress  EYES: Eyes grossly normal to inspection, PERRL and conjunctivae and sclerae normal  NECK: no adenopathy, no asymmetry, masses, or scars  MS: no gross musculoskeletal defects noted, no edema  BACK: no CVA tenderness, L2-4 tenderness over the spinous process, tenderness adjacent to the lumbar spine with soft tissue (multilobulated) mass that is mobile and painful    XR Sacrum and Coccyx 2 Views    Result Date: 2/19/2024  EXAM: XR SACRUM AND COCCYX 2 VIEWS LOCATION: [facility] DATE: 2/19/2024 INDICATION: acute tenderness over the spine. left soft tissue mass adjacent to the L5 S1 area COMPARISON: Correlation with same day lumbar spine radiographs.     IMPRESSION: Mild degenerative arthrosis of both SI joints. Transitional lumbosacral anatomy. No acute fracture.    XR Lumbar Spine 2/3 Views    Result Date: 2/19/2024  EXAM: XR LUMBAR SPINE 2/3 VIEWS LOCATION: [facility] DATE: 2/19/2024 INDICATION: Pinpoint tenderness over the the lumbar spine COMPARISON: Lumbar spine radiograph 09/07/2023     IMPRESSION: Transitional L5 which is partially sacralized on the right. Subtle levocurvature of the lumbar spine. Sagittal alignment is within normal limits. Mild intervertebral disc height loss and mild facet arthropathy at L4-L5 and L5-S1. The " visualized bony pelvis is within normal limits. Surgical clips project over the right upper quadrant.         Signed Electronically by: MICHAEL JONAS DO

## 2024-02-21 ENCOUNTER — OFFICE VISIT (OUTPATIENT)
Dept: PHARMACY | Facility: CLINIC | Age: 54
End: 2024-02-21
Payer: COMMERCIAL

## 2024-02-21 VITALS
OXYGEN SATURATION: 92 % | SYSTOLIC BLOOD PRESSURE: 116 MMHG | WEIGHT: 107.7 LBS | BODY MASS INDEX: 20.35 KG/M2 | HEART RATE: 80 BPM | DIASTOLIC BLOOD PRESSURE: 62 MMHG

## 2024-02-21 DIAGNOSIS — K59.00 CONSTIPATION, UNSPECIFIED CONSTIPATION TYPE: ICD-10-CM

## 2024-02-21 DIAGNOSIS — E11.69 TYPE 2 DIABETES MELLITUS WITH OTHER SPECIFIED COMPLICATION, UNSPECIFIED WHETHER LONG TERM INSULIN USE (H): ICD-10-CM

## 2024-02-21 DIAGNOSIS — G62.9 NEUROPATHY: ICD-10-CM

## 2024-02-21 DIAGNOSIS — F32.A DEPRESSION, UNSPECIFIED DEPRESSION TYPE: ICD-10-CM

## 2024-02-21 DIAGNOSIS — E78.5 HYPERLIPIDEMIA LDL GOAL <100: ICD-10-CM

## 2024-02-21 DIAGNOSIS — I10 PRIMARY HYPERTENSION: Primary | ICD-10-CM

## 2024-02-21 DIAGNOSIS — Z78.9 TAKES DIETARY SUPPLEMENTS: ICD-10-CM

## 2024-02-21 DIAGNOSIS — K21.00 GASTROESOPHAGEAL REFLUX DISEASE WITH ESOPHAGITIS WITHOUT HEMORRHAGE: ICD-10-CM

## 2024-02-21 PROCEDURE — 99607 MTMS BY PHARM ADDL 15 MIN: CPT | Performed by: PHARMACIST

## 2024-02-21 PROCEDURE — 99605 MTMS BY PHARM NP 15 MIN: CPT | Performed by: PHARMACIST

## 2024-02-21 RX ORDER — INSULIN GLARGINE 100 [IU]/ML
INJECTION, SOLUTION SUBCUTANEOUS
Qty: 15 ML | Refills: 3 | Status: SHIPPED | OUTPATIENT
Start: 2024-02-21 | End: 2024-04-22

## 2024-02-21 NOTE — Clinical Note
Patient has referral to heme/onc for anemia - but I dont see scheduled visit for this, can someone reach out to help her schedule if not yet scheduled? Scottie

## 2024-02-21 NOTE — PROGRESS NOTES
Medication Therapy Management (MTM) Encounter    ASSESSMENT:                            Medication Adherence/Access: No issues identified    1. Type 2 diabetes mellitus with other specified complication, unspecified whether long term insulin use (H)  A1c at goal <8%. Blood sugar per CGM improving, though hypoglycemic events have returned, and concerning that occurring mainly overnight, therefore will decrease basal insulin dose again today.  If needed in the future, could consider slight increase in middle of the day dose of NovoLog to cover for postprandial elevations.    2. Primary hypertension  BP at goal <130/80 mmHg. Medication discrepancies identified, recommend patient resume currently prescribed dose, carvedilol 12.5 mg twice daily.  Additionally, will discontinue amlodipine due to well-controlled BP.    3. Hyperlipidemia LDL goal <100  Fenofibrate discontinued at last visit, could consider rechecking lipid panel in 1 to 2 months to determine if additional therapy warranted.    4. Takes dietary supplements  Patient referred to heme/onc for anemia of CKD, though does not yet appear that visit is scheduled, routing to specialty  to help with this today.    5. Gastroesophageal reflux disease with esophagitis without hemorrhage  Stable, continue current therapy.    6. Depression, unspecified depression type  Recommended sleep hygiene techniques to help with insomnia today.  Could consider dose increase of mirtazapine in the future if needed, though symptoms likely related to anemia.  Therefore no medication changes made today.    7. Neuropathy  Stable.    8. Constipation, unspecified constipation type  Stable.    PLAN:                            Patient to use higher dose, carvedilol 12.5 mg twice daily as prescribed   Decrease dose: Lantus 34 units on each side of abdomen daily (total 68 units daily)  Discontinue amlodipine - not needed at this time  Patient to check BP at home and bring readings to  PCP and nephrology  Routing to specialty scheduling to help with scheduling heme/onc appointment    Follow-up: Return in about 4 weeks (around 3/20/2024) for With PharmD.    SUBJECTIVE/OBJECTIVE:                          Nithya Matthews is a 54 year old female coming in for a follow-up visit from 12/20/23. Patient was accompanied by daughter. Patient seen with Ruben ; Panda Worthington .      Reason for visit: MTM follow up.    Allergies/ADRs: Reviewed in chart  Past Medical History: Reviewed in chart  Tobacco: She reports that she has never smoked. She has never been exposed to tobacco smoke. She has never used smokeless tobacco.  Alcohol: none    Medication Adherence/Access: States that her daughter helps with managing her medications. She has a nurse that prepares the three times daily pillbox for her every 2 weeks and her daughter gives her medications every day. Reports no missed doses of medications.   Brings with med vials, pillbox, insulin pens, and glucometer today. Pharmacy usually delivers medications to her home.     Diabetes   Lantus 76 (38 units on each side) daily in the morning  NovoLog 5 units 2-3 times daily directly before meals  Victoza 1.8 mg daily in the morning  Aspirin 81mg daily  Patient is not experiencing side effects.   Daughter gives her the injections, reports no missed doses.  Blood sugar monitoring: Continuous Glucose Monitor - Claribel 14 day; see below  Current diabetes symptoms: hypoglycemia, see below.   Diet/Exercise: Eating rice 2 meals per day. Reports appetite has been variable. Reports once in while feels like she doesn't want to eat, if she doesn't eat rice then she feels shaky. Middle meal of the day usually the biggest and also when she eats fruits. Has been reducing the portions of food she is eating.   Med Hx: metformin (CKD)     Eye exam is up to date  Foot exam is up to date  Urine Albumin:   Lab Results   Component Value Date    UMALCR  10/23/2023      Comment:       Unable to calculate, urine albumin and/or urine creatinine is outside detectable limits.  Microalbuminuria is defined as an albumin:creatinine ratio of 17 to 299 for males and 25 to 299 for females. A ratio of albumin:creatinine of 300 or higher is indicative of overt proteinuria.  Due to biologic variability, positive results should be confirmed by a second, first-morning random or 24-hour timed urine specimen. If there is discrepancy, a third specimen is recommended. When 2 out of 3 results are in the microalbuminuria range, this is evidence for incipient nephropathy and warrants increased efforts at glucose control, blood pressure control, and institution of therapy with an angiotensin-converting-enzyme (ACE) inhibitor (if the patient can tolerate it).        Lab Results   Component Value Date    A1C 6.7 (H) 01/22/2024           Hypertension /CKD  Lisinopril 20 mg daily  Amlodipine 2.5 mg daily - started 1/22 per PCP, brings with vial today but not yet in pillbox.   Carvedilol 12.5 mg twice daily - though brings in 6.25 mg tablets which are set up in pillbox twice daily - does not bring vial of 12.5 mg tabs (though looks like this dose was most recently filled 12/5).   Calcium Acetate 667 mg three times daily with meals   Calcium carbonate 500 mg 3 tablets twice daily per nephrologist  Dialysis Tues/Thurs/Sat.   Patient reports the following medication side effects: dizziness when bending down.  Headaches occasionally.   Patient does self-monitor blood pressure, reports she doesn't know what the readings have been lately.   Nephrologist: Carl Rosas MD at [facility]     BP Readings from Last 3 Encounters:   02/21/24 116/62   02/09/24 123/69   01/22/24 (!) 160/64     Pulse Readings from Last 3 Encounters:   02/21/24 80   01/22/24 83   12/20/23 78     Hyperlipidemia   Atorvastatin 80mg daily  Fish Oil 1000mg once daily  Patient reports the following side effects: muscle pain only once in a while.    Med Hx: fenofibrate (stopped d/t CKD)  The ASCVD Risk score (Slava FRASER, et al., 2019) failed to calculate for the following reasons:    The valid total cholesterol range is 130 to 320 mg/dL     Recent Labs   Lab Test 10/23/23  1123 12/28/22  1506   CHOL 124 189   HDL 26* 34*   LDL 53 82   TRIG 224* 365*     Supplements /anemia  Vitamin B12 1000 mcg daily  Ferrous sulfate 325 mg every other day  Ergocalciferol 50,000 units once weekly  No reported issues at this time. Patient has upcoming visit with heme/onc for anemia d/t ESRD - notes recent worsening of weakness.    Latest Reference Range & Units 06/27/23 04:19   Vitamin B12 232 - 1,245 pg/mL 1,095     Lab Results   Component Value Date    VITDT 17 (L) 12/28/2022    VITDT 12 (L) 07/27/2022    VITDT 17 (L) 03/17/2022      GERD    Omeprazole 20 mg once daily   Patient feels that current regimen is effective.     Depression:   Escitalopram 10 mg daily  Mirtazapine 7.5 mg daily at bedtime  States that only once in a while will have trouble sleeping, otherwise good. Avoids daytime naps, does not watch TV or use phone before bed. Reports that she does worry about her poor health all the time, otherwise she is ok. Reports weakness from medical conditions, reports this is a new occurrence.   Appetite has been good and normal.   Wt Readings from Last 3 Encounters:   02/21/24 107 lb 11.2 oz (48.9 kg)   02/09/24 109 lb (49.4 kg)   01/22/24 109 lb 1.9 oz (49.5 kg)     Neuropathy/Pain:  Gabapentin 300 mg at bedtime  Voltaren gel 1% 4 g 4 times daily as needed for rib/chest pain  Acetaminophen 500 mg to 1000 mg every 6 hours as needed - reports this helps her headaches.   Oxycodone 5 mg 1/2 tab every 8 hours as needed, reports only taking when needed and finds effective  Per Podiatry from 2/9/24 - patient should be wearing boot for right foot ulcer. Worried about her pain in her toes. No fever or chills. Aware to go to ER if worsening.      Constipation:  Miralax 17 g daily as  needed   Finds medication effective, only using as needed.      Today's Vitals: /62   Pulse 80   Wt 107 lb 11.2 oz (48.9 kg)   LMP  (LMP Unknown)   SpO2 92%   BMI 20.35 kg/m    ----------------      I spent 60 minutes with this patient today. All changes were made via collaborative practice agreement with Giancarlo Arizmendi MD. A copy of the visit note was provided to the patient's provider(s).    A summary of these recommendations was given to the patient.    Shital Hernandez, PharmD, BCACP  Medication Therapy Management Pharmacist     Medication Therapy Recommendations  Primary hypertension    Current Medication: amLODIPine (NORVASC) 2.5 MG tablet (Discontinued)   Rationale: No medical indication at this time - Unnecessary medication therapy - Indication   Recommendation: Discontinue Medication   Status: Accepted per CPA          Current Medication: carvedilol (COREG) 12.5 MG tablet   Rationale: Dose too low - Dosage too low - Effectiveness   Recommendation: Increase Dose   Status: Patient Agreed - Adherence/Education         Type 2 diabetes mellitus with other specified complication, unspecified whether long term insulin use (H)    Current Medication: insulin glargine (LANTUS SOLOSTAR) 100 UNIT/ML pen   Rationale: Dose too high - Dosage too high - Safety   Recommendation: Decrease Dose   Status: Accepted per CPA

## 2024-02-21 NOTE — TELEPHONE ENCOUNTER
RECORDS STATUS - ALL OTHER DIAGNOSIS      RECORDS RECEIVED FROM: [facility]   Appt Date:   Action February 28, 2024 2:42 PM HOLLEY   Incoming Records Records received from [facility] and sent to [facility] for upload     Action    Action Taken 2/21/2024 1:44pm [facility]PER Jane called back and said they didn't receive Nina's original request from 2/16/2024. They asked her to send it again to the same fax #.    2/22/2024 1pm CASPER   I sent a new fax request to [facility]   February 23, 2024  No records received to date.  Call to [facility] and KAREN letting them know I refaxed again and would like a CB with receipt.      NOTES STATUS DETAILS   OFFICE NOTE from referring provider     OFFICE NOTE from medical oncologist     DISCHARGE SUMMARY from hospital     DISCHARGE REPORT from the ER     OPERATIVE REPORT     MEDICATION LIST     CLINICAL TRIAL TREATMENTS TO DATE     LABS     PATHOLOGY REPORTS     ANYTHING RELATED TO DIAGNOSIS     GENONOMIC TESTING     TYPE:     IMAGING (NEED IMAGES & REPORT)     CT SCANS     MRI     MAMMO     ULTRASOUND     PET

## 2024-02-21 NOTE — PATIENT INSTRUCTIONS
"Recommendations from today's MTM visit:                                                    MTM (medication therapy management) is a service provided by a clinical pharmacist designed to help you get the most of out of your medicines.      Patient to use higher dose, carvedilol 12.5 mg twice daily as prescribed   Decrease dose: Lantus 34 units on each side of abdomen daily (total 68 units daily)  Discontinue amlodipine - not needed at this time  Patient to continue checking BP at home and bring readings to next visit and kidney doctor    Follow-up: Return in about 4 weeks (around 3/20/2024) for With PharmD.    It was great speaking with you today.  I value your experience and would be very thankful for your time in providing feedback in our clinic survey. In the next few days, you may receive an email or text message from [employer] with a link to a survey related to your  clinical pharmacist.\"     To schedule another MTM appointment, please call the clinic directly or you may call the MTM scheduling line at [phone].    My Clinical Pharmacist's contact information:                                                      Please feel free to contact me with any questions or concerns you have.      Shital Hernandez, PharmD, BCACP  Medication Therapy Management Pharmacist    "

## 2024-02-22 ENCOUNTER — PATIENT OUTREACH (OUTPATIENT)
Dept: CARE COORDINATION | Facility: CLINIC | Age: 54
End: 2024-02-22
Payer: COMMERCIAL

## 2024-02-22 NOTE — PROGRESS NOTES
"Clinic Care Coordination Contact  Care Coordination Clinician Chart Review    Situation: Patient chart reviewed by Care Coordinator.       Background: Care Coordination Program started: 3/1/2022. Initial assessment completed and patient-centered care plan(s) were developed with participation from patient. Lead CC handed patient off to CHW for continued outreaches.       Assessment: Per chart review, patient outreach completed by CC CHW on 2/15/2024.  Patient is actively working to accomplish goal(s). Patient's goal(s) appropriate and relevant at this time. Patient is due for updated Plan of Care.  Assessments will be completed annually or as needed/with change of patient status.     Moved patient back to \"enroll\" status with new goal for hematology. Per chart review, patient's nephrologist referred patient to hematology through [facility]. New goal created. Appt hasn't scheduled yet.     have a recent referral to Hematology in this patient's chart. She is out of network to be seen by [facility]. Please send referral order to [facility]. Thanks.    Carl Rosas MD 2/13/2024, 6:34 PM       Care Plan: Hematology       Problem: evaluation       Goal: Patient will attend hematology clinic appointment in the next 6 months.       Note:     Barriers: language barrier, low literacy, noncompliance, and lack of knowledge how to navigate complex health care system  Strengths: motivated to attend appt  Patient expressed understanding of goal: Yes    Action steps to achieve this goal:  1. I will answer my phone when I am contacted to schedule my appointment.  2. I will attend my initial hematology appointment as scheduled  TBD  3. I will schedule a follow up appointment with my provider if it is recommended to do so while I am at the clinic.  4. I will follow up with CCC regarding this goal at each outreach until it is completed.                                    Plan/Recommendations: The patient will " continue working with Care Coordination to achieve goal(s) as above. CHW will continue outreaches at minimum every 30 days and will involve Lead CC as needed or if patient is ready to move to Maintenance. Lead CC will continue to monitor CHW outreaches and patient's progress to goal(s) every 6 weeks.     Plan of Care updated and sent to patient: Yes, via mail

## 2024-02-22 NOTE — LETTER
[facility]  Patient Centered Plan of Care  About Me:        Patient Name:  Nithya Matthews    YOB: 1970  Age:         54 year old   Johann MRN:    [id number] Telephone Information:  Home Phone [phone]   Mobile [phone]   Home Phone [phone]   Mobile Not on file.       Address:  [address] [place] [address] Email address:  [email or web address]      Emergency Contact(s)    Name Relationship Lgl Grd Work Phone Home Phone Mobile Phone   JESSY VAUHGN Daughter    [phone]           Primary language:  St Helenian     needed? Yes   [facility] [employer] [facility]:  [phone] op. 1  Other communication barriers:Language barrier    Preferred Method of Communication:     Current living arrangement: I live in a private home with family    Mobility Status/ Medical Equipment: Independent w/Device        Health Maintenance  Health Maintenance Reviewed: Not assessed      My Access Plan  Medical Emergency 911   Primary Clinic Line [facility] [phone]   24 Hour Appointment Line [phone] or  [phone]) (toll-free)   24 Hour Nurse Line [phone] (toll-free)   Preferred Urgent Care [facility] [phone]     [facility]  [phone]     Preferred Pharmacy [facility] - [place], [place] - [address]     Behavioral Health Crisis Line The National Suicide Prevention Lifeline at 1-792.841.6611 or Text/Call 568           My Care Team Members  Patient Care Team         Relationship Specialty Notifications Start End    Giancarlo Arizmendi MD PCP - General Family Medicine  2/14/22     Referring to Eye    Phone: [phone] Fax: [phone]         [address] [facility] [address] [place] [address]    Giancarlo Arizmendi MD Assigned PCP   2/20/22     Phone: [phone] Fax: [phone]         [address] [place] [address]    Irene Samson CHW [facility]  Health Worker Primary Care - CC Admissions 3/1/22     Phone: [phone]         Albert Moe ARM worker   3/2/22     [facility]. Call [facility] worker  Avani Nelson [phone] for assistance.    Phone: [phone]         Miracle Mi OD  Optometry  3/18/22     Phone: [phone] Fax: [phone]         [address] [place] [address]    Shital Hernandez, PharmD Pharmacist Pharmacist  4/6/22     Phone: [phone]          [facility] [address] [place] [address]    Grecia Hearn DPM, Podiatry/Foot and Ankle Surgery Assigned Musculoskeletal Provider   5/1/22     Phone: [phone] Pager: [phone] Fax: [phone] [address] [address] [address]    Davis Villa MD MD Endocrinology, Diabetes, and Metabolism  6/2/22     Phone: [phone] Fax: [phone]         [facility] [address]    Shital Hernandez, PharmD Assigned [facility] Pharmacist   9/28/22     Phone: [phone]          [facility] [address] [place] [address]    Sahil Waterman MD Assigned Nephrology Provider   10/29/22     Phone: [phone] Fax: [phone]         [address] [place] [address]    Davis Villa MD Assigned Endocrinology Provider   12/24/22     Phone: [phone] Fax: [phone]         [facility] [address]    Carl Rosas MD Nephrologist Nephrology  3/9/23     Phone: [phone] Fax: [phone]         [address] [address]    Janel Jhaveri, RN Registered Nurse Primary Care - CC Admissions 4/19/23     Phone: [phone]         Markell Langford MD Assigned Surgical Provider   5/6/23     Phone: [phone] Fax: [phone]         [address] [address]    Negin Nunes NP Nurse Practitioner   5/30/23     Phone: [phone] Fax: [phone]         [facility]  [address] [facility] [address]    Janel Jhaveri, RN Lead Care Coordinator Primary Care - CC Admissions 2/7/24     Phone: [phone]                     My Care Plans  Self Management and Treatment Plan    Care Plan  Care Plan: Hematology       Problem: evaluation       Goal: Patient will attend hematology clinic appointment in the next 6 months.       Note:     Barriers: language barrier, low literacy, noncompliance, and lack of knowledge how to navigate complex health care system  Strengths: motivated to attend appt  Patient expressed understanding of goal: Yes    Action steps to achieve this goal:  1. I will answer my phone when I am contacted to schedule my appointment.  2. I will attend my initial hematology appointment as scheduled  TBD  3. I will schedule a follow up appointment with my provider if it is recommended to do so while I am at the clinic.  4. I will follow up with CCC regarding this goal at each outreach until it is completed.                                 Action Plans on File:                       Advance Care Plans/Directives:   Advanced Care Plan/Directives on file:   No    Discussed with patient/caregiver(s): No data recorded           My Medical and Care Information  Problem List   Patient Active Problem List   Diagnosis    Primary hypertension    Hyperlipidemia LDL goal <100    Depression, unspecified depression type    Hyperglycemia    Generalized muscle weakness    Low iron    Gastroesophageal reflux disease with esophagitis without hemorrhage    Low vitamin B12 level    Low vitamin D level    Celiac disease    Beta thalassemia minor    Type 2 diabetes mellitus with other specified complication, unspecified whether long term insulin use (H)    Incontinence of feces with fecal urgency    Diabetic ulcer of other part of right foot associated with type 2 diabetes mellitus, with fat layer exposed (H)    ESRD (end stage renal disease) on dialysis (H)    Hyperkalemia    Weakness    Acute cough       Current Medications and Allergies:  See printed Medication Report.    Care Coordination Start Date: 3/1/2022   Frequency of Care Coordination: 6 weeks, more frequently as needed     Form Last Updated: 02/22/2024

## 2024-02-26 ENCOUNTER — PATIENT OUTREACH (OUTPATIENT)
Dept: NURSING | Facility: CLINIC | Age: 54
End: 2024-02-26
Payer: COMMERCIAL

## 2024-02-26 DIAGNOSIS — G62.9 NEUROPATHY: ICD-10-CM

## 2024-02-26 RX ORDER — GABAPENTIN 300 MG/1
300 CAPSULE ORAL AT BEDTIME
Qty: 30 CAPSULE | Refills: 3 | Status: SHIPPED | OUTPATIENT
Start: 2024-02-26 | End: 2024-07-05

## 2024-02-26 NOTE — PROGRESS NOTES
Clinic Care Coordination Contact  Follow Up Progress Note      Assessment: CCRN spoke with oncology/hematology clinic staff today and was told that they weren't able to schedule appt until they received records from [facility]. CHW to follow up on status next month.     Plan: plan to follow up next month on appt status.

## 2024-02-27 ENCOUNTER — PATIENT OUTREACH (OUTPATIENT)
Dept: ONCOLOGY | Facility: CLINIC | Age: 54
End: 2024-02-27
Payer: COMMERCIAL

## 2024-02-27 ENCOUNTER — TRANSFERRED RECORDS (OUTPATIENT)
Dept: HEALTH INFORMATION MANAGEMENT | Facility: CLINIC | Age: 54
End: 2024-02-27
Payer: COMMERCIAL

## 2024-02-27 ENCOUNTER — HOSPITAL ENCOUNTER (OUTPATIENT)
Dept: MRI IMAGING | Facility: HOSPITAL | Age: 54
Discharge: HOME | End: 2024-02-27
Attending: PODIATRIST | Admitting: PODIATRIST
Payer: COMMERCIAL

## 2024-02-27 DIAGNOSIS — E11.42 DIABETIC POLYNEUROPATHY ASSOCIATED WITH TYPE 2 DIABETES MELLITUS (H): ICD-10-CM

## 2024-02-27 DIAGNOSIS — M21.41 PES PLANUS OF BOTH FEET: ICD-10-CM

## 2024-02-27 DIAGNOSIS — L97.512 ULCER OF RIGHT FOOT WITH FAT LAYER EXPOSED (H): ICD-10-CM

## 2024-02-27 DIAGNOSIS — L84 PRE-ULCERATIVE CALLUSES: ICD-10-CM

## 2024-02-27 DIAGNOSIS — M21.42 PES PLANUS OF BOTH FEET: ICD-10-CM

## 2024-02-27 PROCEDURE — 73718 MRI LOWER EXTREMITY W/O DYE: CPT | Mod: RT

## 2024-02-27 NOTE — PROGRESS NOTES
Referral received for Anemia in ESRD  from [facility] provider at [facility] location. CSS team unable to obtain records/response from [facility]. Writer spoke with local clinic rep who will fax over labs and provider notes to the intake team.

## 2024-02-27 NOTE — LETTER
Ntihya Matthews  [address] [place] [address]            March 14, 2024        Dear Nithya Matthews,     Thank you for choosing [facility]. We are committed to providing you with exceptional, compassionate, and personalized care from a team of experts with decades of experience of helping patients and their families navigate their health journey.  Please visit our webpage to learn more about the comprehensive supportive care services available at [facility] to help improve the quality of life and reduce the burden of illness.  [email or web address]      The information provided in this packet pertains to your upcoming appointment. We encourage you to visit our website for the most up-to-date information on visitor restrictions at our clinics and hospitals. [email or web address]      [employer]: Login to your [facility] account to communicate with your care team, begin an eVisit, renew a medication, manage appointments, and more. The [employer] Technical Assistance line can assist with any questions you may have. They can be reached at [phone]. [email or web address]     Billing and Financial information can be found at: [email or web address]     Date and time of appointment: 3/22/2024 at 3:00 PM     Location of appointment:    [facility]                                                  Floor 2                                                  [address] [place]  [address]     Provider name: Sanjuanita Zuñiga MD     What to bring to your appointment:    Insurance card  's license or other form of photo ID  List of any medications you are taking       Questions you may have for your provider     Virtual Visit Resources:  [email or web address]     We appreciate the opportunity to care  for you. If you have any questions prior to your appointment, please do not hesitate to call us at [phone]. We kindly ask that if you need to cancel or reschedule your appointment that you call us at least 48 hours prior to the appointment.      Sincerely,  [facility] Patient Specialty Intake Team  Please visit us at [email or web address]                  IMPORTANT:

## 2024-02-28 ENCOUNTER — MYC MEDICAL ADVICE (OUTPATIENT)
Dept: PODIATRY | Facility: CLINIC | Age: 54
End: 2024-02-28
Payer: COMMERCIAL

## 2024-03-01 ENCOUNTER — VIRTUAL VISIT (OUTPATIENT)
Dept: EDUCATION SERVICES | Facility: CLINIC | Age: 54
End: 2024-03-01
Payer: COMMERCIAL

## 2024-03-01 DIAGNOSIS — R79.89 LOW VITAMIN D LEVEL: ICD-10-CM

## 2024-03-01 DIAGNOSIS — Z79.4 TYPE 2 DIABETES MELLITUS WITH OTHER SPECIFIED COMPLICATION, WITH LONG-TERM CURRENT USE OF INSULIN (H): Primary | ICD-10-CM

## 2024-03-01 DIAGNOSIS — E11.69 TYPE 2 DIABETES MELLITUS WITH OTHER SPECIFIED COMPLICATION, WITH LONG-TERM CURRENT USE OF INSULIN (H): Primary | ICD-10-CM

## 2024-03-01 PROCEDURE — 99207 PR NO CHARGE LOS: CPT | Mod: 93 | Performed by: DIETITIAN, REGISTERED

## 2024-03-01 NOTE — PROGRESS NOTES
Cancelled visit. Spoke with son who was not with patient via .    They are moving to [place] on Monday and plan to establish care there.     No charge

## 2024-03-01 NOTE — LETTER
3/1/2024         RE: Mumtaz Montoya  [address]  [place] [address]        Dear Colleague,    Thank you for referring your patient, Mumtaz Montoya, to the [facility]. Please see a copy of my visit note below.    Cancelled visit. Spoke with son who was not with patient via .    They are moving to [place] on Monday and plan to establish care there.     No charge

## 2024-03-04 ENCOUNTER — MEDICAL CORRESPONDENCE (OUTPATIENT)
Dept: HEALTH INFORMATION MANAGEMENT | Facility: CLINIC | Age: 54
End: 2024-03-04

## 2024-03-04 DIAGNOSIS — F32.A DEPRESSION, UNSPECIFIED DEPRESSION TYPE: ICD-10-CM

## 2024-03-04 RX ORDER — ESCITALOPRAM OXALATE 10 MG/1
TABLET ORAL
Qty: 90 TABLET | Refills: 3 | Status: SHIPPED | OUTPATIENT
Start: 2024-03-04

## 2024-03-04 RX ORDER — ERGOCALCIFEROL 1.25 MG/1
50000 CAPSULE, LIQUID FILLED ORAL
Qty: 8 CAPSULE | Refills: 4 | OUTPATIENT
Start: 2024-03-04

## 2024-03-06 ENCOUNTER — OFFICE VISIT (OUTPATIENT)
Dept: PODIATRY | Facility: CLINIC | Age: 54
End: 2024-03-06
Payer: COMMERCIAL

## 2024-03-06 VITALS — BODY MASS INDEX: 20.22 KG/M2 | SYSTOLIC BLOOD PRESSURE: 118 MMHG | DIASTOLIC BLOOD PRESSURE: 70 MMHG | WEIGHT: 107 LBS

## 2024-03-06 DIAGNOSIS — E11.42 DIABETIC POLYNEUROPATHY ASSOCIATED WITH TYPE 2 DIABETES MELLITUS (H): Primary | ICD-10-CM

## 2024-03-06 DIAGNOSIS — L97.512 ULCER OF RIGHT FOOT WITH FAT LAYER EXPOSED (H): ICD-10-CM

## 2024-03-06 DIAGNOSIS — M20.11 HAV (HALLUX ABDUCTO VALGUS), RIGHT: ICD-10-CM

## 2024-03-06 DIAGNOSIS — M21.42 BILATERAL PES PLANUS: ICD-10-CM

## 2024-03-06 DIAGNOSIS — L84 PRE-ULCERATIVE CALLUSES: ICD-10-CM

## 2024-03-06 DIAGNOSIS — M21.41 BILATERAL PES PLANUS: ICD-10-CM

## 2024-03-06 PROCEDURE — 11042 DBRDMT SUBQ TIS 1ST 20SQCM/<: CPT | Performed by: PODIATRIST

## 2024-03-06 PROCEDURE — 99213 OFFICE O/P EST LOW 20 MIN: CPT | Mod: 25 | Performed by: PODIATRIST

## 2024-03-06 RX ORDER — SILVER SULFADIAZINE 10 MG/G
CREAM TOPICAL 2 TIMES DAILY
Qty: 25 G | Refills: 1 | Status: SHIPPED | OUTPATIENT
Start: 2024-03-06 | End: 2024-07-22

## 2024-03-06 NOTE — LETTER
3/6/2024         RE: Nithya Matthews  [address] [place] [address]        Dear Colleague,    Thank you for referring your patient, Nithya Matthews, to the [facility] PODIATRY. Please see a copy of my visit note below.    Podiatry / Foot and Ankle Surgery Progress Note    March 6, 2024    Subject: Patient was seen for on right foot ulcer.  Here with her family member who is interpreting for her.   Denies fever, nausea, vomiting.  Notes that they did not get any dressing supplies.  No pain to the feet but has baseline neuropathy.    Objective:  Vitals: /70   Wt 48.5 kg (107 lb)   LMP  (LMP Unknown)   BMI 20.22 kg/m    BMI= Body mass index is 20.22 kg/m .    A1C: 6.7 (1/22/24)     General appearance: Patient is alert and fully cooperative with history & exam.  No sign of distress is noted during the visit.     Dermatologic: Preulcerative hyperkeratotic lesions to the plantar aspects of the first and fifth metatarsal heads bilaterally.  Upon debridement of the right first metatarsal head callus there is an ulceration noted.  This measures approximately 0.5cm x 0.5cm x 0.2 cm in depth after debridement which is significantly larger than previous visit.  No redness noted around the ulceration today.  No purulent drainage noted.  The wound is bigger than previous. .  Maceration around the wound edges With heavy/copious amounts of clear serous drainage.     Vascular: DP & PT pulses are intact & regular bilaterally.  No significant edema or varicosities noted.  CFT and skin temperature is normal to both lower extremities.     Neurologic: Lower extremity sensation is diminished to feet.     Musculoskeletal: Patient is ambulatory without assistive device or brace.  Prominent first metatarsal heads medially both feet.     Radiographs: Right foot x-ray - I have looked at and reviewed xrays personally -degenerative changes throughout the foot.  No gas in the tissue.  No evidence of bone infection at  this time.    MRI right foot: No evidence for osteomyelitis, septic arthropathy, or abscess.  2.  Hallux valgus and bunion deformity with degenerative change first MTP joint.  3.  No evidence for fracture.  4.  No evidence for tendinous or ligamentous pathology.  5.  Exam otherwise negative.    Assessment:   Diabetic polyneuropathy associated with type 2 diabetes mellitus (H)  Hav (hallux abducto valgus), right  Ulcer of right foot with fat layer exposed (H)  Bilateral pes planus  Pre-ulcerative calluses     Medical Decision Making/Plan:  Reviewed patient's chart in [facility].   reviewed MRI results.  No bone infection noted.  At this time the ulcer was debrided.  Please see procedure note below.         We will have her apply Silvadene cream daily to the ulcer and covering this with 4 x 4 gauze pads, gauze roll and tape.  She will continue with postop shoe on her right foot to further keep pressure off of this area as I do not feel the shoe is helping enough to relieve pressure.        We discussed that if she develops fevers chills or streaking redness to the foot that she needs to go to the hospital as she may have a worsening infection.  We will call or MyChart her with the MRI results.  We will have her follow-up in 1 month for reassessment.     All questions were answered to patient and patient's daughter satisfaction and they will call for the questions or concerns.     Procedure: After verbal consent, excisional debridement was performed on ulcer.  #15 blade was used to debride ulcer down to and including subcutaneous tissue. Bleeding controlled with light pressure.   No drainage noted.  No anesthesia was used due to neuropathy. Dry dressing applied to foot.  Patient tolerated procedure well.     Patient Risk Factor:  Patient is a moderate risk factor for infection.        Grecia Hearn DPM, Podiatry/Foot and Ankle Surgery      Again, thank you for allowing me to participate in the care of your patient.         Sincerely,        Grecia Hearn DPM, Podiatry/Foot and Ankle Surgery

## 2024-03-06 NOTE — PATIENT INSTRUCTIONS
Thank you for choosing [facility] Podiatry / Foot & Ankle Surgery!    DR [facility]IRSCH[facility]S CLIN[facility]:     [id number]   [place] [address]      TRIAGE LINE: [phone]  APPOINTMENTS: [phone]  RADIOLOGY: [phone]  SET UP SURGERY: [phone]  PHYSICAL THERAPY: [phone]   FAX NUMBER: [phone]  BILLING QUESTIONS: [phone]       Follow up: 4-5 week

## 2024-03-06 NOTE — PROGRESS NOTES
Podiatry / Foot and Ankle Surgery Progress Note    March 6, 2024    Subject: Patient was seen for on right foot ulcer.  Here with her family member who is interpreting for her.   Denies fever, nausea, vomiting.  Notes that they did not get any dressing supplies.  No pain to the feet but has baseline neuropathy.    Objective:  Vitals: /70   Wt 48.5 kg (107 lb)   LMP  (LMP Unknown)   BMI 20.22 kg/m    BMI= Body mass index is 20.22 kg/m .    A1C: 6.7 (1/22/24)     General appearance: Patient is alert and fully cooperative with history & exam.  No sign of distress is noted during the visit.     Dermatologic: Preulcerative hyperkeratotic lesions to the plantar aspects of the first and fifth metatarsal heads bilaterally.  Upon debridement of the right first metatarsal head callus there is an ulceration noted.  This measures approximately 0.5cm x 0.5cm x 0.2 cm in depth after debridement which is significantly larger than previous visit.  No redness noted around the ulceration today.  No purulent drainage noted.  The wound is bigger than previous. .  Maceration around the wound edges With heavy/copious amounts of clear serous drainage.     Vascular: DP & PT pulses are intact & regular bilaterally.  No significant edema or varicosities noted.  CFT and skin temperature is normal to both lower extremities.     Neurologic: Lower extremity sensation is diminished to feet.     Musculoskeletal: Patient is ambulatory without assistive device or brace.  Prominent first metatarsal heads medially both feet.     Radiographs: Right foot x-ray - I have looked at and reviewed xrays personally -degenerative changes throughout the foot.  No gas in the tissue.  No evidence of bone infection at this time.    MRI right foot: No evidence for osteomyelitis, septic arthropathy, or abscess.  2.  Hallux valgus and bunion deformity with degenerative change first MTP joint.  3.  No evidence for fracture.  4.  No evidence for tendinous or  ligamentous pathology.  5.  Exam otherwise negative.    Assessment:   Diabetic polyneuropathy associated with type 2 diabetes mellitus (H)  Hav (hallux abducto valgus), right  Ulcer of right foot with fat layer exposed (H)  Bilateral pes planus  Pre-ulcerative calluses     Medical Decision Making/Plan:  Reviewed patient's chart in [facility].   reviewed MRI results.  No bone infection noted.  At this time the ulcer was debrided.  Please see procedure note below.         We will have her apply Silvadene cream daily to the ulcer and covering this with 4 x 4 gauze pads, gauze roll and tape.  She will continue with postop shoe on her right foot to further keep pressure off of this area as I do not feel the shoe is helping enough to relieve pressure.        We discussed that if she develops fevers chills or streaking redness to the foot that she needs to go to the hospital as she may have a worsening infection.  We will call or MyChart her with the MRI results.  We will have her follow-up in 1 month for reassessment.     All questions were answered to patient and patient's daughter satisfaction and they will call for the questions or concerns.     Procedure: After verbal consent, excisional debridement was performed on ulcer.  #15 blade was used to debride ulcer down to and including subcutaneous tissue. Bleeding controlled with light pressure.   No drainage noted.  No anesthesia was used due to neuropathy. Dry dressing applied to foot.  Patient tolerated procedure well.     Patient Risk Factor:  Patient is a moderate risk factor for infection.        Grecia Hearn DPM, Podiatry/Foot and Ankle Surgery

## 2024-03-07 DIAGNOSIS — Z53.9 DIAGNOSIS NOT YET DEFINED: Primary | ICD-10-CM

## 2024-03-07 PROCEDURE — G0179 MD RECERTIFICATION HHA PT: HCPCS | Performed by: FAMILY MEDICINE

## 2024-03-15 ENCOUNTER — PATIENT OUTREACH (OUTPATIENT)
Dept: CARE COORDINATION | Facility: CLINIC | Age: 54
End: 2024-03-15
Payer: COMMERCIAL

## 2024-03-15 NOTE — PROGRESS NOTES
Clinic Care Coordination Contact  Community Health Worker Follow Up    Care Gaps:   Health Maintenance Due   Topic Date Due    ZOSTER IMMUNIZATION (1 of 2) Never done    COVID-19 Vaccine (3 - Moderna risk series) 06/30/2022    DIABETIC FOOT EXAM  04/10/2024     Postponed to Next CHW Outreach     Care Plan:   Care Plan: Hematology       Problem: evaluation       Goal: Patient will attend hematology clinic appointment in the next 6 months.       This Visit's Progress: 30%    Note:     Barriers: language barrier, low literacy, noncompliance, and lack of knowledge how to navigate complex health care system  Strengths: motivated to attend appt  Patient expressed understanding of goal: Yes    Action steps to achieve this goal:  1. I will answer my phone when I am contacted to schedule my appointment.  2. I will attend my initial hematology appointment as scheduled  TBD  3. I will schedule a follow up appointment with my provider if it is recommended to do so while I am at the clinic.  4. I will follow up with CCC regarding this goal at each outreach until it is completed.                                 Intervention and Education during outreach:     Spoke to Patient (Barriga)  CHW Introduced intent of call regarding monthly follow up.   Patients reports that she will be following up with hematology on 03/22. Further indicating that she will utilizing [facility]. Further expressing that it has already been scheduled for Patient by Patient's Daughter/ Care Team.     Patient expressed that she would like for CHW to contact [facility] to confirm if Patient's transportation appointments have been scheduled. CW connected with [facility] Rep. Via Conference Call with Patient and . All upcoming appointments 03/20 and 03/22 have transportation scheduled.      CHW encourages Patient to contact CHW/ CCC Team if additional support is needed. CHW provides Patient with CHW's contact information. Patient  acknowledged.     CHW Plan: Next CHW Outreach in One Month     Mayi Reyna CHW  Clinic Care Coordination   [facility]   Phone: [phone]  [email or web address]

## 2024-03-20 ENCOUNTER — OFFICE VISIT (OUTPATIENT)
Dept: PHARMACY | Facility: CLINIC | Age: 54
End: 2024-03-20
Payer: COMMERCIAL

## 2024-03-20 VITALS — BODY MASS INDEX: 21.54 KG/M2 | WEIGHT: 114 LBS | SYSTOLIC BLOOD PRESSURE: 158 MMHG | DIASTOLIC BLOOD PRESSURE: 72 MMHG

## 2024-03-20 DIAGNOSIS — E11.69 TYPE 2 DIABETES MELLITUS WITH OTHER SPECIFIED COMPLICATION, UNSPECIFIED WHETHER LONG TERM INSULIN USE (H): ICD-10-CM

## 2024-03-20 DIAGNOSIS — I10 PRIMARY HYPERTENSION: Primary | ICD-10-CM

## 2024-03-20 DIAGNOSIS — N18.6 ESRD (END STAGE RENAL DISEASE) ON DIALYSIS (H): ICD-10-CM

## 2024-03-20 DIAGNOSIS — E78.5 HYPERLIPIDEMIA LDL GOAL <100: ICD-10-CM

## 2024-03-20 DIAGNOSIS — Z99.2 ESRD (END STAGE RENAL DISEASE) ON DIALYSIS (H): ICD-10-CM

## 2024-03-20 PROCEDURE — 99606 MTMS BY PHARM EST 15 MIN: CPT | Performed by: PHARMACIST

## 2024-03-20 PROCEDURE — 99607 MTMS BY PHARM ADDL 15 MIN: CPT | Performed by: PHARMACIST

## 2024-03-20 RX ORDER — INSULIN ASPART 100 [IU]/ML
8 INJECTION, SOLUTION INTRAVENOUS; SUBCUTANEOUS
Qty: 30 ML | Refills: 3 | Status: SHIPPED | OUTPATIENT
Start: 2024-03-20 | End: 2024-04-10

## 2024-03-20 RX ORDER — LISINOPRIL 20 MG/1
20 TABLET ORAL DAILY
Qty: 90 TABLET | Refills: 3 | Status: SHIPPED | OUTPATIENT
Start: 2024-03-20 | End: 2024-08-28 | Stop reason: DRUGHIGH

## 2024-03-20 RX ORDER — AMLODIPINE BESYLATE 2.5 MG/1
2.5 TABLET ORAL DAILY
Qty: 90 TABLET | Refills: 1 | Status: SHIPPED | OUTPATIENT
Start: 2024-03-20 | End: 2024-04-10

## 2024-03-20 NOTE — PATIENT INSTRUCTIONS
"Recommendations from today's MTM visit:                                                      Nurse:   Please make sure carvedilol is in the pillbox twice daily - I have updated the cap to say AM and Bed  Restart amlodipine 2.5 mg daily in the morning - I marked this vial with restart and sent more to the pharmacy  Please make sure you are only using pink  lisinopril 20 mg tablets (there were yellow 40 mg tablets mixed in the bottle and I have removed them)  Increase Novolog to 8 units 5-10 minutes before meals      Check blood pressure twice daily and write them down bring readings with you to your next appointment.   Bring your pillbox to your next appointment.     Follow-up: Return in about 3 weeks (around 4/10/2024) for with PharmD.    It was great speaking with you today.  I value your experience and would be very thankful for your time in providing feedback in our clinic survey. In the next few days, you may receive an email or text message from [employer] with a link to a survey related to your  clinical pharmacist.\"     To schedule another MTM appointment, please call the clinic directly or you may call the MTM scheduling line at [phone].    My Clinical Pharmacist's contact information:                                                      Please feel free to contact me with any questions or concerns you have.      Mery Fernandez, Pharm.D.  Medication Therapy Management Pharmacy Resident        "

## 2024-03-20 NOTE — PROGRESS NOTES
Medication Therapy Management (MTM) Encounter    ASSESSMENT:                            Medication Adherence/Access: Unable to assess entire medication list without pillbox present today. Advised patient to bring pillbox to next appointment for further assessment.     Diabetes:  A1c at goal <7%, postprandial  sugars are high per [facility] AGP. Patient agreeable Novolog dose increase today.       Hypertension/CKD:  Blood pressure not at goal <130/80 mmHg today, patient may benefit from restarting amlodipine as previously prescribed, prescription sent today. Sent patient with note to nurse to verify that carvedilol is now set up correctly in pillbox. Removed lisinopril 40 mg tablets from 20 mg tablet bottle and advised patient dispose of them. Advised patient to check blood pressure twice daily and record readings to bring to follow up visit.     Hyperlipidemia:   Stable on high intensity statin and fish oil. Last LDL goal <100 mg/dL.     PLAN:                            Nurse: Please make sure carvedilol is in the pillbox twice daily   Restart amlodipine 2.5 mg daily in the morning - I marked this vial with restart and sent more to the pharmacy  Please make sure you are only using pink  lisinopril 20 mg tablets (there were yellow 40 mg tablets mixed in the bottle and I have removed them)  Increase Novolog to 8 units 5-10 minutes before meals   Check blood pressure twice daily and write them down bring readings with you to your next appointment.   Bring your pillbox to your next appointment.     Follow-up: Return in about 3 weeks (around 4/10/2024) for with PharmD. Hem/onc 3/22 PCP 4/22    SUBJECTIVE/OBJECTIVE:                          Nithya Matthews is a 54 year old female coming in for a follow-up visit. Patient was accompanied by her daughter. Patient seen with Ruben . ID#[id number].     Reason for visit: MTM follow-up.    Allergies/ADRs: Reviewed in chart  Past Medical History: Reviewed in chart  Tobacco: She  reports that she has never smoked. She has never been exposed to tobacco smoke. She has never used smokeless tobacco.  Alcohol: not currently using    Medication Adherence/Access: States that her daughter helps with managing her medications. She has a nurse that prepares the three times daily pillbox for her every 2 weeks and her daughter gives her medications/injections every day. Reports no missed doses of oral medications or injections.   Brings with med vials, insulin pens, and glucometer today, though no pillbox to verify how medications are set up. Pharmacy usually delivers medications to her home. Also brings her blood pressure monitor.     Diabetes   Lantus 68 (34 units on each side) daily in the morning (decreased 2/21)  NovoLog 5 units 2-3 times daily directly before meals  Victoza 1.8 mg daily in the morning  Aspirin 81mg daily  Patient is not experiencing side effects.   Blood sugar monitoring: Continuous Glucose Monitor - Claribel 14 day; see below  Current diabetes symptoms: none   Diet/Exercise: Eating 2-3 meals per day. Reports appetite has been the same- no changes recently. Eats rice and fruit for meals   Med Hx: metformin (CKD)     Eye exam is up to date  Foot exam: due  Urine Albumin:   Lab Results   Component Value Date    ALCR  10/23/2023      Comment:      Unable to calculate, urine albumin and/or urine creatinine is outside detectable limits.  Microalbuminuria is defined as an albumin:creatinine ratio of 17 to 299 for males and 25 to 299 for females. A ratio of albumin:creatinine of 300 or higher is indicative of overt proteinuria.  Due to biologic variability, positive results should be confirmed by a second, first-morning random or 24-hour timed urine specimen. If there is discrepancy, a third specimen is recommended. When 2 out of 3 results are in the microalbuminuria range, this is evidence for incipient nephropathy and warrants increased efforts at glucose control, blood pressure control,  "and institution of therapy with an angiotensin-converting-enzyme (ACE) inhibitor (if the patient can tolerate it).        Lab Results   Component Value Date    A1C 6.7 (H) 01/22/2024       Hypertension /CKD  Lisinopril 20 mg daily- though bottle has both 20 mg and 40 mg tablets in it  Carvedilol 12.5 mg twice daily - states it is in the pillbox twice daily though cap is marked \"AM\"  Calcium Acetate 667 mg three times daily with meals   Calcium carbonate 500 mg 3 tablets twice daily per nephrologist  Dialysis Tues/Thurs/Sat.   Patient reports the following medication side effects- headache occasionally. Patient does self-monitor blood pressure, reports she doesn't know what the readings have been lately. She takes it twice daily, resting before. One reading from meter is 207/105 mmHg, meter does not have further readings  Nephrologist: Carl Rosas MD at [facility]     BP Readings from Last 3 Encounters:   03/20/24 (!) 158/72   03/06/24 118/70   02/21/24 116/62     Pulse Readings from Last 3 Encounters:   02/21/24 80   01/22/24 83   12/20/23 78     Hyperlipidemia   Atorvastatin 80mg daily  Fish Oil 1000mg once daily  Patient reports the following side effects: muscle pain only once in a while.   Med Hx: fenofibrate (stopped d/t CKD)  The ASCVD Risk score (Slava DK, et al., 2019) failed to calculate for the following reasons:    The valid total cholesterol range is 130 to 320 mg/dL     Recent Labs   Lab Test 10/23/23  1123 12/28/22  1506   CHOL 124 189   HDL 26* 34*   LDL 53 82   TRIG 224* 365*     Today's Vitals: BP (!) 158/72   Wt 114 lb (51.7 kg)   LMP  (LMP Unknown)   BMI 21.54 kg/m    ----------------    I spent 40 minutes with this patient today. All changes were made via collaborative practice agreement with Giancarlo Arizmendi MD. A copy of the visit note was provided to the patient's provider(s).    A summary of these recommendations was given to the patient.    Jules Weaver.D.  Medication " Therapy Management Pharmacy Resident     Preceptor cosignature: Patient was seen independently by Dr. Fernandez. I have reviewed the assessment and plan. Shital Hernandez, PharmD, BCACP     Medication Therapy Recommendations  Primary hypertension    Current Medication: lisinopril (ZESTRIL) 20 MG tablet (Discontinued)   Rationale: Medication requires monitoring - Needs additional monitoring   Recommendation: Self-Monitoring   Status: Patient Agreed - Adherence/Education          Rationale: Untreated condition - Needs additional medication therapy - Indication   Recommendation: Start Medication - amLODIPine 2.5 MG tablet   Status: Accepted per CPA         Type 2 diabetes mellitus with other specified complication, unspecified whether long term insulin use (H)    Current Medication: insulin aspart (NOVOLOG FLEXPEN) 100 UNIT/ML pen (Discontinued)   Rationale: Dose too low - Dosage too low - Effectiveness   Recommendation: Increase Dose   Status: Accepted per CPA

## 2024-03-20 NOTE — Clinical Note
Restarted amlodipine today as blood pressure was high again, also increased Novolog because postprandial sugars are increasing. We will see her again next month.   Mery

## 2024-03-22 ENCOUNTER — ONCOLOGY VISIT (OUTPATIENT)
Dept: ONCOLOGY | Facility: HOSPITAL | Age: 54
End: 2024-03-22
Attending: INTERNAL MEDICINE
Payer: COMMERCIAL

## 2024-03-22 ENCOUNTER — LAB (OUTPATIENT)
Dept: INFUSION THERAPY | Facility: HOSPITAL | Age: 54
End: 2024-03-22
Attending: INTERNAL MEDICINE
Payer: COMMERCIAL

## 2024-03-22 VITALS
DIASTOLIC BLOOD PRESSURE: 70 MMHG | RESPIRATION RATE: 16 BRPM | TEMPERATURE: 98.5 F | WEIGHT: 111.8 LBS | BODY MASS INDEX: 21.12 KG/M2 | HEART RATE: 77 BPM | OXYGEN SATURATION: 97 % | SYSTOLIC BLOOD PRESSURE: 153 MMHG

## 2024-03-22 DIAGNOSIS — E61.1 IRON DEFICIENCY: ICD-10-CM

## 2024-03-22 DIAGNOSIS — D64.9 ANEMIA, UNSPECIFIED TYPE: ICD-10-CM

## 2024-03-22 DIAGNOSIS — N18.6 ESRD (END STAGE RENAL DISEASE) ON DIALYSIS (H): ICD-10-CM

## 2024-03-22 DIAGNOSIS — D56.3 BETA THALASSEMIA MINOR: Primary | ICD-10-CM

## 2024-03-22 DIAGNOSIS — Z99.2 ESRD (END STAGE RENAL DISEASE) ON DIALYSIS (H): ICD-10-CM

## 2024-03-22 LAB
BASOPHILS # BLD AUTO: 0 10E3/UL (ref 0–0.2)
BASOPHILS NFR BLD AUTO: 1 %
EOSINOPHIL # BLD AUTO: 0.2 10E3/UL (ref 0–0.7)
EOSINOPHIL NFR BLD AUTO: 4 %
ERYTHROCYTE [DISTWIDTH] IN BLOOD BY AUTOMATED COUNT: 23.1 % (ref 10–15)
HCT VFR BLD AUTO: 24.7 % (ref 35–47)
HGB BLD-MCNC: 7.2 G/DL (ref 11.7–15.7)
IMM GRANULOCYTES # BLD: 0 10E3/UL
IMM GRANULOCYTES NFR BLD: 1 %
IRON BINDING CAPACITY (ROCHE): 200 UG/DL (ref 240–430)
IRON SATN MFR SERPL: 20 % (ref 15–46)
IRON SERPL-MCNC: 40 UG/DL (ref 37–145)
LYMPHOCYTES # BLD AUTO: 1 10E3/UL (ref 0.8–5.3)
LYMPHOCYTES NFR BLD AUTO: 16 %
MCH RBC QN AUTO: 21.4 PG (ref 26.5–33)
MCHC RBC AUTO-ENTMCNC: 29.1 G/DL (ref 31.5–36.5)
MCV RBC AUTO: 74 FL (ref 78–100)
MONOCYTES # BLD AUTO: 0.5 10E3/UL (ref 0–1.3)
MONOCYTES NFR BLD AUTO: 7 %
NEUTROPHILS # BLD AUTO: 4.7 10E3/UL (ref 1.6–8.3)
NEUTROPHILS NFR BLD AUTO: 71 %
NRBC # BLD AUTO: 0 10E3/UL
NRBC BLD AUTO-RTO: 0 /100
PLATELET # BLD AUTO: 148 10E3/UL (ref 150–450)
RBC # BLD AUTO: 3.36 10E6/UL (ref 3.8–5.2)
WBC # BLD AUTO: 6.5 10E3/UL (ref 4–11)

## 2024-03-22 PROCEDURE — 82040 ASSAY OF SERUM ALBUMIN: CPT | Performed by: INTERNAL MEDICINE

## 2024-03-22 PROCEDURE — 86334 IMMUNOFIX E-PHORESIS SERUM: CPT | Performed by: PATHOLOGY

## 2024-03-22 PROCEDURE — 84165 PROTEIN E-PHORESIS SERUM: CPT | Mod: 26 | Performed by: PATHOLOGY

## 2024-03-22 PROCEDURE — 82728 ASSAY OF FERRITIN: CPT | Performed by: INTERNAL MEDICINE

## 2024-03-22 PROCEDURE — G0463 HOSPITAL OUTPT CLINIC VISIT: HCPCS | Performed by: INTERNAL MEDICINE

## 2024-03-22 PROCEDURE — G2211 COMPLEX E/M VISIT ADD ON: HCPCS | Performed by: INTERNAL MEDICINE

## 2024-03-22 PROCEDURE — 84165 PROTEIN E-PHORESIS SERUM: CPT | Mod: TC | Performed by: PATHOLOGY

## 2024-03-22 PROCEDURE — 83521 IG LIGHT CHAINS FREE EACH: CPT | Performed by: INTERNAL MEDICINE

## 2024-03-22 PROCEDURE — 86334 IMMUNOFIX E-PHORESIS SERUM: CPT | Mod: 26 | Performed by: PATHOLOGY

## 2024-03-22 PROCEDURE — 83550 IRON BINDING TEST: CPT | Performed by: INTERNAL MEDICINE

## 2024-03-22 PROCEDURE — 85025 COMPLETE CBC W/AUTO DIFF WBC: CPT | Performed by: INTERNAL MEDICINE

## 2024-03-22 PROCEDURE — 84155 ASSAY OF PROTEIN SERUM: CPT | Mod: 91 | Performed by: INTERNAL MEDICINE

## 2024-03-22 PROCEDURE — 99204 OFFICE O/P NEW MOD 45 MIN: CPT | Performed by: INTERNAL MEDICINE

## 2024-03-22 PROCEDURE — 36415 COLL VENOUS BLD VENIPUNCTURE: CPT | Performed by: INTERNAL MEDICINE

## 2024-03-22 RX ORDER — FOLIC ACID 1 MG/1
1 TABLET ORAL DAILY
Qty: 90 TABLET | Refills: 3 | Status: SHIPPED | OUTPATIENT
Start: 2024-03-22

## 2024-03-22 ASSESSMENT — PAIN SCALES - GENERAL: PAINLEVEL: SEVERE PAIN (6)

## 2024-03-22 NOTE — LETTER
"    3/22/2024         RE: Nithya Matthews  [address] [place] [address]        Dear Colleague,    Thank you for referring your patient, Nithya Matthews, to the [facility] [place]. Please see a copy of my visit note below.    Oncology Rooming Note    March 22, 2024 3:10 PM   Nithya Matthews is a 54 year old female who presents for:    Chief Complaint   Patient presents with     Hematology     New patient consult related to Anemia in ESRD (end-stage renal disease)     Initial Vitals: BP (!) 153/70 (BP Location: Right arm, Patient Position: Sitting, Cuff Size: Adult Regular)   Pulse 77   Temp 98.5  F (36.9  C) (Tympanic)   Resp 16   Wt 50.7 kg (111 lb 12.8 oz)   LMP  (LMP Unknown)   SpO2 97%   BMI 21.12 kg/m   Estimated body mass index is 21.12 kg/m  as calculated from the following:    Height as of 1/22/24: 1.549 m (5' 1\").    Weight as of this encounter: 50.7 kg (111 lb 12.8 oz). Body surface area is 1.48 meters squared.  Severe Pain (6) Comment: Data Unavailable   No LMP recorded (lmp unknown). Patient is postmenopausal.  Allergies reviewed: Yes  Medications reviewed: No - did not discuss medications due to apt running behind and provider needing to see pt    Medications: Medication refills not needed today.  Pharmacy name entered into [employer]:    [facility] - [place], [place] - [address]  [facility] [place], [place] - [facility] [address]    Frailty Screening:   Is the patient here for a new oncology consult visit in cancer care? 2. No      Clinical concerns: New patient consult related to Anemia in ESRD (end-stage renal disease)      FLASH MENDOZA CMA              [facility] Hematology and Oncology Consult Note    Patient: Nithya Matthews  MRN: [id number]  Date of Service: 03/22/2024      Reason for Visit    Chief Complaint   Patient presents with     Hematology     New patient consult related to Anemia in ESRD (end-stage renal disease)         Assessment/Plan    Problem List " Items Addressed This Visit          Urinary    ESRD (end stage renal disease) on dialysis (H)       Hematologic    Beta thalassemia minor - Primary    Relevant Medications    folic acid (FOLVITE) 1 MG tablet    Other Relevant Orders    Hepatic panel (Albumin, ALT, AST, Bili, Alk Phos, TP)     Other Visit Diagnoses       Iron deficiency        Relevant Orders    CBC with platelets and differential (Completed)    Iron & Iron Binding Capacity (Completed)    Ferritin (Completed)    Anemia, unspecified type        Relevant Medications    folic acid (FOLVITE) 1 MG tablet    Other Relevant Orders    Protein electrophoresis    Kappa and lambda light chain (Completed)    Protein Immunofixation Serum          Anemia in the setting of ESRD on dialysis and beta thalassemia minor along with iron deficiency  Multifactorial has had significant drop since starting dialysis paddling her kidney functions.  Currently on VIK supplementation through her dialysis clinic.  Looks like he is on Mircera 300 mcg every 2 weeks.  Also is on oral iron supplementation.  She also has beta thalassemia minor but is not on any folic acid supplementation.  Recommended starting folic acid 1 mg daily.  Will recheck her iron levels today along with CBC and ferritin.  I will also expand workup for anemia including SPEP, kappa lambda free light chains.  Unfortunately there is no good solution in this situation.  If she is significantly iron deficient then would recommend IV iron infusions.  Goal is to maintain ferritin above 500 and transferrin saturation above 30%.  From the beta thalassemia minor standpoint, apart from folic acid supplementation there is no other intervention that is needed.    Will see her back in a month with repeat labs.  In the meantime she will continue EPO injections through her nephrology clinic.    ECOG Performance    2 - Ambulatory and independent in all ADLs; cannot work; up > 50% of the time    Problem List    Patient Active  Problem List   Diagnosis     Primary hypertension     Hyperlipidemia LDL goal <100     Depression, unspecified depression type     Hyperglycemia     Generalized muscle weakness     Low iron     Gastroesophageal reflux disease with esophagitis without hemorrhage     Low vitamin B12 level     Low vitamin D level     Celiac disease     Beta thalassemia minor     Type 2 diabetes mellitus with other specified complication, unspecified whether long term insulin use (H)     Incontinence of feces with fecal urgency     Diabetic ulcer of other part of right foot associated with type 2 diabetes mellitus, with fat layer exposed (H)     ESRD (end stage renal disease) on dialysis (H)     Hyperkalemia     Weakness     Acute cough     ______________________________________________________________________________    Staging History     Cancer Staging   No matching staging information was found for the patient.        History of presenting illness:  Nithya Matthews is a 54-year-old female with beta thalassemia minor, end-stage renal disease on dialysis, hypertension, type 2 diabetes among other medical problems who has been referred for further evaluation management of anemia.    She has a known history of hemoglobinopathy with beta thalassemia minor with baseline hemoglobin around 10-11.  She has had progressive CKD in the recent past and became dialysis dependent the last couple of years.  She had a biopsy of the kidney in October 2022 which showed advanced global and diffuse abdominal sclerosis.  Thought to be secondary to longstanding diabetes and hypertension.  Paralleling her kidney dysfunction hemoglobin has also significantly dropped and has been around 7-8 in the recent past.  She is getting erythropoietin supplementation through dialysis clinic and looks like she is on Mircera 300 mcg every 2 weeks.  She does have a history of iron deficiency anemia with a transferrin saturation at 7% in June 2023 and is currently on oral iron  supplementation.  Not sure if she gets IV iron through dialysis.  Denies any blood in stools or urine.  She is not on any folic acid supplementation.  Feel significantly fatigued.  Looks like a referral has been made for kidney transplant.  She is not aware of her hemoglobinopathy.    Never smoker.  Does not drink alcohol.    Past History    Past Medical History:   Diagnosis Date     Anuria      Arthritis      Depression      Diabetes (H)      End stage renal disease (H)      Hypertension     Family History   Problem Relation Age of Onset     Glaucoma No family hx of      Macular Degeneration No family hx of      Breast Cancer No family hx of      Ovarian Cancer No family hx of      Anesthesia Reaction No family hx of      Venous thrombosis No family hx of      Heart Disease No family hx of      Diabetes No family hx of       Past Surgical History:   Procedure Laterality Date     ABDOMEN SURGERY       APPENDECTOMY       BIOPSY       CATARACT IOL, RT/LT       CREATE FISTULA ARTERIOVENOUS UPPER EXTREMITY Right 2/14/2023    Procedure: right upper extremity Arteriovenous fistula creation;  Surgeon: Markell Langford MD;  Location:  OR    Social History     Socioeconomic History     Marital status:      Spouse name: Not on file     Number of children: Not on file     Years of education: Not on file     Highest education level: Not on file   Occupational History     Not on file   Tobacco Use     Smoking status: Never     Passive exposure: Never     Smokeless tobacco: Never   Vaping Use     Vaping Use: Never used   Substance and Sexual Activity     Alcohol use: Not Currently     Drug use: Never     Sexual activity: Yes   Other Topics Concern     Not on file   Social History Narrative     Not on file     Social Determinants of Health     Financial Resource Strain: Low Risk  (1/22/2024)    Financial Resource Strain      Within the past 12 months, have you or your family members you live with been unable to get  utilities (heat, electricity) when it was really needed?: No   Food Insecurity: High Risk (1/22/2024)    Food Insecurity      Within the past 12 months, did you worry that your food would run out before you got money to buy more?: Yes      Within the past 12 months, did the food you bought just not last and you didn t have money to get more?: Yes   Transportation Needs: Low Risk  (1/22/2024)    Transportation Needs      Within the past 12 months, has lack of transportation kept you from medical appointments, getting your medicines, non-medical meetings or appointments, work, or from getting things that you need?: No   Physical Activity: Sufficiently Active (7/29/2022)    Exercise Vital Sign      Days of Exercise per Week: 7 days      Minutes of Exercise per Session: 30 min   Stress: Stress Concern Present (7/29/2022)    [place] [employer] - Occupational Stress Questionnaire      Feeling of Stress : To some extent   Social Connections: Moderately Integrated (7/29/2022)    Social Connection and Isolation Panel [NHANES]      Frequency of Communication with Friends and Family: Twice a week      Frequency of Social Gatherings with Friends and Family: Twice a week      Attends [employer] Services: 1 to 4 times per year      Active Member of Clubs or Organizations: No      Attends Club or Organization Meetings: Never      Marital Status:    Interpersonal Safety: Not At Risk (3/2/2022)    Humiliation, Afraid, Rape, and Kick questionnaire      Fear of Current or Ex-Partner: No      Emotionally Abused: No      Physically Abused: No      Sexually Abused: No   Housing Stability: High Risk (1/22/2024)    Housing Stability      Do you have housing? : No      Are you worried about losing your housing?: No        Allergies    No Known Allergies    Review of Systems    Pertinent items are noted in HPI.      Physical Exam        3/22/2024     3:05 PM   Oncology Vitals   Weight 50.712 kg   BSA (m2) 1.48 m2    /70   Pulse 77   Temp 98.5  F (36.9  C)   Temp src Tympanic   SpO2 97 %   Pain Score 6 (Severe)   Pain Loc FOOT       General: alert and cooperative  HEENT: Head: Normal, normocephalic, atraumatic.  Eye: Normal external eye, conjunctiva, lids cornea, JAZMIN.  Chest: Clear to auscultation bilaterally  Cardiac: S1, S2 normal, regular rate and rhythm  Extremities: atraumatic, no peripheral edema  CNS: Alert and oriented x3, neurologic exam grossly normal.  Lymphatics: No clinically significant peripheral adenopathy.      Lab Results    Recent Results (from the past 168 hour(s))   Iron & Iron Binding Capacity   Result Value Ref Range    Iron 40 37 - 145 ug/dL    Iron Binding Capacity 200 (L) 240 - 430 ug/dL    Iron Sat Index 20 15 - 46 %   Ferritin   Result Value Ref Range    Ferritin 1,232 (H) 11 - 328 ng/mL   Kappa and lambda light chain   Result Value Ref Range    Kappa Free Light Chains 24.05 (H) 0.33 - 1.94 mg/dL    Lambda Free Light Chains 27.81 (H) 0.57 - 2.63 mg/dL    Kappa /Lambda Ratio 0.86 0.26 - 1.65   CBC with platelets and differential   Result Value Ref Range    WBC Count 6.5 4.0 - 11.0 10e3/uL    RBC Count 3.36 (L) 3.80 - 5.20 10e6/uL    Hemoglobin 7.2 (L) 11.7 - 15.7 g/dL    Hematocrit 24.7 (L) 35.0 - 47.0 %    MCV 74 (L) 78 - 100 fL    MCH 21.4 (L) 26.5 - 33.0 pg    MCHC 29.1 (L) 31.5 - 36.5 g/dL    RDW 23.1 (H) 10.0 - 15.0 %    Platelet Count 148 (L) 150 - 450 10e3/uL    % Neutrophils 71 %    % Lymphocytes 16 %    % Monocytes 7 %    % Eosinophils 4 %    % Basophils 1 %    % Immature Granulocytes 1 %    NRBCs per 100 WBC 0 <1 /100    Absolute Neutrophils 4.7 1.6 - 8.3 10e3/uL    Absolute Lymphocytes 1.0 0.8 - 5.3 10e3/uL    Absolute Monocytes 0.5 0.0 - 1.3 10e3/uL    Absolute Eosinophils 0.2 0.0 - 0.7 10e3/uL    Absolute Basophils 0.0 0.0 - 0.2 10e3/uL    Absolute Immature Granulocytes 0.0 <=0.4 10e3/uL    Absolute NRBCs 0.0 10e3/uL   Total Protein, Serum for ELP   Result Value Ref Range    Total  Protein Serum for ELP 7.6 6.4 - 8.3 g/dL       Imaging Results    MR Foot Right w/o Contrast    Result Date: 2/28/2024  EXAM: MR FOOT RIGHT W/O CONTRAST LOCATION: [facility] DATE: 2/27/2024 INDICATION: Assess for abscess or bone infection. COMPARISON: None. TECHNIQUE: Unenhanced. FINDINGS: JOINTS AND BONES: -Hallux valgus and bunion deformity with degenerative change at the first MTP joint. No significant fusion. No erosive changes are identified. The remainder of the forefoot is negative for fracture or bone marrow signal abnormality. No significant effusion. No evidence for synovial proliferation or abnormal enhancement to suggest an inflammatory arthropathy. No evidence for osteomyelitis. TENDONS: -The flexor and extensor tendons are negative for tendinopathy, tenosynovitis, or tearing. The hallucis tendons are negative. LIGAMENTS: -The ligament of Lisfranc is intact. The collateral ligaments at the MTP joints are all intact. MUSCLES AND SOFT TISSUES: -No muscle atrophy or edema. No soft tissue mass or fluid collection.     IMPRESSION: 1.  No evidence for osteomyelitis, septic arthropathy, or abscess. 2.  Hallux valgus and bunion deformity with degenerative change first MTP joint. 3.  No evidence for fracture. 4.  No evidence for tendinous or ligamentous pathology. 5.  Exam otherwise negative.     A total of 40 minutes was spent today on this visit including face to face conversation with the patient, EMR review (labs, imaging studies, pathology reports and outside records), counseling and care co-ordination and documentation.    The longitudinal plan of care for the diagnosis(es)/condition(s) as documented were addressed during this visit. Due to the added complexity in care, I will continue to support Barriga in the subsequent management and with ongoing continuity of care.      Signed by: Sanjuanita Zuñiga MD    Again, thank you for allowing me to participate in the care of your patient.         Sincerely,        Sanjuanita Zuñiga MD

## 2024-03-22 NOTE — PROGRESS NOTES
"Oncology Rooming Note    March 22, 2024 3:10 PM   Nithya Matthews is a 54 year old female who presents for:    Chief Complaint   Patient presents with    Hematology     New patient consult related to Anemia in ESRD (end-stage renal disease)     Initial Vitals: BP (!) 153/70 (BP Location: Right arm, Patient Position: Sitting, Cuff Size: Adult Regular)   Pulse 77   Temp 98.5  F (36.9  C) (Tympanic)   Resp 16   Wt 50.7 kg (111 lb 12.8 oz)   LMP  (LMP Unknown)   SpO2 97%   BMI 21.12 kg/m   Estimated body mass index is 21.12 kg/m  as calculated from the following:    Height as of 1/22/24: 1.549 m (5' 1\").    Weight as of this encounter: 50.7 kg (111 lb 12.8 oz). Body surface area is 1.48 meters squared.  Severe Pain (6) Comment: Data Unavailable   No LMP recorded (lmp unknown). Patient is postmenopausal.  Allergies reviewed: Yes  Medications reviewed: No - did not discuss medications due to apt running behind and provider needing to see pt    Medications: Medication refills not needed today.  Pharmacy name entered into [employer]:    [facility] - [place], [place] - [address]  [facility] [place], [place] - [facility] [address]    Frailty Screening:   Is the patient here for a new oncology consult visit in cancer care? 2. No      Clinical concerns: New patient consult related to Anemia in ESRD (end-stage renal disease)      FLASH MENDOZA CMA            "

## 2024-03-23 LAB
FERRITIN SERPL-MCNC: 1232 NG/ML (ref 11–328)
TOTAL PROTEIN SERUM FOR ELP: 7.6 G/DL (ref 6.4–8.3)

## 2024-03-25 LAB
ALBUMIN SERPL BCG-MCNC: 4.1 G/DL (ref 3.5–5.2)
ALBUMIN SERPL ELPH-MCNC: 3.9 G/DL (ref 3.7–5.1)
ALP SERPL-CCNC: 56 U/L (ref 40–150)
ALPHA1 GLOB SERPL ELPH-MCNC: 0.3 G/DL (ref 0.2–0.4)
ALPHA2 GLOB SERPL ELPH-MCNC: 0.7 G/DL (ref 0.5–0.9)
ALT SERPL W P-5'-P-CCNC: 14 U/L (ref 0–50)
AST SERPL W P-5'-P-CCNC: 17 U/L (ref 0–45)
B-GLOBULIN SERPL ELPH-MCNC: 0.9 G/DL (ref 0.6–1)
BILIRUB DIRECT SERPL-MCNC: <0.2 MG/DL (ref 0–0.3)
BILIRUB SERPL-MCNC: 0.4 MG/DL
GAMMA GLOB SERPL ELPH-MCNC: 1.8 G/DL (ref 0.7–1.6)
KAPPA LC FREE SER-MCNC: 24.05 MG/DL (ref 0.33–1.94)
KAPPA LC FREE/LAMBDA FREE SER NEPH: 0.86 {RATIO} (ref 0.26–1.65)
LAMBDA LC FREE SERPL-MCNC: 27.81 MG/DL (ref 0.57–2.63)
M PROTEIN SERPL ELPH-MCNC: 0 G/DL
PROT PATTERN SERPL ELPH-IMP: ABNORMAL
PROT PATTERN SERPL IFE-IMP: NORMAL
PROT SERPL-MCNC: 8.2 G/DL (ref 6.4–8.3)

## 2024-03-25 NOTE — PROGRESS NOTES
[facility] Hematology and Oncology Consult Note    Patient: Nithya [facility]  MRN: [id number]  Date of Service: 03/22/2024      Reason for Visit    Chief Complaint   Patient presents with    Hematology     New patient consult related to Anemia in ESRD (end-stage renal disease)         Assessment/Plan    Problem List Items Addressed This Visit          Urinary    ESRD (end stage renal disease) on dialysis (H)       Hematologic    Beta thalassemia minor - Primary    Relevant Medications    folic acid (FOLVITE) 1 MG tablet    Other Relevant Orders    Hepatic panel (Albumin, ALT, AST, Bili, Alk Phos, TP)     Other Visit Diagnoses       Iron deficiency        Relevant Orders    CBC with platelets and differential (Completed)    Iron & Iron Binding Capacity (Completed)    Ferritin (Completed)    Anemia, unspecified type        Relevant Medications    folic acid (FOLVITE) 1 MG tablet    Other Relevant Orders    Protein electrophoresis    Kappa and lambda light chain (Completed)    Protein Immunofixation Serum          Anemia in the setting of ESRD on dialysis and beta thalassemia minor along with iron deficiency  Multifactorial has had significant drop since starting dialysis paddling her kidney functions.  Currently on VIK supplementation through her dialysis clinic.  Looks like he is on Mircera 300 mcg every 2 weeks.  Also is on oral iron supplementation.  She also has beta thalassemia minor but is not on any folic acid supplementation.  Recommended starting folic acid 1 mg daily.  Will recheck her iron levels today along with CBC and ferritin.  I will also expand workup for anemia including SPEP, kappa lambda free light chains.  Unfortunately there is no good solution in this situation.  If she is significantly iron deficient then would recommend IV iron infusions.  Goal is to maintain ferritin above 500 and transferrin saturation above 30%.  From the beta thalassemia minor standpoint, apart from folic acid supplementation  there is no other intervention that is needed.    Will see her back in a month with repeat labs.  In the meantime she will continue EPO injections through her nephrology clinic.    ECOG Performance    2 - Ambulatory and independent in all ADLs; cannot work; up > 50% of the time    Problem List    Patient Active Problem List   Diagnosis    Primary hypertension    Hyperlipidemia LDL goal <100    Depression, unspecified depression type    Hyperglycemia    Generalized muscle weakness    Low iron    Gastroesophageal reflux disease with esophagitis without hemorrhage    Low vitamin B12 level    Low vitamin D level    Celiac disease    Beta thalassemia minor    Type 2 diabetes mellitus with other specified complication, unspecified whether long term insulin use (H)    Incontinence of feces with fecal urgency    Diabetic ulcer of other part of right foot associated with type 2 diabetes mellitus, with fat layer exposed (H)    ESRD (end stage renal disease) on dialysis (H)    Hyperkalemia    Weakness    Acute cough     ______________________________________________________________________________    Staging History     Cancer Staging   No matching staging information was found for the patient.        History of presenting illness:  Nithya Matthews is a 54-year-old female with beta thalassemia minor, end-stage renal disease on dialysis, hypertension, type 2 diabetes among other medical problems who has been referred for further evaluation management of anemia.    She has a known history of hemoglobinopathy with beta thalassemia minor with baseline hemoglobin around 10-11.  She has had progressive CKD in the recent past and became dialysis dependent the last couple of years.  She had a biopsy of the kidney in October 2022 which showed advanced global and diffuse abdominal sclerosis.  Thought to be secondary to longstanding diabetes and hypertension.  Paralleling her kidney dysfunction hemoglobin has also significantly dropped and has  been around 7-8 in the recent past.  She is getting erythropoietin supplementation through dialysis clinic and looks like she is on Mircera 300 mcg every 2 weeks.  She does have a history of iron deficiency anemia with a transferrin saturation at 7% in June 2023 and is currently on oral iron supplementation.  Not sure if she gets IV iron through dialysis.  Denies any blood in stools or urine.  She is not on any folic acid supplementation.  Feel significantly fatigued.  Looks like a referral has been made for kidney transplant.  She is not aware of her hemoglobinopathy.    Never smoker.  Does not drink alcohol.    Past History    Past Medical History:   Diagnosis Date    Anuria     Arthritis     Depression     Diabetes (H)     End stage renal disease (H)     Hypertension     Family History   Problem Relation Age of Onset    Glaucoma No family hx of     Macular Degeneration No family hx of     Breast Cancer No family hx of     Ovarian Cancer No family hx of     Anesthesia Reaction No family hx of     Venous thrombosis No family hx of     Heart Disease No family hx of     Diabetes No family hx of       Past Surgical History:   Procedure Laterality Date    ABDOMEN SURGERY      APPENDECTOMY      BIOPSY      CATARACT IOL, RT/LT      CREATE FISTULA ARTERIOVENOUS UPPER EXTREMITY Right 2/14/2023    Procedure: right upper extremity Arteriovenous fistula creation;  Surgeon: Markell Langford MD;  Location:  OR    Social History     Socioeconomic History    Marital status:      Spouse name: Not on file    Number of children: Not on file    Years of education: Not on file    Highest education level: Not on file   Occupational History    Not on file   Tobacco Use    Smoking status: Never     Passive exposure: Never    Smokeless tobacco: Never   Vaping Use    Vaping Use: Never used   Substance and Sexual Activity    Alcohol use: Not Currently    Drug use: Never    Sexual activity: Yes   Other Topics Concern    Not on file    Social History Narrative    Not on file     Social Determinants of Health     Financial Resource Strain: Low Risk  (1/22/2024)    Financial Resource Strain     Within the past 12 months, have you or your family members you live with been unable to get utilities (heat, electricity) when it was really needed?: No   Food Insecurity: High Risk (1/22/2024)    Food Insecurity     Within the past 12 months, did you worry that your food would run out before you got money to buy more?: Yes     Within the past 12 months, did the food you bought just not last and you didn t have money to get more?: Yes   Transportation Needs: Low Risk  (1/22/2024)    Transportation Needs     Within the past 12 months, has lack of transportation kept you from medical appointments, getting your medicines, non-medical meetings or appointments, work, or from getting things that you need?: No   Physical Activity: Sufficiently Active (7/29/2022)    Exercise Vital Sign     Days of Exercise per Week: 7 days     Minutes of Exercise per Session: 30 min   Stress: Stress Concern Present (7/29/2022)    [employer] - Occupational Stress Questionnaire     Feeling of Stress : To some extent   Social Connections: Moderately Integrated (7/29/2022)    Social Connection and Isolation Panel [NHANES]     Frequency of Communication with Friends and Family: Twice a week     Frequency of Social Gatherings with Friends and Family: Twice a week     Attends Anabaptist Services: 1 to 4 times per year     Active Member of Clubs or Organizations: No     Attends Club or Organization Meetings: Never     Marital Status:    Interpersonal Safety: Not At Risk (3/2/2022)    Humiliation, Afraid, Rape, and Kick questionnaire     Fear of Current or Ex-Partner: No     Emotionally Abused: No     Physically Abused: No     Sexually Abused: No   Housing Stability: High Risk (1/22/2024)    Housing Stability     Do you have housing? : No     Are you  worried about losing your housing?: No        Allergies    No Known Allergies    Review of Systems    Pertinent items are noted in HPI.      Physical Exam        3/22/2024     3:05 PM   Oncology Vitals   Weight 50.712 kg   BSA (m2) 1.48 m2   /70   Pulse 77   Temp 98.5  F (36.9  C)   Temp src Tympanic   SpO2 97 %   Pain Score 6 (Severe)   Pain Loc FOOT       General: alert and cooperative  HEENT: Head: Normal, normocephalic, atraumatic.  Eye: Normal external eye, conjunctiva, lids cornea, JAZMIN.  Chest: Clear to auscultation bilaterally  Cardiac: S1, S2 normal, regular rate and rhythm  Extremities: atraumatic, no peripheral edema  CNS: Alert and oriented x3, neurologic exam grossly normal.  Lymphatics: No clinically significant peripheral adenopathy.      Lab Results    Recent Results (from the past 168 hour(s))   Iron & Iron Binding Capacity   Result Value Ref Range    Iron 40 37 - 145 ug/dL    Iron Binding Capacity 200 (L) 240 - 430 ug/dL    Iron Sat Index 20 15 - 46 %   Ferritin   Result Value Ref Range    Ferritin 1,232 (H) 11 - 328 ng/mL   Kappa and lambda light chain   Result Value Ref Range    Kappa Free Light Chains 24.05 (H) 0.33 - 1.94 mg/dL    Lambda Free Light Chains 27.81 (H) 0.57 - 2.63 mg/dL    Kappa /Lambda Ratio 0.86 0.26 - 1.65   CBC with platelets and differential   Result Value Ref Range    WBC Count 6.5 4.0 - 11.0 10e3/uL    RBC Count 3.36 (L) 3.80 - 5.20 10e6/uL    Hemoglobin 7.2 (L) 11.7 - 15.7 g/dL    Hematocrit 24.7 (L) 35.0 - 47.0 %    MCV 74 (L) 78 - 100 fL    MCH 21.4 (L) 26.5 - 33.0 pg    MCHC 29.1 (L) 31.5 - 36.5 g/dL    RDW 23.1 (H) 10.0 - 15.0 %    Platelet Count 148 (L) 150 - 450 10e3/uL    % Neutrophils 71 %    % Lymphocytes 16 %    % Monocytes 7 %    % Eosinophils 4 %    % Basophils 1 %    % Immature Granulocytes 1 %    NRBCs per 100 WBC 0 <1 /100    Absolute Neutrophils 4.7 1.6 - 8.3 10e3/uL    Absolute Lymphocytes 1.0 0.8 - 5.3 10e3/uL    Absolute Monocytes 0.5 0.0 - 1.3  10e3/uL    Absolute Eosinophils 0.2 0.0 - 0.7 10e3/uL    Absolute Basophils 0.0 0.0 - 0.2 10e3/uL    Absolute Immature Granulocytes 0.0 <=0.4 10e3/uL    Absolute NRBCs 0.0 10e3/uL   Total Protein, Serum for ELP   Result Value Ref Range    Total Protein Serum for ELP 7.6 6.4 - 8.3 g/dL       Imaging Results    MR Foot Right w/o Contrast    Result Date: 2/28/2024  EXAM: MR FOOT RIGHT W/O CONTRAST LOCATION: [facility] DATE: 2/27/2024 INDICATION: Assess for abscess or bone infection. COMPARISON: None. TECHNIQUE: Unenhanced. FINDINGS: JOINTS AND BONES: -Hallux valgus and bunion deformity with degenerative change at the first MTP joint. No significant fusion. No erosive changes are identified. The remainder of the forefoot is negative for fracture or bone marrow signal abnormality. No significant effusion. No evidence for synovial proliferation or abnormal enhancement to suggest an inflammatory arthropathy. No evidence for osteomyelitis. TENDONS: -The flexor and extensor tendons are negative for tendinopathy, tenosynovitis, or tearing. The hallucis tendons are negative. LIGAMENTS: -The ligament of Lisfranc is intact. The collateral ligaments at the MTP joints are all intact. MUSCLES AND SOFT TISSUES: -No muscle atrophy or edema. No soft tissue mass or fluid collection.     IMPRESSION: 1.  No evidence for osteomyelitis, septic arthropathy, or abscess. 2.  Hallux valgus and bunion deformity with degenerative change first MTP joint. 3.  No evidence for fracture. 4.  No evidence for tendinous or ligamentous pathology. 5.  Exam otherwise negative.     A total of 40 minutes was spent today on this visit including face to face conversation with the patient, EMR review (labs, imaging studies, pathology reports and outside records), counseling and care co-ordination and documentation.    The longitudinal plan of care for the diagnosis(es)/condition(s) as documented were addressed during this visit. Due to  the added complexity in care, I will continue to support Barriga in the subsequent management and with ongoing continuity of care.      Signed by: Sanjuanita Zuñiga MD

## 2024-03-26 ENCOUNTER — TELEPHONE (OUTPATIENT)
Dept: FAMILY MEDICINE | Facility: CLINIC | Age: 54
End: 2024-03-26
Payer: COMMERCIAL

## 2024-03-26 NOTE — TELEPHONE ENCOUNTER
Forms/Letter Request    Type of form/letter: OTHER: Rental w/c, lightweight 16x16 w/ht adj arm, tipper, seatbelt       Do we have the form/letter: Yes: TH    Who is the form from? [facility] (if other please explain)    Where did/will the form come from? form was faxed in    When is form/letter needed by: asap    How would you like the form/letter returned: Fax : [phone]

## 2024-03-28 ENCOUNTER — MEDICAL CORRESPONDENCE (OUTPATIENT)
Dept: HEALTH INFORMATION MANAGEMENT | Facility: CLINIC | Age: 54
End: 2024-03-28
Payer: COMMERCIAL

## 2024-03-31 DIAGNOSIS — E78.5 HYPERLIPIDEMIA LDL GOAL <100: ICD-10-CM

## 2024-04-01 RX ORDER — ATORVASTATIN CALCIUM 80 MG/1
TABLET, FILM COATED ORAL
Qty: 90 TABLET | Refills: 2 | Status: SHIPPED | OUTPATIENT
Start: 2024-04-01

## 2024-04-10 ENCOUNTER — OFFICE VISIT (OUTPATIENT)
Dept: PHARMACY | Facility: CLINIC | Age: 54
End: 2024-04-10
Payer: COMMERCIAL

## 2024-04-10 VITALS — WEIGHT: 116 LBS | DIASTOLIC BLOOD PRESSURE: 76 MMHG | SYSTOLIC BLOOD PRESSURE: 158 MMHG | BODY MASS INDEX: 21.92 KG/M2

## 2024-04-10 DIAGNOSIS — I10 PRIMARY HYPERTENSION: ICD-10-CM

## 2024-04-10 DIAGNOSIS — Z99.2 ESRD (END STAGE RENAL DISEASE) ON DIALYSIS (H): ICD-10-CM

## 2024-04-10 DIAGNOSIS — E11.69 TYPE 2 DIABETES MELLITUS WITH OTHER SPECIFIED COMPLICATION, UNSPECIFIED WHETHER LONG TERM INSULIN USE (H): Primary | ICD-10-CM

## 2024-04-10 DIAGNOSIS — R79.89 LOW VITAMIN D LEVEL: ICD-10-CM

## 2024-04-10 DIAGNOSIS — Z78.9 TAKES DIETARY SUPPLEMENTS: ICD-10-CM

## 2024-04-10 DIAGNOSIS — N18.6 ESRD (END STAGE RENAL DISEASE) ON DIALYSIS (H): ICD-10-CM

## 2024-04-10 DIAGNOSIS — R52 PAIN: ICD-10-CM

## 2024-04-10 DIAGNOSIS — E78.5 HYPERLIPIDEMIA LDL GOAL <100: ICD-10-CM

## 2024-04-10 PROCEDURE — 99606 MTMS BY PHARM EST 15 MIN: CPT | Performed by: PHARMACIST

## 2024-04-10 PROCEDURE — 99607 MTMS BY PHARM ADDL 15 MIN: CPT | Performed by: PHARMACIST

## 2024-04-10 RX ORDER — INSULIN ASPART 100 [IU]/ML
12 INJECTION, SOLUTION INTRAVENOUS; SUBCUTANEOUS
Qty: 45 ML | Refills: 3 | Status: SHIPPED | OUTPATIENT
Start: 2024-04-10

## 2024-04-10 RX ORDER — AMLODIPINE BESYLATE 5 MG/1
5 TABLET ORAL DAILY
Qty: 90 TABLET | Refills: 1 | Status: SHIPPED | OUTPATIENT
Start: 2024-04-10 | End: 2024-04-22 | Stop reason: DRUGHIGH

## 2024-04-10 NOTE — PATIENT INSTRUCTIONS
"Recommendations from today's MTM visit:                                                      Blood Sugar Goals:  Morning (before eating) :   Evening (2 hours after eating) : Less than 180    Blood pressure goal: < 130/80     Increase Novolog to 12 units 2-3 times daily before meals       Increase amlodipine to 5 mg daily   Please make sure you are scanning your blood sugar sensor at least every 8 hours. I recommend scanning when you wake up, in the afternoon, in the evening, and right before going to bed to ensure all data is transferred from the sensor to the reader.   Continue checking blood pressure twice daily and bring those readings to your appointments   You can take acetaminophen 2 tablets three times daily as needed for neck pain     Follow-up:     It was great speaking with you today.  I value your experience and would be very thankful for your time in providing feedback in our clinic survey. In the next few days, you may receive an email or text message from [employer] with a link to a survey related to your  clinical pharmacist.\" To schedule another MTM appointment, please call the clinic directly or you may call the MTM scheduling line at [phone].    My Clinical Pharmacist's contact information:                                                      Please feel free to contact me with any questions or concerns you have.   Mery Fernandez, Pharm.D.  Medication Therapy Management Pharmacy Resident        "

## 2024-04-10 NOTE — PROGRESS NOTES
Medication Therapy Management (MTM) Encounter    ASSESSMENT:                            Medication Adherence/Access: No issues identified, check adherence to victoza at next visit.     Diabetes:  A1c at goal <7%, postprandial  sugars are high per Freestyle AGP. Patient increased dose higher than prescribed though is tolerating well, postprandial blood sugar remains elevated at current dose. Patient agreeable to Novolog dose increase today for further blood sugar control. Due for A1c recheck with next labs.     Hypertension/CKD:  Blood pressure not at goal <130/80 mmHg today, patient may benefit from amlodipine dose increase. Advised patient to continue to check blood pressure twice daily and record readings to bring to follow up visit.     Hyperlipidemia:   Stable on high intensity statin and fish oil. Last LDL goal <100 mg/dL.     Supplements /anemia  Stable    Pain:   Encouraged patient to take acetaminophen 3 times daily as needed for pain and follow-up with PCP for further workup.    PLAN:                            Increase Novolog to 12 units before meals   Increase amlodipine to 5 mg daily   You can take acetaminophen three times daily as needed for pain   Please make sure you are scanning your sensor at least every 8 hours. I recommend scanning when you wake up, in the afternoon, in the evening, and right before going to bed to ensure all data is transferred from the sensor to the reader.     Medication issues to be addressed at a future visit:   Ozempic instead of Victoza?   Increase amlodipine?    Follow-up: Return in about 6 weeks (around 5/22/2024) for with PharmD.  PCP 4/22 hem/onc 4/19    SUBJECTIVE/OBJECTIVE:                          Nithya Matthews is a 54 year old female coming in for a follow-up visit. Patient was accompanied by her . Patient seen with Ruben . ID#[id number]    Reason for visit: MTM follow-up.    Allergies/ADRs: Reviewed in chart  Past Medical History: Reviewed in  chart  Tobacco: She reports that she has never smoked. She has never been exposed to tobacco smoke. She has never used smokeless tobacco.  Alcohol: not currently using    Medication Adherence/Access: States that her daughter helps with managing her medications. She has a nurse that prepares the three times daily pillbox for her every 2 weeks and her daughter gives her medications/injections every day. Reports no missed doses of oral medications or injections.   Brings with med vials, insulin pens, pillbox and glucometer today. Pharmacy usually delivers medications to her home. Also brings her blood pressure monitor and log.    Diabetes   Lantus 68 (34 units on each side) daily in the morning  NovoLog 8 units 2-3 times daily directly before meals (increased 3/20)- patient states she is using 10 units   Victoza 1.8 mg daily in the morning  Aspirin 81mg daily  Patient is not experiencing side effects.   Blood sugar monitoring: Continuous Glucose Monitor - Claribel 14 day; see below. Scanning only twice daily   Current diabetes symptoms: none   Diet/Exercise: Eating 2-3 meals per day. Eats rice/ocasio (traditional [place] food) for meals and then fruit right after. Reports appetite has been the same- no changes recently.   Med Hx: metformin (CKD)     Eye exam is up to date  Foot exam: due  Urine Albumin:   Lab Results   Component Value Date    UMALCR  10/23/2023      Comment:      Unable to calculate, urine albumin and/or urine creatinine is outside detectable limits.  Microalbuminuria is defined as an albumin:creatinine ratio of 17 to 299 for males and 25 to 299 for females. A ratio of albumin:creatinine of 300 or higher is indicative of overt proteinuria.  Due to biologic variability, positive results should be confirmed by a second, first-morning random or 24-hour timed urine specimen. If there is discrepancy, a third specimen is recommended. When 2 out of 3 results are in the microalbuminuria range, this is evidence for  incipient nephropathy and warrants increased efforts at glucose control, blood pressure control, and institution of therapy with an angiotensin-converting-enzyme (ACE) inhibitor (if the patient can tolerate it).        Lab Results   Component Value Date    A1C 6.7 (H) 01/22/2024       Hypertension /CKD  Lisinopril 20 mg daily  Carvedilol 12.5 mg twice daily - in pillbox correctly   Amlodipine 2.5 mg daily (started 3/20)  Calcium Acetate 667 mg three times daily with meals   Calcium carbonate 500 mg 3 tablets twice daily per nephrologist  Dialysis Tues/Thurs/Sat.   Patient reports the following medication side effects- headache occasionally. Patient does self-monitor blood pressure. She takes it twice daily and rests before taking it. She takes her medications then checks her blood pressure (see below)   Nephrologist: Carl Rosas MD at [facility]  Home blood pressure cuff today said 161/77 mmHg when verified with clinic reading      BP Readings from Last 3 Encounters:   04/10/24 (!) 158/76   03/22/24 (!) 153/70   03/20/24 (!) 158/72     Pulse Readings from Last 3 Encounters:   03/22/24 77   02/21/24 80   01/22/24 83     Date morning evening   4/10 182/87 160/77   4/9 177/89 176/74   4/8 162/83 173/82   4/7 166/82 181/90   4/6 176/84 181/83   4/5 179/88 170/83   4/4 166/83 176/87     Hyperlipidemia   Atorvastatin 80mg daily  Fish Oil 1000mg once daily  Patient reports no side effects.   Med Hx: fenofibrate (stopped d/t CKD)  The ASCVD Risk score (Slava DK, et al., 2019) failed to calculate for the following reasons:    The valid total cholesterol range is 130 to 320 mg/dL     Recent Labs   Lab Test 10/23/23  1123 12/28/22  1506   CHOL 124 189   HDL 26* 34*   LDL 53 82   TRIG 224* 365*     Supplements /anemia  Vitamin B12 1000 mcg daily  Ferrous sulfate 325 mg every other day  Ergocalciferol 50,000 units once weekly- set up correctly  Folic acid 1 mg daily- started by hem/onc 3/22  No reported issues at  this time. Patient had visit with heme/onc for anemia d/t ESRD, they started folic acid. Feels a little less fatigued after starting folic acid.    Latest Reference Range & Units 06/27/23 04:19   Vitamin B12 232 - 1,245 pg/mL 1,095     Lab Results   Component Value Date    VITDT 17 (L) 12/28/2022    VITDT 12 (L) 07/27/2022    VITDT 17 (L) 03/17/2022        Pain:   Acetaminophen 1000 mg every 6 hours as needed- taking twice daily   Has neck pain soreness, finds Tylenol some what helpful.     Today's Vitals: BP (!) 158/76   Wt 116 lb (52.6 kg)   LMP  (LMP Unknown)   BMI 21.92 kg/m    ----------------  I spent 40 minutes with this patient today. All changes were made via collaborative practice agreement with Giancarlo Arizmendi MD. A copy of the visit note was provided to the patient's provider(s).    A summary of these recommendations was given to the patient.    Mery Fernandez, Pharm.D.  Medication Therapy Management Pharmacy Resident     Preceptor cosignature: Patient was seen independently by Dr. Fernandez. I have reviewed the assessment and plan. Shital Hernadnez, PharmD, BCACP     Medication Therapy Recommendations  Pain    Current Medication: acetaminophen (TYLENOL) 500 MG tablet   Rationale: Does not understand instructions - Adherence - Adherence   Recommendation: Provide Education   Status: Patient Agreed - Adherence/Education   Note: take three times daily as needed         Primary hypertension    Current Medication: amLODIPine (NORVASC) 2.5 MG tablet (Discontinued)   Rationale: Dose too low - Dosage too low - Effectiveness   Recommendation: Increase Dose - amLODIPine 5 MG tablet   Status: Accepted per CPA         Type 2 diabetes mellitus with other specified complication, unspecified whether long term insulin use (H)    Current Medication: insulin aspart (NOVOLOG FLEXPEN) 100 UNIT/ML pen (Discontinued)   Rationale: Dose too low - Dosage too low - Effectiveness   Recommendation: Increase Dose   Status: Accepted per  CPA

## 2024-04-10 NOTE — Clinical Note
Slight increase to Novolog today, also increased  amlodipine as her blood pressure was still high. She is checking blood pressure twice daily at home and comes with a notebook of the readings. I think A1c may bump a little bit on next check, but we will see it is hard to tell what her evening blood sugars look like.   Mery

## 2024-04-11 ENCOUNTER — PATIENT OUTREACH (OUTPATIENT)
Dept: CARE COORDINATION | Facility: CLINIC | Age: 54
End: 2024-04-11
Payer: COMMERCIAL

## 2024-04-11 NOTE — PROGRESS NOTES
Clinic Care Coordination Contact  Care Coordination Clinician Chart Review    Situation: Patient chart reviewed by Care Coordinator.       Background: Care Coordination Program started: 3/1/2022. Initial assessment completed and patient-centered care plan(s) were developed with participation from patient. Lead CC handed patient off to CHW for continued outreaches.       Assessment: Per chart review, patient outreach completed by CC CHW on 3/12/2024.  Patient is actively working to accomplish goal(s). Patient's goal(s) appropriate and relevant at this time. Patient is not due for updated Plan of Care.  Assessments will be completed annually or as needed/with change of patient status.    Patient attended her hema/oncology appt on 3/22 and is scheduled to follow up on 4/19/2024.     Patient is on dialysis 3x per week.  CCRN plans to review the status of listed on active transplant list during upcoming outreach.      Care Plan: Hematology       Problem: evaluation       Goal: Patient will attend hematology clinic appointment in the next 6 months.       This Visit's Progress: 30%    Note:     Barriers: language barrier, low literacy, noncompliance, and lack of knowledge how to navigate complex health care system  Strengths: motivated to attend appt  Patient expressed understanding of goal: Yes    Action steps to achieve this goal:  1. I will answer my phone when I am contacted to schedule my appointment.  2. I will attend my initial hematology appointment as scheduled  TBD  3. I will schedule a follow up appointment with my provider if it is recommended to do so while I am at the clinic.  4. I will follow up with CCC regarding this goal at each outreach until it is completed.                                    Plan/Recommendations: The patient will continue working with Care Coordination to achieve goal(s) as above. CHW will continue outreaches at minimum every 30 days and will involve Lead CC as needed or if patient is  ready to move to Maintenance. Lead CC will continue to monitor CHW outreaches and patient's progress to goal(s) every 6 weeks.     Plan of Care updated and sent to patient: Yes, via [employer]

## 2024-04-11 NOTE — LETTER
[facility]  Patient Centered Plan of Care  About Me:        Patient Name:  Nithya Matthews    YOB: 1970  Age:         54 year old   Johann MRN:    [id number] Telephone Information:  Home Phone [phone]   Mobile [phone]   Home Phone [phone]   Mobile Not on file.       Address:  [address] [place] [address] Email address:  [email or web address]      Emergency Contact(s)    Name Relationship Lgl Grd Work Phone Home Phone Mobile Phone   JESSY VAUGHN Daughter    [phone]           Primary language:  Afghan     needed? Yes   [employer]:  [phone] op. 1  Other communication barriers:Language barrier    Preferred Method of Communication:     Current living arrangement: I live in a private home with family    Mobility Status/ Medical Equipment: Independent w/Device        Health Maintenance  Health Maintenance Reviewed: Not assessed      My Access Plan  Medical Emergency 911   Primary Clinic Line [facility] [phone]   24 Hour Appointment Line [phone] or  [phone]) (toll-free)   24 Hour Nurse Line [phone] (toll-free)   Preferred Urgent Care [facility] [phone]     [facility]  [phone]     Preferred Pharmacy [facility] - [place], [place] - [address]     Behavioral Health Crisis Line The National Suicide Prevention Lifeline at 1-252.782.1166 or Text/Call 088           My Care Team Members  Patient Care Team         Relationship Specialty Notifications Start End    Giancarlo Arizmendi MD PCP - General Family Medicine  2/14/22     Referring to Eye    Phone: [phone] Fax: [phone]         [address] [facility] [address] [place] [address]    Giancarlo Arizmendi MD Assigned PCP   2/20/22     Phone: [phone] Fax: [phone]         [address] [facility] [address] [place] [address]    Irene Samson CHW [facility]  Health Worker Primary Care - CC Admissions 3/1/22     Phone: [phone]         Albert Moe ARM worker   3/2/22     [facility]. Call [facility] worker  Avani Nelson [phone] for assistance.    Phone: [phone]         Miracle Mi OD  Optometry  3/18/22     Phone: [phone] Fax: [phone]         [address] [place] [address]    Shital Hernandez, PharmD Pharmacist Pharmacist  4/6/22     Phone: [phone]          [facility] [address] [place] [address]    Grecia Hearn DPM, Podiatry/Foot and Ankle Surgery Assigned Musculoskeletal Provider   5/1/22     Phone: [phone] Pager: [phone] Fax: [phone] [address] [address] [address]    Davis Villa MD MD Endocrinology, Diabetes, and Metabolism  6/2/22     Phone: [phone] Fax: [phone]         [facility] [address]    Shital Hernandez, PharmD Assigned [facility] Pharmacist   9/28/22     Phone: [phone]          [facility] [address] [place] [address]    Sahil Waterman MD Assigned Nephrology Provider   10/29/22     Phone: [phone] Fax: [phone]         [address] [place] [address]    Davis Villa MD Assigned Endocrinology Provider   12/24/22     Phone: [phone] Fax: [phone]         [facility] [address]    Carl Rosas MD Nephrologist Nephrology  3/9/23     Phone: [phone] Fax: [phone]         [address] [address]    Janel Jhaveri, RN Registered Nurse Primary Care - CC Admissions 4/19/23     Phone: [phone]         Markell Langford MD Assigned Surgical Provider   5/6/23     Phone: [phone] Fax: [phone]          [address] [address]    Negin Nunes NP Nurse Practitioner   5/30/23     Phone: [phone] Fax: [phone]         [facility]  [address] [facility] [address]    Janel Jhaveri, RN Lead Care Coordinator Primary Care - CC Admissions 2/7/24     Phone: [phone]         Sanjuanita Zuñiga MD MD Medical Oncology  3/4/24     Phone: [phone] Fax: [phone] [address] [facility] [address]    Mery Fernandez [facility] Pharmacist Pharmacist  3/20/24     Phone: [phone] Fax: [phone]         [address] [place] [address]                My Care Plans  Self Management and Treatment Plan    Care Plan  Care Plan: Hematology       Problem: evaluation       Goal: Patient will attend hematology clinic appointment in the next 6 months.       This Visit's Progress: 30%    Note:     Barriers: language barrier, low literacy, noncompliance, and lack of knowledge how to navigate complex health care system  Strengths: motivated to attend appt  Patient expressed understanding of goal: Yes    Action steps to achieve this goal:  1. I will answer my phone when I am contacted to schedule my appointment.  2. I will attend my initial hematology appointment as scheduled  TBD  3. I will schedule a follow up appointment with my provider if it is recommended to do so while I am at the clinic.  4. I will follow up with CCC regarding this goal at each outreach until it is completed.                                 Action Plans on File:                       Advance Care Plans/Directives:   Advanced Care Plan/Directives on file:   No    Discussed with patient/caregiver(s): No data recorded           My Medical and Care Information  Problem List   Patient Active Problem List   Diagnosis    Primary hypertension    Hyperlipidemia LDL goal <100    Depression, unspecified depression type    Hyperglycemia    Generalized muscle weakness    Low iron    Gastroesophageal reflux disease with esophagitis without hemorrhage    Low vitamin B12 level    Low vitamin D level    Celiac disease    Beta thalassemia minor    Type 2 diabetes mellitus with other specified complication,  unspecified whether long term insulin use (H)    Incontinence of feces with fecal urgency    Diabetic ulcer of other part of right foot associated with type 2 diabetes mellitus, with fat layer exposed (H)    ESRD (end stage renal disease) on dialysis (H)    Hyperkalemia    Weakness    Acute cough      Current Medications and Allergies:  See printed Medication Report.    Care Coordination Start Date: 3/1/2022   Frequency of Care Coordination: 6 weeks, more frequently as needed     Form Last Updated: 04/11/2024

## 2024-04-12 ENCOUNTER — PATIENT OUTREACH (OUTPATIENT)
Dept: CARE COORDINATION | Facility: CLINIC | Age: 54
End: 2024-04-12
Payer: COMMERCIAL

## 2024-04-12 NOTE — PROGRESS NOTES
Clinic Care Coordination Contact  Community Health Worker Follow Up  Spoke with Fritz Matthews (Daughter) through [employer]  ID [id number]    Care Gaps:     Health Maintenance Due   Topic Date Due    ZOSTER IMMUNIZATION (1 of 2) Never done    COVID-19 Vaccine (3 - Moderna risk series) 06/30/2022    DIABETIC FOOT EXAM  04/10/2024    A1C  04/22/2024    BMP  04/22/2024     Scheduled on 4/22/24 at 8:40AM with Dr. Arizmendi.       Care Plan:   Care Plan: Hematology/oncology       Problem: Beta thalassemia minor       Goal: Patient will attend hematology clinic appointment in the next 6 months.       Start Date: 2/22/2024 Expected End Date: 8/31/2024    This Visit's Progress: 40% Recent Progress: 30%    Priority: High    Note:     Barriers: language barrier, low literacy, noncompliance, and lack of knowledge how to navigate complex health care system  Strengths: motivated to attend appt  Patient expressed understanding of goal: Yes    Action steps to achieve this goal:  1. I will answer my phone when I am contacted to schedule my appointment.  2. I will attend my initial hematology appointment as scheduled 3/22/24. Completed.   3. I will attend my follow up hematology/oncology appt on 4/19/2024 at 9:15am with Dr Zuñiga.  4. I will schedule a follow up appointment with my provider if it is recommended to do so while I am at the clinic.  5. I will follow up with [facility] regarding this goal at each outreach until it is completed.                             Intervention and Education during outreach:   CHW reviewed upcoming April appointments. Patient requested transportation, her daughter will attend visits with her.    4/17/24 at 11AM Podiatry  4/19 at 8:45AM Oncology follow up on 4/19 Lab at 8:45AM and 9:15AM with Dr. Zuñiga.  4/22 at 8:40AM Dr. Arizmendi     CHW called [employer]  [phone], [phone] confirmed for all appointments above.     CHW Plan: CHW to follow up in 1 month    [facility] Care  Coordination  [facility]    Phone: [phone]

## 2024-04-16 ENCOUNTER — PATIENT OUTREACH (OUTPATIENT)
Dept: ONCOLOGY | Facility: HOSPITAL | Age: 54
End: 2024-04-16
Payer: COMMERCIAL

## 2024-04-16 DIAGNOSIS — Z99.2 ESRD (END STAGE RENAL DISEASE) ON DIALYSIS (H): ICD-10-CM

## 2024-04-16 DIAGNOSIS — D56.3 BETA THALASSEMIA MINOR: Primary | ICD-10-CM

## 2024-04-16 DIAGNOSIS — N18.6 ESRD (END STAGE RENAL DISEASE) ON DIALYSIS (H): ICD-10-CM

## 2024-04-16 DIAGNOSIS — E61.1 IRON DEFICIENCY: ICD-10-CM

## 2024-04-17 ENCOUNTER — OFFICE VISIT (OUTPATIENT)
Dept: PODIATRY | Facility: CLINIC | Age: 54
End: 2024-04-17
Payer: COMMERCIAL

## 2024-04-17 VITALS — DIASTOLIC BLOOD PRESSURE: 64 MMHG | WEIGHT: 116 LBS | SYSTOLIC BLOOD PRESSURE: 118 MMHG | BODY MASS INDEX: 21.92 KG/M2

## 2024-04-17 DIAGNOSIS — M20.11 HAV (HALLUX ABDUCTO VALGUS), RIGHT: ICD-10-CM

## 2024-04-17 DIAGNOSIS — Z87.2 HEALED FOOT ULCER: ICD-10-CM

## 2024-04-17 DIAGNOSIS — M21.41 BILATERAL PES PLANUS: ICD-10-CM

## 2024-04-17 DIAGNOSIS — L84 PRE-ULCERATIVE CALLUSES: ICD-10-CM

## 2024-04-17 DIAGNOSIS — E11.42 DIABETIC POLYNEUROPATHY ASSOCIATED WITH TYPE 2 DIABETES MELLITUS (H): Primary | ICD-10-CM

## 2024-04-17 DIAGNOSIS — M21.42 BILATERAL PES PLANUS: ICD-10-CM

## 2024-04-17 PROCEDURE — 11055 PARING/CUTG B9 HYPRKER LES 1: CPT | Performed by: PODIATRIST

## 2024-04-17 PROCEDURE — 99213 OFFICE O/P EST LOW 20 MIN: CPT | Mod: 25 | Performed by: PODIATRIST

## 2024-04-17 NOTE — LETTER
4/17/2024         RE: Nithya Matthews  [address] [place] [address]        Dear Colleague,    Thank you for referring your patient, Nithya Matthews, to the [facility] POD[facility]ATRY. Please see a copy of my visit note below.    Podiatry / Foot and Ankle Surgery Progress Note    April 17, 2024    Subject: Patient was seen for right foot ulcer.  Here with her family member who is interpreting for her.   Denies fever, nausea, vomiting.  Notes that they did not get any dressing supplies.  No pain to the feet but has baseline neuropathy.        Objective:  Vitals: Wt 52.6 kg (116 lb)   LMP  (LMP Unknown)   BMI 21.92 kg/m    BMI= Body mass index is 21.92 kg/m .    A1C: 6.7 (1/22/24)     General appearance: Patient is alert and fully cooperative with history & exam.  No sign of distress is noted during the visit.     Dermatologic: Preulcerative hyperkeratotic lesions to the plantar aspects of the first and fifth metatarsal heads bilaterally.  Upon debridement of the right first metatarsal head callus ulceration is healed.  No other open lesions or signs of acute infection noted.     Vascular: DP & PT pulses are intact & regular bilaterally.  No significant edema or varicosities noted.  CFT and skin temperature is normal to both lower extremities.     Neurologic: Lower extremity sensation is diminished to feet.     Musculoskeletal: Patient is ambulatory without assistive device or brace.  Prominent first metatarsal heads medially both feet.     Radiographs: Right foot x-ray - I have looked at and reviewed xrays personally -degenerative changes throughout the foot.  No gas in the tissue.  No evidence of bone infection at this time.     MRI right foot: No evidence for osteomyelitis, septic arthropathy, or abscess.  2.  Hallux valgus and bunion deformity with degenerative change first MTP joint.  3.  No evidence for fracture.  4.  No evidence for tendinous or ligamentous pathology.  5.  Exam otherwise negative.      Assessment:    Diabetic polyneuropathy associated with type 2 diabetes mellitus (H)  Healed foot ulcer  Hav (hallux abducto valgus), right  Bilateral pes planus  Pre-ulcerative calluses     Medical Decision Making/Plan:  Reviewed patient's chart in epic.    At this time, the preulcerative callus was debrided down.  At this time they will use a pumice stone or a foot file 2-3 times a week to help keep the callus from building up.  She can go back to her regular socks and diabetic shoes.  I would recommend follow-up in 2 months for reassessment as she is at high risk of reulceration.     All questions were answered to patient and patient's daughter satisfaction and they will call for the questions or concerns.     Procedure: After verbal consent, the hyperkeratotic lesion was debrided using a #15 blade without incident. Patient tolerated procedure well.      Patient Risk Factor:  Patient is a moderate risk factor for infection.           Grecia Hearn DPM, Podiatry/Foot and Ankle Surgery              Again, thank you for allowing me to participate in the care of your patient.        Sincerely,        Grecia Hearn DPM, Podiatry/Foot and Ankle Surgery

## 2024-04-17 NOTE — PROGRESS NOTES
Podiatry / Foot and Ankle Surgery Progress Note    April 17, 2024    Subject: Patient was seen for right foot ulcer.  Here with her family member who is interpreting for her.   Denies fever, nausea, vomiting.  Notes that they did not get any dressing supplies.  No pain to the feet but has baseline neuropathy.        Objective:  Vitals: Wt 52.6 kg (116 lb)   LMP  (LMP Unknown)   BMI 21.92 kg/m    BMI= Body mass index is 21.92 kg/m .    A1C: 6.7 (1/22/24)     General appearance: Patient is alert and fully cooperative with history & exam.  No sign of distress is noted during the visit.     Dermatologic: Preulcerative hyperkeratotic lesions to the plantar aspects of the first and fifth metatarsal heads bilaterally.  Upon debridement of the right first metatarsal head callus ulceration is healed.  No other open lesions or signs of acute infection noted.     Vascular: DP & PT pulses are intact & regular bilaterally.  No significant edema or varicosities noted.  CFT and skin temperature is normal to both lower extremities.     Neurologic: Lower extremity sensation is diminished to feet.     Musculoskeletal: Patient is ambulatory without assistive device or brace.  Prominent first metatarsal heads medially both feet.     Radiographs: Right foot x-ray - I have looked at and reviewed xrays personally -degenerative changes throughout the foot.  No gas in the tissue.  No evidence of bone infection at this time.     MRI right foot: No evidence for osteomyelitis, septic arthropathy, or abscess.  2.  Hallux valgus and bunion deformity with degenerative change first MTP joint.  3.  No evidence for fracture.  4.  No evidence for tendinous or ligamentous pathology.  5.  Exam otherwise negative.     Assessment:    Diabetic polyneuropathy associated with type 2 diabetes mellitus (H)  Healed foot ulcer  Hav (hallux abducto valgus), right  Bilateral pes planus  Pre-ulcerative calluses     Medical Decision Making/Plan:  Reviewed  patient's chart in epic.    At this time, the preulcerative callus was debrided down.  At this time they will use a pumice stone or a foot file 2-3 times a week to help keep the callus from building up.  She can go back to her regular socks and diabetic shoes.  I would recommend follow-up in 2 months for reassessment as she is at high risk of reulceration.     All questions were answered to patient and patient's daughter satisfaction and they will call for the questions or concerns.     Procedure: After verbal consent, the hyperkeratotic lesion was debrided using a #15 blade without incident. Patient tolerated procedure well.      Patient Risk Factor:  Patient is a moderate risk factor for infection.           Grecia Hearn DPM, Podiatry/Foot and Ankle Surgery

## 2024-04-19 ENCOUNTER — ONCOLOGY VISIT (OUTPATIENT)
Dept: ONCOLOGY | Facility: HOSPITAL | Age: 54
End: 2024-04-19
Attending: INTERNAL MEDICINE
Payer: COMMERCIAL

## 2024-04-19 ENCOUNTER — LAB (OUTPATIENT)
Dept: INFUSION THERAPY | Facility: HOSPITAL | Age: 54
End: 2024-04-19
Attending: INTERNAL MEDICINE
Payer: COMMERCIAL

## 2024-04-19 VITALS
DIASTOLIC BLOOD PRESSURE: 67 MMHG | SYSTOLIC BLOOD PRESSURE: 147 MMHG | BODY MASS INDEX: 21.09 KG/M2 | HEART RATE: 76 BPM | TEMPERATURE: 98.2 F | RESPIRATION RATE: 16 BRPM | WEIGHT: 111.6 LBS | OXYGEN SATURATION: 96 %

## 2024-04-19 DIAGNOSIS — Z99.2 ESRD (END STAGE RENAL DISEASE) ON DIALYSIS (H): ICD-10-CM

## 2024-04-19 DIAGNOSIS — E61.1 IRON DEFICIENCY: ICD-10-CM

## 2024-04-19 DIAGNOSIS — N18.6 ESRD (END STAGE RENAL DISEASE) ON DIALYSIS (H): ICD-10-CM

## 2024-04-19 DIAGNOSIS — D56.3 BETA THALASSEMIA MINOR: ICD-10-CM

## 2024-04-19 DIAGNOSIS — E11.69 TYPE 2 DIABETES MELLITUS WITH OTHER SPECIFIED COMPLICATION, UNSPECIFIED WHETHER LONG TERM INSULIN USE (H): ICD-10-CM

## 2024-04-19 DIAGNOSIS — D56.3 BETA THALASSEMIA MINOR: Primary | ICD-10-CM

## 2024-04-19 LAB
ALBUMIN SERPL BCG-MCNC: 4.1 G/DL (ref 3.5–5.2)
ALP SERPL-CCNC: 81 U/L (ref 40–150)
ALT SERPL W P-5'-P-CCNC: 11 U/L (ref 0–50)
AST SERPL W P-5'-P-CCNC: 16 U/L (ref 0–45)
BASOPHILS # BLD AUTO: 0 10E3/UL (ref 0–0.2)
BASOPHILS NFR BLD AUTO: 0 %
BILIRUB DIRECT SERPL-MCNC: <0.2 MG/DL (ref 0–0.3)
BILIRUB SERPL-MCNC: 0.5 MG/DL
EOSINOPHIL # BLD AUTO: 0.3 10E3/UL (ref 0–0.7)
EOSINOPHIL NFR BLD AUTO: 4 %
ERYTHROCYTE [DISTWIDTH] IN BLOOD BY AUTOMATED COUNT: 22.9 % (ref 10–15)
FERRITIN SERPL-MCNC: 1103 NG/ML (ref 11–328)
HCT VFR BLD AUTO: 26.9 % (ref 35–47)
HGB BLD-MCNC: 7.8 G/DL (ref 11.7–15.7)
IMM GRANULOCYTES # BLD: 0 10E3/UL
IMM GRANULOCYTES NFR BLD: 1 %
IRON BINDING CAPACITY (ROCHE): 202 UG/DL (ref 240–430)
IRON SATN MFR SERPL: 29 % (ref 15–46)
IRON SERPL-MCNC: 58 UG/DL (ref 37–145)
KAPPA LC FREE SER-MCNC: 23.05 MG/DL (ref 0.33–1.94)
KAPPA LC FREE/LAMBDA FREE SER NEPH: 0.91 {RATIO} (ref 0.26–1.65)
LAMBDA LC FREE SERPL-MCNC: 25.37 MG/DL (ref 0.57–2.63)
LYMPHOCYTES # BLD AUTO: 1 10E3/UL (ref 0.8–5.3)
LYMPHOCYTES NFR BLD AUTO: 13 %
MCH RBC QN AUTO: 21.3 PG (ref 26.5–33)
MCHC RBC AUTO-ENTMCNC: 29 G/DL (ref 31.5–36.5)
MCV RBC AUTO: 74 FL (ref 78–100)
MONOCYTES # BLD AUTO: 0.5 10E3/UL (ref 0–1.3)
MONOCYTES NFR BLD AUTO: 6 %
NEUTROPHILS # BLD AUTO: 5.7 10E3/UL (ref 1.6–8.3)
NEUTROPHILS NFR BLD AUTO: 76 %
NRBC # BLD AUTO: 0 10E3/UL
NRBC BLD AUTO-RTO: 0 /100
PLATELET # BLD AUTO: 119 10E3/UL (ref 150–450)
PROT SERPL-MCNC: 7.9 G/DL (ref 6.4–8.3)
RBC # BLD AUTO: 3.66 10E6/UL (ref 3.8–5.2)
TOTAL PROTEIN SERUM FOR ELP: 7.5 G/DL (ref 6.4–8.3)
WBC # BLD AUTO: 7.5 10E3/UL (ref 4–11)

## 2024-04-19 PROCEDURE — 80076 HEPATIC FUNCTION PANEL: CPT

## 2024-04-19 PROCEDURE — 86334 IMMUNOFIX E-PHORESIS SERUM: CPT | Mod: 26 | Performed by: PATHOLOGY

## 2024-04-19 PROCEDURE — 85041 AUTOMATED RBC COUNT: CPT

## 2024-04-19 PROCEDURE — 84165 PROTEIN E-PHORESIS SERUM: CPT | Mod: 26 | Performed by: PATHOLOGY

## 2024-04-19 PROCEDURE — 82728 ASSAY OF FERRITIN: CPT

## 2024-04-19 PROCEDURE — 84165 PROTEIN E-PHORESIS SERUM: CPT | Mod: TC | Performed by: PATHOLOGY

## 2024-04-19 PROCEDURE — 83036 HEMOGLOBIN GLYCOSYLATED A1C: CPT

## 2024-04-19 PROCEDURE — 83550 IRON BINDING TEST: CPT

## 2024-04-19 PROCEDURE — 83521 IG LIGHT CHAINS FREE EACH: CPT

## 2024-04-19 PROCEDURE — G0463 HOSPITAL OUTPT CLINIC VISIT: HCPCS | Performed by: INTERNAL MEDICINE

## 2024-04-19 PROCEDURE — 36415 COLL VENOUS BLD VENIPUNCTURE: CPT

## 2024-04-19 PROCEDURE — 84155 ASSAY OF PROTEIN SERUM: CPT

## 2024-04-19 PROCEDURE — 86334 IMMUNOFIX E-PHORESIS SERUM: CPT | Performed by: PATHOLOGY

## 2024-04-19 PROCEDURE — 99213 OFFICE O/P EST LOW 20 MIN: CPT | Performed by: INTERNAL MEDICINE

## 2024-04-19 PROCEDURE — G2211 COMPLEX E/M VISIT ADD ON: HCPCS | Performed by: INTERNAL MEDICINE

## 2024-04-19 ASSESSMENT — PAIN SCALES - GENERAL: PAINLEVEL: MODERATE PAIN (4)

## 2024-04-19 NOTE — LETTER
4/19/2024         RE: Nithya Matthews  [address] [place] [address]        Dear Colleague,    Thank you for referring your patient, Nithya Matthews, to the [facility] [place]. Please see a copy of my visit note below.    [facility] Hematology and Oncology Progress Note    Patient: Nithya Matthews  MRN: [id number]  4/19/24        Reason for Visit    Chief Complaint   Patient presents with     Oncology Clinic Visit         Problem List Items Addressed This Visit          Urinary    ESRD (end stage renal disease) on dialysis (H)    Relevant Orders    CBC with platelets and differential    Ferritin    Iron & Iron Binding Capacity       Hematologic    Beta thalassemia minor - Primary    Relevant Orders    CBC with platelets and differential    Ferritin    Iron & Iron Binding Capacity     Other Visit Diagnoses       Iron deficiency        Relevant Orders    CBC with platelets and differential    Ferritin    Iron & Iron Binding Capacity              Assessment and Plan  Anemia in the setting of ESRD on dialysis, beta thalassemia minor and history of iron deficiency  Her baseline hemoglobin was around 10-11.  But since starting dialysis it has significantly dropped.  She is currently on VIK supplementation through her dialysis clinic.  Gets 300 mcg of Mircera every 2 weeks.  Also takes oral iron.  I repeated her iron studies few weeks ago.  Transferrin saturation was 20%.  Ferritin was over 1200.  TIBC was low and total iron was normal.  Hemoglobin was around 7.2.  I started her on folic acid 1 mg daily.  Repeat labs reviewed today.  Hemoglobin is slightly up at 7.8.  Iron transferrin saturation has come up to 29%.  Ferritin is still pending.  No evidence of any liver dysfunction.  SPEP showed no evidence of any monoclonal proteins.  She has elevated kappa and lambda light chains with normal ratio which is indicative of significant renal dysfunction.  No concern for any primary bone marrow pathology at this  point in time.    Reviewed further management in this setting.  Unfortunately there is no good solution for her anemia going forward other than kidney transplant.  She has a beta thalassemia minor which generally does not lead to transfusion dependency.  Iron studies indicate iron deficiency at this point in time.  In ESRD would like to keep transferrin saturation above 30% and ferritin above 500.  Her ferritin was already significantly elevated which could be an indication for chronic inflammatory state versus iron overload.  However she has no evidence of any liver dysfunction.  I think she should still continue oral iron supplementation and intermittent IV iron infusion through dialysis clinic to keep transferrin saturation above 30%.  Continue VIK supplementation through dialysis clinic.  Could increase the dose to 500 mcg weekly.    Hematology standpoint, unfortunately I do not have any other great recommendations.  Perhaps consideration for urgent kidney transplant will help her hemoglobin to some extent.  Continue folic acid 1 mg daily.  No further workup or treatment needed from my side.  I will continue to follow her intermittently.  I will see her back in 3 months with labs again.  Looks like referral has been made to [facility] for kidney transplant.       Cancer Staging   No matching staging information was found for the patient.      ECOG Performance    2 - Ambulatory and independent in all ADLs; cannot work; up > 50% of the time         Problem List    Patient Active Problem List   Diagnosis     Primary hypertension     Hyperlipidemia LDL goal <100     Depression, unspecified depression type     Hyperglycemia     Generalized muscle weakness     Low iron     Gastroesophageal reflux disease with esophagitis without hemorrhage     Low vitamin B12 level     Low vitamin D level     Celiac disease     Beta thalassemia minor     Type 2 diabetes mellitus with other specified complication, unspecified whether long  term insulin use (H)     Incontinence of feces with fecal urgency     Diabetic ulcer of other part of right foot associated with type 2 diabetes mellitus, with fat layer exposed (H)     ESRD (end stage renal disease) on dialysis (H)     Hyperkalemia     Weakness     Acute cough          Interval History   Nithya Matthews is a 54 year old female with history of beta thalassemia minor, iron deficiency, anemia and ESRD who is seen in the hematology clinic for follow-up.   services via telephone was used during this encounter.      Previously I saw her for management of anemia in the setting of history of beta thalassemia minor and ESRD.  She has been getting VIK supplementation through her dialysis clinic.  Not sure if she is on any IV iron.  Patient currently takes oral iron supplementation.  I had started her on folic acid 1 mg daily.  I will had also obtained some lab studies.  She is here to review the results.  Continues to complain of feet pain.      Review of Systems  A comprehensive review of systems was negative except for what is noted in the interval history    Current Outpatient Medications   Medication Sig Dispense Refill     acetaminophen (TYLENOL) 500 MG tablet Take 1-2 tablets (500-1,000 mg) by mouth every 6 hours as needed for pain 100 tablet 3     Alcohol Swabs (ALCOHOL PADS) 70 % PADS 1 Application 4 times daily (before meals and nightly) 200 each 3     amLODIPine (NORVASC) 5 MG tablet Take 1 tablet (5 mg) by mouth daily 90 tablet 1     ASPIRIN LOW DOSE 81 MG EC tablet TAKE 1 TABLET (81 MG) BY MOUTH DAILY 30 tablet 3     atorvastatin (LIPITOR) 80 MG tablet TAKE 1 TABLET (80 MG) BY MOUTH DAILY FOR CHOLESTEROL 90 tablet 2     BD YOUSIF U/F 32G X 4 MM insulin pen needle USE 6X PEN NEEDLES DAILY OR AS DIRECTED (VICTOZA, 3 FOR NOVOLOG, 2 FOR LANTUS) *34 DAY SUPPLY* 200 each 11     Blood Pressure KIT Use BP kit to check BP daily. 1 kit 0     calcium acetate (PHOSLO) 667 MG CAPS capsule Take 667 mg by mouth  3 times daily (with meals)       calcium carbonate (TUMS) 500 MG chewable tablet Take by mouth 3 times daily       carvedilol (COREG) 12.5 MG tablet Take 12.5 mg by mouth 2 times daily (with meals) Per nephrology on 12/5       Continuous Blood Gluc Sensor (FREESTYLE MICKIE 14 DAY SENSOR) MISC USE 1 DEVICE EVERY 14 DAYS CHANGE SENSOR AS DIRECTED EVERY 14 DAYS 6 each 3     cyanocobalamin (VITAMIN B-12) 1000 MCG tablet TAKE 1 TABLET (1,000 MCG) BY MOUTH DAILY 30 tablet 3     diclofenac (VOLTAREN) 1 % topical gel Apply 4 g topically 4 times daily as needed (rib/chest pain) 350 g 3     escitalopram (LEXAPRO) 10 MG tablet TAKE 1 TABLET (10 MG) BY MOUTH DAILY FOR DEPRESSION 90 tablet 3     ferrous sulfate (FEROSUL) 325 (65 Fe) MG tablet TAKE 1 TABLET (325 MG) BY MOUTH EVERY OTHER DAY FOR IRON 70 tablet 3     fish oil-omega-3 fatty acids 1000 MG capsule TAKE 1 CAPSULE (1 G) BY MOUTH DAILY 30 capsule 3     folic acid (FOLVITE) 1 MG tablet Take 1 tablet (1 mg) by mouth daily 90 tablet 3     gabapentin (NEURONTIN) 300 MG capsule TAKE 1 CAPSULE (300 MG) BY MOUTH AT BEDTIME 30 capsule 3     insulin aspart (NOVOLOG FLEXPEN) 100 UNIT/ML pen Inject 12 Units Subcutaneous 3 times daily (with meals) 45 mL 3     insulin glargine (LANTUS SOLOSTAR) 100 UNIT/ML pen Inject 68 units subcutaneous every morning 34 units on each side. 15 mL 3     liraglutide (VICTOZA PEN) 18 MG/3ML solution Inject 1.8 mg Subcutaneous every morning 9 mL 11     lisinopril (ZESTRIL) 20 MG tablet Take 1 tablet (20 mg) by mouth daily 90 tablet 3     mirtazapine (REMERON) 7.5 MG tablet TAKE 1 TABLET (7.5 MG) BY MOUTH AT BEDTIME 90 tablet 3     nystatin (MYCOSTATIN) 993401 UNIT/GM external cream Apply topically 2 times daily 60 g 3     omeprazole (PRILOSEC) 20 MG DR capsule TAKE 1 PILL (20 MG) BY MOUTH DAILY FOR STOMACH 90 capsule 3     oxyCODONE (ROXICODONE) 5 MG tablet Take 0.5 tablets (2.5 mg) by mouth every 8 hours as needed for pain 12 tablet 0     polyethylene  glycol (MIRALAX) 17 GM/Dose powder Take 17 g by mouth daily as needed for constipation 510 g 4     REFRESH OPTIVE ADVANCED 0.5-1-0.5 % SOLN PLACE 1 DROP INTO BOTH EYES 3 TIMES DAILY AS NEEDED (AS NEEDED FOR DRY EYE/EYE PAIN) 10 mL 4     silver sulfADIAZINE (SILVADENE) 1 % external cream Apply topically 2 times daily 25 g 1     vitamin D2 (ERGOCALCIFEROL) 08259 units (1250 mcg) capsule Take 1 capsule (50,000 Units) by mouth once a week 12 capsule 3     No current facility-administered medications for this visit.        Physical Exam    Failed to redirect to the Timeline version of the [facility] SmartLink.    General: alert and cooperative  HEENT: Head: Normal, normocephalic, atraumatic.  Eye: Normal external eye, conjunctiva, lids cornea, JAZMIN.  CNS: Alert and oriented x3, neurologic exam grossly normal.      Lab Results    Recent Results (from the past 168 hour(s))   Iron and iron binding capacity   Result Value Ref Range    Iron 58 37 - 145 ug/dL    Iron Binding Capacity 202 (L) 240 - 430 ug/dL    Iron Sat Index 29 15 - 46 %   Hepatic panel (Albumin, ALT, AST, Bili, Alk Phos, TP)   Result Value Ref Range    Protein Total 7.9 6.4 - 8.3 g/dL    Albumin 4.1 3.5 - 5.2 g/dL    Bilirubin Total 0.5 <=1.2 mg/dL    Alkaline Phosphatase 81 40 - 150 U/L    AST 16 0 - 45 U/L    ALT 11 0 - 50 U/L    Bilirubin Direct <0.20 0.00 - 0.30 mg/dL   CBC with platelets and differential   Result Value Ref Range    WBC Count 7.5 4.0 - 11.0 10e3/uL    RBC Count 3.66 (L) 3.80 - 5.20 10e6/uL    Hemoglobin 7.8 (L) 11.7 - 15.7 g/dL    Hematocrit 26.9 (L) 35.0 - 47.0 %    MCV 74 (L) 78 - 100 fL    MCH 21.3 (L) 26.5 - 33.0 pg    MCHC 29.0 (L) 31.5 - 36.5 g/dL    RDW 22.9 (H) 10.0 - 15.0 %    Platelet Count 119 (L) 150 - 450 10e3/uL    % Neutrophils 76 %    % Lymphocytes 13 %    % Monocytes 6 %    % Eosinophils 4 %    % Basophils 0 %    % Immature Granulocytes 1 %    NRBCs per 100 WBC 0 <1 /100    Absolute Neutrophils 5.7 1.6 - 8.3 10e3/uL     Absolute Lymphocytes 1.0 0.8 - 5.3 10e3/uL    Absolute Monocytes 0.5 0.0 - 1.3 10e3/uL    Absolute Eosinophils 0.3 0.0 - 0.7 10e3/uL    Absolute Basophils 0.0 0.0 - 0.2 10e3/uL    Absolute Immature Granulocytes 0.0 <=0.4 10e3/uL    Absolute NRBCs 0.0 10e3/uL       Imaging    No results found.    A total of 25 min was spent today on this visit which included face to face conversation with the patient, EMR review, counseling and co-ordination of care and medical documentation.    The longitudinal plan of care for the diagnosis(es)/condition(s) as documented were addressed during this visit. Due to the added complexity in care, I will continue to support Barriga in the subsequent management and with ongoing continuity of care.    Signed by: Sanjuanita Zuñiga MD    Again, thank you for allowing me to participate in the care of your patient.        Sincerely,        Sanjuanita Zuñiga MD

## 2024-04-22 ENCOUNTER — OFFICE VISIT (OUTPATIENT)
Dept: FAMILY MEDICINE | Facility: CLINIC | Age: 54
End: 2024-04-22
Payer: COMMERCIAL

## 2024-04-22 VITALS
HEART RATE: 80 BPM | OXYGEN SATURATION: 95 % | RESPIRATION RATE: 16 BRPM | HEIGHT: 61 IN | DIASTOLIC BLOOD PRESSURE: 68 MMHG | BODY MASS INDEX: 22.14 KG/M2 | WEIGHT: 117.25 LBS | SYSTOLIC BLOOD PRESSURE: 156 MMHG | TEMPERATURE: 98.2 F

## 2024-04-22 DIAGNOSIS — R53.1 WEAKNESS: ICD-10-CM

## 2024-04-22 DIAGNOSIS — E11.621 DIABETIC ULCER OF OTHER PART OF RIGHT FOOT ASSOCIATED WITH TYPE 2 DIABETES MELLITUS, WITH FAT LAYER EXPOSED (H): ICD-10-CM

## 2024-04-22 DIAGNOSIS — G89.29 CHRONIC PAIN OF BOTH LOWER EXTREMITIES: ICD-10-CM

## 2024-04-22 DIAGNOSIS — I10 PRIMARY HYPERTENSION: ICD-10-CM

## 2024-04-22 DIAGNOSIS — N18.6 ESRD (END STAGE RENAL DISEASE) ON DIALYSIS (H): Primary | ICD-10-CM

## 2024-04-22 DIAGNOSIS — R53.81 PHYSICAL DECONDITIONING: ICD-10-CM

## 2024-04-22 DIAGNOSIS — M79.604 CHRONIC PAIN OF BOTH LOWER EXTREMITIES: ICD-10-CM

## 2024-04-22 DIAGNOSIS — M79.605 CHRONIC PAIN OF BOTH LOWER EXTREMITIES: ICD-10-CM

## 2024-04-22 DIAGNOSIS — E11.69 TYPE 2 DIABETES MELLITUS WITH OTHER SPECIFIED COMPLICATION, UNSPECIFIED WHETHER LONG TERM INSULIN USE (H): ICD-10-CM

## 2024-04-22 DIAGNOSIS — G89.4 CHRONIC PAIN SYNDROME: ICD-10-CM

## 2024-04-22 DIAGNOSIS — Z99.2 ESRD (END STAGE RENAL DISEASE) ON DIALYSIS (H): Primary | ICD-10-CM

## 2024-04-22 DIAGNOSIS — L97.512 DIABETIC ULCER OF OTHER PART OF RIGHT FOOT ASSOCIATED WITH TYPE 2 DIABETES MELLITUS, WITH FAT LAYER EXPOSED (H): ICD-10-CM

## 2024-04-22 LAB
ALBUMIN SERPL ELPH-MCNC: 4 G/DL (ref 3.7–5.1)
ALPHA1 GLOB SERPL ELPH-MCNC: 0.3 G/DL (ref 0.2–0.4)
ALPHA2 GLOB SERPL ELPH-MCNC: 0.6 G/DL (ref 0.5–0.9)
B-GLOBULIN SERPL ELPH-MCNC: 0.8 G/DL (ref 0.6–1)
GAMMA GLOB SERPL ELPH-MCNC: 1.8 G/DL (ref 0.7–1.6)
HBA1C MFR BLD: 6.8 %
M PROTEIN SERPL ELPH-MCNC: 0 G/DL
PROT PATTERN SERPL ELPH-IMP: ABNORMAL
PROT PATTERN SERPL IFE-IMP: NORMAL

## 2024-04-22 PROCEDURE — 99207 PR FOOT EXAM NO CHARGE: CPT | Performed by: FAMILY MEDICINE

## 2024-04-22 PROCEDURE — 99214 OFFICE O/P EST MOD 30 MIN: CPT | Performed by: FAMILY MEDICINE

## 2024-04-22 RX ORDER — OXYCODONE HYDROCHLORIDE 5 MG/1
2.5 TABLET ORAL EVERY 8 HOURS PRN
Qty: 20 TABLET | Refills: 0 | Status: SHIPPED | OUTPATIENT
Start: 2024-04-22

## 2024-04-22 RX ORDER — INSULIN GLARGINE 100 [IU]/ML
74 INJECTION, SOLUTION SUBCUTANEOUS AT BEDTIME
Qty: 30 ML | Refills: 3 | Status: SHIPPED | OUTPATIENT
Start: 2024-04-22 | End: 2024-04-24

## 2024-04-22 RX ORDER — AMLODIPINE BESYLATE 10 MG/1
10 TABLET ORAL DAILY
Qty: 90 TABLET | Refills: 1 | Status: SHIPPED | OUTPATIENT
Start: 2024-04-22

## 2024-04-22 NOTE — PROGRESS NOTES
Assessment & Plan     ESRD (end stage renal disease) on dialysis (H)  Follows closely with nephrology.  - Physical Therapy  Referral  - oxyCODONE (ROXICODONE) 5 MG tablet  Dispense: 20 tablet; Refill: 0    Type 2 diabetes mellitus with other specified complication, unspecified whether long term insulin use (H)  A1c of minimal utility in context of ESRD. Brought meter today - AM glucose in 250s and PM glucose low 300s. Reviewed insulin pens and patient demonstrated clear understanding of dosing and scheduling of long-acting/short-acting insulin. Will increase insulin glargine by 10% today to 74U.   - Hemoglobin A1c  - FOOT EXAM  - insulin glargine (LANTUS SOLOSTAR) 100 UNIT/ML pen  Dispense: 30 mL; Refill: 3    Primary hypertension  Has BP cuff and checks BID - systolic regularly 160-180 with a few in 190s. Will increase amlodipine from 5mg to 10mg today. Will be gradual with pharmacologic intervention in context of fall risk.  - amLODIPine (NORVASC) 10 MG tablet  Dispense: 90 tablet; Refill: 1    Weakness  Physical deconditioning  Uses walker, no falls. Feels weaker.  - Physical Therapy  Referral    Diabetic ulcer of other part of right foot associated with type 2 diabetes mellitus, with fat layer exposed (H)  Well-managed on exam today. No concern for infection at this time. Follows with podiatry.  - continue management with podiatry, appreciate recs    Chronic pain syndrome  Chronic pain of both lower extremities  Patient with significant pain impacting activities of daily living. No concern for abuse. Will continue chronic opioid pain management. If doing well, will have her sign CSA for monthly refills. Maxed out on gabapentin due to renal function, can't do nsaids, already on acetaminophen.   - oxyCODONE (ROXICODONE) 5 MG tablet  Dispense: 20 tablet; Refill: 0      Subjective   Barriga is a 54 year old, presenting for the following health issues:  BPcheck and Headache    History of Present  Illness       Back Pain:  She presents for follow up of back pain. Patient's back pain is a recurring problem.  Location of back pain:  Right lower back, left lower back and left shoulder  Description of back pain: dull ache  Back pain spreads: right foot and left foot    Since patient first noticed back pain, pain is: unchanged  Does back pain interfere with her job:  Yes       CKD: She is not using over the counter pain medicine.     Mental Health Follow-up:  Patient presents to follow-up on Depression.Patient's depression since last visit has been:  Medium  The patient is not having other symptoms associated with depression.      Any significant life events: No  Patient is not feeling anxious or having panic attacks.  Patient has no concerns about alcohol or drug use.    Diabetes:   She presents for follow up of diabetes.  She is checking home blood glucose three times daily.   She checks blood glucose before and after meals and at bedtime.  Blood glucose is sometimes over 200 and sometimes under 70. She is aware of hypoglycemia symptoms including shakiness, dizziness, weakness, blurred vision and confusion.    She has no concerns regarding her diabetes at this time.  She is having numbness in feet.            Hyperlipidemia:  She presents for follow up of hyperlipidemia.   She is taking medication to lower cholesterol. She is not having myalgia or other side effects to statin medications.    Hypertension: She presents for follow up of hypertension.  She does check blood pressure  regularly outside of the clinic. Outside blood pressures have been over 140/90. She does not follow a low salt diet.     She eats 2-3 servings of fruits and vegetables daily.She consumes 1 sweetened beverage(s) daily.She exercises with enough effort to increase her heart rate 9 or less minutes per day.  She exercises with enough effort to increase her heart rate 3 or less days per week.   She is taking medications regularly.    "  Interested in seeing PT. Been keeping BP diary and brought glucose meter today. Has ongoing bilateral leg pain, unchanged in intensity/quality. Found benefit with oxycodone before. No other concerns.    Sen by oncology 4/19/24  Unfortunately there is no good solution for her anemia going forward other than kidney transplant.         Objective    BP (!) 156/68 (BP Location: Left arm, Patient Position: Sitting, Cuff Size: Adult Regular)   Pulse 80   Temp 98.2  F (36.8  C) (Oral)   Resp 16   Ht 1.549 m (5' 1\")   Wt 53.2 kg (117 lb 4 oz)   LMP  (LMP Unknown)   SpO2 95%   BMI 22.15 kg/m    Body mass index is 22.15 kg/m .  Physical Exam   GENERAL: alert and no distress  NECK: no adenopathy, no asymmetry, masses, or scars  RESP: lungs clear to auscultation - no rales, rhonchi or wheezes  CV: regular rate and rhythm, normal S1 S2, no S3 or S4, no murmur, click or rub, no peripheral edema  ABDOMEN: soft, nontender, no hepatosplenomegaly, no masses and bowel sounds normal  MS: no gross musculoskeletal defects noted, no edema. Well-healing ulcer, flat, minimally erythematous with shaved callus. No swelling or discharge.    No results found for any visits on 04/22/24.  No results found for this or any previous visit (from the past 24 hour(s)).        Signed Electronically by: Giancarlo Arizmendi MD    "

## 2024-04-24 ENCOUNTER — TELEPHONE (OUTPATIENT)
Dept: FAMILY MEDICINE | Facility: CLINIC | Age: 54
End: 2024-04-24
Payer: COMMERCIAL

## 2024-04-24 DIAGNOSIS — E61.1 LOW IRON: ICD-10-CM

## 2024-04-24 DIAGNOSIS — E11.69 TYPE 2 DIABETES MELLITUS WITH OTHER SPECIFIED COMPLICATION, UNSPECIFIED WHETHER LONG TERM INSULIN USE (H): Primary | ICD-10-CM

## 2024-04-24 RX ORDER — INSULIN GLARGINE 100 [IU]/ML
70 INJECTION, SOLUTION SUBCUTANEOUS AT BEDTIME
Qty: 30 ML | Refills: 3 | Status: SHIPPED | OUTPATIENT
Start: 2024-04-24 | End: 2024-04-25

## 2024-04-24 RX ORDER — FERROUS SULFATE 325(65) MG
TABLET ORAL
Qty: 45 TABLET | Refills: 3 | Status: SHIPPED | OUTPATIENT
Start: 2024-04-24

## 2024-04-24 NOTE — TELEPHONE ENCOUNTER
Dr. Arizmendi-Please review and advise:  Would you like patient to meet with Diabetes Educator and/or MTM?  To clarify, patient should NOT be taking Metformin?  RN will need to contact patient to confirm this medication instruction.    Call received from Leandra, Nurse transplant coordinator, with [facility] ([phone]):  Patient was in their clinic this AM because interested in pursuing kidney transplant  Daughter in attendance  At start of appt, patient became diaphoretic and did not feel well  Glucose was 75, gave patient juice and bread  15 minutes later blood sugar was up to 119  Patient stated insulin dose increased to 74 units which patient takes in AM but did not eat afterwards  Patient needs diabetes education  Appears patient is taking Metformin and unsure if that is accurate; patient might still be taking it  Should not be taking Metformin if she is taking it since she has ESRD    Writer informed Leandra message will be sent to Dr. Arizmendi and Metformin is not on patient's active medication list.    Thank you!  ZAYRA MendesN, RN-[facility]

## 2024-04-24 NOTE — TELEPHONE ENCOUNTER
Provider message  Giancarlo Arizmendi MD25 minutes ago (4:19 PM)     TH  Patient has follow up scheduled with MTM, also sees endocrinology.    Patient should NOT be on metformin because she is on dialysis.   Ok to decrease insulin to 70 unit(s)      Giancarlo Arizmendi MD          1- I left a message with Leandra at the [facility] transplant team. Please call for this  provider response.  2-I reviewed this provider response with pt.    Can you clarify this insulin message?  The rx sent in today is confusing.  Was the HS dose the changed dose. The morning dose doesn't add up.    insulin glargine (LANTUS SOLOSTAR) 100 UNIT/ML pen 30 mL 3 4/24/2024 -- No   Sig - Route: Inject 70 Units Subcutaneous at bedtime Inject 74 units subcutaneous every morning 35 units on each side. - Subcutaneous         Warren Altamirano, RN-[facility]

## 2024-04-24 NOTE — CONFIDENTIAL NOTE
Patient has follow up scheduled with MTM, also sees endocrinology.    Patient should NOT be on metformin because she is on dialysis.   Ok to decrease insulin to 70 unit(s)     Giancarlo Arizmendi MD

## 2024-04-24 NOTE — TELEPHONE ENCOUNTER
Prescription approved per [facility] Refill Protocol.  Brittany Stephenson, RN  [facility] Triage Nurse

## 2024-04-25 ENCOUNTER — TELEPHONE (OUTPATIENT)
Dept: FAMILY MEDICINE | Facility: CLINIC | Age: 54
End: 2024-04-25
Payer: COMMERCIAL

## 2024-04-25 RX ORDER — INSULIN GLARGINE 100 [IU]/ML
70 INJECTION, SOLUTION SUBCUTANEOUS EVERY MORNING
Qty: 30 ML | Refills: 3 | Status: SHIPPED | OUTPATIENT
Start: 2024-04-25 | End: 2024-07-31

## 2024-04-25 NOTE — TELEPHONE ENCOUNTER
Sorry, the way epic decides to render the prescriptions drives me insane - it should be 70 unit(s) once per day (35 unit(s) on each side). I can't remember if she does it in the day time or night - but whichever it is, just one per day       Called pt with a Ruben  and relayed message. Pt verbalized understanding.         Paresh Perry Cem Say, BSN RN  [facility]

## 2024-04-25 NOTE — TELEPHONE ENCOUNTER
Leandra (Transplant coordinator) return call. Leandra states insulin dose was relayed from pt stating PCP clinic decreased insulin dose from 754 down 70 units and instructed pt to divide dose in half  35unit and inject 35 united in each side and not to eat after injection. Leandra verb pt seemed confused and felt pt could benefit from more education.      Also relayed clinic nurse has reached out to pt to re-educate pt and pt verb understanding (per note threads in the TE). No further action required.     Beatriz PANIAGUA RN  [facility]

## 2024-05-03 ENCOUNTER — MEDICAL CORRESPONDENCE (OUTPATIENT)
Dept: HEALTH INFORMATION MANAGEMENT | Facility: CLINIC | Age: 54
End: 2024-05-03

## 2024-05-07 DIAGNOSIS — R79.89 LOW VITAMIN D LEVEL: ICD-10-CM

## 2024-05-07 DIAGNOSIS — E11.69 TYPE 2 DIABETES MELLITUS WITH OTHER SPECIFIED COMPLICATION, UNSPECIFIED WHETHER LONG TERM INSULIN USE (H): ICD-10-CM

## 2024-05-07 RX ORDER — LANOLIN ALCOHOL/MO/W.PET/CERES
1000 CREAM (GRAM) TOPICAL DAILY
Qty: 30 TABLET | Refills: 3 | Status: SHIPPED | OUTPATIENT
Start: 2024-05-07 | End: 2024-08-23

## 2024-05-07 RX ORDER — CHLORAL HYDRATE 500 MG
1 CAPSULE ORAL DAILY
Qty: 30 CAPSULE | Refills: 3 | Status: SHIPPED | OUTPATIENT
Start: 2024-05-07 | End: 2024-08-23

## 2024-05-09 DIAGNOSIS — Z53.9 DIAGNOSIS NOT YET DEFINED: Primary | ICD-10-CM

## 2024-05-09 PROCEDURE — G0179 MD RECERTIFICATION HHA PT: HCPCS | Performed by: FAMILY MEDICINE

## 2024-05-10 ENCOUNTER — PATIENT OUTREACH (OUTPATIENT)
Dept: CARE COORDINATION | Facility: CLINIC | Age: 54
End: 2024-05-10

## 2024-05-10 NOTE — PROGRESS NOTES
Clinic Care Coordination Contact  Community Health Worker Follow Up  Spoke with Byron Fritz (Daughter) through [employer]  ID [id number]     Care Gaps:     Health Maintenance Due   Topic Date Due    ZOSTER IMMUNIZATION (1 of 2) Never done    COVID-19 Vaccine (3 - Moderna risk series) 06/30/2022     Scheduled 7/22+/24 at 2:10PM with Dr. Arizmendi      Care Plan:   Care Plan: Hematology/oncology       Problem: Beta thalassemia minor       Goal: Patient will attend hematology clinic appointment in the next 6 months.       Start Date: 2/22/2024 Expected End Date: 8/31/2024    This Visit's Progress: 50% Recent Progress: 40%    Priority: High    Note:     Barriers: language barrier, low literacy, noncompliance, and lack of knowledge how to navigate complex health care system  Strengths: motivated to attend appt  Patient expressed understanding of goal: Yes    Action steps to achieve this goal:  1. I will answer my phone when I am contacted to schedule my appointment.  2. I will attend my initial hematology appointment as scheduled 3/22/24. Completed.   3. I will attend my follow up hematology/oncology appt on 4/19/2024 at 9:15am with Dr Zuñiga.Completed  4. I will attend my follow up hematology/oncology appt on 7/19 at 10:45AM Lab, 11:15AM Dr. Zuñiga  5. I will schedule a follow up appointment with my provider if it is recommended to do so while I am at the clinic.  6. I will follow up with CCC regarding this goal at each outreach until it is completed.                             Intervention and Education during outreach:   Per chart review, patient completed 4/19 Oncology appointment. Patient is scheduled for follow up on 7/19 at 10:45AM. Fritz is aware of appointment, CHW will assist with transportation as needed.     CHW reviewed patients upcoming appointments in order:  -5/22 at 2PM with Jory Beebe RPH for MTM follow up. CHW called [employer]  [phone], ride confirmed with [employer] [phone]. Fritz  will remind patient to bring all medications.    -7/19 at 10:45AM Lab, [facility]   -7/22 at 2:10PM Dr. Arizmendi    CHW Plan: CHW to follow up in 1 month    [facility] Care Coordination  [facility]    Phone: [phone]

## 2024-05-21 ENCOUNTER — PATIENT OUTREACH (OUTPATIENT)
Dept: NURSING | Facility: CLINIC | Age: 54
End: 2024-05-21
Payer: COMMERCIAL

## 2024-05-21 NOTE — PROGRESS NOTES
Medication Therapy Management (MTM) Encounter    ASSESSMENT:                            Medication Adherence/Access: See below for considerations    Type 2 Diabetes: Patient is meeting A1c goal of < 7% but is not meeting goal of > 70% time in target range with continuous glucose monitoring. Her blood sugars have increased over the last month. Pt endorsed adherence to Victoza but dispense report shows last fill was was 12/28/24. Called [facility] after the visit today and they confirmed Victoza has not been filled since 12/28. The technician confirmed they can fill it today and mail to the patient.    Hypertension/CKD:   Uncontrolled. Patient is not meeting blood pressure goal of < 140/90mmHg. She does not have carvedilol set up in her pill box. She is waiting on the medication to arrive from [facility].    Hyperlipidemia   Controlled. Pt is on high intensity statin     Pain:   Uncontrolled. Pt may benefit from refill of diclofenac since she ran out    GERD:   stable    Depression/insomnia:  Uncontrolled. Mirtazapine was not in her pill box today. Would benefit from adding this back to regimen.    Supplements/anemia:   Stable    PLAN:                            1. Start giving 12 units of Novolog when you get your shake at dialysis    2. Make sure your nurse puts carvedilol back in your pill box once it arrives from [facility]. It looks like they just refilled it    3. I added mirtazapine back into your pill box at bedtime to help with mood and sleep. Make sure your nurse continues to put it in your pill box    4. I sent a refill of diclofenac gel to [facility]    5. Called [facility] about Victoza - the confirmed they haven't filled it in 3 months. Sent new order to re-titrate     Medication issues to be addressed at a future visit:   Ozempic instead of Victoza?    Follow-up: Return in 3 months (on 8/19/2024) for Follow up, in person.    SUBJECTIVE/OBJECTIVE:                          Nithya Matthews is a 54 year old female coming in for a  follow-up visit. Patient was accompanied by her daughter.  (ID# [id number]) was used during today's visit.       Reason for visit: follow-up on increased amlodipine and Novolog     Allergies/ADRs: Reviewed in chart  Past Medical History: Reviewed in chart  Tobacco: She reports that she has never smoked. She has never been exposed to tobacco smoke. She has never used smokeless tobacco.  Alcohol: not currently using     Medication Adherence/Access: States that her daughter helps with managing her medications and has a nurse that sets up medications three times daily in pillbox every 2 weeks. Her daughter gives her medications/injections every day. Reports no missed doses of oral medications or injections.   Brings with med vials, insulin pens, pillbox and glucometer today. Also brings her blood pressure monitor and log. Pharmacy usually delivers medications to her home.   Diabetes   Lantus 70 (35 units on each side) daily in the morning  NovoLog 12 units 2-3 times daily directly before meals  Victoza 1.8 mg daily in the morning - not filled since January  Aspirin 81mg daily    Patient is not experiencing side effects.   We discussed her blood sugars have increased since she saw [facility] last  Patient isn't sure why this is  The only thing she recalls is that she is drinking shakes at dialysis now on T, Th, Sat  They have sugar in them  She does not use Novolog when she has the shakes  She brings a Victoza pen with her today  Endorses adherence  Current diabetes symptoms: none   Diet/Exercise: Eating 2-3 meals per day. Eats rice/ocasio (traditional [place] food) for meals and then fruit right after. She started a shake that was prescribed by nephrologist. She drinks that on Tuesday, Thursday, and Saturday. Thinks this is making her blood sugars go really high those days  Med Hx: metformin (CKD)    Blood sugar monitoring: Continuous Glucose Monitor - Claribel 14 day; see below. Scanning only twice daily          Eye exam  is up to date  Foot exam is up to date    Hypertension /CKD  Lisinopril 20 mg daily  Carvedilol 12.5 mg twice daily - not in pillbox  Amlodipine 10 mg daily - increased last visit  Calcium Acetate 667 mg three times daily with meals   Calcium carbonate 500 mg 3 tablets twice daily per nephrologist    Dialysis Tues/Thurs/Sat.   Patient reports the following medication side effects- headache occasionally.   Patient does self-monitor blood pressure twice daily and rests before taking it.   She takes her medications then checks her blood pressure (see below)   Nephrologist: Carl Rosas MD at [facility]  Recent home readings: 154/76, 148/69, 161/73, 177/86, 179/86, 153/76     Hyperlipidemia   Atorvastatin 80mg daily  Fish Oil 1000mg once daily    Denies myalgias   Med Hx: fenofibrate (stopped d/t CKD)     Pain:   Acetaminophen 1000 mg twice daily - not in pill box, but has bottle with her  Gabapentin 300 mg at bedtime  Oxycodone 2.5 mg if needed - not in pill box, but has bottle with her  Diclofenac 1% gel - ran out    Has neck pain soreness, finds Tylenol somewhat helpful.  Having pain in her arm/neck on occasion that makes it hard to sleep  She has small supply of oxycodone for her pain  She does feel it is helpful  Used to use diclofenac gel but ran out of it  Requested refill today    GERD:   Omeprazole 20 mg once daily   Patient reports no current symptoms.   Patient feels that current regimen is effective.    Depression/insomnia:  Escitalopram 10 mg daily  Mirtazapine 7.5 mg at bedtime - not in pill box    Feels mood is okay  Has a lot going on lately  Hasn't been sleeping well because of arm pain  She didn't have mirtazapine in her pill box today  Her nurse was not at the visit today    Supplements/anemia:   Vitamin B12 1000 mcg daily  Ferrous sulfate 325 mg every other day  Ergocalciferol 50,000 units once weekly  Folic acid 1 mg daily per heme/onc    No reported issues at this time.   Patient  follows with heme/onc for anemia d/t ESRD  Feels a little less fatigued after starting folic acid.     Today's Vitals:   BP (!) 144/62 (BP Location: Left arm, Patient Position: Sitting, Cuff Size: Adult Regular)   Wt 117 lb (53.1 kg)   LMP  (LMP Unknown)   BMI 22.11 kg/m      ----------------  I spent 45 minutes with this patient today. All changes were made via collaborative practice agreement with Giancarlo Arizmendi MD. A copy of the visit note was provided to the patient's provider(s).    A summary of these recommendations was given to the patient and was faxed to her home health nurse.    Jory Beebe, PharmD, [facility]  Medication Therapy Management Provider  [phone]     Medication Therapy Recommendations  Depression, unspecified depression type    Current Medication: mirtazapine (REMERON) 7.5 MG tablet   Rationale: Does not understand instructions - Adherence - Adherence   Recommendation: Provide Education - mirtazapine 7.5 MG tablet   Status: Accepted - no CPA Needed         Primary hypertension    Current Medication: carvedilol (COREG) 12.5 MG tablet   Rationale: Patient forgets to take - Adherence - Adherence   Recommendation: Provide Adherence Intervention - carvedilol 12.5 MG tablet   Status: Resolved Med Access Issue         Type 2 diabetes mellitus with other specified complication, unspecified whether long term insulin use (H)    Current Medication: insulin aspart (NOVOLOG FLEXPEN) 100 UNIT/ML pen   Rationale: Frequency inappropriate - Dosage too low - Effectiveness   Recommendation: Increase Frequency   Status: Accepted per CPA          Current Medication: liraglutide (VICTOZA PEN) 18 MG/3ML solution (Discontinued)   Rationale: Patient forgets to take - Adherence - Adherence   Recommendation: Provide Adherence Intervention - VICTOZA PEN 18 MG/3ML soln   Status: Resolved Med Access Issue

## 2024-05-21 NOTE — PROGRESS NOTES
Clinic Care Coordination Contact  Follow Up Progress Note      Assessment: follow up on goals.    Kidney transplant evaluation  Per chart review, patient [place] [facility] [place] for pre-transplant evaluation for chronic kidney disease. Patient had hypoglycemic episode during visit on 4/24/2024. Patient is scheduled to return for screening visit to see MD and PharmD on 6/12/2024 at 12pm. CCRN plans to review transplant team visit notes on 6/12/24 for recommendations and follow up.   Dialysis 3x/wk on Tues-Thurs-Sat  New goal created today.     Paulo Thomas MD Sahadevan, Meena, MBBS Lori, Nurse transplant coordinator, with [facility] ([phone])  [address]   [place], [place] [address] [phone] (Work)   [phone] (Fax)     Diabetic education  Per chart review, patient last seen by a diabetic educator on 12/4/2023. Will assist to schedule follow up appt with diabetic educator per [facility] transplant team recommendation.     MTM  Patient is scheduled to see MTM on 5/22/2024 a 2:00pm.     Endocrinology   Appt canceled on 7/12 due to illness and 9/29/23 due to provider out. Last seen by an endocrinologist was on 2/21/23. CCRN called [phone] to assist patient rescheduled but was told that Dr Villa only in once a week on Tuesday. Patient goes to dialysis on Tuesday so it won't work. CCRN requested to schedule with a different provider or NP but was told scheduling team will send a message to endocrinology team to get permission first. Someone will reach out to patient or CCRN to schedule once approved to schedule with a different provider.     Home care skilled nursing  Long term med set up    [facility]  [address] [facility] [address], [place] [address]   7.4 mi  [phone]    Physical therapy   Need to schedule appt.    Eye exam  Appt canceled on 10/6/23 and 12/1/2023  Need to schedule appt    Yearly preventative care   Assisted patient with yearly  preventative care scheduled with PCP on 8/28/2024 at 2:10pm.     Care Gaps:    Health Maintenance Due   Topic Date Due    ZOSTER IMMUNIZATION (1 of 2) Never done    COVID-19 Vaccine (3 - Moderna risk series) 06/30/2022    YEARLY PREVENTIVE VISIT  06/19/2024       Patient will address overdue care gaps during preventative care visit on 8/28/24.     Care Plans  Care Plan: Hematology/oncology       Problem: Beta thalassemia minor       Goal: Patient will attend hematology clinic appointment in the next 6 months.       Start Date: 2/22/2024 Expected End Date: 8/31/2024    This Visit's Progress: 50% Recent Progress: 40%    Priority: High    Note:     Barriers: language barrier, low literacy, noncompliance, and lack of knowledge how to navigate complex health care system  Strengths: motivated to attend appt  Patient expressed understanding of goal: Yes    Action steps to achieve this goal:  1. I will answer my phone when I am contacted to schedule my appointment.  2. I will attend my initial hematology appointment as scheduled 3/22/24. Completed.   3. I will attend my follow up hematology/oncology appt on 4/19/2024 at 9:15am with Dr Zuñiga.Completed  4. I will attend my follow up hematology/oncology appt on 7/19 at 10:45AM Lab, 11:15AM Dr. Zuñiga  5. I will schedule a follow up appointment with my provider if it is recommended to do so while I am at the clinic.  6. I will follow up with CCC regarding this goal at each outreach until it is completed.                               Care Plan: Transplant evaluation       Problem: CKD       Goal: Patient will attend her kidney transplant evaluation in the next 12 months.       Start Date: 5/21/2024 Expected End Date: 5/31/2025    This Visit's Progress: 10%    Note:     Barriers: language barrier, low literacy, noncompliance, and lack of knowledge how to navigate complex health care system  Strengths: motivated to attend appt  Patient expressed understanding of goal:  Yes    Action steps to achieve this goal:  1. I will attend my pre-transplant evaluation appointments on 6/12/2024 at 12pm, 12:30pm, and 1pm with [place] and PharmD [place] [facility]  2. I will schedule a follow up appointment with my providers if it is recommended to do so while I am at the clinic.  3. I will follow up with CCC regarding this goal at each outreach until it is completed.     Paulo Thomas MD Sahadevan, Meena, MBBS Lori, Nurse transplant coordinator, with [facility] ([phone])  [address]   [place], [place] [address] [phone] (Work)   [phone] (Fax)                             Plan: CCRN plans to assist patient with eye appt, PT appt, dental?

## 2024-05-22 ENCOUNTER — OFFICE VISIT (OUTPATIENT)
Dept: PHARMACY | Facility: CLINIC | Age: 54
End: 2024-05-22
Payer: COMMERCIAL

## 2024-05-22 ENCOUNTER — MEDICAL CORRESPONDENCE (OUTPATIENT)
Dept: HEALTH INFORMATION MANAGEMENT | Facility: CLINIC | Age: 54
End: 2024-05-22
Payer: COMMERCIAL

## 2024-05-22 VITALS — DIASTOLIC BLOOD PRESSURE: 62 MMHG | SYSTOLIC BLOOD PRESSURE: 144 MMHG | BODY MASS INDEX: 22.11 KG/M2 | WEIGHT: 117 LBS

## 2024-05-22 DIAGNOSIS — F32.A DEPRESSION, UNSPECIFIED DEPRESSION TYPE: ICD-10-CM

## 2024-05-22 DIAGNOSIS — R52 PAIN: ICD-10-CM

## 2024-05-22 DIAGNOSIS — R07.81 RIB PAIN: ICD-10-CM

## 2024-05-22 DIAGNOSIS — Z78.9 TAKES DIETARY SUPPLEMENTS: ICD-10-CM

## 2024-05-22 DIAGNOSIS — K21.00 GASTROESOPHAGEAL REFLUX DISEASE WITH ESOPHAGITIS WITHOUT HEMORRHAGE: ICD-10-CM

## 2024-05-22 DIAGNOSIS — E11.69 TYPE 2 DIABETES MELLITUS WITH OTHER SPECIFIED COMPLICATION, UNSPECIFIED WHETHER LONG TERM INSULIN USE (H): Primary | ICD-10-CM

## 2024-05-22 DIAGNOSIS — I10 PRIMARY HYPERTENSION: ICD-10-CM

## 2024-05-22 DIAGNOSIS — E78.5 HYPERLIPIDEMIA LDL GOAL <100: ICD-10-CM

## 2024-05-22 PROCEDURE — 99607 MTMS BY PHARM ADDL 15 MIN: CPT | Performed by: PHARMACIST

## 2024-05-22 PROCEDURE — 99606 MTMS BY PHARM EST 15 MIN: CPT | Performed by: PHARMACIST

## 2024-05-22 RX ORDER — FLASH GLUCOSE SENSOR
KIT MISCELLANEOUS
Qty: 6 EACH | Refills: 3 | Status: SHIPPED | OUTPATIENT
Start: 2024-05-22

## 2024-05-22 NOTE — PATIENT INSTRUCTIONS
Jens Barriga, it was great talking with you today!     Recommendations from today's MTM visit:                                                      1. Start giving 12 units of Novolog when you get your shake at dialysis    2. Please put carvedilol back in pill box twice daily once it arrives from [facility]    3. I added mirtazapine back into your pill box at bedtime to help with mood and sleep. Please make sure this is put in bedtime slot going forward.    4. Victoza has not been filled in a few months. I sent a new order to [facility] and recommend starting back at the starting dose of 0.6 mg daily for one week, then 1.2 mg daily for one week, then 1.8 mg daily thereafter    I value your experience and would be very grateful for your time with providing feedback on our clinic survey. You may receive a survey via email or text message in the next few days.     Next MTM visit: 8/19/24    To schedule another MTM appointment, please call the clinic directly or you may call the MTM scheduling line at [phone] or toll-free at [phone].     My Clinical Pharmacist's contact information:                                                      Please feel free to contact me with any questions or concerns you have.      Jory Beebe, PharmD, [facility]  Phone: [phone]

## 2024-05-22 NOTE — Clinical Note
Hey - found a few discrepancies today. She didn't have carvedilol or mirtazapine in her pill box. I added mirtazapine back but carvedilol bottle was empty. Sent message to her nurse to make sure she adds it in once she gets the new prescription. She also hasn't had victoza in 3 months - I had to call [facility] to confirm this. I recommended re-titrating. Last thing - she asked for a refill of diclofenac gel so I renewed it but I was wondering if you're comfortable with that given her eGFR. I know there isn't much systemic absorption but just wanted to make sure you're okay with her staying on that. Thanks! Jory

## 2024-05-23 RX ORDER — LIRAGLUTIDE 6 MG/ML
INJECTION SUBCUTANEOUS
Qty: 9 ML | Refills: 0 | Status: SHIPPED | OUTPATIENT
Start: 2024-05-23 | End: 2024-07-10

## 2024-05-24 ENCOUNTER — OFFICE VISIT (OUTPATIENT)
Dept: ENDOCRINOLOGY | Facility: CLINIC | Age: 54
End: 2024-05-24
Payer: COMMERCIAL

## 2024-05-24 VITALS
HEART RATE: 76 BPM | DIASTOLIC BLOOD PRESSURE: 70 MMHG | SYSTOLIC BLOOD PRESSURE: 144 MMHG | OXYGEN SATURATION: 96 % | BODY MASS INDEX: 21.65 KG/M2 | WEIGHT: 114.6 LBS

## 2024-05-24 DIAGNOSIS — E11.69 TYPE 2 DIABETES MELLITUS WITH OTHER SPECIFIED COMPLICATION, UNSPECIFIED WHETHER LONG TERM INSULIN USE (H): Primary | ICD-10-CM

## 2024-05-24 PROBLEM — N20.0 NEPHROLITHIASIS: Status: ACTIVE | Noted: 2024-04-24

## 2024-05-24 PROBLEM — E11.621 DIABETIC ULCER OF OTHER PART OF RIGHT FOOT ASSOCIATED WITH TYPE 2 DIABETES MELLITUS, WITH FAT LAYER EXPOSED (H): Status: RESOLVED | Noted: 2023-01-11 | Resolved: 2024-05-24

## 2024-05-24 PROBLEM — E11.621 DIABETIC FOOT ULCER (H): Status: ACTIVE | Noted: 2024-04-24

## 2024-05-24 PROBLEM — R76.8 HEPATITIS B CORE ANTIBODY POSITIVE: Status: ACTIVE | Noted: 2024-04-24

## 2024-05-24 PROBLEM — R53.81 PHYSICAL DECONDITIONING: Status: ACTIVE | Noted: 2024-04-24

## 2024-05-24 PROBLEM — D64.9 CHRONIC ANEMIA: Status: ACTIVE | Noted: 2024-04-24

## 2024-05-24 PROBLEM — L97.509 DIABETIC FOOT ULCER (H): Status: ACTIVE | Noted: 2024-04-24

## 2024-05-24 PROBLEM — E11.40 DIABETIC NEUROPATHY (H): Status: ACTIVE | Noted: 2024-04-24

## 2024-05-24 PROBLEM — E11.621 DIABETIC FOOT ULCER (H): Status: RESOLVED | Noted: 2024-04-24 | Resolved: 2024-05-24

## 2024-05-24 PROBLEM — E11.319 DIABETIC RETINOPATHY (H): Status: ACTIVE | Noted: 2024-04-24

## 2024-05-24 PROBLEM — L97.512 DIABETIC ULCER OF OTHER PART OF RIGHT FOOT ASSOCIATED WITH TYPE 2 DIABETES MELLITUS, WITH FAT LAYER EXPOSED (H): Status: RESOLVED | Noted: 2023-01-11 | Resolved: 2024-05-24

## 2024-05-24 PROBLEM — L97.509 DIABETIC FOOT ULCER (H): Status: RESOLVED | Noted: 2024-04-24 | Resolved: 2024-05-24

## 2024-05-24 PROCEDURE — 99213 OFFICE O/P EST LOW 20 MIN: CPT | Performed by: NURSE PRACTITIONER

## 2024-05-24 NOTE — LETTER
"    5/24/2024        RE: Nithya Matthews  [address] [place] [address]        [facility] ENDOCRINOLOGY    Diabetes Note 5/27/2024    Nithya Matthews, 1970, [id number]          Reason for visit      1. Type 2 diabetes mellitus with other specified complication, unspecified whether long term insulin use (H)        HPI     Nithya Matthews is a very pleasant 54 year old old female who presents for follow up.  SUMMARY:      Nithya is seen today in follow-up for DM 2. She is a patient of Dr Villa, and I have not seen her before. Her most recent A1c was 6.8 and about what her previous was at 6.7.     She is here with her daughter. We are assisted by a professional [place] .       She has recently met with  with Jory Beebe [facility]. It was determined that sakina has not been taking Victoza, although it was prescribed. It has not been picked up since December. When asked about it today, her daughter reports that she IS taking it and is taking \"18\". Directions on the prescription show that she was to start with 0.6 mg. Unsure if they actually have it, or if they have misread the directions, as they did not bring her medications today, and neither of them are truly confident in what she is taking. They do seem to know of the Lantus and the Novolog, as her daughter correctly identified what the pens look like.     She is taking 70 units of Lantus daily. She takes Novolog, 12 units with her meals.     She is using the Freestyle CGM. She reports that she has been without sensors for a week. She is \"expecting a delivery\". She did not bring in her [place] today.     Pt is on Dialysis and is on the Transplant list.         Blood glucose data:      Past Medical History     Patient Active Problem List   Diagnosis     Primary hypertension     Hyperlipidemia LDL goal <100     Depression, unspecified depression type     Hyperglycemia     Generalized muscle weakness     Low iron     Gastroesophageal reflux disease with esophagitis without " hemorrhage     Low vitamin B12 level     Low vitamin D level     Celiac disease     Beta thalassemia minor     Type 2 diabetes mellitus with other specified complication, unspecified whether long term insulin use (H)     Incontinence of feces with fecal urgency     ESRD (end stage renal disease) on dialysis (H)     Hyperkalemia     Weakness     Acute cough     Chronic anemia     Diabetic neuropathy (H)     Diabetic retinopathy (H)     Hepatitis B core antibody positive     Nephrolithiasis     Physical deconditioning        Family History       family history is not on file.    Social History      reports that she has never smoked. She has never been exposed to tobacco smoke. She has never used smokeless tobacco. She reports that she does not currently use alcohol. She reports that she does not use drugs.      Review of Systems     Patient has no polyuria or polydipsia, no chest pain, dyspnea or TIA's, no numbness, tingling or pain in extremities  Remainder negative except as noted in HPI.    Vital Signs     BP (!) 144/70 (BP Location: Left arm, Patient Position: Sitting, Cuff Size: Adult Small)   Pulse 76   Wt 52 kg (114 lb 9.6 oz)   LMP  (LMP Unknown)   SpO2 96%   BMI 21.65 kg/m    Wt Readings from Last 3 Encounters:   05/24/24 52 kg (114 lb 9.6 oz)   05/22/24 53.1 kg (117 lb)   04/22/24 53.2 kg (117 lb 4 oz)       Physical Exam     Constitutional:  Well developed, Well nourished  HENT:  Normocephalic,   Neck: Thyroid normal, No lymph nodes, Supple  Eyes:  PERRL, Conjunctiva pink  Respiratory:  Normal breath sounds, No respiratory distress  Cardiovascular:  Normal heart rate, Normal rhythm,  murmur present.   GI:  Bowel sounds normal, Soft, No tenderness  Musculoskeletal:  No gross deformity or lesions, normal dorsalis pedis pulses  Skin: No acanthosis nigricans, lipoatrophy or lipodystrophy  Neurologic:  Alert & oriented x 3, nonfocal  Psychiatric:  Affect, Mood, Insight appropriate      Assessment     1.  Type 2 diabetes mellitus with other specified complication, unspecified whether long term insulin use (H)        Plan     I think that pt would be much better served by seeing her PCP and working with MTM from her home Clinic. They know her and have a much better understanding of her situation and day to day life. Nothing was accomplished at this appointment today. I will send my recommendations to MTM and PCP.         Negin Nunes NP  [facility]  5/27/2024  9:03 AM    Lab Results     Microalbumin Urine mg/dL   Date Value Ref Range Status   02/28/2022 230.52 (H) 0.00 - 1.99 mg/dL Final       Cholesterol   Date Value Ref Range Status   10/23/2023 124 <200 mg/dL Final     Direct Measure HDL   Date Value Ref Range Status   10/23/2023 26 (L) >=50 mg/dL Final     Triglycerides   Date Value Ref Range Status   10/23/2023 224 (H) <150 mg/dL Final     Triglycerides (External)   Date Value Ref Range Status   07/07/2021 534 (H) <150 mg/dL Final       [unfilled]      Current Medications     Outpatient Medications Prior to Visit   Medication Sig Dispense Refill     acetaminophen (TYLENOL) 500 MG tablet Take 1-2 tablets (500-1,000 mg) by mouth every 6 hours as needed for pain 100 tablet 3     Alcohol Swabs (ALCOHOL PADS) 70 % PADS 1 Application 4 times daily (before meals and nightly) 200 each 3     amLODIPine (NORVASC) 10 MG tablet Take 1 tablet (10 mg) by mouth daily 90 tablet 1     ASPIRIN LOW DOSE 81 MG EC tablet TAKE 1 TABLET (81 MG) BY MOUTH DAILY 30 tablet 3     atorvastatin (LIPITOR) 80 MG tablet TAKE 1 TABLET (80 MG) BY MOUTH DAILY FOR CHOLESTEROL 90 tablet 2     BD YOUSIF U/F 32G X 4 MM insulin pen needle USE 6X PEN NEEDLES DAILY OR AS DIRECTED (VICTOZA, 3 FOR NOVOLOG, 2 FOR LANTUS) *34 DAY SUPPLY* 200 each 11     calcium acetate (PHOSLO) 667 MG CAPS capsule Take 667 mg by mouth 3 times daily (with meals)       calcium carbonate (TUMS) 500 MG chewable tablet Take 3 chew tab by mouth 2 times daily       carvedilol  (COREG) 12.5 MG tablet Take 12.5 mg by mouth 2 times daily (with meals) Per nephrology on 12/5       Continuous Glucose Sensor (FREESTYLE MICKIE 14 DAY SENSOR) MISC USE 1 DEVICE EVERY 14 DAYS CHANGE SENSOR AS DIRECTED EVERY 14 DAYS 6 each 3     cyanocobalamin (VITAMIN B-12) 1000 MCG tablet TAKE 1 TABLET (1,000 MCG) BY MOUTH DAILY 30 tablet 3     diclofenac (VOLTAREN) 1 % topical gel Apply 4 g topically 4 times daily as needed for pain in arm 350 g 3     escitalopram (LEXAPRO) 10 MG tablet TAKE 1 TABLET (10 MG) BY MOUTH DAILY FOR DEPRESSION 90 tablet 3     ferrous sulfate (FEROSUL) 325 (65 Fe) MG tablet TAKE 1 TABLET (325 MG) BY MOUTH EVERY OTHER DAY FOR IRON 45 tablet 3     fish oil-omega-3 fatty acids 1000 MG capsule TAKE 1 CAPSULE (1 G) BY MOUTH DAILY 30 capsule 3     folic acid (FOLVITE) 1 MG tablet Take 1 tablet (1 mg) by mouth daily 90 tablet 3     gabapentin (NEURONTIN) 300 MG capsule TAKE 1 CAPSULE (300 MG) BY MOUTH AT BEDTIME 30 capsule 3     insulin aspart (NOVOLOG FLEXPEN) 100 UNIT/ML pen Inject 12 Units Subcutaneous 3 times daily (with meals) 45 mL 3     insulin glargine (LANTUS SOLOSTAR) 100 UNIT/ML pen Inject 70 Units Subcutaneous every morning 35 units on each side. 30 mL 3     liraglutide (VICTOZA PEN) 18 MG/3ML solution Inject 0.6 mg Subcutaneous every morning for 7 days, THEN 1.2 mg every morning for 7 days, THEN 1.8 mg every morning for 24 days. 9 mL 0     lisinopril (ZESTRIL) 20 MG tablet Take 1 tablet (20 mg) by mouth daily 90 tablet 3     mirtazapine (REMERON) 7.5 MG tablet TAKE 1 TABLET (7.5 MG) BY MOUTH AT BEDTIME 90 tablet 3     omeprazole (PRILOSEC) 20 MG DR capsule TAKE 1 PILL (20 MG) BY MOUTH DAILY FOR STOMACH 90 capsule 3     oxyCODONE (ROXICODONE) 5 MG tablet Take 0.5 tablets (2.5 mg) by mouth every 8 hours as needed for pain 20 tablet 0     polyethylene glycol (MIRALAX) 17 GM/Dose powder Take 17 g by mouth daily as needed for constipation 510 g 4     REFRESH OPTIVE ADVANCED 0.5-1-0.5 %  SOLN PLACE 1 DROP INTO BOTH EYES 3 TIMES DAILY AS NEEDED (AS NEEDED FOR DRY EYE/EYE PAIN) 10 mL 4     silver sulfADIAZINE (SILVADENE) 1 % external cream Apply topically 2 times daily 25 g 1     vitamin D2 (ERGOCALCIFEROL) 71109 units (1250 mcg) capsule Take 1 capsule (50,000 Units) by mouth once a week 12 capsule 3     No facility-administered medications prior to visit.             Sincerely,        Negin Nunes NP

## 2024-05-24 NOTE — Clinical Note
5/24/2024         RE: Nithya Matthews  [address] [place] [address]        Dear Colleague,    Thank you for referring your patient, Nithya Matthews, to the [facility]. Please see a copy of my visit note below.    No notes on file    Again, thank you for allowing me to participate in the care of your patient.        Sincerely,        Negin Nunes NP

## 2024-05-24 NOTE — LETTER
"    5/24/2024         RE: Nithya Matthews  [address] [place] [address]        Dear Colleague,    Thank you for referring your patient, Nithya Matthews, to the [facility] [place]. Please see a copy of my visit note below.    [facility] ENDOCRINOLOGY    Diabetes Note 5/27/2024    Nithya Matthews, 1970, [id number]          Reason for visit      1. Type 2 diabetes mellitus with other specified complication, unspecified whether long term insulin use (H)        HPI     Nithya Matthews is a very pleasant 54 year old old female who presents for follow up.  SUMMARY:      Nithya is seen today in follow-up for DM 2. She is a patient of Dr Villa, and I have not seen her before. Her most recent A1c was 6.8 and about what her previous was at 6.7.     She is here with her daughter. We are assisted by a professional [email or web address] .       She has recently met with  with Jory Beebe [facility]. It was determined that pt has not been taking Victoza, although it was prescribed. It has not been picked up since December. When asked about it today, her daughter reports that she IS taking it and is taking \"18\". Directions on the prescription show that she was to start with 0.6 mg. Unsure if they actually have it, or if they have misread the directions, as they did not bring her medications today, and neither of them are truly confident in what she is taking. They do seem to know of the Lantus and the Novolog, as her daughter correctly identified what the pens look like.     She is taking 70 units of Lantus daily. She takes Novolog, 12 units with her meals.     She is using the Freestyle CGM. She reports that she has been without sensors for a week. She is \"expecting a delivery\". She did not bring in her [place] today.     Pt is on Dialysis and is on the Transplant list.         Blood glucose data:      Past Medical History     Patient Active Problem List   Diagnosis     Primary hypertension     Hyperlipidemia LDL goal <100     " Depression, unspecified depression type     Hyperglycemia     Generalized muscle weakness     Low iron     Gastroesophageal reflux disease with esophagitis without hemorrhage     Low vitamin B12 level     Low vitamin D level     Celiac disease     Beta thalassemia minor     Type 2 diabetes mellitus with other specified complication, unspecified whether long term insulin use (H)     Incontinence of feces with fecal urgency     ESRD (end stage renal disease) on dialysis (H)     Hyperkalemia     Weakness     Acute cough     Chronic anemia     Diabetic neuropathy (H)     Diabetic retinopathy (H)     Hepatitis B core antibody positive     Nephrolithiasis     Physical deconditioning        Family History       family history is not on file.    Social History      reports that she has never smoked. She has never been exposed to tobacco smoke. She has never used smokeless tobacco. She reports that she does not currently use alcohol. She reports that she does not use drugs.      Review of Systems     Patient has no polyuria or polydipsia, no chest pain, dyspnea or TIA's, no numbness, tingling or pain in extremities  Remainder negative except as noted in HPI.    Vital Signs     BP (!) 144/70 (BP Location: Left arm, Patient Position: Sitting, Cuff Size: Adult Small)   Pulse 76   Wt 52 kg (114 lb 9.6 oz)   LMP  (LMP Unknown)   SpO2 96%   BMI 21.65 kg/m    Wt Readings from Last 3 Encounters:   05/24/24 52 kg (114 lb 9.6 oz)   05/22/24 53.1 kg (117 lb)   04/22/24 53.2 kg (117 lb 4 oz)       Physical Exam     Constitutional:  Well developed, Well nourished  HENT:  Normocephalic,   Neck: Thyroid normal, No lymph nodes, Supple  Eyes:  PERRL, Conjunctiva pink  Respiratory:  Normal breath sounds, No respiratory distress  Cardiovascular:  Normal heart rate, Normal rhythm,  murmur present.   GI:  Bowel sounds normal, Soft, No tenderness  Musculoskeletal:  No gross deformity or lesions, normal dorsalis pedis pulses  Skin: No  acanthosis nigricans, lipoatrophy or lipodystrophy  Neurologic:  Alert & oriented x 3, nonfocal  Psychiatric:  Affect, Mood, Insight appropriate      Assessment     1. Type 2 diabetes mellitus with other specified complication, unspecified whether long term insulin use (H)        Plan     I think that pt would be much better served by seeing her PCP and working with MTM from her home Clinic. They know her and have a much better understanding of her situation and day to day life. Nothing was accomplished at this appointment today. I will send my recommendations to MTM and PCP.         Negin Nunes NP  [facility]in[facility]  5/27/2024  9:03 AM    Lab Results     Microalbumin Urine mg/dL   Date Value Ref Range Status   02/28/2022 230.52 (H) 0.00 - 1.99 mg/dL Final       Cholesterol   Date Value Ref Range Status   10/23/2023 124 <200 mg/dL Final     Direct Measure HDL   Date Value Ref Range Status   10/23/2023 26 (L) >=50 mg/dL Final     Triglycerides   Date Value Ref Range Status   10/23/2023 224 (H) <150 mg/dL Final     Triglycerides (External)   Date Value Ref Range Status   07/07/2021 534 (H) <150 mg/dL Final       [unfilled]      Current Medications     Outpatient Medications Prior to Visit   Medication Sig Dispense Refill     acetaminophen (TYLENOL) 500 MG tablet Take 1-2 tablets (500-1,000 mg) by mouth every 6 hours as needed for pain 100 tablet 3     Alcohol Swabs (ALCOHOL PADS) 70 % PADS 1 Application 4 times daily (before meals and nightly) 200 each 3     amLODIPine (NORVASC) 10 MG tablet Take 1 tablet (10 mg) by mouth daily 90 tablet 1     ASPIRIN LOW DOSE 81 MG EC tablet TAKE 1 TABLET (81 MG) BY MOUTH DAILY 30 tablet 3     atorvastatin (LIPITOR) 80 MG tablet TAKE 1 TABLET (80 MG) BY MOUTH DAILY FOR CHOLESTEROL 90 tablet 2     BD YOUSIF U/F 32G X 4 MM insulin pen needle USE 6X PEN NEEDLES DAILY OR AS DIRECTED (VICTOZA, 3 FOR NOVOLOG, 2 FOR LANTUS) *34 DAY SUPPLY* 200 each 11     calcium acetate (PHOSLO) 667 MG CAPS  capsule Take 667 mg by mouth 3 times daily (with meals)       calcium carbonate (TUMS) 500 MG chewable tablet Take 3 chew tab by mouth 2 times daily       carvedilol (COREG) 12.5 MG tablet Take 12.5 mg by mouth 2 times daily (with meals) Per nephrology on 12/5       Continuous Glucose Sensor (FREESTYLE MICKIE 14 DAY SENSOR) MISC USE 1 DEVICE EVERY 14 DAYS CHANGE SENSOR AS DIRECTED EVERY 14 DAYS 6 each 3     cyanocobalamin (VITAMIN B-12) 1000 MCG tablet TAKE 1 TABLET (1,000 MCG) BY MOUTH DAILY 30 tablet 3     diclofenac (VOLTAREN) 1 % topical gel Apply 4 g topically 4 times daily as needed for pain in arm 350 g 3     escitalopram (LEXAPRO) 10 MG tablet TAKE 1 TABLET (10 MG) BY MOUTH DAILY FOR DEPRESSION 90 tablet 3     ferrous sulfate (FEROSUL) 325 (65 Fe) MG tablet TAKE 1 TABLET (325 MG) BY MOUTH EVERY OTHER DAY FOR IRON 45 tablet 3     fish oil-omega-3 fatty acids 1000 MG capsule TAKE 1 CAPSULE (1 G) BY MOUTH DAILY 30 capsule 3     folic acid (FOLVITE) 1 MG tablet Take 1 tablet (1 mg) by mouth daily 90 tablet 3     gabapentin (NEURONTIN) 300 MG capsule TAKE 1 CAPSULE (300 MG) BY MOUTH AT BEDTIME 30 capsule 3     insulin aspart (NOVOLOG FLEXPEN) 100 UNIT/ML pen Inject 12 Units Subcutaneous 3 times daily (with meals) 45 mL 3     insulin glargine (LANTUS SOLOSTAR) 100 UNIT/ML pen Inject 70 Units Subcutaneous every morning 35 units on each side. 30 mL 3     liraglutide (VICTOZA PEN) 18 MG/3ML solution Inject 0.6 mg Subcutaneous every morning for 7 days, THEN 1.2 mg every morning for 7 days, THEN 1.8 mg every morning for 24 days. 9 mL 0     lisinopril (ZESTRIL) 20 MG tablet Take 1 tablet (20 mg) by mouth daily 90 tablet 3     mirtazapine (REMERON) 7.5 MG tablet TAKE 1 TABLET (7.5 MG) BY MOUTH AT BEDTIME 90 tablet 3     omeprazole (PRILOSEC) 20 MG DR capsule TAKE 1 PILL (20 MG) BY MOUTH DAILY FOR STOMACH 90 capsule 3     oxyCODONE (ROXICODONE) 5 MG tablet Take 0.5 tablets (2.5 mg) by mouth every 8 hours as needed for pain  20 tablet 0     polyethylene glycol (MIRALAX) 17 GM/Dose powder Take 17 g by mouth daily as needed for constipation 510 g 4     REFRESH OPTIVE ADVANCED 0.5-1-0.5 % SOLN PLACE 1 DROP INTO BOTH EYES 3 TIMES DAILY AS NEEDED (AS NEEDED FOR DRY EYE/EYE PAIN) 10 mL 4     silver sulfADIAZINE (SILVADENE) 1 % external cream Apply topically 2 times daily 25 g 1     vitamin D2 (ERGOCALCIFEROL) 96651 units (1250 mcg) capsule Take 1 capsule (50,000 Units) by mouth once a week 12 capsule 3     No facility-administered medications prior to visit.           Again, thank you for allowing me to participate in the care of your patient.        Sincerely,        Negin Nunes NP

## 2024-05-27 NOTE — PROGRESS NOTES
"[facility] ENDOCRINOLOGY    Diabetes Note 5/27/2024    Nithya Matthews, 1970, [id number]          Reason for visit      1. Type 2 diabetes mellitus with other specified complication, unspecified whether long term insulin use (H)        HPI     Nithya Matthews is a very pleasant 54 year old old female who presents for follow up.  SUMMARY:      Nithya is seen today in follow-up for DM 2. She is a patient of Dr Villa, and I have not seen her before. Her most recent A1c was 6.8 and about what her previous was at 6.7.     She is here with her daughter. We are assisted by a professional [employer] .       She has recently met with  with Jory Beebe [facility]. It was determined that pt has not been taking Victoza, although it was prescribed. It has not been picked up since December. When asked about it today, her daughter reports that she IS taking it and is taking \"18\". Directions on the prescription show that she was to start with 0.6 mg. Unsure if they actually have it, or if they have misread the directions, as they did not bring her medications today, and neither of them are truly confident in what she is taking. They do seem to know of the Lantus and the Novolog, as her daughter correctly identified what the pens look like.     She is taking 70 units of Lantus daily. She takes Novolog, 12 units with her meals.     She is using the Freestyle CGM. She reports that she has been without sensors for a week. She is \"expecting a delivery\". She did not bring in her [place] today.     Pt is on Dialysis and is on the Transplant list.         Blood glucose data:      Past Medical History     Patient Active Problem List   Diagnosis    Primary hypertension    Hyperlipidemia LDL goal <100    Depression, unspecified depression type    Hyperglycemia    Generalized muscle weakness    Low iron    Gastroesophageal reflux disease with esophagitis without hemorrhage    Low vitamin B12 level    Low vitamin D level    Celiac disease    Beta " thalassemia minor    Type 2 diabetes mellitus with other specified complication, unspecified whether long term insulin use (H)    Incontinence of feces with fecal urgency    ESRD (end stage renal disease) on dialysis (H)    Hyperkalemia    Weakness    Acute cough    Chronic anemia    Diabetic neuropathy (H)    Diabetic retinopathy (H)    Hepatitis B core antibody positive    Nephrolithiasis    Physical deconditioning        Family History       family history is not on file.    Social History      reports that she has never smoked. She has never been exposed to tobacco smoke. She has never used smokeless tobacco. She reports that she does not currently use alcohol. She reports that she does not use drugs.      Review of Systems     Patient has no polyuria or polydipsia, no chest pain, dyspnea or TIA's, no numbness, tingling or pain in extremities  Remainder negative except as noted in HPI.    Vital Signs     BP (!) 144/70 (BP Location: Left arm, Patient Position: Sitting, Cuff Size: Adult Small)   Pulse 76   Wt 52 kg (114 lb 9.6 oz)   LMP  (LMP Unknown)   SpO2 96%   BMI 21.65 kg/m    Wt Readings from Last 3 Encounters:   05/24/24 52 kg (114 lb 9.6 oz)   05/22/24 53.1 kg (117 lb)   04/22/24 53.2 kg (117 lb 4 oz)       Physical Exam     Constitutional:  Well developed, Well nourished  HENT:  Normocephalic,   Neck: Thyroid normal, No lymph nodes, Supple  Eyes:  PERRL, Conjunctiva pink  Respiratory:  Normal breath sounds, No respiratory distress  Cardiovascular:  Normal heart rate, Normal rhythm,  murmur present.   GI:  Bowel sounds normal, Soft, No tenderness  Musculoskeletal:  No gross deformity or lesions, normal dorsalis pedis pulses  Skin: No acanthosis nigricans, lipoatrophy or lipodystrophy  Neurologic:  Alert & oriented x 3, nonfocal  Psychiatric:  Affect, Mood, Insight appropriate      Assessment     1. Type 2 diabetes mellitus with other specified complication, unspecified whether long term insulin use  (H)        Plan     I think that pt would be much better served by seeing her PCP and working with MTM from her home Clinic. They know her and have a much better understanding of her situation and day to day life. Nothing was accomplished at this appointment today. I will send my recommendations to MTM and PCP.         Negin Nunes NP  [facility]  5/27/2024  9:03 AM    Lab Results     Microalbumin Urine mg/dL   Date Value Ref Range Status   02/28/2022 230.52 (H) 0.00 - 1.99 mg/dL Final       Cholesterol   Date Value Ref Range Status   10/23/2023 124 <200 mg/dL Final     Direct Measure HDL   Date Value Ref Range Status   10/23/2023 26 (L) >=50 mg/dL Final     Triglycerides   Date Value Ref Range Status   10/23/2023 224 (H) <150 mg/dL Final     Triglycerides (External)   Date Value Ref Range Status   07/07/2021 534 (H) <150 mg/dL Final       [unfilled]      Current Medications     Outpatient Medications Prior to Visit   Medication Sig Dispense Refill    acetaminophen (TYLENOL) 500 MG tablet Take 1-2 tablets (500-1,000 mg) by mouth every 6 hours as needed for pain 100 tablet 3    Alcohol Swabs (ALCOHOL PADS) 70 % PADS 1 Application 4 times daily (before meals and nightly) 200 each 3    amLODIPine (NORVASC) 10 MG tablet Take 1 tablet (10 mg) by mouth daily 90 tablet 1    ASPIRIN LOW DOSE 81 MG EC tablet TAKE 1 TABLET (81 MG) BY MOUTH DAILY 30 tablet 3    atorvastatin (LIPITOR) 80 MG tablet TAKE 1 TABLET (80 MG) BY MOUTH DAILY FOR CHOLESTEROL 90 tablet 2    BD YOUSIF U/F 32G X 4 MM insulin pen needle USE 6X PEN NEEDLES DAILY OR AS DIRECTED (VICTOZA, 3 FOR NOVOLOG, 2 FOR LANTUS) *34 DAY SUPPLY* 200 each 11    calcium acetate (PHOSLO) 667 MG CAPS capsule Take 667 mg by mouth 3 times daily (with meals)      calcium carbonate (TUMS) 500 MG chewable tablet Take 3 chew tab by mouth 2 times daily      carvedilol (COREG) 12.5 MG tablet Take 12.5 mg by mouth 2 times daily (with meals) Per nephrology on 12/5      Continuous  Glucose Sensor (FREESTYLE MICKIE 14 DAY SENSOR) MISC USE 1 DEVICE EVERY 14 DAYS CHANGE SENSOR AS DIRECTED EVERY 14 DAYS 6 each 3    cyanocobalamin (VITAMIN B-12) 1000 MCG tablet TAKE 1 TABLET (1,000 MCG) BY MOUTH DAILY 30 tablet 3    diclofenac (VOLTAREN) 1 % topical gel Apply 4 g topically 4 times daily as needed for pain in arm 350 g 3    escitalopram (LEXAPRO) 10 MG tablet TAKE 1 TABLET (10 MG) BY MOUTH DAILY FOR DEPRESSION 90 tablet 3    ferrous sulfate (FEROSUL) 325 (65 Fe) MG tablet TAKE 1 TABLET (325 MG) BY MOUTH EVERY OTHER DAY FOR IRON 45 tablet 3    fish oil-omega-3 fatty acids 1000 MG capsule TAKE 1 CAPSULE (1 G) BY MOUTH DAILY 30 capsule 3    folic acid (FOLVITE) 1 MG tablet Take 1 tablet (1 mg) by mouth daily 90 tablet 3    gabapentin (NEURONTIN) 300 MG capsule TAKE 1 CAPSULE (300 MG) BY MOUTH AT BEDTIME 30 capsule 3    insulin aspart (NOVOLOG FLEXPEN) 100 UNIT/ML pen Inject 12 Units Subcutaneous 3 times daily (with meals) 45 mL 3    insulin glargine (LANTUS SOLOSTAR) 100 UNIT/ML pen Inject 70 Units Subcutaneous every morning 35 units on each side. 30 mL 3    liraglutide (VICTOZA PEN) 18 MG/3ML solution Inject 0.6 mg Subcutaneous every morning for 7 days, THEN 1.2 mg every morning for 7 days, THEN 1.8 mg every morning for 24 days. 9 mL 0    lisinopril (ZESTRIL) 20 MG tablet Take 1 tablet (20 mg) by mouth daily 90 tablet 3    mirtazapine (REMERON) 7.5 MG tablet TAKE 1 TABLET (7.5 MG) BY MOUTH AT BEDTIME 90 tablet 3    omeprazole (PRILOSEC) 20 MG DR capsule TAKE 1 PILL (20 MG) BY MOUTH DAILY FOR STOMACH 90 capsule 3    oxyCODONE (ROXICODONE) 5 MG tablet Take 0.5 tablets (2.5 mg) by mouth every 8 hours as needed for pain 20 tablet 0    polyethylene glycol (MIRALAX) 17 GM/Dose powder Take 17 g by mouth daily as needed for constipation 510 g 4    REFRESH OPTIVE ADVANCED 0.5-1-0.5 % SOLN PLACE 1 DROP INTO BOTH EYES 3 TIMES DAILY AS NEEDED (AS NEEDED FOR DRY EYE/EYE PAIN) 10 mL 4    silver sulfADIAZINE  (SILVADENE) 1 % external cream Apply topically 2 times daily 25 g 1    vitamin D2 (ERGOCALCIFEROL) 20159 units (1250 mcg) capsule Take 1 capsule (50,000 Units) by mouth once a week 12 capsule 3     No facility-administered medications prior to visit.

## 2024-05-30 ENCOUNTER — MEDICAL CORRESPONDENCE (OUTPATIENT)
Dept: HEALTH INFORMATION MANAGEMENT | Facility: CLINIC | Age: 54
End: 2024-05-30
Payer: COMMERCIAL

## 2024-06-04 NOTE — PROGRESS NOTES
Addendum 4/14/22:    Cholesterol & trigs elevated.  Patient previously taking fenofibrate, unclear per chart review why this was discontinued.      Plan:  Will restart fenofibrate 54 mg daily today.   Routing not to clinic RN to inform patient of new medication sent today.    Shital Hernandez, PharmD, BCACP  Medication Therapy Management Pharmacist   Over Night Events: events noted, on NC, palliative/ hospice reviewed, afebrile    PHYSICAL EXAM    ICU Vital Signs Last 24 Hrs  T(C): 36 (04 Jun 2024 04:18), Max: 37 (03 Jun 2024 16:00)  T(F): 96.8 (04 Jun 2024 04:18), Max: 98.6 (03 Jun 2024 16:00)  HR: 81 (04 Jun 2024 04:18) (81 - 97)  BP: 135/66 (04 Jun 2024 04:18) (110/62 - 141/67)  BP(mean): 81 (04 Jun 2024 00:21) (81 - 95)  RR: 20 (04 Jun 2024 04:18) (20 - 20)  SpO2: 92% (04 Jun 2024 04:18) (91% - 97%)    O2 Parameters below as of 04 Jun 2024 04:18  Patient On (Oxygen Delivery Method): nasal cannula            General: ill looking  Lungs: dec bs l side  Cardiovascular: QUINTIN 2.6  Abdomen: Soft, Positive BS  Extremities: No clubbing   Neurological: Non focal       06-02-24 @ 07:01  -  06-03-24 @ 07:00  --------------------------------------------------------  IN:    Oral Fluid: 240 mL  Total IN: 240 mL    OUT:    Voided (mL): 600 mL  Total OUT: 600 mL    Total NET: -360 mL      06-03-24 @ 07:01  -  06-04-24 @ 06:48  --------------------------------------------------------  IN:    Oral Fluid: 60 mL  Total IN: 60 mL    OUT:    Voided (mL): 350 mL  Total OUT: 350 mL    Total NET: -290 mL          LABS:                          9.8    16.16 )-----------( 507      ( 04 Jun 2024 05:39 )             34.9                                               06-03    142  |  95<L>  |  12  ----------------------------<  125<H>  4.9   |  46<HH>  |  <0.5<L>    Ca    9.1      03 Jun 2024 04:42  Phos  3.4     06-03  Mg     1.9     06-03    TPro  5.3<L>  /  Alb  3.1<L>  /  TBili  <0.2  /  DBili  x   /  AST  12  /  ALT  35  /  AlkPhos  94  06-03      PT/INR - ( 03 Jun 2024 04:42 )   PT: 11.30 sec;   INR: 0.99 ratio         PTT - ( 03 Jun 2024 04:42 )  PTT:28.5 sec                                       Urinalysis Basic - ( 03 Jun 2024 04:42 )    Color: x / Appearance: x / SG: x / pH: x  Gluc: 125 mg/dL / Ketone: x  / Bili: x / Urobili: x   Blood: x / Protein: x / Nitrite: x   Leuk Esterase: x / RBC: x / WBC x   Sq Epi: x / Non Sq Epi: x / Bacteria: x                                                  LIVER FUNCTIONS - ( 03 Jun 2024 04:42 )  Alb: 3.1 g/dL / Pro: 5.3 g/dL / ALK PHOS: 94 U/L / ALT: 35 U/L / AST: 12 U/L / GGT: x                                                  Culture - Blood (collected 02 Jun 2024 15:35)  Source: .Blood Blood  Preliminary Report (04 Jun 2024 01:02):    No growth at 24 hours    Culture - Blood (collected 02 Jun 2024 14:59)  Source: .Blood Blood  Preliminary Report (04 Jun 2024 01:02):    No growth at 24 hours                                                                                       ABG - ( 02 Jun 2024 20:04 )  pH, Arterial: 7.37  pH, Blood: x     /  pCO2: 91    /  pO2: 70    / HCO3: 53    / Base Excess: 23.8  /  SaO2: 95.3                MEDICATIONS  (STANDING):  acetylcysteine 20%  Inhalation 4 milliLiter(s) Inhalation daily  amLODIPine   Tablet 5 milliGRAM(s) Oral daily  carbamide peroxide Otic Solution 5 Drop(s) Both Ears every 12 hours  chlorhexidine 2% Cloths 1 Application(s) Topical <User Schedule>  enoxaparin Injectable 40 milliGRAM(s) SubCutaneous every 24 hours  ferrous    sulfate 325 milliGRAM(s) Oral daily  fluticasone propionate 50 MICROgram(s)/spray Nasal Spray 1 Spray(s) Both Nostrils two times a day  methylPREDNISolone sodium succinate Injectable 40 milliGRAM(s) IV Push two times a day  polyethylene glycol 3350 17 Gram(s) Oral every 12 hours  saccharomyces boulardii 250 milliGRAM(s) Oral two times a day  senna 2 Tablet(s) Oral at bedtime  sodium chloride 3%  Inhalation 4 milliLiter(s) Inhalation every 12 hours  tiotropium 2.5 MICROgram(s) Inhaler 2 Puff(s) Inhalation daily    MEDICATIONS  (PRN):  albuterol    90 MICROgram(s) HFA Inhaler 2 Puff(s) Inhalation every 6 hours PRN for shortness of breath and/or wheezing  hydrOXYzine hydrochloride 25 milliGRAM(s) Oral every 12 hours PRN Anxiety  lactulose Syrup 10 Gram(s) Oral every 6 hours PRN constipation      CXR Noted

## 2024-06-05 ENCOUNTER — PATIENT OUTREACH (OUTPATIENT)
Dept: NURSING | Facility: CLINIC | Age: 54
End: 2024-06-05
Payer: COMMERCIAL

## 2024-06-05 DIAGNOSIS — E11.69 TYPE 2 DIABETES MELLITUS WITH OTHER SPECIFIED COMPLICATION, UNSPECIFIED WHETHER LONG TERM INSULIN USE (H): ICD-10-CM

## 2024-06-05 NOTE — PROGRESS NOTES
Clinic Care Coordination Contact  Follow Up Progress Note      Assessment: CCRN spoke with patient briefly. Patient would like to call back in a later date to follow up on eye exam, dental exam and endocrinology. CCRN reviewed pre-transplant eval visit notes dated on 4/24/2024. Patient will need to better control her diabetes first and will need another evaluation. Per chart review, patient has been attended appt with endocrinologist and scheduled to see MTM on 8/19. Will discuss on RD appt as well. Per chart review, patient is scheduled to follow up with transplant team again on 6/12/2024. CCRN plans to review notes for plans and recommendations.      Care Gaps:    Health Maintenance Due   Topic Date Due    ZOSTER IMMUNIZATION (1 of 2) Never done    COVID-19 Vaccine (3 - Moderna risk series) 06/30/2022    YEARLY PREVENTIVE VISIT  06/19/2024       Care Plans  Care Plan: Hematology/oncology       Problem: Beta thalassemia minor       Goal: Patient will attend hematology clinic appointment in the next 6 months.       Start Date: 2/22/2024 Expected End Date: 8/31/2024    This Visit's Progress: 50% Recent Progress: 40%    Priority: High    Note:     Barriers: language barrier, low literacy, noncompliance, and lack of knowledge how to navigate complex health care system  Strengths: motivated to attend appt  Patient expressed understanding of goal: Yes    Action steps to achieve this goal:  1. I will answer my phone when I am contacted to schedule my appointment.  2. I will attend my initial hematology appointment as scheduled 3/22/24. Completed.   3. I will attend my follow up hematology/oncology appt on 4/19/2024 at 9:15am with Dr Zuñiga.Completed  4. I will attend my follow up hematology/oncology appt on 7/19 at 10:45AM Lab, 11:15AM Dr. Zuñiga  5. I will schedule a follow up appointment with my provider if it is recommended to do so while I am at the clinic.  6. I will follow up with [facility] regarding this goal at each  outreach until it is completed.                               Care Plan: Transplant evaluation       Problem: CKD       Goal: Patient will attend her kidney transplant evaluation in the next 12 months.       Start Date: 5/21/2024 Expected End Date: 5/31/2025    This Visit's Progress: 10%    Note:     Barriers: language barrier, low literacy, noncompliance, and lack of knowledge how to navigate complex health care system  Strengths: motivated to attend appt  Patient expressed understanding of goal: Yes    Action steps to achieve this goal:  1. I will attend my pre-transplant evaluation appointments on 6/12/2024 at 12pm, 12:30pm, and 1pm with MD and PharmD at [facility].  2. I will schedule a follow up appointment with my providers if it is recommended to do so while I am at the clinic.  3. I will follow up with [facility] regarding this goal at each outreach until it is completed.     Paulo Thomas MD Sahadevan, Meena, MBBS Lori, Nurse transplant coordinator, with [facility] ([phone])  [address]   [place] [address]   [phone] (Work)   [phone] (Fax)                               Plan: Patient will attend her appt with transplant team at [facility] on 6/12/2024.

## 2024-06-06 RX ORDER — ASPIRIN 81 MG/1
81 TABLET, COATED ORAL DAILY
Qty: 90 TABLET | Refills: 2 | Status: SHIPPED | OUTPATIENT
Start: 2024-06-06

## 2024-06-29 DIAGNOSIS — K21.00 GASTROESOPHAGEAL REFLUX DISEASE WITH ESOPHAGITIS WITHOUT HEMORRHAGE: ICD-10-CM

## 2024-07-03 ENCOUNTER — PATIENT OUTREACH (OUTPATIENT)
Dept: NURSING | Facility: CLINIC | Age: 54
End: 2024-07-03
Payer: COMMERCIAL

## 2024-07-03 NOTE — PROGRESS NOTES
Clinic Care Coordination Contact  Follow Up Progress Note      Assessment: follow up on goals. CCRN spoke with patient and reviewed tx candidacy assessment and eval visit notes on 6/12/24. Screening visit. Patient agree dto mammogram, and dental appt Prefers appts on M-W-F non-dialysis days.      Plan:  Complete rest of Tx information session.  If cleared, will need Tx cards, stress test, geographic ID screening, pap, mammogram, dental clearance, Hep B DNA PCR.    Dental clearance   Patient agreed to dental appointment. Patient  and CCRN tried calling [facility] dental clinic but had to leave a message requesting to call patient directly to schedule appointment.     PT  Patient agreed to schedule PT. Patient and CCRN tried calling to schedule PT appointment but on hold > 19 mins. Patient agreed to come see CCRN on 7/22 back to back with PCP appt.    [phone]     Eye exam  Patient agreed to schedule eye appt. Need follow up    Mammogram  Assisted patient scheduled a mammogram appointment on 8/28/2024 at 12:45pm.     Preventative care and overdue care gaps  Assisted patient scheduled preventative care appointment on 8/28/24 with PCP.    Care Gaps:    Health Maintenance Due   Topic Date Due    ZOSTER IMMUNIZATION (1 of 2) Never done    COVID-19 Vaccine (3 - Moderna risk series) 06/30/2022    YEARLY PREVENTIVE VISIT  06/19/2024       Patient will address overdue care gaps with PCP on 7/22 or 9/23.     Care Plans  Care Plan: Hematology/oncology       Problem: Beta thalassemia minor       Goal: Patient will attend hematology clinic appointment in the next 6 months.       Start Date: 2/22/2024 Expected End Date: 8/31/2024    This Visit's Progress: 50% Recent Progress: 40%    Priority: High    Note:     Barriers: language barrier, low literacy, noncompliance, and lack of knowledge how to navigate complex health care system  Strengths: motivated to attend appt  Patient expressed understanding of goal: Yes    Action  steps to achieve this goal:  1. I will answer my phone when I am contacted to schedule my appointment.  2. I will attend my initial hematology appointment as scheduled 3/22/24. Completed.   3. I will attend my follow up hematology/oncology appt on 4/19/2024 at 9:15am with Dr Zuñiga.Completed  4. I will attend my follow up hematology/oncology appt on 7/19 at 10:45AM Lab, 11:15AM Dr. Zuñiga  5. I will schedule a follow up appointment with my provider if it is recommended to do so while I am at the clinic.  6. I will follow up with [facility] regarding this goal at each outreach until it is completed.                               Care Plan: Transplant evaluation       Problem: CKD       Goal: Patient will attend her kidney transplant evaluation in the next 12 months.       Start Date: 5/21/2024 Expected End Date: 5/31/2025    Recent Progress: 10%    Note:     Barriers: language barrier, low literacy, noncompliance, and lack of knowledge how to navigate complex health care system  Strengths: motivated to attend appt  Patient expressed understanding of goal: Yes    Action steps to achieve this goal:  1. I will attend my pre-transplant evaluation appointments on 6/12/2024 at 12pm, 12:30pm, and 1pm with MD and PharmD at [facility]. Completed.   3. I will attend my mammogram on TBD  4. I will attend my eye exam on TBD  5. I will attend my dental appointment on TBD  6. I will schedule a follow up appointment with my providers if it is recommended to do so while I am at the clinic.  7. I will follow up with [facility] regarding this goal at each outreach until it is completed.     Paulo Thomas MD Sahadevan, Meena, MBBS Lori, Nurse transplant coordinator, with [facility] ([phone])  [address] [place] [address] [address]   [place] [address]   [phone] (Work)   [phone] (Fax)                               Plan: CCRN plans to meet with patient on 7/22 when she comes to see PCP to assist with PT, dental, mammogram and eye appt.

## 2024-07-03 NOTE — LETTER
[facility]  Patient Centered Plan of Care  About Me:        Patient Name:  Nithya Matthews    YOB: 1970  Age:         54 year old   Johann MRN:    [id number] Telephone Information:  Home Phone [phone]   Mobile [phone]   Home Phone [phone]   Mobile Not on file.       Address:  [address] [place] [address] Email address:  [email or web address]      Emergency Contact(s)    Name Relationship Lgl Grd Work Phone Home Phone Mobile Phone   JESSY VAUGHN Daughter    [phone]           Primary language:  Syrian     needed? Yes   [employer]:  [phone] op. 1  Other communication barriers:Language barrier    Preferred Method of Communication:     Current living arrangement: No data recorded  Mobility Status/ Medical Equipment: No data recorded      Health Maintenance  Health Maintenance Reviewed: Due/Overdue       My Access Plan  Medical Emergency 911   Primary Clinic Line [facility] [phone]   24 Hour Appointment Line [phone] or  [phone]) (toll-free)   24 Hour Nurse Line [phone] (toll-free)   Preferred Urgent Care No data recorded   Preferred Hospital No data recorded   Preferred Pharmacy [facility] - [place], [place] - [address]     Behavioral Health Crisis Line The National Suicide Prevention Lifeline at 1-587.418.5983 or Text/Call 948           My Care Team Members  Patient Care Team         Relationship Specialty Notifications Start End    Giancarlo Arizmendi MD PCP - General Family Medicine  2/14/22     Referring to Eye    Phone: [phone] Fax: [phone]         [address] [place] [address]    Giancarlo Arizmendi MD Assigned PCP   2/20/22     Phone: [phone] Fax: [phone]         [address] [facility] [address] [place] [address]    Irene Samson CHW Community Health Worker Primary Care - [facility] Admissions 3/1/22     Phone: [phone]         Albert Moe  ARM worker   3/2/22     [facility]. Call [facility] worker  Avani Nelson [phone] for assistance.    Phone: [phone]         Miracle Mi OD  Optometry  3/18/22     Phone: [phone] Fax: [phone]         [address] [place] [address]    Shital Hernandez, PharmD Pharmacist Pharmacist  4/6/22     Phone: [phone]          [facility] [address] [place] [address]    Grecia Hearn DPM, Podiatry/Foot and Ankle Surgery Assigned Musculoskeletal Provider   5/1/22     Phone: [phone] Pager: [phone] Fax: [phone]        [address] [address] [address]    Davis Villa MD MD Endocrinology, Diabetes, and Metabolism  6/2/22     Phone: [phone] Fax: [phone]         [facility] [address]    Shital Hernandez, PharmD Assigned MTM Pharmacist   9/28/22     Phone: [phone]          [facility] [address] [place] [address]    Sahil Waterman MD Assigned Nephrology Provider   10/29/22     Phone: [phone] Fax: [phone]         [address] [place] [address]    Davis Villa MD Assigned Endocrinology Provider   12/24/22     Phone: [phone] Fax: [phone]         [facility] [address]    Carl Rosas MD Nephrologist Nephrology  3/9/23     Phone: [phone] Fax: [phone]         [address] [address]    Markell Langford MD Assigned Surgical Provider   5/6/23     Phone: [phone] Fax: [phone]         [address] [place] [address]    Negin Nunes NP Nurse Practitioner   5/30/23     Phone: [phone] Fax: [phone]         [facility] [place] [address]    Janel Jhaveri, RN Lead Care Coordinator Primary Care - [facility] Admissions 2/7/24     Phone: [phone]         Sanjuanita Zuñiga MD MD Medical Oncology  3/4/24     Phone:  [phone] Fax: [phone]         [address] [facility] [address]    Mery Fernandez [facility] Pharmacist Pharmacist  3/20/24     Phone: [phone] Fax: [phone] [address] [place] [address]    Desiree Leyva, RN Specialty Care Coordinator Hematology & Oncology Admissions 4/16/24     Sanjuanita Zuñiga MD Assigned Cancer Care Provider   4/23/24     Phone: [phone] Fax: [phone]         [address] [facility] [address]    Jory Beebe [facility] Pharmacist Pharmacist  5/22/24     Phone: [phone] Fax: [phone] [address] [facility] [address]                My Care Plans  Self Management and Treatment Plan    Care Plan  Care Plan: Hematology/oncology       Problem: Beta thalassemia minor       Goal: Patient will attend hematology clinic appointment in the next 6 months.       Start Date: 2/22/2024 Expected End Date: 8/31/2024    This Visit's Progress: 50% Recent Progress: 40%    Priority: High    Note:     Barriers: language barrier, low literacy, noncompliance, and lack of knowledge how to navigate complex health care system  Strengths: motivated to attend appt  Patient expressed understanding of goal: Yes    Action steps to achieve this goal:  1. I will answer my phone when I am contacted to schedule my appointment.  2. I will attend my initial hematology appointment as scheduled 3/22/24. Completed.   3. I will attend my follow up hematology/oncology appt on 4/19/2024 at 9:15am with Dr Zuñiga.Completed  4. I will attend my follow up hematology/oncology appt on 7/19 at 10:45AM Lab, 11:15AM Dr. Zuñiga  5. I will schedule a follow up appointment with my provider if it is recommended to do so while I am at the clinic.  6. I will follow up with [facility] regarding this goal at each outreach until it is completed.                               Care Plan: Transplant evaluation       Problem: CKD       Goal: Patient will attend her kidney transplant evaluation in the next 12  months.       Start Date: 5/21/2024 Expected End Date: 5/31/2025    Recent Progress: 10%    Note:     Barriers: language barrier, low literacy, noncompliance, and lack of knowledge how to navigate complex health care system  Strengths: motivated to attend appt  Patient expressed understanding of goal: Yes    Action steps to achieve this goal:  1. I will attend my pre-transplant evaluation appointments on 6/12/2024 at 12pm, 12:30pm, and 1pm with MD and PharmD at [facility]. Completed.   3. I will attend my mammogram on TBD  4. I will attend my eye exam on TBD  5. I will attend my dental appointment on TBD  6. I will schedule a follow up appointment with my providers if it is recommended to do so while I am at the clinic.  7. I will follow up with [facility] regarding this goal at each outreach until it is completed.     Paulo Thomas MD Sahadevan, Meena, MBBS Lori, Nurse transplant coordinator, with [facility] ([phone])  [address] [facility] [address]   [place], [place] [address] [phone] (Work)   [phone] (Fax)                               Action Plans on File:                       Advance Care Plans/Directives:   Advanced Care Plan/Directives on file: No data recorded  Discussed with patient/caregiver(s): No data recorded           My Medical and Care Information  Problem List   Patient Active Problem List   Diagnosis    Primary hypertension    Hyperlipidemia LDL goal <100    Depression, unspecified depression type    Hyperglycemia    Generalized muscle weakness    Low iron    Gastroesophageal reflux disease with esophagitis without hemorrhage    Low vitamin B12 level    Low vitamin D level    Celiac disease    Beta thalassemia minor    Type 2 diabetes mellitus with other specified complication, unspecified whether long term insulin use (H)    Incontinence of feces with fecal urgency    ESRD (end stage renal disease) on dialysis (H)    Hyperkalemia    Weakness    Acute cough    Chronic anemia    Diabetic neuropathy (H)     Diabetic retinopathy (H)    Hepatitis B core antibody positive    Nephrolithiasis    Physical deconditioning      Current Medications and Allergies:  See printed Medication Report.    Care Coordination Start Date: 3/1/2022   Frequency of Care Coordination: 6 weeks, more frequently as needed     Form Last Updated: 07/03/2024

## 2024-07-04 DIAGNOSIS — G62.9 NEUROPATHY: ICD-10-CM

## 2024-07-05 DIAGNOSIS — E11.69 TYPE 2 DIABETES MELLITUS WITH OTHER SPECIFIED COMPLICATION, UNSPECIFIED WHETHER LONG TERM INSULIN USE (H): ICD-10-CM

## 2024-07-05 RX ORDER — GABAPENTIN 300 MG/1
300 CAPSULE ORAL AT BEDTIME
Qty: 30 CAPSULE | Refills: 3 | Status: SHIPPED | OUTPATIENT
Start: 2024-07-05

## 2024-07-05 RX ORDER — UBIQUINOL 100 MG
CAPSULE ORAL
Qty: 100 EACH | Refills: 3 | Status: SHIPPED | OUTPATIENT
Start: 2024-07-05 | End: 2024-07-22

## 2024-07-09 DIAGNOSIS — Z53.9 DIAGNOSIS NOT YET DEFINED: Primary | ICD-10-CM

## 2024-07-09 DIAGNOSIS — E11.69 TYPE 2 DIABETES MELLITUS WITH OTHER SPECIFIED COMPLICATION, UNSPECIFIED WHETHER LONG TERM INSULIN USE (H): ICD-10-CM

## 2024-07-09 PROCEDURE — G0179 MD RECERTIFICATION HHA PT: HCPCS | Performed by: FAMILY MEDICINE

## 2024-07-09 PROCEDURE — 99207 PR MD RECERTIFICATION HHA PT: CPT | Performed by: FAMILY MEDICINE

## 2024-07-10 RX ORDER — LIRAGLUTIDE 6 MG/ML
INJECTION SUBCUTANEOUS
Qty: 9 ML | Refills: 0 | Status: SHIPPED | OUTPATIENT
Start: 2024-07-10 | End: 2024-08-28 | Stop reason: ALTCHOICE

## 2024-07-19 ENCOUNTER — ONCOLOGY VISIT (OUTPATIENT)
Dept: ONCOLOGY | Facility: HOSPITAL | Age: 54
End: 2024-07-19
Attending: INTERNAL MEDICINE
Payer: COMMERCIAL

## 2024-07-19 ENCOUNTER — PATIENT OUTREACH (OUTPATIENT)
Dept: ONCOLOGY | Facility: HOSPITAL | Age: 54
End: 2024-07-19

## 2024-07-19 ENCOUNTER — LAB (OUTPATIENT)
Dept: INFUSION THERAPY | Facility: HOSPITAL | Age: 54
End: 2024-07-19
Attending: INTERNAL MEDICINE
Payer: COMMERCIAL

## 2024-07-19 VITALS
BODY MASS INDEX: 21.71 KG/M2 | DIASTOLIC BLOOD PRESSURE: 67 MMHG | RESPIRATION RATE: 22 BRPM | OXYGEN SATURATION: 95 % | HEART RATE: 81 BPM | HEIGHT: 61 IN | TEMPERATURE: 98.6 F | WEIGHT: 115 LBS | SYSTOLIC BLOOD PRESSURE: 141 MMHG

## 2024-07-19 DIAGNOSIS — N18.6 ANEMIA IN CHRONIC KIDNEY DISEASE, ON CHRONIC DIALYSIS (H): ICD-10-CM

## 2024-07-19 DIAGNOSIS — E61.1 IRON DEFICIENCY: ICD-10-CM

## 2024-07-19 DIAGNOSIS — Z99.2 ANEMIA IN CHRONIC KIDNEY DISEASE, ON CHRONIC DIALYSIS (H): ICD-10-CM

## 2024-07-19 DIAGNOSIS — D56.3 BETA THALASSEMIA MINOR: ICD-10-CM

## 2024-07-19 DIAGNOSIS — N18.6 ESRD (END STAGE RENAL DISEASE) ON DIALYSIS (H): Primary | ICD-10-CM

## 2024-07-19 DIAGNOSIS — Z99.2 ESRD (END STAGE RENAL DISEASE) ON DIALYSIS (H): Primary | ICD-10-CM

## 2024-07-19 DIAGNOSIS — D63.1 ANEMIA IN CHRONIC KIDNEY DISEASE, ON CHRONIC DIALYSIS (H): ICD-10-CM

## 2024-07-19 DIAGNOSIS — Z99.2 ESRD (END STAGE RENAL DISEASE) ON DIALYSIS (H): ICD-10-CM

## 2024-07-19 DIAGNOSIS — N18.6 ESRD (END STAGE RENAL DISEASE) ON DIALYSIS (H): ICD-10-CM

## 2024-07-19 LAB
BASOPHILS # BLD AUTO: 0 10E3/UL (ref 0–0.2)
BASOPHILS NFR BLD AUTO: 0 %
EOSINOPHIL # BLD AUTO: 0.2 10E3/UL (ref 0–0.7)
EOSINOPHIL NFR BLD AUTO: 3 %
ERYTHROCYTE [DISTWIDTH] IN BLOOD BY AUTOMATED COUNT: 21.8 % (ref 10–15)
FERRITIN SERPL-MCNC: 955 NG/ML (ref 11–328)
HCT VFR BLD AUTO: 29.3 % (ref 35–47)
HGB BLD-MCNC: 8.6 G/DL (ref 11.7–15.7)
IMM GRANULOCYTES # BLD: 0.1 10E3/UL
IMM GRANULOCYTES NFR BLD: 1 %
IRON BINDING CAPACITY (ROCHE): 175 UG/DL (ref 240–430)
IRON SATN MFR SERPL: 25 % (ref 15–46)
IRON SERPL-MCNC: 44 UG/DL (ref 37–145)
LYMPHOCYTES # BLD AUTO: 0.9 10E3/UL (ref 0.8–5.3)
LYMPHOCYTES NFR BLD AUTO: 12 %
MCH RBC QN AUTO: 21.3 PG (ref 26.5–33)
MCHC RBC AUTO-ENTMCNC: 29.4 G/DL (ref 31.5–36.5)
MCV RBC AUTO: 73 FL (ref 78–100)
MONOCYTES # BLD AUTO: 0.6 10E3/UL (ref 0–1.3)
MONOCYTES NFR BLD AUTO: 7 %
NEUTROPHILS # BLD AUTO: 6.1 10E3/UL (ref 1.6–8.3)
NEUTROPHILS NFR BLD AUTO: 77 %
NRBC # BLD AUTO: 0 10E3/UL
NRBC BLD AUTO-RTO: 0 /100
PLATELET # BLD AUTO: 143 10E3/UL (ref 150–450)
RBC # BLD AUTO: 4.03 10E6/UL (ref 3.8–5.2)
WBC # BLD AUTO: 7.9 10E3/UL (ref 4–11)

## 2024-07-19 PROCEDURE — 82728 ASSAY OF FERRITIN: CPT

## 2024-07-19 PROCEDURE — 36415 COLL VENOUS BLD VENIPUNCTURE: CPT

## 2024-07-19 PROCEDURE — 83550 IRON BINDING TEST: CPT

## 2024-07-19 PROCEDURE — G0463 HOSPITAL OUTPT CLINIC VISIT: HCPCS | Performed by: INTERNAL MEDICINE

## 2024-07-19 PROCEDURE — 99214 OFFICE O/P EST MOD 30 MIN: CPT | Performed by: INTERNAL MEDICINE

## 2024-07-19 PROCEDURE — G2211 COMPLEX E/M VISIT ADD ON: HCPCS | Performed by: INTERNAL MEDICINE

## 2024-07-19 PROCEDURE — 85025 COMPLETE CBC W/AUTO DIFF WBC: CPT

## 2024-07-19 RX ORDER — IBUPROFEN 600 MG/1
TABLET, FILM COATED ORAL
COMMUNITY
Start: 2024-05-28 | End: 2024-07-22

## 2024-07-19 RX ORDER — CETIRIZINE HYDROCHLORIDE 10 MG/1
10 TABLET ORAL DAILY
COMMUNITY
Start: 2024-05-28 | End: 2024-07-22

## 2024-07-19 ASSESSMENT — PAIN SCALES - GENERAL: PAINLEVEL: WORST PAIN (10)

## 2024-07-19 NOTE — PROGRESS NOTES
"Oncology Rooming Note    July 19, 2024 11:11 AM   Nithya Matthews is a 54 year old female who presents for:    Chief Complaint   Patient presents with    Oncology Clinic Visit     3 month with labs     Initial Vitals: BP (!) 141/67 (BP Location: Left arm, Patient Position: Sitting, Cuff Size: Adult Small)   Pulse 81   Temp 98.6  F (37  C) (Tympanic)   Resp 22   Ht 1.549 m (5' 1\")   Wt 52.2 kg (115 lb)   LMP  (LMP Unknown)   SpO2 95%   BMI 21.73 kg/m   Estimated body mass index is 21.73 kg/m  as calculated from the following:    Height as of this encounter: 1.549 m (5' 1\").    Weight as of this encounter: 52.2 kg (115 lb). Body surface area is 1.5 meters squared.  Worst Pain (10) Comment: Data Unavailable   No LMP recorded (lmp unknown). Patient is postmenopausal.  Allergies reviewed: Yes  Medications reviewed: Yes    Medications: Medication refills not needed today.  Pharmacy name entered into [facility]:    [facility] - [place], [place] - [address] [facility] [place], [place] - [facility] [address]    Frailty Screening:   Is the patient here for a new oncology consult visit in cancer care? 2. No      Clinical concerns: pt having pain in lower back that started this morning. Rates 10/10   was notified.      FLASH MENDOZA CMA              "

## 2024-07-19 NOTE — LETTER
7/19/2024      Nithya Matthews  [address] [place] [address]      Dear Colleague,    Thank you for referring your patient, Nithya Matthews, to the [facility] [place]. Please see a copy of my visit note below.    [facility] Hematology and Oncology Progress Note    Patient: Nithya Matthews  MRN: [id number]  7/19/24        Reason for Visit    Chief Complaint   Patient presents with     Oncology Clinic Visit     3 month with labs         Problem List Items Addressed This Visit       Beta thalassemia minor    ESRD (end stage renal disease) on dialysis (H) - Primary     Other Visit Diagnoses       Anemia in chronic kidney disease, on chronic dialysis (H)                    Assessment and Plan  Anemia in the setting of ESRD on dialysis, beta thalassemia minor and history of iron deficiency  Her baseline hemoglobin was around 10-11.  But since starting dialysis it has significantly dropped.  She is currently on VIK supplementation through her dialysis clinic.  Gets 300 mcg of Mircera every 2 weeks.  Also takes oral iron.    No evidence of any primary BM pathology. SPEP negative for monoclonal proteins    She is being considered for kidney transplant    Here with labs. Not sure what is her dose of Mircera. Previously I had recommended increasing it to 500mcg every 2 weeks. She is also getting IV iron at the time of dialysis.    Continue oral iron supplementation and intermittent IV iron infusion through dialysis clinic to keep transferrin saturation above 30%.  Continue VIK supplementation through dialysis clinic.  Again consider increasing the dose to 500 mcg every 2 weeks if not already done.    From the hematology standpoint, unfortunately I do not have any other great recommendations.   No further workup or treatment needed from my side.  I will continue to follow her intermittently.  I will see her back in 6 months with labs again.         Cancer Staging   No matching staging information was found for the  patient.      ECOG Performance    2 - Ambulatory and independent in all ADLs; cannot work; up > 50% of the time         Problem List    Patient Active Problem List   Diagnosis     Primary hypertension     Hyperlipidemia LDL goal <100     Depression, unspecified depression type     Hyperglycemia     Generalized muscle weakness     Low iron     Gastroesophageal reflux disease with esophagitis without hemorrhage     Low vitamin B12 level     Low vitamin D level     Celiac disease     Beta thalassemia minor     Type 2 diabetes mellitus with other specified complication, unspecified whether long term insulin use (H)     Incontinence of feces with fecal urgency     ESRD (end stage renal disease) on dialysis (H)     Hyperkalemia     Weakness     Acute cough     Chronic anemia     Diabetic neuropathy (H)     Diabetic retinopathy (H)     Hepatitis B core antibody positive     Nephrolithiasis     Physical deconditioning          Interval History   Nithya Matthews is a 54 year old female with history of beta thalassemia minor, iron deficiency, anemia and ESRD who is seen in the hematology clinic for follow-up.   services via iPad was used during this encounter.      Previously I saw her for management of anemia in the setting of history of beta thalassemia minor and ESRD.  She has been getting VIK supplementation through her dialysis clinic.  And is also on IV iron.  Patient currently takes oral iron supplementation and folic acid 1 mg daily.      Doing ok. Has some low back pain today. No beedin issues. She was evaluated by renal transplant team at [facility].       Review of Systems  A comprehensive review of systems was negative except for what is noted in the interval history    Current Outpatient Medications   Medication Sig Dispense Refill     acetaminophen (TYLENOL) 500 MG tablet Take 1-2 tablets (500-1,000 mg) by mouth every 6 hours as needed for pain 100 tablet 3     amLODIPine (NORVASC) 10 MG tablet Take 1 tablet (10 mg)  by mouth daily 90 tablet 1     aspirin (ASPIRIN LOW DOSE) 81 MG EC tablet TAKE 1 TABLET (81 MG) BY MOUTH DAILY 90 tablet 2     atorvastatin (LIPITOR) 80 MG tablet TAKE 1 TABLET (80 MG) BY MOUTH DAILY FOR CHOLESTEROL 90 tablet 2     calcium acetate (PHOSLO) 667 MG CAPS capsule Take 667 mg by mouth 3 times daily (with meals)       calcium carbonate (TUMS) 500 MG chewable tablet Take 3 chew tab by mouth 2 times daily       carvedilol (COREG) 12.5 MG tablet Take 12.5 mg by mouth 2 times daily (with meals) Per nephrology on 12/5       cyanocobalamin (VITAMIN B-12) 1000 MCG tablet TAKE 1 TABLET (1,000 MCG) BY MOUTH DAILY 30 tablet 3     escitalopram (LEXAPRO) 10 MG tablet TAKE 1 TABLET (10 MG) BY MOUTH DAILY FOR DEPRESSION 90 tablet 3     ferrous sulfate (FEROSUL) 325 (65 Fe) MG tablet TAKE 1 TABLET (325 MG) BY MOUTH EVERY OTHER DAY FOR IRON 45 tablet 3     fish oil-omega-3 fatty acids 1000 MG capsule TAKE 1 CAPSULE (1 G) BY MOUTH DAILY 30 capsule 3     folic acid (FOLVITE) 1 MG tablet Take 1 tablet (1 mg) by mouth daily 90 tablet 3     gabapentin (NEURONTIN) 300 MG capsule TAKE 1 CAPSULE (300 MG) BY MOUTH AT BEDTIME 30 capsule 3     insulin aspart (NOVOLOG FLEXPEN) 100 UNIT/ML pen Inject 12 Units Subcutaneous 3 times daily (with meals) 45 mL 3     insulin glargine (LANTUS SOLOSTAR) 100 UNIT/ML pen Inject 70 Units Subcutaneous every morning 35 units on each side. 30 mL 3     lisinopril (ZESTRIL) 20 MG tablet Take 1 tablet (20 mg) by mouth daily 90 tablet 3     mirtazapine (REMERON) 7.5 MG tablet TAKE 1 TABLET (7.5 MG) BY MOUTH AT BEDTIME 90 tablet 3     nicotine polacrilex (NICORETTE) 4 MG gum 1 each (4 mg) by Gum route every 1 hour as needed for Nicotine Craving.       omeprazole (PRILOSEC) 20 MG DR capsule TAKE 1 PILL (20 MG) BY MOUTH DAILY FOR STOMACH 90 capsule 2     oxyCODONE (ROXICODONE) 5 MG tablet Take 0.5 tablets (2.5 mg) by mouth every 8 hours as needed for pain 20 tablet 0     VICTOZA PEN 18 MG/3ML soln INJECT  0.6 MG SUBCUTANEOUS EVERY MORNING FOR 7 DAYS, THEN 1.2 MG EVERY MORNING FOR 7 DAYS, THEN 1.8 MG EVERY MORNING FOR 24 DAYS. 9 mL 0     vitamin D2 (ERGOCALCIFEROL) 21459 units (1250 mcg) capsule Take 1 capsule (50,000 Units) by mouth once a week 12 capsule 3     Alcohol Swabs (ALCOHOL PREP) 70 % PADS APPLY 1 APPLICATION 4 TIMES DAILY (BEFORE MEALS AND NIGHTLY) (Patient not taking: Reported on 7/19/2024) 100 each 3     ALLERGY RELIEF CETIRIZINE 10 MG tablet Take 10 mg by mouth daily (Patient not taking: Reported on 7/19/2024)       BD YOUSIF U/F 32G X 4 MM insulin pen needle USE 6X PEN NEEDLES DAILY OR AS DIRECTED (VICTOZA, 3 FOR NOVOLOG, 2 FOR LANTUS) *34 DAY SUPPLY* (Patient not taking: Reported on 7/19/2024) 200 each 11     Continuous Glucose Sensor (FREESTYLE MICKIE 14 DAY SENSOR) MISC USE 1 DEVICE EVERY 14 DAYS CHANGE SENSOR AS DIRECTED EVERY 14 DAYS (Patient not taking: Reported on 7/19/2024) 6 each 3     diclofenac (VOLTAREN) 1 % topical gel Apply 4 g topically 4 times daily as needed for pain in arm (Patient not taking: Reported on 7/19/2024) 350 g 3     ibuprofen (ADVIL/MOTRIN) 600 MG tablet TAKE 1 TABLET (600 MG) BY MOUTH EVERY 6 HOURS AS NEEDED FOR MODERATE PAIN OR FEVER (Patient not taking: Reported on 7/19/2024)       polyethylene glycol (MIRALAX) 17 GM/Dose powder Take 17 g by mouth daily as needed for constipation (Patient not taking: Reported on 7/19/2024) 510 g 4     REFRESH OPTIVE ADVANCED 0.5-1-0.5 % SOLN PLACE 1 DROP INTO BOTH EYES 3 TIMES DAILY AS NEEDED (AS NEEDED FOR DRY EYE/EYE PAIN) (Patient not taking: Reported on 7/19/2024) 10 mL 4     silver sulfADIAZINE (SILVADENE) 1 % external cream Apply topically 2 times daily (Patient not taking: Reported on 7/19/2024) 25 g 1     No current facility-administered medications for this visit.        Physical Exam    Failed to redirect to the Timeline version of the [facility] SmartLink.    General: alert and cooperative  HEENT: Head: Normal, normocephalic,  "atraumatic.  Eye: Normal external eye, conjunctiva, lids cornea, JAZMIN.  CNS: Alert and oriented x3, neurologic exam grossly normal.      Lab Results    Recent Results (from the past 168 hour(s))   Iron & Iron Binding Capacity   Result Value Ref Range    Iron 44 37 - 145 ug/dL    Iron Binding Capacity 175 (L) 240 - 430 ug/dL    Iron Sat Index 25 15 - 46 %   CBC with platelets and differential   Result Value Ref Range    WBC Count 7.9 4.0 - 11.0 10e3/uL    RBC Count 4.03 3.80 - 5.20 10e6/uL    Hemoglobin 8.6 (L) 11.7 - 15.7 g/dL    Hematocrit 29.3 (L) 35.0 - 47.0 %    MCV 73 (L) 78 - 100 fL    MCH 21.3 (L) 26.5 - 33.0 pg    MCHC 29.4 (L) 31.5 - 36.5 g/dL    RDW 21.8 (H) 10.0 - 15.0 %    Platelet Count 143 (L) 150 - 450 10e3/uL    % Neutrophils 77 %    % Lymphocytes 12 %    % Monocytes 7 %    % Eosinophils 3 %    % Basophils 0 %    % Immature Granulocytes 1 %    NRBCs per 100 WBC 0 <1 /100    Absolute Neutrophils 6.1 1.6 - 8.3 10e3/uL    Absolute Lymphocytes 0.9 0.8 - 5.3 10e3/uL    Absolute Monocytes 0.6 0.0 - 1.3 10e3/uL    Absolute Eosinophils 0.2 0.0 - 0.7 10e3/uL    Absolute Basophils 0.0 0.0 - 0.2 10e3/uL    Absolute Immature Granulocytes 0.1 <=0.4 10e3/uL    Absolute NRBCs 0.0 10e3/uL         Imaging    No results found.    The longitudinal plan of care for the diagnosis(es)/condition(s) as documented were addressed during this visit. Due to the added complexity in care, I will continue to support Barriga in the subsequent management and with ongoing continuity of care.      Signed by: Sanjuanita Zuñiga MD      Oncology Rooming Note    July 19, 2024 11:11 AM   Nithya Matthews is a 54 year old female who presents for:    Chief Complaint   Patient presents with     Oncology Clinic Visit     3 month with labs     Initial Vitals: BP (!) 141/67 (BP Location: Left arm, Patient Position: Sitting, Cuff Size: Adult Small)   Pulse 81   Temp 98.6  F (37  C) (Tympanic)   Resp 22   Ht 1.549 m (5' 1\")   Wt 52.2 kg (115 lb)   LMP  " "(LMP Unknown)   SpO2 95%   BMI 21.73 kg/m   Estimated body mass index is 21.73 kg/m  as calculated from the following:    Height as of this encounter: 1.549 m (5' 1\").    Weight as of this encounter: 52.2 kg (115 lb). Body surface area is 1.5 meters squared.  Worst Pain (10) Comment: Data Unavailable   No LMP recorded (lmp unknown). Patient is postmenopausal.  Allergies reviewed: Yes  Medications reviewed: Yes    Medications: Medication refills not needed today.  Pharmacy name entered into [facility]:    [facility] - [place], [place] - [address]  [employer] [place], [place] - [facility] [address]    Frailty Screening:   Is the patient here for a new oncology consult visit in cancer care? 2. No      Clinical concerns: pt having pain in lower back that started this morning. Rates 10/10   was notified.      FLASH MENDOZA CMA                Again, thank you for allowing me to participate in the care of your patient.        Sincerely,        Sanjuanita Zuñiga MD  "

## 2024-07-19 NOTE — PROGRESS NOTES
[facility]: Cancer Care                                                                                          Situation: Chart reviewed by RN Care Coordinator.    Background: Patient was seen in clinic today for follow up regarding Beta thalassemia minor, ESRD and anemia.     Assessment: On VIK supplementation through dialysis clinic. On oral iron. Being considered for kidney transplant.     Plan/Recommendations: Follow up in 6 months with labs.       Signature:  Geni Feliciano  RN Care Coordinator  [facility]  Cancer [facility]

## 2024-07-19 NOTE — PROGRESS NOTES
[facility] Hematology and Oncology Progress Note    Patient: Nithya [facility]  MRN: [id number]  7/19/24        Reason for Visit    Chief Complaint   Patient presents with    Oncology Clinic Visit     3 month with labs         Problem List Items Addressed This Visit       Beta thalassemia minor    ESRD (end stage renal disease) on dialysis (H) - Primary     Other Visit Diagnoses       Anemia in chronic kidney disease, on chronic dialysis (H)                    Assessment and Plan  Anemia in the setting of ESRD on dialysis, beta thalassemia minor and history of iron deficiency  Her baseline hemoglobin was around 10-11.  But since starting dialysis it has significantly dropped.  She is currently on VIK supplementation through her dialysis clinic.  Gets 300 mcg of Mircera every 2 weeks.  Also takes oral iron.    No evidence of any primary BM pathology. SPEP negative for monoclonal proteins    She is being considered for kidney transplant    Here with labs. Not sure what is her dose of Mircera. Previously I had recommended increasing it to 500mcg every 2 weeks. She is also getting IV iron at the time of dialysis.    Continue oral iron supplementation and intermittent IV iron infusion through dialysis clinic to keep transferrin saturation above 30%.  Her ferritin is high but transferrin saturation is still <30% today. Hemoglobin has come up to 8.6. may need to monitor ferritin going forward and reduce IV iron if it continues to climb up. With her beta thalassemia, there is always concern for increased RBC recycling leading iron overload.     Continue VIK supplementation through dialysis clinic.  Again consider increasing the dose to 500 mcg every 2 weeks if not already done.    From the hematology standpoint, unfortunately I do not have any other great recommendations.   No further workup or treatment needed from my side.  I will continue to follow her intermittently.  I will see her back in 6 months with labs again.          Cancer Staging   No matching staging information was found for the patient.      ECOG Performance    2 - Ambulatory and independent in all ADLs; cannot work; up > 50% of the time         Problem List    Patient Active Problem List   Diagnosis    Primary hypertension    Hyperlipidemia LDL goal <100    Depression, unspecified depression type    Hyperglycemia    Generalized muscle weakness    Low iron    Gastroesophageal reflux disease with esophagitis without hemorrhage    Low vitamin B12 level    Low vitamin D level    Celiac disease    Beta thalassemia minor    Type 2 diabetes mellitus with other specified complication, unspecified whether long term insulin use (H)    Incontinence of feces with fecal urgency    ESRD (end stage renal disease) on dialysis (H)    Hyperkalemia    Weakness    Acute cough    Chronic anemia    Diabetic neuropathy (H)    Diabetic retinopathy (H)    Hepatitis B core antibody positive    Nephrolithiasis    Physical deconditioning          Interval History   Nithya Matthews is a 54 year old female with history of beta thalassemia minor, iron deficiency, anemia and ESRD who is seen in the hematology clinic for follow-up.   services via iPad was used during this encounter.      Previously I saw her for management of anemia in the setting of history of beta thalassemia minor and ESRD.  She has been getting VIK supplementation through her dialysis clinic.  And is also on IV iron.  Patient currently takes oral iron supplementation and folic acid 1 mg daily.      Doing ok. Has some low back pain today. No beedin issues. She was evaluated by renal transplant team at [facility].       Review of Systems  A comprehensive review of systems was negative except for what is noted in the interval history    Current Outpatient Medications   Medication Sig Dispense Refill    acetaminophen (TYLENOL) 500 MG tablet Take 1-2 tablets (500-1,000 mg) by mouth every 6 hours as needed for pain 100 tablet 3    amLODIPine  (NORVASC) 10 MG tablet Take 1 tablet (10 mg) by mouth daily 90 tablet 1    aspirin (ASPIRIN LOW DOSE) 81 MG EC tablet TAKE 1 TABLET (81 MG) BY MOUTH DAILY 90 tablet 2    atorvastatin (LIPITOR) 80 MG tablet TAKE 1 TABLET (80 MG) BY MOUTH DAILY FOR CHOLESTEROL 90 tablet 2    calcium acetate (PHOSLO) 667 MG CAPS capsule Take 667 mg by mouth 3 times daily (with meals)      calcium carbonate (TUMS) 500 MG chewable tablet Take 3 chew tab by mouth 2 times daily      carvedilol (COREG) 12.5 MG tablet Take 12.5 mg by mouth 2 times daily (with meals) Per nephrology on 12/5      cyanocobalamin (VITAMIN B-12) 1000 MCG tablet TAKE 1 TABLET (1,000 MCG) BY MOUTH DAILY 30 tablet 3    escitalopram (LEXAPRO) 10 MG tablet TAKE 1 TABLET (10 MG) BY MOUTH DAILY FOR DEPRESSION 90 tablet 3    ferrous sulfate (FEROSUL) 325 (65 Fe) MG tablet TAKE 1 TABLET (325 MG) BY MOUTH EVERY OTHER DAY FOR IRON 45 tablet 3    fish oil-omega-3 fatty acids 1000 MG capsule TAKE 1 CAPSULE (1 G) BY MOUTH DAILY 30 capsule 3    folic acid (FOLVITE) 1 MG tablet Take 1 tablet (1 mg) by mouth daily 90 tablet 3    gabapentin (NEURONTIN) 300 MG capsule TAKE 1 CAPSULE (300 MG) BY MOUTH AT BEDTIME 30 capsule 3    insulin aspart (NOVOLOG FLEXPEN) 100 UNIT/ML pen Inject 12 Units Subcutaneous 3 times daily (with meals) 45 mL 3    insulin glargine (LANTUS SOLOSTAR) 100 UNIT/ML pen Inject 70 Units Subcutaneous every morning 35 units on each side. 30 mL 3    lisinopril (ZESTRIL) 20 MG tablet Take 1 tablet (20 mg) by mouth daily 90 tablet 3    mirtazapine (REMERON) 7.5 MG tablet TAKE 1 TABLET (7.5 MG) BY MOUTH AT BEDTIME 90 tablet 3    nicotine polacrilex (NICORETTE) 4 MG gum 1 each (4 mg) by Gum route every 1 hour as needed for Nicotine Craving.      omeprazole (PRILOSEC) 20 MG DR capsule TAKE 1 PILL (20 MG) BY MOUTH DAILY FOR STOMACH 90 capsule 2    oxyCODONE (ROXICODONE) 5 MG tablet Take 0.5 tablets (2.5 mg) by mouth every 8 hours as needed for pain 20 tablet 0    VICTOZA  PEN 18 MG/3ML soln INJECT 0.6 MG SUBCUTANEOUS EVERY MORNING FOR 7 DAYS, THEN 1.2 MG EVERY MORNING FOR 7 DAYS, THEN 1.8 MG EVERY MORNING FOR 24 DAYS. 9 mL 0    vitamin D2 (ERGOCALCIFEROL) 69970 units (1250 mcg) capsule Take 1 capsule (50,000 Units) by mouth once a week 12 capsule 3    Alcohol Swabs (ALCOHOL PREP) 70 % PADS APPLY 1 APPLICATION 4 TIMES DAILY (BEFORE MEALS AND NIGHTLY) (Patient not taking: Reported on 7/19/2024) 100 each 3    ALLERGY RELIEF CETIRIZINE 10 MG tablet Take 10 mg by mouth daily (Patient not taking: Reported on 7/19/2024)      BD YOUSIF U/F 32G X 4 MM insulin pen needle USE 6X PEN NEEDLES DAILY OR AS DIRECTED (VICTOZA, 3 FOR NOVOLOG, 2 FOR LANTUS) *34 DAY SUPPLY* (Patient not taking: Reported on 7/19/2024) 200 each 11    Continuous Glucose Sensor (FREESTYLE MICKIE 14 DAY SENSOR) MISC USE 1 DEVICE EVERY 14 DAYS CHANGE SENSOR AS DIRECTED EVERY 14 DAYS (Patient not taking: Reported on 7/19/2024) 6 each 3    diclofenac (VOLTAREN) 1 % topical gel Apply 4 g topically 4 times daily as needed for pain in arm (Patient not taking: Reported on 7/19/2024) 350 g 3    ibuprofen (ADVIL/MOTRIN) 600 MG tablet TAKE 1 TABLET (600 MG) BY MOUTH EVERY 6 HOURS AS NEEDED FOR MODERATE PAIN OR FEVER (Patient not taking: Reported on 7/19/2024)      polyethylene glycol (MIRALAX) 17 GM/Dose powder Take 17 g by mouth daily as needed for constipation (Patient not taking: Reported on 7/19/2024) 510 g 4    REFRESH OPTIVE ADVANCED 0.5-1-0.5 % SOLN PLACE 1 DROP INTO BOTH EYES 3 TIMES DAILY AS NEEDED (AS NEEDED FOR DRY EYE/EYE PAIN) (Patient not taking: Reported on 7/19/2024) 10 mL 4    silver sulfADIAZINE (SILVADENE) 1 % external cream Apply topically 2 times daily (Patient not taking: Reported on 7/19/2024) 25 g 1     No current facility-administered medications for this visit.        Physical Exam    Failed to redirect to the Timeline version of the [facility] SmartLink.    General: alert and cooperative  HEENT: Head: Normal,  normocephalic, atraumatic.  Eye: Normal external eye, conjunctiva, lids cornea, JAZMIN.  CNS: Alert and oriented x3, neurologic exam grossly normal.      Lab Results    Recent Results (from the past 168 hour(s))   Iron & Iron Binding Capacity   Result Value Ref Range    Iron 44 37 - 145 ug/dL    Iron Binding Capacity 175 (L) 240 - 430 ug/dL    Iron Sat Index 25 15 - 46 %   CBC with platelets and differential   Result Value Ref Range    WBC Count 7.9 4.0 - 11.0 10e3/uL    RBC Count 4.03 3.80 - 5.20 10e6/uL    Hemoglobin 8.6 (L) 11.7 - 15.7 g/dL    Hematocrit 29.3 (L) 35.0 - 47.0 %    MCV 73 (L) 78 - 100 fL    MCH 21.3 (L) 26.5 - 33.0 pg    MCHC 29.4 (L) 31.5 - 36.5 g/dL    RDW 21.8 (H) 10.0 - 15.0 %    Platelet Count 143 (L) 150 - 450 10e3/uL    % Neutrophils 77 %    % Lymphocytes 12 %    % Monocytes 7 %    % Eosinophils 3 %    % Basophils 0 %    % Immature Granulocytes 1 %    NRBCs per 100 WBC 0 <1 /100    Absolute Neutrophils 6.1 1.6 - 8.3 10e3/uL    Absolute Lymphocytes 0.9 0.8 - 5.3 10e3/uL    Absolute Monocytes 0.6 0.0 - 1.3 10e3/uL    Absolute Eosinophils 0.2 0.0 - 0.7 10e3/uL    Absolute Basophils 0.0 0.0 - 0.2 10e3/uL    Absolute Immature Granulocytes 0.1 <=0.4 10e3/uL    Absolute NRBCs 0.0 10e3/uL         Imaging    No results found.    The longitudinal plan of care for the diagnosis(es)/condition(s) as documented were addressed during this visit. Due to the added complexity in care, I will continue to support Barriga in the subsequent management and with ongoing continuity of care.      Signed by: Sanjuanita Zuñiga MD

## 2024-07-22 ENCOUNTER — ALLIED HEALTH/NURSE VISIT (OUTPATIENT)
Dept: NURSING | Facility: CLINIC | Age: 54
End: 2024-07-22
Payer: COMMERCIAL

## 2024-07-22 ENCOUNTER — OFFICE VISIT (OUTPATIENT)
Dept: FAMILY MEDICINE | Facility: CLINIC | Age: 54
End: 2024-07-22
Payer: COMMERCIAL

## 2024-07-22 VITALS
OXYGEN SATURATION: 97 % | BODY MASS INDEX: 22.66 KG/M2 | DIASTOLIC BLOOD PRESSURE: 76 MMHG | WEIGHT: 120 LBS | TEMPERATURE: 97.8 F | SYSTOLIC BLOOD PRESSURE: 179 MMHG | HEIGHT: 61 IN | RESPIRATION RATE: 16 BRPM | HEART RATE: 85 BPM

## 2024-07-22 DIAGNOSIS — Z99.2 ESRD (END STAGE RENAL DISEASE) ON DIALYSIS (H): ICD-10-CM

## 2024-07-22 DIAGNOSIS — E16.2 HYPOGLYCEMIA: ICD-10-CM

## 2024-07-22 DIAGNOSIS — M79.2 NEURALGIA AND NEURITIS, UNSPECIFIED: ICD-10-CM

## 2024-07-22 DIAGNOSIS — Z71.89 COMPLEX CARE COORDINATION: Primary | ICD-10-CM

## 2024-07-22 DIAGNOSIS — R60.9 EDEMA, UNSPECIFIED TYPE: ICD-10-CM

## 2024-07-22 DIAGNOSIS — N18.6 ESRD (END STAGE RENAL DISEASE) ON DIALYSIS (H): ICD-10-CM

## 2024-07-22 DIAGNOSIS — Z12.31 VISIT FOR SCREENING MAMMOGRAM: Primary | ICD-10-CM

## 2024-07-22 DIAGNOSIS — G89.29 CHRONIC LEFT-SIDED LOW BACK PAIN WITHOUT SCIATICA: ICD-10-CM

## 2024-07-22 DIAGNOSIS — M54.50 CHRONIC LEFT-SIDED LOW BACK PAIN WITHOUT SCIATICA: ICD-10-CM

## 2024-07-22 PROCEDURE — 99207 PR NO CHARGE LOS: CPT

## 2024-07-22 PROCEDURE — 99214 OFFICE O/P EST MOD 30 MIN: CPT | Performed by: FAMILY MEDICINE

## 2024-07-22 PROCEDURE — G2211 COMPLEX E/M VISIT ADD ON: HCPCS | Performed by: FAMILY MEDICINE

## 2024-07-22 RX ORDER — LIDOCAINE 4 G/G
1 PATCH TOPICAL EVERY 24 HOURS
Qty: 30 PATCH | Refills: 3 | Status: SHIPPED | OUTPATIENT
Start: 2024-07-22

## 2024-07-22 RX ORDER — CAPSAICIN 0.025 %
4 CREAM (GRAM) TOPICAL 3 TIMES DAILY PRN
Qty: 120 G | Refills: 3 | Status: SHIPPED | OUTPATIENT
Start: 2024-07-22

## 2024-07-22 RX ORDER — LIDOCAINE 50 MG/G
OINTMENT TOPICAL PRN
Qty: 240 G | Refills: 3 | Status: SHIPPED | OUTPATIENT
Start: 2024-07-22

## 2024-07-22 NOTE — PROGRESS NOTES
Clinic Care Coordination Contact  Follow Up Progress Note      Assessment: CCRN met with patient and oldest son who's visiting from [place]. Son speaks fluent English,. Fanny Miranda. Patient signed C2C for son Viv Nelson who lives in [place] and son Navneet Nelson lives in [place].  Goals reviewed and updated today.     Dialysis days: Tues-Thurs-Sat - 7am-10am- Prefer no appts scheduled on dialysis days.     PT appointment  Assisted patient scheduled PT appointments on:     8/7/2024 at 1:00 pm  8/14/2024 at 2:15 pm  8/21/2024 at 2:15 pm.     New PT goal created today.     Transplant evaluation  CCRN reviewed transplant screening visit completed on 6/12/24 at [facility]. Reviewed recommendations. Patient is scheduled to follow up with [facility] transplant team on 8/21/2024 with MD, RD, SW and nurse. Arrival time at 10am. Expect to be from 10am-4pm. Patient will complete rest of tx information session on 8/21/24. If cleared, will need stress test, ID, pap, mammogram ( scheduled on 8/28/24), dental clear, and hep B DNA PCR. CCRN plans to review transplant clinic follow up visit notes once completed on 8/21 to review recommendations. Goal updated today.     [facility]  Transplant Program   [address]   [address]   [place] [address]   [phone]     Plan:  Complete rest of Tx information session.  If cleared, will need Tx cards, stress test, geographic ID screening, pap, mammogram, dental clearance, Hep B DNA PCR.    The following information was discussed with the patient and family/friends in attendance:  Recipient workup process.  Assessment of candidacy/contraindications to transplantation.  Benefits of transplantation: Liberalized diet, more normal lifestyle, more energy.  Risks of transplantation: Infection, rejection, technical problems, wound complications, loss of function, inability to guarantee kidney function, medication side effects, malignancy, long-term function, ongoing medical diseases progressing, not a cure but an option, disability,  death.  Advantages of living donor vs.  donor transplant.  Waiting time on list.  Kidney transplant statistics/outcomes: National vs. [facility].  Importance of compliance: Labs, clinic visits, medications.     2024 10:00 AM CDT Office Visit Transplant Program   [address]   [address]   [place] [address]   [phone]      2024 11:30 AM CDT Office Visit Transplant Program   [address]   [address]   [place] [address]   [phone]      2024 1:00 PM CDT Office Visit Transplant Program   [address]   [address]   [place] [address]   [phone]     2024 2:15 PM CDT Nurse Only Transplant Program   [address]   [address]   [place] [address]   [phone]      2024 2:30 PM CDT Nurse Only Transplant Program   [address]   [address]   [place] [address]   [phone]         PCA service  Approved for 10 hours per day  [facility] - son [phone] - 3.5 hours   [facility] - daughter - [phone] - 6.5 hrs/day - Patient lives with daughter.   Care team updated today.     Care Gaps:    Health Maintenance Due   Topic Date Due    ZOSTER IMMUNIZATION (1 of 2) Never done    COVID-19 Vaccine (3 - Moderna risk series) 2022    YEARLY PREVENTIVE VISIT  2024    MAMMO SCREENING  2024       Patient will address overdue care gaps with PCP on 24.    Care Plans  Care Plan: Hematology/oncology       Problem: Beta thalassemia minor       Goal: Patient will attend hematology clinic appointment in the next 12 months.       Start Date: 2024 Expected End Date: 2025    Recent Progress: 50%    Priority: High    Note:     Barriers: language barrier, low literacy, noncompliance, and lack of knowledge how to navigate complex health care system  Strengths: motivated to attend appt  Patient expressed understanding of goal: Yes    Action steps to achieve this goal:  1. I will answer my phone when I am contacted to schedule my appointment.  2. I will attend my initial  hematology appointment as scheduled 3/22/24. Completed.   3. I will attend my follow up hematology/oncology appt on 4/19/2024 at 9:15am with Dr Zuñiga.Completed  4. I will attend my   4. I will attend my follow up hematology/oncology appt on 7/19 at 10:45AM Lab, 11:15AM Dr. Zuñiga. Completed.   5.  I will attend my follow up hematology/oncology appt on 1/17/2025 at 1:15pm for lab and 1:45 with Dr. Zuñiga.  6. I will schedule a follow up appointment with my provider if it is recommended to do so while I am at the clinic.  7. I will follow up with [facility] regarding this goal at each outreach until it is completed.                               Care Plan: Transplant evaluation       Problem: CKD       Goal: Patient will attend her kidney transplant evaluation in the next 12 months.       Start Date: 5/21/2024 Expected End Date: 5/31/2025    Recent Progress: 10%    Note:     Barriers: language barrier, low literacy, noncompliance, and lack of knowledge how to navigate complex health care system  Strengths: motivated to attend appt  Patient expressed understanding of goal: Yes    Action steps to achieve this goal:  1. I will attend my pre-transplant evaluation appointments on 6/12/2024 at 12pm, 12:30pm, and 1pm with MD and PharmD [place] [facility]. Completed.   2. I will attend my follow up transplant evaluation as scheduled on 8/21/2024 at 10am. Multiple appts scheduled today at [facility].   3. I will attend my mammogram on 8/28/24.  4. I will attend my eye exam on TBD  5. I will attend my dental appointment on TBD  6. I will schedule a follow up appointment with my providers if it is recommended to do so while I am at the clinic.  7. I will follow up with [facility] regarding this goal at each outreach until it is completed.     Paulo Thomas MD Sahadevan, Meena, MBBS Lori, Nurse transplant coordinator, with [facility] ([phone])  [address]   [place] [address]   [phone] (Work)   [phone] (Fax)                              Care Plan: Physical therapy       Problem: chronic back pain       Goal: Patient will attend her PT appointments as scheduled in the next 90 days.       Start Date: 7/22/2024 Expected End Date: 10/31/2024    This Visit's Progress: 20%    Note:     Barriers: language barrier, low literacy, noncompliance, and lack of knowledge how to navigate complex health care system  Strengths: motivated to attend appt  Patient expressed understanding of goal: Yes    Action steps to achieve this goal:  1. I will attend my PT appointments as scheduled:     8/7/2024 at 1:00 pm  8/14/2024 at 2:15 pm  8/21/2024 at 2:15 pm.   3. I will schedule a follow up appointment with my  if it is recommended to do so while I am at the clinic.  4. I will follow up with CCC regarding this goal at each outreach until it is completed.     Note: Message sent to [employer] on 7/22/24 to request transportation for PT appts.                                 Plan:   1) Message sent to [employer] to set up ride for PT appts.   2) Patient will [place] [facility] [place]. [facility] to follow up if transportation needed.   2) CCRN plans to follow up with patient post appt on 8/21/24.

## 2024-07-22 NOTE — PROGRESS NOTES
Assessment & Plan     Visit for screening mammogram  Scheduled for next visit.     Neuralgia and neuritis, unspecified  Chronic left-sided low back pain without sciatica  Back pain with sciatica. Ok to continue gabapentin, trial topical. Consistent with msk.   - Lidocaine (LIDOCARE) 4 % Patch  Dispense: 30 patch; Refill: 3  - capsaicin (ZOSTRIX) 0.025 % external cream  Dispense: 120 g; Refill: 3  - lidocaine (XYLOCAINE) 5 % external ointment  Dispense: 240 g; Refill: 3    Hypoglycemia  Follow up in 1 week with glucometer, glucose going down into 50s and symptomatic. Currently on insulin (basal and mealtime).   - glucose (BD GLUCOSE) 4 g chewable tablet  Dispense: 60 tablet; Refill: 3    ESRD (end stage renal disease) on dialysis (H)  On transplant list, with mary. CCC assisting.   Check list in chart but seems that systems are not crossing and may be asking for dupicate work. Will see if we can streamline this for patient.     Edema, unspecified type  Unclear, currently asymptomatic. Broad differential including medications (amlodipine), fluid overload (5lb up and due for dialysis tomorrow early morning), dependent edema (sedentary lifestyle). She has dialysis tomorrow, hesitate to make any changes with normal BP in the past. Nephrology might be managing her BP medications? Will need to check. Follow up next week, dialysis early morning. Recheck weight next week.         Return in about 10 days (around 8/1/2024) for diabetes follow up.    The longitudinal plan of care for the diagnosis(es)/condition(s) as documented were addressed during this visit. Due to the added complexity in care, I will continue to support Barriga in the subsequent management and with ongoing continuity of care.      Subjective   Barriga is a 54 year old, presenting for the following health issues:  Recheck Medication    History of Present Illness       Reason for visit:  Follow up for diabetes and high blood pression    She eats 0-1 servings of  fruits and vegetables daily.She consumes 1 sweetened beverage(s) daily.She exercises with enough effort to increase her heart rate 9 or less minutes per day.  She exercises with enough effort to increase her heart rate 3 or less days per week. She is missing 7 dose(s) of medications per week.       Seen by transplant, working with CCC    Back pain   1 week of back pain   Left sided  No injury  On and off for years, usually given medication for the pain, tried medication, pain improves with meds, but without it difficulty walking   Has some left, plan to take it   Would like warm pack or lidocaine patch     Swelling   Both legs   Worse before, but better today   Spoke with nephrology, told it is because of kidneys   Tuesday, Thursday, Saturday dialysis  No chest pain or shortness of breath     170/72  Taking blood pressure medications  No issues with taking medications  Daughter manages blood pressure             Plan:  Complete rest of Tx information session.  If cleared, will need Tx cards, stress test, geographic ID screening, pap, mammogram, dental clearance, Hep B DNA PCR   Dental clearance   Patient agreed to dental appointment. Patient  and CCRN tried calling [facility] dental clinic but had to leave a message requesting to call patient directly to schedule appointment.   PT  Patient agreed to schedule PT. Patient and CCRN tried calling to schedule PT appointment but on hold > 19 mins. Patient agreed to come see CCRN on 7/22 back to back with PCP sharee  Eye exam  Patient agreed to schedule eye appt. Need follow up  Mammogram  Assisted patient scheduled a mammogram appointment on 8/28/2024 at 12:45pm.   Preventative care and overdue care gaps  Assisted patient scheduled preventative care appointment on 8/28/24 with PCP.    Seen by hematology   Anemia in the setting of ESRD on dialysis, beta thalassemia minor and history of iron deficiency  Her baseline hemoglobin was around 10-11.  But since starting dialysis it  "has significantly dropped.  She is currently on VIK supplementation through her dialysis clinic.  Gets 300 mcg of Mircera every 2 weeks.  Also takes oral iron.  No evidence of any primary BM pathology. SPEP negative for monoclonal proteins  She is being considered for kidney transplant  Here with labs. Not sure what is her dose of Mircera. Previously I had recommended increasing it to 500mcg every 2 weeks. She is also getting IV iron at the time of dialysis.     Continue oral iron supplementation and intermittent IV iron infusion through dialysis clinic to keep transferrin saturation above 30%.  Her ferritin is high but transferrin saturation is still <30% today. Hemoglobin has come up to 8.6. may need to monitor ferritin going forward and reduce IV iron if it continues to climb up. With her beta thalassemia, there is always concern for increased RBC recycling leading iron overload.      Continue VIK supplementation through dialysis clinic.  Again consider increasing the dose to 500 mcg every 2 weeks if not already done.     From the hematology standpoint, unfortunately I do not have any other great recommendations.   No further workup or treatment needed from my side.  I will continue to follow her intermittently.  I will see her back in 6 months with labs again.                   Objective    BP (!) 179/76   Pulse 85   Temp 97.8  F (36.6  C) (Oral)   Resp 16   Ht 1.549 m (5' 1\")   Wt 54.4 kg (120 lb)   LMP  (LMP Unknown)   SpO2 97%   BMI 22.67 kg/m    Body mass index is 22.67 kg/m .  Physical Exam   GENERAL: alert and no distress  NECK: no adenopathy, no asymmetry, masses, or scars  RESP: lungs clear to auscultation - no rales, rhonchi or wheezes  CV: regular rate and rhythm, normal S1 S2, no S3 or S4, no murmur, click or rub, no peripheral edema  ABDOMEN: soft, nontender, no hepatosplenomegaly, no masses and bowel sounds normal  MS: no gross musculoskeletal defects noted, no edema    No results found for " any visits on 07/22/24.  No results found for this or any previous visit (from the past 24 hour(s)).        Signed Electronically by: Giancarlo Arizmendi MD

## 2024-07-27 ENCOUNTER — HEALTH MAINTENANCE LETTER (OUTPATIENT)
Age: 54
End: 2024-07-27

## 2024-07-31 ENCOUNTER — OFFICE VISIT (OUTPATIENT)
Dept: FAMILY MEDICINE | Facility: CLINIC | Age: 54
End: 2024-07-31
Payer: COMMERCIAL

## 2024-07-31 VITALS
OXYGEN SATURATION: 95 % | HEART RATE: 75 BPM | WEIGHT: 112.8 LBS | DIASTOLIC BLOOD PRESSURE: 64 MMHG | HEIGHT: 61 IN | RESPIRATION RATE: 16 BRPM | BODY MASS INDEX: 21.3 KG/M2 | SYSTOLIC BLOOD PRESSURE: 130 MMHG | TEMPERATURE: 98.1 F

## 2024-07-31 DIAGNOSIS — E11.69 TYPE 2 DIABETES MELLITUS WITH OTHER SPECIFIED COMPLICATION, UNSPECIFIED WHETHER LONG TERM INSULIN USE (H): Primary | ICD-10-CM

## 2024-07-31 DIAGNOSIS — E16.2 HYPOGLYCEMIA: ICD-10-CM

## 2024-07-31 PROCEDURE — G2211 COMPLEX E/M VISIT ADD ON: HCPCS | Performed by: FAMILY MEDICINE

## 2024-07-31 PROCEDURE — 99214 OFFICE O/P EST MOD 30 MIN: CPT | Performed by: FAMILY MEDICINE

## 2024-07-31 RX ORDER — INSULIN GLARGINE 100 [IU]/ML
64 INJECTION, SOLUTION SUBCUTANEOUS EVERY MORNING
Qty: 30 ML | Refills: 3 | Status: SHIPPED | OUTPATIENT
Start: 2024-07-31 | End: 2024-08-19

## 2024-07-31 NOTE — PROGRESS NOTES
Assessment & Plan     Type 2 diabetes mellitus with other specified complication, unspecified whether long term insulin use (H)  Hypoglycemia  Glucose today 75. Yesterday was her dialysis day. She is not symptomatic right now.   Glucose consistently going low on dialysis days at 5-6am, but also sometimes the morning after.   Will decrease lantus to 64 unit(s) qam and then follow up in a few weeks. If still low in the morning, decrease to 60 unit(s). Will likely need adjustment to mealtime insulin - increasing at night time and noon? Discussed with MTM  - insulin glargine (LANTUS SOLOSTAR) 100 UNIT/ML pen  Dispense: 30 mL; Refill: 3      Return in about 1 month (around 8/31/2024) for Routine preventive, diabetes follow up.      Lucio Barriga is a 54 year old, presenting for the following health issues:  Follow up  (Hypoglycemia )    History of Present Illness       Reason for visit:  Follow up for diabetes and high blood pression    She eats 0-1 servings of fruits and vegetables daily.She consumes 1 sweetened beverage(s) daily.She exercises with enough effort to increase her heart rate 9 or less minutes per day.  She exercises with enough effort to increase her heart rate 3 or less days per week. She is missing 7 dose(s) of medications per week.       Last visit:   Neuralgia and neuritis, unspecified  Chronic left-sided low back pain without sciatica  Back pain with sciatica. Ok to continue gabapentin, trial topical. Consistent with msk.   - Lidocaine (LIDOCARE) 4 % Patch  Dispense: 30 patch; Refill: 3  - capsaicin (ZOSTRIX) 0.025 % external cream  Dispense: 120 g; Refill: 3  - lidocaine (XYLOCAINE) 5 % external ointment  Dispense: 240 g; Refill: 3  Today:   No complaints, wants to continue.      Hypoglycemia  Follow up in 1 week with glucometer, glucose going down into 50s and symptomatic. Currently on insulin (basal and mealtime).   - glucose (BD GLUCOSE) 4 g chewable tablet  Dispense: 60 tablet; Refill:  "3  Today:   Glucose is dropping around 5am and on days she does not eat well. Sometimes it's on non-dialysis days         ESRD (end stage renal disease) on dialysis (H)  On transplant list, with mary. [facility] assisting.   Check list in chart but seems that systems are not crossing and may be asking for dupicate work. Will see if we can streamline this for patient.   Today:   She brought in her meter - consistently having lows on dialysis days at 5am, sometimes on non dialysis days, too. She does not eat breakfast on those days, eats after she comes home. Even on non dialysis days, she does not eat that early.          Edema, unspecified type  Unclear, currently asymptomatic. Broad differential including medications (amlodipine), fluid overload (5lb up and due for dialysis tomorrow early morning), dependent edema (sedentary lifestyle). She has dialysis tomorrow, hesitate to make any changes with normal BP in the past. Nephrology might be managing her BP medications? Will need to check. Follow up next week, dialysis early morning. Recheck weight next week.    Today:   No edema again today. Weight is down.                 Objective    /64 (BP Location: Left arm, Patient Position: Sitting, Cuff Size: Adult Regular)   Pulse 75   Temp 98.1  F (36.7  C) (Oral)   Resp 16   Ht 1.549 m (5' 1\")   Wt 51.2 kg (112 lb 12.8 oz)   LMP  (LMP Unknown)   SpO2 95%   BMI 21.31 kg/m    Body mass index is 21.31 kg/m .  Physical Exam   GENERAL: alert and no distress  NECK: no adenopathy, no asymmetry, masses, or scars  RESP: lungs clear to auscultation - no rales, rhonchi or wheezes  CV: regular rate and rhythm, normal S1 S2, no S3 or S4, no murmur, click or rub, no peripheral edema  ABDOMEN: soft, nontender, no hepatosplenomegaly, no masses and bowel sounds normal  MS: no gross musculoskeletal defects noted, no edema    No results found for any visits on 07/31/24.  No results found for this or any previous visit (from the past " 24 hour(s)).        Signed Electronically by: Giancarlo Arizmendi MD

## 2024-08-04 DIAGNOSIS — F51.01 PRIMARY INSOMNIA: ICD-10-CM

## 2024-08-04 DIAGNOSIS — Z76.0 ENCOUNTER FOR MEDICATION REFILL: ICD-10-CM

## 2024-08-05 RX ORDER — MIRTAZAPINE 7.5 MG/1
7.5 TABLET, FILM COATED ORAL AT BEDTIME
Qty: 90 TABLET | Refills: 3 | Status: SHIPPED | OUTPATIENT
Start: 2024-08-05

## 2024-08-07 ENCOUNTER — THERAPY VISIT (OUTPATIENT)
Dept: PHYSICAL THERAPY | Facility: REHABILITATION | Age: 54
End: 2024-08-07
Attending: FAMILY MEDICINE
Payer: COMMERCIAL

## 2024-08-07 DIAGNOSIS — N18.6 ESRD (END STAGE RENAL DISEASE) ON DIALYSIS (H): ICD-10-CM

## 2024-08-07 DIAGNOSIS — R53.81 PHYSICAL DECONDITIONING: ICD-10-CM

## 2024-08-07 DIAGNOSIS — R53.1 WEAKNESS: ICD-10-CM

## 2024-08-07 DIAGNOSIS — Z74.09 IMPAIRED FUNCTIONAL MOBILITY, BALANCE, GAIT, AND ENDURANCE: Primary | ICD-10-CM

## 2024-08-07 DIAGNOSIS — Z99.2 ESRD (END STAGE RENAL DISEASE) ON DIALYSIS (H): ICD-10-CM

## 2024-08-07 PROCEDURE — 97110 THERAPEUTIC EXERCISES: CPT | Mod: GP | Performed by: PHYSICAL THERAPIST

## 2024-08-07 PROCEDURE — 97161 PT EVAL LOW COMPLEX 20 MIN: CPT | Mod: GP | Performed by: PHYSICAL THERAPIST

## 2024-08-07 ASSESSMENT — 6 MINUTE WALK TEST (6MWT)
OXYGEN DEVICE: YES
TOTAL DISTANCE WALKED (METERS): 0.52

## 2024-08-07 NOTE — PROGRESS NOTES
PHYSICAL THERAPY EVALUATION  Type of Visit: Evaluation     Patient has been on dialysis for her kidney diease for about 2 years. She feels overall fatigued from this, decreased strength, endurance and weakness, some unsteadiness with mobility. Denies falls but used cane in the home, walker outside the home and outside to go to/from dialysis.   She reports she goes out and walk, and does some exercises but does not feel she can do too much because she gets tired.     Fall Risk Screen:  Fall screen completed by: PT  Have you fallen 2 or more times in the past year?: No  Have you fallen and had an injury in the past year?: No  Is patient a fall risk?: No; Department fall risk interventions implemented    Subjective       Presenting condition or subjective complaint: doctor told me to come here  Date of onset: 04/22/24 (4/22/24 - date of order for PT)    Relevant medical history: Diabetes; Heart problems; High blood pressure; Kidney disease; Migraines or headaches; Non-healing wounds; Pain at night or rest; Severe dizziness; Severe headaches; Sleep disorder like apnea; Unexplained weight loss   Dates & types of surgery: Hand, Chest,Stomach    Prior diagnostic imaging/testing results: MRI; CT scan; X-ray; EMG; Bone scan     Prior therapy history for the same diagnosis, illness or injury: Yes      Prior Level of Function  Transfers: Assistive equipment  Ambulation: Assistive equipment  ADL: Assistive equipment, Assistive person  IADL: Driving, Housekeeping, Laundry, Meal preparation    Living Environment  Social support: With a significant other or spouse   Type of home: House   Stairs to enter the home: Yes       Ramp: No   Stairs inside the home: Yes 1 Is there a railing: Yes     Help at home: Self Cares (home health aide/personal care attendant, family, etc)  Equipment owned: Straight Cane; Walker with wheels; Standard wheelchair; Raised toilet seat; Bath bench     Employment: No    Hobbies/Interests: Listening Music,  Watching Movie    Patient goals for therapy: Cannot hold and walk    Pain assessment: See objective evaluation for additional pain details     Objective      Cognitive Status Examination  Orientation: Oriented to person, place and time   Level of Consciousness: Alert  Follows Commands and Answers Questions: 100% of the time  Personal Safety and Judgement: Intact  Memory: Intact      POSTURE: Sitting Posture: Rounded shoulders  RANGE OF MOTION: LE ROM WFL  UE ROM WFL    BED MOBILITY: WFL    TRANSFERS: WFL    WHEELCHAIR MOBILITY: NA    GAIT:   Level of Parker: Independent  Assistive Device(s): Cane (single end)  Gait Deviations: Base of support increased  Stance time decreased  Stride length decreased  Nila decreased  Gait Distance: see walk test below   Stairs:     BALANCE:     SPECIAL TESTS  Functional Gait Assessment (FGA)      10 Meter Walk Test (Comfortable) 10 Meter Walk Test- comfortable (m/s):  (11.47 seconds)   10 Meter Walk Test (Fast)     6 Minute Walk Test (6MWT)   54.6 meter/180ft ( 2 minute walk test - 1min 47 sec and sat down to rest, pain in right posterior hip)   6 minute walk test - device used?: Yes (single end cane)  Stopped before 2 minutes    Downs Balance Scale (BBS)     5 Times Sit-to-Stand (5TSTS) 5 times Sit to Stand (sec): 24.87     Timed Up and Go (TUG) - sec    30 Second Sit to Stand (reps/height) 6 reps from standard height chair; arms across your chest        SENSATION: LE Sensation WNL    Assessment & Plan   CLINICAL IMPRESSIONS  Medical Diagnosis: ESRD (end stage renal disease) on dialysis, Weakness, Physical Deconditioning    Treatment Diagnosis: ESRD (end stage renal disease) on dialysis, Weakness, Physical Deconditioning, Decreased functional mobility and activity tolerance   Impression/Assessment: Patient is a 54 year old female with complaints of weakness with a dx of renal disease on dialysis.  The following significant findings have been identified: Pain, Decreased  ROM/flexibility, Decreased joint mobility, Decreased strength, Impaired gait, Impaired muscle performance, and Decreased activity tolerance. These impairments interfere with their ability to perform self care tasks, recreational activities, household chores, and community mobility as compared to previous level of function.     Clinical Decision Making (Complexity):  Clinical Presentation: Stable/Uncomplicated  Clinical Presentation Rationale: based on medical and personal factors listed in PT evaluation  Clinical Decision Making (Complexity): Low complexity    PLAN OF CARE  Treatment Interventions:  Interventions: Gait Training, Neuromuscular Re-education, Therapeutic Activity, Therapeutic Exercise, Self-Care/Home Management    Long Term Goals     PT Goal 1  Goal Identifier: HEP  Goal Description: Patient will be independent in a HEP for ongoiing symptom management in 90 days  Target Date: 11/05/24  PT Goal 2  Goal Identifier: 2 Minute Walk Test  Goal Description: Patient will be able to complete a 2 minute walk test to and increase her distance to >220 ft for improvement in her functional mobiltiy and activity tolerance in 90 days  Target Date: 11/05/24  PT Goal 3  Goal Identifier: Sit to stands  Goal Description: Patient will increase reps on 30 sec sit to stand to >8 reps and decrease time on the 5 times sit to stand to less than 20 seconds for improvement in strength, mobility and decrease risk for falls in 90 days  Target Date: 11/05/24      Frequency of Treatment: 1 time per week  Duration of Treatment: up to 90 days    Recommended Referrals to Other Professionals:  none  Education Assessment:        Risks and benefits of evaluation/treatment have been explained.   Patient/Family/caregiver agrees with Plan of Care.     Evaluation Time:     PT Eval, Low Complexity Minutes (53012): 30   Present: Yes: Language: Ruben , ID Number/Identifier: in person       Signing Clinician: Luz Lopez  PT        [facility]                                                                                   OUTPATIENT PHYSICAL THERAPY      PLAN OF TREATMENT FOR OUTPATIENT REHABILITATION   Patient's Last Name, First Name, Nithya Lopez    YOB: 1970   Provider's Name   [facility]   Medical Record No.  [id number]     Onset Date: 04/22/24 (4/22/24 - date of order for PT)  Start of Care Date: 08/07/24     Medical Diagnosis:  ESRD (end stage renal disease) on dialysis, Weakness, Physical Deconditioning      PT Treatment Diagnosis:  ESRD (end stage renal disease) on dialysis, Weakness, Physical Deconditioning, Decreased functional mobility and activity tolerance Plan of Treatment  Frequency/Duration: 1 time per week/ up to 90 days    Certification date from 08/07/24 to 11/05/24         See note for plan of treatment details and functional goals     Luz Lopez, PT                         I CERTIFY THE NEED FOR THESE SERVICES FURNISHED UNDER        THIS PLAN OF TREATMENT AND WHILE UNDER MY CARE     (Physician attestation of this document indicates review and certification of the therapy plan).              Referring Provider:  Giancarlo Arizmendi    Initial Assessment  See Epic Evaluation- Start of Care Date: 08/07/24

## 2024-08-08 ENCOUNTER — PATIENT OUTREACH (OUTPATIENT)
Dept: CARE COORDINATION | Facility: CLINIC | Age: 54
End: 2024-08-08
Payer: COMMERCIAL

## 2024-08-08 NOTE — PROGRESS NOTES
Clinic Care Coordination Contact  [facility]/Voicemail  Ruben  ID [id number]    Clinical Data: Care Coordinator Outreach    Outreach Documentation Number of Outreach Attempt   8/8/2024  10:07 AM 1     Left message on patient's voicemail with call back information and requested return call.    Plan: Care Coordinator will try to reach patient again in 10 business days.    Irene Samson  [facility] Care Coordination  [facility]    Phone: [phone]

## 2024-08-14 ENCOUNTER — THERAPY VISIT (OUTPATIENT)
Dept: PHYSICAL THERAPY | Facility: REHABILITATION | Age: 54
End: 2024-08-14
Attending: FAMILY MEDICINE
Payer: COMMERCIAL

## 2024-08-14 DIAGNOSIS — Z99.2 ESRD (END STAGE RENAL DISEASE) ON DIALYSIS (H): ICD-10-CM

## 2024-08-14 DIAGNOSIS — M62.81 MUSCLE WEAKNESS (GENERALIZED): ICD-10-CM

## 2024-08-14 DIAGNOSIS — R53.1 WEAKNESS: ICD-10-CM

## 2024-08-14 DIAGNOSIS — Z74.09 IMPAIRED FUNCTIONAL MOBILITY, BALANCE, GAIT, AND ENDURANCE: ICD-10-CM

## 2024-08-14 DIAGNOSIS — G62.9 NEUROPATHY: ICD-10-CM

## 2024-08-14 DIAGNOSIS — N18.6 ESRD (END STAGE RENAL DISEASE) ON DIALYSIS (H): ICD-10-CM

## 2024-08-14 DIAGNOSIS — R53.81 PHYSICAL DECONDITIONING: ICD-10-CM

## 2024-08-14 DIAGNOSIS — M62.81 GENERALIZED MUSCLE WEAKNESS: Primary | ICD-10-CM

## 2024-08-14 PROCEDURE — 97110 THERAPEUTIC EXERCISES: CPT | Mod: GP | Performed by: PHYSICAL THERAPY ASSISTANT

## 2024-08-14 PROCEDURE — 97112 NEUROMUSCULAR REEDUCATION: CPT | Mod: GP | Performed by: PHYSICAL THERAPY ASSISTANT

## 2024-08-15 NOTE — PROGRESS NOTES
Medication Therapy Management (MTM) Encounter    ASSESSMENT:                            Medication Adherence/Access: No issues identified    1. Type 2 diabetes mellitus with other specified complication, unspecified whether long term insulin use (H)  A1c not at goal < 7% but is close to meeting goal of > 70% time in target range with continuous glucose monitoring. Given several hypoglycemic events, would benefit from decreased dose of basal insulin. Would also benefit from switching Victoza to Ozempic, prescription sent today.     2. Primary hypertension  BP not at goal <140/90 mmHg, though close to goal today and at goal at last visit. Would consider increasing lisinopril dose or adding hydralazine at future visit if needed.    3. Hyperlipidemia LDL goal <100  Stable, continue high intensity statin.    4. Gastroesophageal reflux disease with esophagitis without hemorrhage  Stable.    5. Takes dietary supplements  Vitamin D was low when last monitored, since then has been taking ergocalciferol once weekly, would recommend rechecking vitamin D level at next lab visit.    6. Pain  Relatively controlled on current medications, recommend readdressing with PCP at next visit.    PLAN:                             CGM Claribel 2 sensors and reader - use to check blood sugar   Decrease dose: Lantus 56 units daily  STOP Victoza   START Ozempic 0.5 mg weekly    Follow-up: Return in about 4 weeks (around 9/16/2024).    SUBJECTIVE/OBJECTIVE:                          Nithya Matthews is a 54 year old female seen for a follow-up visit. Patient was accompanied by daughter.  (ID# [id number]) was used during today's visit.       Reason for visit: MTM follow up.    Allergies/ADRs: Reviewed in chart  Past Medical History: Reviewed in chart  Tobacco: She reports that she has never smoked. She has never been exposed to tobacco smoke. She has never used smokeless tobacco.  Alcohol: never used    Medication Adherence/Access:  States that  her daughter helps with managing her medications and has a nurse that sets up medications three times daily in pillbox every 2 weeks. Her daughter gives her medications/injections every day. Reports no missed doses of oral medications or injections.   Brings with med vials, insulin pens, pillbox and glucometer today. Pharmacy usually delivers medications to her home.     Diabetes   Lantus 64 (32 units on each side) daily in the morning  NovoLog 12 units 2-3 times daily directly before meals  Victoza 1.8 mg daily in the morning  Aspirin 81mg daily  Patient is not experiencing side effects. Notes one episode of nausea and vomiting last week, after seeing provider  Endorses adherence.   Current diabetes symptoms: hypoglycemia, see below. Symptomatic in the mornings, aware of how to correct for lows with sweet drinks.   Diet/Exercise: No changes in appetite. Eating 2-3 meals per day. Eats rice/ocasio (traditional Swedish food) for meals and then fruit right after. Reports after eating fish paste is when her blood sugar goes very high.   Med Hx: metformin (CKD)    Blood sugar monitoring: Continuous Glucose Monitor - Claribel 14 day; see below.   Patient states that she has not been using for the last week because she has not gotten the sensors refilled from the pharmacy. Refill history states last filled 7/19, 8/8, and 8/13 #2 each.          Eye exam is up to date  Foot exam is up to date    Hypertension   /CKD  Lisinopril 20 mg daily  Carvedilol 12.5 mg twice daily   Amlodipine 10 mg daily   Calcium Acetate 667 mg three times daily with meals   Calcium carbonate 500 mg 3 tablets twice daily per nephrologist    Dialysis Tues/Thurs/Sat.   Patient reports no medication side effects.   Patient does self-monitor blood pressure twice daily and rests before taking it.   She takes her medications then checks her blood pressure (see below)   [facility]rolog[facility]  Recent home readings: 130-160  systolic     Hyperlipidemia   Atorvastatin 80mg daily  Fish Oil 1000mg once daily  Denies myalgias   Med Hx: fenofibrate (stopped d/t CKD)     GERD    Omeprazole 20 mg once daily   Patient feels that current regimen is effective.     Supplements   /anemia  Vitamin B12 1000 mcg daily  Ferrous sulfate 325 mg every other day  Ergocalciferol 50,000 units once weekly- set up correctly  Folic acid 1 mg daily- started by hem/onc 3/22  No reported issues at this time. Patient had visit with heme/onc for anemia d/t ESRD, they started folic acid. Feels a little less fatigued after starting folic acid.      Pain:   Acetaminophen 1000 mg twice daily - not in pill box  Gabapentin 300 mg at bedtime  Oxycodone 2.5 mg if needed - does not bring bottle with today, though states still using  Lidocaine 4% patch every 24 hours   Sometimes pain is ok, other times in pain.   Taking tylenol as needed     Today's Vitals: BP (!) 140/64   Pulse 72   Wt 117 lb 4 oz (53.2 kg)   LMP  (LMP Unknown)   SpO2 95%   BMI 22.15 kg/m    ----------------      I spent 60 minutes with this patient today. All changes were made via collaborative practice agreement with Giancarlo Arizmendi MD. A copy of the visit note was provided to the patient's provider(s).    A summary of these recommendations was given to the patient.    Shital Hernandez, PharmD, BCACP  Medication Therapy Management Pharmacist     Medication Therapy Recommendations  Type 2 diabetes mellitus with other specified complication, unspecified whether long term insulin use (H)    Current Medication: VICTOZA PEN 18 MG/3ML soln   Rationale: More effective medication available - Ineffective medication - Effectiveness   Recommendation: Change Medication - Ozempic (0.25 or 0.5 MG/DOSE) 2 MG/1.5ML Sopn   Status: Accepted per CPA          Current Medication: insulin glargine (LANTUS SOLOSTAR) 100 UNIT/ML pen   Rationale: Dose too high - Dosage too high - Safety   Recommendation: Decrease Dose    Status: Accepted per CPA

## 2024-08-19 ENCOUNTER — PATIENT OUTREACH (OUTPATIENT)
Dept: CARE COORDINATION | Facility: CLINIC | Age: 54
End: 2024-08-19

## 2024-08-19 ENCOUNTER — OFFICE VISIT (OUTPATIENT)
Dept: PHARMACY | Facility: CLINIC | Age: 54
End: 2024-08-19
Payer: COMMERCIAL

## 2024-08-19 VITALS
DIASTOLIC BLOOD PRESSURE: 64 MMHG | WEIGHT: 117.25 LBS | SYSTOLIC BLOOD PRESSURE: 140 MMHG | HEART RATE: 72 BPM | BODY MASS INDEX: 22.15 KG/M2 | OXYGEN SATURATION: 95 %

## 2024-08-19 DIAGNOSIS — K21.00 GASTROESOPHAGEAL REFLUX DISEASE WITH ESOPHAGITIS WITHOUT HEMORRHAGE: ICD-10-CM

## 2024-08-19 DIAGNOSIS — E11.69 TYPE 2 DIABETES MELLITUS WITH OTHER SPECIFIED COMPLICATION, UNSPECIFIED WHETHER LONG TERM INSULIN USE (H): Primary | ICD-10-CM

## 2024-08-19 DIAGNOSIS — R52 PAIN: ICD-10-CM

## 2024-08-19 DIAGNOSIS — Z78.9 TAKES DIETARY SUPPLEMENTS: ICD-10-CM

## 2024-08-19 DIAGNOSIS — I10 PRIMARY HYPERTENSION: ICD-10-CM

## 2024-08-19 DIAGNOSIS — E78.5 HYPERLIPIDEMIA LDL GOAL <100: ICD-10-CM

## 2024-08-19 PROCEDURE — 99607 MTMS BY PHARM ADDL 15 MIN: CPT | Performed by: PHARMACIST

## 2024-08-19 PROCEDURE — 99606 MTMS BY PHARM EST 15 MIN: CPT | Performed by: PHARMACIST

## 2024-08-19 RX ORDER — INSULIN GLARGINE 100 [IU]/ML
56 INJECTION, SOLUTION SUBCUTANEOUS EVERY MORNING
Qty: 60 ML | Refills: 3 | Status: SHIPPED | OUTPATIENT
Start: 2024-08-19

## 2024-08-19 NOTE — PROGRESS NOTES
Clinic Care Coordination Contact  Community Health Worker Follow Up  Spoke with patient - Ruben  ID [id number]    Care Gaps:     Health Maintenance Due   Topic Date Due    ZOSTER IMMUNIZATION (1 of 2) Never done    COVID-19 Vaccine (3 - Moderna risk series) 06/30/2022    YEARLY PREVENTIVE VISIT  06/19/2024    MAMMO SCREENING  08/09/2024     Scheduled 8/28 at 12:30pm for mammo and 2:10PM Dr. Arizmendi for preventive care visit        Care Plan:   Care Plan: Hematology/oncology       Problem: Beta thalassemia minor       Goal: Patient will attend hematology clinic appointment in the next 12 months.       Start Date: 2/22/2024 Expected End Date: 2/28/2025    This Visit's Progress: 50% Recent Progress: 50%    Priority: High    Note:     Barriers: language barrier, low literacy, noncompliance, and lack of knowledge how to navigate complex health care system  Strengths: motivated to attend appt  Patient expressed understanding of goal: Yes    Action steps to achieve this goal:  1. I will answer my phone when I am contacted to schedule my appointment.  2. I will attend my initial hematology appointment as scheduled 3/22/24. Completed.   3. I will attend my follow up hematology/oncology appt on 4/19/2024 at 9:15am with Dr Zuñiga.Completed  4. I will attend my   4. I will attend my follow up hematology/oncology appt on 7/19 at 10:45AM Lab, 11:15AM Dr. Zuñiga. Completed.   5.  I will attend my follow up hematology/oncology appt on 1/17/2025 at 1:15pm for lab and 1:45 with Dr. Zuñiga.  6. I will schedule a follow up appointment with my provider if it is recommended to do so while I am at the clinic.  7. I will follow up with [facility] regarding this goal at each outreach until it is completed.                               Care Plan: Transplant evaluation       Problem: CKD       Goal: Patient will attend her kidney transplant evaluation in the next 12 months.       Start Date: 5/21/2024 Expected End Date: 5/31/2025    This  Visit's Progress: 20% Recent Progress: 10%    Priority: High    Note:     Barriers: language barrier, low literacy, noncompliance, and lack of knowledge how to navigate complex health care system  Strengths: motivated to attend appt  Patient expressed understanding of goal: Yes    Action steps to achieve this goal:  1. I will attend my pre-transplant evaluation appointments on 6/12/2024 at 12pm, 12:30pm, and 1pm with [place] and PharmD [place] [facility]. Completed.     2. I will attend my follow up transplant evaluation as scheduled on 8/21/2024 at 10am. Multiple appts scheduled today at [facility].   3. I will attend my mammogram on 8/28/24.  4. I will attend my eye exam on TBD  5. I will attend my dental appointment on TBD  6. I will schedule a follow up appointment with my providers if it is recommended to do so while I am at the clinic.  7. I will follow up with [facility] regarding this goal at each outreach until it is completed.     Paulo Thomas MD Sahadevan, Meena, MBBS Lori, Nurse transplant coordinator, with [facility] ([phone])  [address]   [place] [address]   [phone] (Work)   [phone] (Fax)                             Care Plan: Physical therapy       Problem: chronic back pain       Goal: Patient will attend her PT appointments as scheduled in the next 90 days.       Start Date: 7/22/2024 Expected End Date: 10/31/2024    This Visit's Progress: 30% Recent Progress: 20%    Priority: High    Note:     Barriers: language barrier, low literacy, noncompliance, and lack of knowledge how to navigate complex health care system  Strengths: motivated to attend appt  Patient expressed understanding of goal: Yes    Action steps to achieve this goal:  1. I will attend my PT appointments as scheduled:     8/7/2024 at 1:00 pm Completed  8/14/2024 at 2:15 pm Completed  8/21/2024 at 2:15 pm.   8/28/2024 at 11am  9/5/2024 at 11:30am  9/12/2024 at 11:30am  9/18/2024 at 11am    3. I will schedule a follow up  appointment with my  if it is recommended to do so while I am at the clinic.  4. I will follow up with CCC regarding this goal at each outreach until it is completed.     Note: Message sent to [employer] on 7/22/24 to request transportation for PT appts.                             Intervention and Education during outreach:   CHW reminded patient of Hematology/Oncology appointment on 1/17/2025 at 1:15pm for lab and 1:45 with Dr. Zuñiga.    CHW reminded patient of Transplant Eval follow up on 8/21 at 10AM. CHW to assist with transportation. CHW called [employer]  [phone], female representative states someone already call and scheduled a ride for this appointment.     Per chart review, patient completed PT appointment on 8/14. CHW reviewed all upcoming PT appointments and assisted with scheduling transportation. [employer] is confirmed for following appointments:   8/21/2024 at 2:15 pm. [facility]   8/28/2024 at 11am   9/5/2024 at 11:30am  9/12/2024 at 11:30am  9/18/2024 at 11am    CHW Plan: CHW to follow up in 1 month    Irene Samson  Clinic Care Coordination  [facility]    Phone: [phone]

## 2024-08-19 NOTE — PATIENT INSTRUCTIONS
"Recommendations from today's MTM visit:                                                    MTM (medication therapy management) is a service provided by a clinical pharmacist designed to help you get the most of out of your medicines.       CGM Claribel 2 sensors and reader - use to check blood sugar   Decrease dose: Lantus 56 units daily  STOP Victoza   START Ozempic 0.5 mg weekly    Follow-up: Return in about 4 weeks (around 9/16/2024).    It was great speaking with you today.  I value your experience and would be very thankful for your time in providing feedback in our clinic survey. In the next few days, you may receive an email or text message from [employer] with a link to a survey related to your  clinical pharmacist.\"     To schedule another MTM appointment, please call the clinic directly or you may call the MTM scheduling line at [phone].    My Clinical Pharmacist's contact information:                                                      Please feel free to contact me with any questions or concerns you have.      Shital Hernandez, PharmD, BCACP  Medication Therapy Management Pharmacist    "

## 2024-08-19 NOTE — Clinical Note
Switching victoza to ozempic today AND decreasing lantus again - now only 56, she is still having lots of lows.  Also set up for her to get a new CGM nathan 2.

## 2024-08-23 DIAGNOSIS — E11.69 TYPE 2 DIABETES MELLITUS WITH OTHER SPECIFIED COMPLICATION, UNSPECIFIED WHETHER LONG TERM INSULIN USE (H): ICD-10-CM

## 2024-08-23 DIAGNOSIS — R79.89 LOW VITAMIN B12 LEVEL: Primary | ICD-10-CM

## 2024-08-23 DIAGNOSIS — R79.89 LOW VITAMIN D LEVEL: ICD-10-CM

## 2024-08-23 RX ORDER — LANOLIN ALCOHOL/MO/W.PET/CERES
1000 CREAM (GRAM) TOPICAL DAILY
Qty: 30 TABLET | Refills: 2 | Status: SHIPPED | OUTPATIENT
Start: 2024-08-23

## 2024-08-23 RX ORDER — CHLORAL HYDRATE 500 MG
1 CAPSULE ORAL DAILY
Qty: 30 CAPSULE | Refills: 3 | Status: SHIPPED | OUTPATIENT
Start: 2024-08-23

## 2024-08-28 ENCOUNTER — OFFICE VISIT (OUTPATIENT)
Dept: FAMILY MEDICINE | Facility: CLINIC | Age: 54
End: 2024-08-28
Payer: COMMERCIAL

## 2024-08-28 ENCOUNTER — THERAPY VISIT (OUTPATIENT)
Dept: PHYSICAL THERAPY | Facility: REHABILITATION | Age: 54
End: 2024-08-28
Payer: COMMERCIAL

## 2024-08-28 VITALS
TEMPERATURE: 98.2 F | DIASTOLIC BLOOD PRESSURE: 70 MMHG | HEIGHT: 61 IN | WEIGHT: 115.56 LBS | HEART RATE: 74 BPM | RESPIRATION RATE: 16 BRPM | OXYGEN SATURATION: 96 % | SYSTOLIC BLOOD PRESSURE: 166 MMHG | BODY MASS INDEX: 21.82 KG/M2

## 2024-08-28 DIAGNOSIS — N18.6 ESRD (END STAGE RENAL DISEASE) ON DIALYSIS (H): ICD-10-CM

## 2024-08-28 DIAGNOSIS — R53.1 WEAKNESS: ICD-10-CM

## 2024-08-28 DIAGNOSIS — Z74.09 IMPAIRED FUNCTIONAL MOBILITY, BALANCE, GAIT, AND ENDURANCE: ICD-10-CM

## 2024-08-28 DIAGNOSIS — I10 PRIMARY HYPERTENSION: ICD-10-CM

## 2024-08-28 DIAGNOSIS — Z00.00 ROUTINE GENERAL MEDICAL EXAMINATION AT A HEALTH CARE FACILITY: Primary | ICD-10-CM

## 2024-08-28 DIAGNOSIS — Z99.2 ESRD (END STAGE RENAL DISEASE) ON DIALYSIS (H): ICD-10-CM

## 2024-08-28 DIAGNOSIS — R53.81 PHYSICAL DECONDITIONING: ICD-10-CM

## 2024-08-28 DIAGNOSIS — M62.81 GENERALIZED MUSCLE WEAKNESS: Primary | ICD-10-CM

## 2024-08-28 DIAGNOSIS — E11.69 TYPE 2 DIABETES MELLITUS WITH OTHER SPECIFIED COMPLICATION, UNSPECIFIED WHETHER LONG TERM INSULIN USE (H): ICD-10-CM

## 2024-08-28 PROBLEM — R05.1 ACUTE COUGH: Status: RESOLVED | Noted: 2023-06-27 | Resolved: 2024-08-28

## 2024-08-28 PROCEDURE — 97112 NEUROMUSCULAR REEDUCATION: CPT | Mod: GP | Performed by: PHYSICAL THERAPIST

## 2024-08-28 PROCEDURE — 99396 PREV VISIT EST AGE 40-64: CPT | Mod: 25 | Performed by: FAMILY MEDICINE

## 2024-08-28 PROCEDURE — 99214 OFFICE O/P EST MOD 30 MIN: CPT | Mod: 25 | Performed by: FAMILY MEDICINE

## 2024-08-28 PROCEDURE — 97110 THERAPEUTIC EXERCISES: CPT | Mod: GP | Performed by: PHYSICAL THERAPIST

## 2024-08-28 RX ORDER — LISINOPRIL 40 MG/1
40 TABLET ORAL DAILY
Qty: 90 TABLET | Refills: 3 | Status: SHIPPED | OUTPATIENT
Start: 2024-08-28

## 2024-08-28 SDOH — HEALTH STABILITY: PHYSICAL HEALTH: ON AVERAGE, HOW MANY DAYS PER WEEK DO YOU ENGAGE IN MODERATE TO STRENUOUS EXERCISE (LIKE A BRISK WALK)?: 3 DAYS

## 2024-08-28 ASSESSMENT — SOCIAL DETERMINANTS OF HEALTH (SDOH): HOW OFTEN DO YOU GET TOGETHER WITH FRIENDS OR RELATIVES?: PATIENT DECLINED

## 2024-08-28 NOTE — Clinical Note
BIJAN, increased lisinopril. And she wants to use the rest of her victoza before starting her ozempic... she has more than 2 pens of victoza left. The sensors keep falling off, her daughter might be able to call the manufacturers if you give her instructions?  normal...

## 2024-08-28 NOTE — PATIENT INSTRUCTIONS
Patient Education   Preventive Care Advice   This is general advice given by our system to help you stay healthy. However, your care team may have specific advice just for you. Please talk to your care team about your preventive care needs.  Nutrition  Eat 5 or more servings of fruits and vegetables each day.  Try wheat bread, brown rice and whole grain pasta (instead of white bread, rice, and pasta).  Get enough calcium and vitamin D. Check the label on foods and aim for 100% of the RDA (recommended daily allowance).  Lifestyle  Exercise at least 150 minutes each week  (30 minutes a day, 5 days a week).  Do muscle strengthening activities 2 days a week. These help control your weight and prevent disease.  No smoking.  Wear sunscreen to prevent skin cancer.  Have a dental exam and cleaning every 6 months.  Yearly exams  See your health care team every year to talk about:  Any changes in your health.  Any medicines your care team has prescribed.  Preventive care, family planning, and ways to prevent chronic diseases.  Shots (vaccines)   HPV shots (up to age 26), if you've never had them before.  Hepatitis B shots (up to age 59), if you've never had them before.  COVID-19 shot: Get this shot when it's due.  Flu shot: Get a flu shot every year.  Tetanus shot: Get a tetanus shot every 10 years.  Pneumococcal, hepatitis A, and RSV shots: Ask your care team if you need these based on your risk.  Shingles shot (for age 50 and up)  General health tests  Diabetes screening:  Starting at age 35, Get screened for diabetes at least every 3 years.  If you are younger than age 35, ask your care team if you should be screened for diabetes.  Cholesterol test: At age 39, start having a cholesterol test every 5 years, or more often if advised.  Bone density scan (DEXA): At age 50, ask your care team if you should have this scan for osteoporosis (brittle bones).  Hepatitis C: Get tested at least once in your life.  STIs (sexually  transmitted infections)  Before age 24: Ask your care team if you should be screened for STIs.  After age 24: Get screened for STIs if you're at risk. You are at risk for STIs (including HIV) if:  You are sexually active with more than one person.  You don't use condoms every time.  You or a partner was diagnosed with a sexually transmitted infection.  If you are at risk for HIV, ask about PrEP medicine to prevent HIV.  Get tested for HIV at least once in your life, whether you are at risk for HIV or not.  Cancer screening tests  Cervical cancer screening: If you have a cervix, begin getting regular cervical cancer screening tests starting at age 21.  Breast cancer scan (mammogram): If you've ever had breasts, begin having regular mammograms starting at age 40. This is a scan to check for breast cancer.  Colon cancer screening: It is important to start screening for colon cancer at age 45.  Have a colonoscopy test every 10 years (or more often if you're at risk) Or, ask your provider about stool tests like a FIT test every year or Cologuard test every 3 years.  To learn more about your testing options, visit:   .  For help making a decision, visit:   https://bit.ly/ji25197.  Prostate cancer screening test: If you have a prostate, ask your care team if a prostate cancer screening test (PSA) at age 55 is right for you.  Lung cancer screening: If you are a current or former smoker ages 50 to 80, ask your care team if ongoing lung cancer screenings are right for you.  For informational purposes only. Not to replace the advice of your health care provider. Copyright   2023 [facility] [employer]. All rights reserved. Clinically reviewed by the [facility] Transitions Program. [employer] 306128 - REV 01/24.

## 2024-08-29 ENCOUNTER — PATIENT OUTREACH (OUTPATIENT)
Dept: CARE COORDINATION | Facility: CLINIC | Age: 54
End: 2024-08-29
Payer: COMMERCIAL

## 2024-08-29 NOTE — PROGRESS NOTES
Clinic Care Coordination Contact  Care Coordination Clinician Chart Review    Situation: Patient chart reviewed by Care Coordinator.       Background: Care Coordination Program started: 3/1/2022. Initial assessment completed and patient-centered care plan(s) were developed with participation from patient. Lead CC handed patient off to CHW for continued outreaches.       Assessment: Per chart review, patient outreach completed by CC CHW on 8/19/2024.  Patient is actively working to accomplish goal(s). Patient's goal(s) appropriate and relevant at this time. Patient is not due for updated Plan of Care.  Assessments will be completed annually or as needed/with change of patient status.    CHW to follow up why mammogram on 8/28 was cancelled via ScanÃ¢â‚¬Â¢Jour. Would like to make sure patient is up to date with mammogram for transplant active list requirement. Message sent to PCP for advise.     Patient attended SOT appointments on 8/21/24 pending committee meeting.       Care Plan: Hematology/oncology       Problem: Beta thalassemia minor       Goal: Patient will attend hematology clinic appointment in the next 12 months.       Start Date: 2/22/2024 Expected End Date: 2/28/2025    Recent Progress: 50%    Priority: High    Note:     Barriers: language barrier, low literacy, noncompliance, and lack of knowledge how to navigate complex health care system  Strengths: motivated to attend appt  Patient expressed understanding of goal: Yes    Action steps to achieve this goal:  1. I will answer my phone when I am contacted to schedule my appointment.  2. I will attend my initial hematology appointment as scheduled 3/22/24. Completed.   3. I will attend my follow up hematology/oncology appt on 4/19/2024 at 9:15am with Dr Zuñiga.Completed  4. I will attend my follow up hematology/oncology appt on 7/19 at 10:45AM Lab, 11:15AM Dr. Zuñiga. Completed.   5.  I will attend my follow up hematology/oncology appt on 1/17/2025 at 1:15pm for  lab and 1:45 with Dr. Zuñiga.  6. I will schedule a follow up appointment with my provider if it is recommended to do so while I am at the clinic.  7. I will follow up with [facility] regarding this goal at each outreach until it is completed.                               Care Plan: Transplant evaluation       Problem: CKD       Goal: Patient will attend her kidney transplant evaluation in the next 12 months.       Start Date: 5/21/2024 Expected End Date: 5/31/2025    Recent Progress: 20%    Priority: High    Note:     Barriers: language barrier, low literacy, noncompliance, and lack of knowledge how to navigate complex health care system  Strengths: motivated to attend appt  Patient expressed understanding of goal: Yes    Action steps to achieve this goal:  1. I will attend my pre-transplant evaluation appointments on 6/12/2024 at 12pm, 12:30pm, and 1pm with MD and PharmD at [facility]. Completed.   2. I will attend my follow up transplant evaluation as scheduled on 8/21/2024 at 10am. Multiple appts scheduled today at [facility]. Completed.   3. I will attend my mammogram on 8/28/24. Cancelled via Mychart  4. I will attend my eye exam on TBD  5. I will attend my dental appointment on TBD  6. I will schedule a follow up appointment with my providers if it is recommended to do so while I am at the clinic.  7. I will follow up with [facility] regarding this goal at each outreach until it is completed.     Paulo Thomas MD Sahadevan, Meena, MBBS Lori, Nurse transplant coordinator, with [facility] ([phone])  [address] [place] [address] [id number]   [place] [address]   [phone] (Work)   [phone] (Fax)                             Care Plan: Physical therapy       Problem: chronic back pain       Goal: Patient will attend her PT appointments as scheduled in the next 90 days.       Start Date: 7/22/2024 Expected End Date: 10/31/2024    Recent Progress: 30%    Priority: High    Note:     Barriers: language barrier, low literacy, noncompliance,  and lack of knowledge how to navigate complex health care system  Strengths: motivated to attend appt  Patient expressed understanding of goal: Yes    Action steps to achieve this goal:  1. I will attend my PT appointments as scheduled:     8/7/2024 at 1:00 pm Completed  8/14/2024 at 2:15 pm Completed  8/21/2024 at 2:15 pm. Cancelled due to conflict with SOT appt  8/28/2024 at 11am Completed.   9/5/2024 at 11:30am  9/12/2024 at 11:30am  9/18/2024 at 11am    3. I will schedule a follow up appointment with my  if it is recommended to do so while I am at the clinic.  4. I will follow up with CCC regarding this goal at each outreach until it is completed.     Note: Message sent to [facility] on 7/22/24 to request transportation for PT appts.                                    Plan/Recommendations: The patient will continue working with Care Coordination to achieve goal(s) as above. CHW will continue outreaches at minimum every 30 days and will involve Lead CC as needed or if patient is ready to move to Maintenance. Lead CC will continue to monitor CHW outreaches and patient's progress to goal(s) every 6 weeks.     Plan of Care updated and sent to patient: No

## 2024-08-31 ENCOUNTER — MEDICAL CORRESPONDENCE (OUTPATIENT)
Dept: HEALTH INFORMATION MANAGEMENT | Facility: CLINIC | Age: 54
End: 2024-08-31

## 2024-09-05 DIAGNOSIS — Z53.9 DIAGNOSIS NOT YET DEFINED: Primary | ICD-10-CM

## 2024-09-05 PROCEDURE — G0179 MD RECERTIFICATION HHA PT: HCPCS | Performed by: FAMILY MEDICINE

## 2024-09-10 ENCOUNTER — PATIENT OUTREACH (OUTPATIENT)
Dept: NURSING | Facility: CLINIC | Age: 54
End: 2024-09-10
Payer: COMMERCIAL

## 2024-09-10 NOTE — PROGRESS NOTES
Clinic Care Coordination Contact      Direct care provided in primary language, no  needed.     Follow Up Progress Note      Assessment: Per chart review, patient attended transplant information session on 8/21/24. Patient's case will be reviewed with the transplant committee to discuss candidacy for kidney transplant with [facility]. Per daughter, they were told someone will call them after committee meeting. Will wait until next month to follow up on status.     [facility]   Transplant   [phone]     Note: Daughter and sons speak fluent Fanny and [place].    Mammogram was canceled on 8/28/24. Not sure the reason. Consulted with PCP via secure chat today confirmed mammogram is due. Patient agreed to reschedule same day as PCP appt on 11/18/24.     Eye exam   Patient was last seen at [facility] on 10/23/23. Assisted patient scheduled eye appointment on 10/7/2024 at 1:00pm. Requested [place] . Goal updated.     [place] [facility] - [place]   Prefers appts on M-W-F   [facility]  [address] [place] [address]   [facility]  [phone]    Dental exam  Patient agreed to scheduled dental appointment. Patient states she was never seen by a dentist. Educated patient on the important of seeing a dentist annually for prevention and may required to get on transplant list.     Assisted patient scheduled dental exam appointment on 4/2/2025 at 11:00 am. Need to arrive 15 mins early. Goal updated.     [facility]  [address], [place] [address]   [phone]    Physical therapy  Per chart review, PT appt on 9/5/24 was canceled due to conflict with dialysis. Patient is scheduled with PT on 9/12 which is again conflict with dialysis. [facility] assisted patient rescheduled on 10/14/24 at 10am. ( 1st available appt). Goal updated.       Care Gaps:    Health Maintenance Due   Topic Date Due    ZOSTER IMMUNIZATION (1 of 2) Never done     COVID-19 Vaccine (3 - Moderna risk series) 06/30/2022    INFLUENZA VACCINE (1) 09/01/2024    MAMMO SCREENING  08/09/2024       Patient will address over due care gaps with PCP on 11/28/24.     Care Plans  Care Plan: Hematology/oncology       Problem: Beta thalassemia minor       Goal: Patient will attend hematology clinic appointment in the next 12 months.       Start Date: 2/22/2024 Expected End Date: 2/28/2025    Recent Progress: 50%    Priority: High    Note:     Barriers: language barrier, low literacy, noncompliance, and lack of knowledge how to navigate complex health care system  Strengths: motivated to attend appt  Patient expressed understanding of goal: Yes    Action steps to achieve this goal:  1. I will answer my phone when I am contacted to schedule my appointment.  2. I will attend my initial hematology appointment as scheduled 3/22/24. Completed.   3. I will attend my follow up hematology/oncology appt on 4/19/2024 at 9:15am with Dr Zuñiga.Completed  4. I will attend my follow up hematology/oncology appt on 7/19 at 10:45AM Lab, 11:15AM Dr. Zuñiga. Completed.   5.  I will attend my follow up hematology/oncology appt on 1/17/2025 at 1:15pm for lab and 1:45 with Dr. Zuñiga.  6. I will schedule a follow up appointment with my provider if it is recommended to do so while I am at the clinic.  7. I will follow up with CCC regarding this goal at each outreach until it is completed.                               Care Plan: Transplant evaluation       Problem: CKD       Goal: Patient will attend her kidney transplant evaluation in the next 12 months.       Start Date: 5/21/2024 Expected End Date: 5/31/2025    Recent Progress: 20%    Priority: High    Note:     Barriers: language barrier, low literacy, noncompliance, and lack of knowledge how to navigate complex health care system  Strengths: motivated to attend appt  Patient expressed understanding of goal: Yes    Action steps to achieve this goal:  1. I  will attend my pre-transplant evaluation appointments on 6/12/2024 at 12pm, 12:30pm, and 1pm with MD and PharmD at [facility]. Completed.   2. I will attend my follow up transplant evaluation as scheduled on 8/21/2024 at 10am. Multiple appts scheduled today at [facility]. Completed.   3. I will attend my mammogram on 8/28/24. Cancelled via Mychart  4. I will attend my rescheduled mammogram appointment on 11/18/24 at 2:15 pm.   5. I will attend my eye exam on 10/7/2024 at 1:00 pm at [facility] location.   6. I will attend my dental appointment on 4/2/2025 at 11:00 am at [facility] location.   7. I will schedule a follow up appointment with my providers if it is recommended to do so while I am at the clinic.  8. I will follow up with CCC regarding this goal at each outreach until it is completed.     Paulo Thomas MD Sahadevan, Meena, MBBS Lori, Nurse transplant coordinator, with [facility] ([phone])  [address] [place] [address] [id number]   [place] [address]   [phone] (Work)   [phone] (Fax)     [facility]   Transplant   [phone]                             Care Plan: Physical therapy       Problem: chronic back pain       Goal: Patient will attend her PT appointments as scheduled in the next 90 days.       Start Date: 7/22/2024 Expected End Date: 10/31/2024    Recent Progress: 30%    Priority: High    Note:     Barriers: language barrier, low literacy, noncompliance, and lack of knowledge how to navigate complex health care system  Strengths: motivated to attend appt  Patient expressed understanding of goal: Yes    Action steps to achieve this goal:  1. I will attend my PT appointments as scheduled:     8/7/2024 at 1:00 pm Completed  8/14/2024 at 2:15 pm Completed  8/21/2024 at 2:15 pm. Cancelled due to conflict with SOT appt  8/28/2024 at 11am Completed.   9/5/2024 at 11:30am - canceled conflict with dialysis  9/12/2024 at 11:30am - rescheduled due to  conflict with dialysis  9/18/2024 at 11:00 am   10/14/2024 at 10:00 am.     3. I will schedule a follow up appointment with my  if it is recommended to do so while I am at the clinic.  4. I will follow up with CCC regarding this goal at each outreach until it is completed.     Note: Message sent to [facility] on 7/22/24 to request transportation for PT appts.                                 Plan: Patient will attend appts as scheduled.

## 2024-09-10 NOTE — LETTER
[facility]  Patient Centered Plan of Care  About Me:        Patient Name:  Nithya Matthews    YOB: 1970  Age:         54 year old   Johann MRN:    [id number] Telephone Information:  Home Phone [phone]   Mobile [phone]   Home Phone [phone]   Mobile Not on file.       Address:  [address] [place] [address] Email address:  [email or web address]      Emergency Contact(s)    Name Relationship Lgl Grd Work Phone Home Phone Mobile Phone   1. JESSY MATTHEWS Daughter    [phone]   2. NAY MIMI Son    [phone]   3. YAYA MIMI Son    [phone]           Primary language:  Ruben     needed? Yes   [place] [facility]:  [phone] op. 1  Other communication barriers:Language barrier    Preferred Method of Communication:     Current living arrangement: No data recorded  Mobility Status/ Medical Equipment: No data recorded      Health Maintenance  Health Maintenance Reviewed: Due/Overdue       My Access Plan  Medical Emergency 911   Primary Clinic Line [facility] [phone]   24 Hour Appointment Line [phone] or  [phone]) (toll-free)   24 Hour Nurse Line [phone] (toll-free)   Preferred Urgent Care No data recorded   Preferred Hospital No data recorded   Preferred Pharmacy [facility] - [place], [place] - [address]     Behavioral Health Crisis Line The National Suicide Prevention Lifeline at 1-507.398.7759 or Text/Call 468           My Care Team Members  Patient Care Team         Relationship Specialty Notifications Start End    Giancarlo Arizmendi MD PCP - General Family Medicine  2/14/22     Referring to Eye    Phone: [phone] Fax: [phone]         [address] [facility] [address] [place] [address]    Giancarlo Arizmendi MD Assigned PCP   2/20/22     Phone: [phone] Fax: [phone]         [address] [facility] [address] [place] [address]    Irene Samson CHW Community Health Worker Primary Care -  CC Admissions 3/1/22     Phone: [phone]         Albert Moe [facility] worker   3/2/22     [facility]. Call [facility] worker  Avani Nelson [phone] for assistance.    Phone: [phone]         Miracle Mi OD  Optometry  3/18/22     Phone: [phone] Fax: [phone]         [address] [place] [address]    Shital Hernandez, PharmD Pharmacist Pharmacist  4/6/22     Phone: [phone]          [facility] [address] [facility] [address] [place] [address]    Grecia Hearn DPM, Podiatry/Foot and Ankle Surgery Assigned Musculoskeletal Provider   5/1/22     Phone: [phone] Pager: [phone] Fax: [phone] [address] [facility] [address] [facility] [address]    Davis Villa MD MD Endocrinology, Diabetes, and Metabolism  6/2/22     Phone: [phone] Fax: [phone]         [facility] [address]    Shital Hernandez, PharmD Assigned [facility] Pharmacist   9/28/22     Phone: [phone]          [address] [place] [address]    Carl Rosas MD Nephrologist Nephrology  3/9/23     Phone: [phone] Fax: [phone]         [address] [address]    Markell Langford MD Assigned Surgical Provider   5/6/23     Phone: [phone] Fax: [phone]         [address] [address]    Negin Nunes NP Nurse Practitioner   5/30/23     Phone: [phone] Fax: [phone]         [address] [facility] [address] [facility] [address]    Janel Jhaveri, RN Lead Care Coordinator Primary Care - CC Admissions 2/7/24     Phone: [phone]         Sanjuanita Zuñiga MD MD Medical Oncology  3/4/24     Phone: [phone] Fax: [phone] [address] [facility] [address]    Mery Fernandez [facility] Pharmacist Pharmacist  3/20/24     Phone: [phone] Fax: [phone]         [address] [place] [address]    Desiree Leyva, RN Specialty Care  Coordinator Hematology & Oncology Admissions 4/16/24     Sanjuanita Zuñiga MD Assigned Cancer Care Provider   4/23/24     Phone: [phone] Fax: [phone] [address] [facility] [place] [address]    Jory Beebe [facility] Pharmacist Pharmacist  5/22/24     Phone: [phone] Fax: [phone] [address] [facility] [address]    Fritz Matthews, daughter and Nay Leslie - son Personal Care Attendant PCA   7/22/24     Nay Leslie - son [phone] - 3.5 hours   Cooper Byron - daughter - [phone] - 6.5 hrs/day    Phone: [phone]                     My Care Plans  Self Management and Treatment Plan    Care Plan  Care Plan: Hematology/oncology       Problem: Beta thalassemia minor       Goal: Patient will attend hematology clinic appointment in the next 12 months.       Start Date: 2/22/2024 Expected End Date: 2/28/2025    Recent Progress: 50%    Priority: High    Note:     Barriers: language barrier, low literacy, noncompliance, and lack of knowledge how to navigate complex health care system  Strengths: motivated to attend appt  Patient expressed understanding of goal: Yes    Action steps to achieve this goal:  1. I will answer my phone when I am contacted to schedule my appointment.  2. I will attend my initial hematology appointment as scheduled 3/22/24. Completed.   3. I will attend my follow up hematology/oncology appt on 4/19/2024 at 9:15am with Dr Zuñiga.Completed  4. I will attend my follow up hematology/oncology appt on 7/19 at 10:45AM Lab, 11:15AM Dr. Zuñiga. Completed.   5.  I will attend my follow up hematology/oncology appt on 1/17/2025 at 1:15pm for lab and 1:45 with Dr. Zuñiga.  6. I will schedule a follow up appointment with my provider if it is recommended to do so while I am at the clinic.  7. I will follow up with [facility] regarding this goal at each outreach until it is completed.                               Care Plan: Transplant evaluation       Problem: CKD       Goal: Patient will  attend her kidney transplant evaluation in the next 12 months.       Start Date: 5/21/2024 Expected End Date: 5/31/2025    Recent Progress: 20%    Priority: High    Note:     Barriers: language barrier, low literacy, noncompliance, and lack of knowledge how to navigate complex health care system  Strengths: motivated to attend appt  Patient expressed understanding of goal: Yes    Action steps to achieve this goal:  1. I will attend my pre-transplant evaluation appointments on 6/12/2024 at 12pm, 12:30pm, and 1pm with MD and PharmD at [facility]. Completed.   2. I will attend my follow up transplant evaluation as scheduled on 8/21/2024 at 10am. Multiple appts scheduled today at [facility]. Completed.   3. I will attend my mammogram on 8/28/24. Cancelled via Mychart  4. I will attend my rescheduled mammogram appointment on 11/18/24 at 2:15 pm.   5. I will attend my eye exam on 10/7/2024 at 1:00 pm at [facility] location.   6. I will attend my dental appointment on 4/2/2025 at 11:00 am at [facility].   7. I will schedule a follow up appointment with my providers if it is recommended to do so while I am at the clinic.  8. I will follow up with [facility] regarding this goal at each outreach until it is completed.     Paulo Thomas MD Sahadevan, Meena, MBBS Lori, Nurse transplant coordinator, with [facility] ([phone])  [address] [place] [address] [address]   [place] [address]   [phone] (Work)   [phone] (Fax)     Prema Heard [facility]   Transplant   [phone]                             Care Plan: Physical therapy       Problem: chronic back pain       Goal: Patient will attend her PT appointments as scheduled in the next 90 days.       Start Date: 7/22/2024 Expected End Date: 10/31/2024    Recent Progress: 30%    Priority: High    Note:     Barriers: language barrier, low literacy, noncompliance, and lack of knowledge how to navigate complex health care system  Strengths:  motivated to attend appt  Patient expressed understanding of goal: Yes    Action steps to achieve this goal:  1. I will attend my PT appointments as scheduled:     8/7/2024 at 1:00 pm Completed  8/14/2024 at 2:15 pm Completed  8/21/2024 at 2:15 pm. Cancelled due to conflict with SOT appt  8/28/2024 at 11am Completed.   9/5/2024 at 11:30am - canceled conflict with dialysis  9/12/2024 at 11:30am - rescheduled due to conflict with dialysis  9/18/2024 at 11:00 am   10/14/2024 at 10:00 am.     3. I will schedule a follow up appointment with my  if it is recommended to do so while I am at the clinic.  4. I will follow up with [facility] regarding this goal at each outreach until it is completed.     Note: Message sent to [employer] on 7/22/24 to request transportation for PT appts.                                 Action Plans on File:                       Advance Care Plans/Directives:   Advanced Care Plan/Directives on file: No data recorded  Discussed with patient/caregiver(s): No data recorded           My Medical and Care Information  Problem List   Patient Active Problem List   Diagnosis    Primary hypertension    Hyperlipidemia LDL goal <100    Depression, unspecified depression type    Hyperglycemia    Generalized muscle weakness    Low iron    Gastroesophageal reflux disease with esophagitis without hemorrhage    Low vitamin B12 level    Low vitamin D level    Celiac disease    Beta thalassemia minor    Type 2 diabetes mellitus with other specified complication, unspecified whether long term insulin use (H)    Incontinence of feces with fecal urgency    ESRD (end stage renal disease) on dialysis (H)    Hyperkalemia    Weakness    Chronic anemia    Diabetic neuropathy (H)    Diabetic retinopathy (H)    Hepatitis B core antibody positive    Nephrolithiasis    Physical deconditioning      Current Medications and Allergies:  See printed Medication Report.    Care Coordination Start Date: 3/1/2022   Frequency of Care  Coordination: 6 weeks, more frequently as needed     Form Last Updated: 09/30/2024

## 2024-09-16 ENCOUNTER — OFFICE VISIT (OUTPATIENT)
Dept: PHARMACY | Facility: CLINIC | Age: 54
End: 2024-09-16
Payer: COMMERCIAL

## 2024-09-16 ENCOUNTER — LAB (OUTPATIENT)
Dept: LAB | Facility: CLINIC | Age: 54
End: 2024-09-16
Payer: COMMERCIAL

## 2024-09-16 VITALS
OXYGEN SATURATION: 94 % | HEART RATE: 76 BPM | DIASTOLIC BLOOD PRESSURE: 66 MMHG | WEIGHT: 116.75 LBS | SYSTOLIC BLOOD PRESSURE: 142 MMHG | BODY MASS INDEX: 22.06 KG/M2

## 2024-09-16 DIAGNOSIS — E11.69 TYPE 2 DIABETES MELLITUS WITH OTHER SPECIFIED COMPLICATION, UNSPECIFIED WHETHER LONG TERM INSULIN USE (H): Primary | ICD-10-CM

## 2024-09-16 DIAGNOSIS — E78.5 HYPERLIPIDEMIA LDL GOAL <100: ICD-10-CM

## 2024-09-16 DIAGNOSIS — R52 PAIN: ICD-10-CM

## 2024-09-16 DIAGNOSIS — Z78.9 TAKES DIETARY SUPPLEMENTS: ICD-10-CM

## 2024-09-16 DIAGNOSIS — E11.69 TYPE 2 DIABETES MELLITUS WITH OTHER SPECIFIED COMPLICATION, UNSPECIFIED WHETHER LONG TERM INSULIN USE (H): ICD-10-CM

## 2024-09-16 DIAGNOSIS — I10 PRIMARY HYPERTENSION: ICD-10-CM

## 2024-09-16 DIAGNOSIS — K21.00 GASTROESOPHAGEAL REFLUX DISEASE WITH ESOPHAGITIS WITHOUT HEMORRHAGE: ICD-10-CM

## 2024-09-16 LAB — HBA1C MFR BLD: 6.6 % (ref 0–5.6)

## 2024-09-16 PROCEDURE — 36415 COLL VENOUS BLD VENIPUNCTURE: CPT

## 2024-09-16 PROCEDURE — 99606 MTMS BY PHARM EST 15 MIN: CPT | Performed by: PHARMACIST

## 2024-09-16 PROCEDURE — 83036 HEMOGLOBIN GLYCOSYLATED A1C: CPT

## 2024-09-16 PROCEDURE — 99607 MTMS BY PHARM ADDL 15 MIN: CPT | Performed by: PHARMACIST

## 2024-09-16 NOTE — PROGRESS NOTES
Medication Therapy Management (MTM) Encounter    ASSESSMENT:                            Medication Adherence/Access: No issues identified    1. Type 2 diabetes mellitus with other specified complication, unspecified whether long term insulin use (H)  A1c at goal <7%.  Patient reports taking and tolerating Ozempic, would benefit from increasing dose today.  Pending blood sugars at next visit, could consider further dose reduction of Lantus and/or NovoLog.    2. Primary hypertension  BP not at goal <140/90 mmHg, though close to goal today. Would consider increasing carvedilol or adding hydralazine at future visit if needed.    3. Hyperlipidemia LDL goal <100  Stable, continue current medication.    4. Gastroesophageal reflux disease with esophagitis without hemorrhage  Stable.    5. Takes dietary supplements  Vitamin D was low when last monitored, since then has been taking ergocalciferol once weekly, would recommend rechecking vitamin D level at next lab visit.    6. Pain  Relatively controlled on current medications, recommend readdressing with PCP at next visit.    PLAN:                            Increase dose: Ozempic 1 mg weekly  Encouraged patient to check blood sugar with CGM at least every 8 hours (at least 3-4 times daily)  Keep monitoring at home BP, brings readings to next visit    Medication issues to be addressed at a future visit:   Increase carvedilol?    Follow-up: Return in about 3 months (around 12/16/2024) for With PharmD.    SUBJECTIVE/OBJECTIVE:                          Nithya Matthews is a 54 year old female seen for a follow-up visit. Patient was accompanied by .  (ID# [id number]) was used during today's visit.       Reason for visit: MTM follow up.    Allergies/ADRs: Reviewed in chart  Past Medical History: Reviewed in chart  Tobacco: She reports that she has never smoked. She has never been exposed to tobacco smoke. She has never used smokeless tobacco.  Alcohol: never used    Medication  Adherence/Access:  Patients daughter helps with managing her medications and has a nurse that sets up medications three times daily in pillbox every 2 weeks. Her daughter gives her medications/injections every day. Reports no missed doses of oral medications or injections.   Brings with med vials, insulin pens, pillbox and glucometer today. Pharmacy usually delivers medications to her home.     Diabetes   Lantus 56 daily in the morning  NovoLog 12 units 2-3 times daily directly before meals (usually twice daily)  Ozempic 0.5 mg weekly on Monday - switched from Victoza on 8/19, brings with full ozempic pen today and states its a refill  Aspirin 81 mg daily  Patient is not experiencing side effects.   Endorses adherence.   Current diabetes symptoms: None.  Diet/Exercise: No changes in appetite. Eating 2-3 meals per day, usually two. Eats rice/ocasio (traditional [place] food) for meals and then fruit right after.   Med Hx: metformin (CKD), Victoza (switched to ozempic)    Blood sugar monitoring: Continuous Glucose Monitor - Claribel 14 day; see below.        Eye exam is up to date  Foot exam is up to date    Hypertension   /CKD  Lisinopril 40 mg daily - dose increased 8/28 per PCP  Carvedilol 12.5 mg twice daily   Amlodipine 10 mg daily   Calcium Acetate 667 mg three times daily with meals   Calcium carbonate 500 mg 3 tablets twice daily per nephrologist  Dialysis Tues/Thurs/Sat.   Patient reports no medication side effects.   Patient does self-monitor blood pressure twice daily and rests before taking it.   She takes her medications then checks her blood pressure; Recent home readings: 138/70, 148/80 mmHg  Nephrologist: Carl Rosas MD at [facility]     Hyperlipidemia   Atorvastatin 80 mg daily  Fish Oil 1000mg once daily  Denies myalgias   Med Hx: fenofibrate (stopped d/t CKD)     GERD    Omeprazole 20 mg once daily   Patient feels that current regimen is effective.     Supplements   /anemia  Vitamin B12  1000 mcg daily  Ferrous sulfate 325 mg every other day  Ergocalciferol 50,000 units once weekly- set up correctly  Folic acid 1 mg daily- started by hem/onc 3/22  No reported issues at this time. Patient had visit with heme/onc for anemia d/t ESRD, they started folic acid. Reports fatigue has been good lately on non-dialysis days. On dialysis days still feeling fatigued and weak.      Pain:   Acetaminophen 1000 mg twice daily - not in pill box  Gabapentin 300 mg at bedtime  Oxycodone 2.5 mg if needed - does not bring bottle with today, though states still using  Lidocaine 4% patch every 24 hours   Sometimes pain at nighttime, describes as aching on her right foot where callus present - reports its been like this for 1-2 weeks. Has upcoming appt schedule with doctor.   Taking tylenol as needed     Today's Vitals: BP (!) 142/66   Pulse 76   Wt 116 lb 12 oz (53 kg)   LMP  (LMP Unknown)   SpO2 94%   BMI 22.06 kg/m    ----------------      I spent 30 minutes with this patient today. All changes were made via collaborative practice agreement with Giancarlo Arizmendi MD. A copy of the visit note was provided to the patient's provider(s).    A summary of these recommendations was given to the patient.    Shital Hernandez, PharmD, BCACP  Medication Therapy Management Pharmacist     Medication Therapy Recommendations  Type 2 diabetes mellitus with other specified complication, unspecified whether long term insulin use (H)    Current Medication: semaglutide (OZEMPIC) 2 MG/3ML pen (Discontinued)   Rationale: Dose too low - Dosage too low - Effectiveness   Recommendation: Increase Dose   Status: Accepted per CPA

## 2024-09-16 NOTE — Clinical Note
Dr. Arizmendi,   Patient is still having pain on her foot - appears she has a foot callus that has been bothering her for 1-2 weeks. I wanted you to be aware and to see if you want to see her sooner than November or referral to podiatry, or other.   Thanks Scottie

## 2024-09-16 NOTE — PATIENT INSTRUCTIONS
"Recommendations from today's MTM visit:                                                         Increase dose: Ozempic 1 mg weekly  Check blood sugar with CGM at least every 8 hours (at least 3-4 times daily)  Keep monitoring at home BP, brings readings to next visit    Follow-up: Return in about 3 months (around 12/16/2024) for With PharmD.    It was great speaking with you today.  I value your experience and would be very thankful for your time in providing feedback in our clinic survey. In the next few days, you may receive an email or text message from [employer] with a link to a survey related to your  clinical pharmacist.\"     To schedule another MTM appointment, please call the clinic directly or you may call the MTM scheduling line at [phone].    My Clinical Pharmacist's contact information:                                                      Please feel free to contact me with any questions or concerns you have.      Shital Hernandez, PharmD, BCACP  Medication Therapy Management Pharmacist    "

## 2024-10-07 ENCOUNTER — MEDICAL CORRESPONDENCE (OUTPATIENT)
Dept: HEALTH INFORMATION MANAGEMENT | Facility: CLINIC | Age: 54
End: 2024-10-07
Payer: COMMERCIAL

## 2024-10-08 ENCOUNTER — TELEPHONE (OUTPATIENT)
Dept: FAMILY MEDICINE | Facility: CLINIC | Age: 54
End: 2024-10-08
Payer: COMMERCIAL

## 2024-10-08 NOTE — TELEPHONE ENCOUNTER
Ammon, from [facility] ER pharmacist calling stating that patient was prescribed 6 medications from 9/25-9/27. Writer does not see any Rx in chart from 9/25-9/27. He mentioned that Rx was prescribed by Dr. Zhou on 9/25.    Ammon has requested to speak to DESHAUN Rubin.    Phone: [phone]          ZAYRA Ray CemN RN  [facility]

## 2024-10-08 NOTE — TELEPHONE ENCOUNTER
Called Ammon at [facility].     Pt only on escitalopram 10 mg (not higher dose as appears in surescripts), NO olanzapine, even though showing up on surescripts. Likely duplicate pt name?    Shital Hernandez, PharmD, BCACP  Medication Therapy Management Pharmacist

## 2024-10-18 ENCOUNTER — PATIENT OUTREACH (OUTPATIENT)
Dept: CARE COORDINATION | Facility: CLINIC | Age: 54
End: 2024-10-18
Payer: COMMERCIAL

## 2024-10-18 NOTE — PROGRESS NOTES
Clinic Care Coordination Contact  Community Health Worker Follow Up  Spoke with patient - Ruben  ID [id number]    Care Gaps:     Health Maintenance Due   Topic Date Due    ZOSTER IMMUNIZATION (1 of 2) Never done    COVID-19 Vaccine (3 - Moderna risk series) 06/30/2022    MAMMO SCREENING  08/09/2024    LIPID  10/23/2024    EYE EXAM  10/30/2024     Scheduled  11/18/24 at 1:40PM with Dr. Arizmendi      Care Plan:   Care Plan: Hematology/oncology       Problem: Beta thalassemia minor       Goal: Patient will attend hematology clinic appointment in the next 12 months.       Start Date: 2/22/2024 Expected End Date: 2/28/2025    This Visit's Progress: 50% Recent Progress: 50%    Priority: High    Note:     Barriers: language barrier, low literacy, noncompliance, and lack of knowledge how to navigate complex health care system  Strengths: motivated to attend appt  Patient expressed understanding of goal: Yes    Action steps to achieve this goal:  1. I will answer my phone when I am contacted to schedule my appointment.  2. I will attend my initial hematology appointment as scheduled 3/22/24. Completed.   3. I will attend my follow up hematology/oncology appt on 4/19/2024 at 9:15am with Dr Zuñiga.Completed  4. I will attend my follow up hematology/oncology appt on 7/19 at 10:45AM Lab, 11:15AM Dr. Zuñiga. Completed.   5.  I will attend my follow up hematology/oncology appt on 1/17/2025 at 1:15pm for lab and 1:45 with Dr. Zuñiga.  6. I will schedule a follow up appointment with my provider if it is recommended to do so while I am at the clinic.  7. I will follow up with [facility] regarding this goal at each outreach until it is completed.                               Care Plan: Transplant evaluation       Problem: CKD       Goal: Patient will attend her kidney transplant evaluation in the next 12 months.       Start Date: 5/21/2024 Expected End Date: 5/31/2025    This Visit's Progress: 20% Recent Progress: 20%    Priority:  High    Note:     Barriers: language barrier, low literacy, noncompliance, and lack of knowledge how to navigate complex health care system  Strengths: motivated to attend appt  Patient expressed understanding of goal: Yes    Action steps to achieve this goal:  1. I will attend my pre-transplant evaluation appointments on 6/12/2024 at 12pm, 12:30pm, and 1pm with MD and PharmD at [facility]. Completed.   2. I will attend my follow up transplant evaluation as scheduled on 8/21/2024 at 10am. Multiple appts scheduled today at [facility]. Completed.   3. I will attend my mammogram on 8/28/24. Cancelled via Mychart  4. I will attend my eye exam on 10/7/2024 at 1:00 pm at [facility] location. No Showed. Eye exam rescheduled on Mon 11/4/24 at 32PM.  5. I will attend my rescheduled mammogram appointment on 11/18/24 at 2:15 pm.   6. I will attend my dental appointment on 4/2/2025 at 11:00 am at [facility] location.   7. I will schedule a follow up appointment with my providers if it is recommended to do so while I am at the clinic.  8. I will follow up with [facility] regarding this goal at each outreach until it is completed.     Paulo Thomas MD Sahadevan, Meena, MBBS Lori, Nurse transplant coordinator, with [facility] ([phone])  [address] [place] [address] [address]   [place] [address]   [phone] (Work)   [phone] (Fax)     [facility]   Transplant   [phone]                             Care Plan: Physical therapy       Problem: chronic back pain       Goal: Patient will attend her PT appointments as scheduled in the next 90 days.       Start Date: 7/22/2024 Expected End Date: 10/31/2024    Recent Progress: 30%    Priority: High    Note:     Barriers: language barrier, low literacy, noncompliance, and lack of knowledge how to navigate complex health care system  Strengths: motivated to attend appt  Patient expressed understanding of goal: Yes    Action steps to achieve this  goal:  1. I will attend my PT appointments as scheduled:     8/7/2024 at 1:00 pm Completed  8/14/2024 at 2:15 pm Completed  8/21/2024 at 2:15 pm. Cancelled due to conflict with SOT appt  8/28/2024 at 11am Completed.   9/5/2024 at 11:30am - canceled conflict with dialysis  9/12/2024 at 11:30am - rescheduled due to conflict with dialysis  9/18/2024 at 11:00 am  Cancelled due to not feeling well  10/14/2024 at 10:00 am Cancelled due to not feeling well    3. I will schedule a follow up appointment with my  if it is recommended to do so while I am at the clinic.  4. I will follow up with CCC regarding this goal at each outreach until it is completed.     Note: Message sent to [employer] on 7/22/24 to request transportation for PT appts.                             Intervention and Education during outreach:     Hematology/Oncology  CHW reminded patient of appointment on 1/17/2025 at 1:15pm for lab and 1:45 with Dr. Zuñiga.    Transplant Evaluation  CHW reviewed appointments recommended by transplant team.  -Patient states she missed Eye exam on 10/7. Patient agreed to rescheduled, conference called [facility], appointment rescheduled on Mon 11/4 at 2pm.  -Reminded patient of rescheduled mammogram appointment on 11/18/24 at 2:15 pm.   -Reminded patient of dental appointment on 4/2/2025 at 11:00am at [facility].     Physical Therapy  Per chart review, 9/18 and 10/14 appointments cancelled.   CHW inquire if any barriers. Patient shares that she does not want to continue PT due to no feeling well, especially after dialysis. She's too week to do the exercises.     Patient reports that she's bleeding after dialysis. Sometimes, she has to change her shirts twice when she gets home. CHW inquired if patient reported concern to dialysis RN. Patient replied that she not sure if they'll care. CHW reviewed, will relay her concern to PCP and lead clinician CCRN.   Per consult with  CCRN via teams, CCRN will follow with patient next week.    CHW Plan: 1 month follow up. Routing to PCP and lead clinician CCRN for review    [facility] Care Coordination  [facility]    Phone: [phone]

## 2024-10-22 ENCOUNTER — PATIENT OUTREACH (OUTPATIENT)
Dept: CARE COORDINATION | Facility: CLINIC | Age: 54
End: 2024-10-22

## 2024-10-22 ENCOUNTER — PATIENT OUTREACH (OUTPATIENT)
Dept: NURSING | Facility: CLINIC | Age: 54
End: 2024-10-22
Payer: COMMERCIAL

## 2024-10-22 DIAGNOSIS — I10 PRIMARY HYPERTENSION: ICD-10-CM

## 2024-10-22 DIAGNOSIS — Z71.89 OTHER SPECIFIED COUNSELING: Primary | Chronic | ICD-10-CM

## 2024-10-22 RX ORDER — AMLODIPINE BESYLATE 10 MG/1
TABLET ORAL
Qty: 90 TABLET | Refills: 3 | Status: SHIPPED | OUTPATIENT
Start: 2024-10-22

## 2024-10-22 NOTE — PROGRESS NOTES
Clinic Care Coordination Contact  Follow Up Progress Note      Assessment: CCRN spoke with patient via phone today to follow up on reporting of bleeding to dialysis site after each dialysis. Patient was on her way back from dialysis. Patient states no bleeding noted today to dialysis site. Patient states her daughter has been attending dialysis with her since her health insurance ended last month and no transportation set up so daughter provides transportation. Check[place] today and health insurance still not active. Patient states she already re-applied and still pending. She's almost out of her meds and agreed to be referred to [facility] prescription assistance program. FRW referral placed today. New goal created.    Care Gaps:    Health Maintenance Due   Topic Date Due    ZOSTER IMMUNIZATION (1 of 2) Never done    COVID-19 Vaccine (3 - Moderna risk series) 06/30/2022    MAMMO SCREENING  08/09/2024    LIPID  10/23/2024    EYE EXAM  10/30/2024    HEMOGLOBIN  01/19/2025       Patient will address overdue care gaps with PCP on 11/18/24.    Care Plans  Care Plan: DME Completed 2/28/2023      Problem: I want Pull-Ups in the next 60 days so that I may better manage my incontinence symptoms.  Resolved 2/28/2023      Goal: Incontinent supplies  Completed 2/28/2023      Start Date: 1/25/2023 Expected End Date: 3/25/2023    This Visit's Progress: 100%    Note:     Goal Statement: I want Pull-Ups in the next 60 days so that I may better manage my incontinence symptoms.     Barriers: Language  Strengths: Accepting of support  Patient expressed understanding of goal: Yes    Action steps to achieve this goal:  1. I will answer my phone when [facility] [employer] [phone]  contacts me to deliver or  my pull-ups.  3. I will follow up with CCC in the next month regarding this goal for additional coordination support.    Note: Order for pull-ups was sent to DME Provider 1/10/23.                              Care Plan: [facility] Benefits Completed 4/24/2023      Problem: I will apply for [facility] financial benefits within the next 90 days.  Resolved 4/24/2023      Goal: Rent Assistance  Completed 4/24/2023      Start Date: 2/28/2023 Expected End Date: 5/28/2023    Recent Progress: 10%    Note:     Goal statement: I will apply for [facility] benefits within the next 90 days.     Barriers: Language   Strengths: Accepting of support.  Patient expressed understanding of goal: Yes     Action steps to achieve this goal:   1.  I will answer my phone when I am contacted by the [facility] FRW to schedule an initial appointment.   2.  I will provide all necessary documentation and information to complete a [facility] application for benefits with the support of the FRW.   3.  I will call my FRW if I have questions or concerns regarding my [facility] benefits application.     Note: Benefits requesting is Rental, referral submitted by CHW on 2/28/23.     Updated note on 4/25/2023 by CCRN.   - SNAP - $530 for 2 people   - rental assistance $260 per month - max received for 2 people.   - rent - $1150                              Care Plan: SSI Completed 4/24/2023      Problem: I will find out if I am eligible to apply for Social Security benefits.  Resolved 4/24/2023      Goal: SSI  Completed 4/24/2023      Start Date: 2/28/2023 Expected End Date: 2/28/2024    Recent Progress: 40%    Note:     Goal statement: I will find out if I am eligible to apply for Social Security benefits.    Barriers: Language  Strengths: Agreeable to support.   Patient expressed understanding of goal: Yes    Action steps to achieve this goal:  1.  I will follow the instructions that Disability Specialists recommended on 9/30/22. Steps per Ayleen at :     Once patient receives her [place] Citizenship paperwork,  a) Patient should go to the Social Security office in person.  b) Patient should show the Social  the original paperwork of  her [place] Citizenship.  During this time, request an in person appointment to apply for Social Security benefits.    2.  I will request assistance as needed and clarify if my [facility] Worker can take me to the SSA Office.   3.  I will attend an in-person appointment to apply for social security income as scheduled on. TBD  4.  I will follow up with CCC in the next month regarding this goal.    Note: See encounter 9/30/22, for instructions from .     SSI starting April, 2023 - $609/month.ndah 4/25/2023                                Care Plan: DEMs Completed 8/25/2023      Problem: Impaired mobility  Resolved 8/25/2023      Goal: I want a wheelchair. w/c cushion and shower chair in the next 90 days.  Completed 8/25/2023      Start Date: 4/24/2023 Expected End Date: 9/24/2023    Recent Progress: 60%    Priority: High    Note:     Barriers: language  Strengths: Accepting of support  Patient expressed understanding of goal: Yes    Action steps to achieve this goal:  1. I will complete a face-to-face appointment with my PCP on 3/22/2023. (Completed)  2. I will complete an OT/PT evaluation for a bath bench lift as scheduled on 8/9 at 10:15AM [facility]. (Completed)  3. I will answer my phone when DME Provider [facility] contacts me to deliver or  my item.  4. I will follow up with CCC in the next month regarding this goal for additional coordination support.    Note: Order for wheel chair and shower chair were sent to DME Provider PCP on 3/22/2023. 5/26 Orders are at [facility] not [facility] - Wheel Chair/cushion delivered.     [facility] [phone]  Complex Rehab Team at [facility] [phone] (bath bench lift)                            Care Plan: PCA Completed 10/23/2023      Problem: Request for early re-assessment of PCA services.  Resolved 10/23/2023      Goal: I will be re-evaluated for increased PCA services in the next 90 days.  Completed 10/23/2023      Start  Date: 8/25/2023 Expected End Date: 11/25/2023    Recent Progress: 70%    Priority: High    Note:     Barriers: Language, recent health changes.  Strengths: Willing to accept CCC support.  Patient expressed understanding of goal: Yes    Action steps to achieve this goal:  1.  I will answer my phone when I am contacted by [facility] to schedule an assessment.   2.  I will make sure I have any supportive family/friends present for my assessment as scheduled on Fri 9/29 at 10AM. (No . Rescheduled)  3. I will make sure I have any supportive family/friends present for my assessment as rescheduled on Fri 10/6/23 at 2:30PM.  4.  I will follow up with [facility] in the next month regarding this goal.   Note: PCP's letter of support for early reassessment faxed on 7/25/23. [facility] [phone], she confirmed fax number [phone].    10/23/2023 - approved for 10 hours per day. Son and daughter are her PCAs.                               Care Plan: Incontinence Supplies Completed 12/22/2023      Problem: Incontinence of feces with fecal urgency  Resolved 12/22/2023      Goal: I want check coverage for more Pull-Ups in the next 60 days so that I can better manage my incontinence symptoms.  Completed 12/22/2023      Start Date: 10/4/2023 Expected End Date: 12/4/2023    Recent Progress: 100%    Priority: High    Note:     Barriers: Language  Strengths: Accepting of support  Patient expressed understanding of goal: Yes    Action steps to achieve this goal:  1. I will attend an appointment with my PCP to address my need for pull-ups. (Completed, 9/20)  2. I will answer my phone when [facility] contacts me to deliver or  my pull-ups.  3. I will follow up with [facility] in the next month regarding this goal for additional coordination support.    Note: 9/20/23 Order for pull-ups was sent to [facility] [phone], Fax [phone] on 10/4/23.                             Care Plan:  Orthopedics Completed 12/22/2023      Problem: experiencing painful callus on bottom of her foot.  Resolved 12/22/2023      Goal: Patient will attend her orthopedics appointment in the next 12 months.  Completed 12/22/2023      Start Date: 10/23/2023 Expected End Date: 10/23/2024    Recent Progress: 20%    Note:     Barriers: language barrier, low literacy, noncompliance, and lack of knowledge how to navigate complex health care system  Strengths: motivated to attend appt  Patient expressed understanding of goal: Yes    Action steps to achieve this goal:  1. I will answer my phone when I am contacted to schedule my appointment.  2. I will attend my follow-up appointment as scheduled on 11/22/23 at 8:15AM. (Completed)  3. I will schedule a follow up appointment with my orthopedics if it is recommended to do so while I am at the clinic.  4. I will follow up with CCC regarding this goal at each outreach until it is completed.                               Care Plan: Hematology/oncology       Problem: Beta thalassemia minor       Goal: Patient will attend hematology clinic appointment in the next 12 months.       Start Date: 2/22/2024 Expected End Date: 2/28/2025    This Visit's Progress: 50% Recent Progress: 50%    Priority: High    Note:     Barriers: language barrier, low literacy, noncompliance, and lack of knowledge how to navigate complex health care system  Strengths: motivated to attend appt  Patient expressed understanding of goal: Yes    Action steps to achieve this goal:  1. I will answer my phone when I am contacted to schedule my appointment.  2. I will attend my initial hematology appointment as scheduled 3/22/24. Completed.   3. I will attend my follow up hematology/oncology appt on 4/19/2024 at 9:15am with Dr Zuñiga.Completed  4. I will attend my follow up hematology/oncology appt on 7/19 at 10:45AM Lab, 11:15AM Dr. Zuñiga. Completed.   5.  I will attend my follow up hematology/oncology appt on  1/17/2025 at 1:15pm for lab and 1:45 with Dr. Zuñiga.  6. I will schedule a follow up appointment with my provider if it is recommended to do so while I am at the clinic.  7. I will follow up with [facility] regarding this goal at each outreach until it is completed.                               Care Plan: Transplant evaluation       Problem: CKD       Goal: Patient will attend her kidney transplant evaluation in the next 12 months.       Start Date: 5/21/2024 Expected End Date: 5/31/2025    This Visit's Progress: 20% Recent Progress: 20%    Priority: High    Note:     Barriers: language barrier, low literacy, noncompliance, and lack of knowledge how to navigate complex health care system  Strengths: motivated to attend appt  Patient expressed understanding of goal: Yes    Action steps to achieve this goal:  1. I will attend my pre-transplant evaluation appointments on 6/12/2024 at 12pm, 12:30pm, and 1pm with MD and PharmD at [facility]. Completed.   2. I will attend my follow up transplant evaluation as scheduled on 8/21/2024 at 10am. Multiple appts scheduled today at [facility]. Completed.   3. I will attend my mammogram on 8/28/24. Cancelled via Mychart  4. I will attend my eye exam on 10/7/2024 at 1:00 pm at [facility] location. No Showed. Eye exam rescheduled on Mon 11/4/24 at 32PM.  5. I will attend my rescheduled mammogram appointment on 11/18/24 at 2:15 pm.   6. I will attend my dental appointment on 4/2/2025 at 11:00 am at [facility] location.   7. I will schedule a follow up appointment with my providers if it is recommended to do so while I am at the clinic.  8. I will follow up with [facility] regarding this goal at each outreach until it is completed.     Paulo Thomas MD Sahadevan, Meena, MBBS Lori, Nurse transplant coordinator, with [facility] ([phone])  [address]  [address]   [place] [address]   [phone] (Work)   [phone] (Fax)     [facility]   Transplant    [phone]                             Care Plan: Physical therapy Completed 10/22/2024      Problem: chronic back pain  Resolved 10/22/2024      Goal: Patient will attend her PT appointments as scheduled in the next 90 days.  Completed 10/22/2024      Start Date: 7/22/2024 Expected End Date: 10/31/2024    This Visit's Progress: 60% Recent Progress: 30%    Priority: High    Note:     Barriers: language barrier, low literacy, noncompliance, and lack of knowledge how to navigate complex health care system  Strengths: motivated to attend appt  Patient expressed understanding of goal: Yes    Action steps to achieve this goal:  1. I will attend my PT appointments as scheduled:     8/7/2024 at 1:00 pm Completed  8/14/2024 at 2:15 pm Completed  8/21/2024 at 2:15 pm. Cancelled due to conflict with SOT appt  8/28/2024 at 11am Completed.   9/5/2024 at 11:30am - canceled conflict with dialysis  9/12/2024 at 11:30am - rescheduled due to conflict with dialysis  9/18/2024 at 11:00 am  Cancelled due to not feeling well  10/14/2024 at 10:00 am Cancelled due to not feeling well    3. I will schedule a follow up appointment with my  if it is recommended to do so while I am at the clinic.  4. I will follow up with [facility] regarding this goal at each outreach until it is completed.     Note: Message sent to [employer] on 7/22/24 to request transportation for PT appts.                               Care Plan: Health Insurance       Problem: Health insurance lapsed       Goal: Patient would like to re-apply for health insurance in the next 90 days.       Start Date: 10/22/2024 Expected End Date: 1/31/2025    This Visit's Progress: 10%    Note:     Barriers: language   Strengths: Accepting of support,    Patient expressed understanding of goal: Yes     Action steps to achieve this goal:   1.  I will answer my phone when I am contacted by the [facility] FRW to schedule an initial appointment.   2.  I will provide all necessary  documentation and information to [place].   3.  I will call my FRW if I have questions or concerns regarding my county benefits application.                                 Plan:   1) FRW referral placed today.   2) Referred patient to [facility] prescription assistance program.   3) CCRN plans to follow up again next week.

## 2024-10-23 ENCOUNTER — PATIENT OUTREACH (OUTPATIENT)
Dept: CARE COORDINATION | Facility: CLINIC | Age: 54
End: 2024-10-23
Payer: COMMERCIAL

## 2024-11-01 ENCOUNTER — PATIENT OUTREACH (OUTPATIENT)
Dept: CARE COORDINATION | Facility: CLINIC | Age: 54
End: 2024-11-01
Payer: COMMERCIAL

## 2024-11-01 NOTE — PROGRESS NOTES
Clinic Care Coordination Contact      Direct care provided in primary language, no  needed.     Follow Up Progress Note      Assessment: Confirmed health insurance re-instated as of today. Patient and daughter updated with the info. Patient requested to assist her refill her meds. Daughter states she went to [facility] requested for refills but was told insurance not active. CCRN connected patient/daughter to [facility] today and requested to refill all prescriptions. Daughter can pick it up today around noon. Daughter will call CCRN directly if unable to  prescriptions.     Patient states she noticed less bleeding to dialysis site and her daughter is able to communicate with the nurse at dialysis as needed for support. No additional support needed. Instructed daughter to notify     Care Plans  Care Plan: DME Completed 2/28/2023      Problem: I want Pull-Ups in the next 60 days so that I may better manage my incontinence symptoms.  Resolved 2/28/2023      Goal: Incontinent supplies  Completed 2/28/2023      Start Date: 1/25/2023 Expected End Date: 3/25/2023    This Visit's Progress: 100%    Note:     Goal Statement: I want Pull-Ups in the next 60 days so that I may better manage my incontinence symptoms.     Barriers: Language  Strengths: Accepting of support  Patient expressed understanding of goal: Yes    Action steps to achieve this goal:  1. I will answer my phone when [employer] [phone]  contacts me to deliver or  my pull-ups.  3. I will follow up with [facility] in the next month regarding this goal for additional coordination support.    Note: Order for pull-ups was sent to DME Provider 1/10/23.                             Care Plan: County Benefits Completed 4/24/2023      Problem: I will apply for county financial benefits within the next 90 days.  Resolved 4/24/2023      Goal: Rent Assistance  Completed 4/24/2023      Start Date: 2/28/2023 Expected End  Date: 5/28/2023    Recent Progress: 10%    Note:     Goal statement: I will apply for [facility] financial benefits within the next 90 days.     Barriers: Language   Strengths: Accepting of support.  Patient expressed understanding of goal: Yes     Action steps to achieve this goal:   1.  I will answer my phone when I am contacted by the [facility] FRW to schedule an initial appointment.   2.  I will provide all necessary documentation and information to complete a [facility] application for benefits with the support of the FRW.   3.  I will call my FRW if I have questions or concerns regarding my [facility] benefits application.     Note: Benefits requesting is Rental, referral submitted by CHW on 2/28/23.     Updated note on 4/25/2023 by CCRN.   - SNAP - $530 for 2 people   - rental assistance $260 per month - max received for 2 people.   - rent - $1150                              Care Plan: SSI Completed 4/24/2023      Problem: I will find out if I am eligible to apply for Social Security benefits.  Resolved 4/24/2023      Goal: SSI  Completed 4/24/2023      Start Date: 2/28/2023 Expected End Date: 2/28/2024    Recent Progress: 40%    Note:     Goal statement: I will find out if I am eligible to apply for Social Security benefits.    Barriers: Language  Strengths: Agreeable to support.   Patient expressed understanding of goal: Yes    Action steps to achieve this goal:  1.  I will follow the instructions that Disability Specialists recommended on 9/30/22. Steps per Ayleen at :     Once patient receives her [place] Citizenship paperwork,  a) Patient should go to the Social Security office in person.  b) Patient should show the Social  the original paperwork of her [place] Citizenship.  During this time, request an in person appointment to apply for Social Security benefits.    2.  I will request assistance as needed and clarify if my [facility] Worker can take me to the SSA Office.   3.  I will attend an  in-person appointment to apply for social security income as scheduled on. TBD  4.  I will follow up with [facility] in the next month regarding this goal.    Note: See encounter 9/30/22, for instructions from .     SSI starting April, 2023 - $609/month.ndah 4/25/2023                                Care Plan: DEMs Completed 8/25/2023      Problem: Impaired mobility  Resolved 8/25/2023      Goal: I want a wheelchair. w/c cushion and shower chair in the next 90 days.  Completed 8/25/2023      Start Date: 4/24/2023 Expected End Date: 9/24/2023    Recent Progress: 60%    Priority: High    Note:     Barriers: language  Strengths: Accepting of support  Patient expressed understanding of goal: Yes    Action steps to achieve this goal:  1. I will complete a face-to-face appointment with my PCP on 3/22/2023. (Completed)  2. I will complete an OT/PT evaluation for a bath bench lift as scheduled [facility] 8[facility]9 [facility] 10[facility]15AM [facility]. (Completed)  3. I will answer my phone when DME Provider [facility] contacts me to deliver or  my item.  4. I will follow up with CCC in the next month regarding this goal for additional coordination support.    Note: Order for wheel chair and shower chair were sent to DME Provider PCP on 3/22/2023. 5/26 Orders are at [facility] not [facility] - Wheel Chair/cushion delivered.     [facility] [phone]  Complex Rehab Team at [facility] [phone] (bath bench lift)                            Care Plan: PCA Completed 10/23/2023      Problem: Request for early re-assessment of PCA services.  Resolved 10/23/2023      Goal: I will be re-evaluated for increased PCA services in the next 90 days.  Completed 10/23/2023      Start Date: 8/25/2023 Expected End Date: 11/25/2023    Recent Progress: 70%    Priority: High    Note:     Barriers: Language, recent health changes.  Strengths: Willing to accept CCC support.  Patient expressed understanding of goal: Yes    Action  steps to achieve this goal:  1.  I will answer my phone when I am contacted by [facility] to schedule an assessment.   2.  I will make sure I have any supportive family/friends present for my assessment as scheduled on Fri 9/29 at 10AM. (No . Rescheduled)  3. I will make sure I have any supportive family/friends present for my assessment as rescheduled on Fri 10/6/23 at 2:30PM.  4.  I will follow up with [facility] in the next month regarding this goal.   Note: PCP's letter of support for early reassessment faxed on 7/25/23. [facility] [phone], she confirmed fax number [phone].    10/23/2023 - approved for 10 hours per day. Son and daughter are her PCAs.                               Care Plan: Incontinence Supplies Completed 12/22/2023      Problem: Incontinence of feces with fecal urgency  Resolved 12/22/2023      Goal: I want check coverage for more Pull-Ups in the next 60 days so that I can better manage my incontinence symptoms.  Completed 12/22/2023      Start Date: 10/4/2023 Expected End Date: 12/4/2023    Recent Progress: 100%    Priority: High    Note:     Barriers: Language  Strengths: Accepting of support  Patient expressed understanding of goal: Yes    Action steps to achieve this goal:  1. I will attend an appointment with my PCP to address my need for pull-ups. (Completed, 9/20)  2. I will answer my phone when [facility] contacts me to deliver or  my pull-ups.  3. I will follow up with [facility] in the next month regarding this goal for additional coordination support.    Note: 9/20/23 Order for pull-ups was sent to [facility] [employer] [phone], Fax [phone] on 10/4/23.                             Care Plan: Orthopedics Completed 12/22/2023      Problem: experiencing painful callus on bottom of her foot.  Resolved 12/22/2023      Goal: Patient will attend her orthopedics appointment in the next 12 months.  Completed 12/22/2023      Start Date: 10/23/2023  Expected End Date: 10/23/2024    Recent Progress: 20%    Note:     Barriers: language barrier, low literacy, noncompliance, and lack of knowledge how to navigate complex health care system  Strengths: motivated to attend appt  Patient expressed understanding of goal: Yes    Action steps to achieve this goal:  1. I will answer my phone when I am contacted to schedule my appointment.  2. I will attend my follow-up appointment as scheduled on 11/22/23 at 8:15AM. (Completed)  3. I will schedule a follow up appointment with my orthopedics if it is recommended to do so while I am at the clinic.  4. I will follow up with [facility] regarding this goal at each outreach until it is completed.                               Care Plan: Hematology/oncology       Problem: Beta thalassemia minor       Goal: Patient will attend hematology clinic appointment in the next 12 months.       Start Date: 2/22/2024 Expected End Date: 2/28/2025    This Visit's Progress: 50% Recent Progress: 50%    Priority: High    Note:     Barriers: language barrier, low literacy, noncompliance, and lack of knowledge how to navigate complex health care system  Strengths: motivated to attend appt  Patient expressed understanding of goal: Yes    Action steps to achieve this goal:  1. I will answer my phone when I am contacted to schedule my appointment.  2. I will attend my initial hematology appointment as scheduled 3/22/24. Completed.   3. I will attend my follow up hematology/oncology appt on 4/19/2024 at 9:15am with Dr Zuñiga.Completed  4. I will attend my follow up hematology/oncology appt on 7/19 at 10:45AM Lab, 11:15AM Dr. Zuñiga. Completed.   5.  I will attend my follow up hematology/oncology appt on 1/17/2025 at 1:15pm for lab and 1:45 with Dr. Zuñiga.  6. I will schedule a follow up appointment with my provider if it is recommended to do so while I am at the clinic.  7. I will follow up with [facility] regarding this goal at each outreach until it is  completed.                               Care Plan: Transplant evaluation       Problem: CKD       Goal: Patient will attend her kidney transplant evaluation in the next 12 months.       Start Date: 5/21/2024 Expected End Date: 5/31/2025    This Visit's Progress: 20% Recent Progress: 20%    Priority: High    Note:     Barriers: language barrier, low literacy, noncompliance, and lack of knowledge how to navigate complex health care system  Strengths: motivated to attend appt  Patient expressed understanding of goal: Yes    Action steps to achieve this goal:  1. I will attend my pre-transplant evaluation appointments on 6/12/2024 at 12pm, 12:30pm, and 1pm with MD and PharmD at [facility]. Completed.   2. I will attend my follow up transplant evaluation as scheduled on 8/21/2024 at 10am. Multiple appts scheduled today at [facility]. Completed.   3. I will attend my mammogram on 8/28/24. Cancelled via Mychart  4. I will attend my eye exam on 10/7/2024 at 1:00 pm at [facility] location. No Showed. Eye exam rescheduled on Mon 11/4/24 at 32PM.  5. I will attend my rescheduled mammogram appointment on 11/18/24 at 2:15 pm.   6. I will attend my dental appointment on 4/2/2025 at 11:00 am at [facility].   7. I will schedule a follow up appointment with my providers if it is recommended to do so while I am at the clinic.  8. I will follow up with [facility] regarding this goal at each outreach until it is completed.     Paulo Thomas MD Sahadevan, Meena, MBBS Lori, Nurse transplant coordinator, with [facility] ([phone])  [address]   [place] [address]   [phone] (Work)   [phone] (Fax)     [facility]   Transplant   [phone]                             Care Plan: Physical therapy Completed 10/22/2024      Problem: chronic back pain  Resolved 10/22/2024      Goal: Patient will attend her PT appointments as scheduled in the next 90 days.   Completed 10/22/2024      Start Date: 7/22/2024 Expected End Date: 10/31/2024    This Visit's Progress: 60% Recent Progress: 30%    Priority: High    Note:     Barriers: language barrier, low literacy, noncompliance, and lack of knowledge how to navigate complex health care system  Strengths: motivated to attend appt  Patient expressed understanding of goal: Yes    Action steps to achieve this goal:  1. I will attend my PT appointments as scheduled:     8/7/2024 at 1:00 pm Completed  8/14/2024 at 2:15 pm Completed  8/21/2024 at 2:15 pm. Cancelled due to conflict with SOT appt  8/28/2024 at 11am Completed.   9/5/2024 at 11:30am - canceled conflict with dialysis  9/12/2024 at 11:30am - rescheduled due to conflict with dialysis  9/18/2024 at 11:00 am  Cancelled due to not feeling well  10/14/2024 at 10:00 am Cancelled due to not feeling well    3. I will schedule a follow up appointment with my  if it is recommended to do so while I am at the clinic.  4. I will follow up with [facility] regarding this goal at each outreach until it is completed.     Note: Message sent to [employer] on 7/22/24 to request transportation for PT appts.                               Care Plan: Health Insurance Completed 11/1/2024      Problem: Health insurance lapsed  Resolved 11/1/2024      Goal: Patient would like to re-apply for health insurance in the next 90 days.  Completed 11/1/2024      Start Date: 10/22/2024 Expected End Date: 1/31/2025    This Visit's Progress: 100% Recent Progress: 10%    Note:     Barriers: language   Strengths: Accepting of support,    Patient expressed understanding of goal: Yes     Action steps to achieve this goal:   1.  I will answer my phone when I am contacted by the [facility] FRW to schedule an initial appointment.   2.  I will provide all necessary documentation and information to complete a [place] application.   3.  I will call my FRW if I have questions or concerns regarding my county benefits application.                                     Outreach Frequency: 6 weeks, more frequently as needed    Plan: Daughter will  prescriptions around noon today.

## 2024-11-04 ENCOUNTER — TRANSFERRED RECORDS (OUTPATIENT)
Dept: HEALTH INFORMATION MANAGEMENT | Facility: CLINIC | Age: 54
End: 2024-11-04
Payer: COMMERCIAL

## 2024-11-04 LAB — RETINOPATHY: NEGATIVE

## 2024-11-06 ENCOUNTER — PATIENT OUTREACH (OUTPATIENT)
Dept: CARE COORDINATION | Facility: CLINIC | Age: 54
End: 2024-11-06
Payer: COMMERCIAL

## 2024-11-11 ENCOUNTER — MEDICAL CORRESPONDENCE (OUTPATIENT)
Dept: HEALTH INFORMATION MANAGEMENT | Facility: CLINIC | Age: 54
End: 2024-11-11
Payer: COMMERCIAL

## 2024-11-18 ENCOUNTER — OFFICE VISIT (OUTPATIENT)
Dept: FAMILY MEDICINE | Facility: CLINIC | Age: 54
End: 2024-11-18
Payer: COMMERCIAL

## 2024-11-18 ENCOUNTER — PATIENT OUTREACH (OUTPATIENT)
Dept: CARE COORDINATION | Facility: CLINIC | Age: 54
End: 2024-11-18

## 2024-11-18 VITALS
TEMPERATURE: 97.5 F | HEART RATE: 69 BPM | BODY MASS INDEX: 22.85 KG/M2 | RESPIRATION RATE: 16 BRPM | HEIGHT: 61 IN | DIASTOLIC BLOOD PRESSURE: 60 MMHG | WEIGHT: 121.04 LBS | OXYGEN SATURATION: 96 % | SYSTOLIC BLOOD PRESSURE: 138 MMHG

## 2024-11-18 DIAGNOSIS — N18.6 ANEMIA IN ESRD (END-STAGE RENAL DISEASE) (H): ICD-10-CM

## 2024-11-18 DIAGNOSIS — E11.319 DIABETIC RETINOPATHY ASSOCIATED WITH TYPE 2 DIABETES MELLITUS, MACULAR EDEMA PRESENCE UNSPECIFIED, UNSPECIFIED LATERALITY, UNSPECIFIED RETINOPATHY SEVERITY (H): ICD-10-CM

## 2024-11-18 DIAGNOSIS — I10 PRIMARY HYPERTENSION: ICD-10-CM

## 2024-11-18 DIAGNOSIS — D63.1 ANEMIA IN ESRD (END-STAGE RENAL DISEASE) (H): ICD-10-CM

## 2024-11-18 DIAGNOSIS — N18.6 ESRD (END STAGE RENAL DISEASE) ON DIALYSIS (H): ICD-10-CM

## 2024-11-18 DIAGNOSIS — E87.5 HYPERKALEMIA: ICD-10-CM

## 2024-11-18 DIAGNOSIS — E11.69 TYPE 2 DIABETES MELLITUS WITH OTHER SPECIFIED COMPLICATION, UNSPECIFIED WHETHER LONG TERM INSULIN USE (H): ICD-10-CM

## 2024-11-18 DIAGNOSIS — Z99.2 ESRD (END STAGE RENAL DISEASE) ON DIALYSIS (H): ICD-10-CM

## 2024-11-18 DIAGNOSIS — R73.9 HYPERGLYCEMIA: ICD-10-CM

## 2024-11-18 DIAGNOSIS — E87.70 HYPERVOLEMIA, UNSPECIFIED HYPERVOLEMIA TYPE: Primary | ICD-10-CM

## 2024-11-18 PROCEDURE — G2211 COMPLEX E/M VISIT ADD ON: HCPCS | Performed by: FAMILY MEDICINE

## 2024-11-18 PROCEDURE — 99214 OFFICE O/P EST MOD 30 MIN: CPT | Performed by: FAMILY MEDICINE

## 2024-11-18 RX ORDER — FLASH GLUCOSE SENSOR
KIT MISCELLANEOUS
Qty: 6 EACH | Refills: 3 | Status: SHIPPED | OUTPATIENT
Start: 2024-11-18

## 2024-11-18 ASSESSMENT — ANXIETY QUESTIONNAIRES
4. TROUBLE RELAXING: SEVERAL DAYS
6. BECOMING EASILY ANNOYED OR IRRITABLE: MORE THAN HALF THE DAYS
1. FEELING NERVOUS, ANXIOUS, OR ON EDGE: SEVERAL DAYS
5. BEING SO RESTLESS THAT IT IS HARD TO SIT STILL: NEARLY EVERY DAY
GAD7 TOTAL SCORE: 10
8. IF YOU CHECKED OFF ANY PROBLEMS, HOW DIFFICULT HAVE THESE MADE IT FOR YOU TO DO YOUR WORK, TAKE CARE OF THINGS AT HOME, OR GET ALONG WITH OTHER PEOPLE?: SOMEWHAT DIFFICULT
7. FEELING AFRAID AS IF SOMETHING AWFUL MIGHT HAPPEN: SEVERAL DAYS
GAD7 TOTAL SCORE: 10
GAD7 TOTAL SCORE: 10
7. FEELING AFRAID AS IF SOMETHING AWFUL MIGHT HAPPEN: SEVERAL DAYS
3. WORRYING TOO MUCH ABOUT DIFFERENT THINGS: SEVERAL DAYS
IF YOU CHECKED OFF ANY PROBLEMS ON THIS QUESTIONNAIRE, HOW DIFFICULT HAVE THESE PROBLEMS MADE IT FOR YOU TO DO YOUR WORK, TAKE CARE OF THINGS AT HOME, OR GET ALONG WITH OTHER PEOPLE: SOMEWHAT DIFFICULT
2. NOT BEING ABLE TO STOP OR CONTROL WORRYING: SEVERAL DAYS

## 2024-11-18 NOTE — PROGRESS NOTES
Assessment & Plan     Hypervolemia, unspecified hypervolemia type  Hyperglycemia  Hospitalization last month, no improved/resolved. Euvolemic on exam.     Type 2 diabetes mellitus with other specified complication, unspecified whether long term insulin use (H)  A1c last was 6.6%, not an accurate indicator of glucose because she is on dialysis. Glucometer checked and some hyperglycemia mostly in the evenings.   - Continuous Glucose Sensor (FREESTYLE MICKIE 14 DAY SENSOR) MISC  Dispense: 6 each; Refill: 3    Hyperkalemia  Resolved/stable. Follows with nephrology, dialysis TThSa    Diabetic retinopathy associated with type 2 diabetes mellitus, macular edema presence unspecified, unspecified laterality, unspecified retinopathy severity (H)  Following with ophthalmology.     ESRD (end stage renal disease) on dialysis (H)  Anemia in ESRD (end-stage renal disease) (H)  Follows with nephrology.     Primary hypertension  Well controlled. Recently started on coreg 12.5mg bid.       Return in about 3 months (around 2/18/2025) for diabetes follow up.      Subjective   Barriga is a 54 year old, presenting for the following health issues:  Diabetes, Hypertension, Depression, and Recheck Medication (Pt needs blood glucose sensor refill)    History of Present Illness       Mental Health Follow-up:  Patient presents to follow-up on Depression & Anxiety.Patient's depression since last visit has been:  No change  The patient is not having other symptoms associated with depression.  Patient's anxiety since last visit has been:  No change  The patient is not having other symptoms associated with anxiety.  Any significant life events: No  Patient is not feeling anxious or having panic attacks.  Patient has no concerns about alcohol or drug use.    Diabetes:   She presents for follow up of diabetes.  She is checking home blood glucose three times daily.   She checks blood glucose before and after meals.  Blood glucose is sometimes over 200  "and sometimes under 70. She is aware of hypoglycemia symptoms including shakiness, dizziness, weakness, blurred vision and confusion.    She has no concerns regarding her diabetes at this time.  She is having numbness in feet.            Hypertension: She presents for follow up of hypertension.  She does check blood pressure  regularly outside of the clinic. Outside blood pressures have been over 140/90. She does not follow a low salt diet.     She eats 2-3 servings of fruits and vegetables daily.She consumes 3 sweetened beverage(s) daily.She exercises with enough effort to increase her heart rate 9 or less minutes per day.  She exercises with enough effort to increase her heart rate 3 or less days per week.   She is taking medications regularly.               Objective    /60   Pulse 69   Temp 97.5  F (36.4  C) (Oral)   Resp 16   Ht 1.549 m (5' 1\")   Wt 54.9 kg (121 lb 0.6 oz)   LMP  (LMP Unknown)   SpO2 96%   BMI 22.87 kg/m    Body mass index is 22.87 kg/m .  Physical Exam   GENERAL: alert and no distress  NECK: no adenopathy, no asymmetry, masses, or scars  RESP: lungs clear to auscultation - no rales, rhonchi or wheezes  CV: regular rate and rhythm, normal S1 S2, no S3 or S4, no murmur, click or rub, no peripheral edema  ABDOMEN: soft, nontender, no hepatosplenomegaly, no masses and bowel sounds normal  MS: no gross musculoskeletal defects noted, no edema. Fistula with palpable thrill.     No results found for any visits on 11/18/24.  No results found for this or any previous visit (from the past 24 hours).        Signed Electronically by: Giancarlo Arizmendi MD    "

## 2024-11-18 NOTE — PROGRESS NOTES
Clinic Care Coordination Contact  Community Health Worker Follow Up  Spoke with Fritz Matthews (Daughter) - Declined      Care Gaps:     Health Maintenance Due   Topic Date Due    ZOSTER IMMUNIZATION (1 of 2) Never done    COVID-19 Vaccine (3 - Moderna risk series) 06/30/2022    MAMMO SCREENING  08/09/2024    LIPID  10/23/2024    HEMOGLOBIN  01/19/2025     Postponed to next outreach.     Care Plan:   Care Plan: Hematology/oncology       Problem: Beta thalassemia minor       Goal: Patient will attend hematology clinic appointment in the next 12 months.       Start Date: 2/22/2024 Expected End Date: 2/28/2025    This Visit's Progress: 50% Recent Progress: 50%    Priority: High    Note:     Barriers: language barrier, low literacy, noncompliance, and lack of knowledge how to navigate complex health care system  Strengths: motivated to attend appt  Patient expressed understanding of goal: Yes    Action steps to achieve this goal:  1. I will answer my phone when I am contacted to schedule my appointment.  2. I will attend my initial hematology appointment as scheduled 3/22/24. Completed.   3. I will attend my follow up hematology/oncology appt on 4/19/2024 at 9:15am with Dr Zuñiga.Completed  4. I will attend my follow up hematology/oncology appt on 7/19 at 10:45AM Lab, 11:15AM Dr. Zuñiga. Completed.   5.  I will attend my follow up hematology/oncology appt on 1/17/2025 at 1:15pm for lab and 1:45 with Dr. Zuñiga.  6. I will schedule a follow up appointment with my provider if it is recommended to do so while I am at the clinic.  7. I will follow up with [facility] regarding this goal at each outreach until it is completed.                               Care Plan: Transplant evaluation       Problem: CKD       Goal: Patient will attend her kidney transplant evaluation in the next 12 months.       Start Date: 5/21/2024 Expected End Date: 5/31/2025    This Visit's Progress: 30% Recent Progress: 20%    Priority: High     Note:     Barriers: language barrier, low literacy, noncompliance, and lack of knowledge how to navigate complex health care system  Strengths: motivated to attend appt  Patient expressed understanding of goal: Yes    Action steps to achieve this goal:  1. I will attend my pre-transplant evaluation appointments on 6/12/2024 at 12pm, 12:30pm, and 1pm with MD and PharmD at [facility]. Completed.   2. I will attend my follow up transplant evaluation as scheduled on 8/21/2024 at 10am. Multiple appts scheduled today at [facility]. Completed.   3. I will attend my mammogram on 8/28/24. Cancelled via Mychart  4. I will attend my eye exam on 10/7/2024 at 1:00 pm at [facility] location. No Showed. Eye exam rescheduled on Mon 11/4/24 at 32PM. Completed  5. I will attend my rescheduled mammogram appointment on 11/18/24 at 2:15 pm. No Showed. Mammogram rescheduled to 12/4/24 at 12:45PM.  6. I will attend my dental appointment on 4/2/2025 at 11:00 am at [facility] location.   7. I will schedule a follow up appointment with my providers if it is recommended to do so while I am at the clinic.  8. I will follow up with [facility] regarding this goal at each outreach until it is completed.     Paulo Thomas MD Sahadevan, Meena, MBBS Lori, Nurse transplant coordinator, with [facility] ([phone])  [address] [place] [address] [id number]   [place] [address]   [phone] (Work)   [phone] (Fax)     [facility]   Transplant   [phone]                           Intervention and Education during outreach:     Hematology/Oncology  CHW reminded [place] of patients appointment on 1/17/2025 at 1:15pm for lab and 1:45 with Dr. Zuñiga.    Transplant Evaluation  CHW inquired about appointment eye exam at [facility] on 11/4. [place] confirmed, patient completed appointment.  [place] shares that Mammo gram today was rescheduled to 12/4 at 12:45PM  CHW reminded [place] of patients dental appointment on  4/2/2025 at 11:00 am at [facility].     CHW Plan: 1 month follow up     Irene Samson  [facility] Care Coordination  [facility]    Phone: [phone]

## 2024-11-20 ENCOUNTER — OFFICE VISIT (OUTPATIENT)
Dept: PODIATRY | Facility: CLINIC | Age: 54
End: 2024-11-20
Payer: COMMERCIAL

## 2024-11-20 VITALS — DIASTOLIC BLOOD PRESSURE: 74 MMHG | BODY MASS INDEX: 22.86 KG/M2 | SYSTOLIC BLOOD PRESSURE: 120 MMHG | WEIGHT: 121 LBS

## 2024-11-20 DIAGNOSIS — M79.672 FOOT PAIN, BILATERAL: ICD-10-CM

## 2024-11-20 DIAGNOSIS — E11.42 DIABETIC POLYNEUROPATHY ASSOCIATED WITH TYPE 2 DIABETES MELLITUS (H): Primary | ICD-10-CM

## 2024-11-20 DIAGNOSIS — M20.11 HAV (HALLUX ABDUCTO VALGUS), RIGHT: ICD-10-CM

## 2024-11-20 DIAGNOSIS — M79.671 FOOT PAIN, BILATERAL: ICD-10-CM

## 2024-11-20 DIAGNOSIS — L84 PRE-ULCERATIVE CALLUSES: ICD-10-CM

## 2024-11-20 NOTE — PROGRESS NOTES
Podiatry / Foot and Ankle Surgery Progress Note    November 20, 2024    Subject: Patient was seen for painful calluses.  Here with her daughter who is interpreting for her.  Gets recurrent preulcerative lesions to both feet.  Pain is 5 out of 10.  Worse with walking and pressure.    Objective:  Vitals: /74   Wt 54.9 kg (121 lb)   LMP  (LMP Unknown)   BMI 22.86 kg/m    BMI= Body mass index is 22.86 kg/m .    A1C: 6.6 (9/16/24)     General appearance: Patient is alert and fully cooperative with history & exam.  No sign of distress is noted during the visit.     Dermatologic: Preulcerative hyperkeratotic lesions to the plantar aspects of the first and fifth metatarsal heads bilaterally.  Upon debridement of the right first metatarsal head callus ulceration is healed.  No other open lesions or signs of acute infection noted.     Vascular: DP & PT pulses are intact & regular bilaterally.  No significant edema or varicosities noted.  CFT and skin temperature is normal to both lower extremities.     Neurologic: Lower extremity sensation is diminished to feet.     Musculoskeletal: Patient is ambulatory without assistive device or brace.  Prominent first metatarsal heads medially both feet.       Assessment:    Diabetic polyneuropathy associated with type 2 diabetes mellitus (H)  Healed foot ulcer  Hav (hallux abducto valgus), right  Bilateral pes planus  Pre-ulcerative calluses     Medical Decision Making/Plan:  Reviewed patient's chart in epic.    At this time, the preulcerative callus were debrided down.  At this time they will use a pumice stone or a foot file 2-3 times a week to help keep the callus from building up.  She can go back to her regular socks and diabetic shoes.      Follow up as needed.      All questions were answered to patient and patient's daughter satisfaction and they will call for the questions or concerns.     Procedure: After verbal consent, the hyperkeratotic lesions above were debrided  using a #15 blade without incident. Patient tolerated procedure well.      Patient Risk Factor:  Patient is a moderate risk factor for infection.       Grecia Hearn DPM, Podiatry/Foot and Ankle Surgery

## 2024-11-20 NOTE — LETTER
11/20/2024      Nithya Matthews  [address] [place] [address]      Dear Colleague,    Thank you for referring your patient, Nithya Matthews, to the [facility]OD[facility]. Please see a copy of my visit note below.    Podiatry / Foot and Ankle Surgery Progress Note    November 20, 2024    Subject: Patient was seen for painful calluses.  Here with her daughter who is interpreting for her.  Gets recurrent preulcerative lesions to both feet.  Pain is 5 out of 10.  Worse with walking and pressure.    Objective:  Vitals: /74   Wt 54.9 kg (121 lb)   LMP  (LMP Unknown)   BMI 22.86 kg/m    BMI= Body mass index is 22.86 kg/m .    A1C: 6.6 (9/16/24)     General appearance: Patient is alert and fully cooperative with history & exam.  No sign of distress is noted during the visit.     Dermatologic: Preulcerative hyperkeratotic lesions to the plantar aspects of the first and fifth metatarsal heads bilaterally.  Upon debridement of the right first metatarsal head callus ulceration is healed.  No other open lesions or signs of acute infection noted.     Vascular: DP & PT pulses are intact & regular bilaterally.  No significant edema or varicosities noted.  CFT and skin temperature is normal to both lower extremities.     Neurologic: Lower extremity sensation is diminished to feet.     Musculoskeletal: Patient is ambulatory without assistive device or brace.  Prominent first metatarsal heads medially both feet.       Assessment:    Diabetic polyneuropathy associated with type 2 diabetes mellitus (H)  Healed foot ulcer  Hav (hallux abducto valgus), right  Bilateral pes planus  Pre-ulcerative calluses     Medical Decision Making/Plan:  Reviewed patient's chart in epic.    At this time, the preulcerative callus were debrided down.  At this time they will use a pumice stone or a foot file 2-3 times a week to help keep the callus from building up.  She can go back to her regular socks and diabetic shoes.      Follow  up as needed.      All questions were answered to patient and patient's daughter satisfaction and they will call for the questions or concerns.     Procedure: After verbal consent, the hyperkeratotic lesions above were debrided using a #15 blade without incident. Patient tolerated procedure well.      Patient Risk Factor:  Patient is a moderate risk factor for infection.       Grecia Hearn DPM, Podiatry/Foot and Ankle Surgery      Again, thank you for allowing me to participate in the care of your patient.        Sincerely,        Grecia Hearn DPM, Podiatry/Foot and Ankle Surgery

## 2024-12-03 ENCOUNTER — PATIENT OUTREACH (OUTPATIENT)
Dept: CARE COORDINATION | Facility: CLINIC | Age: 54
End: 2024-12-03
Payer: COMMERCIAL

## 2024-12-03 NOTE — PROGRESS NOTES
Clinic Care Coordination Contact  Care Coordination Clinician Chart Review    Situation: Patient chart reviewed by Care Coordinator.       Background: Care Coordination Program started: 3/1/2022. Initial assessment completed and patient-centered care plan(s) were developed with participation from patient. Lead CC handed patient off to CHW for continued outreaches.       Assessment: Per chart review, patient outreach completed by CC CHW on 11/18/24.  Patient is actively working to accomplish goal(s). Patient's goal(s) appropriate and relevant at this time. Patient is not due for updated Plan of Care.  Assessments will be completed annually or as needed/with change of patient status.    CCRN plans to reach out to transplant team after patient completed dental exam and mammogram.      Care Plan: Hematology/oncology       Problem: Beta thalassemia minor       Goal: Patient will attend hematology clinic appointment in the next 12 months.       Start Date: 2/22/2024 Expected End Date: 2/28/2025    This Visit's Progress: 50% Recent Progress: 50%    Priority: High    Note:     Barriers: language barrier, low literacy, noncompliance, and lack of knowledge how to navigate complex health care system  Strengths: motivated to attend appt  Patient expressed understanding of goal: Yes    Action steps to achieve this goal:  1. I will answer my phone when I am contacted to schedule my appointment.  2. I will attend my initial hematology appointment as scheduled 3/22/24. Completed.   3. I will attend my follow up hematology/oncology appt on 4/19/2024 at 9:15am with Dr Zuñiga.Completed  4. I will attend my follow up hematology/oncology appt on 7/19 at 10:45AM Lab, 11:15AM Dr. Zuñiga. Completed.   5.  I will attend my follow up hematology/oncology appt on 1/17/2025 at 1:15pm for lab and 1:45 with Dr. Zuñiga.  6. I will schedule a follow up appointment with my provider if it is recommended to do so while I am at the clinic.  7. I  will follow up with [facility] regarding this goal at each outreach until it is completed.                               Care Plan: Transplant evaluation       Problem: CKD       Goal: Patient will attend her kidney transplant evaluation in the next 12 months.       Start Date: 5/21/2024 Expected End Date: 5/31/2025    This Visit's Progress: 30% Recent Progress: 20%    Priority: High    Note:     Barriers: language barrier, low literacy, noncompliance, and lack of knowledge how to navigate complex health care system  Strengths: motivated to attend appt  Patient expressed understanding of goal: Yes    Action steps to achieve this goal:  1. I will attend my pre-transplant evaluation appointments on 6/12/2024 at 12pm, 12:30pm, and 1pm with MD and PharmD [place] [facility] Completed.   2. I will attend my follow up transplant evaluation as scheduled on 8/21/2024 at 10am. Multiple appts scheduled today at [facility]. Completed.   3. I will attend my mammogram on 8/28/24. Cancelled via Mychart  4. I will attend my eye exam on 10/7/2024 at 1:00 pm at [facility] location. No Showed. Eye exam rescheduled on Mon 11/4/24 at 32PM. Completed  5. I will attend my rescheduled mammogram appointment on 11/18/24 at 2:15 pm. No Showed. Mammogram rescheduled to 12/4/24 at 12:45PM.  6. I will attend my dental appointment on 4/2/2025 at 11:00 am at [facility] at the [facility] location.   7. I will schedule a follow up appointment with my providers if it is recommended to do so while I am at the clinic.  8. I will follow up with [facility] regarding this goal at each outreach until it is completed.     Paulo Thomas MD Sahadevan, Meena, MBBS Lori, Nurse transplant coordinator, with [facility] ([phone])  [address]   [place] [address]   [phone] (Work)   [phone] (Fax)     [facility]   Transplant   [phone]                                  Plan/Recommendations: The patient will continue  working with Care Coordination to achieve goal(s) as above. CHW will continue outreaches at minimum every 30 days and will involve Lead CC as needed or if patient is ready to move to Maintenance. Lead CC will continue to monitor CHW outreaches and patient's progress to goal(s) every 6 weeks.     Plan of Care updated and sent to patient: No

## 2024-12-04 ENCOUNTER — ANCILLARY PROCEDURE (OUTPATIENT)
Dept: MAMMOGRAPHY | Facility: CLINIC | Age: 54
End: 2024-12-04
Payer: COMMERCIAL

## 2024-12-04 DIAGNOSIS — Z12.31 VISIT FOR SCREENING MAMMOGRAM: ICD-10-CM

## 2024-12-04 PROCEDURE — 77067 SCR MAMMO BI INCL CAD: CPT | Mod: TC | Performed by: RADIOLOGY

## 2024-12-17 DIAGNOSIS — E78.5 HYPERLIPIDEMIA LDL GOAL <100: ICD-10-CM

## 2024-12-17 RX ORDER — ATORVASTATIN CALCIUM 80 MG/1
TABLET, FILM COATED ORAL
Qty: 90 TABLET | Refills: 3 | Status: SHIPPED | OUTPATIENT
Start: 2024-12-17

## 2024-12-18 ENCOUNTER — PATIENT OUTREACH (OUTPATIENT)
Dept: CARE COORDINATION | Facility: CLINIC | Age: 54
End: 2024-12-18
Payer: COMMERCIAL

## 2024-12-18 ENCOUNTER — NURSE TRIAGE (OUTPATIENT)
Dept: FAMILY MEDICINE | Facility: CLINIC | Age: 54
End: 2024-12-18
Payer: COMMERCIAL

## 2024-12-18 NOTE — PROGRESS NOTES
Clinic Care Coordination Contact      CHW spoke with Fritz (daughter) who was interpreting for the patient. The patient shared that her arm had swelled up since dialysis yesterday. CHW asked the patient if she would like to speak with a Nurse. The patient stated yes. CHW proceed to call the patient's home clinic ([facility]) and they transferred the CHW to the Nurse's line  The CHW waited until a Nurse answered and connected rFitz and the patient with the Nurse. CHW assistance was not needed any further and hung up the line.      Plan: Care Coordinator will try to reach patient again in 10 business days.    ANDRES Boyce   [phone]  Clinic Care Coordination  [facility]

## 2024-12-18 NOTE — TELEPHONE ENCOUNTER
"CHW calling with patient and daughter on the line to interpret--warm transfer. Declined language line .    Nurse Triage SBAR    Is this a 2nd Level Triage? YES, LICENSED PRACTITIONER REVIEW IS REQUIRED    Situation:  Right arm swelling x7 days.    Background:   Dialysis patient--AV fistula in right upper arm.  Last dialysis treatment [id number] 12/17/24.    Assessment:   CHW worker initially said swelling occurred after 12/17 dialysis treatment, patient denies this.  Swelling started 1 week ago, unrelated to dialysis.  \"Entire arm is swollen.\"  Endorses 5/10 pain--constant, burning.  Difficult to move/use arm--stiff, painful.  Feels warm to touch.  Patient denies bruising, redness.  Denies fever, breathing difficulty, chest pain.    Protocol Recommended Disposition:   Go to ED Now    Recommendation:   Recommended ED now--emphasized potential for infection, risk with fistula in affected arm.  Per patient, \"I will go to dialysis tomorrow and have them look at it, if they think I should go to the ER then I will go.\"  Writer encouraged patient/daughter not to wait.  Reviewed red flag symptoms requiring emergent care: fever, chills, breathing difficulty. Increased swelling and/or pain. Loss of sensation. Redness/warmth/drainage.    Patient/daughter verbalize understanding.    Does the patient meet one of the following criteria for ADS visit consideration? 16+ years old, with an [facility] PCP     TIP  Providers, please consider if this condition is appropriate for management at one of our Acute and Diagnostic Services sites.     If patient is a good candidate, please use dotphrase <dot>triageresponse and select Refer to ADS to document.     Reason for Disposition   SEVERE arm swelling (e.g., all of arm looks swollen)    Additional Information   Negative: SEVERE difficulty breathing (e.g., struggling for each breath, speaks in single words)   Negative: Sounds like a life-threatening emergency to the triager   " Negative: Chest pain   Negative: Followed an arm injury   Negative: [1] Followed an insect bite AND [2] localized swelling (e.g., small area of puffy or swollen skin)   Negative: Swelling of wrist is main symptom   Negative: Swelling of elbow is main symptom   Negative: Cast problems or questions   Negative: SEVERE difficulty breathing (e.g., struggling for each breath, speaks in single words)   Negative: Shock suspected (e.g., cold/pale/clammy skin, too weak to stand, low BP, rapid pulse)   Negative: Cracked or broken central line or PICC Line   Negative: Sounds like a life-threatening emergency to the triager   Negative: IV not running or running slowly   Negative: Pain, redness, or swelling intravenous (IV) site or along course of vein    Protocols used: Arm Swelling and Edema-A-AH, IV Site and Other Symptoms-A-AH

## 2024-12-31 ENCOUNTER — PATIENT OUTREACH (OUTPATIENT)
Dept: CARE COORDINATION | Facility: CLINIC | Age: 54
End: 2024-12-31
Payer: COMMERCIAL

## 2024-12-31 NOTE — PROGRESS NOTES
Clinic Care Coordination Contact  [facility]/Voicemail    Clinical Data: Care Coordinator Outreach    Outreach Documentation Number of Outreach Attempt   12/31/2024  10:32 AM 1     Per Language Line Solution [phone], Jamari  not available at this time.     Plan: Care Coordinator will try to reach patient again in 2-3 business days.    Irene Samson  Clinic Care Coordination  [facility]    Phone: [phone]

## 2025-01-02 ENCOUNTER — PATIENT OUTREACH (OUTPATIENT)
Dept: CARE COORDINATION | Facility: CLINIC | Age: 55
End: 2025-01-02
Payer: COMMERCIAL

## 2025-01-02 NOTE — PROGRESS NOTES
Preventive Care Visit  [facility] PARTHAGUILHERME Arizmendi MD, Family Medicine  Aug 28, 2024      Assessment & Plan     Routine general medical examination at a health care facility  Up to date.     Type 2 diabetes mellitus with other specified complication, unspecified whether long term insulin use (H)  Her sensors keep falling off her arm and she is wondering if she can get any extras? - she may need to call  if this continues to happen. Insurance will not cover more. Recently seen by [facility] and lantus decreased, started ozempic. No longer having symptomatic lows. She doesn't want to start the ozempic until she uses the rest of her victoza.    ESRD (end stage renal disease) on dialysis (H)  Primary hypertension  Will only increase lisinopril, she denies any hypotension.   - lisinopril (ZESTRIL) 40 MG tablet  Dispense: 90 tablet; Refill: 3        Counseling  Appropriate preventive services were addressed with this patient via screening, questionnaire, or discussion as appropriate for fall prevention, nutrition, physical activity, Tobacco-use cessation, social engagement, weight loss and cognition.  Checklist reviewing preventive services available has been given to the patient.  Reviewed patient's diet, addressing concerns and/or questions.   She is at risk for lack of exercise and has been provided with information to increase physical activity for the benefit of her well-being.       Return in about 2 months (around 10/28/2024).      Subjective   Barriga is a 54 year old, presenting for the following:  Physical        8/28/2024     2:03 PM   Additional Questions   Roomed by Mary FUNK   Accompanied by daughter Fritz       Health Care Directive  Patient does not have a Health Care Directive or Living Will: Discussed advance care planning with patient; information given to patient to review.    HPI              8/28/2024   General Health   How would you rate your overall physical health? (!) DECLINE    Feel stress (tense, anxious, or unable to sleep) Patient declined            8/28/2024   Nutrition   Three or more servings of calcium each day? Yes   Diet: I don't know   How many servings of fruit and vegetables per day? (!) 2-3   How many sweetened beverages each day? (!) 2            8/28/2024   Exercise   Days per week of moderate/strenous exercise 3 days            8/28/2024   Social Factors   Frequency of gathering with friends or relatives Patient declined   Worry food won't last until get money to buy more Patient declined   Food not last or not have enough money for food? Patient declined   Do you have housing? (Housing is defined as stable permanent housing and does not include staying ouside in a car, in a tent, in an abandoned building, in an overnight shelter, or couch-surfing.) Patient declined   Are you worried about losing your housing? Patient declined   Lack of transportation? Patient declined   Unable to get utilities (heat,electricity)? Patient declined            8/28/2024   Fall Risk   Fallen 2 or more times in the past year? No   Trouble with walking or balance? Yes   Gait Speed Test Interpretation Greater than 5.01 seconds - ABNORMAL              8/28/2024   Dental   Dentist two times every year? (!) DECLINE            8/28/2024   TB Screening   Were you born outside of the [place]? Decline              Today's PHQ-2 Score:       1/22/2024    11:09 AM   PHQ-2 ( 1999 [employer])   Q1: Little interest or pleasure in doing things 1   Q2: Feeling down, depressed or hopeless 1   PHQ-2 Score 2   Q1: Little interest or pleasure in doing things Several days   Q2: Feeling down, depressed or hopeless Several days   PHQ-2 Score 2         8/28/2024   Substance Use   Alcohol more than 3/day or more than 7/wk No   Do you use any other substances recreationally? No        Social History     Tobacco Use    Smoking status: Never     Passive exposure: Never    Smokeless tobacco: Never   Vaping Use    Vaping status:  "Never Used   Substance Use Topics    Alcohol use: Not Currently    Drug use: Never           8/9/2022   LAST FHS-7 RESULTS   1st degree relative breast or ovarian cancer No   Any relative bilateral breast cancer No   Any male have breast cancer No   Any ONE woman have BOTH breast AND ovarian cancer No   Any woman with breast cancer before 50yrs No   2 or more relatives with breast AND/OR ovarian cancer No   2 or more relatives with breast AND/OR bowel cancer No           Mammogram Screening - Mammogram every 1-2 years updated in Health Maintenance based on mutual decision making        8/28/2024   STI Screening   New sexual partner(s) since last STI/HIV test? No        History of abnormal Pap smear: No - age 30- 64 PAP with HPV every 5 years recommended        8/17/2021     8:24 AM   PAP / HPV   PAP-ABSTRACT See Scanned Document           This result is from an external source.     ASCVD Risk   The ASCVD Risk score (Slava FRASER, et al., 2019) failed to calculate for the following reasons:    The valid total cholesterol range is 130 to 320 mg/dL           Reviewed and updated as needed this visit by Provider                             Objective    Exam  BP (!) 166/70   Pulse 74   Temp 98.2  F (36.8  C) (Oral)   Resp 16   Ht 1.549 m (5' 1\")   Wt 52.4 kg (115 lb 9 oz)   LMP  (LMP Unknown)   SpO2 96%   BMI 21.84 kg/m     Estimated body mass index is 21.84 kg/m  as calculated from the following:    Height as of this encounter: 1.549 m (5' 1\").    Weight as of this encounter: 52.4 kg (115 lb 9 oz).    Physical Exam  GENERAL: alert and no distress  NECK: no adenopathy, no asymmetry, masses, or scars  RESP: lungs clear to auscultation - no rales, rhonchi or wheezes  CV: regular rate and rhythm, normal S1 S2, no S3 or S4, no murmur, click or rub, no peripheral edema  ABDOMEN: soft, nontender, no hepatosplenomegaly, no masses and bowel sounds normal  MS: no gross musculoskeletal defects noted, no " edema        Signed Electronically by: Giancarlo Arizmendi MD     done

## 2025-01-02 NOTE — PROGRESS NOTES
Clinic Care Coordination Contact  Community Health Worker Follow Up  Spoke with patient and Fritz (Daughter). Patient declined  and preferred daughter to interpret for call. CHW reminded patient to update consent form at next visit.     Care Gaps:     Health Maintenance Due   Topic Date Due    ZOSTER IMMUNIZATION (1 of 2) Never done    COVID-19 Vaccine (3 - Moderna risk series) 06/30/2022    LIPID  10/23/2024    PHQ-2 (once per calendar year)  01/01/2025    HEMOGLOBIN  01/19/2025     Scheduled 2/19/25 at 2:10PM with Dr. Arizmendi      Care Plan:   Care Plan: Hematology/oncology       Problem: Beta thalassemia minor       Goal: Patient will attend hematology clinic appointment in the next 12 months.       Start Date: 2/22/2024 Expected End Date: 2/28/2025    This Visit's Progress: 50% Recent Progress: 50%    Priority: High    Note:     Barriers: language barrier, low literacy, noncompliance, and lack of knowledge how to navigate complex health care system  Strengths: motivated to attend appt  Patient expressed understanding of goal: Yes    Action steps to achieve this goal:  1. I will answer my phone when I am contacted to schedule my appointment.  2. I will attend my initial hematology appointment as scheduled 3/22/24. Completed.   3. I will attend my follow up hematology/oncology appt on 4/19/2024 at 9:15am with Dr Zuñiga.Completed  4. I will attend my follow up hematology/oncology appt on 7/19 at 10:45AM Lab, 11:15AM Dr. Zuñiga. Completed.   5.  I will attend my follow up hematology/oncology appt on 1/17/2025 at 1:15pm for lab and 1:45 with Dr. Zuñiga.  6. I will schedule a follow up appointment with my provider if it is recommended to do so while I am at the clinic.  7. I will follow up with [facility] regarding this goal at each outreach until it is completed.                               Care Plan: Transplant evaluation       Problem: CKD       Goal: Patient will attend her kidney transplant evaluation in the  next 12 months.       Start Date: 5/21/2024 Expected End Date: 5/31/2025    This Visit's Progress: 40% Recent Progress: 30%    Priority: High    Note:     Barriers: language barrier, low literacy, noncompliance, and lack of knowledge how to navigate complex health care system  Strengths: motivated to attend appt  Patient expressed understanding of goal: Yes    Action steps to achieve this goal:  1. I will attend my pre-transplant evaluation appointments on 6/12/2024 at 12pm, 12:30pm, and 1pm with MD and PharmD at [facility]. Completed.   2. I will attend my follow up transplant evaluation as scheduled on 8/21/2024 at 10am. Multiple appts scheduled today at [facility]. Completed.   3. I will attend my mammogram on 8/28/24. Cancelled via Mychart  4. I will attend my eye exam on 10/7/2024 at 1:00 pm at [facility] location. No Showed. Eye exam rescheduled on Mon 11/4/24 at 32PM. Completed  5. I will attend my rescheduled mammogram appointment on 11/18/24 at 2:15 pm. No Showed. Mammogram rescheduled to 12/4/24 at 12:45PM. Completed  6. I will attend my dental appointment on 4/2/2025 at 11:00 am at [facility] location.   7. I will schedule a follow up appointment with my providers if it is recommended to do so while I am at the clinic.  8. I will follow up with [facility] regarding this goal at each outreach until it is completed.     Paulo Thomas MD Sahadevan, Meena, MBBS Lori, Nurse transplant coordinator, with [facility] ([phone])  [address]   [place] [address]   [phone] (Work)   [phone] (Fax)     [facility]   Transplant   [phone]                           Intervention and Education during outreach:   Fritz shares that patients right arm swelling is better, dialysis team was notified, no concerns at this time.     Hematology/Oncology  CHW reminded Fritz of patients appointment on 1/17/2025 at 1:15pm for lab and 1:45 with Dr. Zuñiga.      Transplant Evaluation  [facility] confirmed patient completed Mammogram on  12/4.  CHW reminded Fritz of patients dental appointment on 4/2/2025 at 11:00 am at [facility] location.     CHW Plan: 1 month follow up   Review goals    Irene Samson  Clinic Care Coordination  [facility]    Phone: [phone]

## 2025-01-14 DIAGNOSIS — Z99.2 ANEMIA IN CHRONIC KIDNEY DISEASE, ON CHRONIC DIALYSIS (H): ICD-10-CM

## 2025-01-14 DIAGNOSIS — Z99.2 ESRD (END STAGE RENAL DISEASE) ON DIALYSIS (H): Primary | ICD-10-CM

## 2025-01-14 DIAGNOSIS — D63.1 ANEMIA IN CHRONIC KIDNEY DISEASE, ON CHRONIC DIALYSIS (H): ICD-10-CM

## 2025-01-14 DIAGNOSIS — N18.6 ESRD (END STAGE RENAL DISEASE) ON DIALYSIS (H): Primary | ICD-10-CM

## 2025-01-14 DIAGNOSIS — D56.3 BETA THALASSEMIA MINOR: ICD-10-CM

## 2025-01-14 DIAGNOSIS — E61.1 IRON DEFICIENCY: ICD-10-CM

## 2025-01-14 DIAGNOSIS — N18.6 ANEMIA IN CHRONIC KIDNEY DISEASE, ON CHRONIC DIALYSIS (H): ICD-10-CM

## 2025-01-16 ENCOUNTER — PATIENT OUTREACH (OUTPATIENT)
Dept: CARE COORDINATION | Facility: CLINIC | Age: 55
End: 2025-01-16
Payer: COMMERCIAL

## 2025-01-16 NOTE — PROGRESS NOTES
Clinic Care Coordination Contact  Care Coordination Clinician Chart Review    Situation: Patient chart reviewed by Care Coordinator.       Background: Care Coordination Program started: 3/1/2022. Initial assessment completed and patient-centered care plan(s) were developed with participation from patient. Lead CC handed patient off to CHW for continued outreaches.       Assessment: Per chart review, patient outreach completed by CC CHW on 1/2/25.  Patient is actively working to accomplish goal(s). Patient's goal(s) appropriate and relevant at this time. Patient is not due for updated Plan of Care.  Assessments will be completed annually or as needed/with change of patient status.      Care Plan: Hematology/oncology       Problem: Beta thalassemia minor       Goal: Patient will attend hematology clinic appointment in the next 12 months.       Start Date: 2/22/2024 Expected End Date: 2/28/2025    This Visit's Progress: 50% Recent Progress: 50%    Priority: High    Note:     Barriers: language barrier, low literacy, noncompliance, and lack of knowledge how to navigate complex health care system  Strengths: motivated to attend appt  Patient expressed understanding of goal: Yes    Action steps to achieve this goal:  1. I will answer my phone when I am contacted to schedule my appointment.  2. I will attend my initial hematology appointment as scheduled 3/22/24. Completed.   3. I will attend my follow up hematology/oncology appt on 4/19/2024 at 9:15am with Dr Zuñiga.Completed  4. I will attend my follow up hematology/oncology appt on 7/19 at 10:45AM Lab, 11:15AM Dr. Zuñiga. Completed.   5.  I will attend my follow up hematology/oncology appt on 1/17/2025 at 1:15pm for lab and 1:45 with Dr. Zuñiga.  6. I will schedule a follow up appointment with my provider if it is recommended to do so while I am at the clinic.  7. I will follow up with CCC regarding this goal at each outreach until it is completed.                                Care Plan: Transplant evaluation       Problem: CKD       Goal: Patient will attend her kidney transplant evaluation in the next 12 months.       Start Date: 5/21/2024 Expected End Date: 5/31/2025    This Visit's Progress: 40% Recent Progress: 30%    Priority: High    Note:     Barriers: language barrier, low literacy, noncompliance, and lack of knowledge how to navigate complex health care system  Strengths: motivated to attend appt  Patient expressed understanding of goal: Yes    Action steps to achieve this goal:  1. I will attend my pre-transplant evaluation appointments on 6/12/2024 at 12pm, 12:30pm, and 1pm with MD and PharmD [place] [facility]. Completed.   2. I will attend my follow up transplant evaluation as scheduled on 8/21/2024 at 10am. Multiple appts scheduled today at [facility]. Completed.   3. I will attend my mammogram on 8/28/24. Cancelled via Mychart  4. I will attend my eye exam on 10/7/2024 at 1:00 pm at [facility] location. No Showed. Eye exam rescheduled on Mon 11/4/24 at 32PM. Completed  5. I will attend my rescheduled mammogram appointment on 11/18/24 at 2:15 pm. No Showed. Mammogram rescheduled to 12/4/24 at 12:45PM. Completed  6. I will attend my dental appointment on 4/2/2025 at 11:00 am at [facility] location.   7. I will schedule a follow up appointment with my providers if it is recommended to do so while I am at the clinic.  8. I will follow up with [facility] regarding this goal at each outreach until it is completed.     Paulo Thomas MD Sahadevan, Meena, MBBS Lori, Nurse transplant coordinator, with [facility] ([phone])  [address]   [place] [address]   [phone] (Work)   [phone] (Fax)     Prema Heard [facility]   Transplant   [phone]                                  Plan/Recommendations: The patient will continue working with Care Coordination to achieve goal(s) as above. CHW will continue outreaches at  minimum every 30 days and will involve Lead CC as needed or if patient is ready to move to Maintenance. Lead CC will continue to monitor CHW outreaches and patient's progress to goal(s) every 6 weeks.     Plan of Care updated and sent to patient: No

## 2025-01-23 ENCOUNTER — MYC MEDICAL ADVICE (OUTPATIENT)
Dept: INTERVENTIONAL RADIOLOGY/VASCULAR | Facility: CLINIC | Age: 55
End: 2025-01-23
Payer: COMMERCIAL

## 2025-01-23 NOTE — TELEPHONE ENCOUNTER
[facility] contacted and IR now awaiting fax of records.    Maribeth ALONZO  Interventional Radiology RN

## 2025-02-02 NOTE — PROGRESS NOTES
"  INTERVENTIONAL RADIOLOGY PRE-PROCEDURE ASSESSMENT  Outpatient - [facility] - 02/03/25    REQUESTED PROCEDURE: RUE Fistulogram  REQUESTING PROVIDER: Carl Rosas MD    The patient's history & physical was reviewed. A focused medical examination and interview of the patient was conducted. No pertinent interval changes were noted.    Brief HPI: Nithya Matthews is a 55 year old female with a pertinent past medical history of hypertension, diabetes mellitus type II and end-stage renal disease on hemodialysis status post right brachial artery to cephalic vein arteriovenous fistula creation on 2/14/2023 by Dr. Langford at [facility] who presents today for fistula evaluation 2/2 reported \"inflow or outflow issues\". Patient reports some pain and swelling that occurred approximately a month ago, but none now in her right arm.     Patient hypertensive today upon arrival, denies taking any of her medications today, and has complaints of a headache and back pain.     NPO since Midnight  ANTICOAGULATION/ANTIPLATELET: ASA 81 mg  ANTIBIOTIC(S): An antibiotic is not clinically indicated for a fistulogram. If a tunneled hemodialysis catheter is placed, recommend administering Ancef intra-procedure.    IMAGING  10/1/2024 IR DIALYSIS FISTULOGRAM RIGHT - [facility]  Procedure in detail:   Risks, benefits, alternatives explained to the patient with an ; informed consent was obtained. Patient brought to the interventional suite and placed in supine position. The right upper extremity was prepped and draped in usual sterile fashion. Ultrasound was used to evaluate the fistula. It was found to be patent and compressible. An image was saved to the patient's permanent medical record.     1% lidocaine was used for local anesthetic. With direct ultrasound guidance, micropuncture technique was used to access the fistula in a central direction. Imaging was performed.     High-grade stenosis of the cephalic " "arch. No central venous stenosis.     A wire was passed centrally. Sheath upsized to 7 Kuwaiti. Cephalic arch stenosis treated with 8 mm high-pressure balloon angioplasty. Follow-up imaging demonstrates good angiographic result. Reflux imaging demonstrates widely patent AV anastomosis.     The wire and sheath were removed and manual pressure was applied until hemostasis was achieved.     Impression:   1.High-grade stenosis of the cephalic arch, treated with 8 mm high-pressure balloon angioplasty.     Total contrast: 20  mL of Visipaque   Fluoroscopic time: 1.4 minutes   Fluoroscopic dose: 12 mGy     ALLERGIES  No Known Allergies    LABORATORY VALUES: INR PENDING  INR   Date Value Ref Range Status   10/28/2022 0.91 0.85 - 1.15 Final      Hemoglobin   Date Value Ref Range Status   07/19/2024 8.6 (L) 11.7 - 15.7 g/dL Final     Platelet Count   Date Value Ref Range Status   07/19/2024 143 (L) 150 - 450 10e3/uL Final     Creatinine   Date Value Ref Range Status   01/22/2024 7.45 (H) 0.51 - 0.95 mg/dL Final     Potassium   Date Value Ref Range Status   01/22/2024 4.7 3.4 - 5.3 mmol/L Final   06/02/2022 3.0 (L) 3.5 - 5.0 mmol/L Final     EXAM  Vital signs:  Temp: 98.4  F (36.9  C) Temp src: Oral BP: (!) 203/97 Pulse: 85   Resp: 18 SpO2: 95 % O2 Device: None (Room air)   Height: 157.5 cm (5' 2\") Weight: 55 kg (121 lb 4.1 oz)  Estimated body mass index is 22.18 kg/m  as calculated from the following:    Height as of this encounter: 1.575 m (5' 2\").    Weight as of this encounter: 55 kg (121 lb 4.1 oz).    General: No apparent acute distress.  Respiratory: Normal depth and regular rhythm. Clear and equal throughout without adventitious sounds.  Cardiovascular: S1 & S2; regular rate and rhythm.  RUE Fistula pulsatile; no thrill or bruit; aneurysmal; site marked.  Neurological: Alert and oriented. Thoughts coherent; speech clear and logical.  Psychiatric: Appropriate mood and affect.    PRE-SEDATION ASSESSMENT  Mallampati Airway " "Classification:  II - Faucial pillars and soft palate may be seen, but uvula is masked by the base of the tongue  Previous reaction to anesthesia/sedation:  No  Sedation plan based on assessment: Moderate (conscious) sedation  ASA Classification: Class 3 - SEVERE SYSTEMIC DISEASE, DEFINITE FUNCTIONAL LIMITATIONS.  Code Status: continue patient's current code status (Full code) intra-procedure, per discussion with patient.    ASSESSMENT  55 year old female with ESRD on HD s/p right brachial artery to cephalic vein AV fistula creation on 2/14/2023 by Dr. Langford at [facility] who presents today for fistula evaluation 2/2 reported \"inflow or outflow issues\".    Dialyzes TTS at [facility] under the care of Dr. Rosas.  Last full hemodialysis run was on Saturday. Next hemodialysis run scheduled for Tuesday.    Last fistulogram on 10/1/2024; please refer to report above.  History of a right internal jugular vein tunneled hemodialysis catheter; last placed on 3/6/2023 with subsequent removal on 5/10/2023.    PLAN  Case discussed with IR MD Dr. Tobias. Agrees with IV hydralazine; start with 10mg. If patient remains hypertensive with a headache; plan will be to send to ED for further evaluation.     End-stage renal disease  Flow issues  - Right upper extremity fistulogram with possible intervention and/or dialysis catheter placement under moderate sedation.  - Procedural education reviewed with patient with use of Turkmen  in detail with the assistance of a professional Turkmen  including but not limited to risks, benefits and alternatives. Patient  verbalized understanding.      Total time spent on this encounter was 40 minutes.    Note comprised by Ashley Burton CNP    Exam and procedure discussion completed by:   Margie Kay CNP  Interventional Radiology    "

## 2025-02-03 ENCOUNTER — HOSPITAL ENCOUNTER (OUTPATIENT)
Dept: INTERVENTIONAL RADIOLOGY/VASCULAR | Facility: HOSPITAL | Age: 55
Discharge: HOME | End: 2025-02-03
Attending: INTERNAL MEDICINE | Admitting: RADIOLOGY
Payer: COMMERCIAL

## 2025-02-03 VITALS
OXYGEN SATURATION: 92 % | WEIGHT: 121.25 LBS | TEMPERATURE: 98.4 F | RESPIRATION RATE: 16 BRPM | DIASTOLIC BLOOD PRESSURE: 80 MMHG | BODY MASS INDEX: 22.31 KG/M2 | HEIGHT: 62 IN | HEART RATE: 82 BPM | SYSTOLIC BLOOD PRESSURE: 167 MMHG

## 2025-02-03 DIAGNOSIS — N18.6 END STAGE RENAL DISEASE (H): ICD-10-CM

## 2025-02-03 LAB
ANION GAP SERPL CALCULATED.3IONS-SCNC: 17 MMOL/L (ref 7–15)
BUN SERPL-MCNC: 45.2 MG/DL (ref 6–20)
CALCIUM SERPL-MCNC: 8.7 MG/DL (ref 8.8–10.4)
CHLORIDE SERPL-SCNC: 96 MMOL/L (ref 98–107)
CREAT SERPL-MCNC: 10.9 MG/DL (ref 0.51–0.95)
EGFRCR SERPLBLD CKD-EPI 2021: 4 ML/MIN/1.73M2
GLUCOSE SERPL-MCNC: 137 MG/DL (ref 70–99)
HCO3 SERPL-SCNC: 24 MMOL/L (ref 22–29)
INR PPP: 1.04 (ref 0.85–1.15)
POTASSIUM SERPL-SCNC: 5 MMOL/L (ref 3.4–5.3)
SODIUM SERPL-SCNC: 137 MMOL/L (ref 135–145)

## 2025-02-03 PROCEDURE — 272N000566 HC SHEATH CR3

## 2025-02-03 PROCEDURE — 85610 PROTHROMBIN TIME: CPT | Performed by: NURSE PRACTITIONER

## 2025-02-03 PROCEDURE — 82565 ASSAY OF CREATININE: CPT | Performed by: NURSE PRACTITIONER

## 2025-02-03 PROCEDURE — 80048 BASIC METABOLIC PNL TOTAL CA: CPT | Performed by: NURSE PRACTITIONER

## 2025-02-03 PROCEDURE — 80051 ELECTROLYTE PANEL: CPT | Performed by: NURSE PRACTITIONER

## 2025-02-03 PROCEDURE — 250N000011 HC RX IP 250 OP 636: Performed by: NURSE PRACTITIONER

## 2025-02-03 PROCEDURE — 99152 MOD SED SAME PHYS/QHP 5/>YRS: CPT

## 2025-02-03 PROCEDURE — C1769 GUIDE WIRE: HCPCS

## 2025-02-03 PROCEDURE — C1725 CATH, TRANSLUMIN NON-LASER: HCPCS

## 2025-02-03 PROCEDURE — 99153 MOD SED SAME PHYS/QHP EA: CPT

## 2025-02-03 PROCEDURE — 272N000117 HC CATH CR2

## 2025-02-03 PROCEDURE — 272N000500 HC NEEDLE CR2

## 2025-02-03 PROCEDURE — 255N000002 HC RX 255 OP 636: Performed by: RADIOLOGY

## 2025-02-03 PROCEDURE — 272N000302 HC DEVICE INFLATION CR5

## 2025-02-03 PROCEDURE — C2623 CATH, TRANSLUMIN, DRUG-COAT: HCPCS

## 2025-02-03 PROCEDURE — 36415 COLL VENOUS BLD VENIPUNCTURE: CPT | Performed by: NURSE PRACTITIONER

## 2025-02-03 PROCEDURE — 36901 INTRO CATH DIALYSIS CIRCUIT: CPT

## 2025-02-03 RX ORDER — IODIXANOL 320 MG/ML
100 INJECTION, SOLUTION INTRAVASCULAR ONCE
Status: COMPLETED | OUTPATIENT
Start: 2025-02-03 | End: 2025-02-03

## 2025-02-03 RX ORDER — HYDRALAZINE HYDROCHLORIDE 20 MG/ML
10-20 INJECTION INTRAMUSCULAR; INTRAVENOUS EVERY 6 HOURS PRN
Status: DISCONTINUED | OUTPATIENT
Start: 2025-02-03 | End: 2025-02-04 | Stop reason: HOSPADM

## 2025-02-03 RX ORDER — FLUMAZENIL 0.1 MG/ML
0.2 INJECTION, SOLUTION INTRAVENOUS
Status: DISCONTINUED | OUTPATIENT
Start: 2025-02-03 | End: 2025-02-04 | Stop reason: HOSPADM

## 2025-02-03 RX ORDER — HEPARIN SODIUM 200 [USP'U]/100ML
1 INJECTION, SOLUTION INTRAVENOUS CONTINUOUS PRN
Status: DISCONTINUED | OUTPATIENT
Start: 2025-02-03 | End: 2025-02-04 | Stop reason: HOSPADM

## 2025-02-03 RX ORDER — NALOXONE HYDROCHLORIDE 0.4 MG/ML
0.2 INJECTION, SOLUTION INTRAMUSCULAR; INTRAVENOUS; SUBCUTANEOUS
Status: DISCONTINUED | OUTPATIENT
Start: 2025-02-03 | End: 2025-02-04 | Stop reason: HOSPADM

## 2025-02-03 RX ORDER — LIDOCAINE 40 MG/G
CREAM TOPICAL
Status: DISCONTINUED | OUTPATIENT
Start: 2025-02-03 | End: 2025-02-04 | Stop reason: HOSPADM

## 2025-02-03 RX ORDER — NALOXONE HYDROCHLORIDE 0.4 MG/ML
0.4 INJECTION, SOLUTION INTRAMUSCULAR; INTRAVENOUS; SUBCUTANEOUS
Status: DISCONTINUED | OUTPATIENT
Start: 2025-02-03 | End: 2025-02-04 | Stop reason: HOSPADM

## 2025-02-03 RX ORDER — ONDANSETRON 2 MG/ML
4 INJECTION INTRAMUSCULAR; INTRAVENOUS
Status: DISCONTINUED | OUTPATIENT
Start: 2025-02-03 | End: 2025-02-04 | Stop reason: HOSPADM

## 2025-02-03 RX ORDER — FENTANYL CITRATE 50 UG/ML
25-50 INJECTION, SOLUTION INTRAMUSCULAR; INTRAVENOUS EVERY 5 MIN PRN
Status: DISCONTINUED | OUTPATIENT
Start: 2025-02-03 | End: 2025-02-04 | Stop reason: HOSPADM

## 2025-02-03 RX ADMIN — FENTANYL CITRATE 25 MCG: 50 INJECTION, SOLUTION INTRAMUSCULAR; INTRAVENOUS at 09:20

## 2025-02-03 RX ADMIN — HYDRALAZINE HYDROCHLORIDE 10 MG: 20 INJECTION INTRAMUSCULAR; INTRAVENOUS at 09:01

## 2025-02-03 RX ADMIN — FENTANYL CITRATE 50 MCG: 50 INJECTION, SOLUTION INTRAMUSCULAR; INTRAVENOUS at 09:32

## 2025-02-03 RX ADMIN — HEPARIN SODIUM 3 BAG: 200 INJECTION, SOLUTION INTRAVENOUS at 09:16

## 2025-02-03 RX ADMIN — IODIXANOL 30 ML: 320 INJECTION, SOLUTION INTRAVASCULAR at 09:50

## 2025-02-03 RX ADMIN — MIDAZOLAM HYDROCHLORIDE 0.5 MG: 1 INJECTION, SOLUTION INTRAMUSCULAR; INTRAVENOUS at 09:14

## 2025-02-03 RX ADMIN — MIDAZOLAM HYDROCHLORIDE 0.5 MG: 1 INJECTION, SOLUTION INTRAMUSCULAR; INTRAVENOUS at 09:24

## 2025-02-03 RX ADMIN — HYDRALAZINE HYDROCHLORIDE 10 MG: 20 INJECTION INTRAMUSCULAR; INTRAVENOUS at 08:47

## 2025-02-03 NOTE — PROCEDURES
Interventional Radiology Post-Procedure Note   ?   Brief Procedure Note:   Patient name: Nithya Matthews  Pt MRN:[id number]   Date of procedure: 2/3/2025     Procedure(s): AV Fistulogram  Sedation method: Moderate sedation was employed. The patient was monitored by an interventional radiology nurse at all times throughout the procedure under my direct guidance.  Pre Procedure Diagnosis: ESRD on HD  Post Procedure Diagnosis: same  Indications: prolonged bleeding after HD   ?   Attending: Carl Tobias M.D.  Specimen(s) removed: None   Additional studies ordered: None  Drains: None   Estimated Blood Loss: Minimal  Complications: None  Vascular closure method: Manual pressure    Findings/Notes/Comments: Multifocal severe stenoses of cephalic vein outflow treated with 8mm high pressure PTA and DCBA. Now widely patent and ready for immediate use.   ?   Please see dictation in PACS or under the Imaging tab in [employer] for detailed procedure note.     Carl Tobias M.D.   Vascular and Interventional Radiology   Pager: [phone]   After Hours / Scheduling: [phone]     2/3/2025  10:41 AM

## 2025-02-03 NOTE — SEDATION DOCUMENTATION
Interventional Radiology Intra-procedural Nursing Note  Patient Name: Nithya Allenh  Medical Record Number: [id number]  Today's Date: February 3, 2025    Procedure: right upper ext fistula  Start time: 0922  End time: 0939  Sedation time: 25 minutes    Administered medication totals:  Versed 1 mg IVP  Fentanyl 75 mcg IVP

## 2025-02-03 NOTE — PRE-PROCEDURE
GENERAL PRE-PROCEDURE:   Procedure:  RUE fistulogram  Date/Time:  2/3/2025 8:54 AM    Written consent obtained?: Yes    Risks and benefits: Risks, benefits and alternatives were discussed    Consent given by:  Patient  Patient states understanding of procedure being performed: Yes    Patient's understanding of procedure matches consent: Yes    Procedure consent matches procedure scheduled: Yes    Expected level of sedation:  Moderate  Appropriately NPO:  Yes  ASA Class:  3  Mallampati  :  Grade 2- soft palate, base of uvula, tonsillar pillars, and portion of posterior pharyngeal wall visible  Lungs:  Lungs clear with good breath sounds bilaterally  Heart:  Normal heart sounds and rate  History & Physical reviewed:  History and physical reviewed and no updates needed  Statement of review:  I have reviewed the lab findings, diagnostic data, medications, and the plan for sedation

## 2025-02-03 NOTE — DISCHARGE INSTRUCTIONS
Fistulogram Discharge Instructions:  You had a fistulogram (evaluation of your fistula/graft). A puncture was made into your fistula/graft and your fistula/graft and blood vessels were evaluated for narrowing and clot. Please follow these instructions as you recover:    Care Instructions:   - If you received sedation for your procedure, do not drive or operate heavy machinery for the rest of the day.  - Resume your normal activities as you tolerate.  - Remove dressing tomorrow.   - You may shower tomorrow.     Follow Up:  - Follow up with your nephrologists/dialysis unit for your dialysis plan.    Please seek medical evaluation for:  - Uncontrolled bleeding from puncture site.   - Fever (greater than 101 F (38.3C)).  - Purulent (yellow/green/foul smelling) drainage from puncture site.   - Increasing pain at puncture site.  - Increasing redness at puncture site.  Please keep your primary care provider appotinment, bring this list and all your pill bottles so they may update your home med list.     Also with all medical appointments going forward please bring a paper print out of meds and or all the home bottles.

## 2025-02-03 NOTE — PROVIDER NOTIFICATION
Pt wheeled out with daughter, pt and daughter verbzlied understanding of AVS and upcoming appt to bring all her home meds.

## 2025-02-04 ENCOUNTER — PATIENT OUTREACH (OUTPATIENT)
Dept: CARE COORDINATION | Facility: CLINIC | Age: 55
End: 2025-02-04
Payer: COMMERCIAL

## 2025-02-04 NOTE — PROGRESS NOTES
Clinic Care Coordination Contact  Community Health Worker Follow Up  Spoke with Fritz (Daughter)    Care Gaps:     Health Maintenance Due   Topic Date Due    ZOSTER IMMUNIZATION (1 of 2) Never done    COVID-19 Vaccine (3 - Moderna risk series) 06/30/2022    LIPID  10/23/2024    PHQ-2 (once per calendar year)  01/01/2025    HEMOGLOBIN  01/19/2025     Scheduled 2/19/25 at 2:10pm with Dr. Arizmendi      Care Plan:   Care Plan: Hematology/oncology       Problem: Beta thalassemia minor       Goal: Patient will attend hematology clinic appointment in the next 12 months.       Start Date: 2/22/2024 Expected End Date: 2/28/2025    This Visit's Progress: 50% Recent Progress: 50%    Priority: High    Note:     Barriers: language barrier, low literacy, noncompliance, and lack of knowledge how to navigate complex health care system  Strengths: motivated to attend appt  Patient expressed understanding of goal: Yes    Action steps to achieve this goal:  1. I will answer my phone when I am contacted to schedule my appointment.  2. I will attend my initial hematology appointment as scheduled 3/22/24. Completed.   3. I will attend my follow up hematology/oncology appt on 4/19/2024 at 9:15am with Dr Zuñiga.Completed  4. I will attend my follow up hematology/oncology appt on 7/19 at 10:45AM Lab, 11:15AM Dr. Zuñiga. Completed.   5. I will attend my follow up hematology/oncology appt on 1/17/2025 at 1:15pm for lab and 1:45 with Dr. Zuñiga. No showed  6. I will attend my follow up hematology/oncology appt on 3/19/2025 at 2pm for lab and 2:30pm with Dr. Zuñiga.  7. I will schedule a follow up appointment with my provider if it is recommended to do so while I am at the clinic.  8. I will follow up with [facility] regarding this goal at each outreach until it is completed.                               Care Plan: Transplant evaluation       Problem: CKD       Goal: Patient will attend her kidney transplant evaluation in the next 12 months.       Start  Date: 5/21/2024 Expected End Date: 5/31/2025    This Visit's Progress: 40% Recent Progress: 40%    Priority: High    Note:     Barriers: language barrier, low literacy, noncompliance, and lack of knowledge how to navigate complex health care system  Strengths: motivated to attend appt  Patient expressed understanding of goal: Yes    Action steps to achieve this goal:  1. I will attend my pre-transplant evaluation appointments on 6/12/2024 at 12pm, 12:30pm, and 1pm with MD and PharmD [place] [facility] Completed.   2. I will attend my follow up transplant evaluation as scheduled on 8/21/2024 at 10am. Multiple appts scheduled today at [facility]. Completed.   3. I will attend my mammogram on 8/28/24. Cancelled via Mychart  4. I will attend my eye exam on 10/7/2024 at 1:00 pm at [facility] location. No Showed. Eye exam rescheduled on Mon 11/4/24 at 32PM. Completed  5. I will attend my rescheduled mammogram appointment on 11/18/24 at 2:15 pm. No Showed. Mammogram rescheduled to 12/4/24 at 12:45PM. Completed  6. I will attend my dental appointment on 4/2/2025 at 11:00 am at [facility] location.   7. I will schedule a follow up appointment with my providers if it is recommended to do so while I am at the clinic.  8. I will follow up with [facility] regarding this goal at each outreach until it is completed.     Paulo Thomas MD Sahadevan, Meena, MBBS Lori, Nurse transplant coordinator, with [facility] ([phone])  [address] [place] [address] [address]   [place] [address]   [phone] (Work)   [phone] (Fax)     Pre[facility] Heard [facility]   Transplant   [phone]                           Intervention and Education during outreach:     Hematology/Oncology  Per chart review, patient no showed appointment on 1/17/2025 at 1:15pm for lab and 1:45 with Dr. Zuñiga.  [place] shares that patient was feeling well and agreed to reschedule. Conference called scheduling, [phone], appointment rescheduled on  Wed 3/19 at 2pm and 2:30pm with Dr. Zuñiga.    Transplant Evaluation  Reminded of dental exam on 4/2/2025 at 11:00 am at [facility] Plan: 1 month follow up    ANDRES Quiros  Clinic Care Coordination  [facility]    Phone: [phone]

## 2025-02-10 ENCOUNTER — MYC REFILL (OUTPATIENT)
Dept: FAMILY MEDICINE | Facility: CLINIC | Age: 55
End: 2025-02-10
Payer: COMMERCIAL

## 2025-02-10 DIAGNOSIS — E61.1 LOW IRON: ICD-10-CM

## 2025-02-10 DIAGNOSIS — E11.69 TYPE 2 DIABETES MELLITUS WITH OTHER SPECIFIED COMPLICATION, UNSPECIFIED WHETHER LONG TERM INSULIN USE (H): ICD-10-CM

## 2025-02-10 DIAGNOSIS — R79.89 LOW VITAMIN B12 LEVEL: ICD-10-CM

## 2025-02-10 RX ORDER — FLASH GLUCOSE SENSOR
KIT MISCELLANEOUS
Qty: 6 EACH | Refills: 3 | OUTPATIENT
Start: 2025-02-10

## 2025-02-10 RX ORDER — FERROUS SULFATE 325(65) MG
TABLET ORAL
Qty: 45 TABLET | Refills: 3 | OUTPATIENT
Start: 2025-02-10

## 2025-02-10 RX ORDER — CALCIUM CARBONATE 500 MG/1
3 TABLET, CHEWABLE ORAL 2 TIMES DAILY
Status: CANCELLED | OUTPATIENT
Start: 2025-02-10

## 2025-02-10 RX ORDER — UBIQUINOL 100 MG
CAPSULE ORAL
Qty: 100 EACH | Refills: 3 | OUTPATIENT
Start: 2025-02-10

## 2025-02-10 RX ORDER — LANOLIN ALCOHOL/MO/W.PET/CERES
1000 CREAM (GRAM) TOPICAL DAILY
Qty: 30 TABLET | Refills: 2 | Status: SHIPPED | OUTPATIENT
Start: 2025-02-10

## 2025-02-10 RX ORDER — UBIQUINOL 100 MG
CAPSULE ORAL
Qty: 100 EACH | Refills: 3 | Status: SHIPPED | OUTPATIENT
Start: 2025-02-10

## 2025-02-10 NOTE — LETTER
February 18, 2025      [facility]  [address] [place] [address]        Dear Nithya,     We have attempted to reach you by phone in regards to a medication refill request, but have been unsuccessful. Please call the clinic at [phone] and ask to speak to a nurse. Thank you.         Sincerely,       [facility]   Nurse Team

## 2025-02-13 NOTE — TELEPHONE ENCOUNTER
Called patient with help from a reinaldo . Spoke to patient's daughter (CTC on file). Call got disconnected and tried calling patient again. Call went to voicemail and message left to call clinic back.        Paresh Perry Cem Say, BSN RN  [facility]

## 2025-02-13 NOTE — TELEPHONE ENCOUNTER
Called patient with help from a reinaldo . Spoke to patient's daughter (CTC on file). Call got disconnected and tried calling patient again. Call went to voicemail and message left to call clinic back.      Paresh Perry Cem Say, BSN RN  [facility]      Per chart Rx discontinue 7/22/24.

## 2025-02-17 NOTE — TELEPHONE ENCOUNTER
Writer attempt # 2 to call patient with the help of a Ruben  ID # [phone]  regarding Refill RN's message below. No answer, left non-detailed voicemail (VM) with clinic call back number.    If patient returns call back, please review Refill RN's message with patient. Obtain patient responses, and route to the prescribing provider as needed. Thanks!    ZAYRA ArmendarizN, RN, PHN   [facility]

## 2025-02-17 NOTE — TELEPHONE ENCOUNTER
Writer attempt # 2 to call patient with the help of a Ruben  ID # [phone]  regarding Refill RN's message below. No answer, left non-detailed voicemail (VM) with clinic call back number.    If patient returns call back, please review Refill RN's message with patient. Obtain patient responses, and route to the prescribing provider as needed. Thanks!    ZAYRA ArmendarizN, RN, PHN   [facility]

## 2025-02-18 RX ORDER — PEN NEEDLE, DIABETIC 32GX 5/32"
NEEDLE, DISPOSABLE MISCELLANEOUS
Qty: 200 EACH | Refills: 11 | Status: SHIPPED | OUTPATIENT
Start: 2025-02-18

## 2025-02-18 RX ORDER — CALCIUM CARBONATE 500 MG/1
3 TABLET, CHEWABLE ORAL 2 TIMES DAILY
OUTPATIENT
Start: 2025-02-18

## 2025-02-18 NOTE — TELEPHONE ENCOUNTER
3rd attempt----Called patient, unable to reach patient, left voicemail. Please relay refill nurse message if patient calls back. Thank you.      Three unsuccessful attempts by phone, encounter closed. Letter will be sent to patient's home address.       Rona Taveras, MSN, RN   Triage Nurse   [facility]

## 2025-02-19 ENCOUNTER — OFFICE VISIT (OUTPATIENT)
Dept: FAMILY MEDICINE | Facility: CLINIC | Age: 55
End: 2025-02-19
Payer: COMMERCIAL

## 2025-02-19 VITALS
WEIGHT: 118 LBS | RESPIRATION RATE: 16 BRPM | DIASTOLIC BLOOD PRESSURE: 72 MMHG | BODY MASS INDEX: 21.71 KG/M2 | OXYGEN SATURATION: 97 % | HEIGHT: 62 IN | TEMPERATURE: 98.2 F | HEART RATE: 76 BPM | SYSTOLIC BLOOD PRESSURE: 150 MMHG

## 2025-02-19 DIAGNOSIS — E11.42 DIABETIC POLYNEUROPATHY ASSOCIATED WITH TYPE 2 DIABETES MELLITUS (H): Primary | ICD-10-CM

## 2025-02-19 DIAGNOSIS — F32.A DEPRESSION, UNSPECIFIED DEPRESSION TYPE: ICD-10-CM

## 2025-02-19 DIAGNOSIS — I10 PRIMARY HYPERTENSION: ICD-10-CM

## 2025-02-19 DIAGNOSIS — E11.69 TYPE 2 DIABETES MELLITUS WITH OTHER SPECIFIED COMPLICATION, UNSPECIFIED WHETHER LONG TERM INSULIN USE (H): ICD-10-CM

## 2025-02-19 DIAGNOSIS — N18.6 ANEMIA IN ESRD (END-STAGE RENAL DISEASE) (H): ICD-10-CM

## 2025-02-19 DIAGNOSIS — D63.1 ANEMIA IN ESRD (END-STAGE RENAL DISEASE) (H): ICD-10-CM

## 2025-02-19 DIAGNOSIS — Z99.2 ESRD (END STAGE RENAL DISEASE) ON DIALYSIS (H): ICD-10-CM

## 2025-02-19 DIAGNOSIS — N18.6 ESRD (END STAGE RENAL DISEASE) ON DIALYSIS (H): ICD-10-CM

## 2025-02-19 PROCEDURE — 99214 OFFICE O/P EST MOD 30 MIN: CPT | Performed by: FAMILY MEDICINE

## 2025-02-19 PROCEDURE — T1013 SIGN LANG/ORAL INTERPRETER: HCPCS | Mod: U4

## 2025-02-19 PROCEDURE — G2211 COMPLEX E/M VISIT ADD ON: HCPCS | Performed by: FAMILY MEDICINE

## 2025-02-19 ASSESSMENT — PATIENT HEALTH QUESTIONNAIRE - PHQ9
10. IF YOU CHECKED OFF ANY PROBLEMS, HOW DIFFICULT HAVE THESE PROBLEMS MADE IT FOR YOU TO DO YOUR WORK, TAKE CARE OF THINGS AT HOME, OR GET ALONG WITH OTHER PEOPLE: VERY DIFFICULT
SUM OF ALL RESPONSES TO PHQ QUESTIONS 1-9: 12
SUM OF ALL RESPONSES TO PHQ QUESTIONS 1-9: 12

## 2025-02-19 NOTE — PROGRESS NOTES
Assessment & Plan     Anemia in ESRD (end-stage renal disease) (H)  Has appt with hematology.     Type 2 diabetes mellitus with other specified complication, unspecified whether long term insulin use (H)  Diabetic polyneuropathy associated with type 2 diabetes mellitus (H)  She was not eating well for about 5 days due to acute illness and just got her appetite back. Her glucose was going into the 50s and 60s when this happened. No changes at this time. Her glucose was 180 at this visit without any recent checks. Though A1c is well controlled, her glucometer is likely the best read on control.     Primary hypertension  Coreg started last visit by nephrology at 12.5mg bid, will defer to nephrology to increase.     ESRD (end stage renal disease) on dialysis (H)  Per chart review, patient was deemed ineligible for transplant due to frailty and other financial concerns.     Depression, unspecified depression type  Patient declined any intervention. No suicidal ideations.             Depression Screening Follow Up        2/19/2025     2:04 PM   PHQ   PHQ-9 Total Score 12    Q9: Thoughts of better off dead/self-harm past 2 weeks Not at all        Proxy-reported     Follow Up Actions Taken  Crisis resource information provided in After Visit Summary  Patient declined referral.       Return in about 3 months (around 5/19/2025) for blood pressure recheck, diabetes follow up.      Subjective   Barriga is a 55 year old, presenting for the following health issues:  follow up  (DM )    History of Present Illness       Reason for visit:  Follow up dm    She eats 0-1 servings of fruits and vegetables daily.She consumes 0 sweetened beverage(s) daily.She exercises with enough effort to increase her heart rate 9 or less minutes per day.  She exercises with enough effort to increase her heart rate 3 or less days per week.   She is taking medications regularly.       Hypervolemia, unspecified hypervolemia  type  Hyperglycemia  Hospitalization last month, now improved/resolved. Euvolemic on exam.   Today:           Type 2 diabetes mellitus with other specified complication, unspecified whether long term insulin use (H)  A1c last was 6.6%, not an accurate indicator of glucose because she is on dialysis. Glucometer checked and some hyperglycemia mostly in the evenings.   - Continuous Glucose Sensor (FREESTYLE MICKIE 14 DAY SENSOR) [facility]  Dispense: 6 each; Refill: 3  Today:   Having symptomatic lows when she lost her appetite. Now finally eating again. No recent reads on her meter - just placed a new one and last reads were from 1st week of January. Glucose today 180.       Diabetic retinopathy associated with type 2 diabetes mellitus, macular edema presence unspecified, unspecified laterality, unspecified retinopathy severity (H)  Following with ophthalmology.      ESRD (end stage renal disease) on dialysis (H)  Anemia in ESRD (end-stage renal disease) (H)  Follows with nephrology. She has labs drawn at dialysis - will see if they are able to pull her A1c? dialysis TThSa  Today:   Continues to maintain baseline weight. Patient says she does not know what's going on with transplant. Daughter is usually with her and has a good grasp of the situation. Sounds like she was removed from transplant eligibility due to frailty? Chart review below:   [facility] ntoes:   10/16/24  Call placed to Barriga and spoke with her daughter Fritz. Explained that Barriga has been declined for transplant due to frailty, concerns with financial resources. Additional concerns with ability to care for both parents.   10/17/2024  Patient is filed inactive. Letter, OPTN UNOS letter to patient. Copies of letter and OPTN UNOS letter to dialysis, nephrologist, chart and [facility]. Chart sent to HIM. LILLIANA Dubon        Primary hypertension  Well controlled. Recently started on coreg 12.5mg bid.   Today:   Not controlled today. Could increase coreg, but will defer to nephrology.  "She has dialysis tomorrow as well.                   Objective    BP (!) 150/72   Pulse 76   Temp 98.2  F (36.8  C) (Oral)   Resp 16   Ht 1.575 m (5' 2\")   Wt 53.5 kg (118 lb)   LMP  (LMP Unknown)   SpO2 97%   BMI 21.58 kg/m    Body mass index is 21.58 kg/m .  Physical Exam   GENERAL: alert and no distress. Frail.   NECK: no adenopathy, no asymmetry, masses, or scars  RESP: lungs clear to auscultation - no rales, rhonchi or wheezes  CV: regular rate and rhythm, normal S1 S2, no S3 or S4, no murmur, click or rub, no peripheral edema  ABDOMEN: soft, nontender, no hepatosplenomegaly, no masses and bowel sounds normal  MS: no gross musculoskeletal defects noted, no edema. Fistula on left arm, CGM on right.     No results found for any visits on 02/19/25.  No results found for this or any previous visit (from the past 24 hours).        Signed Electronically by: Giancarlo Arizmendi MD    "

## 2025-02-19 NOTE — Clinical Note
[place] [facility] [place] aware that she was deemed ineligible for transplant due to frailty and other concerns back in October'24.

## 2025-02-20 NOTE — PROGRESS NOTES
FYI: I am aware of this. She needs to work on a few things and we can request another transplant evaluation after she completed the recommendations. Thanks.

## 2025-02-24 DIAGNOSIS — F32.A DEPRESSION, UNSPECIFIED DEPRESSION TYPE: ICD-10-CM

## 2025-02-25 RX ORDER — ESCITALOPRAM OXALATE 10 MG/1
TABLET ORAL
Qty: 90 TABLET | Refills: 3 | Status: SHIPPED | OUTPATIENT
Start: 2025-02-25

## 2025-02-27 ENCOUNTER — PATIENT OUTREACH (OUTPATIENT)
Dept: CARE COORDINATION | Facility: CLINIC | Age: 55
End: 2025-02-27
Payer: COMMERCIAL

## 2025-02-27 NOTE — PROGRESS NOTES
Clinic Care Coordination Contact  Clinic Care Coordination Contact  OUTREACH    Referral Information:  Referral Source: PCP    Primary Diagnosis: CKD    Chief Complaint   Patient presents with    Clinic Care Coordination - Follow-up     Annual re-assessment     Clinic Utilization  Difficulty keeping appointments:: No  Compliance Concerns: No  No-Show Concerns: No  No PCP office visit in Past Year: No  Utilization      No Show Count (past year)  3             ED Visits  0             Hospital Admissions  0                    Current as of: 2/25/2025 12:39 PM            Clinical Concerns:  Annual re-assessment completed via chart review today. Patient is actively working on goals. Goals reviewed and updated.     Pain  Pain (GOAL):: Yes  Alleviating Factors: Rest, Pain Medication  Aggravating Factors: Activity  Health Maintenance Reviewed:    Clinical Pathway: None    Medication Management:  Medication review status: Medications reviewed and no changes reported per patient.           Functional Status:  Dependent ADLs:: Bathing, Eating, Grooming, Transfers  Dependent IADLs:: Cleaning, Cooking, Laundry, Shopping, Meal Preparation, Medication Management, Money Management, Transportation, Incontinence  Bed or wheelchair confined:: No  Mobility Status: Independent w/Device  Fallen 2 or more times in the past year?: No  Any fall with injury in the past year?: No    Living Situation:  Current living arrangement:: I live in a private home with family  Type of residence:: Private home - stairs    Lifestyle & Psychosocial Needs:    Social Drivers of Health     Food Insecurity: Food Insecurity Present (10/9/2024)    Received from [facility]    Hunger Vital Sign     Worried About Running Out of Food in the Last Year: Sometimes true     Ran Out of Food in the Last Year: Sometimes true   Depression: At risk (2/19/2025)    PHQ-2     PHQ-2 Score: 6   Housing Stability: Unknown (10/1/2024)    Received from [facility]     Housing Stability Vital Sign     Unable to Pay for Housing in the Last Year: No     Number of Times Moved in the Last Year: Not on file     Homeless in the Last Year: No   Tobacco Use: Low Risk  (2/19/2025)    Patient History     Smoking Tobacco Use: Never     Smokeless Tobacco Use: Never     Passive Exposure: Never   Financial Resource Strain: Unknown (8/28/2024)    Financial Resource Strain     Within the past 12 months, have you or your family members you live with been unable to get utilities (heat, electricity) when it was really needed?: Patient declined   Alcohol Use: Not At Risk (3/2/2022)    AUDIT-C     Frequency of Alcohol Consumption: Never     Average Number of Drinks: Not on file     Frequency of Binge Drinking: Never   Transportation Needs: No Transportation Needs (10/9/2024)    Received from [facility]    PRAPARE - Transportation     Lack of Transportation (Medical): No     Lack of Transportation (Non-Medical): No   Physical Activity: Unknown (8/28/2024)    Exercise Vital Sign     Days of Exercise per Week: 3 days     Minutes of Exercise per Session: Not on file   Interpersonal Safety: Low Risk  (2/3/2025)    Interpersonal Safety     Do you feel physically and emotionally safe where you currently live?: Yes     Within the past 12 months, have you been hit, slapped, kicked or otherwise physically hurt by someone?: No     Within the past 12 months, have you been humiliated or emotionally abused in other ways by your partner or ex-partner?: No   Stress: Patient Declined (8/28/2024)    [place] [employer] - Occupational Stress Questionnaire     Feeling of Stress : Patient declined   Social Connections: Unknown (8/28/2024)    Social Connection and Isolation Panel [NHANES]     Frequency of Communication with Friends and Family: Not on file     Frequency of Social Gatherings with Friends and Family: Patient declined     Attends Mu-ism Services: Not on file     Active Member of  Clubs or Organizations: Not on file     Attends Club or Organization Meetings: Not on file     Marital Status: Not on file   Health Literacy: Not on file     Diet:: Regular  Inadequate nutrition (GOAL):: No  Tube Feeding: No  Inadequate activity/exercise (GOAL):: Yes  Significant changes in sleep pattern (GOAL): No  Transportation means:: Medical transport, Regular car     Bahai or spiritual beliefs that impact treatment:: No  Mental health DX:: Yes  Mental health DX how managed:: Medication  Mental health management concern (GOAL):: No     Resources and Interventions:  Current Resources:      Community Resources: [facility], [facility], County Worker, County Programs, Dialysis Services, Financial/Insurance, PCA  Supplies Currently Used at Home: Wound Care Supplies, Incontinence Supplies  Equipment Currently Used at Home: cane, straight  Employment Status: disabled, unemployed     Advance Care Plan/Directive  Advanced Care Plans/Directives on file:: No  Discussed with patient/caregiver:: Declined Further Information    Referrals Placed: None     Care Plan:  Care Plan: DME Completed 2/28/2023      Problem: I want Pull-Ups in the next 60 days so that I may better manage my incontinence symptoms.  Resolved 2/28/2023      Goal: Incontinent supplies  Completed 2/28/2023      Start Date: 1/25/2023 Expected End Date: 3/25/2023    This Visit's Progress: 100%    Note:     Goal Statement: I want Pull-Ups in the next 60 days so that I may better manage my incontinence symptoms.     Barriers: Language  Strengths: Accepting of support  Patient expressed understanding of goal: Yes    Action steps to achieve this goal:  1. I will answer my phone when [employer] [phone]  contacts me to deliver or  my pull-ups.  3. I will follow up with CCC in the next month regarding this goal for additional coordination support.    Note: Order for pull-ups was sent to DME Provider 1/10/23.                             Care  Plan: [facility] Benefits Completed 4/24/2023      Problem: I will apply for [facility] financial benefits within the next 90 days.  Resolved 4/24/2023      Goal: Rent Assistance  Completed 4/24/2023      Start Date: 2/28/2023 Expected End Date: 5/28/2023    Recent Progress: 10%    Note:     Goal statement: I will apply for [facility] benefits within the next 90 days.     Barriers: Language   Strengths: Accepting of support.  Patient expressed understanding of goal: Yes     Action steps to achieve this goal:   1.  I will answer my phone when I am contacted by the [facility] FRW to schedule an initial appointment.   2.  I will provide all necessary documentation and information to complete a [facility] application for benefits with the support of the FRW.   3.  I will call my FRW if I have questions or concerns regarding my [facility] benefits application.     Note: Benefits requesting is Rental, referral submitted by CHW on 2/28/23.     Updated note on 4/25/2023 by CCRN.   - SNAP - $530 for 2 people   - rental assistance $260 per month - max received for 2 people.   - rent - $1150                              Care Plan: SSI Completed 4/24/2023      Problem: I will find out if I am eligible to apply for Social Security benefits.  Resolved 4/24/2023      Goal: SSI  Completed 4/24/2023      Start Date: 2/28/2023 Expected End Date: 2/28/2024    Recent Progress: 40%    Note:     Goal statement: I will find out if I am eligible to apply for Social Security benefits.    Barriers: Language  Strengths: Agreeable to support.   Patient expressed understanding of goal: Yes    Action steps to achieve this goal:  1.  I will follow the instructions that Disability Specialists recommended on 9/30/22. Steps per Ayleen at :     Once patient receives her [place] Citizenship paperwork,  a) Patient should go to the Social Security office in person.  b) Patient should show the Social  the original paperwork of her [place]  Citizenship.  During this time, request an in person appointment to apply for Social Security benefits.    2.  I will request assistance as needed and clarify if my [facility] Worker can take me to the SSA Office.   3.  I will attend an in-person appointment to apply for social security income as scheduled on. TBD  4.  I will follow up with CCC in the next month regarding this goal.    Note: See encounter 9/30/22, for instructions from .     SSI starting April, 2023 - $609/month.ndah 4/25/2023                                Care Plan: DEMs Completed 8/25/2023      Problem: Impaired mobility  Resolved 8/25/2023      Goal: I want a wheelchair. w/c cushion and shower chair in the next 90 days.  Completed 8/25/2023      Start Date: 4/24/2023 Expected End Date: 9/24/2023    Recent Progress: 60%    Priority: High    Note:     Barriers: language  Strengths: Accepting of support  Patient expressed understanding of goal: Yes    Action steps to achieve this goal:  1. I will complete a face-to-face appointment with my PCP on 3/22/2023. (Completed)  2. I will complete an OT/PT evaluation for a bath bench lift as scheduled on 8/9 at 10:15AM [facility]. (Completed)  3. I will answer my phone when DME Provider [facility] contacts me to deliver or  my item.  4. I will follow up with CCC in the next month regarding this goal for additional coordination support.    Note: Order for wheel chair and shower chair were sent to DME Provider PCP on 3/22/2023. 5/26 Orders are at [facility] not [facility] - Wheel Chair/cushion delivered.     [facility] [phone]  Complex Rehab Team at [facility] [phone] (bath bench lift)                            Care Plan: PCA Completed 10/23/2023      Problem: Request for early re-assessment of PCA services.  Resolved 10/23/2023      Goal: I will be re-evaluated for increased PCA services in the next 90 days.  Completed 10/23/2023      Start Date:  8/25/2023 Expected End Date: 11/25/2023    Recent Progress: 70%    Priority: High    Note:     Barriers: Language, recent health changes.  Strengths: Willing to accept [facility] support.  Patient expressed understanding of goal: Yes    Action steps to achieve this goal:  1.  I will answer my phone when I am contacted by [facility] to schedule an assessment.   2.  I will make sure I have any supportive family/friends present for my assessment as scheduled on Fri 9/29 at 10AM. (No . Rescheduled)  3. I will make sure I have any supportive family/friends present for my assessment as rescheduled on Fri 10/6/23 at 2:30PM.  4.  I will follow up with [facility] in the next month regarding this goal.   Note: PCP's letter of support for early reassessment faxed on 7/25/23. [facility] [phone], she confirmed fax number [phone].    10/23/2023 - approved for 10 hours per day. Son and daughter are her PCAs.                               Care Plan: Incontinence Supplies Completed 12/22/2023      Problem: Incontinence of feces with fecal urgency  Resolved 12/22/2023      Goal: I want check coverage for more Pull-Ups in the next 60 days so that I can better manage my incontinence symptoms.  Completed 12/22/2023      Start Date: 10/4/2023 Expected End Date: 12/4/2023    Recent Progress: 100%    Priority: High    Note:     Barriers: Language  Strengths: Accepting of support  Patient expressed understanding of goal: Yes    Action steps to achieve this goal:  1. I will attend an appointment with my PCP to address my need for pull-ups. (Completed, 9/20)  2. I will answer my phone when [facility] contacts me to deliver or  my pull-ups.  3. I will follow up with [facility] in the next month regarding this goal for additional coordination support.    Note: 9/20/23 Order for pull-ups was sent to [facility] [phone], Fax [phone] on 10/4/23.                             Care Plan: Orthopedics  Completed 12/22/2023      Problem: experiencing painful callus on bottom of her foot.  Resolved 12/22/2023      Goal: Patient will attend her orthopedics appointment in the next 12 months.  Completed 12/22/2023      Start Date: 10/23/2023 Expected End Date: 10/23/2024    Recent Progress: 20%    Note:     Barriers: language barrier, low literacy, noncompliance, and lack of knowledge how to navigate complex health care system  Strengths: motivated to attend appt  Patient expressed understanding of goal: Yes    Action steps to achieve this goal:  1. I will answer my phone when I am contacted to schedule my appointment.  2. I will attend my follow-up appointment as scheduled on 11/22/23 at 8:15AM. (Completed)  3. I will schedule a follow up appointment with my orthopedics if it is recommended to do so while I am at the clinic.  4. I will follow up with [facility] regarding this goal at each outreach until it is completed.                               Care Plan: Hematology/oncology       Problem: Beta thalassemia minor       Goal: Patient will attend hematology clinic appointment in the next 12 months.  Completed 2/27/2025      Start Date: 2/22/2024 Expected End Date: 2/28/2025    This Visit's Progress: 100% Recent Progress: 50%    Priority: High    Note:     Barriers: language barrier, low literacy, noncompliance, and lack of knowledge how to navigate complex health care system  Strengths: motivated to attend appt  Patient expressed understanding of goal: Yes    Action steps to achieve this goal:  1. I will answer my phone when I am contacted to schedule my appointment.  2. I will attend my initial hematology appointment as scheduled 3/22/24. Completed.   3. I will attend my follow up hematology/oncology appt on 4/19/2024 at 9:15am with Dr Zuñiga.Completed  4. I will attend my follow up hematology/oncology appt on 7/19 at 10:45AM Lab, 11:15AM Dr. Zuñiga. Completed.   5. I will attend my follow up hematology/oncology appt on  1/17/2025 at 1:15pm for lab and 1:45 with Dr. Zuñiga. No showed  6. I will attend my follow up hematology/oncology appt on 3/19/2025 at 2pm for lab and 2:30pm with Dr. Zuñiga.  7. I will schedule a follow up appointment with my provider if it is recommended to do so while I am at the clinic.  8. I will follow up with [facility] regarding this goal at each outreach until it is completed.                   Goal: Patient will attend hematology clinic appointment in the next 12 months.       This Visit's Progress: 10%    Note:     Barriers: language barrier, low literacy, noncompliance, and lack of knowledge how to navigate complex health care system  Strengths: motivated to attend appt  Patient expressed understanding of goal: Yes    Action steps to achieve this goal:  1. I will attend my follow up hematology/oncology appt on 3/19/2025 at 2pm for lab and 2:30pm with Dr. Zuñiga.  2. I will schedule a follow up appointment with my provider if it is recommended to do so while I am at the clinic.  3. I will follow up with [facility] regarding this goal at each outreach until it is completed.                               Care Plan: Transplant evaluation       Problem: CKD       Goal: Patient will attend her kidney transplant evaluation in the next 12 months.       Start Date: 5/21/2024 Expected End Date: 5/31/2025    This Visit's Progress: 40% Recent Progress: 40%    Priority: High    Note:     Barriers: language barrier, low literacy, noncompliance, and lack of knowledge how to navigate complex health care system  Strengths: motivated to attend appt  Patient expressed understanding of goal: Yes    Action steps to achieve this goal:  1. I will attend my pre-transplant evaluation appointments on 6/12/2024 at 12pm, 12:30pm, and 1pm with MD and PharmD at [facility]. Completed.   2. I will attend my follow up transplant evaluation as scheduled on 8/21/2024 at 10am. Multiple appts scheduled today at [facility]. Completed.   3. I will attend my  mammogram on 8/28/24. Cancelled via Mychart  4. I will attend my eye exam on 10/7/2024 at 1:00 pm at [facility] location. No Showed. Eye exam rescheduled on Mon 11/4/24 at 32PM. Completed  5. I will attend my rescheduled mammogram appointment on 11/18/24 at 2:15 pm. No Showed. Mammogram rescheduled to 12/4/24 at 12:45PM. Completed  6. I will attend my dental appointment on 4/2/2025 at 11:00 am at [facility] location.   7. I will schedule a follow up appointment with my providers if it is recommended to do so while I am at the clinic.  8. I will follow up with [facility] regarding this goal at each outreach until it is completed.     Paulo Thomas MD Sahadevan, Meena, MBBS Lori, Nurse transplant coordinator, with [facility] ([phone])  [address]   [place] [address]   [phone] (Work)   [phone] (Fax)     Prema Heard [facility]   Transplant   [phone]                             Care Plan: Physical therapy Completed 10/22/2024      Problem: chronic back pain  Resolved 10/22/2024      Goal: Patient will attend her PT appointments as scheduled in the next 90 days.  Completed 10/22/2024      Start Date: 7/22/2024 Expected End Date: 10/31/2024    This Visit's Progress: 60% Recent Progress: 30%    Priority: High    Note:     Barriers: language barrier, low literacy, noncompliance, and lack of knowledge how to navigate complex health care system  Strengths: motivated to attend appt  Patient expressed understanding of goal: Yes    Action steps to achieve this goal:  1. I will attend my PT appointments as scheduled:     8/7/2024 at 1:00 pm Completed  8/14/2024 at 2:15 pm Completed  8/21/2024 at 2:15 pm. Cancelled due to conflict with SOT appt  8/28/2024 at 11am Completed.   9/5/2024 at 11:30am - canceled conflict with dialysis  9/12/2024 at 11:30am - rescheduled due to conflict with dialysis  9/18/2024 at 11:00 am  Cancelled due to not feeling  well  10/14/2024 at 10:00 am Cancelled due to not feeling well    3. I will schedule a follow up appointment with my  if it is recommended to do so while I am at the clinic.  4. I will follow up with [facility] regarding this goal at each outreach until it is completed.     Note: Message sent to [employer] on 7/22/24 to request transportation for PT appts.                               Care Plan: Health Insurance Completed 11/1/2024      Problem: Health insurance lapsed  Resolved 11/1/2024      Goal: Patient would like to re-apply for health insurance in the next 90 days.  Completed 11/1/2024      Start Date: 10/22/2024 Expected End Date: 1/31/2025    This Visit's Progress: 100% Recent Progress: 10%    Note:     Barriers: language   Strengths: Accepting of support,    Patient expressed understanding of goal: Yes     Action steps to achieve this goal:   1.  I will answer my phone when I am contacted by the [facility] FRW to schedule an initial appointment.   2.  I will provide all necessary documentation and information to complete a [employer] application.   3.  I will call my FRW if I have questions or concerns regarding my county benefits application.                               Outreach Frequency: 6 weeks, more frequently as needed  Future Appointments                In 2 weeks [facility]    In 2 weeks Sanjuanita Zuñiga MD [facility]    In 2 months Giancarlo Arizmendi MD [facility]            Plan: CCRN plans to reach back out to transplant team to request another evaluation post dental appointment in April.  Moved CHW outreach on 3/6/25 to 3/13/25.

## 2025-02-27 NOTE — LETTER
[facility]  Patient Centered Plan of Care  About Me:        Patient Name:  Nithya Matthews    YOB: 1970  Age:         55 year old   Johann MRN:    [id number] Telephone Information:  Home Phone [phone]   Mobile [phone]   Home Phone [phone]   Mobile Not on file.       Address:  [address] [place] [address] Email address:  [email or web address]      Emergency Contact(s)    Name Relationship Lgl Grd Work Phone Home Phone Mobile Phone   1. JESSY MATTHEWS Daughter    [phone]   2. NAY MIMI Son    [phone]   3. YAYA MIMI Son    [phone]           Primary language:  Ruben     needed? Yes   [employer]:  [phone] op. 1  Other communication barriers:Language barrier    Preferred Method of Communication:     Current living arrangement: I live in a private home with family    Mobility Status/ Medical Equipment: Independent w/Device        Health Maintenance  Health Maintenance Reviewed: Due/Overdue       My Access Plan  Medical Emergency 911   Primary Clinic Line [facility] [phone]   24 Hour Appointment Line [phone] or  [phone]) (toll-free)   24 Hour Nurse Line [phone] (toll-free)   Preferred Urgent Care [facility] [phone]     Preferred Hospital [facility]  [phone]     Preferred Pharmacy [facility] - [place], [place] - [address]     Behavioral Health Crisis Line The National Suicide Prevention Lifeline at 1-247.200.3101 or Text/Call 418           My Care Team Members  Patient Care Team         Relationship Specialty Notifications Start End    Giancarlo Arizmendi MD PCP - General Family Medicine  2/14/22     Referring to Eye    Phone: [phone] Fax: [phone]         [address] [place] [address]    Giancarlo Arizmendi MD Assigned PCP   2/20/22     Phone: [phone] Fax: [phone]         [address] [place] [address]  [facility] [address] [place] [address]    Irene Samson CHW Community Health Worker Primary Care - CC Admissions 3/1/22     Phone: [phone]         Albert Moe ARM worker   3/2/22     [facility]. Call [facility] worker  Avani Nelson [phone] for assistance.    Phone: [phone]         Miracle Mi OD  Optometry  3/18/22     Phone: [phone] Fax: [phone]         [address] [place] [address]    Shital Hernandez, PharmD Pharmacist Pharmacist  4/6/22     Phone: [phone]          [address] [place] [address]    Grecia Hearn DPM, Podiatry/Foot and Ankle Surgery Assigned Musculoskeletal Provider   5/1/22     Phone: [phone] Pager: [phone] Fax: [phone]        [address]  [facility] [address] [facility] [address]    Davis Villa MD MD Endocrinology, Diabetes, and Metabolism  6/2/22     Phone: [phone] Fax: [phone]         [place] [facility] [address]    Shital Hernandez, PharmD Assigned MTM Pharmacist   9/28/22     Phone: [phone]          [address] [place] [address]    Carl Rosas MD Nephrologist Nephrology  3/9/23     Phone: [phone] Fax: [phone]         [address] [address]    Negin Nunes NP Nurse Practitioner   5/30/23     Phone: [phone] Fax: [phone]         [address] [facility] [address] [facility] [address]    Janel Jhaveri, RN Lead Care Coordinator Primary Care - CC Admissions 2/7/24     Phone: [phone]         Sanjuanita Zuñiga MD MD Medical Oncology  3/4/24     Phone: [phone] Fax: [phone] [address] [facility] [address]    Mery Fernandez [facility] Pharmacist Pharmacist  3/20/24     Phone: [phone] Fax: [phone]         [address] [place] [address]    Desiree Leyva, RN Specialty Care Coordinator Hematology & Oncology Admissions 4/16/24     Yogesh  MD Sanjuanita Assigned Cancer Care Provider   4/23/24     Phone: [phone] Fax: [phone] [address] [facility] [place] [facility] [address]    Fritz Matthews, daughter and Muriel Leslie - son Personal Care Attendant [facility]   7/22/24     Muriel Leslie - son [phone] - 3.5 hours   Fritz Matthews - daughter - [phone] - 6.5 hrs/day    Phone: [phone]                     My Care Plans  Self Management and Treatment Plan    Care Plan  Care Plan: Hematology/oncology       Problem: Beta thalassemia minor       Goal: Patient will attend hematology clinic appointment in the next 12 months.       This Visit's Progress: 10%    Note:     Barriers: language barrier, low literacy, noncompliance, and lack of knowledge how to navigate complex health care system  Strengths: motivated to attend appt  Patient expressed understanding of goal: Yes    Action steps to achieve this goal:  1. I will attend my follow up hematology/oncology appt on 3/19/2025 at 2pm for lab and 2:30pm with Dr. Zuñiga.  2. I will schedule a follow up appointment with my provider if it is recommended to do so while I am at the clinic.  3. I will follow up with CCC regarding this goal at each outreach until it is completed.                               Care Plan: Transplant evaluation       Problem: CKD       Goal: Patient will attend her kidney transplant evaluation in the next 12 months.       Start Date: 5/21/2024 Expected End Date: 5/31/2025    This Visit's Progress: 40% Recent Progress: 40%    Priority: High    Note:     Barriers: language barrier, low literacy, noncompliance, and lack of knowledge how to navigate complex health care system  Strengths: motivated to attend appt  Patient expressed understanding of goal: Yes    Action steps to achieve this goal:  1. I will attend my pre-transplant evaluation appointments on 6/12/2024 at 12pm, 12:30pm, and 1pm with MD and PharmD at [facility]. Completed.   2. I will attend my follow up transplant evaluation as scheduled on 8/21/2024  at 10am. Multiple appts scheduled today at [facility]. Completed.   3. I will attend my mammogram on 8/28/24. Cancelled via Mychart  4. I will attend my eye exam on 10/7/2024 at 1:00 pm at [facility] location. No Showed. Eye exam rescheduled on Mon 11/4/24 at 32PM. Completed  5. I will attend my rescheduled mammogram appointment on 11/18/24 at 2:15 pm. No Showed. Mammogram rescheduled to 12/4/24 at 12:45PM. Completed  6. I will attend my dental appointment on 4/2/2025 at 11:00 am at [facility] location.   7. I will schedule a follow up appointment with my providers if it is recommended to do so while I am at the clinic.  8. I will follow up with [facility] regarding this goal at each outreach until it is completed.     Paulo Thomas MD Sahadevan, Meena, MBBS Lori, Nurse transplant coordinator, with [facility] ([phone])  [address] [place] [address] [address]   [place] [address]   [phone] (Work)   [phone] (Fax)     [facility]   Transplant   [phone]                               Action Plans on File:                       Advance Care Plans/Directives:   Advanced Care Plan/Directives on file: No    Discussed with patient/caregiver(s): Declined Further Information             My Medical and Care Information  Problem List   Patient Active Problem List   Diagnosis    Primary hypertension    Hyperlipidemia LDL goal <100    Depression, unspecified depression type    Hyperglycemia    Generalized muscle weakness    Low iron    Gastroesophageal reflux disease with esophagitis without hemorrhage    Low vitamin B12 level    Low vitamin D level    Celiac disease    Beta thalassemia minor    Type 2 diabetes mellitus with other specified complication, unspecified whether long term insulin use (H)    Incontinence of feces with fecal urgency    ESRD (end stage renal disease) on dialysis (H)    Hyperkalemia    Weakness    Chronic anemia    Diabetic neuropathy (H)    Hepatitis  B core antibody positive    Nephrolithiasis    Physical deconditioning      Current Medications:  Please refer to the most recent medication list provided to you by your medical team and reach out to your provider with any questions or to make any corrections.    Care Coordination Start Date: 3/1/2022   Frequency of Care Coordination: 6 weeks, more frequently as needed     Form Last Updated: 02/27/2025

## 2025-03-09 DIAGNOSIS — E11.69 TYPE 2 DIABETES MELLITUS WITH OTHER SPECIFIED COMPLICATION, UNSPECIFIED WHETHER LONG TERM INSULIN USE (H): ICD-10-CM

## 2025-03-09 RX ORDER — ASPIRIN 81 MG/1
81 TABLET, COATED ORAL DAILY
Qty: 90 TABLET | Refills: 2 | Status: SHIPPED | OUTPATIENT
Start: 2025-03-09

## 2025-03-13 ENCOUNTER — PATIENT OUTREACH (OUTPATIENT)
Dept: CARE COORDINATION | Facility: CLINIC | Age: 55
End: 2025-03-13
Payer: COMMERCIAL

## 2025-03-13 NOTE — PROGRESS NOTES
Clinic Care Coordination Contact  [facility]/Voicemail    Clinical Data: Care Coordinator Outreach    Outreach Documentation Number of Outreach Attempt   3/13/2025  11:35 AM 1       Left message on patient's daughter [facility] with call back information and requested return call.      Plan: Care Coordinator will try to reach patient again in 10 business days.    ANDRES Boyce   [phone]  Clinic Care Coordination  [facility]

## 2025-03-25 ENCOUNTER — PATIENT OUTREACH (OUTPATIENT)
Dept: CARE COORDINATION | Facility: CLINIC | Age: 55
End: 2025-03-25
Payer: COMMERCIAL

## 2025-03-25 DIAGNOSIS — G62.9 NEUROPATHY: ICD-10-CM

## 2025-03-25 RX ORDER — GABAPENTIN 300 MG/1
300 CAPSULE ORAL AT BEDTIME
Qty: 30 CAPSULE | Refills: 3 | Status: SHIPPED | OUTPATIENT
Start: 2025-03-25

## 2025-03-25 NOTE — PROGRESS NOTES
Clinic Care Coordination Contact  Community Health Worker Follow Up  Spoke with Fritz (Daughter)     Care Gaps:     Health Maintenance Due   Topic Date Due    ZOSTER IMMUNIZATION (1 of 2) Never done    COVID-19 Vaccine (3 - Moderna risk series) 06/30/2022    LIPID  10/23/2024    A1C  03/16/2025    HEMOGLOBIN  01/19/2025    DIABETIC FOOT EXAM  04/22/2025     Scheduled 5/21/25 at 1:10pm with Dr. Arizmendi      Care Plan:   Care Plan: Hematology/oncology       Problem: Beta thalassemia minor       Goal: Patient will attend hematology clinic appointment in the next 12 months.       This Visit's Progress: 10% Recent Progress: 10%    Priority: High    Note:     Barriers: language barrier, low literacy, noncompliance, and lack of knowledge how to navigate complex health care system  Strengths: motivated to attend appt  Patient expressed understanding of goal: Yes    Action steps to achieve this goal:  1. I will attend my follow up hematology/oncology appt on 3/19/2025 at 2pm for lab and 2:30pm with Dr. Zuñiga. No Showed  2. I will attend my rescheduled follow up hematology/oncology appt on 4/2 at 1 pm Lab and 1:30pm with ANTHONY Krause.   3. I will schedule a follow up appointment with my provider if it is recommended to do so while I am at the clinic.  4. I will follow up with CCC regarding this goal at each outreach until it is completed.                               Care Plan: Transplant evaluation       Problem: CKD       Goal: Patient will attend her kidney transplant evaluation in the next 12 months.       Start Date: 5/21/2024 Expected End Date: 5/31/2025    This Visit's Progress: 40% Recent Progress: 40%    Priority: High    Note:     Barriers: language barrier, low literacy, noncompliance, and lack of knowledge how to navigate complex health care system  Strengths: motivated to attend appt  Patient expressed understanding of goal: Yes    Action steps to achieve this goal:  1. I will attend my pre-transplant  evaluation appointments on 6/12/2024 at 12pm, 12:30pm, and 1pm with MD and PharmD at [facility]. Completed.   2. I will attend my follow up transplant evaluation as scheduled on 8/21/2024 at 10am. Multiple appts scheduled today at [facility]. Completed.   3. I will attend my mammogram on 8/28/24. Cancelled via Mychart  4. I will attend my eye exam on 10/7/2024 at 1:00 pm at [facility] location. No Showed. Eye exam rescheduled on Mon 11/4/24 at 32PM. Completed  5. I will attend my rescheduled mammogram appointment on 11/18/24 at 2:15 pm. No Showed. Mammogram rescheduled to 12/4/24 at 12:45PM. Completed  6. I will attend my dental appointment on 4/2/2025 at 11:00 am at [facility] location.   7. I will schedule a follow up appointment with my providers if it is recommended to do so while I am at the clinic.  8. I will follow up with CCC regarding this goal at each outreach until it is completed.     Paulo [place] MD Sahadevan, Meena, MBBS Lori, Nurse transplant coordinator, with [facility] ([phone])  [address] [place] [address] [address]   [place] [address]   [phone] (Work)   [phone] (Fax)     [facility]   Transplant   [phone]                           Intervention and Education during outreach:     Hematology/Oncology  Per chart review, patient no showed appointment on 3/19 with Dr. Zuñiga. [facility] declined patient having any barriers and shares that they forgot about appointment.  Conference called speciality scheduling, appointment rescheduled on 4/2 at 1 pm Lab and 1:30pm with ANTHONY Krause.    CHW reminded prajesus of patients same day dental appointment in the morning. [facility] prefers to keep both appointments and will be going with patient.     Transplant Evaluation  CHW reminded prajesus of patients dental exam on 4/2/2025 at 11:00 am at [facility]  Fritz requested ride to appointment, then from appointment to hematology/oncology  appointment at 1pm, then from apptointment to home.     CHW called [employer]  [phone], ride confirmed with [employer] [phone].    CHW Plan: 1 month follow up    Irene Samson Community Health Worker   Clinic Care Coordination  [facility]    Phone: [phone]

## 2025-04-02 ENCOUNTER — ONCOLOGY VISIT (OUTPATIENT)
Dept: ONCOLOGY | Facility: HOSPITAL | Age: 55
End: 2025-04-02
Attending: NURSE PRACTITIONER
Payer: COMMERCIAL

## 2025-04-02 ENCOUNTER — LAB (OUTPATIENT)
Dept: INFUSION THERAPY | Facility: HOSPITAL | Age: 55
End: 2025-04-02
Attending: NURSE PRACTITIONER
Payer: COMMERCIAL

## 2025-04-02 VITALS
BODY MASS INDEX: 21.56 KG/M2 | HEART RATE: 75 BPM | OXYGEN SATURATION: 92 % | RESPIRATION RATE: 16 BRPM | TEMPERATURE: 98.1 F | SYSTOLIC BLOOD PRESSURE: 118 MMHG | DIASTOLIC BLOOD PRESSURE: 57 MMHG | WEIGHT: 117.9 LBS

## 2025-04-02 DIAGNOSIS — D56.3 BETA THALASSEMIA MINOR: ICD-10-CM

## 2025-04-02 DIAGNOSIS — N18.6 ESRD (END STAGE RENAL DISEASE) ON DIALYSIS (H): ICD-10-CM

## 2025-04-02 DIAGNOSIS — N18.6 ANEMIA IN CHRONIC KIDNEY DISEASE, ON CHRONIC DIALYSIS (H): ICD-10-CM

## 2025-04-02 DIAGNOSIS — D63.1 ANEMIA IN CHRONIC KIDNEY DISEASE, ON CHRONIC DIALYSIS (H): ICD-10-CM

## 2025-04-02 DIAGNOSIS — Z99.2 ESRD (END STAGE RENAL DISEASE) ON DIALYSIS (H): ICD-10-CM

## 2025-04-02 DIAGNOSIS — Z99.2 ESRD (END STAGE RENAL DISEASE) ON DIALYSIS (H): Primary | ICD-10-CM

## 2025-04-02 DIAGNOSIS — N18.6 ESRD (END STAGE RENAL DISEASE) ON DIALYSIS (H): Primary | ICD-10-CM

## 2025-04-02 DIAGNOSIS — Z99.2 ANEMIA IN CHRONIC KIDNEY DISEASE, ON CHRONIC DIALYSIS (H): ICD-10-CM

## 2025-04-02 DIAGNOSIS — E61.1 IRON DEFICIENCY: ICD-10-CM

## 2025-04-02 LAB
BASOPHILS # BLD AUTO: 0 10E3/UL (ref 0–0.2)
BASOPHILS NFR BLD AUTO: 0 %
EOSINOPHIL # BLD AUTO: 0.2 10E3/UL (ref 0–0.7)
EOSINOPHIL NFR BLD AUTO: 5 %
ERYTHROCYTE [DISTWIDTH] IN BLOOD BY AUTOMATED COUNT: 21.2 % (ref 10–15)
FERRITIN SERPL-MCNC: 1067 NG/ML (ref 11–328)
HCT VFR BLD AUTO: 25.5 % (ref 35–47)
HGB BLD-MCNC: 7.5 G/DL (ref 11.7–15.7)
IMM GRANULOCYTES # BLD: 0 10E3/UL
IMM GRANULOCYTES NFR BLD: 0 %
IRON BINDING CAPACITY (ROCHE): 225 UG/DL (ref 240–430)
IRON SATN MFR SERPL: 21 % (ref 15–46)
IRON SERPL-MCNC: 48 UG/DL (ref 37–145)
LYMPHOCYTES # BLD AUTO: 0.9 10E3/UL (ref 0.8–5.3)
LYMPHOCYTES NFR BLD AUTO: 19 %
MCH RBC QN AUTO: 21.6 PG (ref 26.5–33)
MCHC RBC AUTO-ENTMCNC: 29.4 G/DL (ref 31.5–36.5)
MCV RBC AUTO: 74 FL (ref 78–100)
MONOCYTES # BLD AUTO: 0.4 10E3/UL (ref 0–1.3)
MONOCYTES NFR BLD AUTO: 8 %
NEUTROPHILS # BLD AUTO: 3.4 10E3/UL (ref 1.6–8.3)
NEUTROPHILS NFR BLD AUTO: 68 %
NRBC # BLD AUTO: 0 10E3/UL
NRBC BLD AUTO-RTO: 0 /100
PLATELET # BLD AUTO: 112 10E3/UL (ref 150–450)
RBC # BLD AUTO: 3.47 10E6/UL (ref 3.8–5.2)
WBC # BLD AUTO: 4.9 10E3/UL (ref 4–11)

## 2025-04-02 PROCEDURE — 85014 HEMATOCRIT: CPT

## 2025-04-02 PROCEDURE — 83550 IRON BINDING TEST: CPT

## 2025-04-02 PROCEDURE — G0463 HOSPITAL OUTPT CLINIC VISIT: HCPCS | Performed by: NURSE PRACTITIONER

## 2025-04-02 PROCEDURE — 82728 ASSAY OF FERRITIN: CPT

## 2025-04-02 PROCEDURE — 83540 ASSAY OF IRON: CPT

## 2025-04-02 PROCEDURE — 36415 COLL VENOUS BLD VENIPUNCTURE: CPT

## 2025-04-02 PROCEDURE — 85004 AUTOMATED DIFF WBC COUNT: CPT

## 2025-04-02 ASSESSMENT — PAIN SCALES - GENERAL: PAINLEVEL_OUTOF10: MODERATE PAIN (5)

## 2025-04-02 NOTE — PROGRESS NOTES
"Oncology Rooming Note    April 2, 2025 1:32 PM   Nithya Matthews is a 55 year old female who presents for:    Chief Complaint   Patient presents with    Oncology Clinic Visit     Return visit 6 months with lab. ESRD (end stage renal disease) on dialysis.     Initial Vitals: /57 (BP Location: Left arm, Patient Position: Sitting, Cuff Size: Adult Regular)   Pulse 75   Temp 98.1  F (36.7  C) (Oral)   Resp 16   Wt 53.5 kg (117 lb 14.4 oz)   LMP  (LMP Unknown)   SpO2 92%   BMI 21.56 kg/m   Estimated body mass index is 21.56 kg/m  as calculated from the following:    Height as of 2/19/25: 1.575 m (5' 2\").    Weight as of this encounter: 53.5 kg (117 lb 14.4 oz). Body surface area is 1.53 meters squared.  Moderate Pain (5) Comment: Data Unavailable   No LMP recorded (lmp unknown). Patient is postmenopausal.  Allergies reviewed: Yes  Medications reviewed: Yes    Medications: Medication refills not needed today.  Pharmacy name entered into [facility]:    [facility] - [place], [place] - [address] [place], [place] [address]Y  [facility] [place], [place] - [address] [place] [address]    Frailty Screening:   Is the patient here for a new oncology consult visit in cancer care? 2. No    PHQ9:  Did this patient require a PHQ9?: No      Clinical concerns: none     [employer] [place]  Zelalem [phone] via video used for this visit.       Felisha Thacker CMA            "

## 2025-04-02 NOTE — LETTER
"4/2/2025      Nithya Matthews  [address] [place] [address]      Dear Colleague,    Thank you for referring your patient, Nithya Matthews, to the [facility] [place]. Please see a copy of my visit note below.    Oncology Rooming Note    April 2, 2025 1:32 PM   Nithya Matthews is a 55 year old female who presents for:    Chief Complaint   Patient presents with     Oncology Clinic Visit     Return visit 6 months with lab. ESRD (end stage renal disease) on dialysis.     Initial Vitals: /57 (BP Location: Left arm, Patient Position: Sitting, Cuff Size: Adult Regular)   Pulse 75   Temp 98.1  F (36.7  C) (Oral)   Resp 16   Wt 53.5 kg (117 lb 14.4 oz)   LMP  (LMP Unknown)   SpO2 92%   BMI 21.56 kg/m   Estimated body mass index is 21.56 kg/m  as calculated from the following:    Height as of 2/19/25: 1.575 m (5' 2\").    Weight as of this encounter: 53.5 kg (117 lb 14.4 oz). Body surface area is 1.53 meters squared.  Moderate Pain (5) Comment: Data Unavailable   No LMP recorded (lmp unknown). Patient is postmenopausal.  Allergies reviewed: Yes  Medications reviewed: Yes    Medications: Medication refills not needed today.  Pharmacy name entered into [employer]:    [facility] - [place], [place] - [address] [place] [address] [place]  [facility] [place], [place] - [facility] [address]    Frailty Screening:   Is the patient here for a new oncology consult visit in cancer care? 2. No    PHQ9:  Did this patient require a PHQ9?: No      Clinical concerns: none     [employer] [place]  [place]elal[place] [phone] via video used for this visit.       Felisha Thacker, [facility] Hematology and Oncology Progress Note    Patient: Nithya Matthews  MRN: [id number]  Date of Service: Apr 2, 2025          Reason for Visit    Chief Complaint   Patient presents with     Oncology Clinic Visit     Return visit 6 months with lab. ESRD (end stage renal disease) on dialysis.       Assessment and " Plan    Cancer Staging   No matching staging information was found for the patient.    Anemia, beta thalassemia minor, ESRD, iron deficiency: Pt has ongoing dialysis three times a week. She also gets VIK supplementation at dialysis. Intermittent iron. Overall hemoglobin is stable. Mildly symptomatic with chronic fatigue. Ferritin is elevated, but overall stable. May need to decrease iron if this continues to increase. No change for now. Will see Dr. Zuñiga in 6 months.     Evaluation for kidney transplant: was told her performance status not good enough for transplant at this time. Encourage her to slowly increase her activity levels to try to get stronger. Encourage her and her family to ask dialysis team if there is a program she can do to get stronger to try to get transplant. Maybe PT would be helpful.     ECOG Performance    1 - Can't do physically strenuous work, but fully ambyulatory and can do light sedentary work    Distress Screening (within last 30 days)    1. How concerned are you about your ability to eat? : 6  2. How concerned are you about unintended weight loss or your current weight? : 0  3. How concerned are you about feeling depressed or very sad? : (!) 10  4. How concerned are you about feeling anxious or very scared? : (!) 10  5. Do you struggle with the loss of meaning and griselda in your life? : Not at all  6. How concerned are you about work and home life issues that may be affected by your cancer? : 0  7. How concerned are you about knowing what resources are available to help you? : (!) 9  8. Do you currently have what you would describe as Jain or spiritual struggles?: Not at all       Pain  Pain Score: Moderate Pain (5)  Pain Loc: Foot (bilateral)    Problem List    Patient Active Problem List   Diagnosis     Primary hypertension     Hyperlipidemia LDL goal <100     Depression, unspecified depression type     Hyperglycemia     Generalized muscle weakness     Low iron      Gastroesophageal reflux disease with esophagitis without hemorrhage     Low vitamin B12 level     Low vitamin D level     Celiac disease     Beta thalassemia minor     Type 2 diabetes mellitus with other specified complication, unspecified whether long term insulin use (H)     Incontinence of feces with fecal urgency     ESRD (end stage renal disease) on dialysis (H)     Hyperkalemia     Weakness     Chronic anemia     Diabetic neuropathy (H)     Hepatitis B core antibody positive     Nephrolithiasis     Physical deconditioning        ______________________________________________________________________________    History of Present Illness    Hematologist: Dr. Zuñiga    Diagnosis:   Anemia. Hx of beta thalassemia minor, iron deficiency, and ESRD on dialysis.     Treatment:   Intermittent iron infusions. Oral iron.     Interim History:   Pt is here today for a follow up visit. She is doing about the same. Feels chronically tired. Mild PECK. No significant change. Continues dialysis three times a week. Gets injections at dialysis. Denies any weight changes. Denies any infectious complaints.     Review of Systems    Pertinent items are noted in HPI.    Past History    Past Medical History:   Diagnosis Date     Anuria      Arthritis      Depression      Diabetes (H)      End stage renal disease (H)      Hypertension        PHYSICAL EXAM  /57 (BP Location: Left arm, Patient Position: Sitting, Cuff Size: Adult Regular)   Pulse 75   Temp 98.1  F (36.7  C) (Oral)   Resp 16   Wt 53.5 kg (117 lb 14.4 oz)   LMP  (LMP Unknown)   SpO2 92%   BMI 21.56 kg/m      GENERAL: no acute distress. Cooperative in conversation. Here with daughter.  on iPad.   RESP: Regular respiratory rate. No expiratory wheezes   NEURO: non focal. Alert and oriented x3.   PSYCH: within normal limits. No depression or anxiety.  SKIN: exposed skin is dry intact.     Lab Results    Recent Results (from the past week)   Iron and iron  binding capacity   Result Value Ref Range    Iron 48 37 - 145 ug/dL    Iron Binding Capacity 225 (L) 240 - 430 ug/dL    Iron Sat Index 21 15 - 46 %   Ferritin   Result Value Ref Range    Ferritin 1,067 (H) 11 - 328 ng/mL   CBC with platelets and differential   Result Value Ref Range    WBC Count 4.9 4.0 - 11.0 10e3/uL    RBC Count 3.47 (L) 3.80 - 5.20 10e6/uL    Hemoglobin 7.5 (L) 11.7 - 15.7 g/dL    Hematocrit 25.5 (L) 35.0 - 47.0 %    MCV 74 (L) 78 - 100 fL    MCH 21.6 (L) 26.5 - 33.0 pg    MCHC 29.4 (L) 31.5 - 36.5 g/dL    RDW 21.2 (H) 10.0 - 15.0 %    Platelet Count 112 (L) 150 - 450 10e3/uL    % Neutrophils 68 %    % Lymphocytes 19 %    % Monocytes 8 %    % Eosinophils 5 %    % Basophils 0 %    % Immature Granulocytes 0 %    NRBCs per 100 WBC 0 <1 /100    Absolute Neutrophils 3.4 1.6 - 8.3 10e3/uL    Absolute Lymphocytes 0.9 0.8 - 5.3 10e3/uL    Absolute Monocytes 0.4 0.0 - 1.3 10e3/uL    Absolute Eosinophils 0.2 0.0 - 0.7 10e3/uL    Absolute Basophils 0.0 0.0 - 0.2 10e3/uL    Absolute Immature Granulocytes 0.0 <=0.4 10e3/uL    Absolute NRBCs 0.0 10e3/uL       Imaging    No results found.    Total time spent with patient in face to face time, chart review and documentation was 20 minutes.      The longitudinal plan of care for the diagnosis(es)/condition(s) as documented were addressed during this visit. Due to the added complexity in care, I will continue to support Barriga in the subsequent management and with ongoing continuity of care.      Signed by: ANTHONY Lopez CNP      Again, thank you for allowing me to participate in the care of your patient.        Sincerely,        ANTHONY Lopez CNP    Electronically signed

## 2025-04-03 ENCOUNTER — PATIENT OUTREACH (OUTPATIENT)
Dept: CARE COORDINATION | Facility: CLINIC | Age: 55
End: 2025-04-03

## 2025-04-03 ENCOUNTER — PATIENT OUTREACH (OUTPATIENT)
Dept: NURSING | Facility: CLINIC | Age: 55
End: 2025-04-03
Payer: COMMERCIAL

## 2025-04-03 NOTE — PROGRESS NOTES
[facility] Hematology and Oncology Progress Note    Patient: [facility]  MRN: [id number]  Date of Service: Apr 2, 2025          Reason for Visit    Chief Complaint   Patient presents with    Oncology Clinic Visit     Return visit 6 months with lab. ESRD (end stage renal disease) on dialysis.       Assessment and Plan    Cancer Staging   No matching staging information was found for the patient.    Anemia, beta thalassemia minor, ESRD, iron deficiency: Pt has ongoing dialysis three times a week. She also gets VIK supplementation at dialysis. Intermittent iron. Overall hemoglobin is stable. Mildly symptomatic with chronic fatigue. Ferritin is elevated, but overall stable. May need to decrease iron if this continues to increase. No change for now. Will see Dr. Zuñiga in 6 months.     Evaluation for kidney transplant: was told her performance status not good enough for transplant at this time. Encourage her to slowly increase her activity levels to try to get stronger. Encourage her and her family to ask dialysis team if there is a program she can do to get stronger to try to get transplant. Maybe PT would be helpful.     ECOG Performance    1 - Can't do physically strenuous work, but fully ambyulatory and can do light sedentary work    Distress Screening (within last 30 days)    1. How concerned are you about your ability to eat? : 6  2. How concerned are you about unintended weight loss or your current weight? : 0  3. How concerned are you about feeling depressed or very sad? : (!) 10  4. How concerned are you about feeling anxious or very scared? : (!) 10  5. Do you struggle with the loss of meaning and griselda in your life? : Not at all  6. How concerned are you about work and home life issues that may be affected by your cancer? : 0  7. How concerned are you about knowing what resources are available to help you? : (!) 9  8. Do you currently have what you would describe as Gnosticist or spiritual struggles?: Not at  all       Pain  Pain Score: Moderate Pain (5)  Pain Loc: Foot (bilateral)    Problem List    Patient Active Problem List   Diagnosis    Primary hypertension    Hyperlipidemia LDL goal <100    Depression, unspecified depression type    Hyperglycemia    Generalized muscle weakness    Low iron    Gastroesophageal reflux disease with esophagitis without hemorrhage    Low vitamin B12 level    Low vitamin D level    Celiac disease    Beta thalassemia minor    Type 2 diabetes mellitus with other specified complication, unspecified whether long term insulin use (H)    Incontinence of feces with fecal urgency    ESRD (end stage renal disease) on dialysis (H)    Hyperkalemia    Weakness    Chronic anemia    Diabetic neuropathy (H)    Hepatitis B core antibody positive    Nephrolithiasis    Physical deconditioning        ______________________________________________________________________________    History of Present Illness    Hematologist: Dr. Zuñiga    Diagnosis:   Anemia. Hx of beta thalassemia minor, iron deficiency, and ESRD on dialysis.     Treatment:   Intermittent iron infusions. Oral iron.     Interim History:   Pt is here today for a follow up visit. She is doing about the same. Feels chronically tired. Mild PECK. No significant change. Continues dialysis three times a week. Gets injections at dialysis. Denies any weight changes. Denies any infectious complaints.     Review of Systems    Pertinent items are noted in HPI.    Past History    Past Medical History:   Diagnosis Date    Anuria     Arthritis     Depression     Diabetes (H)     End stage renal disease (H)     Hypertension        PHYSICAL EXAM  /57 (BP Location: Left arm, Patient Position: Sitting, Cuff Size: Adult Regular)   Pulse 75   Temp 98.1  F (36.7  C) (Oral)   Resp 16   Wt 53.5 kg (117 lb 14.4 oz)   LMP  (LMP Unknown)   SpO2 92%   BMI 21.56 kg/m      GENERAL: no acute distress. Cooperative in conversation. Here with daughter.   on iPad.   RESP: Regular respiratory rate. No expiratory wheezes   NEURO: non focal. Alert and oriented x3.   PSYCH: within normal limits. No depression or anxiety.  SKIN: exposed skin is dry intact.     Lab Results    Recent Results (from the past week)   Iron and iron binding capacity   Result Value Ref Range    Iron 48 37 - 145 ug/dL    Iron Binding Capacity 225 (L) 240 - 430 ug/dL    Iron Sat Index 21 15 - 46 %   Ferritin   Result Value Ref Range    Ferritin 1,067 (H) 11 - 328 ng/mL   CBC with platelets and differential   Result Value Ref Range    WBC Count 4.9 4.0 - 11.0 10e3/uL    RBC Count 3.47 (L) 3.80 - 5.20 10e6/uL    Hemoglobin 7.5 (L) 11.7 - 15.7 g/dL    Hematocrit 25.5 (L) 35.0 - 47.0 %    MCV 74 (L) 78 - 100 fL    MCH 21.6 (L) 26.5 - 33.0 pg    MCHC 29.4 (L) 31.5 - 36.5 g/dL    RDW 21.2 (H) 10.0 - 15.0 %    Platelet Count 112 (L) 150 - 450 10e3/uL    % Neutrophils 68 %    % Lymphocytes 19 %    % Monocytes 8 %    % Eosinophils 5 %    % Basophils 0 %    % Immature Granulocytes 0 %    NRBCs per 100 WBC 0 <1 /100    Absolute Neutrophils 3.4 1.6 - 8.3 10e3/uL    Absolute Lymphocytes 0.9 0.8 - 5.3 10e3/uL    Absolute Monocytes 0.4 0.0 - 1.3 10e3/uL    Absolute Eosinophils 0.2 0.0 - 0.7 10e3/uL    Absolute Basophils 0.0 0.0 - 0.2 10e3/uL    Absolute Immature Granulocytes 0.0 <=0.4 10e3/uL    Absolute NRBCs 0.0 10e3/uL       Imaging    No results found.    Total time spent with patient in face to face time, chart review and documentation was 20 minutes.      The longitudinal plan of care for the diagnosis(es)/condition(s) as documented were addressed during this visit. Due to the added complexity in care, I will continue to support Barriga in the subsequent management and with ongoing continuity of care.      Signed by: ANTHONY Lopez CNP

## 2025-04-03 NOTE — PROGRESS NOTES
Social Work - Distress Screen Intervention  [facility]    Identified Concern and Score from Distress Screenin. How concerned are you about your ability to eat? 6     2. How concerned are you about unintended weight loss or your current weight? 0     3. How concerned are you about feeling depressed or very sad?  (!) 10     4. How concerned are you about feeling anxious or very scared?  (!) 10     5. Do you struggle with the loss of meaning and griselda in your life?  Not at all     6. How concerned are you about work and home life issues that may be affected by your cancer?  0     7. How concerned are you about knowing what resources are available to help you?  (!) 9     8. Do you currently have what you would describe as Restorationism or spiritual struggles? Not at all     9. If you want to be contacted by one of our professionals, I can send a message to them right now.  No data recorded     This patient scored positive on Cancer Distress Screen at the time of their most recent clinic visit. Chart reviewed, and patient does not have a cancer diagnosis. Therefore, oncology social work team will not outreach to patient. Oncology social work will engage if referral received from provider.   SW communicated with primary care team - Janel Jhaveri RN and she will follow up with patient.   GERALDINE Purdy, [facility]  Adult Oncology Clinics  [place] (M,W), [place] (T) & [place] ()  *I am off Friday  Office: [phone]

## 2025-04-03 NOTE — PROGRESS NOTES
Clinic Care Coordination Contact  Care Coordination Clinician Chart Review    Situation: Patient chart reviewed by Care Coordinator.       Background: Care Coordination Program started: 3/1/2022. Initial assessment completed and patient-centered care plan(s) were developed with participation from patient. Lead CC handed patient off to CHW for continued outreaches.       Assessment: Per chart review, patient outreach completed by CC CHW on 3/25/25.  Patient is actively working to accomplish goal(s). Patient's goal(s) appropriate and relevant at this time. Patient is not due for updated Plan of Care.  Assessments will be completed annually or as needed/with change of patient status.    Rent concern   Mortgage - $2300.   Patient needs to pay $ 800 per month to daughter. Patient and spouse receive SSI $1450 per month.     SNAP $ 400 maico per month.     Rental assistance program      {Care Plan:962401}       Plan/Recommendations: The patient will continue working with Care Coordination to achieve goal(s) as above. CHW will continue outreaches at minimum every 30 days and will involve Lead CC as needed or if patient is ready to move to Maintenance. Lead CC will continue to monitor CHW outreaches and patient's progress to goal(s) every 6 weeks.     Plan of Care updated and sent to patient: {yesno-via:580250}           ideations. Patient and daughter stated that they will discuss this among their family to see if there's any other options then will reach back out to CCRN or CCSW if additional support needed.     Addendum on 4/8/25: Armhs worker - patient and daughter declined armhs worker or therapy at this time. Daughter states her brother plans to assist them pay $500 for the mortgage.      Moved CHW outreach on 4/24/25 to 5/22/25. CCRN plans to follow up on transplant evaluation on 5/21/25.       Care Plan: Hematology/oncology       Problem: Beta thalassemia minor       Goal: Patient will attend hematology clinic appointment in the next 12 months.       This Visit's Progress: 10% Recent Progress: 10%    Priority: High    Note:     Barriers: language barrier, low literacy, noncompliance, and lack of knowledge how to navigate complex health care system  Strengths: motivated to attend appt  Patient expressed understanding of goal: Yes    Action steps to achieve this goal:  1. I will attend my follow up hematology/oncology appt on 3/19/2025 at 2pm for lab and 2:30pm with Dr. Zuñiga. No Showed  2. I will attend my rescheduled follow up hematology/oncology appt on 4/2 at 1 pm Lab and 1:30pm with ANTHONY Krause.   3. I will schedule a follow up appointment with my provider if it is recommended to do so while I am at the clinic.  4. I will follow up with CCC regarding this goal at each outreach until it is completed.                               Care Plan: Transplant evaluation       Problem: CKD       Goal: Patient will attend her kidney transplant evaluation in the next 12 months.       Start Date: 5/21/2024 Expected End Date: 5/31/2025    This Visit's Progress: 40% Recent Progress: 40%    Priority: High    Note:     Barriers: language barrier, low literacy, noncompliance, and lack of knowledge how to navigate complex health care system  Strengths: motivated to attend appt  Patient expressed understanding of goal:  Yes    Action steps to achieve this goal:  1. I will attend my pre-transplant evaluation appointments on 6/12/2024 at 12pm, 12:30pm, and 1pm with MD and PharmD at [facility]. Completed.   2. I will attend my follow up transplant evaluation as scheduled on 8/21/2024 at 10am. Multiple appts scheduled today at [facility]. Completed.   3. I will attend my mammogram on 8/28/24. Cancelled via Mychart  4. I will attend my eye exam on 10/7/2024 at 1:00 pm at [facility] location. No Showed. Eye exam rescheduled on Mon 11/4/24 at 32PM. Completed  5. I will attend my rescheduled mammogram appointment on 11/18/24 at 2:15 pm. No Showed. Mammogram rescheduled to 12/4/24 at 12:45PM. Completed  6. I will attend my dental appointment on 4/2/2025 at 11:00 am at [facility] location.   7. I will schedule a follow up appointment with my providers if it is recommended to do so while I am at the clinic.  8. I will follow up with CCC regarding this goal at each outreach until it is completed.     Paulo Thomas MD Sahadevan, Meena, MBBS Lori, Nurse transplant coordinator, with [facility] ([phone])  [address] [place] [address] [id number]   [place] [address]   [phone] (Work)   [phone] (Fax)     Prema Heard [facility]   Transplant   [phone]                                  Plan/Recommendations: The patient will continue working with Care Coordination to achieve goal(s) as above. CHW will continue outreaches at minimum every 30 days and will involve Lead CC as needed or if patient is ready to move to Maintenance. Lead CC will continue to monitor CHW outreaches and patient's progress to goal(s) every 6 weeks.     Plan of Care updated and sent to patient: No

## 2025-04-06 ENCOUNTER — HEALTH MAINTENANCE LETTER (OUTPATIENT)
Age: 55
End: 2025-04-06

## 2025-04-26 ENCOUNTER — LAB REQUISITION (OUTPATIENT)
Dept: LAB | Facility: CLINIC | Age: 55
End: 2025-04-26

## 2025-04-26 LAB
BUN SERPL-MCNC: 27.9 MG/DL (ref 6–20)
BUN SERPL-MCNC: 7.9 MG/DL (ref 6–20)

## 2025-04-26 PROCEDURE — 84520 ASSAY OF UREA NITROGEN: CPT | Performed by: INTERNAL MEDICINE

## 2025-05-06 ENCOUNTER — PATIENT OUTREACH (OUTPATIENT)
Dept: ONCOLOGY | Facility: HOSPITAL | Age: 55
End: 2025-05-06
Payer: COMMERCIAL

## 2025-05-06 NOTE — PROGRESS NOTES
Situation: Patient chart reviewed by care coordinator.    Background: Anemia, beta thalassemia minor, ESRD, iron deficiency. Pt has ongoing dialysis three times a week. She also gets VIK supplementation at dialysis.     In clinic on 4/2 seeing Nancy Dorman CNP    Re: kidney transplant, pt is not physically strong enough for transplant. Nancy suggested to family to ask transplant team if there is PT or other program to help her get stronger.     Assessment: Overall stable with blood/iron issues, although Ferritin is still elevated.     Plan/Recommendations: RTC with Dr. Zuñiga 6 mo.    Desiree Leyva RN

## 2025-05-21 ENCOUNTER — OFFICE VISIT (OUTPATIENT)
Dept: FAMILY MEDICINE | Facility: CLINIC | Age: 55
End: 2025-05-21
Payer: COMMERCIAL

## 2025-05-21 ENCOUNTER — PATIENT OUTREACH (OUTPATIENT)
Dept: NURSING | Facility: CLINIC | Age: 55
End: 2025-05-21
Payer: COMMERCIAL

## 2025-05-21 VITALS
TEMPERATURE: 98 F | BODY MASS INDEX: 21.94 KG/M2 | HEIGHT: 62 IN | SYSTOLIC BLOOD PRESSURE: 104 MMHG | RESPIRATION RATE: 16 BRPM | HEART RATE: 61 BPM | WEIGHT: 119.25 LBS | DIASTOLIC BLOOD PRESSURE: 56 MMHG | OXYGEN SATURATION: 95 %

## 2025-05-21 DIAGNOSIS — D56.1 BETA THALASSEMIA (H): ICD-10-CM

## 2025-05-21 DIAGNOSIS — E11.42 DIABETIC POLYNEUROPATHY ASSOCIATED WITH TYPE 2 DIABETES MELLITUS (H): ICD-10-CM

## 2025-05-21 DIAGNOSIS — E11.69 TYPE 2 DIABETES MELLITUS WITH OTHER SPECIFIED COMPLICATION, UNSPECIFIED WHETHER LONG TERM INSULIN USE (H): ICD-10-CM

## 2025-05-21 DIAGNOSIS — Z13.6 SCREENING FOR CARDIOVASCULAR CONDITION: Primary | ICD-10-CM

## 2025-05-21 DIAGNOSIS — H04.123 DRY EYES: ICD-10-CM

## 2025-05-21 DIAGNOSIS — I50.9 HEART FAILURE, UNSPECIFIED HF CHRONICITY, UNSPECIFIED HEART FAILURE TYPE (H): ICD-10-CM

## 2025-05-21 DIAGNOSIS — F33.2 MAJOR DEPRESSIVE DISORDER, RECURRENT SEVERE WITHOUT PSYCHOTIC FEATURES (H): ICD-10-CM

## 2025-05-21 DIAGNOSIS — Z09 HOSPITAL DISCHARGE FOLLOW-UP: ICD-10-CM

## 2025-05-21 DIAGNOSIS — E87.5 HYPERKALEMIA: ICD-10-CM

## 2025-05-21 PROCEDURE — 83735 ASSAY OF MAGNESIUM: CPT | Performed by: FAMILY MEDICINE

## 2025-05-21 PROCEDURE — 36415 COLL VENOUS BLD VENIPUNCTURE: CPT | Performed by: FAMILY MEDICINE

## 2025-05-21 PROCEDURE — 3078F DIAST BP <80 MM HG: CPT | Performed by: FAMILY MEDICINE

## 2025-05-21 PROCEDURE — 80048 BASIC METABOLIC PNL TOTAL CA: CPT | Performed by: FAMILY MEDICINE

## 2025-05-21 PROCEDURE — 1111F DSCHRG MED/CURRENT MED MERGE: CPT | Performed by: FAMILY MEDICINE

## 2025-05-21 PROCEDURE — 84100 ASSAY OF PHOSPHORUS: CPT | Performed by: FAMILY MEDICINE

## 2025-05-21 PROCEDURE — 99214 OFFICE O/P EST MOD 30 MIN: CPT | Performed by: FAMILY MEDICINE

## 2025-05-21 PROCEDURE — 3074F SYST BP LT 130 MM HG: CPT | Performed by: FAMILY MEDICINE

## 2025-05-21 RX ORDER — FLASH GLUCOSE SENSOR
KIT MISCELLANEOUS
Qty: 6 EACH | Refills: 3 | Status: SHIPPED | OUTPATIENT
Start: 2025-05-21

## 2025-05-21 RX ORDER — CARBOXYMETHYLCELLULOSE SODIUM, GLYCERIN 5; 9 MG/ML; MG/ML
1 SOLUTION/ DROPS OPHTHALMIC PRN
Qty: 10 ML | Refills: 11 | Status: SHIPPED | OUTPATIENT
Start: 2025-05-21

## 2025-05-21 NOTE — PROGRESS NOTES
Assessment & Plan     Hospital discharge follow-up  Hyperkalemia  Recheck labs. If critical K, can consider going to ER but she is stable today with scheduled dialysis tomorrow. Would not go to ER for high Creatinine - known ESRD on dialysis, going to dialysis tomorrow.   - Basic metabolic panel  (Ca, Cl, CO2, Creat, Gluc, K, Na, BUN)  - Magnesium  - Phosphorus  - Basic metabolic panel  (Ca, Cl, CO2, Creat, Gluc, K, Na, BUN)  - Magnesium  - Phosphorus    Diabetic neuropathy (H)  Stable. Decreased sensation throughout. No ulcerations currently.     Dry eyes  - carboxymethylcell-glycerin PF (REFRESH OPTIVE PF) 0.5-0.9 % ophthalmic solution  Dispense: 10 mL; Refill: 11    Type 2 diabetes mellitus with other specified complication, unspecified whether long term insulin use (H)  Refilled sensors.   - Continuous Glucose Sensor (FREESTYLE MICKIE 14 DAY SENSOR) MISC  Dispense: 6 each; Refill: 3    c metabolic panel  (Ca, Cl, CO2, Creat, Gluc, K, Na, BUN)    Heart failure, unspecified HF chronicity, unspecified heart failure type (H)  Stable. no    Major depressive disorder, recurrent severe without psychotic features (H)  Stable, declined any new issues today. Does not have therapist, declined.     Beta thalassemia (H)  Baseline anemia . Also secondary anemia from esrd.       MED REC REQUIRED  Post Medication Reconciliation Status: discharge medications reconciled, continue medications without change    Follow-up  Return in about 3 months (around 8/21/2025) for diabetes follow up.    Lucio Barriga is a 55 year old, presenting for the following health issues:  Blood Pressure Check and Diabetes        5/21/2025     1:01 PM   Additional Questions   Roomed by Marcela HARRIS     History of Present Illness       Diabetes:   She presents for follow up of diabetes.  She is checking home blood glucose three times daily.   She checks blood glucose before and after meals.  Blood glucose is sometimes over 200 and sometimes under 70. She  "is aware of hypoglycemia symptoms including shakiness and weakness.    She has no concerns regarding her diabetes at this time.  She is having numbness in feet, redness, sores, or blisters on feet, excessive thirst, blurry vision, weight loss and weight gain.            Hypertension: She presents for follow up of hypertension.  She does check blood pressure  regularly outside of the clinic. Outside blood pressures have been over 140/90. She follows a low salt diet.              Hospital Follow-up Visit:    Hospital/Nursing Home/ Rehab Facility: [facility]  Date of Admission: 05/12/2025  Date of Discharge: 05/14/2025  Reason(s) for Admission:   Principal Problem:  Acute hyperkalemia  Active Problems:  ESRD (end stage renal disease) on dialysis ([facility])  Metabolic encephalopathy  Anemia, macrocytic  Hypocalcemia  Uncontrolled type 2 diabetes mellitus with hyperglycemia ([facility])  Cardiogenic shock ([facility])  Bradycardia, sinus  ECG abnormality   Was the patient in the ICU or did the patient experience delirium during hospitalization?  No  Do you have any other stressors you would like to discuss with your provider? No    Problems taking medications regularly:  None  Medication changes since discharge: None  Problems adhering to non-medication therapy:  None    Summary of hospitalization:  [facility] discharge summary reviewed  Diagnostic Tests/Treatments reviewed.  Follow up needed: continue dialysis  Other Healthcare Providers Involved in Patient s Care:         Homecare and Specialist appointment - dialysis/neph  Update since discharge: improved.         Plan of care communicated with patient and family                       Objective    /56   Pulse 61   Temp 98  F (36.7  C) (Oral)   Resp 16   Ht 1.575 m (5' 2\")   Wt 54.1 kg (119 lb 4 oz)   LMP  (LMP Unknown)   SpO2 95%   BMI 21.81 kg/m    Body mass index is 21.81 kg/m .  Physical Exam   GENERAL: alert and no distress  NECK: no adenopathy, no " asymmetry, masses, or scars  RESP: lungs clear to auscultation - no rales, rhonchi or wheezes  CV: regular rate and rhythm, normal S1 S2, no S3 or S4, no murmur, click or rub, no peripheral edema  ABDOMEN: soft, nontender, no hepatosplenomegaly, no masses and bowel sounds normal  MS: no gross musculoskeletal defects noted, no edema    No results found for any visits on 05/21/25.  No results found for this or any previous visit (from the past 24 hours).        Signed Electronically by: Giancarlo Arizmendi MD

## 2025-05-21 NOTE — PROGRESS NOTES
Clinic Care Coordination Contact  Care Coordination Clinician Chart Review    Situation: Patient chart reviewed by Care Coordinator.       Background: Care Coordination Program started: 3/1/2022. Initial assessment completed and patient-centered care plan(s) were developed with participation from patient. Lead CC handed patient off to CHW for continued outreaches.       Assessment: Per chart review, patient outreach completed by CC CHW on 3/25/25.  Patient is actively working to accomplish goal(s). Patient's goal(s) appropriate and relevant at this time. Patient is not due for updated Plan of Care.  Assessments will be completed annually or as needed/with change of patient status.    CCRN tried calling patient/daughter today but had to leave a voicemail requesting a return call to review goals. Moved CHW outreach on 5/22/25 to 6/4/25.     Hematology/oncology   Per chart review, patient attended her appointment on 4/2/25. Patient is scheduled to follow up on 11/5/25 at 1:15 pm for lab then will see Dr Zuñiga at 1:45 pm. Goal updated today.     Dental exam  CCRN tried calling patient/daughter today to confirm if she attended her dental appointment on 4/2/25. Left a vm requesting a return call.     Transplant evaluation  Per chart review, patient was told her performance status not good enough for transplant at this time. Goal up to date.      Care Plan: Hematology/oncology       Problem: Beta thalassemia minor       Goal: Patient will attend hematology clinic appointment in the next 12 months.       Start Date: 3/19/2025 Expected End Date: 3/31/2026    Recent Progress: 10%    Priority: High    Note:     Barriers: language barrier, low literacy, noncompliance, and lack of knowledge how to navigate complex health care system  Strengths: motivated to attend appt  Patient expressed understanding of goal: Yes    Action steps to achieve this goal:  1. I will attend my follow up hematology/oncology appt on 3/19/2025 at 2pm  for lab and 2:30pm with Dr. Zuñiga. No Showed  2. I will attend my rescheduled follow up hematology/oncology appt on 4/2 at 1 pm Lab and 1:30pm with ANTHONY Krause. Completed.   3. I will attend my 6 months follow-up on 11/5/25 at 1:15 am for lab and 1:45 pm with Dr Zuñiga.  4. I will schedule a follow up appointment with my provider if it is recommended to do so while I am at the clinic.  5. I will follow up with [facility] regarding this goal at each outreach until it is completed.                               Care Plan: Transplant evaluation       Problem: CKD       Goal: Patient will attend her kidney transplant evaluation in the next 12 months.       Start Date: 5/21/2024 Expected End Date: 5/31/2025    This Visit's Progress: 40% Recent Progress: 40%    Priority: High    Note:     Barriers: language barrier, low literacy, noncompliance, and lack of knowledge how to navigate complex health care system  Strengths: motivated to attend appt  Patient expressed understanding of goal: Yes    Action steps to achieve this goal:  1. I will attend my pre-transplant evaluation appointments on 6/12/2024 at 12pm, 12:30pm, and 1pm with MD and PharmD at [facility]. Completed.   2. I will attend my follow up transplant evaluation as scheduled on 8/21/2024 at 10am. Multiple appts scheduled today at [facility]. Completed.   3. I will attend my mammogram on 8/28/24. Cancelled via Mychart  4. I will attend my eye exam on 10/7/2024 at 1:00 pm at [facility] location. No Showed. Eye exam rescheduled on Mon 11/4/24 at 32PM. Completed  5. I will attend my rescheduled mammogram appointment on 11/18/24 at 2:15 pm. No Showed. Mammogram rescheduled to 12/4/24 at 12:45PM. Completed  6. I will attend my dental appointment on 4/2/2025 at 11:00 am at [facility] location.   7. I will schedule a follow up appointment with my providers if it is recommended to do so while I am at the clinic.  8. I will follow up with  CCC regarding this goal at each outreach until it is completed.     Paulo Thomas MD Sahadevan, Meena, MBBS Lori, Nurse transplant coordinator, with [facility] ([phone])  [address] [facility] [id number]   [place] [address]   [phone] (Work)   [phone] (Fax)     Prema Heard [facility]   Transplant   [phone]                                  Plan/Recommendations: The patient will continue working with Care Coordination to achieve goal(s) as above. CHW will continue outreaches at minimum every 30 days and will involve Lead CC as needed or if patient is ready to move to Maintenance. Lead CC will continue to monitor CHW outreaches and patient's progress to goal(s) every 6 weeks.     Plan of Care updated and sent to patient: No

## 2025-05-21 NOTE — Clinical Note
BIJAN: I moved your outreach on 5/22/25 to 6/4/25. I tried calling patient/daughter today but had to leave a message. She's scheduled to see PCP today. Hopefully, she will show. I sent a message to Dr Arizmendi to message me if she would like me to follow up on anything. Thanks.

## 2025-05-22 LAB
ANION GAP SERPL CALCULATED.3IONS-SCNC: 18 MMOL/L (ref 7–15)
BUN SERPL-MCNC: 23 MG/DL (ref 6–20)
CALCIUM SERPL-MCNC: 6.5 MG/DL (ref 8.8–10.4)
CHLORIDE SERPL-SCNC: 99 MMOL/L (ref 98–107)
CREAT SERPL-MCNC: 7.25 MG/DL (ref 0.51–0.95)
EGFRCR SERPLBLD CKD-EPI 2021: 6 ML/MIN/1.73M2
GLUCOSE SERPL-MCNC: 247 MG/DL (ref 70–99)
HCO3 SERPL-SCNC: 21 MMOL/L (ref 22–29)
MAGNESIUM SERPL-MCNC: 1.6 MG/DL (ref 1.7–2.3)
PHOSPHATE SERPL-MCNC: 5.1 MG/DL (ref 2.5–4.5)
POTASSIUM SERPL-SCNC: 4.2 MMOL/L (ref 3.4–5.3)
SODIUM SERPL-SCNC: 138 MMOL/L (ref 135–145)

## 2025-05-28 DIAGNOSIS — E61.1 LOW IRON: ICD-10-CM

## 2025-05-28 DIAGNOSIS — R79.89 LOW VITAMIN B12 LEVEL: ICD-10-CM

## 2025-05-28 RX ORDER — FERROUS SULFATE 325(65) MG
TABLET ORAL
Qty: 45 TABLET | Refills: 3 | Status: SHIPPED | OUTPATIENT
Start: 2025-05-28

## 2025-05-28 RX ORDER — LANOLIN ALCOHOL/MO/W.PET/CERES
1000 CREAM (GRAM) TOPICAL DAILY
Qty: 30 TABLET | Refills: 2 | Status: SHIPPED | OUTPATIENT
Start: 2025-05-28

## 2025-06-04 ENCOUNTER — OFFICE VISIT (OUTPATIENT)
Dept: PODIATRY | Facility: CLINIC | Age: 55
End: 2025-06-04
Payer: COMMERCIAL

## 2025-06-04 ENCOUNTER — PATIENT OUTREACH (OUTPATIENT)
Dept: CARE COORDINATION | Facility: CLINIC | Age: 55
End: 2025-06-04

## 2025-06-04 VITALS — WEIGHT: 119 LBS | BODY MASS INDEX: 21.77 KG/M2

## 2025-06-04 DIAGNOSIS — M21.42 BILATERAL PES PLANUS: ICD-10-CM

## 2025-06-04 DIAGNOSIS — E11.42 DIABETIC POLYNEUROPATHY ASSOCIATED WITH TYPE 2 DIABETES MELLITUS (H): Primary | ICD-10-CM

## 2025-06-04 DIAGNOSIS — M20.11 HAV (HALLUX ABDUCTO VALGUS), RIGHT: ICD-10-CM

## 2025-06-04 DIAGNOSIS — M21.41 BILATERAL PES PLANUS: ICD-10-CM

## 2025-06-04 DIAGNOSIS — L84 PRE-ULCERATIVE CALLUSES: ICD-10-CM

## 2025-06-04 NOTE — PATIENT INSTRUCTIONS
Thank you for choosing [facility] Podiatry / Foot & Ankle Surgery!    DR HIRSCH'S CLINIC:  [facility]   [address]   [place] [address]  [phone]    (Tues, Wed, Thur am)     [facility]  [address] [place] [address]  [phone]  (Every Friday)     TRIAGE LINE: [phone]  APPOINTMENTS: [phone]  RADIOLOGY: [phone]  SET UP SURGERY: [phone]  PHYSICAL THERAPY: [phone]   FAX NUMBER: [phone]  BILLING QUESTIONS: [phone]       Follow up: As needed      Here is a list of routine foot care resources, which includes toenail trimming and callus/corn management.     This is not a referral. It is your responsibility to contact the organization and your insurance to confirm cost and coverage.      ROUTINE FOOT CARE (NAIL TRIMMING / CALLUSES)      [facility]  [phone]   [facility]  [phone]  Multiple locations   [facility]  [phone] Dr. Holt and Dr. Lawler  [address]  [place] [address] [phone]   [facility]  [phone]  [email or web address]

## 2025-06-04 NOTE — PROGRESS NOTES
Clinic Care Coordination Contact  [facility]/Milan Escobedo  ID [id number]    Clinical Data: Care Coordinator Outreach    Outreach Documentation Number of Outreach Attempt   6/4/2025   2:56 PM 1     Left message on patient's voicemail with call back information and requested return call.    Plan: Care Coordinator will try to reach patient again in 10 business days.    Irene Samson  Community Health Worker   Clinic Care Coordination  [facility]    Phone: [phone]

## 2025-06-04 NOTE — PROGRESS NOTES
Podiatry / Foot and Ankle Surgery Progress Note    June 4, 2025    Subject: Patient was seen for preulcerative callus on right foot.  Here with her daughter.  Using a iPad .  Notes that has been really sore to walk on.  Can be 5 out of 10 pain.  Has been motioning her feet daily and using pumice stone every other day.    Objective:  Vitals: Wt 54 kg (119 lb)   LMP  (LMP Unknown)   BMI 21.77 kg/m    BMI= Body mass index is 21.77 kg/m .    A1C: 6.6 (9/16/24)     General appearance: Patient is alert and fully cooperative with history & exam.  No sign of distress is noted during the visit.     Dermatologic: Preulcerative hyperkeratotic lesions to the plantar aspect of the right first metatarsal head.  Upon debridement, lesions or signs of acute infection noted.     Vascular: DP & PT pulses are intact & regular bilaterally.  No significant edema or varicosities noted.  CFT and skin temperature is normal to both lower extremities.     Neurologic: Lower extremity sensation is diminished to feet.     Musculoskeletal: Patient is ambulatory without assistive device or brace.  Prominent first metatarsal heads medially both feet.       Assessment:     Diabetic polyneuropathy associated with type 2 diabetes mellitus (H)  Pre-ulcerative calluses  Hav (hallux abducto valgus), right  Bilateral pes planus       Medical Decision Making/Plan:  Reviewed patient's chart in epic.    At this time, the preulcerative callus were debrided down.  At this time they will use a pumice stone or a foot file 2-3 times a week to help keep the callus from building up.  She can go back to her regular socks and diabetic shoes.       Follow up as needed.      All questions were answered to patient and patient's daughter satisfaction and they will call for the questions or concerns.     Procedure: After verbal consent, the hyperkeratotic lesion above were debrided using a #15 blade without incident. Patient tolerated procedure well.       Patient Risk Factor:  Patient is a moderate risk factor for infection.     Grecia Hearn DPM, Podiatry/Foot and Ankle Surgery

## 2025-06-04 NOTE — LETTER
6/4/2025      Nithya Matthews  [address] [place] [address]      Dear Colleague,    Thank you for referring your patient, Nithya Matthews, to the [facility] PODIATRY. Please see a copy of my visit note below.    Podiatry / Foot and Ankle Surgery Progress Note    June 4, 2025    Subject: Patient was seen for preulcerative callus on right foot.  Here with her daughter.  Using a iPad .  Notes that has been really sore to walk on.  Can be 5 out of 10 pain.  Has been motioning her feet daily and using pumice stone every other day.    Objective:  Vitals: Wt 54 kg (119 lb)   LMP  (LMP Unknown)   BMI 21.77 kg/m    BMI= Body mass index is 21.77 kg/m .    A1C: 6.6 (9/16/24)     General appearance: Patient is alert and fully cooperative with history & exam.  No sign of distress is noted during the visit.     Dermatologic: Preulcerative hyperkeratotic lesions to the plantar aspect of the right first metatarsal head.  Upon debridement, lesions or signs of acute infection noted.     Vascular: DP & PT pulses are intact & regular bilaterally.  No significant edema or varicosities noted.  CFT and skin temperature is normal to both lower extremities.     Neurologic: Lower extremity sensation is diminished to feet.     Musculoskeletal: Patient is ambulatory without assistive device or brace.  Prominent first metatarsal heads medially both feet.       Assessment:     Diabetic polyneuropathy associated with type 2 diabetes mellitus (H)  Pre-ulcerative calluses  Hav (hallux abducto valgus), right  Bilateral pes planus       Medical Decision Making/Plan:  Reviewed patient's chart in epic.    At this time, the preulcerative callus were debrided down.  At this time they will use a pumice stone or a foot file 2-3 times a week to help keep the callus from building up.  She can go back to her regular socks and diabetic shoes.       Follow up as needed.      All questions were answered to patient and patient's  daughter satisfaction and they will call for the questions or concerns.     Procedure: After verbal consent, the hyperkeratotic lesion above were debrided using a #15 blade without incident. Patient tolerated procedure well.      Patient Risk Factor:  Patient is a moderate risk factor for infection.     Grecia Hearn DPM, Podiatry/Foot and Ankle Surgery            Again, thank you for allowing me to participate in the care of your patient.        Sincerely,        Grecia Hearn DPM, Podiatry/Foot and Ankle Surgery    Electronically signed

## 2025-06-18 ENCOUNTER — PATIENT OUTREACH (OUTPATIENT)
Dept: CARE COORDINATION | Facility: CLINIC | Age: 55
End: 2025-06-18
Payer: COMMERCIAL

## 2025-06-18 NOTE — PROGRESS NOTES
Clinic Care Coordination Contact  [facility]/Milan  Ruben  ID [id number]    Clinical Data: Care Coordinator Outreach    Outreach Documentation Number of Outreach Attempt   2025   2:56 PM 1   2025   9:45 AM 2     CHW spoke with Fritz (Daughter). She will call back with patient on the line due to  C2C form.     Plan: Care Coordinator will try to reach patient again in 1 week.    Irene Samson  Community Health Worker   Clinic Care Coordination  [facility]    Phone: [phone]

## 2025-06-18 NOTE — PROGRESS NOTES
Clinic Care Coordination Contact  Community Health Worker Follow Up  Spoke with patient and Fritz (Daughter). Patient gives verbal permission for daughter to speak on her behalf and will update C2C form at next visit - Ruben  ID [id number]    Care Gaps:     Health Maintenance Due   Topic Date Due    HF ACTION PLAN  Never done    DEPRESSION ACTION PLAN  Never done    ZOSTER VACCINE (1 of 2) Never done    COVID-19 VACCINE (3 - Moderna risk series) 06/30/2022    LIPID  10/23/2024    A1C  03/16/2025    ALT  04/19/2025    DIABETIC FOOT EXAM  04/22/2025    COLORECTAL CANCER SCREENING  06/24/2025     Scheduled 8/21 at 2:10pm with Dr. Arizmendi.       Care Plan:   Care Plan: Hematology/oncology       Problem: Beta thalassemia minor       Goal: Patient will attend hematology clinic appointment in the next 12 months.       Start Date: 3/19/2025 Expected End Date: 3/31/2026    Recent Progress: 10%    Priority: High    Note:     Barriers: language barrier, low literacy, noncompliance, and lack of knowledge how to navigate complex health care system  Strengths: motivated to attend appt  Patient expressed understanding of goal: Yes    Action steps to achieve this goal:  1. I will attend my follow up hematology/oncology appt on 3/19/2025 at 2pm for lab and 2:30pm with Dr. Zuñiga. No Showed  2. I will attend my rescheduled follow up hematology/oncology appt on 4/2 at 1 pm Lab and 1:30pm with ANTHONY Krause. Completed.   3. I will attend my 6 months follow-up on 11/5/25 at 1:15 am for lab and 1:45 pm with Dr Zuñiga.   4. I will schedule a follow up appointment with my provider if it is recommended to do so while I am at the clinic.  5. I will follow up with [facility] regarding this goal at each outreach until it is completed.                               Care Plan: Transplant evaluation       Problem: CKD       Goal: Patient will attend her kidney transplant evaluation in the next 12 months.       Start Date: 5/21/2024 Expected  End Date: 5/31/2025    Recent Progress: 40%    Priority: High    Note:     Barriers: language barrier, low literacy, noncompliance, and lack of knowledge how to navigate complex health care system  Strengths: motivated to attend appt  Patient expressed understanding of goal: Yes    Action steps to achieve this goal:  1. I will attend my pre-transplant evaluation appointments on 6/12/2024 at 12pm, 12:30pm, and 1pm with MD and PharmD at [facility]. Completed.   2. I will attend my follow up transplant evaluation as scheduled on 8/21/2024 at 10am. Multiple appts scheduled today at [facility]. Completed.   3. I will attend my mammogram on 8/28/24. Cancelled via Mychart  4. I will attend my eye exam on 10/7/2024 at 1:00 pm at [facility] location. No Showed. Eye exam rescheduled on Mon 11/4/24 at 32PM. Completed  5. I will attend my rescheduled mammogram appointment on 11/18/24 at 2:15 pm. No Showed. Mammogram rescheduled to 12/4/24 at 12:45PM. Completed  6. I will attend my dental appointment on 4/2/2025 at 11:00 am at [facility] location. Completed  7. I will schedule a follow up appointment with my providers if it is recommended to do so while I am at the clinic.  8. I will follow up with [facility] regarding this goal at each outreach until it is completed.     Paulo Thomas MD Sahadevan, Meena, MBBS Lori, Nurse transplant coordinator, with [facility] ([phone])  [address] [place] [address] [address]   [place] [address]   [phone] (Work)   [phone] (Fax)     Prema Heard [facility]   Transplant   [phone]                           Intervention and Education during outreach:     Hematology/Oncology  Per chart review, patient completed appointment on 4/2. Fritz shared that patient is schedule for follow up on 11/5 at 1:15PM Lab and at 1:45pm with Sanjuanita Maynard MD.     Transplant Evaluation  [place] confirmed, patient completed appointment on 4/2 with [facility]    CHW Plan: 1 month follow up    Irene Samson  Community Health Worker   Clinic Care Coordination  [facility]    Phone: [phone]

## 2025-06-30 DIAGNOSIS — K21.00 GASTROESOPHAGEAL REFLUX DISEASE WITH ESOPHAGITIS WITHOUT HEMORRHAGE: ICD-10-CM

## 2025-06-30 RX ORDER — OMEPRAZOLE 20 MG/1
CAPSULE, DELAYED RELEASE ORAL
Qty: 90 CAPSULE | Refills: 2 | Status: SHIPPED | OUTPATIENT
Start: 2025-06-30

## 2025-07-02 ENCOUNTER — PATIENT OUTREACH (OUTPATIENT)
Dept: CARE COORDINATION | Facility: CLINIC | Age: 55
End: 2025-07-02
Payer: COMMERCIAL

## 2025-07-02 NOTE — PROGRESS NOTES
Clinic Care Coordination Contact  Care Coordination Clinician Chart Review    Situation: Patient chart reviewed by Care Coordinator.       Background: Care Coordination Program started: 3/1/2022. Initial assessment completed and patient-centered care plan(s) were developed with participation from patient. Lead CC handed patient off to CHW for continued outreaches.       Assessment: Per chart review, patient outreach completed by CC CHW on 6/18/25.  Patient is actively working to accomplish goal(s). Patient's goal(s) appropriate and relevant at this time. Patient is not due for updated Plan of Care.  Assessments will be completed annually or as needed/with change of patient status.    Hepatology follow up  Per chart review, patient is scheduled to follow up with her hepatologist on 11/5/25 with lab. Goal up to date.     Transplant evaluation  Will hold of completing goal until annual re-assessment will be completed by CCRN on 8/21/25. Goal on hold.       Care Plan: Hematology/oncology       Problem: Beta thalassemia minor       Goal: Patient will attend hematology clinic appointment in the next 12 months.       Start Date: 3/19/2025 Expected End Date: 3/31/2026    Recent Progress: 10%    Priority: High    Note:     Barriers: language barrier, low literacy, noncompliance, and lack of knowledge how to navigate complex health care system  Strengths: motivated to attend appt  Patient expressed understanding of goal: Yes    Action steps to achieve this goal:  1. I will attend my follow up hematology/oncology appt on 3/19/2025 at 2pm for lab and 2:30pm with Dr. Zuñiga. No Showed  2. I will attend my rescheduled follow up hematology/oncology appt on 4/2 at 1 pm Lab and 1:30pm with ANTHONY Krause. Completed.   3. I will attend my 6 months follow-up on 11/5/25 at 1:15 am for lab and 1:45 pm with Dr Zuñiga.   4. I will schedule a follow up appointment with my provider if it is recommended to do so while I am at the  clinic.  5. I will follow up with [facility] regarding this goal at each outreach until it is completed.                               Care Plan: Transplant evaluation       Problem: CKD       Goal: Patient will attend her kidney transplant evaluation in the next 12 months.       Start Date: 5/21/2024 Expected End Date: 5/31/2025    This Visit's Progress: On Hold Recent Progress: 40%    Priority: High    Note:     Barriers: language barrier, low literacy, noncompliance, and lack of knowledge how to navigate complex health care system  Strengths: motivated to attend appt  Patient expressed understanding of goal: Yes    Action steps to achieve this goal:  1. I will attend my pre-transplant evaluation appointments on 6/12/2024 at 12pm, 12:30pm, and 1pm with MD and PharmD at [facility]. Completed.   2. I will attend my follow up transplant evaluation as scheduled on 8/21/2024 at 10am. Multiple appts scheduled today at [facility]. Completed.   3. I will attend my mammogram on 8/28/24. Cancelled via Mychart  4. I will attend my eye exam on 10/7/2024 at 1:00 pm at [facility] location. No Showed. Eye exam rescheduled on Mon 11/4/24 at 32PM. Completed  5. I will attend my rescheduled mammogram appointment on 11/18/24 at 2:15 pm. No Showed. Mammogram rescheduled to 12/4/24 at 12:45PM. Completed  6. I will attend my dental appointment on 4/2/2025 at 11:00 am at [facility] location. Completed  7. I will schedule a follow up appointment with my providers if it is recommended to do so while I am at the clinic.  8. I will follow up with [facility] regarding this goal at each outreach until it is completed.     Paulo Thomas MD Sahadevan, Meena, MBBS Lori, Nurse transplant coordinator, with [facility] ([phone])  [address]   [place] [address]   [phone] (Work)   [phone] (Fax)     Prema Heard [facility]   Transplant   [phone]                                   Plan/Recommendations: The patient will continue working with Care Coordination to achieve goal(s) as above. CHW will continue outreaches at minimum every 30 days and will involve Lead CC as needed or if patient is ready to move to Maintenance. Lead CC will continue to monitor CHW outreaches and patient's progress to goal(s) every 6 weeks.     Plan of Care updated and sent to patient: No

## 2025-07-21 ENCOUNTER — PATIENT OUTREACH (OUTPATIENT)
Dept: CARE COORDINATION | Facility: CLINIC | Age: 55
End: 2025-07-21
Payer: COMMERCIAL

## 2025-07-21 NOTE — PROGRESS NOTES
Clinic Care Coordination Contact  [facility]/Milan  Ruben  ID [id number]    Clinical Data: Care Coordinator Outreach    Outreach Documentation Number of Outreach Attempt       7/21/2025   2:28 PM 1     Left message on patient's voicemail with call back information and requested return call.    Plan: Care Coordinator will try to reach patient again in 10 business days.    Irene Samson  Community Health Worker   Clinic Care Coordination  [facility]    Phone: [phone]

## 2025-07-25 DIAGNOSIS — G62.9 NEUROPATHY: ICD-10-CM

## 2025-07-28 RX ORDER — GABAPENTIN 300 MG/1
300 CAPSULE ORAL AT BEDTIME
Qty: 30 CAPSULE | Refills: 3 | Status: SHIPPED | OUTPATIENT
Start: 2025-07-28

## 2025-07-31 ENCOUNTER — PATIENT OUTREACH (OUTPATIENT)
Dept: CARE COORDINATION | Facility: CLINIC | Age: 55
End: 2025-07-31
Payer: COMMERCIAL

## 2025-07-31 NOTE — PROGRESS NOTES
Clinic Care Coordination Contact  Community Health Worker Follow Up    Care Gaps:     Health Maintenance Due   Topic Date Due    HF ACTION PLAN  Never done    DEPRESSION ACTION PLAN  Never done    ZOSTER VACCINE (1 of 2) Never done    COVID-19 VACCINE (3 - Moderna risk series) 06/30/2022    LIPID  10/23/2024    A1C  03/16/2025    ALT  04/19/2025    DIABETIC FOOT EXAM  04/22/2025    COLORECTAL CANCER SCREENING  06/24/2025    PHQ-9  08/19/2025    YEARLY PREVENTIVE VISIT  08/28/2025    HEMOGLOBIN  10/02/2025       Scheduled to be addressed at ongoing PCP appointments      Care Plan:   Care Plan: Hematology/oncology       Problem: Beta thalassemia minor       Goal: Patient will attend hematology clinic appointment in the next 12 months.       Start Date: 3/19/2025 Expected End Date: 3/31/2026    This Visit's Progress: 70% Recent Progress: 10%    Priority: High    Note:     Barriers: language barrier, low literacy, noncompliance, and lack of knowledge how to navigate complex health care system  Strengths: motivated to attend appt  Patient expressed understanding of goal: Yes    Action steps to achieve this goal:  1. I will attend my follow up hematology/oncology appt on 3/19/2025 at 2pm for lab and 2:30pm with Dr. Zuñiga. No Showed  2. I will attend my rescheduled follow up hematology/oncology appt on 4/2 at 1 pm Lab and 1:30pm with ANTHONY Krause. Completed.   3. I will attend my 6 months follow-up on 11/5/25 at 1:15 am for lab and 1:45 pm with Dr Zuñiga.   4. I will schedule a follow up appointment with my provider if it is recommended to do so while I am at the clinic.  5. I will follow up with [facility] regarding this goal at each outreach until it is completed.                               Care Plan: Transplant evaluation       Problem: CKD       Goal: Patient will attend her kidney transplant evaluation in the next 12 months.       Start Date: 5/21/2024 Expected End Date: 5/31/2025    This Visit's Progress: On  Hold Recent Progress: On Hold    Priority: High    Note:     Barriers: language barrier, low literacy, noncompliance, and lack of knowledge how to navigate complex health care system  Strengths: motivated to attend appt  Patient expressed understanding of goal: Yes    Action steps to achieve this goal:  1. I will attend my pre-transplant evaluation appointments on 6/12/2024 at 12pm, 12:30pm, and 1pm with MD and PharmD at [facility]. Completed.   2. I will attend my follow up transplant evaluation as scheduled on 8/21/2024 at 10am. Multiple appts scheduled today at [facility]. Completed.   3. I will attend my mammogram on 8/28/24. Cancelled via Mychart  4. I will attend my eye exam on 10/7/2024 at 1:00 pm at [facility] location. No Showed. Eye exam rescheduled on Mon 11/4/24 at 32PM. Completed  5. I will attend my rescheduled mammogram appointment on 11/18/24 at 2:15 pm. No Showed. Mammogram rescheduled to 12/4/24 at 12:45PM. Completed  6. I will attend my dental appointment on 4/2/2025 at 11:00 am at [facility] location. Completed  7. I will schedule a follow up appointment with my providers if it is recommended to do so while I am at the clinic.  8. I will follow up with CCC regarding this goal at each outreach until it is completed.     Paulo Thomas MD Sahadevan, Meena, MBBS Lori, Nurse transplant coordinator, with [facility] ([phone])  [address]   [place] [address]   [phone] (Work)   [phone] (Fax)     Prema Heard [facility]   Transplant   [phone]                               Intervention and Education during outreach:     CHW was able to connect with the Patient with the assistance of a Monegasque  and introduce self/purpose of call. Per chart review, patient has their scheduled hepatologist appointment for 11/5/2025 and patient is scheduled with CC RN on 8/21/2025 for a follow-up.   Patient has no additional questions or  concerns during the time of call, however is agreeable to a CC call after 8/21/2025 appointment to assist with recommended follow-ups associated with that evaluation.    No additional CC needs identified during the time of call.    CHW Plan: CHW will reach out to the Patient in 1 month to monitor the progression of their goals.      Ellen SHER [facility] Community Health Worker  Ambulatory Care Coordination  Covering for CHW, Irene

## 2025-08-21 ENCOUNTER — PATIENT OUTREACH (OUTPATIENT)
Dept: NURSING | Facility: CLINIC | Age: 55
End: 2025-08-21
Payer: COMMERCIAL

## 2025-08-21 ENCOUNTER — OFFICE VISIT (OUTPATIENT)
Dept: FAMILY MEDICINE | Facility: CLINIC | Age: 55
End: 2025-08-21
Payer: COMMERCIAL

## 2025-08-21 VITALS
TEMPERATURE: 97.5 F | WEIGHT: 114 LBS | SYSTOLIC BLOOD PRESSURE: 160 MMHG | HEART RATE: 69 BPM | BODY MASS INDEX: 20.98 KG/M2 | OXYGEN SATURATION: 98 % | HEIGHT: 62 IN | DIASTOLIC BLOOD PRESSURE: 75 MMHG | RESPIRATION RATE: 16 BRPM

## 2025-08-21 DIAGNOSIS — K08.9 POOR DENTITION: ICD-10-CM

## 2025-08-21 DIAGNOSIS — M25.562 ACUTE PAIN OF LEFT KNEE: ICD-10-CM

## 2025-08-21 DIAGNOSIS — B37.31 YEAST INFECTION OF THE VAGINA: ICD-10-CM

## 2025-08-21 DIAGNOSIS — M62.81 GENERALIZED MUSCLE WEAKNESS: ICD-10-CM

## 2025-08-21 DIAGNOSIS — N18.6 ESRD (END STAGE RENAL DISEASE) ON DIALYSIS (H): ICD-10-CM

## 2025-08-21 DIAGNOSIS — Z99.2 ANEMIA DUE TO CHRONIC KIDNEY DISEASE, ON CHRONIC DIALYSIS (H): ICD-10-CM

## 2025-08-21 DIAGNOSIS — N18.6 ANEMIA DUE TO CHRONIC KIDNEY DISEASE, ON CHRONIC DIALYSIS (H): ICD-10-CM

## 2025-08-21 DIAGNOSIS — Z79.4 TYPE 2 DIABETES MELLITUS WITH CHRONIC KIDNEY DISEASE ON CHRONIC DIALYSIS, WITH LONG-TERM CURRENT USE OF INSULIN (H): Primary | ICD-10-CM

## 2025-08-21 DIAGNOSIS — K08.89 LOOSE TOOTH DUE TO TRAUMA: ICD-10-CM

## 2025-08-21 DIAGNOSIS — E11.22 TYPE 2 DIABETES MELLITUS WITH CHRONIC KIDNEY DISEASE ON CHRONIC DIALYSIS, WITH LONG-TERM CURRENT USE OF INSULIN (H): Primary | ICD-10-CM

## 2025-08-21 DIAGNOSIS — N18.6 TYPE 2 DIABETES MELLITUS WITH CHRONIC KIDNEY DISEASE ON CHRONIC DIALYSIS, WITH LONG-TERM CURRENT USE OF INSULIN (H): Primary | ICD-10-CM

## 2025-08-21 DIAGNOSIS — Z99.2 TYPE 2 DIABETES MELLITUS WITH CHRONIC KIDNEY DISEASE ON CHRONIC DIALYSIS, WITH LONG-TERM CURRENT USE OF INSULIN (H): Primary | ICD-10-CM

## 2025-08-21 DIAGNOSIS — R53.81 PHYSICAL DECONDITIONING: ICD-10-CM

## 2025-08-21 DIAGNOSIS — D63.1 ANEMIA DUE TO CHRONIC KIDNEY DISEASE, ON CHRONIC DIALYSIS (H): ICD-10-CM

## 2025-08-21 DIAGNOSIS — Z99.2 ESRD (END STAGE RENAL DISEASE) ON DIALYSIS (H): ICD-10-CM

## 2025-08-21 DIAGNOSIS — F33.2 MAJOR DEPRESSIVE DISORDER, RECURRENT SEVERE WITHOUT PSYCHOTIC FEATURES (H): ICD-10-CM

## 2025-08-21 PROBLEM — F32.A DEPRESSION, UNSPECIFIED DEPRESSION TYPE: Status: RESOLVED | Noted: 2021-12-06 | Resolved: 2025-08-21

## 2025-08-21 LAB
ALBUMIN SERPL BCG-MCNC: 4.2 G/DL (ref 3.5–5.2)
ALP SERPL-CCNC: 77 U/L (ref 40–150)
ALT SERPL W P-5'-P-CCNC: 12 U/L (ref 0–50)
AST SERPL W P-5'-P-CCNC: 17 U/L (ref 0–45)
BILIRUB SERPL-MCNC: 0.5 MG/DL
BILIRUBIN DIRECT (ROCHE PRO & PURE): 0.16 MG/DL (ref 0–0.45)
CHOLEST SERPL-MCNC: 103 MG/DL
ERYTHROCYTE [DISTWIDTH] IN BLOOD BY AUTOMATED COUNT: 21.2 % (ref 10–15)
EST. AVERAGE GLUCOSE BLD GHB EST-MCNC: 160 MG/DL
FASTING STATUS PATIENT QL REPORTED: NO
HBA1C MFR BLD: 7.2 % (ref 0–5.6)
HCT VFR BLD AUTO: 29.2 % (ref 35–47)
HDLC SERPL-MCNC: 21 MG/DL
HGB BLD-MCNC: 8.8 G/DL (ref 11.7–15.7)
LDLC SERPL CALC-MCNC: 28 MG/DL
MCH RBC QN AUTO: 21.6 PG (ref 26.5–33)
MCHC RBC AUTO-ENTMCNC: 30.1 G/DL (ref 31.5–36.5)
MCV RBC AUTO: 71.7 FL (ref 78–100)
NONHDLC SERPL-MCNC: 82 MG/DL
PLATELET # BLD AUTO: 132 10E3/UL (ref 150–450)
PROT SERPL-MCNC: 8.2 G/DL (ref 6.4–8.3)
RBC # BLD AUTO: 4.07 10E6/UL (ref 3.8–5.2)
TRIGL SERPL-MCNC: 272 MG/DL
WBC # BLD AUTO: 5.55 10E3/UL (ref 4–11)

## 2025-08-21 RX ORDER — FLUCONAZOLE 100 MG/1
100 TABLET ORAL DAILY
Qty: 1 TABLET | Refills: 0 | Status: SHIPPED | OUTPATIENT
Start: 2025-08-21

## 2025-08-21 RX ORDER — BLOOD-GLUCOSE SENSOR
EACH MISCELLANEOUS
Qty: 2 EACH | Refills: 5 | Status: SHIPPED | OUTPATIENT
Start: 2025-08-21

## 2025-08-21 ASSESSMENT — COLUMBIA-SUICIDE SEVERITY RATING SCALE - C-SSRS
2. IN THE PAST MONTH, HAVE YOU ACTUALLY HAD ANY THOUGHTS OF KILLING YOURSELF?: NO
6. HAVE YOU EVER DONE ANYTHING, STARTED TO DO ANYTHING, OR PREPARED TO DO ANYTHING TO END YOUR LIFE?: NO
1. WITHIN THE PAST MONTH, HAVE YOU WISHED YOU WERE DEAD OR WISHED YOU COULD GO TO SLEEP AND NOT WAKE UP?: YES

## 2025-08-21 ASSESSMENT — ANXIETY QUESTIONNAIRES
7. FEELING AFRAID AS IF SOMETHING AWFUL MIGHT HAPPEN: MORE THAN HALF THE DAYS
GAD7 TOTAL SCORE: 13
8. IF YOU CHECKED OFF ANY PROBLEMS, HOW DIFFICULT HAVE THESE MADE IT FOR YOU TO DO YOUR WORK, TAKE CARE OF THINGS AT HOME, OR GET ALONG WITH OTHER PEOPLE?: EXTREMELY DIFFICULT
5. BEING SO RESTLESS THAT IT IS HARD TO SIT STILL: MORE THAN HALF THE DAYS
6. BECOMING EASILY ANNOYED OR IRRITABLE: SEVERAL DAYS
2. NOT BEING ABLE TO STOP OR CONTROL WORRYING: MORE THAN HALF THE DAYS
4. TROUBLE RELAXING: MORE THAN HALF THE DAYS
1. FEELING NERVOUS, ANXIOUS, OR ON EDGE: MORE THAN HALF THE DAYS
3. WORRYING TOO MUCH ABOUT DIFFERENT THINGS: MORE THAN HALF THE DAYS
GAD7 TOTAL SCORE: 13
GAD7 TOTAL SCORE: 13
IF YOU CHECKED OFF ANY PROBLEMS ON THIS QUESTIONNAIRE, HOW DIFFICULT HAVE THESE PROBLEMS MADE IT FOR YOU TO DO YOUR WORK, TAKE CARE OF THINGS AT HOME, OR GET ALONG WITH OTHER PEOPLE: EXTREMELY DIFFICULT
7. FEELING AFRAID AS IF SOMETHING AWFUL MIGHT HAPPEN: MORE THAN HALF THE DAYS

## 2025-08-21 ASSESSMENT — PATIENT HEALTH QUESTIONNAIRE - PHQ9
10. IF YOU CHECKED OFF ANY PROBLEMS, HOW DIFFICULT HAVE THESE PROBLEMS MADE IT FOR YOU TO DO YOUR WORK, TAKE CARE OF THINGS AT HOME, OR GET ALONG WITH OTHER PEOPLE: EXTREMELY DIFFICULT
SUM OF ALL RESPONSES TO PHQ QUESTIONS 1-9: 17
SUM OF ALL RESPONSES TO PHQ QUESTIONS 1-9: 17

## 2025-08-23 DIAGNOSIS — I10 PRIMARY HYPERTENSION: ICD-10-CM

## 2025-08-25 DIAGNOSIS — E11.69 TYPE 2 DIABETES MELLITUS WITH OTHER SPECIFIED COMPLICATION, UNSPECIFIED WHETHER LONG TERM INSULIN USE (H): ICD-10-CM

## 2025-08-25 RX ORDER — CHLORAL HYDRATE 500 MG
1 CAPSULE ORAL DAILY
Qty: 30 CAPSULE | Refills: 3 | Status: SHIPPED | OUTPATIENT
Start: 2025-08-25

## 2025-08-25 RX ORDER — LISINOPRIL 40 MG/1
TABLET ORAL
Qty: 90 TABLET | Refills: 3 | Status: SHIPPED | OUTPATIENT
Start: 2025-08-25

## (undated) DEVICE — SOL WATER IRRIG 1000ML BOTTLE 2F7114

## (undated) DEVICE — SYR BULB IRRIG DOVER 60 ML LATEX FREE 67000

## (undated) DEVICE — SU SILK 4-0 TIE 12X30" A303H

## (undated) DEVICE — DRAPE SHEET MED 44X70" 9355

## (undated) DEVICE — PAD CHUX UNDERPAD 23X24" 7136

## (undated) DEVICE — BNDG ELASTIC 4"X5YDS STERILE 6611-4S

## (undated) DEVICE — PACK AV FISTULA

## (undated) DEVICE — SU VICRYL 3-0 SH 27" J316H

## (undated) DEVICE — ESU GROUND PAD ADULT W/CORD E7507

## (undated) DEVICE — GEL ULTRASOUND AQUASONIC 20GM 01-01

## (undated) DEVICE — DRAPE SHEET REV FOLD 3/4 9349

## (undated) DEVICE — LINEN TOWEL PACK X30 5481

## (undated) DEVICE — SYR 10ML SLIP TIP W/O NDL 303134

## (undated) DEVICE — EYE SPONGE SPEAR WECK CEL 0008685

## (undated) DEVICE — GLOVE BIOGEL PI MICRO SZ 7.5 48575

## (undated) DEVICE — SOL NACL 0.9% IRRIG 1000ML BOTTLE 2F7124

## (undated) DEVICE — CLIP HORIZON SM RED WIDE SLOT 001201

## (undated) DEVICE — PREP CHLORAPREP 26ML TINTED HI-LITE ORANGE 930815

## (undated) DEVICE — DRSG KERLIX 4 1/2"X4YDS ROLL 6715

## (undated) DEVICE — Device

## (undated) DEVICE — LINEN TOWEL PACK X6 WHITE 5487

## (undated) DEVICE — CLIP HORIZON MED BLUE 002200

## (undated) DEVICE — DRAPE EXTREMITY UPPER 120X76" 29414

## (undated) DEVICE — SU SILK 2-0 TIE 12X30" A305H

## (undated) DEVICE — DRSG GAUZE 4X4" TRAY 6939

## (undated) DEVICE — SYR 10ML FINGER CONTROL W/O NDL 309695

## (undated) DEVICE — SU MONOCRYL 4-0 PS-2 18" UND Y496G

## (undated) DEVICE — SYR 50ML LL W/O NDL 309653

## (undated) DEVICE — BLADE KNIFE SURG 11 371111

## (undated) DEVICE — LINEN GOWN XLG 5407

## (undated) DEVICE — SPONGE RAY-TEC 4X8" 7318

## (undated) DEVICE — SU SILK 3-0 TIE 12X30" A304H

## (undated) DEVICE — PITCHER STERILE 1000ML  SSK9004A

## (undated) DEVICE — BASIN SET SINGLE STERILE 13752-624

## (undated) DEVICE — SUCTION MANIFOLD NEPTUNE 2 SYS 4 PORT 0702-020-000

## (undated) DEVICE — DRAPE STOCKINETTE 4" 8544

## (undated) RX ORDER — DEXTROSE MONOHYDRATE 25 G/50ML
INJECTION, SOLUTION INTRAVENOUS
Status: DISPENSED
Start: 2022-10-28

## (undated) RX ORDER — HEPARIN SODIUM 1000 [USP'U]/ML
INJECTION, SOLUTION INTRAVENOUS; SUBCUTANEOUS
Status: DISPENSED
Start: 2023-02-14

## (undated) RX ORDER — FENTANYL CITRATE-0.9 % NACL/PF 10 MCG/ML
PLASTIC BAG, INJECTION (ML) INTRAVENOUS
Status: DISPENSED
Start: 2023-02-14

## (undated) RX ORDER — PAPAVERINE HYDROCHLORIDE 30 MG/ML
INJECTION INTRAMUSCULAR; INTRAVENOUS
Status: DISPENSED
Start: 2023-02-14

## (undated) RX ORDER — LIDOCAINE HYDROCHLORIDE AND EPINEPHRINE 10; 10 MG/ML; UG/ML
INJECTION, SOLUTION INFILTRATION; PERINEURAL
Status: DISPENSED
Start: 2023-02-14

## (undated) RX ORDER — LIDOCAINE HYDROCHLORIDE 10 MG/ML
INJECTION, SOLUTION EPIDURAL; INFILTRATION; INTRACAUDAL; PERINEURAL
Status: DISPENSED
Start: 2022-10-28

## (undated) RX ORDER — PROPOFOL 10 MG/ML
INJECTION, EMULSION INTRAVENOUS
Status: DISPENSED
Start: 2023-02-14

## (undated) RX ORDER — HYDROMORPHONE HYDROCHLORIDE 1 MG/ML
INJECTION, SOLUTION INTRAMUSCULAR; INTRAVENOUS; SUBCUTANEOUS
Status: DISPENSED
Start: 2023-02-14

## (undated) RX ORDER — BUPIVACAINE HYDROCHLORIDE 5 MG/ML
INJECTION, SOLUTION EPIDURAL; INTRACAUDAL
Status: DISPENSED
Start: 2023-02-14

## (undated) RX ORDER — CEFAZOLIN SODIUM/WATER 2 G/20 ML
SYRINGE (ML) INTRAVENOUS
Status: DISPENSED
Start: 2023-02-14

## (undated) RX ORDER — HYDRALAZINE HYDROCHLORIDE 20 MG/ML
INJECTION INTRAMUSCULAR; INTRAVENOUS
Status: DISPENSED
Start: 2022-10-28

## (undated) RX ORDER — FENTANYL CITRATE 50 UG/ML
INJECTION, SOLUTION INTRAMUSCULAR; INTRAVENOUS
Status: DISPENSED
Start: 2025-02-03

## (undated) RX ORDER — HYDRALAZINE HYDROCHLORIDE 20 MG/ML
INJECTION INTRAMUSCULAR; INTRAVENOUS
Status: DISPENSED
Start: 2023-02-14

## (undated) RX ORDER — FENTANYL CITRATE 50 UG/ML
INJECTION, SOLUTION INTRAMUSCULAR; INTRAVENOUS
Status: DISPENSED
Start: 2023-02-14

## (undated) RX ORDER — LIDOCAINE HYDROCHLORIDE 10 MG/ML
INJECTION, SOLUTION INFILTRATION; PERINEURAL
Status: DISPENSED
Start: 2025-02-03

## (undated) RX ORDER — HYDRALAZINE HYDROCHLORIDE 20 MG/ML
INJECTION INTRAMUSCULAR; INTRAVENOUS
Status: DISPENSED
Start: 2025-02-03

## (undated) RX ORDER — DEXTROSE MONOHYDRATE 50 MG/ML
INJECTION, SOLUTION INTRAVENOUS
Status: DISPENSED
Start: 2022-10-28